# Patient Record
Sex: FEMALE | Race: WHITE | NOT HISPANIC OR LATINO | Employment: OTHER | URBAN - NONMETROPOLITAN AREA
[De-identification: names, ages, dates, MRNs, and addresses within clinical notes are randomized per-mention and may not be internally consistent; named-entity substitution may affect disease eponyms.]

---

## 2017-01-05 ENCOUNTER — OFFICE VISIT (OUTPATIENT)
Dept: FAMILY MEDICINE CLINIC | Facility: CLINIC | Age: 62
End: 2017-01-05

## 2017-01-05 VITALS
BODY MASS INDEX: 30.82 KG/M2 | DIASTOLIC BLOOD PRESSURE: 82 MMHG | WEIGHT: 185 LBS | SYSTOLIC BLOOD PRESSURE: 122 MMHG | HEIGHT: 65 IN

## 2017-01-05 DIAGNOSIS — M54.41 MIDLINE LOW BACK PAIN WITH BILATERAL SCIATICA, UNSPECIFIED CHRONICITY: Primary | ICD-10-CM

## 2017-01-05 DIAGNOSIS — M54.42 MIDLINE LOW BACK PAIN WITH BILATERAL SCIATICA, UNSPECIFIED CHRONICITY: Primary | ICD-10-CM

## 2017-01-05 DIAGNOSIS — Z23 FLU VACCINE NEED: ICD-10-CM

## 2017-01-05 PROCEDURE — 90471 IMMUNIZATION ADMIN: CPT | Performed by: FAMILY MEDICINE

## 2017-01-05 PROCEDURE — 99213 OFFICE O/P EST LOW 20 MIN: CPT | Performed by: FAMILY MEDICINE

## 2017-01-05 PROCEDURE — 90686 IIV4 VACC NO PRSV 0.5 ML IM: CPT | Performed by: FAMILY MEDICINE

## 2017-01-05 RX ORDER — CLONAZEPAM 1 MG/1
1 TABLET ORAL 3 TIMES DAILY PRN
Qty: 90 TABLET | Refills: 2 | Status: SHIPPED | OUTPATIENT
Start: 2017-01-05 | End: 2017-03-15

## 2017-01-05 RX ORDER — HYDROCODONE BITARTRATE AND ACETAMINOPHEN 10; 325 MG/1; MG/1
1 TABLET ORAL EVERY 6 HOURS PRN
Qty: 120 TABLET | Refills: 0 | Status: SHIPPED | OUTPATIENT
Start: 2017-01-05 | End: 2017-03-15 | Stop reason: SDDI

## 2017-01-05 RX ORDER — OXYCODONE HYDROCHLORIDE 60 MG/1
60 TABLET, FILM COATED, EXTENDED RELEASE ORAL EVERY 12 HOURS
Qty: 60 TABLET | Refills: 0 | Status: SHIPPED | OUTPATIENT
Start: 2017-01-05 | End: 2017-03-15 | Stop reason: SDDI

## 2017-01-05 NOTE — PROGRESS NOTES
Subjective   Lana Mata is a 61 y.o. female.     Back Pain   This is a chronic problem. The current episode started more than 1 year ago. The problem has been gradually worsening since onset. The pain is present in the lumbar spine. The quality of the pain is described as aching. The pain radiates to the right thigh and left thigh. The pain is at a severity of 7/10. The pain is moderate. The pain is worse during the night. The symptoms are aggravated by standing and lying down. Stiffness is present at night. Pertinent negatives include no bladder incontinence.        The following portions of the patient's history were reviewed and updated as appropriate: allergies, current medications, past family history, past medical history, past social history, past surgical history and problem list.    Review of Systems   Genitourinary: Negative for bladder incontinence.   Musculoskeletal: Positive for back pain.       Objective   Physical Exam   Constitutional: She is oriented to person, place, and time. She appears well-developed and well-nourished. No distress.   HENT:   Head: Normocephalic and atraumatic.   Musculoskeletal:        Lumbar back: She exhibits decreased range of motion, tenderness and pain. She exhibits no bony tenderness, no swelling, no edema, no deformity, no laceration and no spasm.   Neurological: She is alert and oriented to person, place, and time.   Reflex Scores:       Patellar reflexes are 2+ on the right side and 2+ on the left side.  Skin: Skin is warm and dry. She is not diaphoretic.   Psychiatric: She has a normal mood and affect. Her behavior is normal. Judgment and thought content normal.   Nursing note and vitals reviewed.      Assessment/Plan   Problems Addressed this Visit     None

## 2017-01-05 NOTE — PROGRESS NOTES
Subjective   Lana Mata is a 61 y.o. female.     Back Pain   This is a chronic problem. The current episode started more than 1 year ago. The problem has been gradually worsening since onset. The pain is present in the lumbar spine. The quality of the pain is described as aching. The pain radiates to the right thigh and left thigh. The pain is at a severity of 7/10. The pain is moderate. The pain is worse during the night. The symptoms are aggravated by standing and lying down. Stiffness is present at night. Pertinent negatives include no bladder incontinence, bowel incontinence or fever.        The following portions of the patient's history were reviewed and updated as appropriate: allergies, current medications, past family history, past medical history, past social history, past surgical history and problem list.    Review of Systems   Constitutional: Negative for fever.   Gastrointestinal: Negative for bowel incontinence.   Genitourinary: Negative for bladder incontinence.   Musculoskeletal: Positive for back pain.       Objective   Physical Exam   Constitutional: She is oriented to person, place, and time. She appears well-developed and well-nourished. No distress.   HENT:   Head: Normocephalic and atraumatic.   Musculoskeletal:        Lumbar back: She exhibits decreased range of motion, tenderness and pain. She exhibits no bony tenderness, no swelling, no edema, no deformity, no laceration and no spasm.   Neurological: She is alert and oriented to person, place, and time.   Reflex Scores:       Patellar reflexes are 2+ on the right side and 2+ on the left side.  Skin: Skin is warm and dry. She is not diaphoretic.   Psychiatric: She has a normal mood and affect. Her behavior is normal. Judgment and thought content normal.   Nursing note and vitals reviewed.      Assessment/Plan   Problems Addressed this Visit     None      Visit Diagnoses     Midline low back pain with bilateral sciatica, unspecified  chronicity    -  Primary

## 2017-01-05 NOTE — MR AVS SNAPSHOT
Lana Mata   1/5/2017 1:45 PM   Office Visit    Dept Phone:  [phone]   Encounter #:  [id number]    Provider:  Camron Hancock MD   Department:  [facility]                Your Full Care Plan              Where to Get Your Medications      You can get these medications from any pharmacy     Bring a paper prescription for each of these medications     clonazePAM 1 MG tablet    HYDROcodone-acetaminophen  MG per tablet    OxyCODONE HCl ER 60 MG 12 hr tablet            Your Updated Medication List          This list is accurate as of: 1/5/17  2:04 PM.  Always use your most recent med list.                aspirin 81 MG EC tablet       atorvastatin 20 MG tablet   Commonly known as:  LIPITOR   Take 1 tablet by mouth daily.       calcium carbonate-vitamin d 600-400 MG-UNIT per tablet   Take 1 tablet by mouth 2 (two) times a day.       calcium citrate-vitamin d 200-250 MG-UNIT tablet tablet   Commonly known as:  CITRACAL       carisoprodol 350 MG tablet   Commonly known as:  SOMA   Take 1 tablet by mouth Every Night.       clonazePAM 1 MG tablet   Commonly known as:  KlonoPIN   Take 1 tablet by mouth 3 (Three) Times a Day As Needed for seizures (anxiety).       CYANOCOBALAMIN ER PO       gabapentin 300 MG capsule   Commonly known as:  NEURONTIN   Take 1 capsule by mouth 3 (Three) Times a Day.       HYDROcodone-acetaminophen  MG per tablet   Commonly known as:  NORCO   Take 1 tablet by mouth Every 6 (Six) Hours As Needed for moderate pain (4-6).       lisinopril 40 MG tablet   Commonly known as:  PRINIVIL,ZESTRIL   Take 1 tablet by mouth Daily.       methIMAzole 5 MG tablet   Commonly known as:  TAPAZOLE       MILK THISTLE PO       nitroglycerin 0.4 MG SL tablet   Commonly known as:  NITROSTAT   Place 1 tablet under the tongue See Admin Instructions. PRN cp, if not relieved in 5 min repeat and go to ER       omeprazole 20 MG capsule   Commonly  known as:  priLOSEC   Take 1 capsule by mouth daily.       OxyCODONE HCl ER 60 MG 12 hr tablet   Commonly known as:  OXYCONTIN   Take 1 tablet by mouth Every 12 (Twelve) Hours.       potassium chloride 10 MEQ CR tablet   Commonly known as:  K-DUR   Take 1 tablet by mouth 2 (two) times a day.       sotalol 120 MG tablet   Commonly known as:  BETAPACE       triamterene-hydrochlorothiazide 37.5-25 MG per tablet   Commonly known as:  MAXZIDE-25   Take 1 tablet by mouth every other day.       TRILIPIX 135 MG capsule   Generic drug:  choline fenofibrate       * venlafaxine  MG 24 hr capsule   Commonly known as:  EFFEXOR-XR       * venlafaxine 75 MG tablet   Commonly known as:  EFFEXOR       VITAMIN D BOOSTER PO       * Notice:  This list has 2 medication(s) that are the same as other medications prescribed for you. Read the directions carefully, and ask your doctor or other care provider to review them with you.            We Performed the Following     Flu Vaccine Quad (FLUZONE)       You Were Diagnosed With        Codes Comments    Midline low back pain with bilateral sciatica, unspecified chronicity    -  Primary ICD-10-CM: M54.41, M54.42  ICD-9-CM: 724.3     Flu vaccine need     ICD-10-CM: Z23  ICD-9-CM: V04.81       Instructions     None    Patient Instructions History      Upcoming Appointments     Visit Type Date Time Department    OFFICE VISIT 1/5/2017  1:45 PM [facility]    OFFICE VISIT 2/6/2017  1:30 PM [facility]    OFFICE VISIT 3/15/2017 10:00 AM [facility]    OFFICE VISIT 3/27/2017  1:15 PM [facility] Sign[facility]     [facility] [employer] allows you to send messages to your doctor, view your test results, renew your prescriptions, schedule appointments, and more. To sign up, go to [employer] and click on the Sign Up Now link in the New User? box. Enter your [employer] Activation Code exactly as it appears below along with the last four digits of  "your Social Security Number and your Date of Birth () to complete the sign-up process. If you do not sign up before the expiration date, you must request a new code.    [employer] Activation Code: [email or web address]  Expires: 2017  2:02 PM    If you have questions, you can email [email or web address] or call [phone] to talk to our MyChart staff. Remember, MyChart is NOT to be used for urgent needs. For medical emergencies, dial 911.               Other Info from Your Visit           Your Appointments     2017  1:30 PM CST   Office Visit with Camron Hancock MD   [facility] (--)    [address] [place] [address] [address]  [address] [place] [id number] [phone]           Arrive 15 minutes prior to appointment.            Mar 15, 2017 10:00 AM CDT   Office Visit with Camron Hancock MD   [facility] (--)    [address] [place] [address] [address]  [facility] [address] [place] [id number] [phone]           Arrive 15 minutes prior to appointment.            Mar 27, 2017  1:15 PM CDT   Office Visit with Ar Gooden MD PhD   [facility]IOLOG[facility] (--)    [address] [place] [address] [address]  [facility] [address] [place] [id number] [phone]           Arrive 15 minutes prior to appointment.              Allergies     Codeine      Latex      Pravachol [Pravastatin Sodium]  Myalgia      Reason for Visit     Back Pain midline low back pain with bilateral sciatica,unspecified chronicity      Vital Signs     Blood Pressure Height Weight Last Menstrual Period Body Mass Index Smoking Status    122/82 65\" (165.1 cm) 185 lb (83.9 kg) (LMP Unknown) 30.79 kg/m2 Never Smoker      Problems and Diagnoses Noted     Low back pain    Needs flu shot          Immunizations Administered     Name Date    Flu Vaccine Split Quad         "

## 2017-01-07 ENCOUNTER — TRANSCRIBE ORDERS (OUTPATIENT)
Dept: ADMINISTRATIVE | Facility: HOSPITAL | Age: 62
End: 2017-01-07

## 2017-01-07 DIAGNOSIS — I34.0 NON-RHEUMATIC MITRAL REGURGITATION: Primary | ICD-10-CM

## 2017-01-26 NOTE — TELEPHONE ENCOUNTER
Ms. torres will contact her pharmacy as she should have refills remaining. She will call if they tell her they need a new rx.

## 2017-01-31 ENCOUNTER — OFFICE VISIT (OUTPATIENT)
Dept: PAIN MEDICINE | Facility: CLINIC | Age: 62
End: 2017-01-31

## 2017-01-31 ENCOUNTER — LAB (OUTPATIENT)
Dept: LAB | Facility: HOSPITAL | Age: 62
End: 2017-01-31

## 2017-01-31 VITALS
WEIGHT: 190.2 LBS | HEIGHT: 65 IN | SYSTOLIC BLOOD PRESSURE: 158 MMHG | DIASTOLIC BLOOD PRESSURE: 90 MMHG | BODY MASS INDEX: 31.69 KG/M2

## 2017-01-31 DIAGNOSIS — M54.16 LUMBAR RADICULOPATHY: ICD-10-CM

## 2017-01-31 DIAGNOSIS — Z98.890 HISTORY OF LUMBAR SURGERY: ICD-10-CM

## 2017-01-31 DIAGNOSIS — M43.06 LUMBAR SPONDYLOLYSIS: ICD-10-CM

## 2017-01-31 DIAGNOSIS — M51.36 DDD (DEGENERATIVE DISC DISEASE), LUMBAR: Primary | ICD-10-CM

## 2017-01-31 DIAGNOSIS — Z79.891 LONG TERM (CURRENT) USE OF OPIATE ANALGESIC: ICD-10-CM

## 2017-01-31 DIAGNOSIS — Z79.891 LONG TERM (CURRENT) USE OF OPIATE ANALGESIC: Primary | ICD-10-CM

## 2017-01-31 PROCEDURE — G0481 DRUG TEST DEF 8-14 CLASSES: HCPCS | Performed by: NURSE PRACTITIONER

## 2017-01-31 PROCEDURE — 80307 DRUG TEST PRSMV CHEM ANLYZR: CPT | Performed by: NURSE PRACTITIONER

## 2017-01-31 PROCEDURE — 99204 OFFICE O/P NEW MOD 45 MIN: CPT | Performed by: NURSE PRACTITIONER

## 2017-01-31 PROCEDURE — 82570 ASSAY OF URINE CREATININE: CPT | Performed by: NURSE PRACTITIONER

## 2017-01-31 NOTE — PROGRESS NOTES
Lana Mata is a 61 y.o. female.   1955    HPI:   Location: lower back  Quality: pressure, throbbing and sharp  Severity: 5/10  Timing: constant  Alleviating: nothing  Aggravating: increased activity      Pt referred pain management via Dr. Magallanes related to Chronic Lower Back pain. Pt states lower back pain started 1995 at work. Pt was seen per Dr. Velasco PCP, Xray and MRI related to on and off left sided leg pain. Physical Therapy and Pain med (high pain tolerances). Dr. [facility] and eventually Dr. Landers ([facility]) MRI and scheduled lower disc left sided L5-S1 diskectomy surgery. PT states was not healing well but left leg pain did improve. Second back surgery 1996, Dr Landers and Dr. Jovel diskectomy right sided L5-S1 and hardware Fusion. Pt states pain was tolerable but leg left pain continued intermittently. Pt remained using pain medicine for years, and right leg became irritated and seen Dr. Landers 3rd surgery 2016, MRI done and DX Spinal stenosis--took small rods out and put bigger rods in and CAGED. Pt has been on Soma, Norco, Oxycontin, and neurontin for pain control by Dr. Magallanes. Depression controlled by Effexor PCP---hx Depression and suicide attempts in 1990s, seen Jhonathan. PCP wants pt to see Psychiatrist, I recommended that she sees a counselor and/or psychiatrist ASAP if patient wants to return in one week when PCP pain medicines run out--however it is noted and explained to the patient that pain medicines may or may not be resumed by this pain management establishment.  I would like to re-Dr. Magallanes's notes regarding apparent no-show for UDS that is in question.  Examination a patient, history and physical, obtaining images for review, discussion pain management options, recommending psychiatry or counseling before next visit, ordering UDS, and discussion with Dr. Eugene Brandon this is a 60 minute visit.          PROCEDURE- MRI lumbar spine without  contrast.      HISTORY- Back pain radiates down both hips. Prior lumbar surgery.      TECHNIQUE- Multiplanar multisequence noncontrast images lumbar spine.      Findings/conclusion-      There is a 0.7 cm anterior spondylolisthesis of L4 with respect L5  .This is a new finding in comparison with a prior lumbar spine x-ray  dated June 20, 2010.      There is a posterior lumbar fusion L5, S1 stabilized by posterior  vertical rods and 4 pedicle screws.   Pedicle screws unfortunately cause severe degradation of normal  anatomy at L5 and S1. Because of this MRI evaluation of L5 and S1  region is nondiagnostic.      MRI Lumbar spine is otherwise unremarkable.          Electronically Signed By- Jose Abbott MD On: 2015-03-17 17:34    The following portions of the patient's history were reviewed by me and updated as appropriate: allergies, current medications, past family history, past medical history, past social history, past surgical history and problem list.    Past Medical History   Diagnosis Date   • Abnormal gait 11/28/2012   • Abrasion 02/26/2015   • Acute ankle pain 03/31/2016   • Acute bronchitis 09/04/2015   • Allergic rhinitis 05/22/2012   • Asthenia 05/22/2014   • Atrial fibrillation 03/04/2016     - converted    • Attempted suicide    • Benign essential hypertension 03/04/2016   • Cardiovascular stress test abnormal 03/04/2016   • Cellulitis of knee 03/03/2015   • Chest pain 04/02/2015   • Chronic pain disorder    • Common cold 06/24/2014   • Congestive heart failure 02/04/2016   • Depressive disorder 05/22/2015     suspect more complicated psych issues      • Gastroesophageal reflux disease 01/17/2013   • Generalized anxiety disorder 02/04/2016   • H/O echocardiogram      Normal LV function with Ef of 60-65%.Mildly dilated right ventricle with normal function. Grade 1B diastolic dysfunction with mild CLVH.NO sig. valvular regurg. or stenosis.   • Headache 10/27/2014   • Hyperlipidemia 03/04/2016   • Low back  pain 06/30/2016     Need for prophylactic vaccination against Streptococcus pneumoniae [pneumococcus]    01/23/2015    • Neck pain 06/30/2016   • Obstructive sleep apnea of adult 07/30/2015   • Obstructive sleep apnea syndrome    • Osteoarthritis    • Osteoporosis 06/30/2016   • Pain in right shoulder 06/30/2016   • Pain in right shoulder 04/24/2014   • Shortness of breath 04/02/2015   • Shortness of breath 04/02/2015   • Skin ulcer 04/13/2015     left   • Thyrotoxicosis with or without goiter 10/24/2014   • Urinary incontinence 05/22/2014       Social History     Social History   • Marital status:      Spouse name: N/A   • Number of children: N/A   • Years of education: N/A     Occupational History   • Not on file.     Social History Main Topics   • Smoking status: Never Smoker   • Smokeless tobacco: Not on file      Comment: smoking cessation    • Alcohol use No   • Drug use: No      Comment: lortab / oxycontin   • Sexual activity: Defer     Other Topics Concern   • Not on file     Social History Narrative       Family History   Problem Relation Age of Onset   • Hypertension Mother    • Hyperlipidemia Mother    • Mental illness Mother    • Thyroid disease Mother    • Diabetes Maternal Grandmother    • Cancer Maternal Grandmother    • Depression Other    • Diabetes Other    • Heart disease Other    • Hypertension Other    • Osteoporosis Other    • Thyroid disease Other    • Cancer Maternal Aunt    • Cancer Paternal Aunt          Current Outpatient Prescriptions:   •  aspirin 81 MG EC tablet, Take 81 mg by mouth 2 (two) times a day., Disp: , Rfl:   •  atorvastatin (LIPITOR) 20 MG tablet, Take 1 tablet by mouth daily., Disp: 90 tablet, Rfl: 1  •  calcium carbonate-vitamin d 600-400 MG-UNIT per tablet, Take 1 tablet by mouth 2 (two) times a day., Disp: 60 tablet, Rfl: 0  •  calcium citrate-vitamin d (CITRACAL) 200-250 MG-UNIT tablet tablet, Take  by mouth daily., Disp: , Rfl:   •  carisoprodol (SOMA) 350 MG  tablet, Take 1 tablet by mouth Every Night., Disp: 30 tablet, Rfl: 5  •  choline fenofibrate (TRILIPIX) 135 MG capsule, Take 135 mg by mouth daily., Disp: , Rfl:   •  clonazePAM (KlonoPIN) 1 MG tablet, Take 1 tablet by mouth 3 (Three) Times a Day As Needed for seizures (anxiety)., Disp: 90 tablet, Rfl: 2  •  CYANOCOBALAMIN ER PO, Take  by mouth., Disp: , Rfl:   •  gabapentin (NEURONTIN) 300 MG capsule, Take 1 capsule by mouth 3 (Three) Times a Day., Disp: 90 capsule, Rfl: 11  •  HYDROcodone-acetaminophen (NORCO)  MG per tablet, Take 1 tablet by mouth Every 6 (Six) Hours As Needed for moderate pain (4-6)., Disp: 120 tablet, Rfl: 0  •  lisinopril (PRINIVIL,ZESTRIL) 40 MG tablet, Take 1 tablet by mouth Daily., Disp: 30 tablet, Rfl: 11  •  methimazole (TAPAZOLE) 5 MG tablet, Take 5 mg by mouth daily., Disp: , Rfl:   •  MILK THISTLE PO, Take  by mouth 2 (two) times a day., Disp: , Rfl:   •  nitroglycerin (NITROSTAT) 0.4 MG SL tablet, Place 1 tablet under the tongue See Admin Instructions. PRN cp, if not relieved in 5 min repeat and go to ER, Disp: 25 tablet, Rfl: 12  •  Nutritional Supplements (VITAMIN D BOOSTER PO), Take  by mouth., Disp: , Rfl:   •  omeprazole (PriLOSEC) 20 MG capsule, Take 1 capsule by mouth daily., Disp: 90 capsule, Rfl: 3  •  OxyCODONE HCl ER (OXYCONTIN) 60 MG 12 hr tablet, Take 1 tablet by mouth Every 12 (Twelve) Hours., Disp: 60 tablet, Rfl: 0  •  potassium chloride (K-DUR) 10 MEQ CR tablet, Take 1 tablet by mouth 2 (two) times a day., Disp: 60 tablet, Rfl: 11  •  sotalol (BETAPACE) 120 MG tablet, Take 120 mg by mouth 2 (two) times a day., Disp: , Rfl:   •  triamterene-hydrochlorothiazide (MAXZIDE-25) 37.5-25 MG per tablet, Take 1 tablet by mouth every other day., Disp: 90 tablet, Rfl: 3  •  venlafaxine (EFFEXOR) 75 MG tablet, Take 75 mg by mouth daily., Disp: , Rfl:   •  venlafaxine XR (EFFEXOR-XR) 150 MG 24 hr capsule, Take 150 mg by mouth daily., Disp: , Rfl:     Allergies   Allergen  Reactions   • Codeine    • Latex    • Pravachol [Pravastatin Sodium] Myalgia         She has already failed the following measures, including:   Conservative measures: ice and heat     Review of Systems   Musculoskeletal: Positive for back pain.   Neurological: Positive for headaches.   Psychiatric/Behavioral: Positive for dysphoric mood. The patient is nervous/anxious.      10 system review of systems was reviewed and negative except for above.    Physical Exam   Constitutional: She is oriented to person, place, and time. She appears well-developed and well-nourished.   HENT:   Head: Normocephalic.   Eyes: Conjunctivae and EOM are normal. Pupils are equal, round, and reactive to light.   Neck: Normal range of motion. Neck supple.   Cardiovascular: Normal rate and regular rhythm.    Pulmonary/Chest: Effort normal and breath sounds normal.   Abdominal: Soft. Bowel sounds are normal.   Musculoskeletal:        Thoracic back: She exhibits tenderness ( muscular in nature).        Lumbar back: She exhibits decreased range of motion ( flex 40 deg and 10 ext deg with mild FL ), tenderness, pain and spasm.        Back:    Tenderness bilateral SI joints   Neurological: She is alert and oriented to person, place, and time. A sensory deficit ( bilateral lateral thighs with light touch decrease in sensation) is present. Gait ( WC) abnormal.   Reflex Scores:       Tricep reflexes are 2+ on the right side and 2+ on the left side.       Bicep reflexes are 2+ on the right side and 2+ on the left side.       Brachioradialis reflexes are 2+ on the right side and 2+ on the left side.       Patellar reflexes are 2+ on the right side and 2+ on the left side.       Achilles reflexes are 2+ on the right side and 2+ on the left side.  Positive mild  SRL bilateral L>R   Skin: Skin is warm and dry.   Psychiatric: She has a normal mood and affect. Her behavior is normal. Judgment normal.   Nursing note and vitals reviewed.      Lana was seen  today for back pain.    Diagnoses and all orders for this visit:    DDD (degenerative disc disease), lumbar    Lumbar radiculopathy    Lumbar spondylolysis    History of lumbar surgery    Long term (current) use of opiate analgesic  -     ToxASSURE Select 13 (MW)        Medication: None at this time. Pt has been on Soma, Norco, Oxycontin, and neurontin for pain control by Dr. Magallanes. I explained to the patient at this current time no opiate medications will be taken overall prescribed.     Interventional:  Discussed TONI and need for emergency treatment. Pt currently on Xarleto chronically related to chronic A-Fib.    Rehab: none at this time.  Patient may be a candidate for future physical therapy.     Behavioral: No aberrant behavior noted. KAYLEIGH Report # [id number]  was reviewed and is consistent with stated history    Urine drug screen Ordered today to test for drugs of abuse and prescribed medications.  I understand that patient has currently taking Soma, OxyContin, Norco for pain management by primary care.  There are seems to be issues of depression that needs to be taken care of by psychiatry of counseling patient states that primary care recommended that she sees a psychiatrist which she has not done, I instructed her that she needs an appointment with a psychiatrist or counselor within 1 week before her pain medicine runs out and returned back to pain management for the Re-evaluation this will be attempt to continue or take over her opiate medication which is not a definite at this present time and this is discussed with the patient.      ORT: 6  Depression hx and suicide attempts in 1990s.  There are several areas of concern with opiate medication at this present time.  I hope to review Dr. Magallanes's last dose about aberrant UDS or no-show as reported.  Patient is to have a psychiatry of counseling appointment within 1 week.  A UDS will be done today with a possible result by within 1 week.     PHQ-9:  24          This document has been electronically signed by JOHN Woo on January 31, 2017 6:12 PM          This document has been electronically signed by JOHN Woo on January 31, 2017 6:12 PM

## 2017-02-05 LAB
CONV REPORT SUMMARY: NORMAL
Lab: NORMAL

## 2017-02-06 ENCOUNTER — OFFICE VISIT (OUTPATIENT)
Dept: PAIN MEDICINE | Facility: CLINIC | Age: 62
End: 2017-02-06

## 2017-02-06 VITALS
SYSTOLIC BLOOD PRESSURE: 140 MMHG | DIASTOLIC BLOOD PRESSURE: 88 MMHG | BODY MASS INDEX: 30.54 KG/M2 | WEIGHT: 183.3 LBS | HEIGHT: 65 IN

## 2017-02-06 DIAGNOSIS — Z98.890 HISTORY OF LUMBAR SURGERY: ICD-10-CM

## 2017-02-06 DIAGNOSIS — M51.36 DDD (DEGENERATIVE DISC DISEASE), LUMBAR: Primary | ICD-10-CM

## 2017-02-06 DIAGNOSIS — M54.16 LUMBAR RADICULOPATHY: ICD-10-CM

## 2017-02-06 DIAGNOSIS — M43.06 LUMBAR SPONDYLOLYSIS: ICD-10-CM

## 2017-02-06 PROCEDURE — 99213 OFFICE O/P EST LOW 20 MIN: CPT | Performed by: NURSE PRACTITIONER

## 2017-02-06 NOTE — PROGRESS NOTES
"Lana Mata is a 61 y.o. female.   1955    HPI:   Location: lower back  Quality: pressure,throbbing, and sharp  Severity: 5/10  Timing: constant  Alleviating: nothing  Aggravating: increased activity     Pt reports has upcoming appointment [facility] as discussed. Pt states \"will ask to up her Effexor\". UDS results reviewed. Reviewed Elpidio Quinn- UDS missed 1- and no account of pill count noted.  Discussed TONI and the need for emergency care.. Discussed today's visit is a follow-up related to counselor appointment, reviewing Dr. Magallanes's notes, and review of UDS.  Patient states \"you are going to write my pain medicines correct\"      The following portions of the patient's history were reviewed by me and updated as appropriate: allergies, current medications, past family history, past medical history, past social history, past surgical history and problem list.    Past Medical History   Diagnosis Date   • Abnormal gait 11/28/2012   • Abrasion 02/26/2015   • Acute ankle pain 03/31/2016   • Acute bronchitis 09/04/2015   • Allergic rhinitis 05/22/2012   • Asthenia 05/22/2014   • Atrial fibrillation 03/04/2016     - converted    • Attempted suicide    • Benign essential hypertension 03/04/2016   • Cardiovascular stress test abnormal 03/04/2016   • Cellulitis of knee 03/03/2015   • Chest pain 04/02/2015   • Chronic pain disorder    • Common cold 06/24/2014   • Congestive heart failure 02/04/2016   • Depressive disorder 05/22/2015     suspect more complicated psych issues      • Gastroesophageal reflux disease 01/17/2013   • Generalized anxiety disorder 02/04/2016   • H/O echocardiogram      Normal LV function with Ef of 60-65%.Mildly dilated right ventricle with normal function. Grade 1B diastolic dysfunction with mild CLVH.NO sig. valvular regurg. or stenosis.   • Headache 10/27/2014   • Hyperlipidemia 03/04/2016   • Low back pain 06/30/2016     Need for prophylactic vaccination against " Streptococcus pneumoniae [pneumococcus]    01/23/2015    • Neck pain 06/30/2016   • Obstructive sleep apnea of adult 07/30/2015   • Obstructive sleep apnea syndrome    • Osteoarthritis    • Osteoporosis 06/30/2016   • Pain in right shoulder 06/30/2016   • Pain in right shoulder 04/24/2014   • Shortness of breath 04/02/2015   • Shortness of breath 04/02/2015   • Skin ulcer 04/13/2015     left   • Thyrotoxicosis with or without goiter 10/24/2014   • Urinary incontinence 05/22/2014       Social History     Social History   • Marital status:      Spouse name: N/A   • Number of children: N/A   • Years of education: N/A     Occupational History   • Not on file.     Social History Main Topics   • Smoking status: Never Smoker   • Smokeless tobacco: Not on file      Comment: smoking cessation    • Alcohol use No   • Drug use: No      Comment: lortab / oxycontin   • Sexual activity: Defer     Other Topics Concern   • Not on file     Social History Narrative       Family History   Problem Relation Age of Onset   • Hypertension Mother    • Hyperlipidemia Mother    • Mental illness Mother    • Thyroid disease Mother    • Diabetes Maternal Grandmother    • Cancer Maternal Grandmother    • Depression Other    • Diabetes Other    • Heart disease Other    • Hypertension Other    • Osteoporosis Other    • Thyroid disease Other    • Cancer Maternal Aunt    • Cancer Paternal Aunt          Current Outpatient Prescriptions:   •  aspirin 81 MG EC tablet, Take 81 mg by mouth 2 (two) times a day., Disp: , Rfl:   •  atorvastatin (LIPITOR) 20 MG tablet, Take 1 tablet by mouth daily., Disp: 90 tablet, Rfl: 1  •  calcium carbonate-vitamin d 600-400 MG-UNIT per tablet, Take 1 tablet by mouth 2 (two) times a day., Disp: 60 tablet, Rfl: 0  •  calcium citrate-vitamin d (CITRACAL) 200-250 MG-UNIT tablet tablet, Take  by mouth daily., Disp: , Rfl:   •  carisoprodol (SOMA) 350 MG tablet, Take 1 tablet by mouth Every Night., Disp: 30 tablet,  Rfl: 5  •  choline fenofibrate (TRILIPIX) 135 MG capsule, Take 135 mg by mouth daily., Disp: , Rfl:   •  clonazePAM (KlonoPIN) 1 MG tablet, Take 1 tablet by mouth 3 (Three) Times a Day As Needed for seizures (anxiety)., Disp: 90 tablet, Rfl: 2  •  CYANOCOBALAMIN ER PO, Take  by mouth., Disp: , Rfl:   •  gabapentin (NEURONTIN) 300 MG capsule, Take 1 capsule by mouth 3 (Three) Times a Day., Disp: 90 capsule, Rfl: 11  •  HYDROcodone-acetaminophen (NORCO)  MG per tablet, Take 1 tablet by mouth Every 6 (Six) Hours As Needed for moderate pain (4-6)., Disp: 120 tablet, Rfl: 0  •  lisinopril (PRINIVIL,ZESTRIL) 40 MG tablet, Take 1 tablet by mouth Daily., Disp: 30 tablet, Rfl: 11  •  methimazole (TAPAZOLE) 5 MG tablet, Take 5 mg by mouth daily., Disp: , Rfl:   •  MILK THISTLE PO, Take  by mouth 2 (two) times a day., Disp: , Rfl:   •  nitroglycerin (NITROSTAT) 0.4 MG SL tablet, Place 1 tablet under the tongue See Admin Instructions. PRN cp, if not relieved in 5 min repeat and go to ER, Disp: 25 tablet, Rfl: 12  •  Nutritional Supplements (VITAMIN D BOOSTER PO), Take  by mouth., Disp: , Rfl:   •  omeprazole (PriLOSEC) 20 MG capsule, Take 1 capsule by mouth daily., Disp: 90 capsule, Rfl: 3  •  OxyCODONE HCl ER (OXYCONTIN) 60 MG 12 hr tablet, Take 1 tablet by mouth Every 12 (Twelve) Hours., Disp: 60 tablet, Rfl: 0  •  potassium chloride (K-DUR) 10 MEQ CR tablet, Take 1 tablet by mouth 2 (two) times a day., Disp: 60 tablet, Rfl: 11  •  sotalol (BETAPACE) 120 MG tablet, Take 120 mg by mouth 2 (two) times a day., Disp: , Rfl:   •  triamterene-hydrochlorothiazide (MAXZIDE-25) 37.5-25 MG per tablet, Take 1 tablet by mouth every other day., Disp: 90 tablet, Rfl: 3  •  venlafaxine (EFFEXOR) 75 MG tablet, Take 75 mg by mouth daily., Disp: , Rfl:   •  venlafaxine XR (EFFEXOR-XR) 150 MG 24 hr capsule, Take 150 mg by mouth daily., Disp: , Rfl:     Allergies   Allergen Reactions   • Codeine    • Latex    • Pravachol [Pravastatin  Sodium] Myalgia         Review of Systems   Musculoskeletal: Positive for back pain (lower).     10 system review of systems was reviewed and negative except for above.    Physical Exam   Constitutional: She is oriented to person, place, and time. She appears well-developed and well-nourished.   HENT:   Head: Normocephalic.   Eyes: Conjunctivae and EOM are normal. Pupils are equal, round, and reactive to light.   Neck: Normal range of motion. Neck supple.   Cardiovascular: Normal rate.    Pulmonary/Chest: Effort normal.   Abdominal: Soft.   Musculoskeletal:        Thoracic back: She exhibits tenderness ( muscular in nature).        Lumbar back: She exhibits decreased range of motion ( flex 40 deg and 10 ext deg with mild FL  ), tenderness and pain.        Back:    Tenderness bilateral SI joints   Neurological: She is alert and oriented to person, place, and time. A sensory deficit ( bilateral lateral thighs with light touch decrease in sensation) is present. Gait ( WC) abnormal.   Reflex Scores:       Tricep reflexes are 2+ on the right side and 2+ on the left side.       Bicep reflexes are 2+ on the right side and 2+ on the left side.       Brachioradialis reflexes are 2+ on the right side and 2+ on the left side.       Patellar reflexes are 2+ on the right side and 2+ on the left side.       Achilles reflexes are 2+ on the right side and 2+ on the left side.  Positive mild  SRL bilateral L>R   Skin: Skin is warm and dry.   Psychiatric: She has a normal mood and affect. Her behavior is normal. Judgment normal. She expresses no homicidal and no suicidal ideation. She expresses no suicidal plans.   Nursing note and vitals reviewed.      Lana was seen today for back pain.    Diagnoses and all orders for this visit:    DDD (degenerative disc disease), lumbar    Lumbar radiculopathy    Lumbar spondylolysis    History of lumbar surgery        Medication: None at this time.  I encouraged patient to keep counseling  "appointment next week.  After reviewing Dr. Magallanes's notes, KAYLEIGH report from January 2016, it is noted that patient did not show up for pill count and missed a urinary drug screen.  As I stated in my notes prior visit, I reviewed Dr. Magallanes's notes and I confronted patient.  Patient was agitated saying that no one in the office told her about the pill count or UDS-I commonly reviewed pain management options and medical decision related to no opiate medication prescriptions today.  Patient demanded to talk to someone \"higher up\" and possibly Dr. Magallanes's office.  Dr. Eugene Brandon, practice manager [place], and I commonly talk with patient respectively over issue.  Patient \"Dr. Pompa sent me to get pain medicines, I'm not leaving until I get pain medicines,\".  Explained that today's evaluation after one week to insure that she has a follow up with Counselor, review Dr. Magallanes's notes, and review UDS.  Patient again became agitated and demanding.  Patient states \"everybody else is getting pain medicine by her…\" Patient irritated as she was assisted by security towards elevators.  Patient may follow up with pain management when necessary, but it was discussed that alternative treatments and medications may be prescribed but opiate medications will not be part of the treatment.    Interventional: Discussed in detail lumbar injection interventions and alternative muscle relaxers; patient declined. Pt following up with tessa Olivera. Follow up PCP Elpidio.     Rehab: Discussed physical therapy for mobility and pain; patient declined. Possible future candidate for Physical Therapy.     Behavioral: No aberrant behavior noted. KAYLEIGH Report # [id number]  was reviewed and is consistent with stated history    Urine drug screen Reviewed from last visit and is appropriate.  Patient become very irate and agitated after reviewing Dr. Magallanes's notes and speaking with his staff, patient did not show up for pill count and urinary drug " screen.          This document has been electronically signed by JOHN Woo on February 6, 2017 12:51 PM

## 2017-02-14 NOTE — PROGRESS NOTES
Addendum:  I have reviewed this patient with JOHN Silver.  I agree with the above findings and plan.  I have personally spoken with the patient today, and confirmed key findings.

## 2017-02-16 ENCOUNTER — DOCUMENTATION (OUTPATIENT)
Dept: FAMILY MEDICINE CLINIC | Facility: CLINIC | Age: 62
End: 2017-02-16

## 2017-02-16 NOTE — PROGRESS NOTES
On 01/16/2017, I called the patient to tell her to come in for a Random Pill Count and Urine Drug Screen for her controlled pain medication.  Told patient that she had 24 hours per Dr. Hancock to come in and have these two things done.  Pt stated that she understood.  Pt did not show up within the 24 hour time.  Per Dr. Hancock, I called patient and told her that due to her not coming in, Dr. Hancock would no longer be able to prescribe any controlled medications for her.  Relayed to patient that Dr. Hancock would still be able to see her for any medical issues, but that we would have to refer her to a Pain Management provider for her controlled medications.  Pt was upset, but stated that she did indeed want to see a pain management provider.  Per Dr. Hancock, referral was sent to Dr. Jacob Brandon for pain management.

## 2017-03-09 ENCOUNTER — HOSPITAL ENCOUNTER (EMERGENCY)
Facility: HOSPITAL | Age: 62
Discharge: HOME | End: 2017-03-09
Attending: EMERGENCY MEDICINE | Admitting: EMERGENCY MEDICINE

## 2017-03-09 ENCOUNTER — APPOINTMENT (OUTPATIENT)
Dept: GENERAL RADIOLOGY | Facility: HOSPITAL | Age: 62
End: 2017-03-09

## 2017-03-09 VITALS
HEART RATE: 103 BPM | TEMPERATURE: 97.9 F | WEIGHT: 189 LBS | RESPIRATION RATE: 18 BRPM | OXYGEN SATURATION: 96 % | BODY MASS INDEX: 31.49 KG/M2 | HEIGHT: 65 IN | DIASTOLIC BLOOD PRESSURE: 74 MMHG | SYSTOLIC BLOOD PRESSURE: 169 MMHG

## 2017-03-09 DIAGNOSIS — F41.9 ANXIETY: Primary | ICD-10-CM

## 2017-03-09 DIAGNOSIS — R06.00 DYSPNEA, UNSPECIFIED TYPE: ICD-10-CM

## 2017-03-09 LAB
ALBUMIN SERPL-MCNC: 4.7 G/DL (ref 3.4–4.8)
ALBUMIN/GLOB SERPL: 1.4 G/DL (ref 1.1–1.8)
ALP SERPL-CCNC: 57 U/L (ref 38–126)
ALT SERPL W P-5'-P-CCNC: 26 U/L (ref 9–52)
ANION GAP SERPL CALCULATED.3IONS-SCNC: 12 MMOL/L (ref 5–15)
APTT PPP: 32.9 SECONDS (ref 20–40.3)
AST SERPL-CCNC: 29 U/L (ref 14–36)
BASOPHILS # BLD AUTO: 0.06 10*3/MM3 (ref 0–0.2)
BASOPHILS NFR BLD AUTO: 0.7 % (ref 0–2)
BILIRUB SERPL-MCNC: 0.7 MG/DL (ref 0.2–1.3)
BUN BLD-MCNC: 14 MG/DL (ref 7–21)
BUN/CREAT SERPL: 14.3 (ref 7–25)
CALCIUM SPEC-SCNC: 10.1 MG/DL (ref 8.4–10.2)
CHLORIDE SERPL-SCNC: 102 MMOL/L (ref 95–110)
CK MB SERPL-CCNC: 1.7 NG/ML (ref 0–5)
CO2 SERPL-SCNC: 27 MMOL/L (ref 22–31)
CREAT BLD-MCNC: 0.98 MG/DL (ref 0.5–1)
DEPRECATED RDW RBC AUTO: 41.6 FL (ref 36.4–46.3)
EOSINOPHIL # BLD AUTO: 0.25 10*3/MM3 (ref 0–0.7)
EOSINOPHIL NFR BLD AUTO: 3 % (ref 0–7)
ERYTHROCYTE [DISTWIDTH] IN BLOOD BY AUTOMATED COUNT: 13 % (ref 11.5–14.5)
GFR SERPL CREATININE-BSD FRML MDRD: 58 ML/MIN/1.73 (ref 45–104)
GLOBULIN UR ELPH-MCNC: 3.3 GM/DL (ref 2.3–3.5)
GLUCOSE BLD-MCNC: 130 MG/DL (ref 60–100)
HCT VFR BLD AUTO: 46 % (ref 35–45)
HGB BLD-MCNC: 16.1 G/DL (ref 12–15.5)
HOLD SPECIMEN: NORMAL
IMM GRANULOCYTES # BLD: 0.01 10*3/MM3 (ref 0–0.02)
IMM GRANULOCYTES NFR BLD: 0.1 % (ref 0–0.5)
INR PPP: 1.58 (ref 0.8–1.2)
LYMPHOCYTES # BLD AUTO: 2.58 10*3/MM3 (ref 0.6–4.2)
LYMPHOCYTES NFR BLD AUTO: 30.8 % (ref 10–50)
MCH RBC QN AUTO: 30.8 PG (ref 26.5–34)
MCHC RBC AUTO-ENTMCNC: 35 G/DL (ref 31.4–36)
MCV RBC AUTO: 88.1 FL (ref 80–98)
MONOCYTES # BLD AUTO: 0.65 10*3/MM3 (ref 0–0.9)
MONOCYTES NFR BLD AUTO: 7.7 % (ref 0–12)
NEUTROPHILS # BLD AUTO: 4.84 10*3/MM3 (ref 2–8.6)
NEUTROPHILS NFR BLD AUTO: 57.7 % (ref 37–80)
NT-PROBNP SERPL-MCNC: 2390 PG/ML (ref 0–900)
PLATELET # BLD AUTO: 324 10*3/MM3 (ref 150–450)
PMV BLD AUTO: 10.2 FL (ref 8–12)
POTASSIUM BLD-SCNC: 4.3 MMOL/L (ref 3.5–5.1)
PROT SERPL-MCNC: 8 G/DL (ref 6.3–8.6)
PROTHROMBIN TIME: 18.6 SECONDS (ref 11.1–15.3)
RBC # BLD AUTO: 5.22 10*6/MM3 (ref 3.77–5.16)
SODIUM BLD-SCNC: 141 MMOL/L (ref 137–145)
TROPONIN I SERPL-MCNC: 0.02 NG/ML
WBC NRBC COR # BLD: 8.39 10*3/MM3 (ref 3.2–9.8)

## 2017-03-09 PROCEDURE — 93005 ELECTROCARDIOGRAM TRACING: CPT

## 2017-03-09 PROCEDURE — 82553 CREATINE MB FRACTION: CPT | Performed by: FAMILY MEDICINE

## 2017-03-09 PROCEDURE — 80053 COMPREHEN METABOLIC PANEL: CPT | Performed by: FAMILY MEDICINE

## 2017-03-09 PROCEDURE — 99284 EMERGENCY DEPT VISIT MOD MDM: CPT

## 2017-03-09 PROCEDURE — 85610 PROTHROMBIN TIME: CPT | Performed by: FAMILY MEDICINE

## 2017-03-09 PROCEDURE — 84484 ASSAY OF TROPONIN QUANT: CPT | Performed by: FAMILY MEDICINE

## 2017-03-09 PROCEDURE — 71020 HC CHEST PA AND LATERAL: CPT

## 2017-03-09 PROCEDURE — 83880 ASSAY OF NATRIURETIC PEPTIDE: CPT | Performed by: FAMILY MEDICINE

## 2017-03-09 PROCEDURE — 85730 THROMBOPLASTIN TIME PARTIAL: CPT | Performed by: FAMILY MEDICINE

## 2017-03-09 PROCEDURE — 93010 ELECTROCARDIOGRAM REPORT: CPT | Performed by: INTERNAL MEDICINE

## 2017-03-09 PROCEDURE — 85025 COMPLETE CBC W/AUTO DIFF WBC: CPT | Performed by: FAMILY MEDICINE

## 2017-03-09 RX ORDER — LABETALOL HYDROCHLORIDE 5 MG/ML
10 INJECTION, SOLUTION INTRAVENOUS ONCE
Status: DISCONTINUED | OUTPATIENT
Start: 2017-03-09 | End: 2017-03-09 | Stop reason: HOSPADM

## 2017-03-09 RX ORDER — HYDROCHLOROTHIAZIDE 25 MG/1
25 TABLET ORAL DAILY
COMMUNITY
End: 2017-03-27

## 2017-03-09 RX ORDER — SODIUM CHLORIDE 0.9 % (FLUSH) 0.9 %
10 SYRINGE (ML) INJECTION AS NEEDED
Status: DISCONTINUED | OUTPATIENT
Start: 2017-03-09 | End: 2017-03-09 | Stop reason: HOSPADM

## 2017-03-09 RX ORDER — HYDROXYZINE PAMOATE 50 MG/1
50 CAPSULE ORAL 3 TIMES DAILY PRN
Qty: 30 CAPSULE | Refills: 0 | Status: SHIPPED | OUTPATIENT
Start: 2017-03-09 | End: 2017-03-27

## 2017-03-09 NOTE — ED PROVIDER NOTES
Subjective     History provided by:  Patient    Lana Mata is a 62 year old female with a history complicated by previous Afib, CHF Grade 1B diastolic dysfunction with EF 60-65% and sleep apnea who presented with a 1 day history of dyspnea, increased blood pressure, headache, dizzyness, weakness. This morning she said she felt like her heart was racing and checked her bp and it was 158/110. These findings along with her feeling of increased dyspnea is what brought her to the ED today. Denies chest pain, wheeze, vomiting, syncope. Has an echo scheduled next Friday with a follow up visit on the 27th with Dr. Gooden.     Review of Systems   Constitutional: Negative for activity change, appetite change, fatigue and fever.   HENT: Negative for congestion, nosebleeds, postnasal drip, rhinorrhea, sneezing and sore throat.    Eyes: Negative for pain, discharge, itching and visual disturbance.   Respiratory: Positive for shortness of breath. Negative for cough, choking, wheezing and stridor.    Cardiovascular: Positive for palpitations. Negative for chest pain.   Gastrointestinal: Negative for abdominal distention, abdominal pain, constipation, diarrhea, nausea and vomiting.   Genitourinary: Negative for dysuria, flank pain and hematuria.   Musculoskeletal: Negative for arthralgias, neck pain and neck stiffness.   Skin: Negative for color change and rash.   Neurological: Positive for dizziness, light-headedness and headaches. Negative for seizures, syncope and speech difficulty.   Psychiatric/Behavioral: Positive for sleep disturbance (sleep apnea). Negative for agitation and confusion. The patient is not nervous/anxious.        Past Medical History   Diagnosis Date   • Abnormal gait 11/28/2012   • Abrasion 02/26/2015   • Acute ankle pain 03/31/2016   • Acute bronchitis 09/04/2015   • Allergic rhinitis 05/22/2012   • Asthenia 05/22/2014   • Atrial fibrillation 03/04/2016     - converted    • Attempted suicide    •  Benign essential hypertension 03/04/2016   • Cardiovascular stress test abnormal 03/04/2016   • Cellulitis of knee 03/03/2015   • Chest pain 04/02/2015   • Chronic pain disorder    • Common cold 06/24/2014   • Congestive heart failure 02/04/2016   • Depressive disorder 05/22/2015     suspect more complicated psych issues      • Gastroesophageal reflux disease 01/17/2013   • Generalized anxiety disorder 02/04/2016   • H/O echocardiogram      Normal LV function with Ef of 60-65%.Mildly dilated right ventricle with normal function. Grade 1B diastolic dysfunction with mild CLVH.NO sig. valvular regurg. or stenosis.   • Headache 10/27/2014   • Hyperlipidemia 03/04/2016   • Low back pain 06/30/2016     Need for prophylactic vaccination against Streptococcus pneumoniae [pneumococcus]    01/23/2015    • Neck pain 06/30/2016   • Obstructive sleep apnea of adult 07/30/2015   • Obstructive sleep apnea syndrome    • Osteoarthritis    • Osteoporosis 06/30/2016   • Pain in right shoulder 06/30/2016   • Pain in right shoulder 04/24/2014   • Shortness of breath 04/02/2015   • Shortness of breath 04/02/2015   • Skin ulcer 04/13/2015     left   • Thyrotoxicosis with or without goiter 10/24/2014   • Urinary incontinence 05/22/2014       Allergies   Allergen Reactions   • Codeine    • Latex    • Morphine And Related    • Pravachol [Pravastatin Sodium] Myalgia       Past Surgical History   Procedure Laterality Date   • Back surgery  08/07/2015     Removal of intstrumentation,L5-S1.Exploration of fusion,L5-S1.Posterolateral fusion,L4-L5.Posterior segmental spinal instrumentation L4-5.Transforaminal interbody fusion Through right sided approach.Performed at    • Injection of medication  03/23/2015     celestone   • Colonoscopy  05/04/2016     will order cologuard   • Mandible fracture surgery       JAW WIRED SHUT   • Injection of medication  05/04/2016     KENALOG(6)   • Lumbar laminectomy  1995   • Lumbar fusion       discectomy fusion  with rods L4-S1   • Mouth surgery  10/05/2015     Fractured mandible. Removal of arch bars.   • Mouth surgery  1985     Fractured mandible. Removal of arch bars.   • Rhinoplasty     • Tubal abdominal ligation  1986       Family History   Problem Relation Age of Onset   • Hypertension Mother    • Hyperlipidemia Mother    • Mental illness Mother    • Thyroid disease Mother    • Diabetes Maternal Grandmother    • Cancer Maternal Grandmother    • Depression Other    • Diabetes Other    • Heart disease Other    • Hypertension Other    • Osteoporosis Other    • Thyroid disease Other    • Cancer Maternal Aunt    • Cancer Paternal Aunt        Social History     Social History   • Marital status:      Spouse name: N/A   • Number of children: N/A   • Years of education: N/A     Social History Main Topics   • Smoking status: Never Smoker   • Smokeless tobacco: Not on file      Comment: smoking cessation    • Alcohol use No   • Drug use: No      Comment: lortab / oxycontin   • Sexual activity: Defer     Other Topics Concern   • Not on file     Social History Narrative           Objective   Physical Exam   Constitutional: She is oriented to person, place, and time. She appears well-developed and well-nourished. No distress.   HENT:   Head: Normocephalic and atraumatic.   Right Ear: External ear normal.   Left Ear: External ear normal.   Nose: Nose normal.   Mouth/Throat: Oropharynx is clear and moist. No oropharyngeal exudate.   Eyes: Conjunctivae and EOM are normal. Pupils are equal, round, and reactive to light. Right eye exhibits no discharge. Left eye exhibits no discharge. No scleral icterus.   Neck: Normal range of motion. Neck supple. No JVD present. No tracheal deviation present.   Cardiovascular: Regular rhythm, S1 normal, S2 normal and normal heart sounds.  Tachycardia present.  Exam reveals no gallop, no distant heart sounds and no friction rub.    No murmur heard.  Pulmonary/Chest: Effort normal and breath  sounds normal. No stridor. No respiratory distress. She has no wheezes. She has no rales. She exhibits no tenderness.   Abdominal: Soft. Bowel sounds are normal. She exhibits no distension and no mass. There is no tenderness. There is no rebound and no guarding. No hernia.   Musculoskeletal: Normal range of motion. She exhibits no edema, tenderness or deformity.   Lymphadenopathy:     She has no cervical adenopathy.   Neurological: She is alert and oriented to person, place, and time. No cranial nerve deficit. She exhibits normal muscle tone.   Skin: Skin is warm and dry. No rash noted. She is not diaphoretic. No erythema. No pallor.   Psychiatric: She has a normal mood and affect. Her behavior is normal. Judgment and thought content normal.       Procedures  Lab Results (last 24 hours)     Procedure Component Value Units Date/Time    CBC & Differential [[id number]] Collected:  03/09/17 0934    Specimen:  Blood Updated:  03/09/17 0948    Narrative:       The following orders were created for panel order CBC & Differential.  Procedure                               Abnormality         Status                     ---------                               -----------         ------                     CBC Auto Differential[[id number]]         Abnormal            Final result                 Please view results for these tests on the individual orders.    Comprehensive Metabolic Panel [[id number]]  (Abnormal) Collected:  03/09/17 0934    Specimen:  Blood Updated:  03/09/17 1007     Glucose 130 (H) mg/dL      BUN 14 mg/dL      Creatinine 0.98 mg/dL      Sodium 141 mmol/L      Potassium 4.3 mmol/L      Chloride 102 mmol/L      CO2 27.0 mmol/L      Calcium 10.1 mg/dL      Total Protein 8.0 g/dL      Albumin 4.70 g/dL      ALT (SGPT) 26 U/L      AST (SGOT) 29 U/L      Alkaline Phosphatase 57 U/L      Total Bilirubin 0.7 mg/dL      eGFR Non African Amer 58 mL/min/1.73      Globulin 3.3 gm/dL      A/G Ratio 1.4 g/dL      BUN/Creatinine  Ratio 14.3      Anion Gap 12.0 mmol/L     BNP [[id number]]  (Abnormal) Collected:  03/09/17 0934    Specimen:  Blood Updated:  03/09/17 1019     proBNP 2390.0 (H) pg/mL     Troponin [[id number]]  (Normal) Collected:  03/09/17 0934    Specimen:  Blood Updated:  03/09/17 1019     Troponin I 0.022 ng/mL     CK-MB [[id number]]  (Normal) Collected:  03/09/17 0934    Specimen:  Blood Updated:  03/09/17 1019     CKMB 1.70 ng/mL     CBC Auto Differential [[id number]]  (Abnormal) Collected:  03/09/17 0934    Specimen:  Blood Updated:  03/09/17 0948     WBC 8.39 10*3/mm3      RBC 5.22 (H) 10*6/mm3      Hemoglobin 16.1 (H) g/dL      Hematocrit 46.0 (H) %      MCV 88.1 fL      MCH 30.8 pg      MCHC 35.0 g/dL      RDW 13.0 %      RDW-SD 41.6 fl      MPV 10.2 fL      Platelets 324 10*3/mm3      Neutrophil % 57.7 %      Lymphocyte % 30.8 %      Monocyte % 7.7 %      Eosinophil % 3.0 %      Basophil % 0.7 %      Immature Grans % 0.1 %      Neutrophils, Absolute 4.84 10*3/mm3      Lymphocytes, Absolute 2.58 10*3/mm3      Monocytes, Absolute 0.65 10*3/mm3      Eosinophils, Absolute 0.25 10*3/mm3      Basophils, Absolute 0.06 10*3/mm3      Immature Grans, Absolute 0.01 10*3/mm3         Xr Chest 2 View    Result Date: 3/9/2017  Narrative: Patient Name:  MS. AZIZA EDWARDS [facility] Patient ID:  [id number] Ordering:  THOMAS KERN Attending:  HUMBERTO DAO Referring:  THOMAS KERN ------------------------------------------------ Chest PA and lateral CLINICAL INDICATION: Shortness of breath COMPARISON: December 15, 2016. FINDINGS: The heart is borderline enlarged. Pulmonary vascularity is unremarkable. No focal infiltrate or consolidation.  No pleural effusion.  No pneumothorax. No change since prior exam.    Impression: CONCLUSION: Borderline cardiomegaly. Otherwise unremarkable chest without change since prior exam. No evidence of active disease. Electronically signed by:  Jose Abbott MD  3/9/2017 10:01 AM CST Workstation:  [id number]         ED Course  ED Course   Comment By Time   Seen and examined Earl Orourke MD 03/09 0950   Ordered cbc, cmp, PT/INR, aptt, Troponin and ck-mb x3, EKG, BNP, CXR. Gave 10 mg labetalol for elevated bp. Earl Orourke MD 03/09 0951   Labetalol held due to bp decrease to 157/86 Earl Orourke MD 03/09 0959   Reevaluated, resting comfortably. Mentioned she had run out of her anxiety medications. Earl Orourke MD 03/09 1054                  [facility]    Final diagnoses:   Anxiety   Dyspnea, unspecified type            Earl Orourke MD  Resident  03/09/17 1101

## 2017-03-10 ENCOUNTER — TELEPHONE (OUTPATIENT)
Dept: CARDIOLOGY | Facility: CLINIC | Age: 62
End: 2017-03-10

## 2017-03-10 NOTE — TELEPHONE ENCOUNTER
Patient contacted the office requesting that dr. garza increase her bp meds as she was recently seen in the er with elevated bp and hr. Dr. garza has reviewed the er note and has asked that the patient see her pcp to restart her anxiety meds. She was instructed to keep her f/u appt with dr. garza on march 27.

## 2017-03-15 ENCOUNTER — OFFICE VISIT (OUTPATIENT)
Dept: FAMILY MEDICINE CLINIC | Facility: CLINIC | Age: 62
End: 2017-03-15

## 2017-03-15 VITALS
BODY MASS INDEX: 29.72 KG/M2 | HEART RATE: 69 BPM | OXYGEN SATURATION: 99 % | SYSTOLIC BLOOD PRESSURE: 110 MMHG | WEIGHT: 178.6 LBS | DIASTOLIC BLOOD PRESSURE: 70 MMHG

## 2017-03-15 DIAGNOSIS — I10 BENIGN ESSENTIAL HYPERTENSION: ICD-10-CM

## 2017-03-15 DIAGNOSIS — I48.0 PAROXYSMAL ATRIAL FIBRILLATION (HCC): Primary | ICD-10-CM

## 2017-03-15 DIAGNOSIS — F41.1 GENERALIZED ANXIETY DISORDER: ICD-10-CM

## 2017-03-15 DIAGNOSIS — R26.9 ABNORMAL GAIT: ICD-10-CM

## 2017-03-15 DIAGNOSIS — I50.9 CONGESTIVE HEART FAILURE, UNSPECIFIED CONGESTIVE HEART FAILURE CHRONICITY, UNSPECIFIED CONGESTIVE HEART FAILURE TYPE: ICD-10-CM

## 2017-03-15 PROCEDURE — 99214 OFFICE O/P EST MOD 30 MIN: CPT | Performed by: FAMILY MEDICINE

## 2017-03-15 RX ORDER — BUSPIRONE HYDROCHLORIDE 5 MG/1
5 TABLET ORAL 3 TIMES DAILY
Qty: 90 TABLET | Refills: 11 | Status: ON HOLD | OUTPATIENT
Start: 2017-03-15 | End: 2018-02-27

## 2017-03-15 RX ORDER — BUSPIRONE HYDROCHLORIDE 5 MG/1
5 TABLET ORAL 3 TIMES DAILY
Qty: 90 TABLET | Refills: 11 | Status: SHIPPED | OUTPATIENT
Start: 2017-03-15 | End: 2017-03-15 | Stop reason: SDUPTHER

## 2017-03-15 NOTE — PROGRESS NOTES
Subjective   Lana Mata is a 62 y.o. female.     Hypertension   This is a chronic problem. The problem has been waxing and waning since onset. The problem is uncontrolled. Associated symptoms include chest pain, headaches, palpitations and shortness of breath. Pertinent negatives include no peripheral edema.   Atrial Fibrillation   Presents for follow-up visit. Symptoms include chest pain, hypertension, palpitations, shortness of breath and tachycardia. Symptoms are negative for hemodynamic instability. Past medical history includes atrial fibrillation and hyperlipidemia.   Congestive Heart Failure   Presents for follow-up visit. Associated symptoms include chest pain, chest pressure, edema, near-syncope, palpitations and shortness of breath.   Hyperlipidemia   This is a chronic problem. Associated symptoms include chest pain and shortness of breath.   Depression   Visit Type: follow-up  Patient presents with the following symptoms: depressed mood, hyperventilation, nervousness/anxiety, palpitations and shortness of breath.    Headache    This is a new problem. The current episode started 1 to 4 weeks ago. The problem occurs intermittently. The problem has been waxing and waning. The pain is located in the occipital region. The pain is moderate (went to ER). Her past medical history is significant for hypertension.   Chest Pain    This is a recurrent (to get echo friday) problem. The current episode started 1 to 4 weeks ago. The onset quality is sudden. The problem has been waxing and waning. The pain is present in the substernal region. The pain is mild. The quality of the pain is described as tightness. Associated symptoms include headaches, near-syncope, palpitations and shortness of breath.   Her past medical history is significant for hyperlipidemia and hypertension.        The following portions of the patient's history were reviewed and updated as appropriate: allergies, current medications, past  family history, past medical history, past social history, past surgical history and problem list.    Review of Systems   Respiratory: Positive for shortness of breath.    Cardiovascular: Positive for chest pain, palpitations and near-syncope.   Genitourinary: Positive for hematuria. Negative for difficulty urinating and dysuria.   Neurological: Positive for headaches.   Psychiatric/Behavioral: The patient is nervous/anxious.        Objective   Physical Exam   Constitutional: She is oriented to person, place, and time. She appears well-developed and well-nourished. No distress.   HENT:   Head: Normocephalic and atraumatic.   Cardiovascular: Normal rate and intact distal pulses.  An irregular rhythm present. Exam reveals distant heart sounds. Exam reveals no gallop and no friction rub.    No murmur heard.  Pulmonary/Chest: Effort normal. She has decreased breath sounds. She has no wheezes. She has no rhonchi. She has no rales.   Musculoskeletal: She exhibits edema. She exhibits no tenderness.   Neurological: She is alert and oriented to person, place, and time.   Skin: Skin is warm and dry. She is not diaphoretic.   Psychiatric: She has a normal mood and affect. Her behavior is normal. Judgment and thought content normal.   Nursing note and vitals reviewed.      Assessment/Plan   Problems Addressed this Visit        Cardiovascular and Mediastinum    Congestive heart failure    Atrial fibrillation - Primary    Benign essential hypertension       Other    Generalized anxiety disorder    Relevant Medications    busPIRone (BUSPAR) 5 MG tablet    Abnormal gait

## 2017-03-20 RX ORDER — VENLAFAXINE HYDROCHLORIDE 150 MG/1
150 CAPSULE, EXTENDED RELEASE ORAL DAILY
Qty: 30 CAPSULE | Refills: 6 | Status: SHIPPED | OUTPATIENT
Start: 2017-03-20 | End: 2017-03-20 | Stop reason: SDUPTHER

## 2017-03-20 RX ORDER — VENLAFAXINE 75 MG/1
75 TABLET ORAL DAILY
Qty: 30 TABLET | Refills: 6 | Status: SHIPPED | OUTPATIENT
Start: 2017-03-20 | End: 2018-01-31

## 2017-03-20 RX ORDER — VENLAFAXINE 75 MG/1
75 TABLET ORAL DAILY
Qty: 30 TABLET | Refills: 6 | Status: SHIPPED | OUTPATIENT
Start: 2017-03-20 | End: 2017-03-20 | Stop reason: SDUPTHER

## 2017-03-20 RX ORDER — VENLAFAXINE HYDROCHLORIDE 150 MG/1
150 CAPSULE, EXTENDED RELEASE ORAL DAILY
Qty: 30 CAPSULE | Refills: 6 | Status: SHIPPED | OUTPATIENT
Start: 2017-03-20 | End: 2018-04-13

## 2017-03-27 ENCOUNTER — OFFICE VISIT (OUTPATIENT)
Dept: CARDIOLOGY | Facility: CLINIC | Age: 62
End: 2017-03-27

## 2017-03-27 VITALS
OXYGEN SATURATION: 93 % | SYSTOLIC BLOOD PRESSURE: 126 MMHG | HEART RATE: 69 BPM | DIASTOLIC BLOOD PRESSURE: 80 MMHG | HEIGHT: 65 IN | WEIGHT: 177 LBS | BODY MASS INDEX: 29.49 KG/M2

## 2017-03-27 DIAGNOSIS — E78.2 MIXED HYPERLIPIDEMIA: ICD-10-CM

## 2017-03-27 DIAGNOSIS — R94.39 ABNORMAL FINDING ON CARDIOVASCULAR STRESS TEST: ICD-10-CM

## 2017-03-27 DIAGNOSIS — I10 ESSENTIAL HYPERTENSION: ICD-10-CM

## 2017-03-27 DIAGNOSIS — I48.0 PAROXYSMAL ATRIAL FIBRILLATION (HCC): Primary | ICD-10-CM

## 2017-03-27 PROCEDURE — 99214 OFFICE O/P EST MOD 30 MIN: CPT | Performed by: INTERNAL MEDICINE

## 2017-03-27 RX ORDER — SOTALOL HYDROCHLORIDE 120 MG/1
120 TABLET ORAL 2 TIMES DAILY
Qty: 60 TABLET | Refills: 12 | Status: SHIPPED | OUTPATIENT
Start: 2017-03-27 | End: 2017-05-31 | Stop reason: SDUPTHER

## 2017-03-27 NOTE — PROGRESS NOTES
Cardiovascular Medicine   Ar Gooden M.D., Ph.D., [facility]      The patient returns to cardiology clinic for follow-up of the following cardiac problems:    PROBLEM LIST:    1. PAF, non-valvular  a.  Ejection fraction: 60-65 percent, May 2015  b. Mild concentric left ventricular hypertrophy: 12mm at IVS  c. Left Atrium 37 mm  d.  Metoprolol succinate 100 mg twice a shabbir  e.  Multaq  f.  Aspirin 162 mg--has refused anticoagulation  2.  Essential hypertension  a.  Mild LVH  b.  Grade 1B diastolic dysfunction  3.  Hyperlipidemia  a.  Myalgias with pravastatin, but did not improve after medication was discontinued  4.  Mild SOPHIA    a.   Not on CPAP.   5.  Questionable history of heart failure  6.  Hyperthyroidism  7.  Chronic back pain  8.  Abnormal stress test  a.  Myocardial perfusion stress April 2015  -Mild photopenic defect of moderate size in the base and mid inferior lateral wall  -Ejection fraction 67%  9. Mild to Moderate MR    PAF  The patient returns in follow-up for her nonvalvular PAF.  She's now on sotalol.  She states she is tolerating this well.  Ejection fraction is preserved.  Interventricular septum has mild LVH 12 mm.  She does have a history of abnormal stress test, so Propafenone was discontinued.  She is on Xarelto.    HTN  Concerning her hypertension, she remains on medication.  She is medication and diet compliant.  Blood pressures at goal.   Denies side effects.      HLD  Concerning her hyperlipidemia, she remains on atorvastatin.  She is tolerating this well.  Denies side effects.    Abnormal MPS  Concerning her abnormal stress test, she's been on medication management.  No further chest pain.  Patient denies CP, SOB, CHAN, PND, orthopnea, dizziness, BLE edema, early abdominal satiety, dizziness, or syncope.     MR  Concerning her MR, she remains asymptomatic. She had a TTE that was mild.     Current Outpatient Prescriptions on File Prior to Visit   Medication Sig Dispense Refill   •  aspirin 81 MG EC tablet Take 81 mg by mouth 2 (two) times a day.     • atorvastatin (LIPITOR) 20 MG tablet Take 1 tablet by mouth daily. 90 tablet 1   • busPIRone (BUSPAR) 5 MG tablet Take 1 tablet by mouth 3 (Three) Times a Day. 90 tablet 11   • calcium carbonate-vitamin d 600-400 MG-UNIT per tablet Take 1 tablet by mouth 2 (two) times a day. 60 tablet 0   • calcium citrate-vitamin d (CITRACAL) 200-250 MG-UNIT tablet tablet Take  by mouth daily.     • carisoprodol (SOMA) 350 MG tablet Take 1 tablet by mouth Every Night. 30 tablet 5   • choline fenofibrate (TRILIPIX) 135 MG capsule Take 135 mg by mouth daily.     • CYANOCOBALAMIN ER PO Take  by mouth.     • gabapentin (NEURONTIN) 300 MG capsule Take 1 capsule by mouth 3 (Three) Times a Day. 90 capsule 11   • hydrochlorothiazide (HYDRODIURIL) 25 MG tablet Take 25 mg by mouth Daily.     • hydrOXYzine (VISTARIL) 50 MG capsule Take 1 capsule by mouth 3 (Three) Times a Day As Needed for Anxiety. 30 capsule 0   • lisinopril (PRINIVIL,ZESTRIL) 40 MG tablet Take 1 tablet by mouth Daily. 30 tablet 11   • methimazole (TAPAZOLE) 5 MG tablet Take 5 mg by mouth daily.     • MILK THISTLE PO Take  by mouth 2 (two) times a day.     • nitroglycerin (NITROSTAT) 0.4 MG SL tablet Place 1 tablet under the tongue See Admin Instructions. PRN cp, if not relieved in 5 min repeat and go to ER 25 tablet 12   • Nutritional Supplements (VITAMIN D BOOSTER PO) Take  by mouth.     • omeprazole (PriLOSEC) 20 MG capsule Take 1 capsule by mouth daily. 90 capsule 3   • potassium chloride (K-DUR) 10 MEQ CR tablet Take 1 tablet by mouth 2 (two) times a day. 60 tablet 11   • rivaroxaban (XARELTO) 20 MG tablet Take 20 mg by mouth Daily.     • sotalol (BETAPACE) 120 MG tablet Take 120 mg by mouth 2 (two) times a day.     • triamterene-hydrochlorothiazide (MAXZIDE-25) 37.5-25 MG per tablet Take 1 tablet by mouth every other day. 90 tablet 3   • venlafaxine (EFFEXOR) 75 MG tablet Take 1 tablet by mouth  Daily. 30 tablet 6   • venlafaxine XR (EFFEXOR-XR) 150 MG 24 hr capsule Take 1 capsule by mouth Daily. 30 capsule 6     No current facility-administered medications on file prior to visit.      Allergies   Allergen Reactions   • Codeine    • Latex    • Morphine And Related    • Pravachol [Pravastatin Sodium] Myalgia     Social History     Social History   • Marital status:      Spouse name: N/A   • Number of children: N/A   • Years of education: N/A     Occupational History   • Not on file.     Social History Main Topics   • Smoking status: Never Smoker   • Smokeless tobacco: Not on file      Comment: smoking cessation    • Alcohol use No   • Drug use: No      Comment: lortab / oxycontin   • Sexual activity: Defer     Other Topics Concern   • Not on file     Social History Narrative       Physical Exam:  Vitals:    03/27/17 1321   BP: 126/80   Pulse: 69   SpO2: 93%     Body mass index is 29.45 kg/(m^2).    GEN: alert, appears stated age and cooperative  Body Habitus: well-nourished  HEENT: Head: Normocephalic, no lesions, without obvious abnormality.  Neck / Thyroid: Supple, no masses, nodes, nodules or enlargement. No arcus senilis, xanthelasma or xanthomas.   JVP: None HJR: Nonsustained.     Carotid:  Upstroke: Brisk.  Volume: Normal.    Carotid Bruit:  None  Subclavian Bruit: Not present.    Chest:  Normal Excursion: Normal.  I:E: Normal  Pulmonary:clear to auscultation, no wheezes, rales or rhonchi, symmetric air entry      Precordium:  No palpable heaves or thrusts. P2 is not palpable.   [place]:  normal size and placement Palpable S4: Not present.   Heart rate: normal     Heart Sounds: S1: normal intensity  S2: normal intensity  S3: Absent      S4: Absent  Opening Snap: Absent.      A2-OS:  N/A  Pericardial rub: None  Ejection click: None      Murmurs: Systolic: Not present  Diastolic: Not present  Extremity: no edema, cyanosis  Trophic changes: None   Pallor on elevation: Absent.    Rubor on dependency:  None  Pulses: Right radial artery has 2+ (normal) pulse    DATA REVIEWED:   Ejection fraction 65%  Mild LVH  Diastolic dysfunction  No valvular abnormalities      This was performed March 15, 2012  Ejection fraction 55-60%  Mild left ventricular hypertrophy  Left Atrium 37 mm  Mild  tricuspid regurgitation  Mild to moderate mitral regurgitation  Mild aortic insufficiency  No mention of diastolic parameters  Myocardial perfusion stress 2015 with inducible ischemia    Procedures  · Left Ventricle: Left ventricular function is normal. Estimated EF appears to be in the range of 61 - 65%.  · Left ventricular diastolic dysfunction is noted (grade II w/high LAP) consistent with pseudonormalization. Elevated left atrial pressure  · Right Ventricle: Normal wall thickness, systolic function and septal motion noted. Right ventricular cavity is mild-to-moderately dilated  · Estimated right ventricular systolic pressure from tricuspid regurgitation is normal (<35 mmHg).  · Mild aortic valve regurgitation is present  · There is no evidence of pericardial effusion.    Assessment/Plan     1.   Abnormal stress test with dyspnea on exertion.  Ejection fraction is preserved.  I recommended aggressive risk factor stratification. CCS 0.  ASA, Sotalol and Pravastatin    2.  Paroxysmal atrial fibrillation.  Currently on  Sotalol. Medications were reviewed.   Additionally, she has mild LVH.  No episodes of increased cardiac awareness.    Continue Sotalol and Xarelto    3. Essential hypertension.  Blood pressures at goal today.  Complicated by mild LVH and diastolic dysfunction.  DASH; Dyazide and Lisinopril    4.  Statin therapy.    Atorvastatin    5. Mild MR, asymptomatic.  Echo in 2020    6.  Tobacco status: Patient is a nonsmoker    Thank you for allowing me  to participate in the care of your patient. Please do not hesitate to contact me with any questions.     Ar Gooden M.D., Ph.D., [facility]

## 2017-04-10 ENCOUNTER — TELEPHONE (OUTPATIENT)
Dept: FAMILY MEDICINE CLINIC | Facility: CLINIC | Age: 62
End: 2017-04-10

## 2017-04-10 RX ORDER — ATORVASTATIN CALCIUM 20 MG/1
20 TABLET, FILM COATED ORAL DAILY
Qty: 90 TABLET | Refills: 1 | Status: SHIPPED | OUTPATIENT
Start: 2017-04-10 | End: 2017-05-31 | Stop reason: SDUPTHER

## 2017-04-10 RX ORDER — METHIMAZOLE 5 MG/1
5 TABLET ORAL DAILY
Qty: 90 TABLET | Refills: 1 | Status: ON HOLD | OUTPATIENT
Start: 2017-04-10 | End: 2018-02-27

## 2017-04-10 NOTE — TELEPHONE ENCOUNTER
Requested Refills Sent    ---- Message from Carrie Mcghee sent at 4/10/2017  4:07 PM CDT -----  Contact: [phone]  Pt called and left message needing a refill of Methimazole 5 mg tablets to be sent to [facility]. Her phone is [phone]. 90 day supply.

## 2017-05-31 RX ORDER — SOTALOL HYDROCHLORIDE 120 MG/1
120 TABLET ORAL 2 TIMES DAILY
Qty: 180 TABLET | Refills: 1 | Status: SHIPPED | OUTPATIENT
Start: 2017-05-31 | End: 2017-09-21 | Stop reason: SDUPTHER

## 2017-05-31 RX ORDER — SOTALOL HYDROCHLORIDE 120 MG/1
120 TABLET ORAL 2 TIMES DAILY
Qty: 60 TABLET | Refills: 0 | Status: SHIPPED | OUTPATIENT
Start: 2017-05-31 | End: 2017-05-31 | Stop reason: SDUPTHER

## 2017-05-31 RX ORDER — ATORVASTATIN CALCIUM 20 MG/1
20 TABLET, FILM COATED ORAL DAILY
Qty: 30 TABLET | Refills: 0 | Status: SHIPPED | OUTPATIENT
Start: 2017-05-31 | End: 2018-01-03 | Stop reason: SDUPTHER

## 2017-06-02 ENCOUNTER — TELEPHONE (OUTPATIENT)
Dept: FAMILY MEDICINE CLINIC | Facility: CLINIC | Age: 62
End: 2017-06-02

## 2017-06-02 RX ORDER — CYCLOBENZAPRINE HCL 5 MG
5 TABLET ORAL 3 TIMES DAILY PRN
Qty: 30 TABLET | Refills: 0 | Status: SHIPPED | OUTPATIENT
Start: 2017-06-02 | End: 2018-01-31

## 2017-06-02 NOTE — TELEPHONE ENCOUNTER
Dr. Hancock will not refill any controlled meds for Ms. Mata, letter was sent to her on 2/16/17.    [facility] has been called.     Ms. Mata has been called and reminded that Dr. Hancock will not be refilling any controlled meds.    Dr. Hancock    She wants to know if there is a muscle relaxer that she can have that is not controlled.  She said maybe something once a day. Because of her stiffness and muscle aches in the AM.    Please advise

## 2017-06-02 NOTE — TELEPHONE ENCOUNTER
Pr . Dr. Hancock's Instruction, prescription for Cyclobenzaprine 5 mg has been sent to [facility].   Patient has been called as well

## 2017-07-11 ENCOUNTER — OFFICE VISIT (OUTPATIENT)
Dept: SLEEP MEDICINE | Facility: HOSPITAL | Age: 62
End: 2017-07-11

## 2017-07-11 VITALS
HEIGHT: 65 IN | DIASTOLIC BLOOD PRESSURE: 76 MMHG | BODY MASS INDEX: 30.99 KG/M2 | WEIGHT: 186 LBS | SYSTOLIC BLOOD PRESSURE: 120 MMHG

## 2017-07-11 DIAGNOSIS — G47.33 OSA ON CPAP: Primary | ICD-10-CM

## 2017-07-11 DIAGNOSIS — G47.30 HYPERSOMNIA WITH SLEEP APNEA: ICD-10-CM

## 2017-07-11 DIAGNOSIS — Z99.89 OSA ON CPAP: Primary | ICD-10-CM

## 2017-07-11 DIAGNOSIS — G47.10 HYPERSOMNIA WITH SLEEP APNEA: ICD-10-CM

## 2017-07-11 PROCEDURE — 99213 OFFICE O/P EST LOW 20 MIN: CPT | Performed by: INTERNAL MEDICINE

## 2017-07-11 NOTE — PROGRESS NOTES
Sleep Clinic Follow Up    Date: 7/11/2017  Primary Care Physician: Camron Hancock MD    CHIEF COMPLAINT: Follow up SOPHIA    Interim History:  Since the last visit on 09/01/2015, patient reports compliance with CPAP at times. She suffers from claustrophobia, and hence wears it when she is just relaxing. She does not have download information available today. She uses a nasal mask but would like to try nasal pillows.     Bed time: fragmented through the night  Sleep latency: 1 hour or longer  Number of times awakens during the night: varies  Wake time: 0700 to let the dogs out, then up for good about 9am  Estimated total sleep time at night: uncertain  Caffeine intake: 3 glasses of tea, and 1 coffee  Alcohol intake: none  Nap time: none  Driving: she does not drive  Mask and machine issues: has claustrophobia that inteferes with wearing while asleep.    She denies abnormal dreams, sleep paralysis, hypnagogic hallucinations, cataplexy symptoms, (+) RLS sx - very rare    PMHx, FH, SH reviewed and pertinent changes are starting buspar    REVIEW OF SYSTEMS:   Negative for chest pain, fever, chills, SOA, abdominal pain.      Exam:  Vitals:    07/11/17 1351   BP: 120/76     Body mass index is 30.95 kg/(m^2).    Gen:  No distress, appears stated age, alert, oriented to person, place, and time  Heent:   NC/AT, PERRLA, EOMI, anicteric sclera, external ears normal, OP clear, Mallamati 4, nares patent  NECK:  Supple, midline trachea, no palpable goiter  LUNGS: Clear breath sounds bilaterally, nonlabored breathing  CV:  Normal S1/S2  ABD:  Non tender, non distended, bowel sounds not appreciated  EXT:  No cyanosis or clubbing      ASSESSMENT:  1. Obstructive sleep apnea - continue CPAP - need compliance, new supplies  2. Restless leg syndrome/ /Alanis-Ekbom disease (RLS/WED) - mild no therapy at this point  3. Fragmented sleep secondary to some unfortunate life events and chronic pain  4. Depression  5. HTN  6. Chronic  pain  7. Atrial fibrillation      PLAN:  1. Continue CPAP as prescribed. RTC in 1 year with compliance check unless sx        Patel Clemente MD        CC: Camron Hancock MD          No ref. provider found

## 2017-07-18 RX ORDER — LISINOPRIL 40 MG/1
40 TABLET ORAL DAILY
Qty: 30 TABLET | Refills: 3 | Status: SHIPPED | OUTPATIENT
Start: 2017-07-18 | End: 2017-11-16 | Stop reason: SDUPTHER

## 2017-09-21 RX ORDER — SOTALOL HYDROCHLORIDE 120 MG/1
120 TABLET ORAL 2 TIMES DAILY
Qty: 180 TABLET | Refills: 1 | Status: SHIPPED | OUTPATIENT
Start: 2017-09-21 | End: 2017-10-06 | Stop reason: SDUPTHER

## 2017-10-06 RX ORDER — POTASSIUM CHLORIDE 750 MG/1
10 TABLET, FILM COATED, EXTENDED RELEASE ORAL 2 TIMES DAILY
Qty: 180 TABLET | Refills: 1 | Status: SHIPPED | OUTPATIENT
Start: 2017-10-06 | End: 2018-02-15 | Stop reason: SDUPTHER

## 2017-10-06 RX ORDER — SOTALOL HYDROCHLORIDE 120 MG/1
120 TABLET ORAL 2 TIMES DAILY
Qty: 180 TABLET | Refills: 1 | Status: SHIPPED | OUTPATIENT
Start: 2017-10-06 | End: 2017-10-06 | Stop reason: SDUPTHER

## 2017-10-06 RX ORDER — SOTALOL HYDROCHLORIDE 120 MG/1
120 TABLET ORAL 2 TIMES DAILY
Qty: 180 TABLET | Refills: 1 | Status: SHIPPED | OUTPATIENT
Start: 2017-10-06 | End: 2018-02-15 | Stop reason: SDUPTHER

## 2017-11-16 RX ORDER — LISINOPRIL 40 MG/1
40 TABLET ORAL DAILY
Qty: 7 TABLET | Refills: 0 | Status: SHIPPED | OUTPATIENT
Start: 2017-11-16 | End: 2017-11-16 | Stop reason: SDUPTHER

## 2017-11-16 RX ORDER — LISINOPRIL 40 MG/1
40 TABLET ORAL DAILY
Qty: 90 TABLET | Refills: 0 | Status: SHIPPED | OUTPATIENT
Start: 2017-11-16 | End: 2017-11-30 | Stop reason: SDUPTHER

## 2017-11-30 RX ORDER — LISINOPRIL 40 MG/1
40 TABLET ORAL DAILY
Qty: 90 TABLET | Refills: 0 | Status: SHIPPED | OUTPATIENT
Start: 2017-11-30 | End: 2018-05-30 | Stop reason: SDUPTHER

## 2017-12-07 ENCOUNTER — APPOINTMENT (OUTPATIENT)
Dept: LAB | Facility: HOSPITAL | Age: 62
End: 2017-12-07

## 2017-12-07 ENCOUNTER — TRANSCRIBE ORDERS (OUTPATIENT)
Dept: LAB | Facility: HOSPITAL | Age: 62
End: 2017-12-07

## 2017-12-07 DIAGNOSIS — E05.90 THYROTOXICOSIS WITHOUT THYROID STORM, UNSPECIFIED THYROTOXICOSIS TYPE: Primary | ICD-10-CM

## 2017-12-07 LAB
ALBUMIN SERPL-MCNC: 4.2 G/DL (ref 3.4–4.8)
ALBUMIN/GLOB SERPL: 1.1 G/DL (ref 1.1–1.8)
ALP SERPL-CCNC: 53 U/L (ref 38–126)
ALT SERPL W P-5'-P-CCNC: 19 U/L (ref 9–52)
ANION GAP SERPL CALCULATED.3IONS-SCNC: 12 MMOL/L (ref 5–15)
AST SERPL-CCNC: 40 U/L (ref 14–36)
BASOPHILS # BLD AUTO: 0.07 10*3/MM3 (ref 0–0.2)
BASOPHILS NFR BLD AUTO: 0.8 % (ref 0–2)
BILIRUB SERPL-MCNC: 0.8 MG/DL (ref 0.2–1.3)
BUN BLD-MCNC: 15 MG/DL (ref 7–21)
BUN/CREAT SERPL: 13.5 (ref 7–25)
CALCIUM SPEC-SCNC: 9.7 MG/DL (ref 8.4–10.2)
CHLORIDE SERPL-SCNC: 101 MMOL/L (ref 95–110)
CO2 SERPL-SCNC: 26 MMOL/L (ref 22–31)
CREAT BLD-MCNC: 1.11 MG/DL (ref 0.5–1)
DEPRECATED RDW RBC AUTO: 40.9 FL (ref 36.4–46.3)
EOSINOPHIL # BLD AUTO: 0.54 10*3/MM3 (ref 0–0.7)
EOSINOPHIL NFR BLD AUTO: 5.9 % (ref 0–7)
ERYTHROCYTE [DISTWIDTH] IN BLOOD BY AUTOMATED COUNT: 12.8 % (ref 11.5–14.5)
GFR SERPL CREATININE-BSD FRML MDRD: 50 ML/MIN/1.73 (ref 60–104)
GLOBULIN UR ELPH-MCNC: 3.7 GM/DL (ref 2.3–3.5)
GLUCOSE BLD-MCNC: 117 MG/DL (ref 60–100)
HCT VFR BLD AUTO: 46.2 % (ref 35–45)
HGB BLD-MCNC: 15.3 G/DL (ref 12–15.5)
IMM GRANULOCYTES # BLD: 0.02 10*3/MM3 (ref 0–0.02)
IMM GRANULOCYTES NFR BLD: 0.2 % (ref 0–0.5)
LYMPHOCYTES # BLD AUTO: 2.26 10*3/MM3 (ref 0.6–4.2)
LYMPHOCYTES NFR BLD AUTO: 24.8 % (ref 10–50)
MCH RBC QN AUTO: 29.3 PG (ref 26.5–34)
MCHC RBC AUTO-ENTMCNC: 33.1 G/DL (ref 31.4–36)
MCV RBC AUTO: 88.5 FL (ref 80–98)
MONOCYTES # BLD AUTO: 0.66 10*3/MM3 (ref 0–0.9)
MONOCYTES NFR BLD AUTO: 7.3 % (ref 0–12)
NEUTROPHILS # BLD AUTO: 5.55 10*3/MM3 (ref 2–8.6)
NEUTROPHILS NFR BLD AUTO: 61 % (ref 37–80)
PLATELET # BLD AUTO: 335 10*3/MM3 (ref 150–450)
PMV BLD AUTO: 10.5 FL (ref 8–12)
POTASSIUM BLD-SCNC: 4.2 MMOL/L (ref 3.5–5.1)
PROT SERPL-MCNC: 7.9 G/DL (ref 6.3–8.6)
RBC # BLD AUTO: 5.22 10*6/MM3 (ref 3.77–5.16)
SODIUM BLD-SCNC: 139 MMOL/L (ref 137–145)
T4 FREE SERPL-MCNC: 1.09 NG/DL (ref 0.78–2.19)
TSH SERPL DL<=0.05 MIU/L-ACNC: 2.91 MIU/ML (ref 0.46–4.68)
WBC NRBC COR # BLD: 9.1 10*3/MM3 (ref 3.2–9.8)

## 2017-12-07 PROCEDURE — 80053 COMPREHEN METABOLIC PANEL: CPT | Performed by: INTERNAL MEDICINE

## 2017-12-07 PROCEDURE — 36415 COLL VENOUS BLD VENIPUNCTURE: CPT | Performed by: INTERNAL MEDICINE

## 2017-12-07 PROCEDURE — 84439 ASSAY OF FREE THYROXINE: CPT | Performed by: INTERNAL MEDICINE

## 2017-12-07 PROCEDURE — 84443 ASSAY THYROID STIM HORMONE: CPT | Performed by: INTERNAL MEDICINE

## 2017-12-07 PROCEDURE — 85025 COMPLETE CBC W/AUTO DIFF WBC: CPT | Performed by: INTERNAL MEDICINE

## 2018-01-03 RX ORDER — TRIAMTERENE AND HYDROCHLOROTHIAZIDE 37.5; 25 MG/1; MG/1
1 TABLET ORAL EVERY OTHER DAY
Qty: 90 TABLET | Refills: 3 | Status: SHIPPED | OUTPATIENT
Start: 2018-01-03 | End: 2018-02-15 | Stop reason: SDUPTHER

## 2018-01-03 RX ORDER — ATORVASTATIN CALCIUM 20 MG/1
20 TABLET, FILM COATED ORAL DAILY
Qty: 90 TABLET | Refills: 3 | Status: SHIPPED | OUTPATIENT
Start: 2018-01-03 | End: 2018-02-15 | Stop reason: SDUPTHER

## 2018-01-30 DIAGNOSIS — I48.0 PAF (PAROXYSMAL ATRIAL FIBRILLATION) (HCC): Primary | ICD-10-CM

## 2018-01-31 ENCOUNTER — APPOINTMENT (OUTPATIENT)
Dept: LAB | Facility: HOSPITAL | Age: 63
End: 2018-01-31

## 2018-01-31 ENCOUNTER — HOSPITAL ENCOUNTER (OUTPATIENT)
Dept: GENERAL RADIOLOGY | Facility: HOSPITAL | Age: 63
Discharge: HOME | End: 2018-01-31
Attending: INTERNAL MEDICINE | Admitting: INTERNAL MEDICINE

## 2018-01-31 ENCOUNTER — OFFICE VISIT (OUTPATIENT)
Dept: CARDIOLOGY | Facility: CLINIC | Age: 63
End: 2018-01-31

## 2018-01-31 VITALS
BODY MASS INDEX: 33.15 KG/M2 | SYSTOLIC BLOOD PRESSURE: 118 MMHG | DIASTOLIC BLOOD PRESSURE: 86 MMHG | OXYGEN SATURATION: 98 % | WEIGHT: 199 LBS | HEIGHT: 65 IN | HEART RATE: 90 BPM

## 2018-01-31 DIAGNOSIS — R06.09 DYSPNEA ON EXERTION: ICD-10-CM

## 2018-01-31 DIAGNOSIS — E78.2 MIXED HYPERLIPIDEMIA: ICD-10-CM

## 2018-01-31 DIAGNOSIS — R94.39 ABNORMAL MYOCARDIAL PERFUSION STUDY: ICD-10-CM

## 2018-01-31 DIAGNOSIS — I34.0 NON-RHEUMATIC MITRAL REGURGITATION: ICD-10-CM

## 2018-01-31 DIAGNOSIS — R79.9 ABNORMAL FINDING OF BLOOD CHEMISTRY: ICD-10-CM

## 2018-01-31 DIAGNOSIS — I10 ESSENTIAL HYPERTENSION: ICD-10-CM

## 2018-01-31 DIAGNOSIS — I48.0 PAROXYSMAL ATRIAL FIBRILLATION (HCC): Primary | ICD-10-CM

## 2018-01-31 DIAGNOSIS — Z13.1 DIABETES MELLITUS SCREENING: ICD-10-CM

## 2018-01-31 LAB
ALBUMIN SERPL-MCNC: 4.5 G/DL (ref 3.4–4.8)
ALBUMIN UR-MCNC: <0.6 MG/L
ALBUMIN/GLOB SERPL: 1.3 G/DL (ref 1.1–1.8)
ALP SERPL-CCNC: 62 U/L (ref 38–126)
ALT SERPL W P-5'-P-CCNC: 27 U/L (ref 9–52)
ANION GAP SERPL CALCULATED.3IONS-SCNC: 9 MMOL/L (ref 5–15)
ARTICHOKE IGE QN: 77 MG/DL (ref 1–129)
AST SERPL-CCNC: 25 U/L (ref 14–36)
BILIRUB SERPL-MCNC: 0.6 MG/DL (ref 0.2–1.3)
BUN BLD-MCNC: 24 MG/DL (ref 7–21)
BUN/CREAT SERPL: 21.4 (ref 7–25)
CALCIUM SPEC-SCNC: 10.2 MG/DL (ref 8.4–10.2)
CHLORIDE SERPL-SCNC: 101 MMOL/L (ref 95–110)
CHOLEST SERPL-MCNC: 165 MG/DL (ref 0–199)
CO2 SERPL-SCNC: 28 MMOL/L (ref 22–31)
CREAT BLD-MCNC: 1.12 MG/DL (ref 0.5–1)
DEPRECATED RDW RBC AUTO: 41.1 FL (ref 36.4–46.3)
ERYTHROCYTE [DISTWIDTH] IN BLOOD BY AUTOMATED COUNT: 12.9 % (ref 11.5–14.5)
GFR SERPL CREATININE-BSD FRML MDRD: 49 ML/MIN/1.73 (ref 45–104)
GLOBULIN UR ELPH-MCNC: 3.5 GM/DL (ref 2.3–3.5)
GLUCOSE BLD-MCNC: 90 MG/DL (ref 60–100)
HBA1C MFR BLD: 5.7 % (ref 4–5.6)
HCT VFR BLD AUTO: 46.7 % (ref 35–45)
HDLC SERPL-MCNC: 67 MG/DL (ref 60–200)
HGB BLD-MCNC: 15.8 G/DL (ref 12–15.5)
LDLC/HDLC SERPL: 0.96 {RATIO} (ref 0–3.22)
MCH RBC QN AUTO: 29.4 PG (ref 26.5–34)
MCHC RBC AUTO-ENTMCNC: 33.8 G/DL (ref 31.4–36)
MCV RBC AUTO: 86.8 FL (ref 80–98)
NT-PROBNP SERPL-MCNC: 1330 PG/ML (ref 0–900)
PLATELET # BLD AUTO: 349 10*3/MM3 (ref 150–450)
PMV BLD AUTO: 9.8 FL (ref 8–12)
POTASSIUM BLD-SCNC: 4.3 MMOL/L (ref 3.5–5.1)
PROT SERPL-MCNC: 8 G/DL (ref 6.3–8.6)
RBC # BLD AUTO: 5.38 10*6/MM3 (ref 3.77–5.16)
SODIUM BLD-SCNC: 138 MMOL/L (ref 137–145)
TRIGL SERPL-MCNC: 169 MG/DL (ref 20–199)
TSH SERPL DL<=0.05 MIU/L-ACNC: 3.96 MIU/ML (ref 0.46–4.68)
WBC NRBC COR # BLD: 12.26 10*3/MM3 (ref 3.2–9.8)

## 2018-01-31 PROCEDURE — 83880 ASSAY OF NATRIURETIC PEPTIDE: CPT | Performed by: INTERNAL MEDICINE

## 2018-01-31 PROCEDURE — 71046 X-RAY EXAM CHEST 2 VIEWS: CPT

## 2018-01-31 PROCEDURE — 82043 UR ALBUMIN QUANTITATIVE: CPT | Performed by: INTERNAL MEDICINE

## 2018-01-31 PROCEDURE — 85027 COMPLETE CBC AUTOMATED: CPT | Performed by: INTERNAL MEDICINE

## 2018-01-31 PROCEDURE — 83036 HEMOGLOBIN GLYCOSYLATED A1C: CPT | Performed by: INTERNAL MEDICINE

## 2018-01-31 PROCEDURE — 80061 LIPID PANEL: CPT | Performed by: INTERNAL MEDICINE

## 2018-01-31 PROCEDURE — 84443 ASSAY THYROID STIM HORMONE: CPT | Performed by: INTERNAL MEDICINE

## 2018-01-31 PROCEDURE — 99214 OFFICE O/P EST MOD 30 MIN: CPT | Performed by: INTERNAL MEDICINE

## 2018-01-31 PROCEDURE — 36415 COLL VENOUS BLD VENIPUNCTURE: CPT | Performed by: INTERNAL MEDICINE

## 2018-01-31 PROCEDURE — 80053 COMPREHEN METABOLIC PANEL: CPT | Performed by: INTERNAL MEDICINE

## 2018-01-31 RX ORDER — ARIPIPRAZOLE 5 MG/1
5 TABLET ORAL DAILY
COMMUNITY

## 2018-01-31 NOTE — PATIENT INSTRUCTIONS
"Pulmonary Function Tests  Pulmonary function tests (PFTs) measure how well your lungs are working. The tests can help to identify the causes of lung problems. They can also help your health care provider select the best treatment for you. Your health care provider may order pulmonary function for any of the following reasons:  · When an illness involving the lungs is suspected.  · To follow changes in your lung function over time if you are known to have a chronic lung disease.  · For industrial plant workers to examine the effects of being exposed to chemicals over a long period of time.  · To assess lung function prior to surgery or other procedures.  · For people who are smokers.  Your measured lung function will be compared to the expected lung function of someone with healthy lungs who is similar to you in age, gender, size, and other factors.  This is used to determine your \"percent predicted\" lung function, which is how your health care provider knows if your lung function is normal or abnormal. If you have had prior pulmonary function testing performed, your health care provider will also compare your current results with past tests to see if your lung function is better, worse, or staying the same. This can sometimes be useful to see if treatments are working.   LET YOUR HEALTH CARE PROVIDER KNOW ABOUT:  · Any allergies you have.  · All medicines you are taking, including inhaler or nebulizer medicines, vitamins, herbs, eye drops, creams, and over-the-counter medicines.  · Any blood disorders you have.  · Previous surgeries you have had, especially recent eye surgery, abdominal surgery, or chest surgery. These can make performing pulmonary function tests difficult or unsafe.  · Medical conditions you have.  · Chest pain or heart problems.  · Tuberculosis or respiratory infections, such as pneumonia, a cold, or the flu.  If you think you will have difficulty performing any of the breathing maneuvers, ask " your health care provider if you should reschedule the test.  RISKS AND COMPLICATIONS:  Generally, pulmonary function testing is a safe procedure. However, as with any procedure, complications can occur. Possible complications include:  · Lightheadedness due to overbreathing (hyperventilation).  · An asthmatic attack from deep breathing.  BEFORE THE PROCEDURE  · Take medicine as directed by your health care provider. If you take inhaler or nebulizer medicines, ask your health care provider which medicines you should take on the day of your test. Some inhaler medicines may interfere with pulmonary function tests, such as bronchodilator testing, if taken shortly before the test.  · Avoid eating a large meal before your test.  · Do not smoke before your test.  · Wear comfortable clothing which will not interfere with breathing.  PROCEDURE  · You will be given a soft nose clip to wear during the procedure. This is done so that all of your breaths will go through your mouth instead of your nose.  · You will be given a germ-free (sterile) mouthpiece. It will be attached to a spirometer. The spirometer is the machine that measures your breathing.  · You will be instructed to perform various breathing maneuvers. The maneuvers will be done by breathing in (inhaling) and breathing out (exhaling). Depending on what measurements are ordered, you may be asked to repeat the maneuvers several times before the test is completed.  · It is important to follow the instructions exactly to obtain accurate results. Make sure to blow as hard and as fast as you can when you are instructed to do so.  · You may be given a bronchodilator after testing has been performed. A bronchodilator is a medicine which makes the small air passages in your lungs larger. These medicines usually make it easier to breathe. The tests are then repeated several minutes later after the bronchodilator has taken effect.  · You will be monitored carefully during  the procedure for faintness, dizziness, difficulty breathing, or any other problems.  AFTER THE PROCEDURE   · You may resume your usual diet, medicines, and activities as directed by your health care provider.  · Your health care provider will go over your test results with you and determine what treatments may be helpful.  This information is not intended to replace advice given to you by your health care provider. Make sure you discuss any questions you have with your health care provider.  Document Released: 08/10/2005 Document Revised: 10/08/2014 Document Reviewed: 07/17/2014  [employer] Interactive Patient Education © 2017 [employer]  Electrical Cardioversion  Electrical cardioversion is the delivery of a jolt of electricity to restore a normal rhythm to the heart. A rhythm that is too fast or is not regular keeps the heart from pumping well. In this procedure, sticky patches or metal paddles are placed on the chest to deliver electricity to the heart from a device.  This procedure may be done in an emergency if:  · There is low or no blood pressure as a result of the heart rhythm.  · Normal rhythm must be restored as fast as possible to protect the brain and heart from further damage.  · It may save a life.  This procedure may also be done for irregular or fast heart rhythms that are not immediately life-threatening.  Tell a health care provider about:  · Any allergies you have.  · All medicines you are taking, including vitamins, herbs, eye drops, creams, and over-the-counter medicines.  · Any problems you or family members have had with anesthetic medicines.  · Any blood disorders you have.  · Any surgeries you have had.  · Any medical conditions you have.  · Whether you are pregnant or may be pregnant.  What are the risks?  Generally, this is a safe procedure. However, problems may occur, including:  · Allergic reactions to medicines.  · A blood clot that breaks free and travels to other parts of your  body.  · The possible return of an abnormal heart rhythm within hours or days after the procedure.  · Your heart stopping (cardiac arrest ). This is rare.  What happens before the procedure?  Medicines  · Your health care provider may have you start taking:  ¨ Blood-thinning medicines (anticoagulants) so your blood does not clot as easily.  ¨ Medicines may be given to help stabilize your heart rate and rhythm.  · Ask your health care provider about changing or stopping your regular medicines. This is especially important if you are taking diabetes medicines or blood thinners.  General instructions  · Plan to have someone take you home from the hospital or clinic.  · If you will be going home right after the procedure, plan to have someone with you for 24 hours.  · Follow instructions from your health care provider about eating or drinking restrictions.  What happens during the procedure?  · To lower your risk of infection:  ¨ Your health care team will wash or sanitize their hands.  ¨ Your skin will be washed with soap.  · An IV tube will be inserted into one of your veins.  · You will be given a medicine to help you relax (sedative).  · Sticky patches (electrodes) or metal paddles may be placed on your chest.  · An electrical shock will be delivered.  The procedure may vary among health care providers and hospitals.  What happens after the procedure?  · Your blood pressure, heart rate, breathing rate, and blood oxygen level will be monitored until the medicines you were given have worn off.  · Do not drive for 24 hours if you were given a sedative.  · Your heart rhythm will be watched to make sure it does not change.  This information is not intended to replace advice given to you by your health care provider. Make sure you discuss any questions you have with your health care provider.  Document Released: 12/08/2003 Document Revised: 08/16/2017 Document Reviewed: 06/23/2017  [employer] Interactive Patient Education ©  2017 [employer]  Transesophageal Echocardiogram  Transesophageal echocardiography (JOCY) is a picture test of your heart using sound waves. The pictures taken can give very detailed pictures of your heart. This can help your doctor see if there are problems with your heart. JOCY can check:  · If your heart has blood clots in it.  · How well your heart valves are working.  · If you have an infection on the inside of your heart.  · Some of the major arteries of your heart.  · If your heart valve is working after a repair.  · Your heart before a procedure that uses a shock to your heart to get the rhythm back to normal.  What happens before the procedure?  · Do not eat or drink for 6 hours before the procedure or as told by your doctor.  · Make plans to have someone drive you home after the procedure. Do not drive yourself home.  · An IV tube will be put in your arm.  What happens during the procedure?  · You will be given a medicine to help you relax (sedative). It will be given through the IV tube.  · A numbing medicine will be sprayed or gargled in the back of your throat to help numb it.  · The tip of the probe is placed into the back of your mouth. You will be asked to swallow. This helps to pass the probe into your esophagus.  · Once the tip of the probe is in the right place, your doctor can take pictures of your heart.  · You may feel pressure at the back of your throat.  What happens after the procedure?  · You will be taken to a recovery area so the sedative can wear off.  · Your throat may be sore and scratchy. This will go away slowly over time.  · You will go home when you are fully awake and able to swallow liquids.  · You should have someone stay with you for the next 24 hours.  · Do not drive or operate machinery for the next 24 hours.  This information is not intended to replace advice given to you by your health care provider. Make sure you discuss any questions you have with your health care  provider.  Document Released: 10/15/2010 Document Revised: 05/25/2017 Document Reviewed: 06/19/2014  [employer] Interactive Patient Education © 2017 [employer]

## 2018-01-31 NOTE — PROGRESS NOTES
Cardiovascular Medicine   Ar Gooden M.D., Ph.D., [facility]      The patient returns to cardiology clinic for follow-up of the following cardiac problems:    PROBLEM LIST:    1. PAF, non-valvular  a.  Ejection fraction: 60-65 percent, May 2015  b. Mild concentric left ventricular hypertrophy: 12mm at IVS  c. Left Atrium 37 mm  d.  Metoprolol succinate 100 mg twice a shabbir  e.  Multaq  f.  Aspirin 162 mg--has refused anticoagulation  2.  Essential hypertension  a.  Mild LVH  b.  Grade 1B diastolic dysfunction  3.  Hyperlipidemia  a.  Myalgias with pravastatin, but did not improve after medication was discontinued  4.  Mild SOPHIA    a.   Not on CPAP.   5.  Questionable history of heart failure  6.  Hyperthyroidism  7.  Chronic back pain  8.  Abnormal stress test  a.  Myocardial perfusion stress April 2015  -Mild photopenic defect of moderate size in the base and mid inferior lateral wall  -Ejection fraction 67%  9. Mild to Moderate MR    PAF  The patient returns in follow-up for her nonvalvular PAF.  She's now on sotalol.  She states she is tolerating this well.  Ejection fraction is preserved.  Interventricular septum has mild LVH 12 mm.  She does have a history of abnormal stress test, so Propafenone was discontinued.  She is on Xarelto. Since her last visit she has been feeling poorly. She has felt unwell for several months. She believes her AF has come back. She has had mild LE edema. She does have fatigue during the day. She tells me her Oxygen has been low. She has had sats down to 88%.     HTN  Concerning her hypertension, she remains on medication.  She is medication and diet compliant.  Blood pressures at goal.   Denies side effects.      HLD  Concerning her hyperlipidemia, she remains on atorvastatin.  She is tolerating this well.  Denies side effects.    Abnormal MPS  Concerning her abnormal stress test, she's been on medication management.  No further chest pain.      MR  She had a TTE that was mild.      Current Outpatient Prescriptions on File Prior to Visit   Medication Sig Dispense Refill   • aspirin 81 MG EC tablet Take 81 mg by mouth 2 (two) times a day.     • atorvastatin (LIPITOR) 20 MG tablet Take 1 tablet by mouth Daily. 90 tablet 3   • busPIRone (BUSPAR) 5 MG tablet Take 1 tablet by mouth 3 (Three) Times a Day. (Patient taking differently: Take 5 mg by mouth. bid) 90 tablet 11   • calcium carbonate-vitamin d 600-400 MG-UNIT per tablet Take 1 tablet by mouth 2 (two) times a day. 60 tablet 0   • choline fenofibrate (TRILIPIX) 135 MG capsule Take 1 capsule by mouth Daily. 90 capsule 0   • CYANOCOBALAMIN ER PO Take  by mouth.     • lisinopril (PRINIVIL,ZESTRIL) 40 MG tablet Take 1 tablet by mouth Daily. 90 tablet 0   • methIMAzole (TAPAZOLE) 5 MG tablet Take 1 tablet by mouth Daily. (Patient taking differently: Take 5 mg by mouth Daily. Patient stated she takes 1/2 a pill (2.5mg)) 90 tablet 1   • MILK THISTLE PO Take  by mouth 2 (two) times a day.     • nitroglycerin (NITROSTAT) 0.4 MG SL tablet Place 1 tablet under the tongue See Admin Instructions. PRN cp, if not relieved in 5 min repeat and go to ER 25 tablet 12   • omeprazole (PriLOSEC) 20 MG capsule Take 1 capsule by mouth daily. 90 capsule 3   • potassium chloride (K-DUR) 10 MEQ CR tablet Take 1 tablet by mouth 2 (Two) Times a Day. 180 tablet 1   • rivaroxaban (XARELTO) 20 MG tablet Take 1 tablet by mouth Daily. 90 tablet 1   • sotalol (BETAPACE) 120 MG tablet Take 1 tablet by mouth 2 (Two) Times a Day. 180 tablet 1   • triamterene-hydrochlorothiazide (MAXZIDE-25) 37.5-25 MG per tablet Take 1 tablet by mouth Every Other Day. 90 tablet 3   • venlafaxine XR (EFFEXOR-XR) 150 MG 24 hr capsule Take 1 capsule by mouth Daily. 30 capsule 6   • [DISCONTINUED] calcium citrate-vitamin d (CITRACAL) 200-250 MG-UNIT tablet tablet Take  by mouth daily.     • [DISCONTINUED] carisoprodol (SOMA) 350 MG tablet Take 1 tablet by mouth Every Night. 30 tablet 5   •  [DISCONTINUED] cyclobenzaprine (FLEXERIL) 5 MG tablet Take 1 tablet by mouth 3 (Three) Times a Day As Needed for Muscle Spasms. 30 tablet 0   • [DISCONTINUED] gabapentin (NEURONTIN) 300 MG capsule Take 1 capsule by mouth 3 (Three) Times a Day. 90 capsule 11   • [DISCONTINUED] venlafaxine (EFFEXOR) 75 MG tablet Take 1 tablet by mouth Daily. 30 tablet 6     No current facility-administered medications on file prior to visit.      Allergies   Allergen Reactions   • Codeine    • Latex    • Morphine And Related    • Pravachol [Pravastatin Sodium] Myalgia     Social History     Social History   • Marital status:      Spouse name: N/A   • Number of children: N/A   • Years of education: N/A     Occupational History   • Not on file.     Social History Main Topics   • Smoking status: Never Smoker   • Smokeless tobacco: Not on file      Comment: smoking cessation    • Alcohol use No   • Drug use: No      Comment: lortab / oxycontin   • Sexual activity: Defer     Other Topics Concern   • Not on file     Social History Narrative       Physical Exam:  Vitals:    01/31/18 1137   BP: 118/86   Pulse: 90   SpO2: 98%     Body mass index is 33.12 kg/(m^2).    GEN: alert, appears stated age and cooperative  Body Habitus: well-nourished  HEENT: Head: Normocephalic, no lesions, without obvious abnormality.  JVP: 6  HJR: Nonsustained.     Carotid:  Upstroke: Brisk.  Volume: Normal.    Carotid Bruit:  None  Subclavian Bruit: Not present.    Chest:  Normal Excursion: Normal.  I:E: Normal  Pulmonary:clear to auscultation, no wheezes, rales or rhonchi, symmetric air entry      Precordium:  No palpable heaves or thrusts. P2 is not palpable.   Imperial:  normal size and placement Palpable S4: Not present.   Heart rate: normal     Heart Sounds: S1: normal intensity  S2: normal intensity  S3: Absent      S4: Absent  Opening Snap: Absent.      A2-OS:  N/A  Pericardial rub: None  Ejection click: None      Murmurs: Systolic: Not  present  Diastolic: Not present  Extremity: no edema, cyanosis  Trophic changes: None   Pallor on elevation: Absent.    Rubor on dependency: None  Pulses: Right radial artery has 2+ (normal) pulse    DATA REVIEWED:     EKG: Atrial fibrillation. VR-90.     Ejection fraction 65%  Mild LVH  Diastolic dysfunction  No valvular abnormalities      This was performed March 15, 2012  Ejection fraction 55-60%  Mild left ventricular hypertrophy  Left Atrium 37 mm  Mild  tricuspid regurgitation  Mild to moderate mitral regurgitation  Mild aortic insufficiency  No mention of diastolic parameters  Myocardial perfusion stress 2015 with inducible ischemia    Procedures  · Left Ventricle: Left ventricular function is normal. Estimated EF appears to be in the range of 61 - 65%.  · Left ventricular diastolic dysfunction is noted (grade II w/high LAP) consistent with pseudonormalization. Elevated left atrial pressure  · Right Ventricle: Normal wall thickness, systolic function and septal motion noted. Right ventricular cavity is mild-to-moderately dilated  · Estimated right ventricular systolic pressure from tricuspid regurgitation is normal (<35 mmHg).  · Mild aortic valve regurgitation is present  · There is no evidence of pericardial effusion.    Assessment/Plan     1.   Abnormal stress test with dyspnea on exertion.  Ejection fraction is preserved.  I recommended aggressive risk factor stratification. CCS 0.  ASA, Sotalol and Pravastatin    2. paroxysmal - 7 days or less Atrial fibrillation. The patient is currently in Atrial fibrillation with a well controlled ventricular rate. She is symptomatic. Recommended JOCY/DCCV.    A JOCY was recommended to the patient with MAC local IV sedation. The indications and risks/benefits were discussed. This included risks of inadvertent tracheal intubation, hematoma, oropharyngeal bleeding, esophageal perforation.  The patient agreed to proceeding.  The risks of conscious sedation were also  reviewed including allergy, anaphylaxis and hypoventilation requiring mechanical ventilation.  A hand out was also provided to the patient explaining the procedure.    DCCV was recommended. The procedure was explained to the patient with risks and benefits including risk of stroke, MI, bradycardia, pulmonary edema and skin burns. Risks of prolonged SNRT were reviewed, including symptomatic bradycardia. Rare, but possible need for emergent TCP, temporary transvenous pacing and even PPM were discussed. The patient voiced understanding.     Anticoagulation:ordered  -Xarelto  Rate control agents:ordered.  - Agent: beta blocker-Sotalol  -Rhythm control agents:ordered  -Additional orders: echocardiogram or Sotalol, JOCY with DCCV    3. Cardiac Risk Assessment:   -Recommended moderate-intensity statin therapy  -Lipid-lowering medications: Atorvastatin  -Recommended ASA  -Smoking cessation  -Daily activity of at least 30 minutes  -BP goal <130/80, Maintain BMI 18.5-24.9, Maintain hemoglobin A1c less than 7    4. Mild MR, asymptomatic.  Echo   5.  Tobacco status: Patient is a nonsmoker    6. Dyspnea.   -6MWT, labs, PFTs, Pulm referral  -CXR  -Will plan on JOCY/DCCV  -Follow-up Chyna    Follow-up: 3 months          This document has been electronically signed by Ar Gooden MD PhD on January 31, 2018 11:55 AM

## 2018-02-07 ENCOUNTER — OFFICE VISIT (OUTPATIENT)
Dept: FAMILY MEDICINE CLINIC | Facility: CLINIC | Age: 63
End: 2018-02-07

## 2018-02-07 VITALS
HEIGHT: 65 IN | OXYGEN SATURATION: 96 % | SYSTOLIC BLOOD PRESSURE: 130 MMHG | WEIGHT: 202 LBS | HEART RATE: 94 BPM | DIASTOLIC BLOOD PRESSURE: 80 MMHG | BODY MASS INDEX: 33.66 KG/M2

## 2018-02-07 DIAGNOSIS — Z98.890 HISTORY OF BACK SURGERY: Primary | ICD-10-CM

## 2018-02-07 DIAGNOSIS — K21.9 GASTROESOPHAGEAL REFLUX DISEASE WITHOUT ESOPHAGITIS: ICD-10-CM

## 2018-02-07 DIAGNOSIS — Z23 NEED FOR TDAP VACCINATION: ICD-10-CM

## 2018-02-07 PROCEDURE — 90471 IMMUNIZATION ADMIN: CPT | Performed by: FAMILY MEDICINE

## 2018-02-07 PROCEDURE — 99203 OFFICE O/P NEW LOW 30 MIN: CPT | Performed by: FAMILY MEDICINE

## 2018-02-07 PROCEDURE — 90715 TDAP VACCINE 7 YRS/> IM: CPT | Performed by: FAMILY MEDICINE

## 2018-02-07 RX ORDER — OMEPRAZOLE 20 MG/1
20 CAPSULE, DELAYED RELEASE ORAL DAILY
Qty: 90 CAPSULE | Refills: 3 | Status: SHIPPED | OUTPATIENT
Start: 2018-02-07 | End: 2019-04-09 | Stop reason: SDUPTHER

## 2018-02-07 NOTE — PROGRESS NOTES
ID: Lana Mata    CC:   Chief Complaint   Patient presents with   • Establish Care       Subjective:     HPI     Lana Mata is a 62 y.o. female who presents to establish care.  She states she no longer wishes to see Dr. Hancock as her PCP as she has had a few problems.  She was upset when he weaned her off of her pain medication but is ultimately happy she is no longer taking these.  Patient has multiple specialists for her care.  She goes to Dr. Gooden for her atrial fibrillation.  Per the patient Dr. Gooden wishes to do a JOCY and then cardiovert her if she does not have evidence of thrombus.  She goes to Dr. Landers in [place] for her extensive back surgery history but wishes to go to Dr. Pizarro in [place] as this would be easier for her to get to.  Patient also goes to Dr. MENDEZ her endocrinologist who follows her thyroid function.  Patient also request refill of her prilosec which she takes for GERD.  She states with her Prilosec she has no problems from her GERD but that when she runs out she starts to have pain, bloating and acid reflux.          Preventative:  Over the past 2 weeks, have you felt down, depressed, or hopeless?No   Over the past 2 weeks, have you felt little interest or pleasure in doing things?No  Clinical depression screening refused by patient.No PHQ9: 4    On osteoporosis therapy?No     Past Medical Hx:  Past Medical History:   Diagnosis Date   • Abnormal gait 11/28/2012   • Abrasion 02/26/2015   • Acute ankle pain 03/31/2016   • Acute bronchitis 09/04/2015   • Allergic rhinitis 05/22/2012   • Asthenia 05/22/2014   • Atrial fibrillation 03/04/2016    - converted    • Attempted suicide    • Benign essential hypertension 03/04/2016   • Cardiovascular stress test abnormal 03/04/2016   • Cellulitis of knee 03/03/2015   • Chest pain 04/02/2015   • Chronic pain disorder    • Common cold 06/24/2014   • Congestive heart failure 02/04/2016   • Depressive disorder 05/22/2015     suspect more complicated psych issues      • Gastroesophageal reflux disease 01/17/2013   • Generalized anxiety disorder 02/04/2016   • H/O echocardiogram     Normal LV function with Ef of 60-65%.Mildly dilated right ventricle with normal function. Grade 1B diastolic dysfunction with mild CLVH.NO sig. valvular regurg. or stenosis.   • Headache 10/27/2014   • Hyperlipidemia 03/04/2016   • Low back pain 06/30/2016    Need for prophylactic vaccination against Streptococcus pneumoniae [pneumococcus]    01/23/2015    • Neck pain 06/30/2016   • Obstructive sleep apnea of adult 07/30/2015   • Obstructive sleep apnea syndrome    • Osteoarthritis    • Osteoporosis 06/30/2016   • Pain in right shoulder 06/30/2016   • Pain in right shoulder 04/24/2014   • Shortness of breath 04/02/2015   • Shortness of breath 04/02/2015   • Skin ulcer 04/13/2015    left   • Thyrotoxicosis with or without goiter 10/24/2014   • Urinary incontinence 05/22/2014       Past Surgical Hx:  Past Surgical History:   Procedure Laterality Date   • BACK SURGERY  08/07/2015    Removal of intstrumentation,L5-S1.Exploration of fusion,L5-S1.Posterolateral fusion,L4-L5.Posterior segmental spinal instrumentation L4-5.Transforaminal interbody fusion Through right sided approach.Performed at    • COLONOSCOPY  05/04/2016    will order cologuard   • INJECTION OF MEDICATION  03/23/2015    celestone   • INJECTION OF MEDICATION  05/04/2016    KENALOG(6)   • LUMBAR FUSION      discectomy fusion with rods L4-S1   • LUMBAR LAMINECTOMY  1995   • MANDIBLE FRACTURE SURGERY      JAW WIRED SHUT   • MOUTH SURGERY  10/05/2015    Fractured mandible. Removal of arch bars.   • MOUTH SURGERY  1985    Fractured mandible. Removal of arch bars.   • RHINOPLASTY     • TUBAL ABDOMINAL LIGATION  1986       Health Maintenance:  Health Maintenance   Topic Date Due   • DXA SCAN  08/15/2018   • MAMMOGRAM  08/16/2018   • LIPID PANEL  01/31/2019   • MEDICARE ANNUAL WELLNESS  02/07/2019   • PAP  SMEAR  12/15/2019   • COLONOSCOPY  11/07/2026   • TDAP/TD VACCINES (3 - Td) 02/07/2028   • HEPATITIS C SCREENING  Completed   • INFLUENZA VACCINE  Addressed   • ZOSTER VACCINE  Completed       Current Meds:    Current Outpatient Prescriptions:   •  ARIPiprazole (ABILIFY) 5 MG tablet, Take 5 mg by mouth Daily., Disp: , Rfl:   •  aspirin 81 MG EC tablet, Take 81 mg by mouth 2 (two) times a day., Disp: , Rfl:   •  atorvastatin (LIPITOR) 20 MG tablet, Take 1 tablet by mouth Daily., Disp: 90 tablet, Rfl: 3  •  busPIRone (BUSPAR) 5 MG tablet, Take 1 tablet by mouth 3 (Three) Times a Day. (Patient taking differently: Take 5 mg by mouth. bid), Disp: 90 tablet, Rfl: 11  •  choline fenofibrate (TRILIPIX) 135 MG capsule, Take 1 capsule by mouth Daily., Disp: 90 capsule, Rfl: 0  •  CYANOCOBALAMIN ER PO, Take  by mouth., Disp: , Rfl:   •  lisinopril (PRINIVIL,ZESTRIL) 40 MG tablet, Take 1 tablet by mouth Daily., Disp: 90 tablet, Rfl: 0  •  methIMAzole (TAPAZOLE) 5 MG tablet, Take 1 tablet by mouth Daily. (Patient taking differently: Take 5 mg by mouth Daily. Patient stated she takes 1/2 a pill (2.5mg)), Disp: 90 tablet, Rfl: 1  •  MILK THISTLE PO, Take  by mouth 2 (two) times a day., Disp: , Rfl:   •  nitroglycerin (NITROSTAT) 0.4 MG SL tablet, Place 1 tablet under the tongue See Admin Instructions. PRN cp, if not relieved in 5 min repeat and go to ER, Disp: 25 tablet, Rfl: 12  •  omeprazole (priLOSEC) 20 MG capsule, Take 1 capsule by mouth Daily., Disp: 90 capsule, Rfl: 3  •  potassium chloride (K-DUR) 10 MEQ CR tablet, Take 1 tablet by mouth 2 (Two) Times a Day., Disp: 180 tablet, Rfl: 1  •  rivaroxaban (XARELTO) 20 MG tablet, Take 1 tablet by mouth Daily., Disp: 90 tablet, Rfl: 1  •  sotalol (BETAPACE) 120 MG tablet, Take 1 tablet by mouth 2 (Two) Times a Day., Disp: 180 tablet, Rfl: 1  •  triamterene-hydrochlorothiazide (MAXZIDE-25) 37.5-25 MG per tablet, Take 1 tablet by mouth Every Other Day., Disp: 90 tablet, Rfl: 3  •   venlafaxine XR (EFFEXOR-XR) 150 MG 24 hr capsule, Take 1 capsule by mouth Daily., Disp: 30 capsule, Rfl: 6    Allergies:  Codeine; Latex; Morphine and related; and Pravachol [pravastatin sodium]    Family Hx:  Family History   Problem Relation Age of Onset   • Hypertension Mother    • Hyperlipidemia Mother    • Mental illness Mother    • Thyroid disease Mother    • Diabetes Maternal Grandmother    • Cancer Maternal Grandmother    • Depression Other    • Diabetes Other    • Heart disease Other    • Hypertension Other    • Osteoporosis Other    • Thyroid disease Other    • Cancer Maternal Aunt    • Cancer Paternal Aunt         Social History:  Social History     Social History   • Marital status:      Spouse name: N/A   • Number of children: N/A   • Years of education: N/A     Occupational History   • Not on file.     Social History Main Topics   • Smoking status: Never Smoker   • Smokeless tobacco: Never Used      Comment: smoking cessation    • Alcohol use No   • Drug use: No      Comment: lortab / oxycontin   • Sexual activity: Defer     Other Topics Concern   • Not on file     Social History Narrative   • No narrative on file       Review of Systems   Constitutional: Negative for activity change, appetite change, fatigue and fever.   HENT: Negative for ear pain and sore throat.    Eyes: Negative for pain and visual disturbance.   Respiratory: Negative for cough and shortness of breath.    Cardiovascular: Negative for chest pain and palpitations.   Gastrointestinal: Negative for abdominal pain and nausea.   Endocrine: Negative for cold intolerance and heat intolerance.   Genitourinary: Negative for difficulty urinating and dysuria.   Musculoskeletal: Negative for arthralgias and gait problem.   Skin: Negative for color change and rash.   Neurological: Negative for dizziness, weakness and headaches.   Hematological: Negative for adenopathy. Does not bruise/bleed easily.   Psychiatric/Behavioral: Negative for  "agitation, confusion and sleep disturbance.       Objective:     /80  Pulse 94  Ht 165.1 cm (65\")  Wt 91.6 kg (202 lb)  SpO2 96%  BMI 33.61 kg/m2    Physical Exam   Constitutional: She is oriented to person, place, and time. She appears well-developed and well-nourished. No distress.   HENT:   Head: Normocephalic and atraumatic.   Nose: Nose normal.   Eyes: Conjunctivae are normal. Right eye exhibits no discharge. Left eye exhibits no discharge.   Neck: Neck supple.   Cardiovascular: Normal rate and normal heart sounds.  An irregularly irregular rhythm present. Exam reveals no gallop and no friction rub.    No murmur heard.  Pulmonary/Chest: Effort normal and breath sounds normal. No accessory muscle usage. No respiratory distress. She has no wheezes. She has no rales. She exhibits no tenderness.   Abdominal: Soft. Bowel sounds are normal. She exhibits no distension. There is no tenderness.   Musculoskeletal: She exhibits no edema or deformity.   Neurological: She is alert and oriented to person, place, and time.   Skin: Skin is warm and dry. No rash noted. She is not diaphoretic. No erythema. No pallor.   Psychiatric: She has a normal mood and affect. Her behavior is normal.          Assessment/Plan:     Lana was seen today for establish care.    Diagnoses and all orders for this visit:    History of back surgery  -     Ambulatory Referral to Neurosurgery    Gastroesophageal reflux disease without esophagitis  -     omeprazole (priLOSEC) 20 MG capsule; Take 1 capsule by mouth Daily.    Need for Tdap vaccination  -     Tdap Vaccine Greater Than or Equal To 6yo IM    Other orders  -     Cancel: DT Vaccine Less Than 6yo IM      Patient has not had pap smear since 32 years old, she was encouraged to return for pap smear and explained the reason for the screening.  Patient declines colonoscopy even after lengthy discussion, she instead opts to do fecal cards provided by her insurance company via mail.  "   Kai reviewed and appropriate # [id number]    Follow-up:     Return in about 4 weeks (around 3/7/2018).      Goals        Patient Stated    • management of chronic medical conditions (pt-stated)            Barriers to goals: none              Health Maintenance   Topic Date Due   • DXA SCAN  08/15/2018   • MAMMOGRAM  08/16/2018   • LIPID PANEL  01/31/2019   • MEDICARE ANNUAL WELLNESS  02/07/2019   • PAP SMEAR  12/15/2019   • COLONOSCOPY  11/07/2026   • TDAP/TD VACCINES (3 - Td) 02/07/2028   • HEPATITIS C SCREENING  Completed   • INFLUENZA VACCINE  Addressed   • ZOSTER VACCINE  Completed       does not smoke  does not drink  eat more fruits and vegetables, reduce screen time, cut out extra servings, reduce fast food intake, family to eat at dinner table more often, plan meals and have 3 meals a day    RISK SCORE: 3          This document has been electronically signed by Bernie Ortiz MD on February 12, 2018 10:05 AM

## 2018-02-07 NOTE — PROGRESS NOTES
I discussed the case with the resident and I agree with their assessment  and plan as outlined by Dr. Ortiz .  Enio Prescott MD.

## 2018-02-13 ENCOUNTER — OFFICE VISIT (OUTPATIENT)
Dept: PULMONOLOGY | Facility: CLINIC | Age: 63
End: 2018-02-13

## 2018-02-13 DIAGNOSIS — R06.02 SOB (SHORTNESS OF BREATH): ICD-10-CM

## 2018-02-13 PROCEDURE — 94727 GAS DIL/WSHOT DETER LNG VOL: CPT | Performed by: INTERNAL MEDICINE

## 2018-02-13 PROCEDURE — 94060 EVALUATION OF WHEEZING: CPT | Performed by: INTERNAL MEDICINE

## 2018-02-13 PROCEDURE — 94729 DIFFUSING CAPACITY: CPT | Performed by: INTERNAL MEDICINE

## 2018-02-15 RX ORDER — ATORVASTATIN CALCIUM 20 MG/1
20 TABLET, FILM COATED ORAL DAILY
Qty: 90 TABLET | Refills: 3 | Status: SHIPPED | OUTPATIENT
Start: 2018-02-15 | End: 2018-07-23 | Stop reason: SDUPTHER

## 2018-02-15 RX ORDER — SOTALOL HYDROCHLORIDE 120 MG/1
120 TABLET ORAL 2 TIMES DAILY
Qty: 180 TABLET | Refills: 3 | Status: SHIPPED | OUTPATIENT
Start: 2018-02-15 | End: 2018-03-09

## 2018-02-15 RX ORDER — POTASSIUM CHLORIDE 750 MG/1
10 TABLET, FILM COATED, EXTENDED RELEASE ORAL 2 TIMES DAILY WITH MEALS
Qty: 180 TABLET | Refills: 3 | Status: SHIPPED | OUTPATIENT
Start: 2018-02-15 | End: 2019-03-13 | Stop reason: SDUPTHER

## 2018-02-15 RX ORDER — TRIAMTERENE AND HYDROCHLOROTHIAZIDE 37.5; 25 MG/1; MG/1
1 TABLET ORAL EVERY OTHER DAY
Qty: 90 TABLET | Refills: 3 | Status: SHIPPED | OUTPATIENT
Start: 2018-02-15 | End: 2018-05-26 | Stop reason: HOSPADM

## 2018-02-26 ENCOUNTER — ANESTHESIA EVENT (OUTPATIENT)
Dept: CARDIOLOGY | Facility: HOSPITAL | Age: 63
End: 2018-02-26

## 2018-02-26 ENCOUNTER — OFFICE VISIT (OUTPATIENT)
Dept: PULMONOLOGY | Facility: CLINIC | Age: 63
End: 2018-02-26

## 2018-02-26 VITALS
BODY MASS INDEX: 33.67 KG/M2 | WEIGHT: 202.1 LBS | HEIGHT: 65 IN | SYSTOLIC BLOOD PRESSURE: 130 MMHG | HEART RATE: 99 BPM | DIASTOLIC BLOOD PRESSURE: 80 MMHG | OXYGEN SATURATION: 97 %

## 2018-02-26 DIAGNOSIS — E66.01 MORBID OBESITY (HCC): ICD-10-CM

## 2018-02-26 DIAGNOSIS — R53.81 PHYSICAL DECONDITIONING: ICD-10-CM

## 2018-02-26 DIAGNOSIS — I50.32 CHRONIC DIASTOLIC CONGESTIVE HEART FAILURE (HCC): ICD-10-CM

## 2018-02-26 DIAGNOSIS — I48.0 PAROXYSMAL ATRIAL FIBRILLATION (HCC): ICD-10-CM

## 2018-02-26 DIAGNOSIS — R06.09 DYSPNEA ON EXERTION: Primary | ICD-10-CM

## 2018-02-26 PROCEDURE — G8417 CALC BMI ABV UP PARAM F/U: HCPCS | Performed by: INTERNAL MEDICINE

## 2018-02-26 PROCEDURE — G9903 PT SCRN TBCO ID AS NON USER: HCPCS | Performed by: INTERNAL MEDICINE

## 2018-02-26 PROCEDURE — 99204 OFFICE O/P NEW MOD 45 MIN: CPT | Performed by: INTERNAL MEDICINE

## 2018-02-26 RX ORDER — SODIUM CHLORIDE 9 MG/ML
1000 INJECTION, SOLUTION INTRAVENOUS CONTINUOUS PRN
Status: CANCELLED | OUTPATIENT
Start: 2018-02-27

## 2018-02-26 NOTE — PROGRESS NOTES
Pulmonary Consultation    No ref. provider found,    Thank you for asking me to see Lana Mata for   Chief Complaint   Patient presents with   • Shortness of Breath     ref by Dr. Gooden   .    Subjective     History of Present Illness  Lana Mata is a 62 y.o. female with a PMH significant for morbid obesity, SOPHIA, PAF, diastolic dysfunction, HTN, HLD, and chronic back pain who presents for evaluation of dyspnea. Pt states she has had progressive CHAN for the past year which has worsened in the last 6 months. She describes the sensation that her lungs feel like they collapse. Pt has been following with Dr. Gooden for multiple cardiac issues. She reports that she is scheduled for a cardioversion tomorrow as she is symptomatic with her AF, but she does not believe her dyspnea is not due to the AF. Pt has been to the ED several times and usually gets IV diltiazem which converts her. She is now on sotalol but believes she just needs to be on diltiazem. Pt states that she initially thought her dyspnea was anxiety related to her AF, but she now believes it is her lungs. She denies prior lung disease. She admits to fatigue related to her dyspnea, but she denies chest pain. Pt does have occasional cough due to a tickle in her throat but she does take Allegra prn for her allergies. She denies significant wt changes but she has had intermittent edema. Pt report she has SOPHIA and has a CPAP but she takes it off when she is ready to fall asleep. She has been referred to Dr. Clemente. She does report that she has used her  's albuterol and has felt some relief in the past.  Pt is a never smoker but her  was a smoker. She is a retired RN. Her mother has chronic bronchitis but there is no lung cancer in the family.    Review of Systems: History obtained from chart review and the patient.  Review of Systems   Constitutional: Positive for fatigue. Negative for unexpected weight change.   HENT:  Positive for postnasal drip.    Respiratory: Positive for shortness of breath. Negative for cough and wheezing.    Cardiovascular: Positive for palpitations. Negative for chest pain and leg swelling.   Psychiatric/Behavioral: The patient is nervous/anxious.      As described in the HPI. Otherwise, remainder of ROS (14 systems) were negative.    Patient Active Problem List   Diagnosis   • Thyrotoxicosis with or without goiter   • Osteoporosis   • Pain in right shoulder   • Low back pain   • Hyperlipidemia   • Generalized anxiety disorder   • Depressive disorder   • Congestive heart failure   • Atrial fibrillation   • Benign essential hypertension   • Asthenia   • Abnormal gait   • Obstructive sleep apnea of adult   • DDD (degenerative disc disease), lumbar   • Lumbar radiculopathy   • History of lumbar surgery   • Lumbar spondylolysis   • Long term (current) use of opiate analgesic   • Paroxysmal atrial fibrillation         Current Outpatient Prescriptions:   •  ARIPiprazole (ABILIFY) 5 MG tablet, Take 5 mg by mouth Daily., Disp: , Rfl:   •  aspirin 81 MG EC tablet, Take 81 mg by mouth 2 (two) times a day., Disp: , Rfl:   •  atorvastatin (LIPITOR) 20 MG tablet, Take 1 tablet by mouth Daily., Disp: 90 tablet, Rfl: 3  •  busPIRone (BUSPAR) 5 MG tablet, Take 1 tablet by mouth 3 (Three) Times a Day. (Patient taking differently: Take 5 mg by mouth. bid), Disp: 90 tablet, Rfl: 11  •  choline fenofibrate (TRILIPIX) 135 MG capsule, Take 1 capsule by mouth Daily., Disp: 90 capsule, Rfl: 3  •  CYANOCOBALAMIN ER PO, Take  by mouth., Disp: , Rfl:   •  lisinopril (PRINIVIL,ZESTRIL) 40 MG tablet, Take 1 tablet by mouth Daily., Disp: 90 tablet, Rfl: 0  •  methIMAzole (TAPAZOLE) 5 MG tablet, Take 1 tablet by mouth Daily. (Patient taking differently: Take 5 mg by mouth Daily. Patient stated she takes 1/2 a pill (2.5mg)), Disp: 90 tablet, Rfl: 1  •  MILK THISTLE PO, Take  by mouth 2 (two) times a day., Disp: , Rfl:   •  nitroglycerin  (NITROSTAT) 0.4 MG SL tablet, Place 1 tablet under the tongue See Admin Instructions. PRN cp, if not relieved in 5 min repeat and go to ER, Disp: 25 tablet, Rfl: 12  •  omeprazole (priLOSEC) 20 MG capsule, Take 1 capsule by mouth Daily., Disp: 90 capsule, Rfl: 3  •  potassium chloride (K-DUR) 10 MEQ CR tablet, Take 1 tablet by mouth 2 (Two) Times a Day With Meals., Disp: 180 tablet, Rfl: 3  •  rivaroxaban (XARELTO) 20 MG tablet, Take 1 tablet by mouth Daily., Disp: 90 tablet, Rfl: 3  •  sotalol (BETAPACE) 120 MG tablet, Take 1 tablet by mouth 2 (Two) Times a Day., Disp: 180 tablet, Rfl: 3  •  triamterene-hydrochlorothiazide (MAXZIDE-25) 37.5-25 MG per tablet, Take 1 tablet by mouth Every Other Day., Disp: 90 tablet, Rfl: 3  •  venlafaxine XR (EFFEXOR-XR) 150 MG 24 hr capsule, Take 1 capsule by mouth Daily., Disp: 30 capsule, Rfl: 6    Past Medical History:   Diagnosis Date   • Abnormal gait 11/28/2012   • Abrasion 02/26/2015   • Acute ankle pain 03/31/2016   • Acute bronchitis 09/04/2015   • Allergic rhinitis 05/22/2012   • Asthenia 05/22/2014   • Atrial fibrillation 03/04/2016    - converted    • Attempted suicide    • Benign essential hypertension 03/04/2016   • Cardiovascular stress test abnormal 03/04/2016   • Cellulitis of knee 03/03/2015   • Chest pain 04/02/2015   • Chronic pain disorder    • Common cold 06/24/2014   • Congestive heart failure 02/04/2016   • Depressive disorder 05/22/2015    suspect more complicated psych issues      • Gastroesophageal reflux disease 01/17/2013   • Generalized anxiety disorder 02/04/2016   • H/O echocardiogram     Normal LV function with Ef of 60-65%.Mildly dilated right ventricle with normal function. Grade 1B diastolic dysfunction with mild CLVH.NO sig. valvular regurg. or stenosis.   • Headache 10/27/2014   • Hyperlipidemia 03/04/2016   • Low back pain 06/30/2016    Need for prophylactic vaccination against Streptococcus pneumoniae [pneumococcus]    01/23/2015    • Neck  pain 06/30/2016   • Obstructive sleep apnea of adult 07/30/2015   • Obstructive sleep apnea syndrome    • Osteoarthritis    • Osteoporosis 06/30/2016   • Pain in right shoulder 06/30/2016   • Pain in right shoulder 04/24/2014   • Shortness of breath 04/02/2015   • Shortness of breath 04/02/2015   • Skin ulcer 04/13/2015    left   • Thyrotoxicosis with or without goiter 10/24/2014   • Urinary incontinence 05/22/2014     Past Surgical History:   Procedure Laterality Date   • BACK SURGERY  08/07/2015    Removal of intstrumentation,L5-S1.Exploration of fusion,L5-S1.Posterolateral fusion,L4-L5.Posterior segmental spinal instrumentation L4-5.Transforaminal interbody fusion Through right sided approach.Performed at    • COLONOSCOPY  05/04/2016    will order cologuard   • INJECTION OF MEDICATION  03/23/2015    celestone   • INJECTION OF MEDICATION  05/04/2016    KENALOG(6)   • LUMBAR FUSION      discectomy fusion with rods L4-S1   • LUMBAR LAMINECTOMY  1995   • MANDIBLE FRACTURE SURGERY      JAW WIRED SHUT   • MOUTH SURGERY  10/05/2015    Fractured mandible. Removal of arch bars.   • MOUTH SURGERY  1985    Fractured mandible. Removal of arch bars.   • RHINOPLASTY     • TUBAL ABDOMINAL LIGATION  1986     Social History     Social History   • Marital status:      Spouse name: N/A   • Number of children: N/A   • Years of education: N/A     Social History Main Topics   • Smoking status: Never Smoker   • Smokeless tobacco: Never Used      Comment: smoking cessation    • Alcohol use No   • Drug use: No      Comment: lortab / oxycontin   • Sexual activity: Defer     Other Topics Concern   • None     Social History Narrative     Family History   Problem Relation Age of Onset   • Hypertension Mother    • Hyperlipidemia Mother    • Mental illness Mother    • Thyroid disease Mother    • Diabetes Maternal Grandmother    • Cancer Maternal Grandmother    • Depression Other    • Diabetes Other    • Heart disease Other    •  "Hypertension Other    • Osteoporosis Other    • Thyroid disease Other    • Cancer Maternal Aunt    • Cancer Paternal Aunt           Objective     Blood pressure 130/80, pulse 99, height 165.1 cm (65\"), weight 91.7 kg (202 lb 1.6 oz), SpO2 97 %.  Physical Exam   Constitutional: She is oriented to person, place, and time. Vital signs are normal. She appears well-developed and well-nourished.   obese   HENT:   Head: Normocephalic and atraumatic.   Nose: Nose normal.   Mouth/Throat: Uvula is midline, oropharynx is clear and moist and mucous membranes are normal.   Mallampati 4   Eyes: Conjunctivae, EOM and lids are normal. Pupils are equal, round, and reactive to light.   Neck: Trachea normal and normal range of motion. No tracheal tenderness present. No thyroid mass present.   Cardiovascular: Normal rate and normal heart sounds.  An irregularly irregular rhythm present. PMI is not displaced.  Exam reveals no gallop.    No murmur heard.  Pulmonary/Chest: Effort normal and breath sounds normal. No respiratory distress. She has no decreased breath sounds. She has no wheezes. She has no rhonchi. Chest wall is not dull to percussion. She exhibits no tenderness.   Abdominal: Soft. Normal appearance and bowel sounds are normal. There is no hepatosplenomegaly. There is no tenderness.   obese   Musculoskeletal:   Normal gait, no extremity edema       Vascular Status -  Her exam exhibits no right foot edema. Her exam exhibits no left foot edema.  Lymphadenopathy:        Head (right side): No submandibular adenopathy present.        Head (left side): No submandibular adenopathy present.     She has no cervical adenopathy.        Right: No supraclavicular adenopathy present.        Left: No supraclavicular adenopathy present.   Neurological: She is alert and oriented to person, place, and time. Gait normal.   Skin: Skin is warm and dry. No rash noted. No cyanosis. Nails show no clubbing.   Psychiatric: She has a normal mood and " affect. Her speech is normal and behavior is normal. Judgment normal.   Nursing note and vitals reviewed.      PFTs: 2/13/18 (independently reviewed and interpreted by me)  Ratio 81  FVC 1.79/ 53%  FEV1 1.45/ 56%  TLC 4.53/ 87%  DLCO 25.38/ 99%  Poor effort.  Nonspecific spirometry pattern.  Normal lung volumes and diffusing capacity.  No bronchodilator response.  No comparative data available.    Radiology (independently reviewed and interpreted by me): CXR 1/31/18 showed mild thoracic scoliosis, NACPD       Assessment/Plan     Lana was seen today for shortness of breath.    Diagnoses and all orders for this visit:    Dyspnea on exertion    Morbid obesity    Paroxysmal atrial fibrillation    Chronic diastolic congestive heart failure    Physical deconditioning         Discussion/ Recommendations:   PFTs did not show overt obstruction and no bronchodilator response.  Chest imaging was unremarkable.  While it is possible she has some mild underlying asthma, I think her current dyspnea is more related to her issues with atrial fibrillation, diastolic dysfunction, and untreated SOPHIA.  She also likely has component from her obesity and deconditioning.  I recommended a stepwise approach and as there are already plans to address her A. fib and SOPHIA, we wait to assess her response before considering bronchodilators given the potential risk with minimal benefit.    -Agree with plan for JOCY and cardioversion per Dr. Gooden given persistent A. fib on antiarrhythmics.  -Counseled on the importance of wearing CPAP with sleep to actually treat SOPHIA.  Recommend she discuss masks with her [employer] company and Dr. Clemente to may be able to assist with CPAP desensitization.  -Cautioned patient on using albuterol given risk for tachycardia with beta agonists and no clear indication.  -Encouraged her to start regular exercise to assist with weight loss and deconditioning.  -Patient's BMI is above normal parameters. Follow-up plan includes:   exercise counseling, nutrition counseling and referral to primary care.         Return in about 3 months (around 5/26/2018) for Recheck.      Thank you for allowing me to participate in the care of Lana Mata. Please do not hesitate to contact me with any questions.         This document has been electronically signed by Bernie Helms MD on February 26, 2018 11:10 AM      Dictated using Dragon

## 2018-02-27 ENCOUNTER — ANESTHESIA (OUTPATIENT)
Dept: CARDIOLOGY | Facility: HOSPITAL | Age: 63
End: 2018-02-27

## 2018-02-27 ENCOUNTER — HOSPITAL ENCOUNTER (OUTPATIENT)
Dept: CARDIOLOGY | Facility: HOSPITAL | Age: 63
Discharge: HOME | End: 2018-02-27
Attending: INTERNAL MEDICINE | Admitting: INTERNAL MEDICINE

## 2018-02-27 ENCOUNTER — HOSPITAL ENCOUNTER (OUTPATIENT)
Facility: HOSPITAL | Age: 63
Discharge: HOME | End: 2018-02-27
Attending: INTERNAL MEDICINE | Admitting: INTERNAL MEDICINE

## 2018-02-27 ENCOUNTER — ANESTHESIA EVENT (OUTPATIENT)
Dept: CARDIOLOGY | Facility: HOSPITAL | Age: 63
End: 2018-02-27

## 2018-02-27 VITALS
SYSTOLIC BLOOD PRESSURE: 143 MMHG | OXYGEN SATURATION: 98 % | WEIGHT: 202 LBS | HEART RATE: 99 BPM | DIASTOLIC BLOOD PRESSURE: 99 MMHG | HEIGHT: 65 IN | TEMPERATURE: 97.2 F | RESPIRATION RATE: 20 BRPM | BODY MASS INDEX: 33.66 KG/M2

## 2018-02-27 VITALS
DIASTOLIC BLOOD PRESSURE: 71 MMHG | BODY MASS INDEX: 33.46 KG/M2 | SYSTOLIC BLOOD PRESSURE: 135 MMHG | TEMPERATURE: 98.3 F | WEIGHT: 200.84 LBS | RESPIRATION RATE: 18 BRPM | HEIGHT: 65 IN | HEART RATE: 82 BPM | OXYGEN SATURATION: 98 %

## 2018-02-27 DIAGNOSIS — I48.19 PERSISTENT ATRIAL FIBRILLATION (HCC): Primary | ICD-10-CM

## 2018-02-27 DIAGNOSIS — I48.0 PAROXYSMAL ATRIAL FIBRILLATION (HCC): ICD-10-CM

## 2018-02-27 PROCEDURE — 92960 CARDIOVERSION ELECTRIC EXT: CPT | Performed by: INTERNAL MEDICINE

## 2018-02-27 PROCEDURE — 25010000002 MIDAZOLAM PER 1 MG: Performed by: NURSE ANESTHETIST, CERTIFIED REGISTERED

## 2018-02-27 PROCEDURE — 93321 DOPPLER ECHO F-UP/LMTD STD: CPT | Performed by: INTERNAL MEDICINE

## 2018-02-27 PROCEDURE — 25010000002 PROPOFOL 10 MG/ML EMULSION: Performed by: NURSE ANESTHETIST, CERTIFIED REGISTERED

## 2018-02-27 PROCEDURE — 93312 ECHO TRANSESOPHAGEAL: CPT | Performed by: INTERNAL MEDICINE

## 2018-02-27 PROCEDURE — 93325 DOPPLER ECHO COLOR FLOW MAPG: CPT

## 2018-02-27 PROCEDURE — 93325 DOPPLER ECHO COLOR FLOW MAPG: CPT | Performed by: INTERNAL MEDICINE

## 2018-02-27 PROCEDURE — 93005 ELECTROCARDIOGRAM TRACING: CPT | Performed by: INTERNAL MEDICINE

## 2018-02-27 PROCEDURE — 93312 ECHO TRANSESOPHAGEAL: CPT

## 2018-02-27 PROCEDURE — 93010 ELECTROCARDIOGRAM REPORT: CPT | Performed by: INTERNAL MEDICINE

## 2018-02-27 PROCEDURE — 93321 DOPPLER ECHO F-UP/LMTD STD: CPT

## 2018-02-27 RX ORDER — PROMETHAZINE HYDROCHLORIDE 25 MG/1
25 TABLET ORAL ONCE AS NEEDED
Status: DISCONTINUED | OUTPATIENT
Start: 2018-02-27 | End: 2018-02-28 | Stop reason: HOSPADM

## 2018-02-27 RX ORDER — PROMETHAZINE HYDROCHLORIDE 25 MG/1
25 SUPPOSITORY RECTAL ONCE AS NEEDED
Status: DISCONTINUED | OUTPATIENT
Start: 2018-02-27 | End: 2018-02-28 | Stop reason: HOSPADM

## 2018-02-27 RX ORDER — BUSPIRONE HYDROCHLORIDE 5 MG/1
5 TABLET ORAL 3 TIMES DAILY
COMMUNITY
End: 2018-04-13

## 2018-02-27 RX ORDER — MIDAZOLAM HYDROCHLORIDE 1 MG/ML
INJECTION INTRAMUSCULAR; INTRAVENOUS AS NEEDED
Status: DISCONTINUED | OUTPATIENT
Start: 2018-02-27 | End: 2018-02-27 | Stop reason: SURG

## 2018-02-27 RX ORDER — DILTIAZEM HYDROCHLORIDE 120 MG/1
120 CAPSULE, EXTENDED RELEASE ORAL DAILY
Qty: 30 CAPSULE | Refills: 11 | Status: SHIPPED | OUTPATIENT
Start: 2018-02-27 | End: 2018-03-26 | Stop reason: DRUGHIGH

## 2018-02-27 RX ORDER — METHIMAZOLE 5 MG/1
2.5 TABLET ORAL DAILY
Status: ON HOLD | COMMUNITY
End: 2020-03-15

## 2018-02-27 RX ORDER — ONDANSETRON 2 MG/ML
4 INJECTION INTRAMUSCULAR; INTRAVENOUS ONCE AS NEEDED
Status: DISCONTINUED | OUTPATIENT
Start: 2018-02-27 | End: 2018-02-28 | Stop reason: HOSPADM

## 2018-02-27 RX ORDER — LIDOCAINE HYDROCHLORIDE 10 MG/ML
INJECTION, SOLUTION INFILTRATION; PERINEURAL AS NEEDED
Status: DISCONTINUED | OUTPATIENT
Start: 2018-02-27 | End: 2018-02-27 | Stop reason: SURG

## 2018-02-27 RX ORDER — PROMETHAZINE HYDROCHLORIDE 25 MG/ML
12.5 INJECTION, SOLUTION INTRAMUSCULAR; INTRAVENOUS ONCE AS NEEDED
Status: DISCONTINUED | OUTPATIENT
Start: 2018-02-27 | End: 2018-02-28 | Stop reason: HOSPADM

## 2018-02-27 RX ORDER — SODIUM CHLORIDE 9 MG/ML
1000 INJECTION, SOLUTION INTRAVENOUS CONTINUOUS PRN
Status: DISCONTINUED | OUTPATIENT
Start: 2018-02-27 | End: 2018-02-27 | Stop reason: HOSPADM

## 2018-02-27 RX ORDER — PROPOFOL 10 MG/ML
VIAL (ML) INTRAVENOUS AS NEEDED
Status: DISCONTINUED | OUTPATIENT
Start: 2018-02-27 | End: 2018-02-27 | Stop reason: SURG

## 2018-02-27 RX ADMIN — PROPOFOL 60 MG: 10 INJECTION, EMULSION INTRAVENOUS at 10:55

## 2018-02-27 RX ADMIN — PROPOFOL 100 MG: 10 INJECTION, EMULSION INTRAVENOUS at 10:45

## 2018-02-27 RX ADMIN — MIDAZOLAM 2 MG: 1 INJECTION INTRAMUSCULAR; INTRAVENOUS at 10:44

## 2018-02-27 RX ADMIN — PROPOFOL 50 MG: 10 INJECTION, EMULSION INTRAVENOUS at 10:50

## 2018-02-27 RX ADMIN — LIDOCAINE HYDROCHLORIDE 50 MG: 10 INJECTION, SOLUTION INFILTRATION; PERINEURAL at 10:40

## 2018-02-27 RX ADMIN — SODIUM CHLORIDE 1000 ML: 9 INJECTION, SOLUTION INTRAVENOUS at 08:39

## 2018-02-27 RX ADMIN — PROPOFOL 50 MG: 10 INJECTION, EMULSION INTRAVENOUS at 10:47

## 2018-02-27 NOTE — ANESTHESIA PREPROCEDURE EVALUATION
Anesthesia Evaluation     NPO Solid Status: > 8 hours  NPO Liquid Status: > 8 hours           Airway   Mallampati: I  TM distance: >3 FB  Neck ROM: full  no difficulty expected  Dental    (+) edentulous    Pulmonary     breath sounds clear to auscultation  (+) a smoker Former, shortness of breath, recent URI resolved, sleep apnea on CPAP,   Cardiovascular     ECG reviewed  PT is on anticoagulation therapy  Rhythm: irregular  Rate: abnormal    (+) hypertension, dysrhythmias Atrial Fib, CHF, hyperlipidemia,       Neuro/Psych  (+) headaches, numbness, psychiatric history Anxiety,     GI/Hepatic/Renal/Endo    (+) obesity,  GERD,  hyperthyroidism    Musculoskeletal     (+) arthralgias, neck pain,   Abdominal   (+) obese,     Abdomen: soft.   Substance History      OB/GYN          Other   (+) arthritis                     Anesthesia Plan    ASA 3     MAC     intravenous induction   Anesthetic plan and risks discussed with patient.

## 2018-02-27 NOTE — INTERVAL H&P NOTE
H&P reviewed. The patient was examined and there are no changes to the H&P.            This document has been electronically signed by Ar Gooden MD PhD on February 27, 2018 8:53 AM       To bedside for pain exacerbation.     Pt writhing in pain, crying and stating pain is 9/10.  Pain is in her ribs and back. Minimal relief for 30 minutes after last dose of Dilaudid. No SOB, contractions, LOF, VB. Normal fetal movement. Pulse low 100's, O2 sats 96%, /57.    -Will give 1mg of dilaudid now and plan to increase the frequency of dilaudid from here to be 1mg q2hr PRN.   -Give oral tylenol.  -Currently receiving 1st unit of blood. Plan for 2 units with follow up CBC and hgb electrophoresis.   -If pain continues to be uncontrolled consider anesthesia consult with PCA.      Discussed assessment and plan with Dr. Cortez.    Tatyana Dalal M.D.  PGY-3 ObGyn  [phone]

## 2018-02-27 NOTE — TREATMENT PLAN
Status post uncomplicated JOCY  Normal LVEF  Mild mitral regurgitation  No evidence of MICKEY thrombus    Failed DCCV X 2  Refer to Akram  Start Diltiazem

## 2018-02-27 NOTE — ANESTHESIA POSTPROCEDURE EVALUATION
Patient: Lana Ayala Attebury    Procedure Summary     Date Anesthesia Start Anesthesia Stop Room / Location    02/27/18 1048 1112 [facility] CATH LAB       Procedure Diagnosis Scheduled Providers Provider    ADULT TRANSESOPHAGEAL ECHO (JOCY) W/ CONT IF NECESSARY PER PROTOCOL Paroxysmal atrial fibrillation  (Guidance for Cardiac Intervention) Ar Gooden MD PhD No responsible provider of record.          Anesthesia Type: MAC  Last vitals  BP       Temp       Pulse       Resp         SpO2         Post Anesthesia Care and Evaluation    Patient location during evaluation: bedside  Patient participation: complete - patient participated  Level of consciousness: awake and alert  Pain score: 0  Pain management: adequate  Airway patency: patent  Anesthetic complications: No anesthetic complications  PONV Status: none  Cardiovascular status: acceptable  Respiratory status: acceptable  Hydration status: acceptable

## 2018-02-27 NOTE — H&P (VIEW-ONLY)
Cardiovascular Medicine   Ar Gooden M.D., Ph.D., [facility]      The patient returns to cardiology clinic for follow-up of the following cardiac problems:    PROBLEM LIST:    1. PAF, non-valvular  a.  Ejection fraction: 60-65 percent, May 2015  b. Mild concentric left ventricular hypertrophy: 12mm at IVS  c. Left Atrium 37 mm  d.  Metoprolol succinate 100 mg twice a shabbir  e.  Multaq  f.  Aspirin 162 mg--has refused anticoagulation  2.  Essential hypertension  a.  Mild LVH  b.  Grade 1B diastolic dysfunction  3.  Hyperlipidemia  a.  Myalgias with pravastatin, but did not improve after medication was discontinued  4.  Mild SOPHIA    a.   Not on CPAP.   5.  Questionable history of heart failure  6.  Hyperthyroidism  7.  Chronic back pain  8.  Abnormal stress test  a.  Myocardial perfusion stress April 2015  -Mild photopenic defect of moderate size in the base and mid inferior lateral wall  -Ejection fraction 67%  9. Mild to Moderate MR    PAF  The patient returns in follow-up for her nonvalvular PAF.  She's now on sotalol.  She states she is tolerating this well.  Ejection fraction is preserved.  Interventricular septum has mild LVH 12 mm.  She does have a history of abnormal stress test, so Propafenone was discontinued.  She is on Xarelto. Since her last visit she has been feeling poorly. She has felt unwell for several months. She believes her AF has come back. She has had mild LE edema. She does have fatigue during the day. She tells me her Oxygen has been low. She has had sats down to 88%.     HTN  Concerning her hypertension, she remains on medication.  She is medication and diet compliant.  Blood pressures at goal.   Denies side effects.      HLD  Concerning her hyperlipidemia, she remains on atorvastatin.  She is tolerating this well.  Denies side effects.    Abnormal MPS  Concerning her abnormal stress test, she's been on medication management.  No further chest pain.      MR  She had a TTE that was mild.      Current Outpatient Prescriptions on File Prior to Visit   Medication Sig Dispense Refill   • aspirin 81 MG EC tablet Take 81 mg by mouth 2 (two) times a day.     • atorvastatin (LIPITOR) 20 MG tablet Take 1 tablet by mouth Daily. 90 tablet 3   • busPIRone (BUSPAR) 5 MG tablet Take 1 tablet by mouth 3 (Three) Times a Day. (Patient taking differently: Take 5 mg by mouth. bid) 90 tablet 11   • calcium carbonate-vitamin d 600-400 MG-UNIT per tablet Take 1 tablet by mouth 2 (two) times a day. 60 tablet 0   • choline fenofibrate (TRILIPIX) 135 MG capsule Take 1 capsule by mouth Daily. 90 capsule 0   • CYANOCOBALAMIN ER PO Take  by mouth.     • lisinopril (PRINIVIL,ZESTRIL) 40 MG tablet Take 1 tablet by mouth Daily. 90 tablet 0   • methIMAzole (TAPAZOLE) 5 MG tablet Take 1 tablet by mouth Daily. (Patient taking differently: Take 5 mg by mouth Daily. Patient stated she takes 1/2 a pill (2.5mg)) 90 tablet 1   • MILK THISTLE PO Take  by mouth 2 (two) times a day.     • nitroglycerin (NITROSTAT) 0.4 MG SL tablet Place 1 tablet under the tongue See Admin Instructions. PRN cp, if not relieved in 5 min repeat and go to ER 25 tablet 12   • omeprazole (PriLOSEC) 20 MG capsule Take 1 capsule by mouth daily. 90 capsule 3   • potassium chloride (K-DUR) 10 MEQ CR tablet Take 1 tablet by mouth 2 (Two) Times a Day. 180 tablet 1   • rivaroxaban (XARELTO) 20 MG tablet Take 1 tablet by mouth Daily. 90 tablet 1   • sotalol (BETAPACE) 120 MG tablet Take 1 tablet by mouth 2 (Two) Times a Day. 180 tablet 1   • triamterene-hydrochlorothiazide (MAXZIDE-25) 37.5-25 MG per tablet Take 1 tablet by mouth Every Other Day. 90 tablet 3   • venlafaxine XR (EFFEXOR-XR) 150 MG 24 hr capsule Take 1 capsule by mouth Daily. 30 capsule 6   • [DISCONTINUED] calcium citrate-vitamin d (CITRACAL) 200-250 MG-UNIT tablet tablet Take  by mouth daily.     • [DISCONTINUED] carisoprodol (SOMA) 350 MG tablet Take 1 tablet by mouth Every Night. 30 tablet 5   •  [DISCONTINUED] cyclobenzaprine (FLEXERIL) 5 MG tablet Take 1 tablet by mouth 3 (Three) Times a Day As Needed for Muscle Spasms. 30 tablet 0   • [DISCONTINUED] gabapentin (NEURONTIN) 300 MG capsule Take 1 capsule by mouth 3 (Three) Times a Day. 90 capsule 11   • [DISCONTINUED] venlafaxine (EFFEXOR) 75 MG tablet Take 1 tablet by mouth Daily. 30 tablet 6     No current facility-administered medications on file prior to visit.      Allergies   Allergen Reactions   • Codeine    • Latex    • Morphine And Related    • Pravachol [Pravastatin Sodium] Myalgia     Social History     Social History   • Marital status:      Spouse name: N/A   • Number of children: N/A   • Years of education: N/A     Occupational History   • Not on file.     Social History Main Topics   • Smoking status: Never Smoker   • Smokeless tobacco: Not on file      Comment: smoking cessation    • Alcohol use No   • Drug use: No      Comment: lortab / oxycontin   • Sexual activity: Defer     Other Topics Concern   • Not on file     Social History Narrative       Physical Exam:  Vitals:    01/31/18 1137   BP: 118/86   Pulse: 90   SpO2: 98%     Body mass index is 33.12 kg/(m^2).    GEN: alert, appears stated age and cooperative  Body Habitus: well-nourished  HEENT: Head: Normocephalic, no lesions, without obvious abnormality.  JVP: 6  HJR: Nonsustained.     Carotid:  Upstroke: Brisk.  Volume: Normal.    Carotid Bruit:  None  Subclavian Bruit: Not present.    Chest:  Normal Excursion: Normal.  I:E: Normal  Pulmonary:clear to auscultation, no wheezes, rales or rhonchi, symmetric air entry      Precordium:  No palpable heaves or thrusts. P2 is not palpable.   [place]:  normal size and placement Palpable S4: Not present.   Heart rate: normal     Heart Sounds: S1: normal intensity  S2: normal intensity  S3: Absent      S4: Absent  Opening Snap: Absent.      A2-OS:  N/A  Pericardial rub: None  Ejection click: None      Murmurs: Systolic: Not  present  Diastolic: Not present  Extremity: no edema, cyanosis  Trophic changes: None   Pallor on elevation: Absent.    Rubor on dependency: None  Pulses: Right radial artery has 2+ (normal) pulse    DATA REVIEWED:     EKG: Atrial fibrillation. VR-90.     Ejection fraction 65%  Mild LVH  Diastolic dysfunction  No valvular abnormalities      This was performed March 15, 2012  Ejection fraction 55-60%  Mild left ventricular hypertrophy  Left Atrium 37 mm  Mild  tricuspid regurgitation  Mild to moderate mitral regurgitation  Mild aortic insufficiency  No mention of diastolic parameters  Myocardial perfusion stress 2015 with inducible ischemia    Procedures  · Left Ventricle: Left ventricular function is normal. Estimated EF appears to be in the range of 61 - 65%.  · Left ventricular diastolic dysfunction is noted (grade II w/high LAP) consistent with pseudonormalization. Elevated left atrial pressure  · Right Ventricle: Normal wall thickness, systolic function and septal motion noted. Right ventricular cavity is mild-to-moderately dilated  · Estimated right ventricular systolic pressure from tricuspid regurgitation is normal (<35 mmHg).  · Mild aortic valve regurgitation is present  · There is no evidence of pericardial effusion.    Assessment/Plan     1.   Abnormal stress test with dyspnea on exertion.  Ejection fraction is preserved.  I recommended aggressive risk factor stratification. CCS 0.  ASA, Sotalol and Pravastatin    2. paroxysmal - 7 days or less Atrial fibrillation. The patient is currently in Atrial fibrillation with a well controlled ventricular rate. She is symptomatic. Recommended JOCY/DCCV.    A JOCY was recommended to the patient with MAC local IV sedation. The indications and risks/benefits were discussed. This included risks of inadvertent tracheal intubation, hematoma, oropharyngeal bleeding, esophageal perforation.  The patient agreed to proceeding.  The risks of conscious sedation were also  reviewed including allergy, anaphylaxis and hypoventilation requiring mechanical ventilation.  A hand out was also provided to the patient explaining the procedure.    DCCV was recommended. The procedure was explained to the patient with risks and benefits including risk of stroke, MI, bradycardia, pulmonary edema and skin burns. Risks of prolonged SNRT were reviewed, including symptomatic bradycardia. Rare, but possible need for emergent TCP, temporary transvenous pacing and even PPM were discussed. The patient voiced understanding.     Anticoagulation:ordered  -Xarelto  Rate control agents:ordered.  - Agent: beta blocker-Sotalol  -Rhythm control agents:ordered  -Additional orders: echocardiogram or Sotalol, JOCY with DCCV    3. Cardiac Risk Assessment:   -Recommended moderate-intensity statin therapy  -Lipid-lowering medications: Atorvastatin  -Recommended ASA  -Smoking cessation  -Daily activity of at least 30 minutes  -BP goal <130/80, Maintain BMI 18.5-24.9, Maintain hemoglobin A1c less than 7    4. Mild MR, asymptomatic.  Echo   5.  Tobacco status: Patient is a nonsmoker    6. Dyspnea.   -6MWT, labs, PFTs, Pulm referral  -CXR  -Will plan on JOCY/DCCV  -Follow-up Chyna    Follow-up: 3 months          This document has been electronically signed by Ar Gooden MD PhD on January 31, 2018 11:55 AM

## 2018-03-01 ENCOUNTER — OFFICE VISIT (OUTPATIENT)
Dept: CARDIOLOGY | Facility: CLINIC | Age: 63
End: 2018-03-01

## 2018-03-01 VITALS
SYSTOLIC BLOOD PRESSURE: 130 MMHG | WEIGHT: 201.7 LBS | DIASTOLIC BLOOD PRESSURE: 75 MMHG | HEIGHT: 65 IN | HEART RATE: 61 BPM | BODY MASS INDEX: 33.61 KG/M2 | OXYGEN SATURATION: 99 %

## 2018-03-01 DIAGNOSIS — I48.0 PAROXYSMAL ATRIAL FIBRILLATION (HCC): Primary | ICD-10-CM

## 2018-03-01 PROCEDURE — 99213 OFFICE O/P EST LOW 20 MIN: CPT | Performed by: NURSE PRACTITIONER

## 2018-03-01 NOTE — PROGRESS NOTES
Subjective:     Follow-up  HR check following cardioversion    Atrial Fibrillation   Presents for follow-up visit. Symptoms include hypertension. Symptoms are negative for bradycardia, chest pain, dizziness, palpitations, shortness of breath, syncope, tachycardia and weakness. The symptoms have been stable. Past medical history includes atrial fibrillation. There are no medication compliance problems.     Ms. Mata is here for pulse check following unsuccessful DCCV on Tuesday with uncontrolled rate. Today, rate is controlled and irregular. She has had marked symptom improvement since Tuesday. Before she was extremely fatigued, sob, and limp. Now she is able to ambulate and do ADLs without SOA. She is awaiting shipment of Xarelto [facility] and has been out for 1 week. Sample provided today.     Review of Systems   Constitution: Negative for weakness.   Cardiovascular: Positive for irregular heartbeat. Negative for chest pain, dyspnea on exertion, leg swelling, orthopnea, palpitations and syncope.   Respiratory: Negative for shortness of breath.    Musculoskeletal: Negative for falls.   Neurological: Negative for dizziness.       Current Outpatient Prescriptions   Medication Sig Dispense Refill   • ARIPiprazole (ABILIFY) 5 MG tablet Take 5 mg by mouth Daily.     • aspirin 81 MG EC tablet Take 81 mg by mouth 2 (two) times a day.     • atorvastatin (LIPITOR) 20 MG tablet Take 1 tablet by mouth Daily. 90 tablet 3   • busPIRone (BUSPAR) 5 MG tablet Take 5 mg by mouth 3 (Three) Times a Day.     • choline fenofibrate (TRILIPIX) 135 MG capsule Take 1 capsule by mouth Daily. 90 capsule 3   • diltiazem XR (DILACOR XR) 120 MG 24 hr capsule Take 1 capsule by mouth Daily. 30 capsule 11   • lisinopril (PRINIVIL,ZESTRIL) 40 MG tablet Take 1 tablet by mouth Daily. 90 tablet 0   • methIMAzole (TAPAZOLE) 5 MG tablet Take 2.5 mg by mouth Daily.     • nitroglycerin (NITROSTAT) 0.4 MG SL tablet Place 1 tablet under the tongue See Admin  "Instructions. PRN cp, if not relieved in 5 min repeat and go to ER 25 tablet 12   • omeprazole (priLOSEC) 20 MG capsule Take 1 capsule by mouth Daily. 90 capsule 3   • potassium chloride (K-DUR) 10 MEQ CR tablet Take 1 tablet by mouth 2 (Two) Times a Day With Meals. 180 tablet 3   • rivaroxaban (XARELTO) 20 MG tablet Take 1 tablet by mouth Daily. 90 tablet 3   • sotalol (BETAPACE) 120 MG tablet Take 1 tablet by mouth 2 (Two) Times a Day. 180 tablet 3   • triamterene-hydrochlorothiazide (MAXZIDE-25) 37.5-25 MG per tablet Take 1 tablet by mouth Every Other Day. 90 tablet 3   • venlafaxine XR (EFFEXOR-XR) 150 MG 24 hr capsule Take 1 capsule by mouth Daily. 30 capsule 6     No current facility-administered medications for this visit.         Objective:     Vitals:    03/01/18 0844   BP: 130/75   Pulse: 61   SpO2: 99%   Weight: 91.5 kg (201 lb 11.2 oz)   Height: 165.1 cm (65\")          Physical Exam   Constitutional: She is oriented to person, place, and time. She appears well-developed and well-nourished. No distress.   HENT:   Head: Normocephalic.   Neck: No JVD present.   Cardiovascular: Normal rate, S1 normal, S2 normal, normal heart sounds and intact distal pulses.  An irregular rhythm present.   No murmur heard.  Pulmonary/Chest: Effort normal and breath sounds normal. No respiratory distress. She has no wheezes. She has no rales.   Abdominal: Soft. Bowel sounds are normal.   Musculoskeletal: Normal range of motion. She exhibits no edema.   Neurological: She is alert and oriented to person, place, and time.   Skin: Skin is warm and dry. No erythema.   Psychiatric: She has a normal mood and affect. Her behavior is normal. Judgment and thought content normal.       Cardiographics  Echocardiogram:   ECG:    JOCY 2/27/2018  Interpretation Summary   · Transesophageal Echocardiogram with Color and Doppler  · Left Ventricle: Estimated EF appears to be in the range of 61 - 65%. Normal left ventricular cavity size " noted  · Left ventricular wall thickness is consistent with mild concentric hypertrophy  · Right Ventricle: Normal right ventricular cavity size, wall thickness, systolic function and septal motion noted  · No evidence of a left atrial appendage thrombus was present. The interatrial septum appears redundant  · No evidence of a patent foramen ovale. No evidence of an atrial septal defect present. Saline test results are negative.  · Mild aortic valve regurgitation is present  · Mild mitral valve regurgitation is present  · There is no evidence of pericardial effusion         Imaging  Chest x-ray:    Lab Review          The following portions of the patient's history were reviewed and updated as appropriate: allergies, current medications, past family history, past medical history, past social history, past surgical history and problem list.  Old records reviewed and pertinent information is included in the above objective data.      Assessment/Plan:      Diagnosis Plan   1. Paroxysmal atrial fibrillation  Status post unsuccessful cardioversion attempt on Tuesday. She is now rate controlled.   BP improved  Sotalol 120mg BID, Cardizem 120mg  Wearing CPAP now.   Xarelto- been out for a week. Awaiting shipment from [facility]. Will provide samples today.    Appointment with Dr. Jurado next week.

## 2018-03-09 ENCOUNTER — OFFICE VISIT (OUTPATIENT)
Dept: CARDIOLOGY | Facility: CLINIC | Age: 63
End: 2018-03-09

## 2018-03-09 VITALS
DIASTOLIC BLOOD PRESSURE: 72 MMHG | BODY MASS INDEX: 33.82 KG/M2 | HEIGHT: 65 IN | SYSTOLIC BLOOD PRESSURE: 130 MMHG | WEIGHT: 203 LBS | HEART RATE: 67 BPM

## 2018-03-09 DIAGNOSIS — I48.0 PAROXYSMAL ATRIAL FIBRILLATION (HCC): ICD-10-CM

## 2018-03-09 DIAGNOSIS — Z79.891 LONG TERM (CURRENT) USE OF OPIATE ANALGESIC: ICD-10-CM

## 2018-03-09 DIAGNOSIS — E05.90 THYROTOXICOSIS WITH OR WITHOUT GOITER: Primary | ICD-10-CM

## 2018-03-09 DIAGNOSIS — E78.2 MIXED HYPERLIPIDEMIA: ICD-10-CM

## 2018-03-09 PROCEDURE — 99204 OFFICE O/P NEW MOD 45 MIN: CPT | Performed by: INTERNAL MEDICINE

## 2018-03-09 PROCEDURE — 93000 ELECTROCARDIOGRAM COMPLETE: CPT | Performed by: INTERNAL MEDICINE

## 2018-03-09 RX ORDER — AMIODARONE HYDROCHLORIDE 200 MG/1
TABLET ORAL
Qty: 30 TABLET | Refills: 1 | Status: SHIPPED | OUTPATIENT
Start: 2018-03-09 | End: 2018-05-26 | Stop reason: HOSPADM

## 2018-03-09 RX ORDER — AMIODARONE HYDROCHLORIDE 200 MG/1
200 TABLET ORAL DAILY
Qty: 30 TABLET | Refills: 5 | Status: SHIPPED | OUTPATIENT
Start: 2018-03-09 | End: 2018-03-09 | Stop reason: SDUPTHER

## 2018-03-09 NOTE — PROGRESS NOTES
Lana Ayala [facility]  63 y.o. female    03/09/2018  1. Thyrotoxicosis with or without goiter    2. Mixed hyperlipidemia    3. Paroxysmal atrial fibrillation    4. Long term (current) use of opiate analgesic        History of Present Illness:    63 years old patient with history of atrial fibrillations more than 5 minute duration, hypertension with hypertensive heart disease, chronic back pain, exogenous obesity with a BMI 33.8, history of sleep apnea status post electrical cardioversion and unable to maintain sinus rhythm with the help of Betapace.  Patient referred for subsequent evaluation is management.  Family present the room at the time of evaluation.  The patient ambulate with the help of cane.  She had a good time after electrical cardioversion for about a week or so but unfortunately unable to maintain sinus rhythm and had change in quality of life and increasing shortness of breath with exertional activity.  No syncope or near syncopal episode reported.  No fever cough or chills reported.  She did dyspnea on exertion.    ECHO 2017    · Left Ventricle: Left ventricular function is normal. Estimated EF appears to be in the range of 61 - 65%.  · Left ventricular diastolic dysfunction is noted (grade II w/high LAP) consistent with pseudonormalization. Elevated left atrial pressure  · Right Ventricle: Normal wall thickness, systolic function and septal motion noted. Right ventricular cavity is mild-to-moderately dilated  · Estimated right ventricular systolic pressure from tricuspid regurgitation is normal (<35 mmHg).  · Mild aortic valve regurgitation is present  · There is no evidence of pericardial effusion.    SUBJECTIVE    Allergies   Allergen Reactions   • Codeine Nausea And Vomiting   • Morphine And Related Hallucinations   • Pravachol [Pravastatin Sodium] Myalgia   • Latex Rash         Past Medical History:   Diagnosis Date   • Abnormal gait 11/28/2012   • Abrasion 02/26/2015   • Acute ankle pain  03/31/2016   • Acute bronchitis 09/04/2015   • Allergic rhinitis 05/22/2012   • Asthenia 05/22/2014   • Atrial fibrillation 03/04/2016    - converted    • Attempted suicide    • Benign essential hypertension 03/04/2016   • Cardiovascular stress test abnormal 03/04/2016   • Cellulitis of knee 03/03/2015   • Chest pain 04/02/2015   • Chronic pain disorder    • Common cold 06/24/2014   • Congestive heart failure 02/04/2016   • Depressive disorder 05/22/2015    suspect more complicated psych issues      • Gastroesophageal reflux disease 01/17/2013   • Generalized anxiety disorder 02/04/2016   • H/O echocardiogram     Normal LV function with Ef of 60-65%.Mildly dilated right ventricle with normal function. Grade 1B diastolic dysfunction with mild CLVH.NO sig. valvular regurg. or stenosis.   • Headache 10/27/2014   • Hyperlipidemia 03/04/2016   • Low back pain 06/30/2016    Need for prophylactic vaccination against Streptococcus pneumoniae [pneumococcus]    01/23/2015    • Neck pain 06/30/2016   • Obstructive sleep apnea of adult 07/30/2015   • Obstructive sleep apnea syndrome    • Osteoarthritis    • Osteoporosis 06/30/2016   • Pain in right shoulder 06/30/2016   • Pain in right shoulder 04/24/2014   • Shortness of breath 04/02/2015   • Shortness of breath 04/02/2015   • Skin ulcer 04/13/2015    left   • Thyrotoxicosis with or without goiter 10/24/2014   • Urinary incontinence 05/22/2014         Past Surgical History:   Procedure Laterality Date   • BACK SURGERY  08/07/2015    Removal of intstrumentation,L5-S1.Exploration of fusion,L5-S1.Posterolateral fusion,L4-L5.Posterior segmental spinal instrumentation L4-5.Transforaminal interbody fusion Through right sided approach.Performed at    • COLONOSCOPY  05/04/2016    will order cologuard   • INJECTION OF MEDICATION  03/23/2015    celestone   • INJECTION OF MEDICATION  05/04/2016    KENALOG(6)   • LUMBAR FUSION      discectomy fusion with rods L4-S1   • LUMBAR LAMINECTOMY   1995   • MANDIBLE FRACTURE SURGERY      JAW WIRED SHUT   • MOUTH SURGERY  10/05/2015    Fractured mandible. Removal of arch bars.   • MOUTH SURGERY  1985    Fractured mandible. Removal of arch bars.   • RHINOPLASTY     • TUBAL ABDOMINAL LIGATION  1986         Family History   Problem Relation Age of Onset   • Hypertension Mother    • Hyperlipidemia Mother    • Mental illness Mother    • Thyroid disease Mother    • Diabetes Maternal Grandmother    • Cancer Maternal Grandmother    • Depression Other    • Diabetes Other    • Heart disease Other    • Hypertension Other    • Osteoporosis Other    • Thyroid disease Other    • Cancer Maternal Aunt    • Cancer Paternal Aunt          Social History     Social History   • Marital status:      Spouse name: N/A   • Number of children: N/A   • Years of education: N/A     Occupational History   • Not on file.     Social History Main Topics   • Smoking status: Never Smoker   • Smokeless tobacco: Never Used      Comment: smoking cessation    • Alcohol use No   • Drug use: No      Comment: lortab / oxycontin   • Sexual activity: Defer     Other Topics Concern   • Not on file     Social History Narrative         Current Outpatient Prescriptions   Medication Sig Dispense Refill   • ARIPiprazole (ABILIFY) 5 MG tablet Take 5 mg by mouth Daily.     • aspirin 81 MG EC tablet Take 81 mg by mouth 2 (two) times a day.     • atorvastatin (LIPITOR) 20 MG tablet Take 1 tablet by mouth Daily. 90 tablet 3   • busPIRone (BUSPAR) 5 MG tablet Take 5 mg by mouth 3 (Three) Times a Day.     • choline fenofibrate (TRILIPIX) 135 MG capsule Take 1 capsule by mouth Daily. 90 capsule 3   • diltiazem XR (DILACOR XR) 120 MG 24 hr capsule Take 1 capsule by mouth Daily. 30 capsule 11   • lisinopril (PRINIVIL,ZESTRIL) 40 MG tablet Take 1 tablet by mouth Daily. 90 tablet 0   • methIMAzole (TAPAZOLE) 5 MG tablet Take 2.5 mg by mouth Daily.     • nitroglycerin (NITROSTAT) 0.4 MG SL tablet Place 1 tablet  "under the tongue See Admin Instructions. PRN cp, if not relieved in 5 min repeat and go to ER 25 tablet 12   • omeprazole (priLOSEC) 20 MG capsule Take 1 capsule by mouth Daily. 90 capsule 3   • potassium chloride (K-DUR) 10 MEQ CR tablet Take 1 tablet by mouth 2 (Two) Times a Day With Meals. 180 tablet 3   • rivaroxaban (XARELTO) 20 MG tablet Take 1 tablet by mouth Daily. 90 tablet 3   • triamterene-hydrochlorothiazide (MAXZIDE-25) 37.5-25 MG per tablet Take 1 tablet by mouth Every Other Day. 90 tablet 3   • venlafaxine XR (EFFEXOR-XR) 150 MG 24 hr capsule Take 1 capsule by mouth Daily. 30 capsule 6   • amiodarone (PACERONE) 200 MG tablet Take 1 tablet by mouth Daily. 30 tablet 5     No current facility-administered medications for this visit.          OBJECTIVE    /72  Pulse 67  Ht 165.1 cm (65\")  Wt 92.1 kg (203 lb)  BMI 33.78 kg/m2        Review of Systems     Constitutional:   change in exercise tolerance     HENT:  Denies any hearing loss, epistaxis, hoarseness, or difficulty speaking.     Eyes: No blurring     Respiratory:  Exertional dyspnea    Cardiovascular: See H&P    Gastrointestinal:  Denies change in bowel habits, dyspepsia, ulcer disease, hematochezia, or melena.     Endocrine: Negative for cold intolerance, heat intolerance, polydipsia, polyphagia and polyuria. Denies any history of weight change, heat/cold intolerance, polydipsia, polyuria     Genitourinary: Negative.      Musculoskeletal: Chronic pain syndrome    Skin:  Denies any change in hair or nails, rashes, or skin lesions.     Allergic/Immunologic: Negative.  Negative for environmental allergies, food allergies and immunocompromised state.     Neurological:  Denies any history of recurrent headaches, strokes, TIA, or seizure disorder.     Hematological: Denies any food allergies, seasonal allergies, bleeding disorders, or lymphadenopathy.     Psychiatric/Behavioral: Denies any history of depression, substance abuse, or change in " cognitive function.         Physical Exam     Constitutional: Cooperative, alert and oriented, well-developed, well-nourished, in no acute distress.     HENT:   Head: Normocephalic, normal hair patterns, no masses or tenderness.  Ears, Nose, and Throat: No gross abnormalities. No pallor or cyanosis. Dentition good.   Eyes: EOMS intact, PERRL, conjunctivae and lids unremarkable. Fundoscopic exam and visual fields not performed.   Neck: No palpable masses or adenopathy, no thyromegaly, no JVD, carotid pulses are full and equal bilaterally and without  Bruits.     Cardiovascular: Regular rhythm, S1 and S2 normal, no S3 or S4. Apical impulse not displaced. No murmurs, gallops, or rubs detected.     Pulmonary/Chest: Chest: normal symmetry, no tenderness to palpation, normal respiratory excursion, no intercostal retraction, no use of accessory muscles.            Pulmonary: Normal breath sounds. No rales or ronchi.    Abdominal: Abdomen soft, bowel sounds normoactive, no masses, no hepatosplenomegaly, non-tender, no bruits.     Musculoskeletal: No deformities, clubbing, cyanosis, erythema, or edema observed. There are no spinal abnormalities noted. Normal muscle strength and tone. Pulses full and equal in all extremities, no bruits auscultated.     Neurological: No gross motor or sensory deficits noted, affect appropriate, oriented to time, person, place.     Skin: Warm and dry to the touch, no apparent skin lesions or masses noted.     Psychiatric: She has a normal mood and affect. Her behavior is normal. Judgment and thought content normal.           ECG 12 Lead  Date/Time: 3/9/2018 8:44 AM  Performed by: CHIKIS GARCIA  Authorized by: CHIKIS GARCIA   Rhythm: atrial fibrillation              Lab Results   Component Value Date    WBC 12.26 (H) 01/31/2018    HGB 15.8 (H) 01/31/2018    HCT 46.7 (H) 01/31/2018    MCV 86.8 01/31/2018     01/31/2018     Lab Results   Component Value Date    GLUCOSE 90 01/31/2018     BUN 24 (H) 01/31/2018    CREATININE 1.12 (H) 01/31/2018    EGFRIFNONA 49 01/31/2018    BCR 21.4 01/31/2018    CO2 28.0 01/31/2018    CALCIUM 10.2 01/31/2018    ALBUMIN 4.50 01/31/2018    LABIL2 1.3 01/31/2018    AST 25 01/31/2018    ALT 27 01/31/2018     Lab Results   Component Value Date    CHOL 165 01/31/2018     Lab Results   Component Value Date    TRIG 169 01/31/2018    TRIG 115 12/15/2016    TRIG 111 09/08/2016     Lab Results   Component Value Date    HDL 67 01/31/2018    HDL 52 (L) 12/15/2016    HDL 66 09/08/2016     No components found for: LDLCALC  Lab Results   Component Value Date    LDL 77 01/31/2018    LDL 73 12/15/2016    LDL 61 09/08/2016     No results found for: HDLLDLRATIO  No components found for: CHOLHDL  Lab Results   Component Value Date    HGBA1C 5.7 (H) 01/31/2018     Lab Results   Component Value Date    TSH 3.960 01/31/2018           ASSESSMENT AND PLAN  #1 paroxysmal atrial fibrillation on file in duration normal to palpitation #3 hypertension with hypertensive heart disease #4 chronic back pain #5 hypothyroidism on Tapazole    Clinically, no sign of cardiac decompensation based on the clinical history physical finding.  No evidence of ongoing ischemia.  At present patient being treated with the Betapace 120 mg twice a day post electrical cardioversion but unable to maintain sinus rhythm.  She is on oral anticoagulations.  Hypertensions a decent blood pressures and treated with the Maxide, lisinopril.  Hyperlipidemia being managed with atorvastatin.  Discussed with the patient and family regarding the management of symptomatic atrial fibrillations off and on of 5-year- durations with  failed Betapace.  discuseds the EP study and ablations with the patient and family.  Pros/ cons/procedure and risk discussed with the patient.  Risk included but nonlimiting to infection, bleeding, stroke, pneumothorax, hematoma, cardiac perforations, phrenic nerve damage, pulmonary vein stenosis,  gastroesophageal fistula range from less than 1 to 7%.  Patient doesn't want to consider or pursue this option at this time.  Second option, starting amiodarone for but a month and re attempt at electrical cardioversions.  She agreed to have another attempt electrical cardioversions.  We'll discontinue Betapace and start the patient amiodarone 200 mg twice a day for about a week then 200 mg daily.   She is in coarse atrial fibrillation    Lana was seen today for follow-up and atrial fibrillation.    Diagnoses and all orders for this visit:    Thyrotoxicosis with or without goiter    Mixed hyperlipidemia    Paroxysmal atrial fibrillation    Long term (current) use of opiate analgesic    Other orders  -     amiodarone (PACERONE) 200 MG tablet; Take 1 tablet by mouth Daily.        Kristyn Jurado MD  3/9/2018  8:32 AM

## 2018-03-15 ENCOUNTER — HOSPITAL ENCOUNTER (EMERGENCY)
Facility: HOSPITAL | Age: 63
Discharge: HOME | End: 2018-03-16
Attending: FAMILY MEDICINE | Admitting: FAMILY MEDICINE

## 2018-03-15 ENCOUNTER — APPOINTMENT (OUTPATIENT)
Dept: GENERAL RADIOLOGY | Facility: HOSPITAL | Age: 63
End: 2018-03-15

## 2018-03-15 DIAGNOSIS — I48.0 PAROXYSMAL ATRIAL FIBRILLATION (HCC): Primary | ICD-10-CM

## 2018-03-15 LAB
ALBUMIN SERPL-MCNC: 4.2 G/DL (ref 3.4–4.8)
ALBUMIN/GLOB SERPL: 1.3 G/DL (ref 1.1–1.8)
ALP SERPL-CCNC: 64 U/L (ref 38–126)
ALT SERPL W P-5'-P-CCNC: 27 U/L (ref 9–52)
ANION GAP SERPL CALCULATED.3IONS-SCNC: 17 MMOL/L (ref 5–15)
AST SERPL-CCNC: 34 U/L (ref 14–36)
BASOPHILS # BLD AUTO: 0.08 10*3/MM3 (ref 0–0.2)
BASOPHILS NFR BLD AUTO: 0.6 % (ref 0–2)
BILIRUB SERPL-MCNC: 0.3 MG/DL (ref 0.2–1.3)
BUN BLD-MCNC: 15 MG/DL (ref 7–21)
BUN/CREAT SERPL: 12.3 (ref 7–25)
CALCIUM SPEC-SCNC: 9.3 MG/DL (ref 8.4–10.2)
CHLORIDE SERPL-SCNC: 98 MMOL/L (ref 95–110)
CO2 SERPL-SCNC: 24 MMOL/L (ref 22–31)
CREAT BLD-MCNC: 1.22 MG/DL (ref 0.5–1)
DEPRECATED RDW RBC AUTO: 43.1 FL (ref 36.4–46.3)
EOSINOPHIL # BLD AUTO: 0.56 10*3/MM3 (ref 0–0.7)
EOSINOPHIL NFR BLD AUTO: 4.5 % (ref 0–7)
ERYTHROCYTE [DISTWIDTH] IN BLOOD BY AUTOMATED COUNT: 13.4 % (ref 11.5–14.5)
GFR SERPL CREATININE-BSD FRML MDRD: 45 ML/MIN/1.73 (ref 45–104)
GLOBULIN UR ELPH-MCNC: 3.2 GM/DL (ref 2.3–3.5)
GLUCOSE BLD-MCNC: 152 MG/DL (ref 60–100)
HCT VFR BLD AUTO: 44.7 % (ref 35–45)
HGB BLD-MCNC: 15.3 G/DL (ref 12–15.5)
IMM GRANULOCYTES # BLD: 0.05 10*3/MM3 (ref 0–0.02)
IMM GRANULOCYTES NFR BLD: 0.4 % (ref 0–0.5)
INR PPP: 1.46 (ref 0.8–1.2)
LYMPHOCYTES # BLD AUTO: 4.59 10*3/MM3 (ref 0.6–4.2)
LYMPHOCYTES NFR BLD AUTO: 36.6 % (ref 10–50)
MCH RBC QN AUTO: 30.3 PG (ref 26.5–34)
MCHC RBC AUTO-ENTMCNC: 34.2 G/DL (ref 31.4–36)
MCV RBC AUTO: 88.5 FL (ref 80–98)
MONOCYTES # BLD AUTO: 1.12 10*3/MM3 (ref 0–0.9)
MONOCYTES NFR BLD AUTO: 8.9 % (ref 0–12)
NEUTROPHILS # BLD AUTO: 6.15 10*3/MM3 (ref 2–8.6)
NEUTROPHILS NFR BLD AUTO: 49 % (ref 37–80)
NT-PROBNP SERPL-MCNC: 428 PG/ML (ref 0–900)
PLATELET # BLD AUTO: 329 10*3/MM3 (ref 150–450)
PMV BLD AUTO: 10.3 FL (ref 8–12)
POTASSIUM BLD-SCNC: 3.2 MMOL/L (ref 3.5–5.1)
PROT SERPL-MCNC: 7.4 G/DL (ref 6.3–8.6)
PROTHROMBIN TIME: 17.3 SECONDS (ref 11.1–15.3)
RBC # BLD AUTO: 5.05 10*6/MM3 (ref 3.77–5.16)
SODIUM BLD-SCNC: 139 MMOL/L (ref 137–145)
WBC NRBC COR # BLD: 12.55 10*3/MM3 (ref 3.2–9.8)

## 2018-03-15 PROCEDURE — 85025 COMPLETE CBC W/AUTO DIFF WBC: CPT | Performed by: FAMILY MEDICINE

## 2018-03-15 PROCEDURE — 85610 PROTHROMBIN TIME: CPT | Performed by: FAMILY MEDICINE

## 2018-03-15 PROCEDURE — 93005 ELECTROCARDIOGRAM TRACING: CPT | Performed by: FAMILY MEDICINE

## 2018-03-15 PROCEDURE — 93010 ELECTROCARDIOGRAM REPORT: CPT | Performed by: INTERNAL MEDICINE

## 2018-03-15 PROCEDURE — 99284 EMERGENCY DEPT VISIT MOD MDM: CPT

## 2018-03-15 PROCEDURE — 83880 ASSAY OF NATRIURETIC PEPTIDE: CPT | Performed by: FAMILY MEDICINE

## 2018-03-15 PROCEDURE — 71045 X-RAY EXAM CHEST 1 VIEW: CPT

## 2018-03-15 PROCEDURE — 25010000002 LORAZEPAM PER 2 MG: Performed by: FAMILY MEDICINE

## 2018-03-15 PROCEDURE — 84484 ASSAY OF TROPONIN QUANT: CPT | Performed by: FAMILY MEDICINE

## 2018-03-15 PROCEDURE — 96374 THER/PROPH/DIAG INJ IV PUSH: CPT

## 2018-03-15 PROCEDURE — 80053 COMPREHEN METABOLIC PANEL: CPT | Performed by: FAMILY MEDICINE

## 2018-03-15 PROCEDURE — 96375 TX/PRO/DX INJ NEW DRUG ADDON: CPT

## 2018-03-15 RX ORDER — ASPIRIN 325 MG
325 TABLET ORAL ONCE
Status: COMPLETED | OUTPATIENT
Start: 2018-03-15 | End: 2018-03-15

## 2018-03-15 RX ORDER — METOPROLOL TARTRATE 5 MG/5ML
5 INJECTION INTRAVENOUS ONCE
Status: COMPLETED | OUTPATIENT
Start: 2018-03-15 | End: 2018-03-15

## 2018-03-15 RX ORDER — LORAZEPAM 2 MG/ML
1 INJECTION INTRAMUSCULAR ONCE
Status: COMPLETED | OUTPATIENT
Start: 2018-03-15 | End: 2018-03-15

## 2018-03-15 RX ORDER — SODIUM CHLORIDE 0.9 % (FLUSH) 0.9 %
10 SYRINGE (ML) INJECTION AS NEEDED
Status: DISCONTINUED | OUTPATIENT
Start: 2018-03-15 | End: 2018-03-16 | Stop reason: HOSPADM

## 2018-03-15 RX ADMIN — ASPIRIN 325 MG: 325 TABLET, COATED ORAL at 23:08

## 2018-03-15 RX ADMIN — LORAZEPAM 1 MG: 2 INJECTION INTRAMUSCULAR; INTRAVENOUS at 23:00

## 2018-03-15 RX ADMIN — METOPROLOL TARTRATE 5 MG: 5 INJECTION INTRAVENOUS at 23:07

## 2018-03-16 ENCOUNTER — TELEPHONE (OUTPATIENT)
Dept: FAMILY MEDICINE CLINIC | Facility: CLINIC | Age: 63
End: 2018-03-16

## 2018-03-16 VITALS
DIASTOLIC BLOOD PRESSURE: 62 MMHG | TEMPERATURE: 98.1 F | HEART RATE: 88 BPM | BODY MASS INDEX: 32.99 KG/M2 | HEIGHT: 65 IN | RESPIRATION RATE: 20 BRPM | SYSTOLIC BLOOD PRESSURE: 114 MMHG | OXYGEN SATURATION: 99 % | WEIGHT: 198 LBS

## 2018-03-16 LAB
HOLD SPECIMEN: NORMAL
HOLD SPECIMEN: NORMAL
TROPONIN I SERPL-MCNC: 0.03 NG/ML
WHOLE BLOOD HOLD SPECIMEN: NORMAL
WHOLE BLOOD HOLD SPECIMEN: NORMAL

## 2018-03-16 NOTE — ED PROVIDER NOTES
Subjective     History provided by:  Patient   used: No    Palpitations   Palpitations quality:  Irregular  Onset quality:  Gradual  Duration:  12 hours  Timing:  Intermittent  Progression:  Waxing and waning  Context: anxiety    Relieved by:  Breathing exercises and deep relaxation  Worsened by:  Nothing  Ineffective treatments:  None tried  Associated symptoms: no cough, no diaphoresis, no nausea, no shortness of breath and no syncope    Risk factors: heart disease and hx of atrial fibrillation        Review of Systems   Constitutional: Negative for diaphoresis.   Respiratory: Negative for cough and shortness of breath.    Cardiovascular: Positive for palpitations. Negative for syncope.   Gastrointestinal: Negative for nausea.   All other systems reviewed and are negative.      Past Medical History:   Diagnosis Date   • Abnormal gait 11/28/2012   • Abrasion 02/26/2015   • Acute ankle pain 03/31/2016   • Acute bronchitis 09/04/2015   • Allergic rhinitis 05/22/2012   • Asthenia 05/22/2014   • Atrial fibrillation 03/04/2016    - converted    • Attempted suicide    • Benign essential hypertension 03/04/2016   • Cardiovascular stress test abnormal 03/04/2016   • Cellulitis of knee 03/03/2015   • Chest pain 04/02/2015   • Chronic pain disorder    • Common cold 06/24/2014   • Congestive heart failure 02/04/2016   • Depressive disorder 05/22/2015    suspect more complicated psych issues      • Gastroesophageal reflux disease 01/17/2013   • Generalized anxiety disorder 02/04/2016   • H/O echocardiogram     Normal LV function with Ef of 60-65%.Mildly dilated right ventricle with normal function. Grade 1B diastolic dysfunction with mild CLVH.NO sig. valvular regurg. or stenosis.   • Headache 10/27/2014   • Hyperlipidemia 03/04/2016   • Low back pain 06/30/2016    Need for prophylactic vaccination against Streptococcus pneumoniae [pneumococcus]    01/23/2015    • Neck pain 06/30/2016   • Obstructive sleep  apnea of adult 07/30/2015   • Obstructive sleep apnea syndrome    • Osteoarthritis    • Osteoporosis 06/30/2016   • Pain in right shoulder 06/30/2016   • Pain in right shoulder 04/24/2014   • Shortness of breath 04/02/2015   • Shortness of breath 04/02/2015   • Skin ulcer 04/13/2015    left   • Thyrotoxicosis with or without goiter 10/24/2014   • Urinary incontinence 05/22/2014       Allergies   Allergen Reactions   • Codeine Nausea And Vomiting   • Morphine And Related Hallucinations   • Pravachol [Pravastatin Sodium] Myalgia   • Latex Rash       Past Surgical History:   Procedure Laterality Date   • BACK SURGERY  08/07/2015    Removal of intstrumentation,L5-S1.Exploration of fusion,L5-S1.Posterolateral fusion,L4-L5.Posterior segmental spinal instrumentation L4-5.Transforaminal interbody fusion Through right sided approach.Performed at    • COLONOSCOPY  05/04/2016    will order cologuard   • INJECTION OF MEDICATION  03/23/2015    celestone   • INJECTION OF MEDICATION  05/04/2016    KENALOG(6)   • LUMBAR FUSION      discectomy fusion with rods L4-S1   • LUMBAR LAMINECTOMY  1995   • MANDIBLE FRACTURE SURGERY      JAW WIRED SHUT   • MOUTH SURGERY  10/05/2015    Fractured mandible. Removal of arch bars.   • MOUTH SURGERY  1985    Fractured mandible. Removal of arch bars.   • RHINOPLASTY     • TUBAL ABDOMINAL LIGATION  1986       Family History   Problem Relation Age of Onset   • Hypertension Mother    • Hyperlipidemia Mother    • Mental illness Mother    • Thyroid disease Mother    • Diabetes Maternal Grandmother    • Cancer Maternal Grandmother    • Depression Other    • Diabetes Other    • Heart disease Other    • Hypertension Other    • Osteoporosis Other    • Thyroid disease Other    • Cancer Maternal Aunt    • Cancer Paternal Aunt        Social History     Social History   • Marital status:      Social History Main Topics   • Smoking status: Never Smoker   • Smokeless tobacco: Never Used      Comment:  "smoking cessation    • Alcohol use No   • Drug use: No      Comment: lortab / oxycontin   • Sexual activity: Defer     Other Topics Concern   • Not on file     /62   Pulse 96   Temp 98.1 °F (36.7 °C) (Oral)   Resp 20   Ht 165.1 cm (65\")   Wt 89.8 kg (198 lb)   SpO2 98%   BMI 32.95 kg/m²       Objective   Physical Exam   Constitutional: She is oriented to person, place, and time. She appears well-developed and well-nourished.   HENT:   Head: Normocephalic.   Right Ear: External ear normal.   Left Ear: External ear normal.   Mouth/Throat: Oropharynx is clear and moist.   Neck: Normal range of motion. Neck supple.   Cardiovascular: An irregular rhythm present.   Pulmonary/Chest: Effort normal and breath sounds normal.   Abdominal: Soft. Bowel sounds are normal.   Musculoskeletal: Normal range of motion.   Neurological: She is alert and oriented to person, place, and time.   Skin: Skin is warm. Capillary refill takes less than 2 seconds.   Psychiatric: She has a normal mood and affect. Her behavior is normal. Judgment and thought content normal.   Nursing note and vitals reviewed.      Procedures         ED Course  ED Course      Labs Reviewed   COMPREHENSIVE METABOLIC PANEL - Abnormal; Notable for the following:        Result Value    Glucose 152 (*)     Creatinine 1.22 (*)     Potassium 3.2 (*)     Anion Gap 17.0 (*)     All other components within normal limits   PROTIME-INR - Abnormal; Notable for the following:     Protime 17.3 (*)     INR 1.46 (*)     All other components within normal limits    Narrative:     Therapeutic range for most indications is 2.0-3.0 INR,  or 2.5-3.5 for mechanical heart valves.   CBC WITH AUTO DIFFERENTIAL - Abnormal; Notable for the following:     WBC 12.55 (*)     Lymphocytes, Absolute 4.59 (*)     Monocytes, Absolute 1.12 (*)     Immature Grans, Absolute 0.05 (*)     All other components within normal limits   TROPONIN (IN-HOUSE) - Normal   BNP (IN-HOUSE) - Normal "   RAINBOW DRAW    Narrative:     The following orders were created for panel order [place] Draw.  Procedure                               Abnormality         Status                     ---------                               -----------         ------                     Light Blue Top[[id number]]                                   Final result               Green Top (Gel)[[id number]]                                  Final result               Lavender Top[[id number]]                                     Final result               Gold Top - SST[[id number]]                                   Final result                 Please view results for these tests on the individual orders.   TROPONIN (IN-HOUSE)   CBC AND DIFFERENTIAL    Narrative:     The following orders were created for panel order CBC & Differential.  Procedure                               Abnormality         Status                     ---------                               -----------         ------                     CBC Auto Differential[[id number]]        Abnormal            Final result                 Please view results for these tests on the individual orders.   LIGHT BLUE TOP   GREEN TOP   LAVENDER TOP   GOLD TOP - SST       XR Chest 1 View   Final Result   No acute disease.      Electronically signed by:  Nguyễn Anderson  3/15/2018 11:01 PM   CDT Workstation: [id number]                [facility]    Final diagnoses:   Paroxysmal atrial fibrillation            Jerson Arteaga MD  03/16/18 0037

## 2018-03-19 ENCOUNTER — OFFICE VISIT (OUTPATIENT)
Dept: FAMILY MEDICINE CLINIC | Facility: CLINIC | Age: 63
End: 2018-03-19

## 2018-03-19 VITALS
HEART RATE: 96 BPM | HEIGHT: 65 IN | DIASTOLIC BLOOD PRESSURE: 80 MMHG | SYSTOLIC BLOOD PRESSURE: 130 MMHG | WEIGHT: 203 LBS | OXYGEN SATURATION: 98 % | BODY MASS INDEX: 33.82 KG/M2

## 2018-03-19 DIAGNOSIS — Z09 HOSPITAL DISCHARGE FOLLOW-UP: Primary | ICD-10-CM

## 2018-03-19 PROCEDURE — 99212 OFFICE O/P EST SF 10 MIN: CPT | Performed by: FAMILY MEDICINE

## 2018-03-19 NOTE — PROGRESS NOTES
Subjective:     Lana Mata is a 63 y.o. female who presents for E.D. follow up for heart palpitations:    Parox. A.Fib:   On 3/15, patient began having heart rate in 130's and would not decrease. She went to the E.D. She was given Ativan and another medication, but she is unsure what this was. Her heart rate decreased and she was allowed to go home. Since that time, if she does any activity her heart rate increases. Her heart rate has been staying in 80-90 while resting, however minimal exertion such as getting dressed her heart rate goes to 130's. She feels weak at those times. She has had a cardioversion on 2/27/18. She convert to NSR, but she was back in Afib by the time she got home. Dr. Riley is planning a second cardioversion in the first part of April. She takes Xarelto and ASA. .     She reports that she has been under a lot stress. She has had a lot of stress with her neighbors. She has been fighting a lot with her family. Her son had a nervous break down 2 months ago, and was arrested. She has been unable to bail him out of [facility]. 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   She is on Effexor 150 mg daily and Buspar 10 mg TID. She sees [facility]. She has any appointment with them 3/29/18.     Preventative:  Over the past 2 weeks, have you felt down, depressed, or hopeless?Yes   Over the past 2 weeks, have you felt little interest or pleasure in doing things?Yes  Clinical depression screening refused by patient.No     On osteoporosis therapy?No     Past Medical Hx:  Past Medical History:   Diagnosis Date   • Abnormal gait 11/28/2012   • Abrasion 02/26/2015   • Acute ankle pain 03/31/2016   • Acute bronchitis 09/04/2015   • Allergic rhinitis 05/22/2012   • Asthenia 05/22/2014   • Atrial fibrillation 03/04/2016    - converted    • Attempted suicide    • Benign essential hypertension 03/04/2016   • Cardiovascular stress test abnormal 03/04/2016   • Cellulitis of knee 03/03/2015    • Chest pain 04/02/2015   • Chronic pain disorder    • Common cold 06/24/2014   • Congestive heart failure 02/04/2016   • Depressive disorder 05/22/2015    suspect more complicated psych issues      • Gastroesophageal reflux disease 01/17/2013   • Generalized anxiety disorder 02/04/2016   • H/O echocardiogram     Normal LV function with Ef of 60-65%.Mildly dilated right ventricle with normal function. Grade 1B diastolic dysfunction with mild CLVH.NO sig. valvular regurg. or stenosis.   • Headache 10/27/2014   • Hyperlipidemia 03/04/2016   • Low back pain 06/30/2016    Need for prophylactic vaccination against Streptococcus pneumoniae [pneumococcus]    01/23/2015    • Neck pain 06/30/2016   • Obstructive sleep apnea of adult 07/30/2015   • Obstructive sleep apnea syndrome    • Osteoarthritis    • Osteoporosis 06/30/2016   • Pain in right shoulder 06/30/2016   • Pain in right shoulder 04/24/2014   • Shortness of breath 04/02/2015   • Shortness of breath 04/02/2015   • Skin ulcer 04/13/2015    left   • Thyrotoxicosis with or without goiter 10/24/2014   • Urinary incontinence 05/22/2014       Past Surgical Hx:  Past Surgical History:   Procedure Laterality Date   • BACK SURGERY  08/07/2015    Removal of intstrumentation,L5-S1.Exploration of fusion,L5-S1.Posterolateral fusion,L4-L5.Posterior segmental spinal instrumentation L4-5.Transforaminal interbody fusion Through right sided approach.Performed at    • COLONOSCOPY  05/04/2016    will order cologuard   • INJECTION OF MEDICATION  03/23/2015    celestone   • INJECTION OF MEDICATION  05/04/2016    KENALOG(6)   • LUMBAR FUSION      discectomy fusion with rods L4-S1   • LUMBAR LAMINECTOMY  1995   • MANDIBLE FRACTURE SURGERY      JAW WIRED SHUT   • MOUTH SURGERY  10/05/2015    Fractured mandible. Removal of arch bars.   • MOUTH SURGERY  1985    Fractured mandible. Removal of arch bars.   • RHINOPLASTY     • TUBAL ABDOMINAL LIGATION  1986       Health Maintenance:  Health  Maintenance   Topic Date Due   • DXA SCAN  08/15/2018   • MAMMOGRAM  08/16/2018   • LIPID PANEL  01/31/2019   • MEDICARE ANNUAL WELLNESS  02/07/2019   • PAP SMEAR  12/15/2019   • COLONOSCOPY  11/07/2026   • TDAP/TD VACCINES (3 - Td) 02/07/2028   • HEPATITIS C SCREENING  Completed   • INFLUENZA VACCINE  Addressed   • ZOSTER VACCINE  Completed       Current Meds:    Current Outpatient Prescriptions:   •  amiodarone (PACERONE) 200 MG tablet, Take 1 tablet two times a day for a week then 1 tablet daily starting on 3/10/2018, Disp: 30 tablet, Rfl: 1  •  ARIPiprazole (ABILIFY) 5 MG tablet, Take 5 mg by mouth Daily., Disp: , Rfl:   •  aspirin 81 MG EC tablet, Take 81 mg by mouth 2 (two) times a day., Disp: , Rfl:   •  atorvastatin (LIPITOR) 20 MG tablet, Take 1 tablet by mouth Daily., Disp: 90 tablet, Rfl: 3  •  busPIRone (BUSPAR) 5 MG tablet, Take 5 mg by mouth 3 (Three) Times a Day., Disp: , Rfl:   •  choline fenofibrate (TRILIPIX) 135 MG capsule, Take 1 capsule by mouth Daily., Disp: 90 capsule, Rfl: 3  •  diltiazem XR (DILACOR XR) 120 MG 24 hr capsule, Take 1 capsule by mouth Daily., Disp: 30 capsule, Rfl: 11  •  lisinopril (PRINIVIL,ZESTRIL) 40 MG tablet, Take 1 tablet by mouth Daily., Disp: 90 tablet, Rfl: 0  •  methIMAzole (TAPAZOLE) 5 MG tablet, Take 2.5 mg by mouth Daily., Disp: , Rfl:   •  nitroglycerin (NITROSTAT) 0.4 MG SL tablet, Place 1 tablet under the tongue See Admin Instructions. PRN cp, if not relieved in 5 min repeat and go to ER, Disp: 25 tablet, Rfl: 12  •  omeprazole (priLOSEC) 20 MG capsule, Take 1 capsule by mouth Daily., Disp: 90 capsule, Rfl: 3  •  potassium chloride (K-DUR) 10 MEQ CR tablet, Take 1 tablet by mouth 2 (Two) Times a Day With Meals., Disp: 180 tablet, Rfl: 3  •  rivaroxaban (XARELTO) 20 MG tablet, Take 1 tablet by mouth Daily., Disp: 90 tablet, Rfl: 3  •  triamterene-hydrochlorothiazide (MAXZIDE-25) 37.5-25 MG per tablet, Take 1 tablet by mouth Every Other Day., Disp: 90 tablet,  Rfl: 3  •  venlafaxine XR (EFFEXOR-XR) 150 MG 24 hr capsule, Take 1 capsule by mouth Daily., Disp: 30 capsule, Rfl: 6    Current outpatient and discharge medications have been reconciled for the patient.  Cody Abarca MD  Allergies:  Codeine; Morphine and related; Pravachol [pravastatin sodium]; and Latex    Family Hx:  Family History   Problem Relation Age of Onset   • Hypertension Mother    • Hyperlipidemia Mother    • Mental illness Mother    • Thyroid disease Mother    • Diabetes Maternal Grandmother    • Cancer Maternal Grandmother    • Depression Other    • Diabetes Other    • Heart disease Other    • Hypertension Other    • Osteoporosis Other    • Thyroid disease Other    • Cancer Maternal Aunt    • Cancer Paternal Aunt         Social History:  Social History     Social History   • Marital status:      Spouse name: N/A   • Number of children: N/A   • Years of education: N/A     Occupational History   • Not on file.     Social History Main Topics   • Smoking status: Never Smoker   • Smokeless tobacco: Never Used      Comment: smoking cessation    • Alcohol use No   • Drug use: No      Comment: lortab / oxycontin   • Sexual activity: Defer     Other Topics Concern   • Not on file     Social History Narrative   • No narrative on file       Review of Systems  Review of Systems   Constitutional: Negative for appetite change, chills and fever.   HENT: Negative for congestion, ear pain, hearing loss, rhinorrhea, sore throat and trouble swallowing.    Eyes: Negative for pain and visual disturbance.   Respiratory: Positive for cough. Negative for shortness of breath.    Cardiovascular: Positive for palpitations. Negative for chest pain and leg swelling.   Gastrointestinal: Negative for abdominal pain, constipation, diarrhea, nausea and vomiting.   Genitourinary: Negative for dysuria and hematuria.   Musculoskeletal: Negative for back pain and neck pain.   Skin: Negative for rash and wound.  "  Neurological: Negative for dizziness and seizures.   Psychiatric/Behavioral: Positive for dysphoric mood and sleep disturbance. The patient is nervous/anxious.        Objective:     /80   Pulse 96   Ht 165.1 cm (65\")   Wt 92.1 kg (203 lb)   SpO2 98%   BMI 33.78 kg/m²    Physical Exam   Constitutional: She is oriented to person, place, and time. She appears well-developed and well-nourished.   HENT:   Head: Normocephalic and atraumatic.   Mouth/Throat: Oropharynx is clear and moist. No oropharyngeal exudate.   Eyes: Conjunctivae and EOM are normal. Pupils are equal, round, and reactive to light.   Neck: Normal range of motion. Neck supple. No tracheal deviation present. No thyromegaly present.   Cardiovascular: Normal heart sounds.  Exam reveals no gallop and no friction rub.    No murmur heard.  Irregularly irregular.    Pulmonary/Chest: Effort normal and breath sounds normal. No respiratory distress. She has no wheezes. She has no rales.   Abdominal: Soft. Bowel sounds are normal. She exhibits no distension. There is no tenderness.   Musculoskeletal: Normal range of motion. She exhibits no edema or deformity.   Lymphadenopathy:     She has no cervical adenopathy.   Neurological: She is alert and oriented to person, place, and time.   Skin: Skin is warm and dry. No rash noted. No erythema.   Psychiatric: She has a normal mood and affect. Her behavior is normal. Thought content normal.   Vitals reviewed.    Assessment/Plan:     1. Hospital discharge follow-up         Lana was seen today for follow-up.    Diagnoses and all orders for this visit:    Hospital discharge follow-up: This patient has had some improvement of her tachycardia since the E.D visit. I discussed the need to avoid strenuous activity until she has the second cardioversion performed by Dr. Riley. I discussed the importance of taking all of her medications as prescribed. Patient gave verbal understanding and agreement to this plan. "       Follow-up:     Return in about 4 weeks (around 4/16/2018), or with Dr. Ortiz.      Goals        Patient Stated    • management of chronic medical conditions (pt-stated)            Barriers to goals: none            Preventative:    Vaccines Recommended at this visit:   Influenza    Vaccines Received at this visit:  Patient refused all vaccinations at this visit.    Screenings Recommended at this visit:  Colonoscopy    Screenings Ordered at this visit:  Patient refused all screenings offer at this visit.    Smoking Status:  Patient has never smoked.    Alcohol Intake:  Patient does not drink    Patient's BMI is Body mass index is 33.78 kg/m². at today's visit. This is classified as Greater than 30.0: Obese.   Goals discussed with the patient to decrease BMI were eat more fruits and vegetables, decrease soda or juice intake and increase physical activity        RISK SCORE: 4              This document has been electronically signed by Cody Abarca MD on March 21, 2018 5:57 PM

## 2018-03-21 DIAGNOSIS — M51.36 DDD (DEGENERATIVE DISC DISEASE), LUMBAR: Primary | ICD-10-CM

## 2018-03-21 PROBLEM — Z09 HOSPITAL DISCHARGE FOLLOW-UP: Status: ACTIVE | Noted: 2018-03-21

## 2018-03-26 ENCOUNTER — TELEPHONE (OUTPATIENT)
Dept: CARDIOLOGY | Facility: CLINIC | Age: 63
End: 2018-03-26

## 2018-03-26 RX ORDER — METOPROLOL SUCCINATE 25 MG/1
25 TABLET, EXTENDED RELEASE ORAL DAILY
Qty: 30 TABLET | Refills: 2 | Status: SHIPPED | OUTPATIENT
Start: 2018-03-26 | End: 2018-05-26 | Stop reason: HOSPADM

## 2018-03-26 RX ORDER — DILTIAZEM HYDROCHLORIDE 180 MG/1
180 CAPSULE, EXTENDED RELEASE ORAL DAILY
Qty: 30 CAPSULE | Refills: 2 | Status: SHIPPED | OUTPATIENT
Start: 2018-03-26 | End: 2018-09-21 | Stop reason: SDUPTHER

## 2018-03-26 NOTE — TELEPHONE ENCOUNTER
----- Message from Carrie Musekit sent at 3/26/2018 10:09 AM CDT -----  Contact: [phone]  Pt called requesting to speak with you stating she is having issues with her blood pressure.      Patient contacted the office stating her blood pressure was 158/110 and her heart rate is 135. Ms. torres has been laying down and her heart rate has dropped to 124. Per dr. cuello her diltiazem will be increased from 120 mg to 180 mg daily and toprol xl 25 mg will be added. She has been instructed to report to the er should her symptoms not improve or become worrisome. Patient verbalized understanding.

## 2018-04-04 ENCOUNTER — APPOINTMENT (OUTPATIENT)
Dept: LAB | Facility: HOSPITAL | Age: 63
End: 2018-04-04

## 2018-04-04 ENCOUNTER — TRANSCRIBE ORDERS (OUTPATIENT)
Dept: LAB | Facility: HOSPITAL | Age: 63
End: 2018-04-04

## 2018-04-04 DIAGNOSIS — E05.90 THYROTOXICOSIS WITHOUT THYROID STORM, UNSPECIFIED THYROTOXICOSIS TYPE: Primary | ICD-10-CM

## 2018-04-04 LAB
ALBUMIN SERPL-MCNC: 4.3 G/DL (ref 3.4–4.8)
ALBUMIN/GLOB SERPL: 1.3 G/DL (ref 1.1–1.8)
ALP SERPL-CCNC: 48 U/L (ref 38–126)
ALT SERPL W P-5'-P-CCNC: 30 U/L (ref 9–52)
ANION GAP SERPL CALCULATED.3IONS-SCNC: 13 MMOL/L (ref 5–15)
AST SERPL-CCNC: 29 U/L (ref 14–36)
BASOPHILS # BLD AUTO: 0.06 10*3/MM3 (ref 0–0.2)
BASOPHILS NFR BLD AUTO: 0.6 % (ref 0–2)
BILIRUB SERPL-MCNC: 0.8 MG/DL (ref 0.2–1.3)
BUN BLD-MCNC: 14 MG/DL (ref 7–21)
BUN/CREAT SERPL: 12.4 (ref 7–25)
CALCIUM SPEC-SCNC: 9.8 MG/DL (ref 8.4–10.2)
CHLORIDE SERPL-SCNC: 101 MMOL/L (ref 95–110)
CO2 SERPL-SCNC: 27 MMOL/L (ref 22–31)
CREAT BLD-MCNC: 1.13 MG/DL (ref 0.5–1)
DEPRECATED RDW RBC AUTO: 41.9 FL (ref 36.4–46.3)
EOSINOPHIL # BLD AUTO: 0.45 10*3/MM3 (ref 0–0.7)
EOSINOPHIL NFR BLD AUTO: 4.9 % (ref 0–7)
ERYTHROCYTE [DISTWIDTH] IN BLOOD BY AUTOMATED COUNT: 13.1 % (ref 11.5–14.5)
GFR SERPL CREATININE-BSD FRML MDRD: 49 ML/MIN/1.73 (ref 60–104)
GLOBULIN UR ELPH-MCNC: 3.2 GM/DL (ref 2.3–3.5)
GLUCOSE BLD-MCNC: 107 MG/DL (ref 60–100)
HCT VFR BLD AUTO: 45.4 % (ref 35–45)
HGB BLD-MCNC: 15.5 G/DL (ref 12–15.5)
IMM GRANULOCYTES # BLD: 0.04 10*3/MM3 (ref 0–0.02)
IMM GRANULOCYTES NFR BLD: 0.4 % (ref 0–0.5)
LYMPHOCYTES # BLD AUTO: 3.24 10*3/MM3 (ref 0.6–4.2)
LYMPHOCYTES NFR BLD AUTO: 35 % (ref 10–50)
MCH RBC QN AUTO: 30 PG (ref 26.5–34)
MCHC RBC AUTO-ENTMCNC: 34.1 G/DL (ref 31.4–36)
MCV RBC AUTO: 87.8 FL (ref 80–98)
MONOCYTES # BLD AUTO: 0.61 10*3/MM3 (ref 0–0.9)
MONOCYTES NFR BLD AUTO: 6.6 % (ref 0–12)
NEUTROPHILS # BLD AUTO: 4.86 10*3/MM3 (ref 2–8.6)
NEUTROPHILS NFR BLD AUTO: 52.5 % (ref 37–80)
PLATELET # BLD AUTO: 327 10*3/MM3 (ref 150–450)
PMV BLD AUTO: 10.1 FL (ref 8–12)
POTASSIUM BLD-SCNC: 4 MMOL/L (ref 3.5–5.1)
PROT SERPL-MCNC: 7.5 G/DL (ref 6.3–8.6)
RBC # BLD AUTO: 5.17 10*6/MM3 (ref 3.77–5.16)
SODIUM BLD-SCNC: 141 MMOL/L (ref 137–145)
T4 FREE SERPL-MCNC: 1.33 NG/DL (ref 0.78–2.19)
TSH SERPL DL<=0.05 MIU/L-ACNC: 4.36 MIU/ML (ref 0.46–4.68)
WBC NRBC COR # BLD: 9.26 10*3/MM3 (ref 3.2–9.8)

## 2018-04-04 PROCEDURE — 84439 ASSAY OF FREE THYROXINE: CPT | Performed by: INTERNAL MEDICINE

## 2018-04-04 PROCEDURE — 85025 COMPLETE CBC W/AUTO DIFF WBC: CPT | Performed by: INTERNAL MEDICINE

## 2018-04-04 PROCEDURE — 36415 COLL VENOUS BLD VENIPUNCTURE: CPT | Performed by: INTERNAL MEDICINE

## 2018-04-04 PROCEDURE — 80053 COMPREHEN METABOLIC PANEL: CPT | Performed by: INTERNAL MEDICINE

## 2018-04-04 PROCEDURE — 84443 ASSAY THYROID STIM HORMONE: CPT | Performed by: INTERNAL MEDICINE

## 2018-04-12 ENCOUNTER — TELEPHONE (OUTPATIENT)
Dept: FAMILY MEDICINE CLINIC | Facility: CLINIC | Age: 63
End: 2018-04-12

## 2018-04-13 ENCOUNTER — OFFICE VISIT (OUTPATIENT)
Dept: CARDIOLOGY | Facility: CLINIC | Age: 63
End: 2018-04-13

## 2018-04-13 ENCOUNTER — DOCUMENTATION (OUTPATIENT)
Dept: CARDIOLOGY | Facility: CLINIC | Age: 63
End: 2018-04-13

## 2018-04-13 ENCOUNTER — TELEPHONE (OUTPATIENT)
Dept: FAMILY MEDICINE CLINIC | Facility: CLINIC | Age: 63
End: 2018-04-13

## 2018-04-13 VITALS
HEART RATE: 72 BPM | DIASTOLIC BLOOD PRESSURE: 78 MMHG | SYSTOLIC BLOOD PRESSURE: 118 MMHG | WEIGHT: 212 LBS | HEIGHT: 65 IN | BODY MASS INDEX: 35.32 KG/M2

## 2018-04-13 DIAGNOSIS — E78.2 MIXED HYPERLIPIDEMIA: Primary | ICD-10-CM

## 2018-04-13 DIAGNOSIS — E05.90 THYROTOXICOSIS WITH OR WITHOUT GOITER: ICD-10-CM

## 2018-04-13 DIAGNOSIS — I48.19 PERSISTENT ATRIAL FIBRILLATION (HCC): Primary | ICD-10-CM

## 2018-04-13 DIAGNOSIS — I48.0 PAROXYSMAL ATRIAL FIBRILLATION (HCC): ICD-10-CM

## 2018-04-13 PROCEDURE — 99213 OFFICE O/P EST LOW 20 MIN: CPT | Performed by: INTERNAL MEDICINE

## 2018-04-13 PROCEDURE — 93000 ELECTROCARDIOGRAM COMPLETE: CPT | Performed by: INTERNAL MEDICINE

## 2018-04-13 RX ORDER — SERTRALINE HYDROCHLORIDE 100 MG/1
100 TABLET, FILM COATED ORAL 2 TIMES DAILY
Refills: 1 | COMMUNITY
Start: 2018-03-22 | End: 2022-06-17 | Stop reason: ALTCHOICE

## 2018-04-13 RX ORDER — LORAZEPAM 1 MG/1
1 TABLET ORAL 2 TIMES DAILY
Refills: 0 | COMMUNITY
Start: 2018-03-22 | End: 2022-07-20 | Stop reason: DRUGHIGH

## 2018-04-13 RX ORDER — ASPIRIN 325 MG
500 TABLET ORAL DAILY
COMMUNITY

## 2018-04-13 RX ORDER — SODIUM CHLORIDE 9 MG/ML
80 INJECTION, SOLUTION INTRAVENOUS CONTINUOUS
Status: CANCELLED | OUTPATIENT
Start: 2018-04-13

## 2018-04-13 NOTE — PROGRESS NOTES
Lana Ayala [facility]  63 y.o. female    04/13/2018  1. Mixed hyperlipidemia    2. Thyrotoxicosis with or without goiter    3. Paroxysmal atrial fibrillation        History of Present Illness  63 years old patient with history of atrial fibrillations more than 5 minute duration, hypertension with hypertensive heart disease, chronic back pain, exogenous obesity with a BMI 33.8, history of sleep apnea status post electrical cardioversion and unable to maintain sinus rhythm with the help of Betapace.  Patient referred for subsequent evaluation is management.  Family present the room at the time of evaluation.  The patient ambulate with the help of cane.  She had a good time after electrical cardioversion for about a week or so but unfortunately unable to maintain sinus rhythm and had change in quality of life and increasing shortness of breath with exertional activity.  No syncope or near syncopal episode reported.  No fever cough or chills reported. EKG done and finding discussed the patient.  Patient still in atrial fibrillation rate is well controlled.  On amiodarone for almost a month duration and she continue Xarelto to decrease risk of cardiac embolic phenomena.      JOCY 1/2018  ·   · Transesophageal Echocardiogram with Color and Doppler  · Left Ventricle: Estimated EF appears to be in the range of 61 - 65%. Normal left ventricular cavity size noted  · Left ventricular wall thickness is consistent with mild concentric hypertrophy  · Right Ventricle: Normal right ventricular cavity size, wall thickness, systolic function and septal motion noted  · No evidence of a left atrial appendage thrombus was present. The interatrial septum appears redundant  · No evidence of a patent foramen ovale. No evidence of an atrial septal defect present. Saline test results are negative.  · Mild aortic valve regurgitation is present  · Mild mitral valve regurgitation is present  4/2018  Total Protein 6.3 - 8.6 g/dL 7.5    Albumin 3.40  - 4.80 g/dL 4.30    ALT (SGPT) 9 - 52 U/L 30    AST (SGOT) 14 - 36 U/L 29    Alkaline Phosphatase 38 - 126 U/L 48    Total Bilirubin 0.2 - 1.3 mg/dL 0.8        1/2018  Total Cholesterol 0 - 199 mg/dL 165    Triglycerides 20 - 199 mg/dL 169    HDL Cholesterol 60 - 200 mg/dL 67    LDL Cholesterol  1 - 129 mg/dL 77    LDL/HDL Ratio 0.00 - 3.22 0.96        TSH 0.460 - 4.680 mIU/mL 3.960          SUBJECTIVE    Allergies   Allergen Reactions   • Codeine Nausea And Vomiting   • Morphine And Related Hallucinations   • Pravachol [Pravastatin Sodium] Myalgia   • Latex Rash         Past Medical History:   Diagnosis Date   • Abnormal gait 11/28/2012   • Abrasion 02/26/2015   • Acute ankle pain 03/31/2016   • Acute bronchitis 09/04/2015   • Allergic rhinitis 05/22/2012   • Asthenia 05/22/2014   • Atrial fibrillation 03/04/2016    - converted    • Attempted suicide    • Benign essential hypertension 03/04/2016   • Cardiovascular stress test abnormal 03/04/2016   • Cellulitis of knee 03/03/2015   • Chest pain 04/02/2015   • Chronic pain disorder    • Common cold 06/24/2014   • Congestive heart failure 02/04/2016   • Depressive disorder 05/22/2015    suspect more complicated psych issues      • Gastroesophageal reflux disease 01/17/2013   • Generalized anxiety disorder 02/04/2016   • H/O echocardiogram     Normal LV function with Ef of 60-65%.Mildly dilated right ventricle with normal function. Grade 1B diastolic dysfunction with mild CLVH.NO sig. valvular regurg. or stenosis.   • Headache 10/27/2014   • Hyperlipidemia 03/04/2016   • Low back pain 06/30/2016    Need for prophylactic vaccination against Streptococcus pneumoniae [pneumococcus]    01/23/2015    • Neck pain 06/30/2016   • Obstructive sleep apnea of adult 07/30/2015   • Obstructive sleep apnea syndrome    • Osteoarthritis    • Osteoporosis 06/30/2016   • Pain in right shoulder 06/30/2016   • Pain in right shoulder 04/24/2014   • Shortness of breath 04/02/2015   • Skin  ulcer 04/13/2015    left   • Thyrotoxicosis with or without goiter 10/24/2014   • Urinary incontinence 05/22/2014         Past Surgical History:   Procedure Laterality Date   • BACK SURGERY  08/07/2015    Removal of intstrumentation,L5-S1.Exploration of fusion,L5-S1.Posterolateral fusion,L4-L5.Posterior segmental spinal instrumentation L4-5.Transforaminal interbody fusion Through right sided approach.Performed at    • COLONOSCOPY  05/04/2016    will order cologuard   • INJECTION OF MEDICATION  03/23/2015    celestone   • INJECTION OF MEDICATION  05/04/2016    KENALOG(6)   • LUMBAR FUSION      discectomy fusion with rods L4-S1   • LUMBAR LAMINECTOMY  1995   • MANDIBLE FRACTURE SURGERY      JAW WIRED SHUT   • MOUTH SURGERY  10/05/2015    Fractured mandible. Removal of arch bars.   • MOUTH SURGERY  1985    Fractured mandible. Removal of arch bars.   • RHINOPLASTY     • TUBAL ABDOMINAL LIGATION  1986         Family History   Problem Relation Age of Onset   • Hypertension Mother    • Hyperlipidemia Mother    • Mental illness Mother    • Thyroid disease Mother    • Diabetes Maternal Grandmother    • Cancer Maternal Grandmother    • Depression Other    • Diabetes Other    • Heart disease Other    • Hypertension Other    • Osteoporosis Other    • Thyroid disease Other    • Cancer Maternal Aunt    • Cancer Paternal Aunt          Social History     Social History   • Marital status:      Spouse name: N/A   • Number of children: N/A   • Years of education: N/A     Occupational History   • Not on file.     Social History Main Topics   • Smoking status: Never Smoker   • Smokeless tobacco: Never Used      Comment: smoking cessation    • Alcohol use No   • Drug use: No      Comment: lortab / oxycontin   • Sexual activity: Defer     Other Topics Concern   • Not on file     Social History Narrative   • No narrative on file         Current Outpatient Prescriptions   Medication Sig Dispense Refill   • amiodarone (PACERONE) 200  "MG tablet Take 1 tablet two times a day for a week then 1 tablet daily starting on 3/10/2018 (Patient taking differently: Take 200 mg by mouth Daily.) 30 tablet 1   • ARIPiprazole (ABILIFY) 5 MG tablet Take 5 mg by mouth Daily.     • aspirin 325 MG tablet Take 325 mg by mouth Daily.     • atorvastatin (LIPITOR) 20 MG tablet Take 1 tablet by mouth Daily. 90 tablet 3   • choline fenofibrate (TRILIPIX) 135 MG capsule Take 1 capsule by mouth Daily. 90 capsule 3   • diltiazem XR (DILACOR XR) 180 MG 24 hr capsule Take 1 capsule by mouth Daily. 30 capsule 2   • lisinopril (PRINIVIL,ZESTRIL) 40 MG tablet Take 1 tablet by mouth Daily. 90 tablet 0   • LORazepam (ATIVAN) 1 MG tablet Take 1 mg by mouth 2 (Two) Times a Day.  0   • methIMAzole (TAPAZOLE) 5 MG tablet Take 2.5 mg by mouth Daily.     • metoprolol succinate XL (TOPROL-XL) 25 MG 24 hr tablet Take 1 tablet by mouth Daily. 30 tablet 2   • nitroglycerin (NITROSTAT) 0.4 MG SL tablet Place 1 tablet under the tongue See Admin Instructions. PRN cp, if not relieved in 5 min repeat and go to ER 25 tablet 12   • omeprazole (priLOSEC) 20 MG capsule Take 1 capsule by mouth Daily. 90 capsule 3   • potassium chloride (K-DUR) 10 MEQ CR tablet Take 1 tablet by mouth 2 (Two) Times a Day With Meals. 180 tablet 3   • rivaroxaban (XARELTO) 20 MG tablet Take 1 tablet by mouth Daily. 90 tablet 3   • sertraline (ZOLOFT) 100 MG tablet Take 150 mg by mouth Daily.  1   • triamterene-hydrochlorothiazide (MAXZIDE-25) 37.5-25 MG per tablet Take 1 tablet by mouth Every Other Day. 90 tablet 3     No current facility-administered medications for this visit.          OBJECTIVE    /78   Pulse 72   Ht 165.1 cm (65\")   Wt 96.2 kg (212 lb)   BMI 35.28 kg/m²         Review of Systems     Constitutional:  Denies recent weight loss, weight gain, fever or chills, no change in exercise tolerance     HENT:  Denies any hearing loss, epistaxis, hoarseness, or difficulty speaking.     Eyes: Wears " eyeglasses or contact lenses     Respiratory:  Denies dyspnea with exertion,no cough, wheezing, or hemoptysis.     Cardiovascular: Negative for palpations, chest pain, orthopnea, PND, peripheral edema, syncope, or claudication.     Gastrointestinal:  Denies change in bowel habits, dyspepsia, ulcer disease, hematochezia, or melena.     Endocrine: Negative for cold intolerance, heat intolerance, polydipsia, polyphagia and polyuria. Denies any history of weight change, heat/cold intolerance, polydipsia, polyuria     Genitourinary: Negative.      Musculoskeletal: Denies any history of arthritic symptoms or back problems     Skin:  Denies any change in hair or nails, rashes, or skin lesions.     Allergic/Immunologic: Negative.  Negative for environmental allergies, food allergies and immunocompromised state.     Neurological:  Denies any history of recurrent headaches, strokes, TIA, or seizure disorder.     Hematological: Denies any food allergies, seasonal allergies, bleeding disorders, or lymphadenopathy.     Psychiatric/Behavioral: Denies any history of depression, substance abuse, or change in cognitive function.         Physical Exam     Constitutional: Cooperative, alert and oriented, well-developed, well-nourished, in no acute distress.     HENT:   Head: Normocephalic, normal hair patterns, no masses or tenderness.  Ears, Nose, and Throat: No gross abnormalities. No pallor or cyanosis. Dentition good.   Eyes: EOMS intact, PERRL, conjunctivae and lids unremarkable. Fundoscopic exam and visual fields not performed.   Neck: No palpable masses or adenopathy, no thyromegaly, no JVD, carotid pulses are full and equal bilaterally and without  Bruits.     Cardiovascular: Regular rhythm, S1 and S2 normal, no S3 or S4. Apical impulse not displaced. No murmurs, gallops, or rubs detected.     Pulmonary/Chest: Chest: normal symmetry, no tenderness to palpation, normal respiratory excursion, no intercostal retraction, no use of  accessory muscles.            Pulmonary: Normal breath sounds. No rales or ronchi.    Abdominal: Abdomen soft, bowel sounds normoactive, no masses, no hepatosplenomegaly, non-tender, no bruits.     Musculoskeletal: No deformities, clubbing, cyanosis, erythema, or edema observed. There are no spinal abnormalities noted. Normal muscle strength and tone. Pulses full and equal in all extremities, no bruits auscultated.     Neurological: No gross motor or sensory deficits noted, affect appropriate, oriented to time, person, place.     Skin: Warm and dry to the touch, no apparent skin lesions or masses noted.     Psychiatric: She has a normal mood and affect. Her behavior is normal. Judgment and thought content normal.           ECG 12 Lead  Date/Time: 4/13/2018 12:01 PM  Performed by: CHIKIS GARCIA  Authorized by: CHIKIS GARCIA   Comparison: not compared with previous ECG   Rhythm: atrial fibrillation              Lab Results   Component Value Date    WBC 9.26 04/04/2018    HGB 15.5 04/04/2018    HCT 45.4 (H) 04/04/2018    MCV 87.8 04/04/2018     04/04/2018     Lab Results   Component Value Date    GLUCOSE 107 (H) 04/04/2018    BUN 14 04/04/2018    CREATININE 1.13 (H) 04/04/2018    EGFRIFNONA 49 (L) 04/04/2018    BCR 12.4 04/04/2018    CO2 27.0 04/04/2018    CALCIUM 9.8 04/04/2018    ALBUMIN 4.30 04/04/2018    LABIL2 1.3 04/04/2018    AST 29 04/04/2018    ALT 30 04/04/2018     Lab Results   Component Value Date    CHOL 165 01/31/2018     Lab Results   Component Value Date    TRIG 169 01/31/2018    TRIG 115 12/15/2016    TRIG 111 09/08/2016     Lab Results   Component Value Date    HDL 67 01/31/2018    HDL 52 (L) 12/15/2016    HDL 66 09/08/2016     No components found for: LDLCALC  Lab Results   Component Value Date    LDL 77 01/31/2018    LDL 73 12/15/2016    LDL 61 09/08/2016     No results found for: HDLLDLRATIO  No components found for: CHOLHDL  Lab Results   Component Value Date    HGBA1C 5.7 (H)  01/31/2018     Lab Results   Component Value Date    TSH 4.360 04/04/2018           ASSESSMENT AND PLAN  #1 paroxysmal atrial fibrillation on file in duration normal to palpitation #3 hypertension with hypertensive heart disease #4 chronic back pain #5 hypothyroidism on Tapazole     Clinically, no sign of cardiac decompensation based on the clinical history physical finding.  No evidence of ongoing ischemia.  At present patient being treated with the Betapace 120 mg twice a day post electrical cardioversion but unable to maintain sinus rhythm.  She is on oral anticoagulations.  Hypertensions a decent blood pressures and treated with the Maxide, lisinopril.  Hyperlipidemia being managed with atorvastatin.  Discussed with the patient and family regarding the management of symptomatic atrial fibrillations off and on of 5-year- durations with  failed Betapace.  discuseds the EP study and ablations with the patient and family.  Pros/ cons/procedure and risk discussed with the patient.  Risk included but nonlimiting to infection, bleeding, stroke, pneumothorax, hematoma, cardiac perforations, phrenic nerve damage, pulmonary vein stenosis, gastroesophageal fistula range from less than 1 to 7%.  EKG finding discussed with the patient.  The patient doesn't want to consider EP study and ablation but okay to have another attempt electrical cardioversions.  Fetal risk regarding the current cardioversion discussed with the patient.  Risk included but nonlimiting 2 arrhythmia and stroke.  Currently patient is being treated with Xarelto to decrease risk of cardiac embolic phenomena, amiodarone's an AV kaya blocking drug with the Cardizem, lisinopril for hypertension.    Lana was seen today for follow-up.    Diagnoses and all orders for this visit:    Mixed hyperlipidemia    Thyrotoxicosis with or without goiter    Paroxysmal atrial fibrillation        Kristyn Jurado MD  4/13/2018  11:53 AM

## 2018-04-16 ENCOUNTER — DOCUMENTATION (OUTPATIENT)
Dept: FAMILY MEDICINE CLINIC | Facility: CLINIC | Age: 63
End: 2018-04-16

## 2018-04-16 NOTE — PROGRESS NOTES
Patient was called.  She will be recontacted by nursing staff to schedule MRI of lumbar spine necessary for her to establish care with a new neurologist.          This document has been electronically signed by Bernie Ortiz MD on April 16, 2018 3:03 PM

## 2018-04-18 ENCOUNTER — DOCUMENTATION (OUTPATIENT)
Dept: FAMILY MEDICINE CLINIC | Facility: CLINIC | Age: 63
End: 2018-04-18

## 2018-04-18 ENCOUNTER — ANESTHESIA EVENT (OUTPATIENT)
Dept: CARDIOLOGY | Facility: HOSPITAL | Age: 63
End: 2018-04-18

## 2018-04-18 NOTE — PROGRESS NOTES
Patient called and did not answer.  Voicemail was not set up and thus voicemail could not be left.  Called #[phone]          This document has been electronically signed by Bernie Ortiz MD on April 18, 2018 1:35 PM

## 2018-04-19 ENCOUNTER — ANESTHESIA (OUTPATIENT)
Dept: CARDIOLOGY | Facility: HOSPITAL | Age: 63
End: 2018-04-19

## 2018-04-19 ENCOUNTER — HOSPITAL ENCOUNTER (OUTPATIENT)
Dept: CARDIOLOGY | Facility: HOSPITAL | Age: 63
Setting detail: HOSPITAL OUTPATIENT SURGERY
Discharge: HOME | End: 2018-04-19
Attending: INTERNAL MEDICINE | Admitting: INTERNAL MEDICINE

## 2018-04-19 VITALS
OXYGEN SATURATION: 100 % | SYSTOLIC BLOOD PRESSURE: 135 MMHG | WEIGHT: 213.85 LBS | RESPIRATION RATE: 20 BRPM | DIASTOLIC BLOOD PRESSURE: 81 MMHG | BODY MASS INDEX: 35.63 KG/M2 | HEART RATE: 72 BPM | TEMPERATURE: 98.4 F | HEIGHT: 65 IN

## 2018-04-19 DIAGNOSIS — I48.19 PERSISTENT ATRIAL FIBRILLATION (HCC): ICD-10-CM

## 2018-04-19 LAB
ANION GAP SERPL CALCULATED.3IONS-SCNC: 12 MMOL/L (ref 5–15)
APTT PPP: 29.5 SECONDS (ref 20–40.3)
BUN BLD-MCNC: 21 MG/DL (ref 7–21)
BUN/CREAT SERPL: 18.8 (ref 7–25)
CALCIUM SPEC-SCNC: 9.6 MG/DL (ref 8.4–10.2)
CHLORIDE SERPL-SCNC: 102 MMOL/L (ref 95–110)
CO2 SERPL-SCNC: 26 MMOL/L (ref 22–31)
CREAT BLD-MCNC: 1.12 MG/DL (ref 0.5–1)
GFR SERPL CREATININE-BSD FRML MDRD: 49 ML/MIN/1.73 (ref 60–104)
GLUCOSE BLD-MCNC: 113 MG/DL (ref 60–100)
INR PPP: 1.22 (ref 0.8–1.2)
POTASSIUM BLD-SCNC: 4 MMOL/L (ref 3.5–5.1)
PROTHROMBIN TIME: 15.1 SECONDS (ref 11.1–15.3)
SODIUM BLD-SCNC: 140 MMOL/L (ref 137–145)

## 2018-04-19 PROCEDURE — 92960 CARDIOVERSION ELECTRIC EXT: CPT | Performed by: INTERNAL MEDICINE

## 2018-04-19 PROCEDURE — 25010000002 PROPOFOL 10 MG/ML EMULSION: Performed by: NURSE ANESTHETIST, CERTIFIED REGISTERED

## 2018-04-19 PROCEDURE — 93010 ELECTROCARDIOGRAM REPORT: CPT | Performed by: INTERNAL MEDICINE

## 2018-04-19 PROCEDURE — 80048 BASIC METABOLIC PNL TOTAL CA: CPT | Performed by: INTERNAL MEDICINE

## 2018-04-19 PROCEDURE — 92960 CARDIOVERSION ELECTRIC EXT: CPT

## 2018-04-19 PROCEDURE — 85730 THROMBOPLASTIN TIME PARTIAL: CPT | Performed by: INTERNAL MEDICINE

## 2018-04-19 PROCEDURE — 85610 PROTHROMBIN TIME: CPT | Performed by: INTERNAL MEDICINE

## 2018-04-19 PROCEDURE — 93005 ELECTROCARDIOGRAM TRACING: CPT | Performed by: INTERNAL MEDICINE

## 2018-04-19 RX ORDER — PROMETHAZINE HYDROCHLORIDE 25 MG/ML
12.5 INJECTION, SOLUTION INTRAMUSCULAR; INTRAVENOUS ONCE AS NEEDED
Status: CANCELLED | OUTPATIENT
Start: 2018-04-19

## 2018-04-19 RX ORDER — PROMETHAZINE HYDROCHLORIDE 25 MG/1
25 SUPPOSITORY RECTAL ONCE AS NEEDED
Status: CANCELLED | OUTPATIENT
Start: 2018-04-19

## 2018-04-19 RX ORDER — LIDOCAINE HYDROCHLORIDE 10 MG/ML
INJECTION, SOLUTION INFILTRATION; PERINEURAL AS NEEDED
Status: DISCONTINUED | OUTPATIENT
Start: 2018-04-19 | End: 2018-04-19 | Stop reason: SURG

## 2018-04-19 RX ORDER — SODIUM CHLORIDE 9 MG/ML
80 INJECTION, SOLUTION INTRAVENOUS CONTINUOUS
Status: DISCONTINUED | OUTPATIENT
Start: 2018-04-19 | End: 2018-04-20 | Stop reason: HOSPADM

## 2018-04-19 RX ORDER — PROPOFOL 10 MG/ML
VIAL (ML) INTRAVENOUS AS NEEDED
Status: DISCONTINUED | OUTPATIENT
Start: 2018-04-19 | End: 2018-04-19 | Stop reason: SURG

## 2018-04-19 RX ORDER — PROMETHAZINE HYDROCHLORIDE 12.5 MG/1
25 TABLET ORAL ONCE AS NEEDED
Status: CANCELLED | OUTPATIENT
Start: 2018-04-19

## 2018-04-19 RX ORDER — SCOLOPAMINE TRANSDERMAL SYSTEM 1 MG/1
1 PATCH, EXTENDED RELEASE TRANSDERMAL
Status: DISCONTINUED | OUTPATIENT
Start: 2018-04-19 | End: 2018-04-20 | Stop reason: HOSPADM

## 2018-04-19 RX ORDER — ONDANSETRON 2 MG/ML
4 INJECTION INTRAMUSCULAR; INTRAVENOUS ONCE AS NEEDED
Status: CANCELLED | OUTPATIENT
Start: 2018-04-19

## 2018-04-19 RX ADMIN — SCOPALAMINE 1 PATCH: 1 PATCH, EXTENDED RELEASE TRANSDERMAL at 06:44

## 2018-04-19 RX ADMIN — LIDOCAINE HYDROCHLORIDE 50 MG: 10 INJECTION, SOLUTION INFILTRATION; PERINEURAL at 08:19

## 2018-04-19 RX ADMIN — PROPOFOL 50 MG: 10 INJECTION, EMULSION INTRAVENOUS at 08:22

## 2018-04-19 RX ADMIN — SODIUM CHLORIDE 80 ML/HR: 9 INJECTION, SOLUTION INTRAVENOUS at 06:43

## 2018-04-19 RX ADMIN — PROPOFOL 50 MG: 10 INJECTION, EMULSION INTRAVENOUS at 08:19

## 2018-04-19 NOTE — ANESTHESIA PREPROCEDURE EVALUATION
Anesthesia Evaluation     history of anesthetic complications: PONV  NPO Solid Status: > 8 hours  NPO Liquid Status: > 8 hours           Airway   Mallampati: I  TM distance: >3 FB  Neck ROM: full  no difficulty expected  Dental    (+) upper dentures and poor dentition        Pulmonary     breath sounds clear to auscultation  (+) a smoker Former, shortness of breath, recent URI resolved, sleep apnea on CPAP,   Cardiovascular   Exercise tolerance: poor (<4 METS)    ECG reviewed  PT is on anticoagulation therapy  Patient on routine beta blocker and Beta blocker given within 24 hours of surgery  Rhythm: irregular  Rate: abnormal    (+) hypertension, dysrhythmias Atrial Fib, CHF, hyperlipidemia,   (-) angina    ROS comment: · Left Ventricle: Left ventricular function is normal. Estimated EF appears to be in the range of 61 - 65%.  · Left ventricular diastolic dysfunction is noted (grade II w/high LAP) consistent with pseudonormalization. Elevated left atrial pressure  · Right Ventricle: Normal wall thickness, systolic function and septal motion noted. Right ventricular cavity is mild-to-moderately dilated  · Estimated right ventricular systolic pressure from tricuspid regurgitation is normal (<35 mmHg).  · Mild aortic valve regurgitation is present  · There is no evidence of pericardial effusion.    Neuro/Psych  (+) headaches, numbness, psychiatric history Anxiety,     GI/Hepatic/Renal/Endo    (+) obesity,  GERD well controlled,  hyperthyroidism    Musculoskeletal     (+) arthralgias, neck pain,   Abdominal   (+) obese,     Abdomen: soft.   Substance History - negative use     OB/GYN          Other   (+) arthritis                       Anesthesia Plan    ASA 4     MAC and general   total IV anesthesia  intravenous induction   Anesthetic plan and risks discussed with patient.

## 2018-04-19 NOTE — ANESTHESIA POSTPROCEDURE EVALUATION
Patient: Lana Ayala At[place]bury    Procedure Summary     Date:  04/19/18 Room / Location:  [facility] CATH LAB    Anesthesia Start:  0807 Anesthesia Stop:  0829    Procedure:  CARDIOVERSION EXTERNAL IN CARDIOLOGY DEPARTMENT Diagnosis:       Persistent atrial fibrillation      (atrial fib)    Scheduled Providers:  Kristyn Jurado MD Provider:  Paul Mckenzie MD    Anesthesia Type:  MAC, general ASA Status:  4          Anesthesia Type: MAC, general  Last vitals  BP   136/81 (04/19/18 0629)   Temp   96.9 °F (36.1 °C) (04/19/18 0629)   Pulse   97 (04/19/18 0629)   Resp   18 (04/19/18 0629)     SpO2   95 % (04/19/18 0629)     Post Anesthesia Care and Evaluation    Patient location during evaluation: bedside  Patient participation: complete - patient cannot participate  Level of consciousness: awake  Pain score: 0  Pain management: adequate  Airway patency: patent  Anesthetic complications: No anesthetic complications  PONV Status: none  Cardiovascular status: acceptable  Respiratory status: acceptable  Hydration status: acceptable

## 2018-04-19 NOTE — NURSING NOTE
Attempted to schedule follow up appointment with Dr. Jurado but received no answer when calling office. Left message with [facility] call back number as well as patient's call back number with instructions for 1 month follow up. Instructed patient office should call with appointment but if she had not heard from them by tomorrow to notify office and verify appointment date and time for 1 month follow up with Dr. Jurado. Patient verbalized understanding and written instructions provided.

## 2018-04-19 NOTE — H&P (VIEW-ONLY)
[facility]  63 y.o. female    04/13/2018  1. Mixed hyperlipidemia    2. Thyrotoxicosis with or without goiter    3. Paroxysmal atrial fibrillation        History of Present Illness  63 years old patient with history of atrial fibrillations more than 5 minute duration, hypertension with hypertensive heart disease, chronic back pain, exogenous obesity with a BMI 33.8, history of sleep apnea status post electrical cardioversion and unable to maintain sinus rhythm with the help of Betapace.  Patient referred for subsequent evaluation is management.  Family present the room at the time of evaluation.  The patient ambulate with the help of cane.  She had a good time after electrical cardioversion for about a week or so but unfortunately unable to maintain sinus rhythm and had change in quality of life and increasing shortness of breath with exertional activity.  No syncope or near syncopal episode reported.  No fever cough or chills reported. EKG done and finding discussed the patient.  Patient still in atrial fibrillation rate is well controlled.  On amiodarone for almost a month duration and she continue Xarelto to decrease risk of cardiac embolic phenomena.      JOCY 1/2018  ·   · Transesophageal Echocardiogram with Color and Doppler  · Left Ventricle: Estimated EF appears to be in the range of 61 - 65%. Normal left ventricular cavity size noted  · Left ventricular wall thickness is consistent with mild concentric hypertrophy  · Right Ventricle: Normal right ventricular cavity size, wall thickness, systolic function and septal motion noted  · No evidence of a left atrial appendage thrombus was present. The interatrial septum appears redundant  · No evidence of a patent foramen ovale. No evidence of an atrial septal defect present. Saline test results are negative.  · Mild aortic valve regurgitation is present  · Mild mitral valve regurgitation is present  4/2018  Total Protein 6.3 - 8.6 g/dL 7.5    Albumin 3.40  - 4.80 g/dL 4.30    ALT (SGPT) 9 - 52 U/L 30    AST (SGOT) 14 - 36 U/L 29    Alkaline Phosphatase 38 - 126 U/L 48    Total Bilirubin 0.2 - 1.3 mg/dL 0.8        1/2018  Total Cholesterol 0 - 199 mg/dL 165    Triglycerides 20 - 199 mg/dL 169    HDL Cholesterol 60 - 200 mg/dL 67    LDL Cholesterol  1 - 129 mg/dL 77    LDL/HDL Ratio 0.00 - 3.22 0.96        TSH 0.460 - 4.680 mIU/mL 3.960          SUBJECTIVE    Allergies   Allergen Reactions   • Codeine Nausea And Vomiting   • Morphine And Related Hallucinations   • Pravachol [Pravastatin Sodium] Myalgia   • Latex Rash         Past Medical History:   Diagnosis Date   • Abnormal gait 11/28/2012   • Abrasion 02/26/2015   • Acute ankle pain 03/31/2016   • Acute bronchitis 09/04/2015   • Allergic rhinitis 05/22/2012   • Asthenia 05/22/2014   • Atrial fibrillation 03/04/2016    - converted    • Attempted suicide    • Benign essential hypertension 03/04/2016   • Cardiovascular stress test abnormal 03/04/2016   • Cellulitis of knee 03/03/2015   • Chest pain 04/02/2015   • Chronic pain disorder    • Common cold 06/24/2014   • Congestive heart failure 02/04/2016   • Depressive disorder 05/22/2015    suspect more complicated psych issues      • Gastroesophageal reflux disease 01/17/2013   • Generalized anxiety disorder 02/04/2016   • H/O echocardiogram     Normal LV function with Ef of 60-65%.Mildly dilated right ventricle with normal function. Grade 1B diastolic dysfunction with mild CLVH.NO sig. valvular regurg. or stenosis.   • Headache 10/27/2014   • Hyperlipidemia 03/04/2016   • Low back pain 06/30/2016    Need for prophylactic vaccination against Streptococcus pneumoniae [pneumococcus]    01/23/2015    • Neck pain 06/30/2016   • Obstructive sleep apnea of adult 07/30/2015   • Obstructive sleep apnea syndrome    • Osteoarthritis    • Osteoporosis 06/30/2016   • Pain in right shoulder 06/30/2016   • Pain in right shoulder 04/24/2014   • Shortness of breath 04/02/2015   • Skin  ulcer 04/13/2015    left   • Thyrotoxicosis with or without goiter 10/24/2014   • Urinary incontinence 05/22/2014         Past Surgical History:   Procedure Laterality Date   • BACK SURGERY  08/07/2015    Removal of intstrumentation,L5-S1.Exploration of fusion,L5-S1.Posterolateral fusion,L4-L5.Posterior segmental spinal instrumentation L4-5.Transforaminal interbody fusion Through right sided approach.Performed at    • COLONOSCOPY  05/04/2016    will order cologuard   • INJECTION OF MEDICATION  03/23/2015    celestone   • INJECTION OF MEDICATION  05/04/2016    KENALOG(6)   • LUMBAR FUSION      discectomy fusion with rods L4-S1   • LUMBAR LAMINECTOMY  1995   • MANDIBLE FRACTURE SURGERY      JAW WIRED SHUT   • MOUTH SURGERY  10/05/2015    Fractured mandible. Removal of arch bars.   • MOUTH SURGERY  1985    Fractured mandible. Removal of arch bars.   • RHINOPLASTY     • TUBAL ABDOMINAL LIGATION  1986         Family History   Problem Relation Age of Onset   • Hypertension Mother    • Hyperlipidemia Mother    • Mental illness Mother    • Thyroid disease Mother    • Diabetes Maternal Grandmother    • Cancer Maternal Grandmother    • Depression Other    • Diabetes Other    • Heart disease Other    • Hypertension Other    • Osteoporosis Other    • Thyroid disease Other    • Cancer Maternal Aunt    • Cancer Paternal Aunt          Social History     Social History   • Marital status:      Spouse name: N/A   • Number of children: N/A   • Years of education: N/A     Occupational History   • Not on file.     Social History Main Topics   • Smoking status: Never Smoker   • Smokeless tobacco: Never Used      Comment: smoking cessation    • Alcohol use No   • Drug use: No      Comment: lortab / oxycontin   • Sexual activity: Defer     Other Topics Concern   • Not on file     Social History Narrative   • No narrative on file         Current Outpatient Prescriptions   Medication Sig Dispense Refill   • amiodarone (PACERONE) 200  "MG tablet Take 1 tablet two times a day for a week then 1 tablet daily starting on 3/10/2018 (Patient taking differently: Take 200 mg by mouth Daily.) 30 tablet 1   • ARIPiprazole (ABILIFY) 5 MG tablet Take 5 mg by mouth Daily.     • aspirin 325 MG tablet Take 325 mg by mouth Daily.     • atorvastatin (LIPITOR) 20 MG tablet Take 1 tablet by mouth Daily. 90 tablet 3   • choline fenofibrate (TRILIPIX) 135 MG capsule Take 1 capsule by mouth Daily. 90 capsule 3   • diltiazem XR (DILACOR XR) 180 MG 24 hr capsule Take 1 capsule by mouth Daily. 30 capsule 2   • lisinopril (PRINIVIL,ZESTRIL) 40 MG tablet Take 1 tablet by mouth Daily. 90 tablet 0   • LORazepam (ATIVAN) 1 MG tablet Take 1 mg by mouth 2 (Two) Times a Day.  0   • methIMAzole (TAPAZOLE) 5 MG tablet Take 2.5 mg by mouth Daily.     • metoprolol succinate XL (TOPROL-XL) 25 MG 24 hr tablet Take 1 tablet by mouth Daily. 30 tablet 2   • nitroglycerin (NITROSTAT) 0.4 MG SL tablet Place 1 tablet under the tongue See Admin Instructions. PRN cp, if not relieved in 5 min repeat and go to ER 25 tablet 12   • omeprazole (priLOSEC) 20 MG capsule Take 1 capsule by mouth Daily. 90 capsule 3   • potassium chloride (K-DUR) 10 MEQ CR tablet Take 1 tablet by mouth 2 (Two) Times a Day With Meals. 180 tablet 3   • rivaroxaban (XARELTO) 20 MG tablet Take 1 tablet by mouth Daily. 90 tablet 3   • sertraline (ZOLOFT) 100 MG tablet Take 150 mg by mouth Daily.  1   • triamterene-hydrochlorothiazide (MAXZIDE-25) 37.5-25 MG per tablet Take 1 tablet by mouth Every Other Day. 90 tablet 3     No current facility-administered medications for this visit.          OBJECTIVE    /78   Pulse 72   Ht 165.1 cm (65\")   Wt 96.2 kg (212 lb)   BMI 35.28 kg/m²         Review of Systems     Constitutional:  Denies recent weight loss, weight gain, fever or chills, no change in exercise tolerance     HENT:  Denies any hearing loss, epistaxis, hoarseness, or difficulty speaking.     Eyes: Wears " eyeglasses or contact lenses     Respiratory:  Denies dyspnea with exertion,no cough, wheezing, or hemoptysis.     Cardiovascular: Negative for palpations, chest pain, orthopnea, PND, peripheral edema, syncope, or claudication.     Gastrointestinal:  Denies change in bowel habits, dyspepsia, ulcer disease, hematochezia, or melena.     Endocrine: Negative for cold intolerance, heat intolerance, polydipsia, polyphagia and polyuria. Denies any history of weight change, heat/cold intolerance, polydipsia, polyuria     Genitourinary: Negative.      Musculoskeletal: Denies any history of arthritic symptoms or back problems     Skin:  Denies any change in hair or nails, rashes, or skin lesions.     Allergic/Immunologic: Negative.  Negative for environmental allergies, food allergies and immunocompromised state.     Neurological:  Denies any history of recurrent headaches, strokes, TIA, or seizure disorder.     Hematological: Denies any food allergies, seasonal allergies, bleeding disorders, or lymphadenopathy.     Psychiatric/Behavioral: Denies any history of depression, substance abuse, or change in cognitive function.         Physical Exam     Constitutional: Cooperative, alert and oriented, well-developed, well-nourished, in no acute distress.     HENT:   Head: Normocephalic, normal hair patterns, no masses or tenderness.  Ears, Nose, and Throat: No gross abnormalities. No pallor or cyanosis. Dentition good.   Eyes: EOMS intact, PERRL, conjunctivae and lids unremarkable. Fundoscopic exam and visual fields not performed.   Neck: No palpable masses or adenopathy, no thyromegaly, no JVD, carotid pulses are full and equal bilaterally and without  Bruits.     Cardiovascular: Regular rhythm, S1 and S2 normal, no S3 or S4. Apical impulse not displaced. No murmurs, gallops, or rubs detected.     Pulmonary/Chest: Chest: normal symmetry, no tenderness to palpation, normal respiratory excursion, no intercostal retraction, no use of  accessory muscles.            Pulmonary: Normal breath sounds. No rales or ronchi.    Abdominal: Abdomen soft, bowel sounds normoactive, no masses, no hepatosplenomegaly, non-tender, no bruits.     Musculoskeletal: No deformities, clubbing, cyanosis, erythema, or edema observed. There are no spinal abnormalities noted. Normal muscle strength and tone. Pulses full and equal in all extremities, no bruits auscultated.     Neurological: No gross motor or sensory deficits noted, affect appropriate, oriented to time, person, place.     Skin: Warm and dry to the touch, no apparent skin lesions or masses noted.     Psychiatric: She has a normal mood and affect. Her behavior is normal. Judgment and thought content normal.           ECG 12 Lead  Date/Time: 4/13/2018 12:01 PM  Performed by: CHIKIS GARCIA  Authorized by: CHIKIS GARCIA   Comparison: not compared with previous ECG   Rhythm: atrial fibrillation              Lab Results   Component Value Date    WBC 9.26 04/04/2018    HGB 15.5 04/04/2018    HCT 45.4 (H) 04/04/2018    MCV 87.8 04/04/2018     04/04/2018     Lab Results   Component Value Date    GLUCOSE 107 (H) 04/04/2018    BUN 14 04/04/2018    CREATININE 1.13 (H) 04/04/2018    EGFRIFNONA 49 (L) 04/04/2018    BCR 12.4 04/04/2018    CO2 27.0 04/04/2018    CALCIUM 9.8 04/04/2018    ALBUMIN 4.30 04/04/2018    LABIL2 1.3 04/04/2018    AST 29 04/04/2018    ALT 30 04/04/2018     Lab Results   Component Value Date    CHOL 165 01/31/2018     Lab Results   Component Value Date    TRIG 169 01/31/2018    TRIG 115 12/15/2016    TRIG 111 09/08/2016     Lab Results   Component Value Date    HDL 67 01/31/2018    HDL 52 (L) 12/15/2016    HDL 66 09/08/2016     No components found for: LDLCALC  Lab Results   Component Value Date    LDL 77 01/31/2018    LDL 73 12/15/2016    LDL 61 09/08/2016     No results found for: HDLLDLRATIO  No components found for: CHOLHDL  Lab Results   Component Value Date    HGBA1C 5.7 (H)  01/31/2018     Lab Results   Component Value Date    TSH 4.360 04/04/2018           ASSESSMENT AND PLAN  #1 paroxysmal atrial fibrillation on file in duration normal to palpitation #3 hypertension with hypertensive heart disease #4 chronic back pain #5 hypothyroidism on Tapazole     Clinically, no sign of cardiac decompensation based on the clinical history physical finding.  No evidence of ongoing ischemia.  At present patient being treated with the Betapace 120 mg twice a day post electrical cardioversion but unable to maintain sinus rhythm.  She is on oral anticoagulations.  Hypertensions a decent blood pressures and treated with the Maxide, lisinopril.  Hyperlipidemia being managed with atorvastatin.  Discussed with the patient and family regarding the management of symptomatic atrial fibrillations off and on of 5-year- durations with  failed Betapace.  discuseds the EP study and ablations with the patient and family.  Pros/ cons/procedure and risk discussed with the patient.  Risk included but nonlimiting to infection, bleeding, stroke, pneumothorax, hematoma, cardiac perforations, phrenic nerve damage, pulmonary vein stenosis, gastroesophageal fistula range from less than 1 to 7%.  EKG finding discussed with the patient.  The patient doesn't want to consider EP study and ablation but okay to have another attempt electrical cardioversions.  Fetal risk regarding the current cardioversion discussed with the patient.  Risk included but nonlimiting 2 arrhythmia and stroke.  Currently patient is being treated with Xarelto to decrease risk of cardiac embolic phenomena, amiodarone's an AV kaya blocking drug with the Cardizem, lisinopril for hypertension.    Lana was seen today for follow-up.    Diagnoses and all orders for this visit:    Mixed hyperlipidemia    Thyrotoxicosis with or without goiter    Paroxysmal atrial fibrillation        Kristyn Jurado MD  4/13/2018  11:53 AM

## 2018-04-25 ENCOUNTER — TELEPHONE (OUTPATIENT)
Dept: FAMILY MEDICINE CLINIC | Facility: CLINIC | Age: 63
End: 2018-04-25

## 2018-04-25 NOTE — TELEPHONE ENCOUNTER
[facility] called said that they are wanting to make sure that the form was sent for the pt mobility exam, and that the correct documentation was done.  Said that what was needed to be listed is on page 2 of the information that was sent.       Should you have questions please call [phone] Ref# [id number]    Her appt is tomorrow at 2 with you

## 2018-04-26 DIAGNOSIS — Z00.00 EVALUATION BY MEDICAL SERVICE REQUIRED: Primary | ICD-10-CM

## 2018-04-30 ENCOUNTER — OFFICE VISIT (OUTPATIENT)
Dept: CARDIOLOGY | Facility: CLINIC | Age: 63
End: 2018-04-30

## 2018-04-30 ENCOUNTER — PREP FOR SURGERY (OUTPATIENT)
Dept: OTHER | Facility: HOSPITAL | Age: 63
End: 2018-04-30

## 2018-04-30 VITALS
HEIGHT: 65 IN | SYSTOLIC BLOOD PRESSURE: 130 MMHG | BODY MASS INDEX: 34.91 KG/M2 | WEIGHT: 209.5 LBS | DIASTOLIC BLOOD PRESSURE: 84 MMHG

## 2018-04-30 DIAGNOSIS — E78.2 MIXED HYPERLIPIDEMIA: ICD-10-CM

## 2018-04-30 DIAGNOSIS — I10 ESSENTIAL HYPERTENSION: ICD-10-CM

## 2018-04-30 DIAGNOSIS — I48.19 PERSISTENT ATRIAL FIBRILLATION (HCC): Primary | ICD-10-CM

## 2018-04-30 DIAGNOSIS — R94.39 ABNORMAL CARDIOVASCULAR STRESS TEST: ICD-10-CM

## 2018-04-30 DIAGNOSIS — I48.91 ATRIAL FIBRILLATION, UNSPECIFIED TYPE (HCC): Primary | ICD-10-CM

## 2018-04-30 DIAGNOSIS — I48.0 PAROXYSMAL ATRIAL FIBRILLATION (HCC): Primary | ICD-10-CM

## 2018-04-30 PROCEDURE — 99214 OFFICE O/P EST MOD 30 MIN: CPT | Performed by: INTERNAL MEDICINE

## 2018-04-30 PROCEDURE — 93000 ELECTROCARDIOGRAM COMPLETE: CPT | Performed by: INTERNAL MEDICINE

## 2018-04-30 RX ORDER — SODIUM CHLORIDE 9 MG/ML
50 INJECTION, SOLUTION INTRAVENOUS CONTINUOUS
Status: CANCELLED | OUTPATIENT
Start: 2018-05-23 | End: 2018-05-23

## 2018-04-30 NOTE — PROGRESS NOTES
Cardiovascular Medicine   Ar Gooden M.D., Ph.D., [facility]      The patient returns to cardiology clinic for follow-up of the following cardiac problems:    PROBLEM LIST:    1. PAF, non-valvular  a.  Ejection fraction: 60-65 percent, May 2015  b. Mild concentric left ventricular hypertrophy: 12mm at IVS  c. Left Atrium 37 mm  d.  Metoprolol succinate 100 mg twice a shabbir  e.  Multaq  f.  Aspirin 162 mg--has refused anticoagulation  2.  Essential hypertension  a.  Mild LVH  b.  Grade 1B diastolic dysfunction  3.  Hyperlipidemia  a.  Myalgias with pravastatin, but did not improve after medication was discontinued  4.  Mild SOPHIA    a.   Not on CPAP.   5.  Questionable history of heart failure  6.  Hyperthyroidism  7.  Chronic back pain  8.  Abnormal stress test  a.  Myocardial perfusion stress April 2015  -Mild photopenic defect of moderate size in the base and mid inferior lateral wall  -Ejection fraction 67%  9. Mild to Moderate MR    PAF  The patient returns in follow-up for her nonvalvular PAF.  She's now on sotalol.  She states she is tolerating this well.  Ejection fraction is preserved.  Interventricular septum has mild LVH 12 mm.  She does have a history of abnormal stress test, so Propafenone was discontinued.  She is on Xarelto.  She went back into atrial fibrillation.  We attempted a JOCY/DCCV that was not successful.  Patient has subsequently seen in EP and was started on amiodarone.  She then underwent a successful DCCV.     Unfortunately, she has went back into AF. Her VR-106 bpm. She is endorsing fatigue. She has had cardiac awareness. She is having dyspnea.     HTN  Concerning the patient's hypertension, the patient is managed by a PCP.  The patient is prescribed antihypertensives.  The patient is medication compliant.  Denies side effects.  Patient's laboratory evaluations are followed  by the PCP.     Elevated Cardiac Risk/Dyslipidemia  Concerning the patient's elevated cardiac risk and  dyslipidemia, the patient remains on a statin.  She's medication compliant.  She's tolerating this well.  She denies side effects.    Abnormal MPS  Concerning her abnormal stress test, she's been on medication management.  She is currently on atorvastatin and Toprol-XL.  She's had no resting, exertional or nocturnal angina.      Current Outpatient Prescriptions on File Prior to Visit   Medication Sig Dispense Refill   • amiodarone (PACERONE) 200 MG tablet Take 1 tablet two times a day for a week then 1 tablet daily starting on 3/10/2018 (Patient taking differently: Take 200 mg by mouth Daily.) 30 tablet 1   • ARIPiprazole (ABILIFY) 5 MG tablet Take 5 mg by mouth Daily.     • aspirin 325 MG tablet Take 325 mg by mouth Daily.     • atorvastatin (LIPITOR) 20 MG tablet Take 1 tablet by mouth Daily. 90 tablet 3   • choline fenofibrate (TRILIPIX) 135 MG capsule Take 1 capsule by mouth Daily. 90 capsule 3   • diltiazem XR (DILACOR XR) 180 MG 24 hr capsule Take 1 capsule by mouth Daily. 30 capsule 2   • lisinopril (PRINIVIL,ZESTRIL) 40 MG tablet Take 1 tablet by mouth Daily. 90 tablet 0   • LORazepam (ATIVAN) 1 MG tablet Take 1 mg by mouth 2 (Two) Times a Day.  0   • methIMAzole (TAPAZOLE) 5 MG tablet Take 2.5 mg by mouth Daily.     • metoprolol succinate XL (TOPROL-XL) 25 MG 24 hr tablet Take 1 tablet by mouth Daily. 30 tablet 2   • nitroglycerin (NITROSTAT) 0.4 MG SL tablet Place 1 tablet under the tongue See Admin Instructions. PRN cp, if not relieved in 5 min repeat and go to ER 25 tablet 12   • omeprazole (priLOSEC) 20 MG capsule Take 1 capsule by mouth Daily. 90 capsule 3   • potassium chloride (K-DUR) 10 MEQ CR tablet Take 1 tablet by mouth 2 (Two) Times a Day With Meals. 180 tablet 3   • rivaroxaban (XARELTO) 20 MG tablet Take 1 tablet by mouth Daily. 90 tablet 3   • sertraline (ZOLOFT) 100 MG tablet Take 150 mg by mouth Daily.  1   • triamterene-hydrochlorothiazide (MAXZIDE-25) 37.5-25 MG per tablet Take 1 tablet  by mouth Every Other Day. 90 tablet 3     No current facility-administered medications on file prior to visit.      Allergies   Allergen Reactions   • Codeine Nausea And Vomiting   • Latex Rash   • Morphine And Related Hallucinations   • Pravachol [Pravastatin Sodium] Myalgia     Social History     Social History   • Marital status:      Spouse name: N/A   • Number of children: N/A   • Years of education: N/A     Occupational History   • Not on file.     Social History Main Topics   • Smoking status: Never Smoker   • Smokeless tobacco: Never Used      Comment: smoking cessation    • Alcohol use No   • Drug use: No      Comment: lortab / oxycontin   • Sexual activity: Defer     Other Topics Concern   • Not on file     Social History Narrative   • No narrative on file       Physical Exam:  Vitals:    04/30/18 1330   BP: 130/84     Body mass index is 34.86 kg/m².    GEN: alert, appears stated age and cooperative  Body Habitus: well-nourished  JVP: 6  HJR: Nonsustained.     Heart rate: tachycardic Irregularly irregular.      Heart Sounds: S1: normal intensity  S2: normal intensity  S3: Absent      S4: Absent  Opening Snap: Absent.      A2-OS:  N/A  Pericardial rub: None  Ejection click: None      Murmurs: Systolic: Not present  Diastolic: Not present  Extremity: no edema, cyanosis  Trophic changes: None   Pallor on elevation: Absent.    Rubor on dependency: None  Pulses: Right radial artery has 2+ (normal) pulse    DATA REVIEWED:     EKG: AF with [id number].     Procedures  · Left Ventricle: Left ventricular function is normal. Estimated EF appears to be in the range of 61 - 65%.  · Left ventricular diastolic dysfunction is noted (grade II w/high LAP) consistent with pseudonormalization. Elevated left atrial pressure  · Right Ventricle: Normal wall thickness, systolic function and septal motion noted. Right ventricular cavity is mild-to-moderately dilated  · Estimated right ventricular systolic pressure from  tricuspid regurgitation is normal (<35 mmHg).  · Mild aortic valve regurgitation is present  · There is no evidence of pericardial effusion.    Assessment/Plan     1.   Abnormal stress test with dyspnea on exertion.  Ejection fraction is preserved.  I recommended aggressive risk factor stratification. CCS 0.  ASA, statin, BB    2. paroxysmal - 7 days or less Atrial fibrillation. The patient is currently in AF with RVR.   Anticoagulation:ordered  -Xarelto  Rate control agents:ordered.  - Agent: beta blocker-Metoprolol  -Rhythm control agents:ordered  -Additional orders: Amiodarone  -Will transition her care to Dr. Jurado for possible RFA/EPS.     3. Cardiac Risk Assessment:   -Recommended moderate-intensity statin therapy  -Lipid-lowering medications: Atorvastatin  -Recommended ASA  -Smoking cessation  -Daily activity of at least 30 minutes  -BP goal <130/80, Maintain BMI 18.5-24.9, Maintain hemoglobin A1c less than 7    4.  Tobacco status: Patient is a nonsmoker    5. Weight: Body mass index is 34.86 kg/m²..  BMI above upper level, FU Plan documented .  Class: I  -General patient education:  -Weight loss hand-out  -Exercise intervention:   Hand out, increase aerobic activity  -PCP Follow-up    Follow-up: I will release her from my care in transition her care to Dr. Jurado; she will be given a 6 month follow-up appointment in his office.          This document has been electronically signed by Ar Gooden MD PhD on April 30, 2018 1:36 PM

## 2018-04-30 NOTE — PATIENT INSTRUCTIONS

## 2018-05-03 ENCOUNTER — TELEPHONE (OUTPATIENT)
Dept: FAMILY MEDICINE CLINIC | Facility: CLINIC | Age: 63
End: 2018-05-03

## 2018-05-03 NOTE — TELEPHONE ENCOUNTER
ISABELLE LIU CALLED TO VERIFY PATIENT'S REFERRAL TO PHYSICAL THERAPY.    AFTER PATIENT IS EVALUATED BY PT THEY WILL SEND YOU THE PAPERWORK TO BE SIGNED OFF ON. HER ID # IS [id number]    THANK YOU

## 2018-05-07 ENCOUNTER — HOSPITAL ENCOUNTER (OUTPATIENT)
Dept: PHYSICAL THERAPY | Facility: HOSPITAL | Age: 63
Setting detail: THERAPIES SERIES
Discharge: HOME | End: 2018-05-07
Attending: FAMILY MEDICINE

## 2018-05-07 DIAGNOSIS — Z00.00 EVALUATION BY MEDICAL SERVICE REQUIRED: Primary | ICD-10-CM

## 2018-05-07 PROCEDURE — G8979 MOBILITY GOAL STATUS: HCPCS

## 2018-05-07 PROCEDURE — G8978 MOBILITY CURRENT STATUS: HCPCS

## 2018-05-07 PROCEDURE — 97162 PT EVAL MOD COMPLEX 30 MIN: CPT

## 2018-05-07 PROCEDURE — G8980 MOBILITY D/C STATUS: HCPCS

## 2018-05-07 NOTE — THERAPY EVALUATION
Outpatient Physical Therapy Ortho Initial Evaluation  [facility]     Patient Name: Lana Mata  : 1955  MRN: [id number]  Today's Date: 2018      Visit Date: 2018    Patient Active Problem List   Diagnosis   • Thyrotoxicosis with or without goiter   • Osteoporosis   • Pain in right shoulder   • Low back pain   • Hyperlipidemia   • Generalized anxiety disorder   • Depressive disorder   • Congestive heart failure   • Atrial fibrillation   • Benign essential hypertension   • Asthenia   • Abnormal gait   • Obstructive sleep apnea of adult   • DDD (degenerative disc disease), lumbar   • Lumbar radiculopathy   • History of lumbar surgery   • Lumbar spondylolysis   • Long term (current) use of opiate analgesic   • Paroxysmal atrial fibrillation   • Hospital discharge follow-up   • Urinary incontinence   • Skin ulcer   • Shortness of breath   • Shortness of breath   • Osteoarthritis   • Obstructive sleep apnea syndrome   • Neck pain   • Headache   • H/O echocardiogram   • Gastroesophageal reflux disease   • Common cold   • Chronic pain disorder   • Chest pain   • Cellulitis of knee   • Cardiovascular stress test abnormal   • Attempted suicide   • Allergic rhinitis   • Acute bronchitis   • Acute ankle pain   • Abrasion        Past Medical History:   Diagnosis Date   • Abnormal gait 2012   • Abrasion 2015   • Acute ankle pain 2016   • Acute bronchitis 2015   • Allergic rhinitis 2012   • Asthenia 2014   • Atrial fibrillation 2016    - converted    • Attempted suicide    • Benign essential hypertension 2016   • Cardiovascular stress test abnormal 2016   • Cellulitis of knee 2015   • Chest pain 2015   • Chronic pain disorder    • Common cold 2014   • Congestive heart failure 2016   • Depressive disorder 2015    suspect more complicated psych issues      • Gastroesophageal reflux disease 2013   • Generalized  anxiety disorder 02/04/2016   • H/O echocardiogram     Normal LV function with Ef of 60-65%.Mildly dilated right ventricle with normal function. Grade 1B diastolic dysfunction with mild CLVH.NO sig. valvular regurg. or stenosis.   • Headache 10/27/2014   • Hyperlipidemia 03/04/2016   • Low back pain 06/30/2016    Need for prophylactic vaccination against Streptococcus pneumoniae [pneumococcus]    01/23/2015    • Neck pain 06/30/2016   • Obstructive sleep apnea of adult 07/30/2015   • Obstructive sleep apnea syndrome    • Osteoarthritis    • Osteoporosis 06/30/2016   • Pain in right shoulder 06/30/2016   • Pain in right shoulder 04/24/2014   • Shortness of breath 04/02/2015   • Skin ulcer 04/13/2015    left   • Thyrotoxicosis with or without goiter 10/24/2014   • Urinary incontinence 05/22/2014        Past Surgical History:   Procedure Laterality Date   • BACK SURGERY  08/07/2015    Removal of intstrumentation,L5-S1.Exploration of fusion,L5-S1.Posterolateral fusion,L4-L5.Posterior segmental spinal instrumentation L4-5.Transforaminal interbody fusion Through right sided approach.Performed at    • COLONOSCOPY  05/04/2016    will order cologuard   • INJECTION OF MEDICATION  03/23/2015    celestone   • INJECTION OF MEDICATION  05/04/2016    KENALOG(6)   • LUMBAR FUSION      discectomy fusion with rods L4-S1   • LUMBAR LAMINECTOMY  1995   • MANDIBLE FRACTURE SURGERY      JAW WIRED SHUT   • MOUTH SURGERY  10/05/2015    Fractured mandible. Removal of arch bars.   • MOUTH SURGERY  1985    Fractured mandible. Removal of arch bars.   • NASAL SEPTUM SURGERY     • RHINOPLASTY     • TUBAL ABDOMINAL LIGATION  1986       Visit Dx:     ICD-10-CM ICD-9-CM   1. Evaluation by medical service required Z00.00 V82.89             Patient History     Row Name 05/07/18 1000             History    Chief Complaint Difficulty Walking;Difficulty with daily activities  -MW      Date Current Problem(s) Began --   chronic issue  -MW              "  User Key  (r) = Recorded By, (t) = Taken By, (c) = Cosigned By    Initials Name Provider Type    NUBIA Lutz PT Physical Therapist                                                                              [facility]   Wheelchair/Mobility Evaluation    Patient Information:   Patient name: Lana Mata  Patient : 1955  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 The pt states she has had a Hoveround chair in the past; however, it is no longer usable. Pt states she has low back and heart issues that attribute to her weakness and need for a power chair. Pt states she is unable to walk and perform ADLs (like going to the grocery) secondary to profound weakness and low activity tolerance. Pt states she is unable to be independent and leave her home secondary to not being ambulatory w/in the community.Pt has frequent SOA that attributes to her mobility deficits.  Pain: Pt experiences back and \"heart\" pain pending what she is doing.  Date of onset: Chronic- over several years  Past Medical History:   Past Medical History:   Diagnosis Date   • Abnormal gait 2012   • Abrasion 2015   • Acute ankle pain 2016   • Acute bronchitis 2015   • Allergic rhinitis 2012   • Asthenia 2014   • Atrial fibrillation 2016    - converted    • Attempted suicide    • Benign essential hypertension 2016   • Cardiovascular stress test abnormal 2016   • Cellulitis of knee 2015   • Chest pain 2015   • Chronic pain disorder    • Common cold 2014   • Congestive heart failure 2016   • Depressive disorder 2015    suspect more complicated psych issues      • Gastroesophageal reflux disease 2013   • Generalized anxiety " disorder 02/04/2016   • H/O echocardiogram     Normal LV function with Ef of 60-65%.Mildly dilated right ventricle with normal function. Grade 1B diastolic dysfunction with mild CLVH.NO sig. valvular regurg. or stenosis.   • Headache 10/27/2014   • Hyperlipidemia 03/04/2016   • Low back pain 06/30/2016    Need for prophylactic vaccination against Streptococcus pneumoniae [pneumococcus]    01/23/2015    • Neck pain 06/30/2016   • Obstructive sleep apnea of adult 07/30/2015   • Obstructive sleep apnea syndrome    • Osteoarthritis    • Osteoporosis 06/30/2016   • Pain in right shoulder 06/30/2016   • Pain in right shoulder 04/24/2014   • Shortness of breath 04/02/2015   • Skin ulcer 04/13/2015    left   • Thyrotoxicosis with or without goiter 10/24/2014   • Urinary incontinence 05/22/2014     Past Surgical History:   Past Surgical History:   Procedure Laterality Date   • BACK SURGERY  08/07/2015    Removal of intstrumentation,L5-S1.Exploration of fusion,L5-S1.Posterolateral fusion,L4-L5.Posterior segmental spinal instrumentation L4-5.Transforaminal interbody fusion Through right sided approach.Performed at    • COLONOSCOPY  05/04/2016    will order cologuard   • INJECTION OF MEDICATION  03/23/2015    celestone   • INJECTION OF MEDICATION  05/04/2016    KENALOG(6)   • LUMBAR FUSION      discectomy fusion with rods L4-S1   • LUMBAR LAMINECTOMY  1995   • MANDIBLE FRACTURE SURGERY      JAW WIRED SHUT   • MOUTH SURGERY  10/05/2015    Fractured mandible. Removal of arch bars.   • MOUTH SURGERY  1985    Fractured mandible. Removal of arch bars.   • NASAL SEPTUM SURGERY     • RHINOPLASTY     • TUBAL ABDOMINAL LIGATION  1986     Allergies:   Allergies   Allergen Reactions   • Codeine Nausea And Vomiting   • Latex Rash   • Morphine And Related Hallucinations   • Pravachol [Pravastatin Sodium] Myalgia     Special tests (Xray, MRI etc): JOCY performed for possible clots- no significant findings  Alcohol use: no  Tobacco Use:  no    Social History/Home environment:   ///single:    Children: 4 (all local)  Lives with: lives alone  Type of home: house- 2 story (all needs met on first floor)  Stairs: >10 inside the home, 5 steps to enter the home Handrail: yes for inside and outside home  Ramp: No- pt reports she just moved houses and is planning to have a ramp built     Jocelyn in home: [place]  Bathroom accesible(per patient report): yes  Bedroom accessible(per patient report): yes  Home equipment: (lift chair, wheelchair, BSC, etc): BSC, shower chair, manual WC  Driveway (gravel, blacktop): gravel  Current mode of transportation: daughter transports pt, daughter drives [place] [employer]   Does patient drive: No  What type of vehicle will be used: Pt states her Hoveround chair will mainly be for home use. She states she would like to have van access to take a chair places; however, she currently does not have access to one.  Will patient load/unload independently: N/A    Communication:  Verbal communication: WNL   Hearing: WNL     Current ambulation/mobility:  Does the patient Ambulate?: Yes  How far: Home distances (approx 50 feet)  AD used: 4-prong straight cane  Distance a day: Pt states she typically only ambulates home distance throughout her house. She states she cannot go out into the community independently.   Gait deviations: Yes, pt has decreased hip flex/ext, lateral trunk shift bilat, decreased DF ability of the L foot causing decreased swing phase of the L LE, and circumduction of the L LE.   Is the patient able to push a manual wheelchair: No- pt lacks UE strength to push her own body weight  Current gait:  Antalgic, slowed ghdaa  Mobility in home: independent w/ use of AD  Fall status/recent falls/frequency: Frequent falls, couple times/month. The pt has foot drop on the L side that is residual from her low back issues. Pt states she had a recent fall (2 weeks ago). Falls are often attributed to  "pt's foot drop.     Objective:   General apperance: Pt seems distressed w/ movement w/in the clinic.    Posture in sitting: Pt has forward flexion of her trunk and she has significant WB through her UE while sitting on the table.   In standing: Pt stands w/ excessive ER of the L hip. Bilat knee valgus noted.  She remains in forward trunk flexion throughout static stance.   Transfers: Independent w/ use of UE or AD assist   Height: 5 foot, 4 inches  Weight: 211 lbs    Physical exam:  ROM(UE/LE): bilat UE and LE AROM WFL  Strength(UE/LE): Bilat UE strength WFL for light ADLs. MMT for bilat LE as follows: hip flex 4-/5, hip abd/add 4-/5, knee ext 4/5, knee flex 4-/5, ankle DF 4/5 on R and 4-/5 on L.   Tone(UE/LE): No significant findings  Balance(sitting, standing): Pt has balance deficits- she requires a widened ARMINDA in standing or use of an AD to maintain standing > 1 min.   Coordination(UE/LE): Coordination WFL  Neurovascular status: (sensation in UE, LE, bottom area) Dermatome testing revealed deficits in L4 and L5 dermatome patterns on the L LE.  Pain rating/location/frequency: back pain 3/10, bilat legs 2/10  Transfers(bed to chair, etc if applicable) Pt requires use of UE for sit-to-stand and stand-to-sit transfers.   Special tests:TUG- pt took 27.18 seconds to complete 10 foot TUG test    Current wheel chair and seating system:   Wheel chair : AMSC  Model type: Pt does not know  Seat width: Unknown  Seat depth:  Unknown  Back height:  Unknown  Overall width:  Unknown  Overall length:  Unknown  Overall height in w/c:  Unknown  Seat height from floor:  Unknown     Rear:  Unknown  Front:  Unknown  Age of current w/c and or seating system: 20 years ago  Are there problems with current system: Yes, pt states \"nothing works\" on the chair. Pt states the electrical component on the chair quit working.  Hours current system is used: Pt is unable to use.     Has the patient tried any other types of " wheelchairs or scooters, if so what types: Alyce chair    Current cushion: N/A  Type: N/A  How old: N/A  Does patient have sores if so where: No  History of sores: No      Clients Current Measurements:  Across Hips: 23 inches  Hip to knee Left 16 inches Right 16 inches  Knee to foot left 15 inches Right 15 inches  Bottom of buttocks to inferior angle of scapula 12.5 inches  Bottom of buttocks to top of shoulder 26 inches  Bottom of buttocks to top of head 34.25 inches  Bottom of buttocks to forearm with elbow bent 8 inches  Shoe size Women's 8  Chest width 15 inches  Chest depth 14 inches  Shoulder width 16 inches    Need for lateral supports: No  Positioning seat belt needed? No  Orthopedic defortmities: increased thoracic Kyphosis, decreased Lordosis     Treatment: Initial evaluation with seating and mobility assessment including measurements taken and discussion of POC for patient to follow up with medical supply to obtain new system.     Assessment:   Patient is able to communicate and interact appropriately during evaluation. Pt has functional mobility deficits due to weakness and other health comorbidities that are impacting her QOL at this time.   It is recommended the patient receive a standard type of wheelchair/powerchair that is powered, with standard wheel type, with standard electric propulsion driving, with standard foot plates, without lateral supports for positioning, standard  armrests, without thigh and knee lateral supports, without trunk supports, without chest supports, and without headrest. Patient verbalizes and demonstrates full understanding of proposed treatment plan. Patient will follow up with patients choice of wheelchair supplier.        Functional Limitation/Rehabilitation Problem List:    1. Ability to obtaining a new wheelchair   2. Decreased mobility    3. Difficulty with ADLs and IADLs   Rehab Potential: Good  Time Calculation:   Start Time: 1011  Stop Time: 1059  Time Calculation  (min): 48 min  Total Timed Code Minutes- PT: 0 minute(s)     Therapy Charges for Today     Code Description Service Date Service Provider Modifiers Qty    87628523132 HC PT MOBILITY CURRENT 2018 Ksenia Lutz, PT GP, CL 1    [id number] HC PT MOBILITY PROJECTED 2018 Ksenia Lutz, PT GP, CL 1    [id number] HC PT MOBILITY DISCHARGE 2018 Ksenia Lutz, PT GP, CL 1    [id number] HC PT EVAL MOD COMPLEXITY 3 2018 Ksenia Lutz, PT GP 1          PT G-Codes  PT Professional Judgement Used?: Yes  Functional Limitation: Mobility: Walking and moving around  Mobility: Walking and Moving Around Current Status (): At least 60 percent but less than 80 percent impaired, limited or restricted  Mobility: Walking and Moving Around Goal Status (): At least 60 percent but less than 80 percent impaired, limited or restricted  Mobility: Walking and Moving Around Discharge Status (): At least 60 percent but less than 80 percent impaired, limited or restricted     Safety Issues/Barriers to rehab: N/A    Goals:  Patient Goals:   1. Obtain new seating system   2. Have independent mobility in community and home   ST. Patient to follow up with medical supply for obtaining new seating system    Plan:   Wheelchair assessment, measurements, and mobility assessment. Patient to follow up PRN for additional documentation and measurements as needed but will otherwise be discharged at this time and to follow up with MD and or medical supplier of patients choice for chair.   Frequency: PRN  Reassessment: PRN     Thank you for this referral,   Ksenia Lutz, PT-DPT        Ksenia Lutz PT  2018

## 2018-05-23 ENCOUNTER — HOSPITAL ENCOUNTER (INPATIENT)
Facility: HOSPITAL | Age: 63
LOS: 2 days | Discharge: HOME | End: 2018-05-26
Attending: INTERNAL MEDICINE | Admitting: INTERNAL MEDICINE

## 2018-05-23 DIAGNOSIS — N18.30 CKD (CHRONIC KIDNEY DISEASE) STAGE 3, GFR 30-59 ML/MIN (HCC): Primary | ICD-10-CM

## 2018-05-23 DIAGNOSIS — I48.20 CHRONIC ATRIAL FIBRILLATION (HCC): ICD-10-CM

## 2018-05-23 LAB
ANION GAP SERPL CALCULATED.3IONS-SCNC: 12 MMOL/L (ref 5–15)
BUN BLD-MCNC: 22 MG/DL (ref 7–21)
BUN/CREAT SERPL: 17.1 (ref 7–25)
CALCIUM SPEC-SCNC: 9.6 MG/DL (ref 8.4–10.2)
CHLORIDE SERPL-SCNC: 102 MMOL/L (ref 95–110)
CO2 SERPL-SCNC: 27 MMOL/L (ref 22–31)
CREAT BLD-MCNC: 1.29 MG/DL (ref 0.5–1)
DEPRECATED RDW RBC AUTO: 42 FL (ref 36.4–46.3)
ERYTHROCYTE [DISTWIDTH] IN BLOOD BY AUTOMATED COUNT: 13.1 % (ref 11.5–14.5)
GFR SERPL CREATININE-BSD FRML MDRD: 42 ML/MIN/1.73 (ref 60–104)
GLUCOSE BLD-MCNC: 105 MG/DL (ref 60–100)
HCT VFR BLD AUTO: 40.2 % (ref 35–45)
HGB BLD-MCNC: 13.4 G/DL (ref 12–15.5)
INR PPP: 1.3 (ref 0.8–1.2)
MAGNESIUM SERPL-MCNC: 1.8 MG/DL (ref 1.6–2.3)
MCH RBC QN AUTO: 29 PG (ref 26.5–34)
MCHC RBC AUTO-ENTMCNC: 33.3 G/DL (ref 31.4–36)
MCV RBC AUTO: 87 FL (ref 80–98)
PLATELET # BLD AUTO: 278 10*3/MM3 (ref 150–450)
PMV BLD AUTO: 10.2 FL (ref 8–12)
POTASSIUM BLD-SCNC: 3.9 MMOL/L (ref 3.5–5.1)
PROTHROMBIN TIME: 15.8 SECONDS (ref 11.1–15.3)
RBC # BLD AUTO: 4.62 10*6/MM3 (ref 3.77–5.16)
SODIUM BLD-SCNC: 141 MMOL/L (ref 137–145)
TSH SERPL DL<=0.05 MIU/L-ACNC: 3.68 MIU/ML (ref 0.46–4.68)
WBC NRBC COR # BLD: 9.19 10*3/MM3 (ref 3.2–9.8)

## 2018-05-23 PROCEDURE — 25010000002 MAGNESIUM SULFATE IN D5W 1G/100ML (PREMIX) 1-5 GM/100ML-% SOLUTION: Performed by: INTERNAL MEDICINE

## 2018-05-23 PROCEDURE — 93010 ELECTROCARDIOGRAM REPORT: CPT | Performed by: INTERNAL MEDICINE

## 2018-05-23 PROCEDURE — 85610 PROTHROMBIN TIME: CPT | Performed by: INTERNAL MEDICINE

## 2018-05-23 PROCEDURE — G0378 HOSPITAL OBSERVATION PER HR: HCPCS

## 2018-05-23 PROCEDURE — 85027 COMPLETE CBC AUTOMATED: CPT | Performed by: INTERNAL MEDICINE

## 2018-05-23 PROCEDURE — 80048 BASIC METABOLIC PNL TOTAL CA: CPT | Performed by: INTERNAL MEDICINE

## 2018-05-23 PROCEDURE — 93005 ELECTROCARDIOGRAM TRACING: CPT | Performed by: INTERNAL MEDICINE

## 2018-05-23 PROCEDURE — 84443 ASSAY THYROID STIM HORMONE: CPT | Performed by: INTERNAL MEDICINE

## 2018-05-23 PROCEDURE — 99219 PR INITIAL OBSERVATION CARE/DAY 50 MINUTES: CPT | Performed by: INTERNAL MEDICINE

## 2018-05-23 PROCEDURE — 83735 ASSAY OF MAGNESIUM: CPT | Performed by: INTERNAL MEDICINE

## 2018-05-23 RX ORDER — METHIMAZOLE 5 MG/1
5 TABLET ORAL DAILY
Status: DISCONTINUED | OUTPATIENT
Start: 2018-05-23 | End: 2018-05-26 | Stop reason: HOSPADM

## 2018-05-23 RX ORDER — MAGNESIUM SULFATE 1 G/100ML
1 INJECTION INTRAVENOUS ONCE
Status: COMPLETED | OUTPATIENT
Start: 2018-05-23 | End: 2018-05-23

## 2018-05-23 RX ORDER — DOFETILIDE 0.25 MG/1
250 CAPSULE ORAL EVERY 12 HOURS SCHEDULED
Status: DISCONTINUED | OUTPATIENT
Start: 2018-05-23 | End: 2018-05-24

## 2018-05-23 RX ORDER — LISINOPRIL 40 MG/1
40 TABLET ORAL
Status: DISCONTINUED | OUTPATIENT
Start: 2018-05-23 | End: 2018-05-26 | Stop reason: HOSPADM

## 2018-05-23 RX ORDER — ATORVASTATIN CALCIUM 20 MG/1
20 TABLET, FILM COATED ORAL NIGHTLY
Status: DISCONTINUED | OUTPATIENT
Start: 2018-05-23 | End: 2018-05-26 | Stop reason: HOSPADM

## 2018-05-23 RX ADMIN — LISINOPRIL 40 MG: 40 TABLET ORAL at 11:45

## 2018-05-23 RX ADMIN — DOFETILIDE 250 MCG: 0.25 CAPSULE ORAL at 11:44

## 2018-05-23 RX ADMIN — RIVAROXABAN 20 MG: 10 TABLET, FILM COATED ORAL at 18:18

## 2018-05-23 RX ADMIN — METHIMAZOLE 5 MG: 5 TABLET ORAL at 11:45

## 2018-05-23 RX ADMIN — MAGNESIUM SULFATE HEPTAHYDRATE 1 G: 1 INJECTION, SOLUTION INTRAVENOUS at 12:09

## 2018-05-23 RX ADMIN — ATORVASTATIN CALCIUM 20 MG: 20 TABLET, FILM COATED ORAL at 22:20

## 2018-05-23 RX ADMIN — DOFETILIDE 250 MCG: 0.25 CAPSULE ORAL at 22:21

## 2018-05-23 NOTE — CONSULTS
"Adult Nutrition  Assessment    Patient Name:  Lana Ayala Attebury  YOB: 1955  MRN: [id number]  Admit Date:  5/23/2018    Assessment Date:  5/23/2018    Comments:  Pt with dx Heart Failure.  Mild Sodium Restricted Diet info used to provide ed regarding Low Sodium Diet and diet copy given.          Adult Nutrition Assessment     Row Name 05/23/18 1451       Reason for Assessment    Reason For Assessment per organizational policy   Low Sodium diet    Diagnosis cardiac disease   dx Heart Failure    Identified At Risk by Screening Criteria need for education    Row Name 05/23/18 0849       Anthropometrics    Height 162.6 cm (64\")    Weight 96.2 kg (212 lb)       Ideal Body Weight (IBW)    Ideal Body Weight (IBW) (kg) 55    % Ideal Body Weight 174.83       Body Mass Index (BMI)    BMI (kg/m2) 36.47       IBW Adjustment, Para/Tetraplegia    5% Adjustment, Para (IBW) 52.25    10% Adjustment, Para (IBW) 49.5    10% Adjustment, Tetra (IBW) 49.5    15% Adjustment, Tetra (IBW) 46.75          Electronically signed by:  Lana Jenkins RD  05/23/18 2:52 PM  "

## 2018-05-23 NOTE — PLAN OF CARE
Problem: Patient Care Overview  Goal: Plan of Care Review  Outcome: Ongoing (interventions implemented as appropriate)   05/23/18 1301   Coping/Psychosocial   Plan of Care Reviewed With patient   Plan of Care Review   Progress no change   OTHER   Outcome Summary client to have cardioversion in morning, Tikosyn started today, client is concerned about medication for procedure, will ask Rhiannon COBB     Goal: Individualization and Mutuality  Outcome: Ongoing (interventions implemented as appropriate)    Goal: Discharge Needs Assessment  Outcome: Ongoing (interventions implemented as appropriate)    Goal: Interprofessional Rounds/Family Conf  Outcome: Ongoing (interventions implemented as appropriate)      Problem: Arrhythmia/Dysrhythmia (Symptomatic) (Adult)  Goal: Signs and Symptoms of Listed Potential Problems Will be Absent, Minimized or Managed (Arrhythmia/Dysrhythmia)  Outcome: Ongoing (interventions implemented as appropriate)

## 2018-05-23 NOTE — H&P
Cardiology at [facility] History and Physical Note      [facility]  [facility]  [id number]  1955    DATE OF ADMISSION: 5/23/2018    Reason for Hospitalization: Initiations of anti-arrhythmia medication and Pro arrhythmia monitoring    History of Present Illness:  63 years old patient with history of persistent  atrial fibrillation, hypertension and post electrical cardioversion on amiodarone unable to decrease the recurrence.  Patient evaluated by me in the office.  She doesn't want to pursue EP study and ablation at this stage.  She wanted try a different anti-arrhythmia medication such as Tikosyn with hypertensive heart disease, chronic back pain, exogenous obesity with a BMI 33.8  No syncope or near syncopal episode reported.  No fever cough or chills reported. she continue Xarelto to decrease risk of cardiac embolic phenomena.  Recently evaluated in the office and admitted for thickness and initiations and arrhythmia monitoring.  He is in atrial fibrillation with good resting heart rate.  Amiodarone and AV kaya blocking drug discontinued.        JOCY 1/2018  ·    · Transesophageal Echocardiogram with Color and Doppler  · Left Ventricle: Estimated EF appears to be in the range of 61 - 65%. Normal left ventricular cavity size noted  · Left ventricular wall thickness is consistent with mild concentric hypertrophy  · Right Ventricle: Normal right ventricular cavity size, wall thickness, systolic function and septal motion noted  · No evidence of a left atrial appendage thrombus was present. The interatrial septum appears redundant  · No evidence of a patent foramen ovale. No evidence of an atrial septal defect present. Saline test results are negative.  · Mild aortic valve regurgitation is present  · Mild mitral valve regurgitation is present  4/2018  Total Protein 6.3 - 8.6 g/dL 7.5    Albumin 3.40 - 4.80 g/dL 4.30    ALT (SGPT) 9 - 52 U/L 30    AST (SGOT) 14 - 36 U/L 29    Alkaline  Phosphatase 38 - 126 U/L 48    Total Bilirubin 0.2 - 1.3 mg/dL 0.8          1/2018  Total Cholesterol 0 - 199 mg/dL 165    Triglycerides 20 - 199 mg/dL 169    HDL Cholesterol 60 - 200 mg/dL 67    LDL Cholesterol  1 - 129 mg/dL 77    LDL/HDL Ratio 0.00 - 3.22 0.96          TSH 0.460 - 4.680 mIU/mL       Allergies   Allergen Reactions   • Codeine Nausea And Vomiting   • Latex Rash   • Morphine And Related Hallucinations   • Pravachol [Pravastatin Sodium] Myalgia       Prior to Admission medications    Medication Sig Start Date End Date Taking? Authorizing Provider   amiodarone (PACERONE) 200 MG tablet Take 1 tablet two times a day for a week then 1 tablet daily starting on 3/10/2018  Patient taking differently: Take 200 mg by mouth Daily. 3/9/18  Yes Kristyn Jurado MD   ARIPiprazole (ABILIFY) 5 MG tablet Take 5 mg by mouth Daily. At bedtime   Yes Historical Provider, MD   aspirin 325 MG tablet Take 325 mg by mouth Daily. At bedtime   Yes Historical Provider, MD   atorvastatin (LIPITOR) 20 MG tablet Take 1 tablet by mouth Daily.  Patient taking differently: Take 20 mg by mouth Every Night. 2/15/18  Yes Ar Gooden MD PhD   choline fenofibrate (TRILIPIX) 135 MG capsule Take 1 capsule by mouth Daily.  Patient taking differently: Take 135 mg by mouth Daily. At bedtime 2/15/18  Yes Ar Gooden MD PhD   diltiazem XR (DILACOR XR) 180 MG 24 hr capsule Take 1 capsule by mouth Daily.  Patient taking differently: Take 180 mg by mouth Daily. At bedtime 3/26/18  Yes Kristyn Jurado MD   lisinopril (PRINIVIL,ZESTRIL) 40 MG tablet Take 1 tablet by mouth Daily. 11/30/17  Yes Ar Gooden MD PhD   LORazepam (ATIVAN) 1 MG tablet Take 1 mg by mouth 2 (Two) Times a Day. Scheduled to take in the morning and one with dinner 3/22/18  Yes Historical Provider, MD   methIMAzole (TAPAZOLE) 5 MG tablet Take 2.5 mg by mouth Daily.   Yes Historical Provider, MD   metoprolol succinate XL (TOPROL-XL) 25 MG 24 hr  tablet Take 1 tablet by mouth Daily. 3/26/18  Yes Kristyn Jurado MD   nitroglycerin (NITROSTAT) 0.4 MG SL tablet Place 1 tablet under the tongue See Admin Instructions. PRN cp, if not relieved in 5 min repeat and go to ER 12/15/16  Yes Camron Hancock MD   omeprazole (priLOSEC) 20 MG capsule Take 1 capsule by mouth Daily.  Patient taking differently: Take 20 mg by mouth Daily. At bedtime 2/7/18  Yes Bernie Ortiz MD   potassium chloride (K-DUR) 10 MEQ CR tablet Take 1 tablet by mouth 2 (Two) Times a Day With Meals. 2/15/18  Yes Ar Gooden MD PhD   rivaroxaban (XARELTO) 20 MG tablet Take 1 tablet by mouth Daily. 2/15/18  Yes Ar Gooden MD PhD   sertraline (ZOLOFT) 100 MG tablet Take 150 mg by mouth Daily. 3/22/18  Yes Historical Provider, MD   triamterene-hydrochlorothiazide (MAXZIDE-25) 37.5-25 MG per tablet Take 1 tablet by mouth Every Other Day. 2/15/18  Yes Ar Gooden MD PhD       Past Medical History:   Diagnosis Date   • Abnormal gait 11/28/2012   • Abrasion 02/26/2015   • Acute ankle pain 03/31/2016   • Acute bronchitis 09/04/2015   • Allergic rhinitis 05/22/2012   • Asthenia 05/22/2014   • Atrial fibrillation 03/04/2016    - converted    • Attempted suicide    • Benign essential hypertension 03/04/2016   • Cardiovascular stress test abnormal 03/04/2016   • Cellulitis of knee 03/03/2015   • Chest pain 04/02/2015   • Chronic pain disorder    • Common cold 06/24/2014   • Congestive heart failure 02/04/2016   • Depressive disorder 05/22/2015    suspect more complicated psych issues      • Gastroesophageal reflux disease 01/17/2013   • Generalized anxiety disorder 02/04/2016   • H/O echocardiogram     Normal LV function with Ef of 60-65%.Mildly dilated right ventricle with normal function. Grade 1B diastolic dysfunction with mild CLVH.NO sig. valvular regurg. or stenosis.   • Headache 10/27/2014   • Hyperlipidemia 03/04/2016   • Low back pain 06/30/2016    Need for  prophylactic vaccination against Streptococcus pneumoniae [pneumococcus]    01/23/2015    • Neck pain 06/30/2016   • Obstructive sleep apnea of adult 07/30/2015   • Obstructive sleep apnea syndrome    • Osteoarthritis    • Osteoporosis 06/30/2016   • Pain in right shoulder 06/30/2016   • Pain in right shoulder 04/24/2014   • Shortness of breath 04/02/2015   • Skin ulcer 04/13/2015    left   • Thyrotoxicosis with or without goiter 10/24/2014   • Urinary incontinence 05/22/2014       Past Surgical History:   Procedure Laterality Date   • BACK SURGERY  08/07/2015    Removal of intstrumentation,L5-S1.Exploration of fusion,L5-S1.Posterolateral fusion,L4-L5.Posterior segmental spinal instrumentation L4-5.Transforaminal interbody fusion Through right sided approach.Performed at    • COLONOSCOPY  05/04/2016    will order cologuard   • INJECTION OF MEDICATION  03/23/2015    celestone   • INJECTION OF MEDICATION  05/04/2016    KENALOG(6)   • LUMBAR FUSION      discectomy fusion with rods L4-S1   • LUMBAR LAMINECTOMY  1995   • MANDIBLE FRACTURE SURGERY      JAW WIRED SHUT   • MOUTH SURGERY  10/05/2015    Fractured mandible. Removal of arch bars.   • MOUTH SURGERY  1985    Fractured mandible. Removal of arch bars.   • NASAL SEPTUM SURGERY     • RHINOPLASTY     • TUBAL ABDOMINAL LIGATION  1986       Social History     Social History   • Marital status:      Spouse name: N/A   • Number of children: N/A   • Years of education: N/A     Occupational History   • Not on file.     Social History Main Topics   • Smoking status: Never Smoker   • Smokeless tobacco: Never Used      Comment: smoking cessation    • Alcohol use No   • Drug use: No      Comment: lortab / oxycontin   • Sexual activity: Defer     Other Topics Concern   • Not on file     Social History Narrative   • No narrative on file       Family History   Problem Relation Age of Onset   • Hypertension Mother    • Hyperlipidemia Mother    • Mental illness Mother    •  Thyroid disease Mother    • Diabetes Maternal Grandmother    • Cancer Maternal Grandmother    • Depression Other    • Diabetes Other    • Heart disease Other    • Hypertension Other    • Osteoporosis Other    • Thyroid disease Other    • Cancer Maternal Aunt    • Cancer Paternal Aunt        Patient Active Problem List   Diagnosis   • Thyrotoxicosis with or without goiter   • Osteoporosis   • Pain in right shoulder   • Low back pain   • Hyperlipidemia   • Generalized anxiety disorder   • Depressive disorder   • Congestive heart failure   • Atrial fibrillation   • Benign essential hypertension   • Asthenia   • Abnormal gait   • Obstructive sleep apnea of adult   • DDD (degenerative disc disease), lumbar   • Lumbar radiculopathy   • History of lumbar surgery   • Lumbar spondylolysis   • Long term (current) use of opiate analgesic   • Paroxysmal atrial fibrillation   • Hospital discharge follow-up   • Urinary incontinence   • Skin ulcer   • Shortness of breath   • Shortness of breath   • Osteoarthritis   • Obstructive sleep apnea syndrome   • Neck pain   • Headache   • H/O echocardiogram   • Gastroesophageal reflux disease   • Common cold   • Chronic pain disorder   • Chest pain   • Cellulitis of knee   • Cardiovascular stress test abnormal   • Attempted suicide   • Allergic rhinitis   • Acute bronchitis   • Acute ankle pain   • Abrasion        REVIEW OF SYSTEMS:   Review of Systems   Constitutional: Negative for appetite change, diaphoresis and fatigue.   HENT: Negative for congestion, ear pain and hearing loss.    Eyes: Negative for photophobia.   Respiratory: Negative for cough, chest tightness and wheezing.    Cardiovascular: Positive for palpitations. Negative for chest pain and leg swelling.   Gastrointestinal: Negative for abdominal distention.   Endocrine: Negative for cold intolerance and heat intolerance.   Genitourinary: Negative for dysuria.   Musculoskeletal: Positive for back pain.   Neurological: Negative  for dizziness and light-headedness.   Psychiatric/Behavioral: Negative for agitation and behavioral problems.              Objective:     Vitals:    05/23/18 0849   BP: 117/65   BP Location: Right arm   Patient Position: Sitting   Pulse: 59   Resp: 18   Temp: 98.6 °F (37 °C)   TempSrc: Oral   SpO2: 94%     There is no height or weight on file to calculate BMI.    No intake or output data in the 24 hours ending 05/23/18 0950      Physical Exam   Constitutional: oriented to person, place, and time.  Well-developed and well-nourished. No distress.   HENT: Normocephalic.   Eyes: Conjunctivae are normal. No scleral icterus.   Neck: Normal carotid pulses, no hepatojugular reflux and no JVD present. Carotid bruit is not present. No tracheal deviation, no edema and no erythema present. No thyromegaly present.   Cardiovascular: Irregular rate and rhythm sounds and intact distal pulses.   No extrasystoles are present.   Pulmonary/Chest: Effort normal and breath sounds normal. No respiratory distress.   Abdominal: Soft. Bowel sounds are normal. Exhibits no distension and no mass.   Musculoskeletal:  Exhibits no edema, tenderness.   Neurological: is alert and awake  Skin: Skin is warm and dry  Psychiatric: Normal mood and affect       Lab Review:                  Results from last 7 days  Lab Units 05/23/18  0845   SODIUM mmol/L 141   POTASSIUM mmol/L 3.9   CHLORIDE mmol/L 102   CO2 mmol/L 27.0   BUN mg/dL 22*   CREATININE mg/dL 1.29*   GLUCOSE mg/dL 105*       Results from last 7 days  Lab Units 05/23/18  0845   WBC 10*3/mm3 9.19   HEMOGLOBIN g/dL 13.4   HEMATOCRIT % 40.2   PLATELETS 10*3/mm3 278       Results from last 7 days  Lab Units 05/23/18  0845   INR  1.30*           Results from last 7 days  Lab Units 05/23/18  0845   MAGNESIUM mg/dL 1.8             I personally viewed and interpreted the patient's EKG/Telemetry data      Assessment/Plan:     #1 persistent atrial fibrillation #2 hypertension with hypertensive heart  disease #3 hyperlipidemia #4 palpitation    63 years old patient with a BMI 36.5 with a history of persistent atrial fibrillation status post electrical cardioversion with a failed Betapace starting an amiodarone cardioverted did well for some time  Unable to maintain sinus with amiodarone. Discussed with the patient regarding EP study and ablation or starting a different anti-arrhythmia medication.  She decided to try a different anti-arrhythmia medication.  She admitted for  initiation of Tikosyn and Pro arrhythmia monitoring.  She is an atrial fibrillation with good heart rate.  Amiodarone and  AV kaay blocking drug discontinued.  Will keep nothing by mouth for electrical cardioversions tomorrow.  Procedure risk regarding the electrical cardioversion discussed with the patient.  Understand willing to proceed forward.  Pros and cons explained.  Pros and cons regarding initiation of Tikosyn also discussed with the patient.  We'll resume the lisinopril for management of hypertension, Lipitor for management of hyperlipidemia.  Risk factor lifestyle modification discussed.-Diet explained          Thank you for allowing me to participate in the care of Lana Mata. Feel free to contact me directly with any further questions or concerns.    Kristyn Jurado MD  05/23/18  9:50 AM.      EMR Dragon/Transcription disclaimer:   Much of this encounter note is an electronic transcription/translation of spoken language to printed text. The electronic translation of spoken language may permit erroneous, or at times, nonsensical words or phrases to be inadvertently transcribed; Although I have reviewed the note for such errors, some may still exist.

## 2018-05-23 NOTE — PLAN OF CARE
Problem: Patient Care Overview  Goal: Plan of Care Review  Outcome: Ongoing (interventions implemented as appropriate)  Mild Sodium Restricted Diet info used to provide ed regarding Low Sodium Diet and diet copy given.   05/23/18 2379   Coping/Psychosocial   Plan of Care Reviewed With patient   Plan of Care Review   Progress no change   OTHER   Outcome Summary initial assessment

## 2018-05-24 LAB
BILIRUB UR QL STRIP: NEGATIVE
CLARITY UR: CLEAR
COLOR UR: YELLOW
GLUCOSE UR STRIP-MCNC: NEGATIVE MG/DL
HGB UR QL STRIP.AUTO: NEGATIVE
KETONES UR QL STRIP: NEGATIVE
LEUKOCYTE ESTERASE UR QL STRIP.AUTO: NEGATIVE
NITRITE UR QL STRIP: NEGATIVE
PH UR STRIP.AUTO: 6.5 [PH] (ref 5–9)
PROT UR QL STRIP: NEGATIVE
SP GR UR STRIP: 1.01 (ref 1–1.03)
UROBILINOGEN UR QL STRIP: NORMAL

## 2018-05-24 PROCEDURE — 93005 ELECTROCARDIOGRAM TRACING: CPT | Performed by: INTERNAL MEDICINE

## 2018-05-24 PROCEDURE — 99232 SBSQ HOSP IP/OBS MODERATE 35: CPT | Performed by: INTERNAL MEDICINE

## 2018-05-24 PROCEDURE — 93010 ELECTROCARDIOGRAM REPORT: CPT | Performed by: INTERNAL MEDICINE

## 2018-05-24 PROCEDURE — 81003 URINALYSIS AUTO W/O SCOPE: CPT | Performed by: NURSE PRACTITIONER

## 2018-05-24 RX ORDER — DOFETILIDE 0.12 MG/1
125 CAPSULE ORAL EVERY 12 HOURS SCHEDULED
Status: DISCONTINUED | OUTPATIENT
Start: 2018-05-24 | End: 2018-05-24 | Stop reason: DRUGHIGH

## 2018-05-24 RX ORDER — LORAZEPAM 1 MG/1
1 TABLET ORAL EVERY 12 HOURS SCHEDULED
Status: DISCONTINUED | OUTPATIENT
Start: 2018-05-24 | End: 2018-05-26 | Stop reason: HOSPADM

## 2018-05-24 RX ORDER — DOFETILIDE 0.12 MG/1
125 CAPSULE ORAL EVERY 12 HOURS SCHEDULED
Status: DISCONTINUED | OUTPATIENT
Start: 2018-05-25 | End: 2018-05-26

## 2018-05-24 RX ORDER — DOFETILIDE 0.25 MG/1
250 CAPSULE ORAL ONCE
Status: COMPLETED | OUTPATIENT
Start: 2018-05-24 | End: 2018-05-24

## 2018-05-24 RX ADMIN — DOFETILIDE 250 MCG: 0.25 CAPSULE ORAL at 08:30

## 2018-05-24 RX ADMIN — DOFETILIDE 250 MCG: 0.25 CAPSULE ORAL at 20:52

## 2018-05-24 RX ADMIN — LORAZEPAM 1 MG: 1 TABLET ORAL at 09:40

## 2018-05-24 RX ADMIN — RIVAROXABAN 20 MG: 10 TABLET, FILM COATED ORAL at 17:56

## 2018-05-24 RX ADMIN — LORAZEPAM 1 MG: 1 TABLET ORAL at 20:52

## 2018-05-24 RX ADMIN — ATORVASTATIN CALCIUM 20 MG: 20 TABLET, FILM COATED ORAL at 20:51

## 2018-05-24 RX ADMIN — SERTRALINE HYDROCHLORIDE 100 MG: 50 TABLET ORAL at 09:40

## 2018-05-24 RX ADMIN — METHIMAZOLE 5 MG: 5 TABLET ORAL at 08:30

## 2018-05-24 RX ADMIN — LISINOPRIL 40 MG: 40 TABLET ORAL at 08:30

## 2018-05-24 NOTE — PLAN OF CARE
Problem: Patient Care Overview  Goal: Plan of Care Review  Outcome: Ongoing (interventions implemented as appropriate)   05/24/18 0411   Coping/Psychosocial   Plan of Care Reviewed With patient   Plan of Care Review   Progress improving       Problem: Arrhythmia/Dysrhythmia (Symptomatic) (Adult)  Goal: Signs and Symptoms of Listed Potential Problems Will be Absent, Minimized or Managed (Arrhythmia/Dysrhythmia)  Outcome: Ongoing (interventions implemented as appropriate)   05/24/18 0411   Goal/Outcome Evaluation   Problems Assessed (Arrhythmia/Dysrhythmia) all   Problems Present (Dysrhythmia) none;electrophysiologic conduction defect

## 2018-05-24 NOTE — PROGRESS NOTES
LOS: 0 days   Patient Care Team:  Bernie Ortiz MD as PCP - General (Family Medicine)  Cody Abarca MD as Resident (Family Medicine)  Jv Khalil Jr., MD as Resident (Family Medicine)  Allie Martin MD as Resident (Family Medicine)  Erendira Aguayo MD as Resident (Family Medicine)  Bola Caldera MD as Resident (Family Medicine)    Chief Complaint:  Paroxysmal atrial fibrillation admitted for Tikosyn initiation and pro arrhythmia monitoring.    Patient was in atrial fibrillation and converted after initiations of decreasing to sinus rhythm.  Initial EKG there   prolonged QTc interval the repeat EKG reported normal QTc interval.     Review of Systems:   ROS  Constitutional: Negative for appetite change, diaphoresis and fatigue.   HENT: Negative for congestion, ear pain and hearing loss.    Eyes: Negative for photophobia.   Respiratory: Negative for cough, chest tightness and wheezing.    Cardiovascular: Positive for palpitation resolved. Negative for chest pain and leg swelling.   Gastrointestinal: Negative for abdominal distention.   Endocrine: Negative for cold intolerance and heat intolerance.   Genitourinary: Negative for dysuria.   Musculoskeletal: Positive for back pain.   Neurological: Negative for dizziness and light-headedness.   Psychiatric/Behavioral: Negative for agitation and behavioral problems  Objective     Current Facility-Administered Medications   Medication Dose Route Frequency Provider Last Rate Last Dose   • atorvastatin (LIPITOR) tablet 20 mg  20 mg Oral Nightly Kristyn Jurado MD   20 mg at 05/23/18 2220   • dofetilide (TIKOSYN) capsule 250 mcg  250 mcg Oral Q12H Kristyn Jurado MD   250 mcg at 05/24/18 0830   • lisinopril (PRINIVIL,ZESTRIL) tablet 40 mg  40 mg Oral Q24H Kristyn Jurado MD   40 mg at 05/24/18 0830   • LORazepam (ATIVAN) tablet 1 mg  1 mg Oral Q12H Kristyn Jurado MD   1 mg at 05/24/18 0940   • methIMAzole (TAPAZOLE) tablet 5 mg  5 mg Oral  "Daily Kristyn Jurado MD   5 mg at 05/24/18 0830   • rivaroxaban (XARELTO) tablet 20 mg  20 mg Oral Daily With Dinner Kristyn Jurado MD   20 mg at 05/23/18 1818   • sertraline (ZOLOFT) tablet 100 mg  100 mg Oral Daily Kristyn Jurado MD   100 mg at 05/24/18 0940       Vital Sign Min/Max for last 24 hours  Temp  Min: 97.6 °F (36.4 °C)  Max: 98.8 °F (37.1 °C)   BP  Min: 106/54  Max: 145/76   Pulse  Min: 63  Max: 73   Resp  Min: 18  Max: 18   SpO2  Min: 92 %  Max: 94 %   No Data Recorded   Weight  Min: 95.7 kg (211 lb)  Max: 95.7 kg (211 lb)     Flowsheet Rows      First Filed Value   Admission Height  162.6 cm (64\") Documented at 05/23/2018 0849   Admission Weight  96.2 kg (212 lb) Documented at 05/23/2018 0849            Intake/Output Summary (Last 24 hours) at 05/24/18 1158  Last data filed at 05/24/18 0900   Gross per 24 hour   Intake              360 ml   Output                0 ml   Net              360 ml       Physical Exam:     General Appearance:    Alert, cooperative, in no acute distress   Lungs:     Clear to auscultation,respirations regular, even and                  unlabored    Heart:    Regular rhythm and normal rate, normal S1 and S2, no            murmur, no gallop, no rub, no click   Chest Wall:    No abnormalities observed   Abdomen:     Normal bowel sounds, no masses, no organomegaly, soft        non-tender, non-distended, no guarding, no rebound                tenderness   Extremities:   Moves all extremities well, no edema, no cyanosis, no             redness   Pulses:   Pulses palpable and equal bilaterally   Skin:   No bleeding, bruising or rash        Results Review:   I reviewed the patient's new clinical results.  Lab Results   Component Value Date    WBC 9.19 05/23/2018    HGB 13.4 05/23/2018    HCT 40.2 05/23/2018    MCV 87.0 05/23/2018     05/23/2018     Lab Results   Component Value Date    GLUCOSE 105 (H) 05/23/2018    BUN 22 (H) 05/23/2018    CREATININE 1.29 (H) 05/23/2018    " EGFRIFNONA 42 (L) 05/23/2018    BCR 17.1 05/23/2018    CO2 27.0 05/23/2018    CALCIUM 9.6 05/23/2018    ALBUMIN 4.30 04/04/2018    LABIL2 1.3 04/04/2018    AST 29 04/04/2018    ALT 30 04/04/2018     Lab Results   Component Value Date    TSH 3.680 05/23/2018     Lab Results   Component Value Date    INR 1.30 (H) 05/23/2018    INR 1.22 (H) 04/19/2018    INR 1.46 (H) 03/15/2018    PROTIME 15.8 (H) 05/23/2018    PROTIME 15.1 04/19/2018    PROTIME 17.3 (H) 03/15/2018     Lab Results   Component Value Date    PTT 29.5 04/19/2018       EKG:     Telemetry:    Ejection Fraction  No results found for: EF    Echo EF Estimated  No results found for: ECHOEFEST      Present on Admission:  • Atrial fibrillation    Assessment/Plan        #1 Paroxysmal atrial fibrillation #2 hypertension with hypertensive heart disease #3 hyperlipidemia #4 palpitation    Patient converted to sinus rhythm with creatinine 1.29.  She is on 250 µg creatinine will decrease to 125 given the creatinine and GFR 42.  Hypertension is being  managed with lisinopril and hyperlipidemia with atorvastatin.  We will get a renal consultation  Kristyn Jurado MD  05/24/18  11:58 AM      EMR Dragon/Transcription disclaimer:   Much of this encounter note is an electronic transcription/translation of spoken language to printed text. The electronic translation of spoken language may permit erroneous, or at times, nonsensical words or phrases to be inadvertently transcribed; Although I have reviewed the note for such errors, some may still exist.

## 2018-05-24 NOTE — CONSULTS
[facility]  [address] [place] [address]. [address]   - [phone]    [phone]     Consultation         PATIENT  DEMOGRAPHICS   PATIENT NAME: Lana Ayala Attebury                      PHYSICIAN: JOHN Hayes  : 1955  MRN: [id number]    Subjective   SUBJECTIVE   Referring Provider: Alfredo Jurado MD  Reason for Consultation: CKD3  History of present illness:  This is a 63-year-old female patient admitted for initiation of antiarrhythmic medication.  She has a past medical history of hypertension, persistent atrial fibrillation, cardioversion, hyperlipidemia, sleep apnea, and obesity.  Nephrology was consulted for further input given her history of kidney disease and new initiation of antiarrhythmic medication.    Past Medical History:   Diagnosis Date   • Abnormal gait 2012   • Abrasion 2015   • Acute ankle pain 2016   • Acute bronchitis 2015   • Allergic rhinitis 2012   • Asthenia 2014   • Atrial fibrillation 2016    - converted    • Attempted suicide    • Benign essential hypertension 2016   • Cardiovascular stress test abnormal 2016   • Cellulitis of knee 2015   • Chest pain 2015   • Chronic pain disorder    • Common cold 2014   • Congestive heart failure 2016   • Depressive disorder 2015    suspect more complicated psych issues      • Gastroesophageal reflux disease 2013   • Generalized anxiety disorder 2016   • H/O echocardiogram     Normal LV function with Ef of 60-65%.Mildly dilated right ventricle with normal function. Grade 1B diastolic dysfunction with mild CLVH.NO sig. valvular regurg. or stenosis.   • Headache 10/27/2014   • Hyperlipidemia 2016   • Low back pain 2016    Need for prophylactic vaccination against Streptococcus pneumoniae [pneumococcus]    2015    • Neck pain 2016   • Obstructive sleep apnea of adult 2015   •  Obstructive sleep apnea syndrome    • Osteoarthritis    • Osteoporosis 06/30/2016   • Pain in right shoulder 06/30/2016   • Pain in right shoulder 04/24/2014   • Shortness of breath 04/02/2015   • Skin ulcer 04/13/2015    left   • Thyrotoxicosis with or without goiter 10/24/2014   • Urinary incontinence 05/22/2014     Past Surgical History:   Procedure Laterality Date   • BACK SURGERY  08/07/2015    Removal of intstrumentation,L5-S1.Exploration of fusion,L5-S1.Posterolateral fusion,L4-L5.Posterior segmental spinal instrumentation L4-5.Transforaminal interbody fusion Through right sided approach.Performed at    • COLONOSCOPY  05/04/2016    will order cologuard   • INJECTION OF MEDICATION  03/23/2015    celestone   • INJECTION OF MEDICATION  05/04/2016    KENALOG(6)   • LUMBAR FUSION      discectomy fusion with rods L4-S1   • LUMBAR LAMINECTOMY  1995   • MANDIBLE FRACTURE SURGERY      JAW WIRED SHUT   • MOUTH SURGERY  10/05/2015    Fractured mandible. Removal of arch bars.   • MOUTH SURGERY  1985    Fractured mandible. Removal of arch bars.   • NASAL SEPTUM SURGERY     • RHINOPLASTY     • TUBAL ABDOMINAL LIGATION  1986     Family History   Problem Relation Age of Onset   • Hypertension Mother    • Hyperlipidemia Mother    • Mental illness Mother    • Thyroid disease Mother    • Diabetes Maternal Grandmother    • Cancer Maternal Grandmother    • Depression Other    • Diabetes Other    • Heart disease Other    • Hypertension Other    • Osteoporosis Other    • Thyroid disease Other    • Cancer Maternal Aunt    • Cancer Paternal Aunt      Social History   Substance Use Topics   • Smoking status: Never Smoker   • Smokeless tobacco: Never Used      Comment: smoking cessation    • Alcohol use No     Allergies:  Codeine; Latex; Morphine and related; and Pravachol [pravastatin sodium]     REVIEW OF SYSTEMS    Review of Systems   Constitutional: Negative.    HENT: Positive for hearing loss (Left ear).    Eyes: Negative.   "  Respiratory: Negative.    Cardiovascular: Negative.    Gastrointestinal: Negative.    Endocrine: Negative.    Genitourinary: Negative.    Musculoskeletal: Negative.    Skin: Negative.    Allergic/Immunologic: Negative.    Neurological: Negative.    Hematological: Negative.    Psychiatric/Behavioral: Negative.        Objective   OBJECTIVE   Vital Signs  Temp:  [97.6 °F (36.4 °C)-98.8 °F (37.1 °C)] 97.6 °F (36.4 °C)  Heart Rate:  [63-73] 71  Resp:  [18] 18  BP: (106-145)/(54-76) 145/76    Flowsheet Rows      First Filed Value   Admission Height  162.6 cm (64\") Documented at 05/23/2018 0849   Admission Weight  96.2 kg (212 lb) Documented at 05/23/2018 0849           I/O last 3 completed shifts:  In: 480 [P.O.:480]  Out: -     PHYSICAL EXAM    Physical Exam   Constitutional: She is oriented to person, place, and time. She appears well-developed and well-nourished.   HENT:   Head: Normocephalic and atraumatic.   Hard of hearing in left ear   Eyes: Conjunctivae and EOM are normal. Pupils are equal, round, and reactive to light.   Cardiovascular: Normal rate and regular rhythm.    Pulmonary/Chest: Effort normal and breath sounds normal.   Abdominal: Soft.   Musculoskeletal: She exhibits no edema.   Neurological: She is alert and oriented to person, place, and time.   Skin: Skin is warm and dry. Capillary refill takes less than 2 seconds.   Psychiatric: She has a normal mood and affect. Her behavior is normal. Judgment and thought content normal.   Nursing note and vitals reviewed.      RESULTS   Results Review:      Results from last 7 days  Lab Units 05/23/18  0845   SODIUM mmol/L 141   POTASSIUM mmol/L 3.9   CHLORIDE mmol/L 102   CO2 mmol/L 27.0   BUN mg/dL 22*   CREATININE mg/dL 1.29*   CALCIUM mg/dL 9.6   GLUCOSE mg/dL 105*       Estimated Creatinine Clearance: 50.1 mL/min (A) (by C-G formula based on SCr of 1.29 mg/dL (H)).      Results from last 7 days  Lab Units 05/23/18  0845   MAGNESIUM mg/dL 1.8         "       Results from last 7 days  Lab Units 05/23/18  0845   WBC 10*3/mm3 9.19   HEMOGLOBIN g/dL 13.4   PLATELETS 10*3/mm3 278         Results from last 7 days  Lab Units 05/23/18  0845   INR  1.30*        MEDICATIONS      atorvastatin 20 mg Oral Nightly   [START ON 5/25/2018] dofetilide 125 mcg Oral Q12H   dofetilide 250 mcg Oral Once   lisinopril 40 mg Oral Q24H   LORazepam 1 mg Oral Q12H   methIMAzole 5 mg Oral Daily   rivaroxaban 20 mg Oral Daily With Dinner   sertraline 100 mg Oral Daily        Prescriptions Prior to Admission   Medication Sig Dispense Refill Last Dose   • amiodarone (PACERONE) 200 MG tablet Take 1 tablet two times a day for a week then 1 tablet daily starting on 3/10/2018 (Patient taking differently: Take 200 mg by mouth Daily.) 30 tablet 1 5/9/2018 at Unknown time   • ARIPiprazole (ABILIFY) 5 MG tablet Take 5 mg by mouth Daily. At bedtime   5/8/2018 at Unknown time   • aspirin 325 MG tablet Take 325 mg by mouth Daily. At bedtime   5/8/2018 at Unknown time   • atorvastatin (LIPITOR) 20 MG tablet Take 1 tablet by mouth Daily. (Patient taking differently: Take 20 mg by mouth Every Night.) 90 tablet 3 5/8/2018 at Unknown time   • choline fenofibrate (TRILIPIX) 135 MG capsule Take 1 capsule by mouth Daily. (Patient taking differently: Take 135 mg by mouth Daily. At bedtime) 90 capsule 3 5/8/2018 at Unknown time   • diltiazem XR (DILACOR XR) 180 MG 24 hr capsule Take 1 capsule by mouth Daily. (Patient taking differently: Take 180 mg by mouth Daily. At bedtime) 30 capsule 2 5/8/2018 at Unknown time   • lisinopril (PRINIVIL,ZESTRIL) 40 MG tablet Take 1 tablet by mouth Daily. 90 tablet 0 5/9/2018 at Unknown time   • LORazepam (ATIVAN) 1 MG tablet Take 1 mg by mouth 2 (Two) Times a Day. Scheduled to take in the morning and one with dinner  0 5/9/2018 at Unknown time   • methIMAzole (TAPAZOLE) 5 MG tablet Take 2.5 mg by mouth Daily.   5/9/2018 at Unknown time   • metoprolol succinate XL (TOPROL-XL) 25 MG  24 hr tablet Take 1 tablet by mouth Daily. 30 tablet 2 5/8/2018 at Unknown time   • nitroglycerin (NITROSTAT) 0.4 MG SL tablet Place 1 tablet under the tongue See Admin Instructions. PRN cp, if not relieved in 5 min repeat and go to ER 25 tablet 12 More than a month at Unknown time   • omeprazole (priLOSEC) 20 MG capsule Take 1 capsule by mouth Daily. (Patient taking differently: Take 20 mg by mouth Daily. At bedtime) 90 capsule 3 5/8/2018 at Unknown time   • potassium chloride (K-DUR) 10 MEQ CR tablet Take 1 tablet by mouth 2 (Two) Times a Day With Meals. 180 tablet 3 5/9/2018 at Unknown time   • rivaroxaban (XARELTO) 20 MG tablet Take 1 tablet by mouth Daily. 90 tablet 3 5/9/2018 at Unknown time   • sertraline (ZOLOFT) 100 MG tablet Take 150 mg by mouth Daily.  1 5/9/2018 at Unknown time   • triamterene-hydrochlorothiazide (MAXZIDE-25) 37.5-25 MG per tablet Take 1 tablet by mouth Every Other Day. 90 tablet 3 Past Week at Unknown time     Assessment/Plan   ASSESSMENT / PLAN    Active Problems:    Atrial fibrillation    1.  CKD3- Although this is a new diagnosis review of the patient's medical records show that her baseline creatinine was approximately 0.9 as recently as March 2017, elevated to 1.1-1.2 since December 2017.  This places her in CKD3.  Etiology of CKD3 is likely from hypertensive disease.  Patient's recently increased baseline may be due to persistent atrial fibrillation and decreased perfusion of the kidneys, it is possible this will improve now that the patient is back in normal sinus rhythm and started on an antiarrhythmic medication.      -We'll check vitamin D and PTH     -We'll check urinalysis    -No intervention or change to regimen is recommended at this time    2.  Atrial fibrillation-this is persistent with recurrence after electrical cardioversion, cardiology following, patient initiated on Tikosyn.    3.  Hypertension-blood pressures currently acceptable     Thank you for the consult, we  will continue to follow this patient.         I discussed the patients findings and my recommendations with the patient    This document has been electronically signed by JOHN Hayes on May 24, 2018 1:34 PM

## 2018-05-25 LAB
25(OH)D3 SERPL-MCNC: 32.9 NG/ML (ref 30–100)
PTH-INTACT SERPL-MCNC: 42.2 PG/ML (ref 10–65)
WHOLE BLOOD HOLD SPECIMEN: NORMAL

## 2018-05-25 PROCEDURE — 93005 ELECTROCARDIOGRAM TRACING: CPT | Performed by: INTERNAL MEDICINE

## 2018-05-25 PROCEDURE — 99232 SBSQ HOSP IP/OBS MODERATE 35: CPT | Performed by: INTERNAL MEDICINE

## 2018-05-25 PROCEDURE — 93010 ELECTROCARDIOGRAM REPORT: CPT | Performed by: INTERNAL MEDICINE

## 2018-05-25 PROCEDURE — 82306 VITAMIN D 25 HYDROXY: CPT | Performed by: INTERNAL MEDICINE

## 2018-05-25 PROCEDURE — 83970 ASSAY OF PARATHORMONE: CPT | Performed by: NURSE PRACTITIONER

## 2018-05-25 RX ORDER — PANTOPRAZOLE SODIUM 40 MG/1
40 TABLET, DELAYED RELEASE ORAL NIGHTLY
Status: DISCONTINUED | OUTPATIENT
Start: 2018-05-25 | End: 2018-05-26 | Stop reason: HOSPADM

## 2018-05-25 RX ORDER — DILTIAZEM HYDROCHLORIDE 180 MG/1
180 CAPSULE, COATED, EXTENDED RELEASE ORAL
Status: DISCONTINUED | OUTPATIENT
Start: 2018-05-25 | End: 2018-05-26 | Stop reason: HOSPADM

## 2018-05-25 RX ORDER — AMILORIDE HYDROCHLORIDE 5 MG/1
5 TABLET ORAL DAILY
Status: DISCONTINUED | OUTPATIENT
Start: 2018-05-25 | End: 2018-05-26 | Stop reason: HOSPADM

## 2018-05-25 RX ADMIN — AMILORIDE HYDROCHLORIDE 5 MG: 5 TABLET ORAL at 11:43

## 2018-05-25 RX ADMIN — METHIMAZOLE 5 MG: 5 TABLET ORAL at 08:09

## 2018-05-25 RX ADMIN — DOFETILIDE 125 MCG: 0.12 CAPSULE ORAL at 21:04

## 2018-05-25 RX ADMIN — ATORVASTATIN CALCIUM 20 MG: 20 TABLET, FILM COATED ORAL at 21:06

## 2018-05-25 RX ADMIN — PANTOPRAZOLE SODIUM 40 MG: 40 TABLET, DELAYED RELEASE ORAL at 21:06

## 2018-05-25 RX ADMIN — DOFETILIDE 125 MCG: 0.12 CAPSULE ORAL at 11:43

## 2018-05-25 RX ADMIN — LORAZEPAM 1 MG: 1 TABLET ORAL at 08:09

## 2018-05-25 RX ADMIN — RIVAROXABAN 20 MG: 10 TABLET, FILM COATED ORAL at 17:44

## 2018-05-25 RX ADMIN — LISINOPRIL 40 MG: 40 TABLET ORAL at 08:09

## 2018-05-25 RX ADMIN — SERTRALINE HYDROCHLORIDE 100 MG: 50 TABLET ORAL at 08:09

## 2018-05-25 RX ADMIN — DILTIAZEM HYDROCHLORIDE 180 MG: 180 CAPSULE, COATED, EXTENDED RELEASE ORAL at 15:10

## 2018-05-25 RX ADMIN — LORAZEPAM 1 MG: 1 TABLET ORAL at 21:06

## 2018-05-25 NOTE — PROGRESS NOTES
Discharge Planning Assessment  [facility]     Patient Name: Lana Mata  MRN: [id number]  Today's Date: 5/25/2018    Admit Date: 5/23/2018          Discharge Needs Assessment     Row Name 05/25/18 1003       Living Environment    Lives With alone    Current Living Arrangements home/apartment/condo   Information on face sheet confirmed with patient.     Primary Care Provided by self    Provides Primary Care For no one    Family Caregiver if Needed child(cande), adult;other (see comments)   Patient voiced that her daughter and/or daughter-in-law assists as needed.     Quality of Family Relationships helpful;involved    Able to Return to Prior Arrangements yes       Resource/Environmental Concerns    Transportation Concerns car, none       Transition Planning    Patient/Family Anticipates Transition to home    Patient/Family Anticipated Services at Transition none    Transportation Anticipated family or friend will provide   Patient does not drive. Patient voiced that her daughter and/or daughter-in-law assists with transportation.        Discharge Needs Assessment    Readmission Within the Last 30 Days no previous admission in last 30 days    Concerns to be Addressed adjustment to diagnosis/illness;compliance issue    Concerns Comments Patient voiced that she is compliant with low sodium diet. S[facility] reinforced importance of performing daily weights. Patient verbalized understanding. Patient does not have a home scale but agreed to purchase one at d/c.     Equipment Currently Used at Home wheelchair;walker, rolling;cane, quad    Anticipated Changes Related to Illness none    Equipment Needed After Discharge none    Current Discharge Risk chronically ill   CHF & AFib            Discharge Plan     Row Name 05/25/18 1007       Plan    Plan home with no services    Plan Comments LACE complete. Patient denies need for home health services at d/c. Continue with medical management....[place] RO[facility]    Row Name  05/25/18 0944       Plan    Plan Comments Noted EF 61-65%. HF navigator not considered at this time secondary to EF %.         Destination     No service coordination in this encounter.      Durable Medical Equipment     No service coordination in this encounter.      Dialysis/Infusion     No service coordination in this encounter.      Home Medical Care     No service coordination in this encounter.      Social Care     No service coordination in this encounter.        Expected Discharge Date and Time     Expected Discharge Date Expected Discharge Time    May 26, 2018               Demographic Summary     Row Name 05/25/18 1001       General Information    Admission Type inpatient    Arrived From home    Referral Source high risk screening   LACE score 8    Preferred Language English     Used During This Interaction no            Functional Status     Row Name 05/25/18 1001       Functional Status    Usual Activity Tolerance moderate       Functional Status, IADL    Medications independent   Pharmacy, [facility], and coverage verified with patient.     Meal Preparation independent    Housekeeping assistive person    Laundry assistive person    Shopping assistive person    IADL Comments Patient voiced that she is independent with ADL's. She voiced that she uses a quad cane for ambulation.  Patient voiced that her daughter assists with housekeeping, laundry, and shopping as she reports this is difficult for her to perform secondary to becoming SOA.        Mental Status Summary    Recent Changes in Mental Status/Cognitive Functioning no changes            Psychosocial    No documentation.           Abuse/Neglect    No documentation.           Legal    No documentation.           Substance Abuse    No documentation.           Patient Forms    No documentation.         JANINA Moses

## 2018-05-25 NOTE — PLAN OF CARE
Problem: Patient Care Overview  Goal: Plan of Care Review  Outcome: Ongoing (interventions implemented as appropriate)  Pt rhythm irregular, suspect Sinus Arrhythmia.    05/25/18 0450   Coping/Psychosocial   Plan of Care Reviewed With patient   Plan of Care Review   Progress no change        Problem: Arrhythmia/Dysrhythmia (Symptomatic) (Adult)  Goal: Signs and Symptoms of Listed Potential Problems Will be Absent, Minimized or Managed (Arrhythmia/Dysrhythmia)  Outcome: Ongoing (interventions implemented as appropriate)

## 2018-05-25 NOTE — NURSING NOTE
While sleeping pt appears to be in Sinus Arrhythmia. ECG scheduled for this AM, pt not symptomatic and denies palpitations, soa, or cp.

## 2018-05-25 NOTE — PLAN OF CARE
Problem: Patient Care Overview  Goal: Plan of Care Review  Outcome: Ongoing (interventions implemented as appropriate)   05/25/18 6936   Coping/Psychosocial   Plan of Care Reviewed With patient   Plan of Care Review   Progress improving   OTHER   Outcome Summary Afib per Dr. Jurado. Tikosyn adjusted, cardizem started. QTc 459. diuretic started as rx'd. Pt is agreeable with plan.       Problem: Arrhythmia/Dysrhythmia (Symptomatic) (Adult)  Goal: Signs and Symptoms of Listed Potential Problems Will be Absent, Minimized or Managed (Arrhythmia/Dysrhythmia)  Outcome: Ongoing (interventions implemented as appropriate)

## 2018-05-25 NOTE — PROGRESS NOTES
LOS: 1 day   Patient Care Team:  Bernie Ortiz MD as PCP - General (Family Medicine)  Cody Abarca MD as Resident (Family Medicine)  Jv Khalil Jr., MD as Resident (Family Medicine)  Allie Martin MD as Resident (Family Medicine)  Erendira Aguayo MD as Resident (Family Medicine)  Bola Caldera MD as Resident (Family Medicine)    Chief Complaint:  History of paroxysmal atrial fibrillation admitted for Tikosyn and initiations and throat arrhythmia monitoring.  She was in sinus rhythm yesterday as she  converted on Tikosyn but today she is  back in atrial fibrillation with a resting heart rate 130 bpm.       Review of Systems:   ROS  Constitutional: Negative for appetite change, diaphoresis and fatigue.   HENT: Negative for congestion, ear pain and hearing loss.    Eyes: Negative for photophobia.   Respiratory: Negative for cough, chest tightness and wheezing.    Cardiovascular: Positive for palpitation  Negative for chest pain and leg swelling.   Gastrointestinal: Negative for abdominal distention.   Endocrine: Negative for cold intolerance and heat intolerance.   Genitourinary: Negative for dysuria.   Musculoskeletal: Positive for back pain.   Neurological: Negative for dizziness and light-headedness.   Psychiatric/Behavioral: Negative for agitation and behavioral problems  Objective     Current Facility-Administered Medications   Medication Dose Route Frequency Provider Last Rate Last Dose   • aMILoride (MIDAMOR) tablet 5 mg  5 mg Oral Daily Mara Buchanan MD   5 mg at 05/25/18 1143   • atorvastatin (LIPITOR) tablet 20 mg  20 mg Oral Nightly Kristyn Jurado MD   20 mg at 05/24/18 2051   • diltiaZEM CD (CARDIZEM CD) 24 hr capsule 180 mg  180 mg Oral Q24H Nette Mejia MD       • dofetilide (TIKOSYN) capsule 125 mcg  125 mcg Oral Q12H Kristyn Jurado MD   125 mcg at 05/25/18 1143   • lisinopril (PRINIVIL,ZESTRIL) tablet 40 mg  40 mg Oral Q24H Kristyn Jurado MD   40 mg  "at 05/25/18 0809   • LORazepam (ATIVAN) tablet 1 mg  1 mg Oral Q12H Kristyn Jurado MD   1 mg at 05/25/18 0809   • methIMAzole (TAPAZOLE) tablet 5 mg  5 mg Oral Daily Kristyn Jurado MD   5 mg at 05/25/18 0809   • rivaroxaban (XARELTO) tablet 20 mg  20 mg Oral Daily With Dinner Kristyn Jurado MD   20 mg at 05/24/18 1756   • sertraline (ZOLOFT) tablet 100 mg  100 mg Oral Daily Kristyn Jurado MD   100 mg at 05/25/18 0809       Vital Sign Min/Max for last 24 hours  Temp  Min: 97 °F (36.1 °C)  Max: 98.3 °F (36.8 °C)   BP  Min: 115/55  Max: 161/85   Pulse  Min: 69  Max: 84   Resp  Min: 18  Max: 18   SpO2  Min: 91 %  Max: 97 %   No Data Recorded   Weight  Min: 95.5 kg (210 lb 9.6 oz)  Max: 95.5 kg (210 lb 9.6 oz)     Flowsheet Rows      First Filed Value   Admission Height  162.6 cm (64\") Documented at 05/23/2018 0849   Admission Weight  96.2 kg (212 lb) Documented at 05/23/2018 0849            Intake/Output Summary (Last 24 hours) at 05/25/18 1354  Last data filed at 05/25/18 0810   Gross per 24 hour   Intake              840 ml   Output                0 ml   Net              840 ml       Physical Exam:     General Appearance:    Alert, cooperative, in no acute distress   Lungs:     Clear to auscultation,respirations regular, even and                  unlabored    Heart:    Irregular rhythm and normal rate, normal S1 and S2, no            murmur, no gallop, no rub, no click   Chest Wall:    No abnormalities observed   Abdomen:     Normal bowel sounds, no masses, no organomegaly, soft        non-tender, non-distended, no guarding, no rebound                tenderness   Extremities:   Moves all extremities well, no edema, no cyanosis, no             redness   Pulses:   Pulses palpable and equal bilaterally   Skin:   No bleeding, bruising or rash        Results Review:   I reviewed the patient's new clinical results.  Lab Results   Component Value Date    WBC 9.19 05/23/2018    HGB 13.4 05/23/2018    HCT 40.2 05/23/2018 "    MCV 87.0 05/23/2018     05/23/2018     Lab Results   Component Value Date    GLUCOSE 105 (H) 05/23/2018    BUN 22 (H) 05/23/2018    CREATININE 1.29 (H) 05/23/2018    EGFRIFNONA 42 (L) 05/23/2018    BCR 17.1 05/23/2018    CO2 27.0 05/23/2018    CALCIUM 9.6 05/23/2018    ALBUMIN 4.30 04/04/2018    LABIL2 1.3 04/04/2018    AST 29 04/04/2018    ALT 30 04/04/2018     Lab Results   Component Value Date    TSH 3.680 05/23/2018     Lab Results   Component Value Date    INR 1.30 (H) 05/23/2018    INR 1.22 (H) 04/19/2018    INR 1.46 (H) 03/15/2018    PROTIME 15.8 (H) 05/23/2018    PROTIME 15.1 04/19/2018    PROTIME 17.3 (H) 03/15/2018     Lab Results   Component Value Date    PTT 29.5 04/19/2018       EKG:     Telemetry:    Ejection Fraction  No results found for: EF    Echo EF Estimated  No results found for: ECHOEFEST      Present on Admission:  • Atrial fibrillation    Assessment/Plan     #1 Paroxysmal atrial fibrillation #2 hypertension with hypertensive heart disease #3 hyperlipidemia #4 palpitation     Patient converted to sinus rhythm with creatinine 1.29.   she is back in atrial fibrillation and Tikosyn dose decreased to decrease to 125 given the creatinine and GFR 42.  Continue Tikosyn today and if she remained in atrial fibrillation will switch to rate controlling medications.  She is reluctan/t hesitant to consider the EP study and ablation.   Hypertension is being  managed with lisinopril and hyperlipidemia with atorvastatin.      Kristyn Jurado MD  05/25/18  1:54 PM      EMR Dragon/Transcription disclaimer:   Much of this encounter note is an electronic transcription/translation of spoken language to printed text. The electronic translation of spoken language may permit erroneous, or at times, nonsensical words or phrases to be inadvertently transcribed; Although I have reviewed the note for such errors, some may still exist.

## 2018-05-26 VITALS
OXYGEN SATURATION: 97 % | HEIGHT: 64 IN | SYSTOLIC BLOOD PRESSURE: 102 MMHG | BODY MASS INDEX: 35.96 KG/M2 | RESPIRATION RATE: 18 BRPM | TEMPERATURE: 98.4 F | WEIGHT: 210.6 LBS | DIASTOLIC BLOOD PRESSURE: 64 MMHG | HEART RATE: 67 BPM

## 2018-05-26 PROCEDURE — 99238 HOSP IP/OBS DSCHRG MGMT 30/<: CPT | Performed by: INTERNAL MEDICINE

## 2018-05-26 RX ORDER — DILTIAZEM HYDROCHLORIDE 180 MG/1
180 CAPSULE, COATED, EXTENDED RELEASE ORAL
Qty: 30 CAPSULE | Refills: 6 | Status: SHIPPED | OUTPATIENT
Start: 2018-05-27 | End: 2018-07-24 | Stop reason: SDUPTHER

## 2018-05-26 RX ORDER — AMILORIDE HYDROCHLORIDE 5 MG/1
5 TABLET ORAL DAILY
Qty: 30 TABLET | Refills: 6 | Status: SHIPPED | OUTPATIENT
Start: 2018-05-27 | End: 2021-09-01 | Stop reason: SDUPTHER

## 2018-05-26 RX ADMIN — LORAZEPAM 1 MG: 1 TABLET ORAL at 08:42

## 2018-05-26 RX ADMIN — METHIMAZOLE 5 MG: 5 TABLET ORAL at 08:42

## 2018-05-26 RX ADMIN — DILTIAZEM HYDROCHLORIDE 180 MG: 180 CAPSULE, COATED, EXTENDED RELEASE ORAL at 08:42

## 2018-05-26 RX ADMIN — SERTRALINE HYDROCHLORIDE 100 MG: 50 TABLET ORAL at 08:42

## 2018-05-26 RX ADMIN — METOPROLOL TARTRATE 25 MG: 25 TABLET ORAL at 10:11

## 2018-05-26 RX ADMIN — AMILORIDE HYDROCHLORIDE 5 MG: 5 TABLET ORAL at 08:42

## 2018-05-26 RX ADMIN — LISINOPRIL 40 MG: 40 TABLET ORAL at 08:42

## 2018-05-26 NOTE — DISCHARGE SUMMARY
Date of Discharge:  5/26/2018    Discharge Diagnosis:   1. Paroxysmal Atrial fibrillation   2.  Hypertension   3.CKD Stage 3-    Presenting Problem/History of Present Illness  Atrial fibrillation [I48.91]  Atrial fibrillation [I48.91]     Hospital Course  Patient is a 63 y.o. female presented with paroxysmal atrial fibrillation who was admitted by Dr. Jurado for initiation of Tikosyn therapy.  The patient was started on 125 µg twice a day.  Though the patient was in sinus rhythm initially she returned to atrial fibrillation.  The patient symptomatically feels better when she is in sinus rhythm and has palpitation and shortness of breath when in atrial fibrillation.  She was seen in consultation by Dr. Buchanan who started her on an amiloride and his HCTZ was discontinued.  The etiology for worsening renal function is likely hypertension and reoccurrence of atrial arrhythmia.      Dr. Jurado and I had long discussion with the patient about different treatment options.  She has obviously not responded to antiarrhythmic therapy with amiodarone,   Tikosyn, and  is symptomatic when in atrial fibrillation.  I believe that she will benefit from pulmonary vein isolation therapy for atrial fibrillation and this has been recommended.  She had several questions with regards to the procedure and pros and cons were discussed.  I believe that she is willing to consider this after discussing with Dr. Jurado.  Tikosyn was hence was discontinued and rate control with diltiazem  mg and metoprolol tartrate 25 mg twice a day will be continued.  The coagulation with Xarelto has been continued.  Diuretic therapy is as per     Consults: Dr. Buchanan    Condition on Discharge:  Stable    Vital Signs  Temp:  [97.1 °F (36.2 °C)-98.4 °F (36.9 °C)] 98.4 °F (36.9 °C)  Heart Rate:  [] 67  Resp:  [16-18] 18  BP: (102-140)/(64-86) 102/64    Physical Exam:  Constitutional: Alert and oriented, in no acute distress.     Cardiovascular:  Irregular rhythm, S1 variable, S2 normal, no S3 or S4.  No murmurs, gallops, or rubs detected.     Pulmonary/Chest: Chest: normal symmetry,  normal respiratory excursion, no intercostal retraction, no use of accessory muscles.            Pulmonary: Normal breath sounds. No rales or ronchi.    Abdominal: Abdomen soft, bowel sounds normoactive, no masses, no hepatosplenomegaly, non-tender, no bruits.     Discharge Disposition  Home or Self Care    Discharge Medications   Lana Mata   Home Medication Instructions ROMULO:[id number]    Printed on:05/26/18 7606   Medication Information                      aMILoride (MIDAMOR) 5 MG tablet  Take 1 tablet by mouth Daily.             ARIPiprazole (ABILIFY) 5 MG tablet  Take 5 mg by mouth Daily. At bedtime             aspirin 325 MG tablet  Take 325 mg by mouth Daily. At bedtime             atorvastatin (LIPITOR) 20 MG tablet  Take 1 tablet by mouth Daily.             choline fenofibrate (TRILIPIX) 135 MG capsule  Take 1 capsule by mouth Daily.             diltiaZEM CD (CARDIZEM CD) 180 MG 24 hr capsule  Take 1 capsule by mouth Daily.             diltiazem XR (DILACOR XR) 180 MG 24 hr capsule  Take 1 capsule by mouth Daily.             lisinopril (PRINIVIL,ZESTRIL) 40 MG tablet  Take 1 tablet by mouth Daily.             LORazepam (ATIVAN) 1 MG tablet  Take 1 mg by mouth 2 (Two) Times a Day. Scheduled to take in the morning and one with dinner             methIMAzole (TAPAZOLE) 5 MG tablet  Take 2.5 mg by mouth Daily.             metoprolol tartrate (LOPRESSOR) 25 MG tablet  Take 1 tablet by mouth Every 12 (Twelve) Hours.             nitroglycerin (NITROSTAT) 0.4 MG SL tablet  Place 1 tablet under the tongue See Admin Instructions. PRN cp, if not relieved in 5 min repeat and go to ER             omeprazole (priLOSEC) 20 MG capsule  Take 1 capsule by mouth Daily.             potassium chloride (K-DUR) 10 MEQ CR tablet  Take 1 tablet by mouth 2 (Two) Times a Day With  Meals.             rivaroxaban (XARELTO) 20 MG tablet  Take 1 tablet by mouth Daily.             sertraline (ZOLOFT) 100 MG tablet  Take 150 mg by mouth Daily.                 Discharge Diet:   Diet Instructions     Heart healthy diet                 Activity at Discharge:   Activity Instructions     As tolerated                 Follow-up Appointments  Future Appointments  Date Time Provider Department Center   6/7/2018 10:30 AM Jolie Gonzalez, MS CCC-A [facility] None   6/7/2018 11:15 AM Enio Alanis MD [facility] None   7/12/2018 2:30 PM Patel Clemente MD [facility] SM MAD None     Additional Instructions for the Follow-ups that You Need to Schedule     Basic Metabolic Panel     Jun 08, 2018 (Approximate)      Magnesium     Jun 08, 2018 (Approximate)      Renal Function Panel     Jun 09, 2018 (Approximate)          Patient will follow up with Dr. Jurado 5 days as outpatient.       Nette Mejia MD  05/26/18  3:54 PM

## 2018-05-26 NOTE — PROGRESS NOTES
"[facility]  [address] [place] [address]. [address]  T - [phone]   - [phone]     Progress Note          PATIENT  DEMOGRAPHICS   PATIENT NAME: Lana Ayala Attebleonila                      PHYSICIAN: Altaf THOMPSON JOHN Strange  : 1955  MRN: [id number]   LOS: 2 days    Patient Care Team:  Bernie Ortiz MD as PCP - General (Family Medicine)  Cody Abarca MD as Resident (Family Medicine)  Jv Khalil Jr., MD as Resident (Family Medicine)  Allie Martin MD as Resident (Family Medicine)  Erendira Aguayo MD as Resident (Family Medicine)  Bola Caldera MD as Resident (Family Medicine)  Subjective   SUBJECTIVE   Feeling a little worse today         Objective   OBJECTIVE   Vital Signs  Temp:  [97.1 °F (36.2 °C)] 97.1 °F (36.2 °C)  Heart Rate:  [] 73  Resp:  [16-18] 18  BP: (131-140)/(64-86) 133/64    Flowsheet Rows      First Filed Value   Admission Height  162.6 cm (64\") Documented at 2018 0849   Admission Weight  96.2 kg (212 lb) Documented at 2018 0849           I/O last 3 completed shifts:  In: 2400 [P.O.:2400]  Out: -     PHYSICAL EXAM    Physical Exam   Constitutional: She is oriented to person, place, and time. She appears well-developed and well-nourished.   HENT:   Head: Normocephalic and atraumatic.   [place] L ear   Eyes: Conjunctivae and EOM are normal. Pupils are equal, round, and reactive to light.   Cardiovascular: Normal rate.    Irregularly irregular rhythm    Pulmonary/Chest: Effort normal and breath sounds normal.   Abdominal: Soft.   Neurological: She is alert and oriented to person, place, and time.   Skin: Skin is warm and dry.   Vitals reviewed.      RESULTS   Results Review:      Results from last 7 days  Lab Units 18  0845   SODIUM mmol/L 141   POTASSIUM mmol/L 3.9   CHLORIDE mmol/L 102   CO2 mmol/L 27.0   BUN mg/dL 22*   CREATININE mg/dL 1.29*   CALCIUM mg/dL 9.6   GLUCOSE mg/dL 105*       Estimated Creatinine " Clearance: 50 mL/min (A) (by C-G formula based on SCr of 1.29 mg/dL (H)).      Results from last 7 days  Lab Units 05/23/18  0845   MAGNESIUM mg/dL 1.8               Results from last 7 days  Lab Units 05/23/18  0845   WBC 10*3/mm3 9.19   HEMOGLOBIN g/dL 13.4   PLATELETS 10*3/mm3 278         Results from last 7 days  Lab Units 05/23/18  0845   INR  1.30*         Imaging Results (last 24 hours)     ** No results found for the last 24 hours. **           MEDICATIONS      aMILoride 5 mg Oral Daily   atorvastatin 20 mg Oral Nightly   diltiaZEM  mg Oral Q24H   lisinopril 40 mg Oral Q24H   LORazepam 1 mg Oral Q12H   methIMAzole 5 mg Oral Daily   metoprolol tartrate 25 mg Oral Q12H   pantoprazole 40 mg Oral Nightly   rivaroxaban 20 mg Oral Daily With Dinner   sertraline 100 mg Oral Daily          Assessment/Plan   ASSESSMENT / PLAN    Active Problems:    Atrial fibrillation    1.  CKD3- Although this is a new diagnosis review of the patient's medical records show that her baseline creatinine was approximately 0.9 as recently as March 2017, elevated to 1.1-1.2 since December 2017.  This places her in CKD3.  Etiology of CKD3 is likely from hypertensive disease.  Patient's recently increased baseline may be due to persistent atrial fibrillation and decreased perfusion of the kidneys, it is possible this will improve now that the patient is back in normal sinus rhythm and started on an antiarrhythmic medication.       -vitamin D and PTH normal     -No proteinuria      -Ok to take tikosyn at 125mcg bid (can increase it to 250mcg if needed for her current GFR). On maxizde qod and h/o low k and mg. I would keep only k sparing diuretic amiloride and stop the hctz component bc of electrolyte wasting. Also can stop k supplement (she is not on it at present)    -As pt is being discharged we will f/u in office after the lab is back today.      2.  Atrial fibrillation-this is persistent with recurrence after electrical  cardioversion, cardiology following, patient initiated on Tikosyn. Per pt she is now being discharged and will follow-up with Akram to plan an ablationn     3.  Hypertension-blood pressures currently acceptable           This document has been electronically signed by JOHN Hayes on May 26, 2018 1:53 PM

## 2018-05-29 ENCOUNTER — TELEPHONE (OUTPATIENT)
Dept: FAMILY MEDICINE CLINIC | Facility: CLINIC | Age: 63
End: 2018-05-29

## 2018-05-29 NOTE — PAYOR COMM NOTE
"Lana Mata (63 y.o. Female)     Date of Birth Social Security Number Address Home Phone MRN    1955  [id number]  [facility] [id number] [phone] [id number]    [employer]          [employer]        Admission Date Admission Type Admitting Provider Attending Provider Department, Room/Bed    5/23/18 Urgent Kristyn Jurado MD  [address], 303/1    Discharge Date Discharge Disposition Discharge Destination        5/26/2018 Home or Self Care              Attending Provider:  (none)   Allergies:  Codeine, Latex, Morphine And Related, Pravachol [Pravastatin Sodium]    Isolation:  None   Infection:  None   Code Status:  Not on file    Ht:  162.6 cm (64\")   Wt:  95.5 kg (210 lb 9.6 oz)    Admission Cmt:  None   Principal Problem:  None                Active Insurance as of 5/23/2018     Primary Coverage     Payor Plan Insurance Group Employer/Plan Group    HUMANA MEDICARE REPLACEMENT HUMANA MEDICARE REPL [id number]     Payor Plan Address Payor Plan Phone Number Effective From Effective To    [address] [address] [phone] 1/1/2014     [facility] [address]       Subscriber Name Subscriber Birth Date Member ID       LANA MATA 1955 [id number]           Secondary Coverage     Payor Plan Insurance Group Employer/Plan Group      [id number]     Payor Plan Address Payor Plan Phone Number Effective From Effective To    [address] [phone] 2/26/1999     [place] [address]       Subscriber Name Subscriber Birth Date Member ID       LANA MATA 1955 [id number]                 Emergency Contacts      (Rel.) Home Phone Work Phone Mobile Phone    EsperanzaGeovanna (Daughter) [phone] -- --    EsperanzaFabian (Son) -- [phone]    Merrill Mata (Other) -- -- [phone]               Discharge Summary      Nette Mejia MD at 5/26/2018  3:54 PM          Date of Discharge:  " 5/26/2018    Discharge Diagnosis:   1. Paroxysmal Atrial fibrillation   2.  Hypertension   3.CKD Stage 3-    Presenting Problem/History of Present Illness  Atrial fibrillation [I48.91]  Atrial fibrillation [I48.91]     Hospital Course  Patient is a 63 y.o. female presented with paroxysmal atrial fibrillation who was admitted by Dr. Jurado for initiation of Tikosyn therapy.  The patient was started on 125 µg twice a day.  Though the patient was in sinus rhythm initially she returned to atrial fibrillation.  The patient symptomatically feels better when she is in sinus rhythm and has palpitation and shortness of breath when in atrial fibrillation.  She was seen in consultation by Dr. Buchanan who started her on an amiloride and his HCTZ was discontinued.  The etiology for worsening renal function is likely hypertension and reoccurrence of atrial arrhythmia.      Dr. Jurado and I had long discussion with the patient about different treatment options.  She has obviously not responded to antiarrhythmic therapy with amiodarone,   Tikosyn, and  is symptomatic when in atrial fibrillation.  I believe that she will benefit from pulmonary vein isolation therapy for atrial fibrillation and this has been recommended.  She had several questions with regards to the procedure and pros and cons were discussed.  I believe that she is willing to consider this after discussing with Dr. Jurado.  Tikosyn was hence was discontinued and rate control with diltiazem  mg and metoprolol tartrate 25 mg twice a day will be continued.  The coagulation with Xarelto has been continued.  Diuretic therapy is as per     Consults: Dr. Buchanan    Condition on Discharge:  Stable    Vital Signs  Temp:  [97.1 °F (36.2 °C)-98.4 °F (36.9 °C)] 98.4 °F (36.9 °C)  Heart Rate:  [] 67  Resp:  [16-18] 18  BP: (102-140)/(64-86) 102/64    Physical Exam:  Constitutional: Alert and oriented, in no acute distress.     Cardiovascular: Irregular rhythm, S1  variable, S2 normal, no S3 or S4.  No murmurs, gallops, or rubs detected.     Pulmonary/Chest: Chest: normal symmetry,  normal respiratory excursion, no intercostal retraction, no use of accessory muscles.            Pulmonary: Normal breath sounds. No rales or ronchi.    Abdominal: Abdomen soft, bowel sounds normoactive, no masses, no hepatosplenomegaly, non-tender, no bruits.     Discharge Disposition  Home or Self Care    Discharge Medications   Lana Mata   Home Medication Instructions ROMULO:[id number]    Printed on:05/26/18 2807   Medication Information                      aMILoride (MIDAMOR) 5 MG tablet  Take 1 tablet by mouth Daily.             ARIPiprazole (ABILIFY) 5 MG tablet  Take 5 mg by mouth Daily. At bedtime             aspirin 325 MG tablet  Take 325 mg by mouth Daily. At bedtime             atorvastatin (LIPITOR) 20 MG tablet  Take 1 tablet by mouth Daily.             choline fenofibrate (TRILIPIX) 135 MG capsule  Take 1 capsule by mouth Daily.             diltiaZEM CD (CARDIZEM CD) 180 MG 24 hr capsule  Take 1 capsule by mouth Daily.             diltiazem XR (DILACOR XR) 180 MG 24 hr capsule  Take 1 capsule by mouth Daily.             lisinopril (PRINIVIL,ZESTRIL) 40 MG tablet  Take 1 tablet by mouth Daily.             LORazepam (ATIVAN) 1 MG tablet  Take 1 mg by mouth 2 (Two) Times a Day. Scheduled to take in the morning and one with dinner             methIMAzole (TAPAZOLE) 5 MG tablet  Take 2.5 mg by mouth Daily.             metoprolol tartrate (LOPRESSOR) 25 MG tablet  Take 1 tablet by mouth Every 12 (Twelve) Hours.             nitroglycerin (NITROSTAT) 0.4 MG SL tablet  Place 1 tablet under the tongue See Admin Instructions. PRN cp, if not relieved in 5 min repeat and go to ER             omeprazole (priLOSEC) 20 MG capsule  Take 1 capsule by mouth Daily.             potassium chloride (K-DUR) 10 MEQ CR tablet  Take 1 tablet by mouth 2 (Two) Times a Day With Meals.              rivaroxaban (XARELTO) 20 MG tablet  Take 1 tablet by mouth Daily.             sertraline (ZOLOFT) 100 MG tablet  Take 150 mg by mouth Daily.                 Discharge Diet:   Diet Instructions     Heart healthy diet                 Activity at Discharge:   Activity Instructions     As tolerated                 Follow-up Appointments  Future Appointments  Date Time Provider Department Center   6/7/2018 10:30 AM Jolie Gonzalez, MS CCC-A [facility] AUD MAD None   6/7/2018 11:15 AM Enio Alanis MD [facility]IN [facility] None   7/12/2018 2:30 PM Patel Clemente MD [facility] SM MAD None     Additional Instructions for the Follow-ups that You Need to Schedule     Basic Metabolic Panel     Jun 08, 2018 (Approximate)      Magnesium     Jun 08, 2018 (Approximate)      Renal Function Panel     Jun 09, 2018 (Approximate)          Patient will follow up with Dr. Jurado 5 days as outpatient.       Nette Mejia MD  05/26/18  3:54 PM              Electronically signed by Nette Mejia MD at 5/26/2018  4:11 PM

## 2018-05-30 RX ORDER — LISINOPRIL 40 MG/1
TABLET ORAL
Qty: 30 TABLET | Refills: 1 | Status: SHIPPED | OUTPATIENT
Start: 2018-05-30 | End: 2018-06-25 | Stop reason: DRUGHIGH

## 2018-06-01 ENCOUNTER — OFFICE VISIT (OUTPATIENT)
Dept: FAMILY MEDICINE CLINIC | Facility: CLINIC | Age: 63
End: 2018-06-01

## 2018-06-01 VITALS
HEIGHT: 64 IN | HEART RATE: 85 BPM | DIASTOLIC BLOOD PRESSURE: 74 MMHG | OXYGEN SATURATION: 98 % | BODY MASS INDEX: 35.34 KG/M2 | SYSTOLIC BLOOD PRESSURE: 130 MMHG | WEIGHT: 207 LBS

## 2018-06-01 DIAGNOSIS — Z09 HOSPITAL DISCHARGE FOLLOW-UP: Primary | ICD-10-CM

## 2018-06-01 DIAGNOSIS — M25.552 LEFT HIP PAIN: ICD-10-CM

## 2018-06-01 PROCEDURE — 99213 OFFICE O/P EST LOW 20 MIN: CPT | Performed by: FAMILY MEDICINE

## 2018-06-01 NOTE — PROGRESS NOTES
I discussed the case with the resident and I agree with their assessment and plan as outlined by Dr. Ortiz.  Enio Prescott MD.

## 2018-06-07 ENCOUNTER — OFFICE VISIT (OUTPATIENT)
Dept: OTOLARYNGOLOGY | Facility: CLINIC | Age: 63
End: 2018-06-07

## 2018-06-07 ENCOUNTER — CLINICAL SUPPORT (OUTPATIENT)
Dept: AUDIOLOGY | Facility: CLINIC | Age: 63
End: 2018-06-07

## 2018-06-07 VITALS — TEMPERATURE: 96 F | WEIGHT: 207 LBS | BODY MASS INDEX: 35.34 KG/M2 | HEIGHT: 64 IN

## 2018-06-07 DIAGNOSIS — H93.12 TINNITUS OF LEFT EAR: Primary | ICD-10-CM

## 2018-06-07 DIAGNOSIS — R42 DIZZINESS: ICD-10-CM

## 2018-06-07 DIAGNOSIS — H91.20 SUDDEN-ONSET SENSORINEURAL HEARING LOSS: ICD-10-CM

## 2018-06-07 DIAGNOSIS — H90.3 SENSORINEURAL HEARING LOSS, ASYMMETRICAL: Primary | ICD-10-CM

## 2018-06-07 PROCEDURE — 99204 OFFICE O/P NEW MOD 45 MIN: CPT | Performed by: OTOLARYNGOLOGY

## 2018-06-07 RX ORDER — PREDNISONE 10 MG/1
TABLET ORAL
Qty: 30 TABLET | Refills: 0 | Status: SHIPPED | OUTPATIENT
Start: 2018-06-07 | End: 2018-09-13

## 2018-06-07 NOTE — PROGRESS NOTES
STANDARD AUDIOMETRIC EVALUATION      Name:  Lana Mata  :  1955  Age:  63 y.o.  Date of Evaluation:  2018      HISTORY    Reason for visit:  Lana Mata is seen today for a hearing evaluation at the request of Dr. Enio Alanis.  Patient reports an incident happened 3 weeks ago when she bent over, and when she stood up, sounds felt muffled in both ears and she lost her hearing.  She states she also experienced dizziness and ringing in the ears.  She states that since that time, the hearing has returned in her right ear, but her left ear still has muffled hearing ear and ringing.  She also states she still gets dizzy when laying down, and she has problems localizing where a sound is coming from.  She reports she had a left ear drum perforation several years ago due to a traumatic injury.        EVALUATION    See Audiogram    RESULTS        Otoscopy and Tympanometry 226 Hz :  Right Ear:  Otoscopy:  Clear ear canal          Tympanometry:  Middle ear function within normal limits    Left Ear:   Otoscopy:  Clear ear canal        Tympanometry:  Middle ear function within normal limits    Test technique:  Standard Audiometry     Pure Tone Audiometry:   Patient responded to pure tones at 5-30 dB for 250-8000 Hz in right ear, and at 95-95 dB for 250 and 500 Hz in left ear.  There was no response to pure tones above 500 Hz in left ear.     Speech Audiometry:        Right Ear:  Speech Reception Threshold (SRT) was obtained at 10 dBHL                 Speech Discrimination scores were 100% in quiet when words were presented at 50 dBHL       Left Ear:  Speech Awareness Threshold (SAT) was observed at 85 dBHL                 Speech Discrimination scores did not test due to limits of audiometer    Reliability:   good    IMPRESSIONS:  1.  Tympanometry results are consistent with Middle ear function within normal limits in both ears.  2.  Pure tone results are consistent with normal to mild high frequency  sensorineural hearing loss for right ear, and severe to profound flat sensorineural hearing loss in left ear.       RECOMMENDATIONS:  Patient is seeing the Ear Nose and Throat physician immediately following this examination.  It was a pleasure seeing Lana Mata in Audiology today.  We would be happy to do further testing or discuss these test as necessary.          This document has been electronically signed by Jolie Gonzalez MS CCC-MYLES on June 7, 2018 2:34 PM       Jolie Gonzalez MS CCC-MYLES  Licensed Audiologist

## 2018-06-07 NOTE — PROGRESS NOTES
Subjective   Lana Mata is a 63 y.o. female.   Chief complaint sudden sensorineural hearing loss  History of Present Illness   Patient had sudden onset of hearing loss dizziness tinnitus or left ear is not come back she is referred for evaluation she's not had any recent trauma noise exposure or toxic drugs has significant cardiac problems  She has remote history of ear surgery on that side but no recent pain drainage.  Said she was bending over what happened.  Hearing is not   improved since that time      The following portions of the patient's history were reviewed and updated as appropriate: allergies, current medications, past family history, past medical history, past social history, past surgical history and problem list.      Lana Mata reports that she has never smoked. She has never used smokeless tobacco. She reports that she does not drink alcohol or use drugs.  Patient is not a tobacco user and has not been counseled for use of tobacco products    Family History   Problem Relation Age of Onset   • Hypertension Mother    • Hyperlipidemia Mother    • Mental illness Mother    • Thyroid disease Mother    • Diabetes Maternal Grandmother    • Cancer Maternal Grandmother    • Depression Other    • Diabetes Other    • Heart disease Other    • Hypertension Other    • Osteoporosis Other    • Thyroid disease Other    • Cancer Maternal Aunt    • Cancer Paternal Aunt          Current Outpatient Prescriptions:   •  aMILoride (MIDAMOR) 5 MG tablet, Take 1 tablet by mouth Daily., Disp: 30 tablet, Rfl: 6  •  ARIPiprazole (ABILIFY) 5 MG tablet, Take 5 mg by mouth Daily. At bedtime, Disp: , Rfl:   •  aspirin 325 MG tablet, Take 325 mg by mouth Daily. At bedtime, Disp: , Rfl:   •  atorvastatin (LIPITOR) 20 MG tablet, Take 1 tablet by mouth Daily. (Patient taking differently: Take 20 mg by mouth Every Night.), Disp: 90 tablet, Rfl: 3  •  choline fenofibrate (TRILIPIX) 135 MG capsule, Take 1 capsule by  mouth Daily. (Patient taking differently: Take 135 mg by mouth Daily. At bedtime), Disp: 90 capsule, Rfl: 3  •  diltiaZEM CD (CARDIZEM CD) 180 MG 24 hr capsule, Take 1 capsule by mouth Daily., Disp: 30 capsule, Rfl: 6  •  diltiazem XR (DILACOR XR) 180 MG 24 hr capsule, Take 1 capsule by mouth Daily. (Patient taking differently: Take 180 mg by mouth Daily. At bedtime), Disp: 30 capsule, Rfl: 2  •  lisinopril (PRINIVIL,ZESTRIL) 40 MG tablet, TAKE 1 TABLET EVERY DAY, Disp: 30 tablet, Rfl: 1  •  LORazepam (ATIVAN) 1 MG tablet, Take 1 mg by mouth 2 (Two) Times a Day. Scheduled to take in the morning and one with dinner, Disp: , Rfl: 0  •  methIMAzole (TAPAZOLE) 5 MG tablet, Take 2.5 mg by mouth Daily., Disp: , Rfl:   •  metoprolol tartrate (LOPRESSOR) 25 MG tablet, Take 1 tablet by mouth Every 12 (Twelve) Hours., Disp: 60 tablet, Rfl: 6  •  nitroglycerin (NITROSTAT) 0.4 MG SL tablet, Place 1 tablet under the tongue See Admin Instructions. PRN cp, if not relieved in 5 min repeat and go to ER, Disp: 25 tablet, Rfl: 12  •  omeprazole (priLOSEC) 20 MG capsule, Take 1 capsule by mouth Daily. (Patient taking differently: Take 20 mg by mouth Daily. At bedtime), Disp: 90 capsule, Rfl: 3  •  potassium chloride (K-DUR) 10 MEQ CR tablet, Take 1 tablet by mouth 2 (Two) Times a Day With Meals., Disp: 180 tablet, Rfl: 3  •  rivaroxaban (XARELTO) 20 MG tablet, Take 1 tablet by mouth Daily., Disp: 90 tablet, Rfl: 3  •  sertraline (ZOLOFT) 100 MG tablet, Take 150 mg by mouth Daily., Disp: , Rfl: 1    Allergies   Allergen Reactions   • Codeine Nausea And Vomiting   • Latex Rash   • Morphine And Related Hallucinations   • Pravachol [Pravastatin Sodium] Myalgia       Past Medical History:   Diagnosis Date   • Abnormal gait 11/28/2012   • Abrasion 02/26/2015   • Acute ankle pain 03/31/2016   • Acute bronchitis 09/04/2015   • Allergic rhinitis 05/22/2012   • Asthenia 05/22/2014   • Atrial fibrillation 03/04/2016    - converted    • Attempted  suicide    • Benign essential hypertension 03/04/2016   • Cardiovascular stress test abnormal 03/04/2016   • Cellulitis of knee 03/03/2015   • Chest pain 04/02/2015   • Chronic pain disorder    • Common cold 06/24/2014   • Congestive heart failure 02/04/2016   • Depressive disorder 05/22/2015    suspect more complicated psych issues      • Gastroesophageal reflux disease 01/17/2013   • Generalized anxiety disorder 02/04/2016   • H/O echocardiogram     Normal LV function with Ef of 60-65%.Mildly dilated right ventricle with normal function. Grade 1B diastolic dysfunction with mild CLVH.NO sig. valvular regurg. or stenosis.   • Headache 10/27/2014   • Hyperlipidemia 03/04/2016   • Low back pain 06/30/2016    Need for prophylactic vaccination against Streptococcus pneumoniae [pneumococcus]    01/23/2015    • Neck pain 06/30/2016   • Obstructive sleep apnea of adult 07/30/2015   • Obstructive sleep apnea syndrome    • Osteoarthritis    • Osteoporosis 06/30/2016   • Pain in right shoulder 06/30/2016   • Pain in right shoulder 04/24/2014   • Shortness of breath 04/02/2015   • Skin ulcer 04/13/2015    left   • Thyrotoxicosis with or without goiter 10/24/2014   • Urinary incontinence 05/22/2014         Review of Systems   Constitutional: Negative.    HENT: Positive for hearing loss and trouble swallowing.    Eyes: Negative.         Visual change     Respiratory: Positive for cough, choking and shortness of breath.    Cardiovascular: Positive for chest pain and palpitations.   Gastrointestinal: Positive for diarrhea.   Endocrine: Positive for heat intolerance.        Hot flashes   Genitourinary: Negative.         Nighttime urination  Urinary incontinence   Musculoskeletal: Negative.         Arthritis  Muscle weakness  Numbness/tingling  Leg pain   Skin: Negative.    Allergic/Immunologic: Negative.    Neurological: Positive for numbness.   Hematological: Negative.    Psychiatric/Behavioral: Negative.         Mood  changes  Depression     All other systems reviewed and are negative.  Tinnitus dizziness hearing loss        Objective   Physical Exam   Constitutional: She is oriented to person, place, and time. She appears well-developed and well-nourished.   HENT:   Head: Normocephalic and atraumatic.   Right Ear: Hearing, tympanic membrane, external ear and ear canal normal.   Left Ear: Hearing, tympanic membrane, external ear and ear canal normal.   Nose: Nose normal. No mucosal edema, rhinorrhea, nasal deformity or septal deviation. No epistaxis. Right sinus exhibits no maxillary sinus tenderness and no frontal sinus tenderness. Left sinus exhibits no maxillary sinus tenderness and no frontal sinus tenderness.   Mouth/Throat: Uvula is midline, oropharynx is clear and moist and mucous membranes are normal. No trismus in the jaw. Normal dentition. No oropharyngeal exudate or posterior oropharyngeal edema. No tonsillar exudate.   Eyes: Conjunctivae and EOM are normal. Pupils are equal, round, and reactive to light.   Neck: Normal range of motion. Neck supple. No JVD present. No tracheal deviation present. No thyromegaly present.   Pulmonary/Chest: Effort normal.   Musculoskeletal: Normal range of motion.   Lymphadenopathy:        Head (right side): No submental, no submandibular, no tonsillar, no preauricular, no posterior auricular and no occipital adenopathy present.        Head (left side): No submental, no submandibular, no tonsillar, no preauricular, no posterior auricular and no occipital adenopathy present.     She has no cervical adenopathy.        Right cervical: No superficial cervical, no deep cervical and no posterior cervical adenopathy present.       Left cervical: No superficial cervical, no deep cervical and no posterior cervical adenopathy present.   Neurological: She is alert and oriented to person, place, and time. No cranial nerve deficit.   Finger to nose, alternating movements, gait and stance normal she  she does use a cane she has normal heal to shin no nystagmus is noted   Skin: Skin is warm.   Psychiatric: She has a normal mood and affect. Her speech is normal and behavior is normal. Thought content normal.   Nursing note and vitals reviewed.      Audiogram and tympanogram were reviewed with the patient actual tracings shown to her showing essentially a dead ear and the left ear      Assessment/Plan   Lana was seen today for ear problem.    Diagnoses and all orders for this visit:    Tinnitus of left ear  -     MRI Internal Auditory Canal With Contrast; Future  -     Creatinine, Serum; Future    Dizziness  -     MRI Internal Auditory Canal With Contrast; Future  -     Creatinine, Serum; Future    Sudden-onset sensorineural hearing loss  -     MRI Internal Auditory Canal With Contrast; Future  -     Creatinine, Serum; Future      Scearce intratympanic injection but the patient does not want to do that psych confirms her cardiologist she can take the medication  We discussed some concern because she has such a profound hearing loss left ear may not come about her hearing loss started 3 weeks ago which is also concerned explained her ideally we start this as soon as possible.    I started on prednisone taper discussed risks benefits.  Before starting the prednisone we confirmed with her cardiologist she could take it the patient denies she is a diabetic.   I am arranging for an MRI she has no contraindications has no pacemaker to have an MRI done to look for cause of this profound hearing loss.  She had recent creatinine level.  We'll see her back in 2 weeks to reassess that she has a problems or questions with the medications she'll let me know explained to use in the proper fashion to minimize side effects

## 2018-06-07 NOTE — PATIENT INSTRUCTIONS

## 2018-06-10 NOTE — PROGRESS NOTES
ID: Lana Mata    CC:   Chief Complaint   Patient presents with   • Atrial Fibrillation     wasin the hospital    • Hip Pain       Subjective:     HPI     Lana Mata is a 63 y.o. female who presents for hospital follow up.  Patient was in the hospital from 5/23 to 5/26 for medication adjustment by cardiologist.  She complains of shortness of breath with activity and weakness.  She denies any chest pain or palpitations currently.      Left hip pain:   Patient also complains of left hip pain which is rated as 7/10 pain when walking and standing.  Currently sitting the pain is a 0/10.  She denies any injuries to the hip and the pain has been present for 3 days, it is described as stinging and occurring 4 times daily and lasting 30 seconds each.  She has a cane which she uses in her right hand.  She denies any radiation, numbness, or tingling.  She has recently under gone evaluation by physical therapy for a duarte round.  Patient was offered lidocaine patch, she states she will try a topical over the counter cream and see if this improves pain as it has only been present for 3 days.        Preventative:  Over the past 2 weeks, have you felt down, depressed, or hopeless?No   Over the past 2 weeks, have you felt little interest or pleasure in doing things?No  Clinical depression screening refused by patient.No     On osteoporosis therapy?No     Past Medical Hx:  Past Medical History:   Diagnosis Date   • Abnormal gait 11/28/2012   • Abrasion 02/26/2015   • Acute ankle pain 03/31/2016   • Acute bronchitis 09/04/2015   • Allergic rhinitis 05/22/2012   • Asthenia 05/22/2014   • Atrial fibrillation 03/04/2016    - converted    • Attempted suicide    • Benign essential hypertension 03/04/2016   • Cardiovascular stress test abnormal 03/04/2016   • Cellulitis of knee 03/03/2015   • Chest pain 04/02/2015   • Chronic pain disorder    • Common cold 06/24/2014   • Congestive heart failure 02/04/2016   • Depressive  disorder 05/22/2015    suspect more complicated psych issues      • Gastroesophageal reflux disease 01/17/2013   • Generalized anxiety disorder 02/04/2016   • H/O echocardiogram     Normal LV function with Ef of 60-65%.Mildly dilated right ventricle with normal function. Grade 1B diastolic dysfunction with mild CLVH.NO sig. valvular regurg. or stenosis.   • Headache 10/27/2014   • Hyperlipidemia 03/04/2016   • Low back pain 06/30/2016    Need for prophylactic vaccination against Streptococcus pneumoniae [pneumococcus]    01/23/2015    • Neck pain 06/30/2016   • Obstructive sleep apnea of adult 07/30/2015   • Obstructive sleep apnea syndrome    • Osteoarthritis    • Osteoporosis 06/30/2016   • Pain in right shoulder 06/30/2016   • Pain in right shoulder 04/24/2014   • Shortness of breath 04/02/2015   • Skin ulcer 04/13/2015    left   • Thyrotoxicosis with or without goiter 10/24/2014   • Urinary incontinence 05/22/2014       Past Surgical Hx:  Past Surgical History:   Procedure Laterality Date   • BACK SURGERY  08/07/2015    Removal of intstrumentation,L5-S1.Exploration of fusion,L5-S1.Posterolateral fusion,L4-L5.Posterior segmental spinal instrumentation L4-5.Transforaminal interbody fusion Through right sided approach.Performed at    • COLONOSCOPY  05/04/2016    will order cologuard   • INJECTION OF MEDICATION  03/23/2015    celestone   • INJECTION OF MEDICATION  05/04/2016    KENALOG(6)   • LUMBAR FUSION      discectomy fusion with rods L4-S1   • LUMBAR LAMINECTOMY  1995   • MANDIBLE FRACTURE SURGERY      JAW WIRED SHUT   • MOUTH SURGERY  10/05/2015    Fractured mandible. Removal of arch bars.   • MOUTH SURGERY  1985    Fractured mandible. Removal of arch bars.   • NASAL SEPTUM SURGERY     • RHINOPLASTY     • TUBAL ABDOMINAL LIGATION  1986       Health Maintenance:  Health Maintenance   Topic Date Due   • ZOSTER VACCINE (2 of 2) 02/09/2017   • INFLUENZA VACCINE  08/01/2018   • DXA SCAN  08/15/2018   • MAMMOGRAM   08/16/2018   • LIPID PANEL  01/31/2019   • MEDICARE ANNUAL WELLNESS  02/07/2019   • PAP SMEAR  12/15/2019   • COLONOSCOPY  11/07/2026   • TDAP/TD VACCINES (3 - Td) 02/07/2028   • HEPATITIS C SCREENING  Completed       Current Meds:    Current Outpatient Prescriptions:   •  aMILoride (MIDAMOR) 5 MG tablet, Take 1 tablet by mouth Daily., Disp: 30 tablet, Rfl: 6  •  ARIPiprazole (ABILIFY) 5 MG tablet, Take 5 mg by mouth Daily. At bedtime, Disp: , Rfl:   •  aspirin 325 MG tablet, Take 325 mg by mouth Daily. At bedtime, Disp: , Rfl:   •  atorvastatin (LIPITOR) 20 MG tablet, Take 1 tablet by mouth Daily. (Patient taking differently: Take 20 mg by mouth Every Night.), Disp: 90 tablet, Rfl: 3  •  choline fenofibrate (TRILIPIX) 135 MG capsule, Take 1 capsule by mouth Daily. (Patient taking differently: Take 135 mg by mouth Daily. At bedtime), Disp: 90 capsule, Rfl: 3  •  diltiaZEM CD (CARDIZEM CD) 180 MG 24 hr capsule, Take 1 capsule by mouth Daily., Disp: 30 capsule, Rfl: 6  •  diltiazem XR (DILACOR XR) 180 MG 24 hr capsule, Take 1 capsule by mouth Daily. (Patient taking differently: Take 180 mg by mouth Daily. At bedtime), Disp: 30 capsule, Rfl: 2  •  lisinopril (PRINIVIL,ZESTRIL) 40 MG tablet, TAKE 1 TABLET EVERY DAY, Disp: 30 tablet, Rfl: 1  •  LORazepam (ATIVAN) 1 MG tablet, Take 1 mg by mouth 2 (Two) Times a Day. Scheduled to take in the morning and one with dinner, Disp: , Rfl: 0  •  methIMAzole (TAPAZOLE) 5 MG tablet, Take 2.5 mg by mouth Daily., Disp: , Rfl:   •  metoprolol tartrate (LOPRESSOR) 25 MG tablet, Take 1 tablet by mouth Every 12 (Twelve) Hours., Disp: 60 tablet, Rfl: 6  •  nitroglycerin (NITROSTAT) 0.4 MG SL tablet, Place 1 tablet under the tongue See Admin Instructions. PRN cp, if not relieved in 5 min repeat and go to ER, Disp: 25 tablet, Rfl: 12  •  omeprazole (priLOSEC) 20 MG capsule, Take 1 capsule by mouth Daily. (Patient taking differently: Take 20 mg by mouth Daily. At bedtime), Disp: 90 capsule,  Rfl: 3  •  potassium chloride (K-DUR) 10 MEQ CR tablet, Take 1 tablet by mouth 2 (Two) Times a Day With Meals., Disp: 180 tablet, Rfl: 3  •  rivaroxaban (XARELTO) 20 MG tablet, Take 1 tablet by mouth Daily., Disp: 90 tablet, Rfl: 3  •  sertraline (ZOLOFT) 100 MG tablet, Take 150 mg by mouth Daily., Disp: , Rfl: 1  •  predniSONE (DELTASONE) 10 MG tablet, Daily dose: 5 tabs for 2 days, then 4 tabs for 2 days, then 3 tabs for 2 days, then 2 tabs for 2 days, then 1 tab for 2 days, Disp: 30 tablet, Rfl: 0    Allergies:  Codeine; Latex; Morphine and related; and Pravachol [pravastatin sodium]    Family Hx:  Family History   Problem Relation Age of Onset   • Hypertension Mother    • Hyperlipidemia Mother    • Mental illness Mother    • Thyroid disease Mother    • Diabetes Maternal Grandmother    • Cancer Maternal Grandmother    • Depression Other    • Diabetes Other    • Heart disease Other    • Hypertension Other    • Osteoporosis Other    • Thyroid disease Other    • Cancer Maternal Aunt    • Cancer Paternal Aunt         Social History:  Social History     Social History   • Marital status:      Spouse name: N/A   • Number of children: N/A   • Years of education: N/A     Occupational History   • Not on file.     Social History Main Topics   • Smoking status: Never Smoker   • Smokeless tobacco: Never Used      Comment: smoking cessation    • Alcohol use No   • Drug use: No      Comment: lortab / oxycontin   • Sexual activity: Defer     Other Topics Concern   • Not on file     Social History Narrative   • No narrative on file       Review of Systems   Constitutional: Negative for activity change, appetite change, fatigue and fever.   HENT: Negative for ear pain and sore throat.    Eyes: Negative for pain and visual disturbance.   Respiratory: Positive for shortness of breath. Negative for cough.    Cardiovascular: Negative for chest pain and palpitations.   Gastrointestinal: Negative for abdominal pain and nausea.  "  Endocrine: Negative for cold intolerance and heat intolerance.   Genitourinary: Negative for difficulty urinating and dysuria.   Musculoskeletal: Positive for gait problem. Negative for arthralgias.        Left hip pain   Skin: Negative for color change and rash.   Neurological: Positive for weakness. Negative for dizziness and headaches.   Hematological: Negative for adenopathy. Does not bruise/bleed easily.   Psychiatric/Behavioral: Negative for agitation, confusion and sleep disturbance.           Objective:     /74   Pulse 85   Ht 162.6 cm (64\")   Wt 93.9 kg (207 lb)   LMP  (LMP Unknown)   SpO2 98%   BMI 35.53 kg/m²     Physical Exam   Constitutional: She is oriented to person, place, and time. She appears well-developed and well-nourished. No distress.   HENT:   Head: Normocephalic and atraumatic.   Nose: Nose normal.   Eyes: Conjunctivae are normal. Right eye exhibits no discharge. Left eye exhibits no discharge.   Neck: Neck supple.   Cardiovascular: Normal rate, regular rhythm and normal heart sounds.  Exam reveals no gallop and no friction rub.    No murmur heard.  Pulmonary/Chest: Effort normal and breath sounds normal. No accessory muscle usage. No respiratory distress. She has no wheezes. She has no rales. She exhibits no tenderness.   Abdominal: Soft. Bowel sounds are normal. She exhibits no distension. There is no tenderness.   Musculoskeletal: She exhibits no edema or deformity.   Neurological: She is alert and oriented to person, place, and time.   Skin: Skin is warm and dry. No rash noted. She is not diaphoretic. No erythema. No pallor.        Psychiatric: She has a normal mood and affect. Her behavior is normal.          Assessment/Plan:     Lana was seen today for atrial fibrillation and hip pain.    Diagnoses and all orders for this visit:    Hospital discharge follow-up    Left hip pain    Will try topical over the counter cream such as icy hot.  Patient also encouraged to ice " the hip and rest.    Patient's Body mass index is 35.53 kg/m². BMI is above normal parameters. Recommendations include: exercise counseling.    Follow-up:     Return in about 4 weeks (around 6/29/2018) for Recheck hip pain.      Goals        Patient Stated    • management of chronic medical conditions (pt-stated)            Barriers to goals: none            Health Maintenance   Topic Date Due   • ZOSTER VACCINE (2 of 2) 02/09/2017   • INFLUENZA VACCINE  08/01/2018   • DXA SCAN  08/15/2018   • MAMMOGRAM  08/16/2018   • LIPID PANEL  01/31/2019   • MEDICARE ANNUAL WELLNESS  02/07/2019   • PAP SMEAR  12/15/2019   • COLONOSCOPY  11/07/2026   • TDAP/TD VACCINES (3 - Td) 02/07/2028   • HEPATITIS C SCREENING  Completed       does not smoke  occasional/rare  eat more fruits and vegetables, decrease soda or juice intake, increase physical activity, reduce screen time, cut out extra servings and keep TV off during meals    RISK SCORE: 4          This document has been electronically signed by Bernie Ortiz MD on Vandana 10, 2018 1:32 PM

## 2018-06-13 ENCOUNTER — HOSPITAL ENCOUNTER (OUTPATIENT)
Dept: MRI IMAGING | Facility: HOSPITAL | Age: 63
Discharge: HOME | End: 2018-06-13
Admitting: OTOLARYNGOLOGY

## 2018-06-13 DIAGNOSIS — R42 DIZZINESS: ICD-10-CM

## 2018-06-13 DIAGNOSIS — H91.20 SUDDEN-ONSET SENSORINEURAL HEARING LOSS: ICD-10-CM

## 2018-06-13 DIAGNOSIS — H93.12 TINNITUS OF LEFT EAR: ICD-10-CM

## 2018-06-13 PROCEDURE — 25010000002 GADOTERIDOL PER 1 ML: Performed by: OTOLARYNGOLOGY

## 2018-06-13 PROCEDURE — 70553 MRI BRAIN STEM W/O & W/DYE: CPT

## 2018-06-13 PROCEDURE — A9576 INJ PROHANCE MULTIPACK: HCPCS | Performed by: OTOLARYNGOLOGY

## 2018-06-13 RX ADMIN — GADOTERIDOL 20 ML: 279.3 INJECTION, SOLUTION INTRAVENOUS at 09:16

## 2018-06-18 ENCOUNTER — TRANSCRIBE ORDERS (OUTPATIENT)
Dept: LAB | Facility: HOSPITAL | Age: 63
End: 2018-06-18

## 2018-06-18 ENCOUNTER — LAB (OUTPATIENT)
Dept: LAB | Facility: HOSPITAL | Age: 63
End: 2018-06-18

## 2018-06-18 DIAGNOSIS — I48.20 CHRONIC ATRIAL FIBRILLATION (HCC): ICD-10-CM

## 2018-06-18 DIAGNOSIS — N18.30 CKD (CHRONIC KIDNEY DISEASE) STAGE 3, GFR 30-59 ML/MIN (HCC): ICD-10-CM

## 2018-06-18 DIAGNOSIS — N18.30 STAGE 3 CHRONIC KIDNEY DISEASE (HCC): Primary | ICD-10-CM

## 2018-06-18 LAB
ALBUMIN SERPL-MCNC: 4.1 G/DL (ref 3.4–4.8)
ANION GAP SERPL CALCULATED.3IONS-SCNC: 9 MMOL/L (ref 5–15)
BUN BLD-MCNC: 25 MG/DL (ref 7–21)
BUN/CREAT SERPL: 19.5 (ref 7–25)
CALCIUM SPEC-SCNC: 9.1 MG/DL (ref 8.4–10.2)
CHLORIDE SERPL-SCNC: 102 MMOL/L (ref 95–110)
CO2 SERPL-SCNC: 27 MMOL/L (ref 22–31)
CREAT BLD-MCNC: 1.28 MG/DL (ref 0.5–1)
GFR SERPL CREATININE-BSD FRML MDRD: 42 ML/MIN/1.73 (ref 45–104)
GLUCOSE BLD-MCNC: 103 MG/DL (ref 60–100)
MAGNESIUM SERPL-MCNC: 2 MG/DL (ref 1.6–2.3)
PHOSPHATE SERPL-MCNC: 4.4 MG/DL (ref 2.4–4.4)
POTASSIUM BLD-SCNC: 5.2 MMOL/L (ref 3.5–5.1)
SODIUM BLD-SCNC: 138 MMOL/L (ref 137–145)

## 2018-06-18 PROCEDURE — 80069 RENAL FUNCTION PANEL: CPT

## 2018-06-18 PROCEDURE — 36415 COLL VENOUS BLD VENIPUNCTURE: CPT

## 2018-06-18 PROCEDURE — 83735 ASSAY OF MAGNESIUM: CPT

## 2018-06-25 ENCOUNTER — OFFICE VISIT (OUTPATIENT)
Dept: OTOLARYNGOLOGY | Facility: CLINIC | Age: 63
End: 2018-06-25

## 2018-06-25 ENCOUNTER — CLINICAL SUPPORT (OUTPATIENT)
Dept: AUDIOLOGY | Facility: CLINIC | Age: 63
End: 2018-06-25

## 2018-06-25 VITALS — TEMPERATURE: 97.7 F | WEIGHT: 213 LBS | HEIGHT: 64 IN | BODY MASS INDEX: 36.37 KG/M2

## 2018-06-25 DIAGNOSIS — H90.3 SENSORINEURAL HEARING LOSS, ASYMMETRICAL: Primary | ICD-10-CM

## 2018-06-25 DIAGNOSIS — H93.12 TINNITUS OF LEFT EAR: Primary | ICD-10-CM

## 2018-06-25 DIAGNOSIS — R22.1 NECK MASS: ICD-10-CM

## 2018-06-25 DIAGNOSIS — H91.20 SUDDEN-ONSET SENSORINEURAL HEARING LOSS: ICD-10-CM

## 2018-06-25 PROCEDURE — 99214 OFFICE O/P EST MOD 30 MIN: CPT | Performed by: OTOLARYNGOLOGY

## 2018-06-25 RX ORDER — CEFDINIR 300 MG/1
300 CAPSULE ORAL 2 TIMES DAILY
Qty: 20 CAPSULE | Refills: 0 | Status: SHIPPED | OUTPATIENT
Start: 2018-06-25 | End: 2018-09-13

## 2018-06-25 RX ORDER — LISINOPRIL 20 MG/1
20 TABLET ORAL DAILY
COMMUNITY
End: 2021-08-26 | Stop reason: SDUPTHER

## 2018-06-25 NOTE — PATIENT INSTRUCTIONS

## 2018-06-25 NOTE — PROGRESS NOTES
STANDARD AUDIOMETRIC EVALUATION      Name:  Lana Mata  :  1955  Age:  63 y.o.  Date of Evaluation:  2018      HISTORY    Reason for visit:  Lana Mata is seen today for a hearing evaluation at the request of Dr. Enio Alanis.  Patient reports she has been on prednisone for a sudden hearing loss in her left ear.  She states she has had slight improvement in her hearing, but still has ringing in her left ear.      EVALUATION    See Audiogram    RESULTS        Otoscopy and Tympanometry 226 Hz :  Right Ear:  Otoscopy:  Clear ear canal          Tympanometry:  Middle ear function within normal limits    Left Ear:   Otoscopy:  Clear ear canal        Tympanometry:  Middle ear function within normal limits    Test technique:  Standard Audiometry     Pure Tone Audiometry:   Patient responded to pure tones at 10-35 dB for 250-8000 Hz in right ear, and at  dB for 250-6000 Hz in left ear.       Speech Audiometry:        Right Ear:  Speech Reception Threshold (SRT) was obtained at 15 dBHL                 Speech Discrimination scores were 100% in quiet when words were presented at 55 dBHL       Left Ear:  Speech Awareness Threshold (SAT) was observed at 75 dBHL                 Speech Discrimination scores were 0% in masking noise when words were presented at  95 dBHL    Reliability:   good    IMPRESSIONS:  1.  Tympanometry results are consistent with Middle ear function within normal limits in both ears.  2.  Pure tone results are consistent with normal to mild high frequency   hearing loss for right ear, and severe to profound sloping sensorineural hearing loss in left ear.       RECOMMENDATIONS:  Patient is seeing the Ear Nose and Throat physician immediately following this examination.  It was a pleasure seeing Lana Mata in Audiology today.  We would be happy to do further testing or discuss these test as necessary.          This document has been electronically signed by Jolie  Serafin Gonzalez, MS CCC-A on June 25, 2018 3:07 PM       Jolie Gonzalez, MS DIAL-A  Licensed Audiologist

## 2018-06-25 NOTE — PROGRESS NOTES
Subjective   Lana Mata is a 63 y.o. female.   Follow-up sudden hearing loss    History of Present Illness   Comes in for review of her MRI results she has no new symptoms she has some tinnitus no change in her hearing.  She is also concerned about an area of her neck were to become inflamed and had some drainage she's not any fever chills Xarelto small area was cut bigger recently.      The following portions of the patient's history were reviewed and updated as appropriate: allergies, current medications, past family history, past medical history, past social history, past surgical history and problem list.      Current Outpatient Prescriptions:   •  aMILoride (MIDAMOR) 5 MG tablet, Take 1 tablet by mouth Daily., Disp: 30 tablet, Rfl: 6  •  ARIPiprazole (ABILIFY) 5 MG tablet, Take 5 mg by mouth Daily. At bedtime, Disp: , Rfl:   •  aspirin 325 MG tablet, Take 325 mg by mouth Daily. At bedtime, Disp: , Rfl:   •  atorvastatin (LIPITOR) 20 MG tablet, Take 1 tablet by mouth Daily. (Patient taking differently: Take 20 mg by mouth Every Night.), Disp: 90 tablet, Rfl: 3  •  choline fenofibrate (TRILIPIX) 135 MG capsule, Take 1 capsule by mouth Daily. (Patient taking differently: Take 135 mg by mouth Daily. At bedtime), Disp: 90 capsule, Rfl: 3  •  diltiaZEM CD (CARDIZEM CD) 180 MG 24 hr capsule, Take 1 capsule by mouth Daily., Disp: 30 capsule, Rfl: 6  •  diltiazem XR (DILACOR XR) 180 MG 24 hr capsule, Take 1 capsule by mouth Daily. (Patient taking differently: Take 180 mg by mouth Daily. At bedtime), Disp: 30 capsule, Rfl: 2  •  lisinopril (PRINIVIL,ZESTRIL) 20 MG tablet, Take 20 mg by mouth Daily., Disp: , Rfl:   •  LORazepam (ATIVAN) 1 MG tablet, Take 1 mg by mouth 2 (Two) Times a Day. Scheduled to take in the morning and one with dinner, Disp: , Rfl: 0  •  methIMAzole (TAPAZOLE) 5 MG tablet, Take 2.5 mg by mouth Daily., Disp: , Rfl:   •  metoprolol tartrate (LOPRESSOR) 25 MG tablet, Take 1 tablet by mouth  Every 12 (Twelve) Hours., Disp: 60 tablet, Rfl: 6  •  nitroglycerin (NITROSTAT) 0.4 MG SL tablet, Place 1 tablet under the tongue See Admin Instructions. PRN cp, if not relieved in 5 min repeat and go to ER, Disp: 25 tablet, Rfl: 12  •  omeprazole (priLOSEC) 20 MG capsule, Take 1 capsule by mouth Daily. (Patient taking differently: Take 20 mg by mouth Daily. At bedtime), Disp: 90 capsule, Rfl: 3  •  potassium chloride (K-DUR) 10 MEQ CR tablet, Take 1 tablet by mouth 2 (Two) Times a Day With Meals., Disp: 180 tablet, Rfl: 3  •  predniSONE (DELTASONE) 10 MG tablet, Daily dose: 5 tabs for 2 days, then 4 tabs for 2 days, then 3 tabs for 2 days, then 2 tabs for 2 days, then 1 tab for 2 days, Disp: 30 tablet, Rfl: 0  •  rivaroxaban (XARELTO) 20 MG tablet, Take 1 tablet by mouth Daily., Disp: 90 tablet, Rfl: 3  •  sertraline (ZOLOFT) 100 MG tablet, Take 150 mg by mouth Daily., Disp: , Rfl: 1    Allergies   Allergen Reactions   • Codeine Nausea And Vomiting   • Latex Rash   • Morphine And Related Hallucinations   • Pravachol [Pravastatin Sodium] Myalgia             Review of Systems   Constitutional: Negative for fever.   HENT: Positive for hearing loss and tinnitus.    Skin:        Small amount of soreness skin drainage left mid neck   Neurological: Negative for dizziness.   Hematological: Negative for adenopathy.           Objective   Physical Exam   Constitutional: She is oriented to person, place, and time. She appears well-developed and well-nourished.   HENT:   Head: Normocephalic and atraumatic.   Right Ear: Hearing, tympanic membrane, external ear and ear canal normal.   Left Ear: Hearing, tympanic membrane, external ear and ear canal normal.   Nose: Nose normal. No mucosal edema, rhinorrhea, nasal deformity or septal deviation. No epistaxis. Right sinus exhibits no maxillary sinus tenderness and no frontal sinus tenderness. Left sinus exhibits no maxillary sinus tenderness and no frontal sinus tenderness.    Mouth/Throat: Uvula is midline, oropharynx is clear and moist and mucous membranes are normal. No trismus in the jaw. Normal dentition. No oropharyngeal exudate or posterior oropharyngeal edema. No tonsillar exudate.   Eyes: Conjunctivae and EOM are normal. Pupils are equal, round, and reactive to light.   Neck: Normal range of motion. Neck supple. No JVD present. No tracheal deviation present. No thyromegaly present.       Pulmonary/Chest: Effort normal.   Musculoskeletal: Normal range of motion.   Lymphadenopathy:        Head (right side): No submental, no submandibular, no tonsillar, no preauricular, no posterior auricular and no occipital adenopathy present.        Head (left side): No submental, no submandibular, no tonsillar, no preauricular, no posterior auricular and no occipital adenopathy present.     She has no cervical adenopathy.        Right cervical: No superficial cervical, no deep cervical and no posterior cervical adenopathy present.       Left cervical: No superficial cervical, no deep cervical and no posterior cervical adenopathy present.   Neurological: She is alert and oriented to person, place, and time. No cranial nerve deficit.   Finger to nose, alternating movements, gait and stance normal she she does use a cane she has normal heal to shin no nystagmus is noted   Skin: Skin is warm.   Psychiatric: She has a normal mood and affect. Her speech is normal and behavior is normal. Thought content normal.   Nursing note and vitals reviewed.    Images     Radiology Images   Study Result     Dizziness and hearing loss.     MR, internal auditory canal with and without intravenous  contrast.     Comparison is made with CT head dated October 27, 2014.     MRI scan 1.5 María Elena. ProHance 20 mL injected IV.     High-resolution scan was performed with thin cut through the  posterior fossa. The supratentorial ventricular system is normal  in size and configuration. No midline shift is identified.  No  abnormal intensity is seen within the brain. There is no evidence  of acute ischemia, intracranial hemorrhage or mass. No acute  abnormality is seen in the posterior fossa. The pituitary is  normal in appearance. Following contrast administration no  abnormal enhancement is identified. Specifically no abnormal  enhancement is identified in the cerebellopontine angle  bilaterally.     No inner ear abnormality is identified.     The flow voids are seen in all major arteries.     The left mastoid cells are partially opacified.     IMPRESSION:  CONCLUSION: No intracranial abnormality is identified.     Electronically signed by:  Greyson Hernandez MD  6/13/2018 11:18  AM CDT Workstation: [id number]   Imaging     MRI Internal Auditory Canal With Wo (Order #[id number]) on 6/13/2018 - Imaging Information   Reprint Order Requisition     MRI Internal Auditory Canal With Wo (Order #[id number]) on 6/13/18       Gram was reviewed neck tracing showing the patient showing essentially a dead ear from the speech discrimination standpoint on the left    Assessment/Plan   Lana was seen today for follow-up.    Diagnoses and all orders for this visit:    Tinnitus of left ear    Sudden-onset sensorineural hearing loss    Neck mass    probiotics   ABX for neck cyst     Concerning her hearing loss reviewed these findings is suggested across hearing aid she's going to talked the audiologist about that.  We'll reassess her neck and she may eventually need excision of the neck mass she is to call for any worsening will follow up the next 2 weeks

## 2018-07-06 ENCOUNTER — OFFICE VISIT (OUTPATIENT)
Dept: CARDIOLOGY | Facility: CLINIC | Age: 63
End: 2018-07-06

## 2018-07-06 VITALS
HEART RATE: 83 BPM | OXYGEN SATURATION: 98 % | WEIGHT: 213 LBS | SYSTOLIC BLOOD PRESSURE: 112 MMHG | HEIGHT: 64 IN | BODY MASS INDEX: 36.37 KG/M2 | DIASTOLIC BLOOD PRESSURE: 60 MMHG

## 2018-07-06 DIAGNOSIS — G47.33 OBSTRUCTIVE SLEEP APNEA OF ADULT: ICD-10-CM

## 2018-07-06 DIAGNOSIS — I10 BENIGN ESSENTIAL HYPERTENSION: ICD-10-CM

## 2018-07-06 DIAGNOSIS — F41.1 GENERALIZED ANXIETY DISORDER: ICD-10-CM

## 2018-07-06 DIAGNOSIS — E78.2 MIXED HYPERLIPIDEMIA: Primary | ICD-10-CM

## 2018-07-06 DIAGNOSIS — I48.0 PAROXYSMAL ATRIAL FIBRILLATION (HCC): ICD-10-CM

## 2018-07-06 PROCEDURE — 99214 OFFICE O/P EST MOD 30 MIN: CPT | Performed by: INTERNAL MEDICINE

## 2018-07-06 PROCEDURE — 93000 ELECTROCARDIOGRAM COMPLETE: CPT | Performed by: INTERNAL MEDICINE

## 2018-07-06 NOTE — PROGRESS NOTES
Lana [facility]ala [facility]  63 y.o. female    07/06/2018  1. Mixed hyperlipidemia    2. Generalized anxiety disorder    3. Paroxysmal atrial fibrillation (CMS/HCC)    4. Benign essential hypertension    5. Obstructive sleep apnea of adult        History of Present Illness    63 years old patient with  BMI 36.5 history of persistent  atrial fibrillation, hypertension and post electrical cardioversion on amiodarone unable to sinus.  Patient evaluated by me in the office.  She doesn't want to pursue EP study and ablation at this stage.  She wanted try a different anti-arrhythmia medication such as Tikosyn with hypertensive heart disease, chronic back pain, exogenous obesity with a BMI 33.8  No syncope or near syncopal episode reported.  No fever cough or chills reported.Shouldn't hospitalized for Tikosyn initiation and prrhythmia monitoring.  Unable to keep him in sinus rhythm.  Tikosyn discontinued and started on AV kaya blocking drug.  she continue Xarelto to decrease risk of cardiac embolic phenomena.  Recently evaluated in the office and admitted for thickness and initiations and arrhythmia monitoring.  He is in atrial fibrillation with good resting heart rate.   who presented posthospital follow-up.  EKG done in atrial fibrillation with good resting heart rate.  Discussed with the patient regarding EP study and ablation she not interested and want to continue AV kaya blocking drug.   JOCY 1/2018  ·    · Transesophageal Echocardiogram with Color and Doppler  · Left Ventricle: Estimated EF appears to be in the range of 61 - 65%. Normal left ventricular cavity size noted  · Left ventricular wall thickness is consistent with mild concentric hypertrophy  · Right Ventricle: Normal right ventricular cavity size, wall thickness, systolic function and septal motion noted  · No evidence of a left atrial appendage thrombus was present. The interatrial septum appears redundant  · No evidence of a patent foramen ovale. No evidence  of an atrial septal defect present. Saline test results are negative.  · Mild aortic valve regurgitation is present  · Mild mitral valve regurgitation is present  4/2018  Total Protein 6.3 - 8.6 g/dL 7.5    Albumin 3.40 - 4.80 g/dL 4.30    ALT (SGPT) 9 - 52 U/L 30    AST (SGOT) 14 - 36 U/L 29    Alkaline Phosphatase 38 - 126 U/L 48    Total Bilirubin 0.2 - 1.3 mg/dL 0.8          1/2018  Total Cholesterol 0 - 199 mg/dL 165    Triglycerides 20 - 199 mg/dL 169    HDL Cholesterol 60 - 200 mg/dL 67    LDL Cholesterol  1 - 129 mg/dL 77    LDL/HDL Ratio 0.00 - 3.22 0.96          TSH 0.460 - 4.680 mIU/mL              SUBJECTIVE    Allergies   Allergen Reactions   • Codeine Nausea And Vomiting   • Latex Rash   • Morphine And Related Hallucinations   • Pravachol [Pravastatin Sodium] Myalgia         Past Medical History:   Diagnosis Date   • Abnormal gait 11/28/2012   • Abrasion 02/26/2015   • Acute ankle pain 03/31/2016   • Acute bronchitis 09/04/2015   • Allergic rhinitis 05/22/2012   • Asthenia 05/22/2014   • Atrial fibrillation (CMS/[facility]) 03/04/2016    - converted    • Attempted suicide    • Benign essential hypertension 03/04/2016   • Cardiovascular stress test abnormal 03/04/2016   • Cellulitis of knee 03/03/2015   • Chest pain 04/02/2015   • Chronic pain disorder    • Common cold 06/24/2014   • Congestive heart failure (CMS/[facility]) 02/04/2016   • Depressive disorder 05/22/2015    suspect more complicated psych issues      • Gastroesophageal reflux disease 01/17/2013   • Generalized anxiety disorder 02/04/2016   • H/O echocardiogram     Normal LV function with Ef of 60-65%.Mildly dilated right ventricle with normal function. Grade 1B diastolic dysfunction with mild CLVH.NO sig. valvular regurg. or stenosis.   • Headache 10/27/2014   • Hyperlipidemia 03/04/2016   • Low back pain 06/30/2016    Need for prophylactic vaccination against Streptococcus pneumoniae [pneumococcus]    01/23/2015    • Neck pain 06/30/2016   •  Obstructive sleep apnea of adult 07/30/2015   • Obstructive sleep apnea syndrome    • Osteoarthritis    • Osteoporosis 06/30/2016   • Pain in right shoulder 06/30/2016   • Pain in right shoulder 04/24/2014   • Shortness of breath 04/02/2015   • Skin ulcer (CMS/HCC) 04/13/2015    left   • Thyrotoxicosis with or without goiter 10/24/2014   • Urinary incontinence 05/22/2014         Past Surgical History:   Procedure Laterality Date   • BACK SURGERY  08/07/2015    Removal of intstrumentation,L5-S1.Exploration of fusion,L5-S1.Posterolateral fusion,L4-L5.Posterior segmental spinal instrumentation L4-5.Transforaminal interbody fusion Through right sided approach.Performed at    • COLONOSCOPY  05/04/2016    will order cologuard   • INJECTION OF MEDICATION  03/23/2015    celestone   • INJECTION OF MEDICATION  05/04/2016    KENALOG(6)   • LUMBAR FUSION      discectomy fusion with rods L4-S1   • LUMBAR LAMINECTOMY  1995   • MANDIBLE FRACTURE SURGERY      JAW WIRED SHUT   • MOUTH SURGERY  10/05/2015    Fractured mandible. Removal of arch bars.   • MOUTH SURGERY  1985    Fractured mandible. Removal of arch bars.   • NASAL SEPTUM SURGERY     • RHINOPLASTY     • TUBAL ABDOMINAL LIGATION  1986         Family History   Problem Relation Age of Onset   • Hypertension Mother    • Hyperlipidemia Mother    • Mental illness Mother    • Thyroid disease Mother    • Diabetes Maternal Grandmother    • Cancer Maternal Grandmother    • Depression Other    • Diabetes Other    • Heart disease Other    • Hypertension Other    • Osteoporosis Other    • Thyroid disease Other    • Cancer Maternal Aunt    • Cancer Paternal Aunt          Social History     Social History   • Marital status:      Spouse name: N/A   • Number of children: N/A   • Years of education: N/A     Occupational History   • Not on file.     Social History Main Topics   • Smoking status: Never Smoker   • Smokeless tobacco: Never Used      Comment: smoking cessation    •  Alcohol use No   • Drug use: No      Comment: lortab / oxycontin   • Sexual activity: Defer     Other Topics Concern   • Not on file     Social History Narrative   • No narrative on file         Current Outpatient Prescriptions   Medication Sig Dispense Refill   • aMILoride (MIDAMOR) 5 MG tablet Take 1 tablet by mouth Daily. 30 tablet 6   • ARIPiprazole (ABILIFY) 5 MG tablet Take 5 mg by mouth Daily. At bedtime     • aspirin 325 MG tablet Take 325 mg by mouth Daily. At bedtime     • atorvastatin (LIPITOR) 20 MG tablet Take 1 tablet by mouth Daily. (Patient taking differently: Take 20 mg by mouth Every Night.) 90 tablet 3   • cefdinir (OMNICEF) 300 MG capsule Take 1 capsule by mouth 2 (Two) Times a Day. 20 capsule 0   • choline fenofibrate (TRILIPIX) 135 MG capsule Take 1 capsule by mouth Daily. (Patient taking differently: Take 135 mg by mouth Daily. At bedtime) 90 capsule 3   • diltiaZEM CD (CARDIZEM CD) 180 MG 24 hr capsule Take 1 capsule by mouth Daily. 30 capsule 6   • diltiazem XR (DILACOR XR) 180 MG 24 hr capsule Take 1 capsule by mouth Daily. (Patient taking differently: Take 180 mg by mouth Daily. At bedtime) 30 capsule 2   • lisinopril (PRINIVIL,ZESTRIL) 20 MG tablet Take 20 mg by mouth Daily.     • LORazepam (ATIVAN) 1 MG tablet Take 1 mg by mouth 2 (Two) Times a Day. Scheduled to take in the morning and one with dinner  0   • methIMAzole (TAPAZOLE) 5 MG tablet Take 2.5 mg by mouth Daily.     • metoprolol tartrate (LOPRESSOR) 25 MG tablet Take 1 tablet by mouth Every 12 (Twelve) Hours. 60 tablet 6   • nitroglycerin (NITROSTAT) 0.4 MG SL tablet Place 1 tablet under the tongue See Admin Instructions. PRN cp, if not relieved in 5 min repeat and go to ER 25 tablet 12   • omeprazole (priLOSEC) 20 MG capsule Take 1 capsule by mouth Daily. (Patient taking differently: Take 20 mg by mouth Daily. At bedtime) 90 capsule 3   • potassium chloride (K-DUR) 10 MEQ CR tablet Take 1 tablet by mouth 2 (Two) Times a Day With  "Meals. 180 tablet 3   • predniSONE (DELTASONE) 10 MG tablet Daily dose: 5 tabs for 2 days, then 4 tabs for 2 days, then 3 tabs for 2 days, then 2 tabs for 2 days, then 1 tab for 2 days 30 tablet 0   • rivaroxaban (XARELTO) 20 MG tablet Take 1 tablet by mouth Daily. 90 tablet 3   • sertraline (ZOLOFT) 100 MG tablet Take 150 mg by mouth Daily.  1     No current facility-administered medications for this visit.          OBJECTIVE    /60   Pulse 83   Ht 162.6 cm (64.02\")   Wt 96.6 kg (213 lb)   SpO2 98%   BMI 36.54 kg/m²         Review of Systems     Constitutional:  Denies recent weight loss, weight gain, fever or chills, no change in exercise tolerance     HENT:  Denies any hearing loss, epistaxis, hoarseness, or difficulty speaking.     Eyes: Wears eyeglasses or contact lenses     Respiratory:  Denies dyspnea with exertion,no cough, wheezing, or hemoptysis.     Cardiovascular: Negative for palpations, chest pain, orthopnea, PND, peripheral edema, syncope, or claudication.     Gastrointestinal:  Denies change in bowel habits, dyspepsia, ulcer disease, hematochezia, or melena.     Endocrine: Negative for cold intolerance, heat intolerance, polydipsia, polyphagia and polyuria. Denies any history of weight change, heat/cold intolerance, polydipsia, polyuria     Genitourinary: Negative.      Musculoskeletal: Denies any history of arthritic symptoms or back problems     Skin:  Denies any change in hair or nails, rashes, or skin lesions.     Allergic/Immunologic: Negative.  Negative for environmental allergies, food allergies and immunocompromised state.     Neurological:  Denies any history of recurrent headaches, strokes, TIA, or seizure disorder.     Hematological: Denies any food allergies, seasonal allergies, bleeding disorders, or lymphadenopathy.     Psychiatric/Behavioral: Denies any history of depression, substance abuse, or change in cognitive function.         Physical Exam     Constitutional: " Cooperative, alert and oriented, well-developed, well-nourished, in no acute distress.     HENT:   Head: Normocephalic, normal hair patterns, no masses or tenderness.  Ears, Nose, and Throat: No gross abnormalities. No pallor or cyanosis. Dentition good.   Eyes: EOMS intact, PERRL, conjunctivae and lids unremarkable. Fundoscopic exam and visual fields not performed.   Neck: No palpable masses or adenopathy, no thyromegaly, no JVD, carotid pulses are full and equal bilaterally and without  Bruits.     Cardiovascular: Regular rhythm, S1 and S2 normal, no S3 or S4. Apical impulse not displaced. No murmurs, gallops, or rubs detected.     Pulmonary/Chest: Chest: normal symmetry, no tenderness to palpation, normal respiratory excursion, no intercostal retraction, no use of accessory muscles.            Pulmonary: Normal breath sounds. No rales or ronchi.    Abdominal: Abdomen soft, bowel sounds normoactive, no masses, no hepatosplenomegaly, non-tender, no bruits.     Musculoskeletal: No deformities, clubbing, cyanosis, erythema, or edema observed. There are no spinal abnormalities noted. Normal muscle strength and tone. Pulses full and equal in all extremities, no bruits auscultated.     Neurological: No gross motor or sensory deficits noted, affect appropriate, oriented to time, person, place.     Skin: Warm and dry to the touch, no apparent skin lesions or masses noted.     Psychiatric: She has a normal mood and affect. Her behavior is normal. Judgment and thought content normal.           ECG 12 Lead  Date/Time: 7/6/2018 9:52 AM  Performed by: CHIKIS GARCIA  Authorized by: CHIKIS GARCIA   Comparison: not compared with previous ECG   Rhythm: atrial fibrillation              Lab Results   Component Value Date    WBC 9.19 05/23/2018    HGB 13.4 05/23/2018    HCT 40.2 05/23/2018    MCV 87.0 05/23/2018     05/23/2018     Lab Results   Component Value Date    GLUCOSE 103 (H) 06/18/2018    BUN 25 (H) 06/18/2018     CREATININE 1.28 (H) 06/18/2018    EGFRIFNONA 42 (L) 06/18/2018    BCR 19.5 06/18/2018    CO2 27.0 06/18/2018    CALCIUM 9.1 06/18/2018    ALBUMIN 4.10 06/18/2018    LABIL2 1.3 04/04/2018    AST 29 04/04/2018    ALT 30 04/04/2018     Lab Results   Component Value Date    CHOL 165 01/31/2018     Lab Results   Component Value Date    TRIG 169 01/31/2018    TRIG 115 12/15/2016    TRIG 111 09/08/2016     Lab Results   Component Value Date    HDL 67 01/31/2018    HDL 52 (L) 12/15/2016    HDL 66 09/08/2016     No components found for: LDLCALC  Lab Results   Component Value Date    LDL 77 01/31/2018    LDL 73 12/15/2016    LDL 61 09/08/2016     No results found for: HDLLDLRATIO  No components found for: CHOLHDL  Lab Results   Component Value Date    HGBA1C 5.7 (H) 01/31/2018     Lab Results   Component Value Date    TSH 3.680 05/23/2018           ASSESSMENT AND PLAN    #1 persistent atrial fibrillation #2 hypertension with hypertensive heart disease #3 hyperlipidemia #4 palpitation     63 years old patient with a BMI 36.5 with a history of persistent atrial fibrillation status post electrical cardioversion with a failed Betapace starting an amiodarone cardioverted did well for some time  Unable to maintain sinus with amiodarone. Discussed with the patient regarding EP study and ablation . She decided to try  different anti-arrhythmia medication.  She admitted for  initiation of Tikosyn and Pro arrhythmia monitoring.  Patient converted to sinus rhythm on unable to maintain anticus and discontinued started on AV kaya blocking drug and subsequent discharge.  She presented posthospital follow-up.  She is in persistent atrial fibrillation rate is better controlled.  Patient does not want to pursue EP study and ablations..  We'll resume the lisinopril for management of hypertension, Lipitor for management of hyperlipidemia.  Risk factor lifestyle modification discussed.  Low-sodium and dash Diet explained    Lana was seen  today for follow-up.    Diagnoses and all orders for this visit:    Mixed hyperlipidemia    Generalized anxiety disorder    Paroxysmal atrial fibrillation (CMS/HCC)    Benign essential hypertension    Obstructive sleep apnea of adult        Kristyn Jurado MD  7/6/2018  9:47 AM

## 2018-07-12 ENCOUNTER — OFFICE VISIT (OUTPATIENT)
Dept: SLEEP MEDICINE | Facility: HOSPITAL | Age: 63
End: 2018-07-12

## 2018-07-12 VITALS
HEART RATE: 71 BPM | BODY MASS INDEX: 36.37 KG/M2 | DIASTOLIC BLOOD PRESSURE: 62 MMHG | SYSTOLIC BLOOD PRESSURE: 121 MMHG | WEIGHT: 213 LBS | HEIGHT: 64 IN | OXYGEN SATURATION: 98 %

## 2018-07-12 DIAGNOSIS — E05.90 THYROTOXICOSIS WITH OR WITHOUT GOITER: ICD-10-CM

## 2018-07-12 DIAGNOSIS — G47.33 OSA (OBSTRUCTIVE SLEEP APNEA): Primary | ICD-10-CM

## 2018-07-12 DIAGNOSIS — G47.33 OBSTRUCTIVE SLEEP APNEA, ADULT: ICD-10-CM

## 2018-07-12 DIAGNOSIS — I10 BENIGN ESSENTIAL HYPERTENSION: ICD-10-CM

## 2018-07-12 DIAGNOSIS — I48.0 PAROXYSMAL ATRIAL FIBRILLATION (HCC): ICD-10-CM

## 2018-07-12 PROCEDURE — 99214 OFFICE O/P EST MOD 30 MIN: CPT | Performed by: INTERNAL MEDICINE

## 2018-07-12 NOTE — PROGRESS NOTES
Sleep Clinic Follow Up    Date: 7/12/2018  Primary Care Physician: Bernie Ortiz MD      Interim History (1/3):  Since the last visit on 07/11/2017, patient has:      1)  SOPHIA - Has not remained compliant with CPAP. She feels like she does not need PAP. She thinks here sx were opiate induced. No data card was provided.    Bed time: 2232-4900  Sleep latency: 20-60 minutes  Number of times awakens during the night: 2  Wake time: 0530 to take the dogs out, back to sleep until 0730 to take dogs out again, then sleeps until 0930  Estimated total sleep time at night: 6-7 hours  Caffeine intake: 1 coffee, 3 teas, 1 soda  Alcohol intake: 0.5  Nap time: 1 hour in the daytime   Sleepiness with Driving: N/A    Pryor - 4    2) Patient denies RLS symptoms.     PMHx, FH, SH reviewed and pertinent changes are: zoloft, ativan, and abilify were added    REVIEW OF SYSTEMS:   Negative for chest pain, fever, chills, SOA, abdominal pain. Smoking: none, complains of hearing loss in her left ear      Exam (6-11/12):    Vitals:    07/12/18 1524   BP: 121/62   Pulse: 71   SpO2: 98%           Body mass index is 36.56 kg/m². Patient's Body mass index is 36.56 kg/m². BMI is above normal parameters. Recommendations include: referral to primary care.      Gen:  No distress, conversant, pleasant, appears stated age, alert, oriented  Eyes:   Anicteric sclera, moist conjunctiva, no lid lag     PERRLA, EOMI   Heent:   NC/AT    Oropharynx clear, Mallampati 4, upper dentures    Reduced hearing in left ear  Lungs:  Normal effort, non-labored breathing    Clear to auscultation    CV:  Normal S1/S2, no murmur    no lower extremity edema  ABD:  Soft, normal bowel sounds    Psych:  Appropriate affect  Neuro:  CN 2-12 intact    Past Medical History:   Diagnosis Date   • Abnormal gait 11/28/2012   • Abrasion 02/26/2015   • Acute ankle pain 03/31/2016   • Acute bronchitis 09/04/2015   • Allergic rhinitis 05/22/2012   • Asthenia 05/22/2014   •  Atrial fibrillation (CMS/[facility]) 03/04/2016    - converted    • Attempted suicide    • Benign essential hypertension 03/04/2016   • Cardiovascular stress test abnormal 03/04/2016   • Cellulitis of knee 03/03/2015   • Chest pain 04/02/2015   • Chronic pain disorder    • Common cold 06/24/2014   • Congestive heart failure (CMS/[facility]) 02/04/2016   • Depressive disorder 05/22/2015    suspect more complicated psych issues      • Gastroesophageal reflux disease 01/17/2013   • Generalized anxiety disorder 02/04/2016   • H/O echocardiogram     Normal LV function with Ef of 60-65%.Mildly dilated right ventricle with normal function. Grade 1B diastolic dysfunction with mild CLVH.NO sig. valvular regurg. or stenosis.   • Headache 10/27/2014   • Hyperlipidemia 03/04/2016   • Low back pain 06/30/2016    Need for prophylactic vaccination against Streptococcus pneumoniae [pneumococcus]    01/23/2015    • Neck pain 06/30/2016   • Obstructive sleep apnea of adult 07/30/2015   • Obstructive sleep apnea syndrome    • Osteoarthritis    • Osteoporosis 06/30/2016   • Pain in right shoulder 06/30/2016   • Pain in right shoulder 04/24/2014   • Shortness of breath 04/02/2015   • Skin ulcer (CMS/[facility]) 04/13/2015    left   • Thyrotoxicosis with or without goiter 10/24/2014   • Urinary incontinence 05/22/2014       Current Outpatient Prescriptions:   •  aMILoride (MIDAMOR) 5 MG tablet, Take 1 tablet by mouth Daily., Disp: 30 tablet, Rfl: 6  •  ARIPiprazole (ABILIFY) 5 MG tablet, Take 5 mg by mouth Daily. At bedtime, Disp: , Rfl:   •  aspirin 325 MG tablet, Take 325 mg by mouth Daily. At bedtime, Disp: , Rfl:   •  atorvastatin (LIPITOR) 20 MG tablet, Take 1 tablet by mouth Daily. (Patient taking differently: Take 20 mg by mouth Every Night.), Disp: 90 tablet, Rfl: 3  •  cefdinir (OMNICEF) 300 MG capsule, Take 1 capsule by mouth 2 (Two) Times a Day., Disp: 20 capsule, Rfl: 0  •  choline fenofibrate (TRILIPIX) 135 MG capsule, Take 1 capsule by mouth  Daily. (Patient taking differently: Take 135 mg by mouth Daily. At bedtime), Disp: 90 capsule, Rfl: 3  •  diltiaZEM CD (CARDIZEM CD) 180 MG 24 hr capsule, Take 1 capsule by mouth Daily., Disp: 30 capsule, Rfl: 6  •  diltiazem XR (DILACOR XR) 180 MG 24 hr capsule, Take 1 capsule by mouth Daily. (Patient taking differently: Take 180 mg by mouth Daily. At bedtime), Disp: 30 capsule, Rfl: 2  •  lisinopril (PRINIVIL,ZESTRIL) 20 MG tablet, Take 20 mg by mouth Daily., Disp: , Rfl:   •  LORazepam (ATIVAN) 1 MG tablet, Take 1 mg by mouth 2 (Two) Times a Day. Scheduled to take in the morning and one with dinner, Disp: , Rfl: 0  •  methIMAzole (TAPAZOLE) 5 MG tablet, Take 2.5 mg by mouth Daily., Disp: , Rfl:   •  metoprolol tartrate (LOPRESSOR) 25 MG tablet, Take 1 tablet by mouth Every 12 (Twelve) Hours., Disp: 60 tablet, Rfl: 6  •  nitroglycerin (NITROSTAT) 0.4 MG SL tablet, Place 1 tablet under the tongue See Admin Instructions. PRN cp, if not relieved in 5 min repeat and go to ER, Disp: 25 tablet, Rfl: 12  •  omeprazole (priLOSEC) 20 MG capsule, Take 1 capsule by mouth Daily. (Patient taking differently: Take 20 mg by mouth Daily. At bedtime), Disp: 90 capsule, Rfl: 3  •  potassium chloride (K-DUR) 10 MEQ CR tablet, Take 1 tablet by mouth 2 (Two) Times a Day With Meals., Disp: 180 tablet, Rfl: 3  •  predniSONE (DELTASONE) 10 MG tablet, Daily dose: 5 tabs for 2 days, then 4 tabs for 2 days, then 3 tabs for 2 days, then 2 tabs for 2 days, then 1 tab for 2 days, Disp: 30 tablet, Rfl: 0  •  rivaroxaban (XARELTO) 20 MG tablet, Take 1 tablet by mouth Daily., Disp: 90 tablet, Rfl: 3  •  sertraline (ZOLOFT) 100 MG tablet, Take 150 mg by mouth Daily., Disp: , Rfl: 1      ASSESSMENT / PLAN:     1. Obstructive sleep apnea  1. Split night PSG - 11/18/2015, AHI of 12.6 recommended 7 cm H2O  2. Repeat in lab PSG to confirm SOPHIA since her meds have decreased and her sx are better  2. RLS - twice weekly  1. Not on therapy  3. CKD -  3  1. Recent admission  4. Depression  5. HTN  6. Chronic pain  7. Atrial fibrillation  1. Recently chemical cardioversion failed, shock x 3 worked only temporarily  8. Poor sleep hygiene   1. Dogs in bed -discussed      Total time 25 min, more than half spent in face to face counseling and coordination of care.    RTC 2 weeks after testing     This document has been electronically signed by Patel Clemente MD on July 12, 2018         CC: Bernie Ortiz MD          No ref. provider found

## 2018-07-16 DIAGNOSIS — G47.33 OSA (OBSTRUCTIVE SLEEP APNEA): Primary | ICD-10-CM

## 2018-07-24 ENCOUNTER — TRANSCRIBE ORDERS (OUTPATIENT)
Dept: LAB | Facility: HOSPITAL | Age: 63
End: 2018-07-24

## 2018-07-24 ENCOUNTER — LAB (OUTPATIENT)
Dept: LAB | Facility: HOSPITAL | Age: 63
End: 2018-07-24

## 2018-07-24 DIAGNOSIS — I48.91 ATRIAL FIBRILLATION, UNSPECIFIED TYPE (HCC): ICD-10-CM

## 2018-07-24 DIAGNOSIS — I10 ESSENTIAL HYPERTENSION: ICD-10-CM

## 2018-07-24 DIAGNOSIS — E87.6 HYPOKALEMIA: ICD-10-CM

## 2018-07-24 DIAGNOSIS — E05.90 HYPERTHYROIDISM: ICD-10-CM

## 2018-07-24 DIAGNOSIS — N18.30 CHRONIC RENAL DISEASE, STAGE III (HCC): ICD-10-CM

## 2018-07-24 DIAGNOSIS — N18.30 CHRONIC RENAL DISEASE, STAGE III (HCC): Primary | ICD-10-CM

## 2018-07-24 DIAGNOSIS — E83.42 HYPOMAGNESEMIA: ICD-10-CM

## 2018-07-24 LAB
25(OH)D3 SERPL-MCNC: 27.5 NG/ML (ref 30–100)
ANION GAP SERPL CALCULATED.3IONS-SCNC: 6 MMOL/L (ref 5–15)
BUN BLD-MCNC: 22 MG/DL (ref 7–21)
BUN/CREAT SERPL: 20.4 (ref 7–25)
CALCIUM SPEC-SCNC: 9.2 MG/DL (ref 8.4–10.2)
CHLORIDE SERPL-SCNC: 106 MMOL/L (ref 95–110)
CO2 SERPL-SCNC: 25 MMOL/L (ref 22–31)
CREAT BLD-MCNC: 1.08 MG/DL (ref 0.5–1)
GFR SERPL CREATININE-BSD FRML MDRD: 51 ML/MIN/1.73 (ref 45–104)
GLUCOSE BLD-MCNC: 93 MG/DL (ref 60–100)
HCT VFR BLD AUTO: 41.1 % (ref 35–45)
HGB BLD-MCNC: 13.5 G/DL (ref 12–15.5)
POTASSIUM BLD-SCNC: 4.4 MMOL/L (ref 3.5–5.1)
SODIUM BLD-SCNC: 137 MMOL/L (ref 137–145)

## 2018-07-24 PROCEDURE — 85018 HEMOGLOBIN: CPT

## 2018-07-24 PROCEDURE — 80048 BASIC METABOLIC PNL TOTAL CA: CPT

## 2018-07-24 PROCEDURE — 36415 COLL VENOUS BLD VENIPUNCTURE: CPT

## 2018-07-24 PROCEDURE — 85014 HEMATOCRIT: CPT

## 2018-07-24 PROCEDURE — 82306 VITAMIN D 25 HYDROXY: CPT

## 2018-07-24 RX ORDER — ATORVASTATIN CALCIUM 20 MG/1
20 TABLET, FILM COATED ORAL DAILY
Qty: 14 TABLET | Refills: 0 | Status: SHIPPED | OUTPATIENT
Start: 2018-07-24 | End: 2018-07-24 | Stop reason: SDUPTHER

## 2018-07-24 RX ORDER — DILTIAZEM HYDROCHLORIDE 180 MG/1
180 CAPSULE, COATED, EXTENDED RELEASE ORAL
Qty: 30 CAPSULE | Refills: 6 | Status: SHIPPED | OUTPATIENT
Start: 2018-07-24 | End: 2018-11-12 | Stop reason: SDUPTHER

## 2018-07-24 RX ORDER — ATORVASTATIN CALCIUM 20 MG/1
20 TABLET, FILM COATED ORAL DAILY
Qty: 14 TABLET | Refills: 0 | Status: SHIPPED | OUTPATIENT
Start: 2018-07-24 | End: 2019-05-22 | Stop reason: SDUPTHER

## 2018-07-24 RX ORDER — METOPROLOL SUCCINATE 25 MG/1
25 TABLET, EXTENDED RELEASE ORAL DAILY
Qty: 30 TABLET | Refills: 2 | Status: SHIPPED | OUTPATIENT
Start: 2018-07-24 | End: 2018-11-12 | Stop reason: SDUPTHER

## 2018-08-02 ENCOUNTER — OFFICE VISIT (OUTPATIENT)
Dept: FAMILY MEDICINE CLINIC | Facility: CLINIC | Age: 63
End: 2018-08-02

## 2018-08-02 VITALS
BODY MASS INDEX: 36.25 KG/M2 | HEART RATE: 74 BPM | WEIGHT: 212.3 LBS | SYSTOLIC BLOOD PRESSURE: 122 MMHG | DIASTOLIC BLOOD PRESSURE: 62 MMHG | HEIGHT: 64 IN | OXYGEN SATURATION: 97 %

## 2018-08-02 DIAGNOSIS — M54.42 CHRONIC MIDLINE LOW BACK PAIN WITH LEFT-SIDED SCIATICA: Primary | ICD-10-CM

## 2018-08-02 DIAGNOSIS — G89.29 CHRONIC MIDLINE LOW BACK PAIN WITH LEFT-SIDED SCIATICA: Primary | ICD-10-CM

## 2018-08-02 NOTE — PROGRESS NOTES
ID: Lana Mata    CC:   Chief Complaint   Patient presents with   • Hip Pain   • Personal Problem     want ref to sports med       Subjective:     HPI       Lana Mata is a 63 y.o. female who presents for back pain to bilateral hips.  Patient injured herself in 1995 and has had several back surgeries.  Patient states her back always hurts and is a 3/10 currently.  It is described as stinging but it also has a constant pressure pain.  Starts in back and radiates to both hips.  Patient states she has numbness and tingling of the left foot.  This comes and goes randomly.  Back pain is worse with twisting, bending and walking.  The worst it has been is an 8/10.  For the back pain she has had 3 back surgeries, pain medications, shots, and physical therapy.  Patient wants a duarte round and needs a new referral to sports medicine for evaluation.      Preventative:  Over the past 2 weeks, have you felt down, depressed, or hopeless?Yes   Over the past 2 weeks, have you felt little interest or pleasure in doing things?Yes currently she is on zoloft and is happy with the current dosage.  She thinks that is due to stress of her son being in [facility].    Clinical depression screening refused by patient.No     On osteoporosis therapy?No     Past Medical Hx:  Past Medical History:   Diagnosis Date   • Abnormal gait 11/28/2012   • Abrasion 02/26/2015   • Acute ankle pain 03/31/2016   • Acute bronchitis 09/04/2015   • Allergic rhinitis 05/22/2012   • Asthenia 05/22/2014   • Atrial fibrillation (CMS/[facility]) 03/04/2016    - converted    • Attempted suicide    • Benign essential hypertension 03/04/2016   • Cardiovascular stress test abnormal 03/04/2016   • Cellulitis of knee 03/03/2015   • Chest pain 04/02/2015   • Chronic pain disorder    • Common cold 06/24/2014   • Congestive heart failure (CMS/[facility]) 02/04/2016   • Depressive disorder 05/22/2015    suspect more complicated psych issues      • Gastroesophageal reflux  disease 01/17/2013   • Generalized anxiety disorder 02/04/2016   • H/O echocardiogram     Normal LV function with Ef of 60-65%.Mildly dilated right ventricle with normal function. Grade 1B diastolic dysfunction with mild CLVH.NO sig. valvular regurg. or stenosis.   • Headache 10/27/2014   • Hyperlipidemia 03/04/2016   • Low back pain 06/30/2016    Need for prophylactic vaccination against Streptococcus pneumoniae [pneumococcus]    01/23/2015    • Neck pain 06/30/2016   • Obstructive sleep apnea of adult 07/30/2015   • Obstructive sleep apnea syndrome    • Osteoarthritis    • Osteoporosis 06/30/2016   • Pain in right shoulder 06/30/2016   • Pain in right shoulder 04/24/2014   • Shortness of breath 04/02/2015   • Skin ulcer (CMS/[facility]) 04/13/2015    left   • Thyrotoxicosis with or without goiter 10/24/2014   • Urinary incontinence 05/22/2014       Past Surgical Hx:  Past Surgical History:   Procedure Laterality Date   • BACK SURGERY  08/07/2015    Removal of intstrumentation,L5-S1.Exploration of fusion,L5-S1.Posterolateral fusion,L4-L5.Posterior segmental spinal instrumentation L4-5.Transforaminal interbody fusion Through right sided approach.Performed at    • COLONOSCOPY  05/04/2016    will order cologuard   • INJECTION OF MEDICATION  03/23/2015    celestone   • INJECTION OF MEDICATION  05/04/2016    KENALOG(6)   • LUMBAR FUSION      discectomy fusion with rods L4-S1   • LUMBAR LAMINECTOMY  1995   • MANDIBLE FRACTURE SURGERY      JAW WIRED SHUT   • MOUTH SURGERY  10/05/2015    Fractured mandible. Removal of arch bars.   • MOUTH SURGERY  1985    Fractured mandible. Removal of arch bars.   • NASAL SEPTUM SURGERY     • RHINOPLASTY     • TUBAL ABDOMINAL LIGATION  1986       Health Maintenance:  Health Maintenance   Topic Date Due   • INFLUENZA VACCINE  08/01/2018   • ZOSTER VACCINE (2 of 2) 08/02/2019 (Originally 2/9/2017)   • DXA SCAN  08/15/2018   • MAMMOGRAM  08/16/2018   • LIPID PANEL  01/31/2019   • MEDICARE ANNUAL  WELLNESS  02/07/2019   • PAP SMEAR  12/15/2019   • COLONOSCOPY  11/07/2026   • TDAP/TD VACCINES (3 - Td) 02/07/2028   • HEPATITIS C SCREENING  Completed       Current Meds:    Current Outpatient Prescriptions:   •  aMILoride (MIDAMOR) 5 MG tablet, Take 1 tablet by mouth Daily., Disp: 30 tablet, Rfl: 6  •  ARIPiprazole (ABILIFY) 5 MG tablet, Take 5 mg by mouth Daily. At bedtime, Disp: , Rfl:   •  aspirin 325 MG tablet, Take 325 mg by mouth Daily. At bedtime, Disp: , Rfl:   •  atorvastatin (LIPITOR) 20 MG tablet, Take 1 tablet by mouth Daily., Disp: 14 tablet, Rfl: 0  •  choline fenofibrate (TRILIPIX) 135 MG capsule, Take 1 capsule by mouth Daily. (Patient taking differently: Take 135 mg by mouth Daily. At bedtime), Disp: 90 capsule, Rfl: 3  •  diltiaZEM CD (CARDIZEM CD) 180 MG 24 hr capsule, Take 1 capsule by mouth Daily., Disp: 30 capsule, Rfl: 6  •  diltiazem XR (DILACOR XR) 180 MG 24 hr capsule, Take 1 capsule by mouth Daily. (Patient taking differently: Take 180 mg by mouth Daily. At bedtime), Disp: 30 capsule, Rfl: 2  •  lisinopril (PRINIVIL,ZESTRIL) 20 MG tablet, Take 20 mg by mouth Daily., Disp: , Rfl:   •  LORazepam (ATIVAN) 1 MG tablet, Take 1 mg by mouth 2 (Two) Times a Day. Scheduled to take in the morning and one with dinner, Disp: , Rfl: 0  •  methIMAzole (TAPAZOLE) 5 MG tablet, Take 2.5 mg by mouth Daily., Disp: , Rfl:   •  metoprolol tartrate (LOPRESSOR) 25 MG tablet, Take 1 tablet by mouth Every 12 (Twelve) Hours., Disp: 60 tablet, Rfl: 6  •  nitroglycerin (NITROSTAT) 0.4 MG SL tablet, Place 1 tablet under the tongue See Admin Instructions. PRN cp, if not relieved in 5 min repeat and go to ER, Disp: 25 tablet, Rfl: 12  •  omeprazole (priLOSEC) 20 MG capsule, Take 1 capsule by mouth Daily. (Patient taking differently: Take 20 mg by mouth Daily. At bedtime), Disp: 90 capsule, Rfl: 3  •  potassium chloride (K-DUR) 10 MEQ CR tablet, Take 1 tablet by mouth 2 (Two) Times a Day With Meals., Disp: 180 tablet,  Rfl: 3  •  rivaroxaban (XARELTO) 20 MG tablet, Take 1 tablet by mouth Daily., Disp: 90 tablet, Rfl: 3  •  sertraline (ZOLOFT) 100 MG tablet, Take 150 mg by mouth Daily., Disp: , Rfl: 1  •  cefdinir (OMNICEF) 300 MG capsule, Take 1 capsule by mouth 2 (Two) Times a Day., Disp: 20 capsule, Rfl: 0  •  metoprolol succinate XL (TOPROL-XL) 25 MG 24 hr tablet, TAKE 1 TABLET BY MOUTH DAILY., Disp: 30 tablet, Rfl: 2  •  predniSONE (DELTASONE) 10 MG tablet, Daily dose: 5 tabs for 2 days, then 4 tabs for 2 days, then 3 tabs for 2 days, then 2 tabs for 2 days, then 1 tab for 2 days, Disp: 30 tablet, Rfl: 0    Allergies:  Codeine; Latex; Morphine and related; and Pravachol [pravastatin sodium]    Family Hx:  Family History   Problem Relation Age of Onset   • Hypertension Mother    • Hyperlipidemia Mother    • Mental illness Mother    • Thyroid disease Mother    • Diabetes Maternal Grandmother    • Cancer Maternal Grandmother    • Depression Other    • Diabetes Other    • Heart disease Other    • Hypertension Other    • Osteoporosis Other    • Thyroid disease Other    • Cancer Maternal Aunt    • Cancer Paternal Aunt         Social History:  Social History     Social History   • Marital status:      Spouse name: N/A   • Number of children: N/A   • Years of education: N/A     Occupational History   • Not on file.     Social History Main Topics   • Smoking status: Never Smoker   • Smokeless tobacco: Never Used      Comment: smoking cessation    • Alcohol use No   • Drug use: No      Comment: lortab / oxycontin   • Sexual activity: Defer     Other Topics Concern   • Not on file     Social History Narrative   • No narrative on file       Review of Systems   Constitutional: Negative for activity change, appetite change, fatigue and fever.   HENT: Negative for ear pain and sore throat.    Eyes: Negative for pain and visual disturbance.   Respiratory: Negative for cough and shortness of breath.    Cardiovascular: Negative for chest  "pain and palpitations.   Gastrointestinal: Negative for abdominal pain and nausea.   Endocrine: Negative for cold intolerance and heat intolerance.   Genitourinary: Negative for difficulty urinating and dysuria.   Musculoskeletal: Positive for back pain. Negative for arthralgias and gait problem.   Skin: Negative for color change and rash.   Neurological: Negative for dizziness, weakness and headaches.   Hematological: Negative for adenopathy. Does not bruise/bleed easily.   Psychiatric/Behavioral: Negative for agitation, confusion and sleep disturbance.           Objective:     /62 (BP Location: Left arm, Patient Position: Sitting, Cuff Size: Large Adult)   Pulse 74   Ht 162.6 cm (64\")   Wt 96.3 kg (212 lb 4.8 oz)   SpO2 97%   BMI 36.44 kg/m²     Physical Exam   Constitutional: She is oriented to person, place, and time. She appears well-developed and well-nourished. No distress.   HENT:   Head: Normocephalic and atraumatic.   Nose: Nose normal.   Eyes: Conjunctivae are normal. Right eye exhibits no discharge. Left eye exhibits no discharge.   Neck: Neck supple.   Cardiovascular: Normal rate, regular rhythm and normal heart sounds.  Exam reveals no gallop and no friction rub.    No murmur heard.  Pulmonary/Chest: Effort normal and breath sounds normal. No accessory muscle usage. No respiratory distress. She has no wheezes. She has no rales. She exhibits no tenderness.   Abdominal: Soft. Bowel sounds are normal. She exhibits no distension. There is no tenderness.   Musculoskeletal: She exhibits no edema or deformity.   Neurological: She is alert and oriented to person, place, and time.   Skin: Skin is warm and dry. No rash noted. She is not diaphoretic. No erythema. No pallor.        Left hip with decreased passive range of motion, + left leg straight leg raise test   Psychiatric: She has a normal mood and affect. Her behavior is normal.              Assessment/Plan:     Lana was seen today for hip pain " and personal problem.    Diagnoses and all orders for this visit:    Chronic midline low back pain with left-sided sciatica  -     Ambulatory Referral to Orthopedic Surgery  -     Ambulatory Referral to Physical Therapy      Patient was seen today for face to face for power wheel chair assessment and was sent to Physical therapy for official assessment.      Follow-up:     Return in about 6 months (around 2/2/2019) for Annual.      Goals        Patient Stated    • management of chronic medical conditions (pt-stated)            Barriers to goals: none          Patient's Body mass index is 36.44 kg/m². BMI is above normal parameters. Recommendations include: educational material, exercise counseling and nutrition counseling.      Health Maintenance   Topic Date Due   • INFLUENZA VACCINE  08/01/2018   • ZOSTER VACCINE (2 of 2) 08/02/2019 (Originally 2/9/2017)   • DXA SCAN  08/15/2018   • MAMMOGRAM  08/16/2018   • LIPID PANEL  01/31/2019   • MEDICARE ANNUAL WELLNESS  02/07/2019   • PAP SMEAR  12/15/2019   • COLONOSCOPY  11/07/2026   • TDAP/TD VACCINES (3 - Td) 02/07/2028   • HEPATITIS C SCREENING  Completed   does not smoke  occasional/rare  eat more fruits and vegetables, decrease soda or juice intake, increase physical activity, reduce screen time, cut out extra servings and keep TV off during meals        RISK SCORE: 3          This document has been electronically signed by Bernie Ortiz MD on August 5, 2018 2:29 PM

## 2018-08-06 ENCOUNTER — APPOINTMENT (OUTPATIENT)
Dept: SLEEP MEDICINE | Facility: HOSPITAL | Age: 63
End: 2018-08-06

## 2018-08-08 ENCOUNTER — HOSPITAL ENCOUNTER (OUTPATIENT)
Dept: PHYSICAL THERAPY | Facility: HOSPITAL | Age: 63
Setting detail: THERAPIES SERIES
Discharge: HOME | End: 2018-08-08
Attending: FAMILY MEDICINE

## 2018-08-08 DIAGNOSIS — M51.36 DDD (DEGENERATIVE DISC DISEASE), LUMBAR: Primary | ICD-10-CM

## 2018-08-08 DIAGNOSIS — I48.0 PAROXYSMAL ATRIAL FIBRILLATION (HCC): ICD-10-CM

## 2018-08-08 DIAGNOSIS — M54.16 LUMBAR RADICULOPATHY: ICD-10-CM

## 2018-08-08 DIAGNOSIS — I50.9 CONGESTIVE HEART FAILURE, UNSPECIFIED CONGESTIVE HEART FAILURE CHRONICITY, UNSPECIFIED CONGESTIVE HEART FAILURE TYPE: ICD-10-CM

## 2018-08-08 PROCEDURE — 97162 PT EVAL MOD COMPLEX 30 MIN: CPT | Performed by: PHYSICAL THERAPIST

## 2018-08-08 PROCEDURE — G8980 MOBILITY D/C STATUS: HCPCS | Performed by: PHYSICAL THERAPIST

## 2018-08-08 PROCEDURE — G8978 MOBILITY CURRENT STATUS: HCPCS | Performed by: PHYSICAL THERAPIST

## 2018-08-08 PROCEDURE — G8979 MOBILITY GOAL STATUS: HCPCS | Performed by: PHYSICAL THERAPIST

## 2018-08-08 NOTE — THERAPY EVALUATION
Outpatient Physical Therapy Ortho Initial Evaluation/ Mobility exam  [facility]     Patient Name: Lana Mata  : 1955  MRN: [id number]   Address: [address] [place] [address]                 Phone:      [phone]  Today's Date: 2018      Visit Date: 2018    Patient Active Problem List   Diagnosis   • Thyrotoxicosis with or without goiter   • Osteoporosis   • Pain in right shoulder   • Low back pain   • Hyperlipidemia   • Generalized anxiety disorder   • Depressive disorder   • Congestive heart failure (CMS/HCC)   • Atrial fibrillation (CMS/HCC)   • Benign essential hypertension   • Asthenia   • Abnormal gait   • Obstructive sleep apnea of adult   • DDD (degenerative disc disease), lumbar   • Lumbar radiculopathy   • History of lumbar surgery   • Lumbar spondylolysis   • Long term (current) use of opiate analgesic   • Paroxysmal atrial fibrillation (CMS/HCC)   • Hospital discharge follow-up   • Urinary incontinence   • Skin ulcer (CMS/HCC)   • Shortness of breath   • Shortness of breath   • Osteoarthritis   • Obstructive sleep apnea syndrome   • Neck pain   • Headache   • H/O echocardiogram   • Gastroesophageal reflux disease   • Common cold   • Chronic pain disorder   • Chest pain   • Cellulitis of knee   • Cardiovascular stress test abnormal   • Attempted suicide   • Allergic rhinitis   • Acute bronchitis   • Acute ankle pain   • Abrasion        Past Medical History:   Diagnosis Date   • Abnormal gait 2012   • Abrasion 2015   • Acute ankle pain 2016   • Acute bronchitis 2015   • Allergic rhinitis 2012   • Asthenia 2014   • Atrial fibrillation (CMS/HCC) 2016    - converted    • Attempted suicide    • Benign essential hypertension 2016   • Cardiovascular stress test abnormal 2016   • Cellulitis of knee 2015   • Chest pain 2015   • Chronic pain disorder    • Common cold 2014   • Congestive heart failure  (CMS/[facility]) 02/04/2016   • Depressive disorder 05/22/2015    suspect more complicated psych issues      • Gastroesophageal reflux disease 01/17/2013   • Generalized anxiety disorder 02/04/2016   • H/O echocardiogram     Normal LV function with Ef of 60-65%.Mildly dilated right ventricle with normal function. Grade 1B diastolic dysfunction with mild CLVH.NO sig. valvular regurg. or stenosis.   • Headache 10/27/2014   • Hyperlipidemia 03/04/2016   • Low back pain 06/30/2016    Need for prophylactic vaccination against Streptococcus pneumoniae [pneumococcus]    01/23/2015    • Neck pain 06/30/2016   • Obstructive sleep apnea of adult 07/30/2015   • Obstructive sleep apnea syndrome    • Osteoarthritis    • Osteoporosis 06/30/2016   • Pain in right shoulder 06/30/2016   • Pain in right shoulder 04/24/2014   • Shortness of breath 04/02/2015   • Skin ulcer (CMS/[facility]) 04/13/2015    left   • Thyrotoxicosis with or without goiter 10/24/2014   • Urinary incontinence 05/22/2014        Past Surgical History:   Procedure Laterality Date   • BACK SURGERY  08/07/2015    Removal of intstrumentation,L5-S1.Exploration of fusion,L5-S1.Posterolateral fusion,L4-L5.Posterior segmental spinal instrumentation L4-5.Transforaminal interbody fusion Through right sided approach.Performed at    • COLONOSCOPY  05/04/2016    will order cologuard   • INJECTION OF MEDICATION  03/23/2015    celestone   • INJECTION OF MEDICATION  05/04/2016    KENALOG(6)   • LUMBAR FUSION      discectomy fusion with rods L4-S1   • LUMBAR LAMINECTOMY  1995   • MANDIBLE FRACTURE SURGERY      JAW WIRED SHUT   • MOUTH SURGERY  10/05/2015    Fractured mandible. Removal of arch bars.   • MOUTH SURGERY  1985    Fractured mandible. Removal of arch bars.   • NASAL SEPTUM SURGERY     • RHINOPLASTY     • TUBAL ABDOMINAL LIGATION  1986       Visit Dx:     ICD-10-CM ICD-9-CM   1. DDD (degenerative disc disease), lumbar M51.36 722.52   2. Congestive heart failure, unspecified  "congestive heart failure chronicity, unspecified congestive heart failure type (CMS/[facility]) I50.9 428.0   3. Lumbar radiculopathy M54.16 724.4   4. Paroxysmal atrial fibrillation (CMS/[facility]) I48.0 427.31             Patient History     Row Name 08/08/18 1200             History    Chief Complaint Difficulty Walking   difficulty with ADLs  -DD      Date Current Problem(s) Began --   first back surgery was about 20 years ago  -DD      Brief Description of Current Complaint Patient presents today for wheelchair evaluation for new [employer] chair.  She states she has used one previously is currently not able to be repaired and has not used it for approximately 2 years.  She has decreased mobility within the household and lives alone she does not go into the community without assistance.  She has no way to transport the chair it is for home use  -DD      Smoking Status none  -DD      Hand Dominance right-handed  -DD         Pain     Pain at Present 3  -DD      Pain Frequency Constant/continuous  -DD        User Key  (r) = Recorded By, (t) = Taken By, (c) = Cosigned By    Initials Name Provider Type    DD Patricia Valentine, PT Physical Therapist           Patient is 64 yo  female , disabled. 5'4\", 212 lbs.  Patient has 4 grown children. And lives alone.  Household Barriers: 2 story home with all needs met by 1st floor, 5 steps to enter.  She does not plan to use chair outside of house.  Ramp: No, she plans to reside in an apartment her son is building on his property that is handicap accessible at ground level.  Jocelyn in current home: [place]  Bathroom accessible per patient report: Yes  Bedroom accessible per patient report: Yes  Home equipment:  manual wheelchair, bedside commode, shower chair  Driveway: [place]  Mode of transportation: daughter transports in car  Does the patient drive: No  What type of vehicle will be used: Patient states around chair will be for home use as she does not currently " have ability to transport it      Medications    aMILoride (MIDAMOR) 5 MG tablet    ARIPiprazole (ABILIFY) 5 MG tablet    aspirin 325 MG tablet    atorvastatin (LIPITOR) 20 MG tablet    cefdinir (OMNICEF) 300 MG capsule    choline fenofibrate (TRILIPIX) 135 MG capsule    diltiaZEM CD (CARDIZEM CD) 180 MG 24 hr capsule    diltiazem XR (DILACOR XR) 180 MG 24 hr capsule    lisinopril (PRINIVIL,ZESTRIL) 20 MG tablet    LORazepam (ATIVAN) 1 MG tablet    methIMAzole (TAPAZOLE) 5 MG tablet    metoprolol succinate XL (TOPROL-XL) 25 MG 24 hr tablet    metoprolol tartrate (LOPRESSOR) 25 MG tablet    nitroglycerin (NITROSTAT) 0.4 MG SL tablet    omeprazole (priLOSEC) 20 MG capsule    potassium chloride (K-DUR) 10 MEQ CR tablet    predniSONE (DELTASONE) 10 MG tablet    rivaroxaban (XARELTO) 20 MG tablet    sertraline (ZOLOFT) 100 MG tablet     Allergies:    Severity Noted Reaction Type Reactions   Codeine Low 08/09/2016    Nausea And Vomiting   Latex Low 08/09/2016    Rash   Morphine And Related Low 03/09/2017    Hallucinations   Pravachol [pravastatin Sodium]         Communication: Patient is verbal has hearing loss in left year at 100%    Current ambulation/mobility:  Does the patient ambulate: Yes  Distance: 75 feet  Assistive device used: Quad cane, patient is without it this date  Distance a day: Patient states she typically only ambulates within the household throughout the day she does not go into the community independently and uses mobility devices available at her destination.  Gait deviations: Patient presents with slight hip flexion with a lateral shift to the right.  She has decreased toe clearance on the left due to foot drop from back surgery certain depth left lower extremity due to hip weakness.  Is the patient able to push a manual wheelchair: No-Patient lacks upper extremity strength and cardiovascular endurance to propel the wheelchair.  Current gait: Unsteady with short stride and slowed ghada  Mobility  in home: Needs assistive device and is unsafe  Fall status/recent falls/frequency: Patient is had frequent falls with approximately 2 per month.  The foot drop is primary cause of the falls as well as lower extremity weakness on the left and giving way.  She has a fall alarm she can use of for assistance  Physical exam:  Vitals O2 sat  96, HR 58.  After ambulation O2 sat 94 and HR 69.  Patient presents under no apparent distress at this time no assistive device present.  She uses upper extremities for sit to stand transfer weight shifts to the right in standing position excessive external rotation on the left and knee valgus noted bilaterally.  She is forward flexed through static stance.  Static balance fair.  Transfers are independent with use of upper extremities.    Range of motion: Bilateral upper extremities and lower extremities are within functional limits.  Strength: Bilateral upper extremities within functional limits for light ADLs.  Manual muscle test 4+/5 flexion 4/5 abduction, 4+ at biceps elbow flexion.  Manual muscle testing bilateral lower extremities hip flexor 4 minus left, 4 right abduction 4 minus bilaterally knee extension right 4+ left 3 knee flexion bilaterally 4 minus, ankle dorsiflexion 4/5 on the right and 4 minus on the left  Tone: No significant findings  Balance sitting within functional limits, standing balance with widened base of support or use of assistive device with standing greater than 1 minute.  Coordination: Pronation within functional limits.  Neurovascular status: Dermatomal testing revealed deficits and L4 5 dermatomes of left lower extremity.  Transfers bed to chair and chair to bed independent with use of upper extremities.  Special test: Timed up and go 10 feet-23.39 seconds          Current Wheelchair System:   and Model: BitGo  Age: Approximately 20 years  Seat Width: Unknown  Seat Depth: Unknown  Back Height: Unknown  Overall width: Unknown  Overall  length: Unknown  Seat Height from floor, rear: Unknown  Seat Height from floor, front: Unknown  Reason for ordering new system: Prior Hoveround model currently not in use and unable to be repaired.  Accessibility issues with current system:     Has the patient tried any other types of wheelchairs or scooters?: Yes    If so, what types? Alyce   Problems with other types:     Current Cushion: Unknown  Will the patient be able to properly maintain the cushion Independantly?: Yes  Has the patient tried any other types of cushions?:       Cardio-Respiratory Status: Impaired O2 sat 95, heart rate 63 at rest, decrease in O2 sat 93 with 50 foot ambulation    COGNITIVE VISUAL STATUS:   Memory: Intact   Problem Solving: Intact    Judgement: Intact   Attention/Concentration: Intact    Vision: Intact   Hearing:  Impaired Left        SENSATION:   Intact,   Does the patient have any current pressure sores?  No    Does the patient have a history of pressure sores?:  No  At Risk?:     ADL/IADL STATUS:  Dressing: Independent   Bathing: Independent   Feeding: Independent   Grooming/Hygeine: Independent   Toileting: Independent   Meal Prep: Independent   Home management:  With assistance   Bowel management: Continent   Bladder Management: Independent     Manual W/C propulsion:  Unable   Operate power W/C with standard joystick: Independent   Operate power W/C with alternate controls: :  N/A  Able to perform weight shifts: : Independent   Hours spent sitting in Wheelchair each day: N/A    MISC. HOME  1) Will the patient be driving?  No  2) What type of vehicle will be used?N/A  3) If a car or truck, will the patient load & unload the w/c independently? N/A  4) Does the patient work or go to school?  No  5) Home Accessibility?  a. Ramp into house? No  i. Can the patient independently propel w/c on ramps?  No  ii. Comments:   b. Doorways accessible? Yes   c. Bathroom accessible? Yes   d. One/Two story?   Two  e. Basement?  No  f. If two  "is the patient required to go upstairs/downstairs? No  g. Jocelyn in the home?   i. Does/Would the patient have difficulty propelling on carpet? Yes  ii. Comments:  h. Driveway?   i. Does/Would the patient have difficulty propelling in grass/gravel? Yes No  ii. Comments:     Client’s Measurements  Across hips: 23.5\"  Hip to knee: 16\"  Knee to Foot 15\"  Hip to inferior angle of scapula: 12.5\"  Hip to top of shoulder:25\"  Hip to top of head:34\"  Hip to forearm with elbow bent:8\"  Chest width:  15\"  Chest Depth: 14\"  Shoe size: 8  Height: 5'4\"  Weight: 212  Orthopedic deformities: Increased thoracic kyphosis, decreased lumbar lordosis  Does the patient suffer from spasms?  No  Will lateral supports be needed:  No  Will a positioning belt needed? No      Coverage  Mobility Assessment Basic Coverage: The patient shows to have limitations of mobility that impair his/per ability to participate in ,mobility related activities of daily living and instrumental activities of daily living to include but not limited to feeding, grooming, toileting, dressing, meal preparation, light housekeeping, and bathing either:  o Prevents the patient from accomplishing an ADL/IADL entirely, or-No  o Patient can accomplish but with risk to safety, or  o Patient can accomplish but not within a reasonable amount of time.    Considering Cane or Walker: Will a cane/walker allow the patient to participate in ADLs/IADLs safely and in a timely manner?  No    Considering a manual wheelchair: Does the patient have sufficient upper extremity strength or endurance necessary to self propel an optimally configured manual wheelchair?  No    Considering a scooter/POV: Scooter requires the patient to have sufficient trunk strength, hand , and upper extremity function, balance to sit upright, requires ability to stand and pivot and may require more space in the home to maneuver. Given these requirements, is the assessment of the patient and their living " environment, is a scooter appropriate?  No    SPECIFIC PMD COVERAGE CRITERIA: Considering a power wheelchair? yes  Is the patient willing or do they have the cognition, judgment, and/or vision to participate in ALD/IALDs? Yes     Does the patient have the functional ability to consistently access a drive control and judgment and visual ability to safely operate a power wheelchair to participate in ADL/IADLs with the home environment? Yes     Is the patient able to safely operate a power wheelchair?  Yes   Is the patient’s weight is less than or equal to the weight capacity on the power wheelchair? Yes    Does the patient’s home provides adequate access between rooms, maneuvering space, and surfaces for the operation of the power wheelchair? Yes No  Will use of the power wheelchair significantly improve the patient’s ability to participate in ADLs/IADLs and will the patient use it thin the home? Yes    GOALS: Patient Goals (4 weeks):  1) To increase mobility and independence with ADLs/IADLs.    Long Term Goal (4 weeks):  1) Pt. to make return phone call if there is a concern or problem obtaining new seating system.  2) Pt. to return for further evaluation &/or care if warranted.              PT OP Goals     Row Name 08/08/18 1200          PT Short Term Goals    STG Date to Achieve 09/05/18  -DD     STG 1 Patient to obtain mobility exam and provide DME with physician and therapy examination papers to obtain a new power mobility device  -DD        Time Calculation    PT Goal Re-Cert Due Date 09/05/18  -DD       User Key  (r) = Recorded By, (t) = Taken By, (c) = Cosigned By    Initials Name Provider Type    Patricia Funk, PT Physical Therapist                PT Assessment/Plan     Row Name 08/08/18 1219          PT Assessment    Assessment Comments Patient has ability to communicate and interact appropriately during evaluation and exam.  She has functional mobility deficits in the lower extremities which  impact gait endurance as well as other medical comorbidities that limit breathing and endurance.  It is recommended to receive a standard type power mobility device with standard we'll type and standard electric propulsion.  Normal foot plates positioning or armrests necessary.  Patient will not need head support or tilt devices.  Patient understands proposed treatment plan and follow-up with wheelchair supplier of choice.  -DD     Rehab Potential Good  -DD        PT Plan    PT Plan Comments Wheelchair assessment only no further physical therapy intervention necessary at this time.  Wheelchair assessment [facility] to [facility] at patient's request.  -DD       User Key  (r) = Recorded By, (t) = Taken By, (c) = Cosigned By    Initials Name Provider Type    DD Patricia Valentine, PT Physical Therapist          Time Calculation:     Therapy Suggested Charges     Code   Minutes Charges    None             Start Time: 1100  Stop Time: 1149  Time Calculation (min): 49 min  Total Timed Code Minutes- PT: 0 minute(s)     Therapy Charges for Today     Code Description Service Date Service Provider Modifiers Qty    58556043225 HC PT MOBILITY CURRENT 8/8/2018 Patricia Valentine, PT GP, CL 1    04889919026 HC PT MOBILITY PROJECTED 8/8/2018 Patricia Valentine, PT GP, CL 1    10878840896 HC PT MOBILITY DISCHARGE 8/8/2018 Patricia Valentine, PT GP, CL 1    13854442069 HC PT EVAL MOD COMPLEXITY 3 8/8/2018 Patricia Valentine, PT GP 1          PT G-Codes  PT Professional Judgement Used?: Yes  Functional Limitation: Mobility: Walking and moving around  Mobility: Walking and Moving Around Current Status (): At least 60 percent but less than 80 percent impaired, limited or restricted  Mobility: Walking and Moving Around Goal Status (): At least 60 percent but less than 80 percent impaired, limited or restricted  Mobility: Walking and Moving Around Discharge Status (): At least 60 percent but less than  80 percent impaired, limited or restricted         Patricia Valentine, PT, ATC, DPT 8/8/2018

## 2018-08-20 ENCOUNTER — TELEPHONE (OUTPATIENT)
Dept: FAMILY MEDICINE CLINIC | Facility: CLINIC | Age: 63
End: 2018-08-20

## 2018-08-22 ENCOUNTER — DOCUMENTATION (OUTPATIENT)
Dept: OTOLARYNGOLOGY | Facility: CLINIC | Age: 63
End: 2018-08-22

## 2018-08-22 NOTE — PROGRESS NOTES
Patient was notified by phone to see if she would like to reschedule her follow up appointment.  This has been rescheduled for 09/13/2018

## 2018-08-30 ENCOUNTER — HOSPITAL ENCOUNTER (OUTPATIENT)
Dept: SLEEP MEDICINE | Facility: HOSPITAL | Age: 63
Discharge: HOME | End: 2018-08-30
Attending: INTERNAL MEDICINE | Admitting: INTERNAL MEDICINE

## 2018-08-30 DIAGNOSIS — G47.33 OSA (OBSTRUCTIVE SLEEP APNEA): ICD-10-CM

## 2018-08-30 PROCEDURE — 95806 SLEEP STUDY UNATT&RESP EFFT: CPT

## 2018-08-30 PROCEDURE — 95806 SLEEP STUDY UNATT&RESP EFFT: CPT | Performed by: INTERNAL MEDICINE

## 2018-09-05 ENCOUNTER — TRANSCRIBE ORDERS (OUTPATIENT)
Dept: LAB | Facility: HOSPITAL | Age: 63
End: 2018-09-05

## 2018-09-05 ENCOUNTER — APPOINTMENT (OUTPATIENT)
Dept: LAB | Facility: HOSPITAL | Age: 63
End: 2018-09-05

## 2018-09-05 DIAGNOSIS — E05.90 THYROTOXICOSIS WITHOUT THYROID STORM, UNSPECIFIED THYROTOXICOSIS TYPE: Primary | ICD-10-CM

## 2018-09-05 LAB
ALBUMIN SERPL-MCNC: 4.2 G/DL (ref 3.4–4.8)
ALBUMIN/GLOB SERPL: 1.3 G/DL (ref 1.1–1.8)
ALP SERPL-CCNC: 56 U/L (ref 38–126)
ALT SERPL W P-5'-P-CCNC: 24 U/L (ref 9–52)
ANION GAP SERPL CALCULATED.3IONS-SCNC: 8 MMOL/L (ref 5–15)
AST SERPL-CCNC: 37 U/L (ref 14–36)
BASOPHILS # BLD AUTO: 0.04 10*3/MM3 (ref 0–0.2)
BASOPHILS NFR BLD AUTO: 0.5 % (ref 0–2)
BILIRUB SERPL-MCNC: 0.5 MG/DL (ref 0.2–1.3)
BUN BLD-MCNC: 18 MG/DL (ref 7–21)
BUN/CREAT SERPL: 14.1 (ref 7–25)
CALCIUM SPEC-SCNC: 9.5 MG/DL (ref 8.4–10.2)
CHLORIDE SERPL-SCNC: 106 MMOL/L (ref 95–110)
CO2 SERPL-SCNC: 26 MMOL/L (ref 22–31)
CREAT BLD-MCNC: 1.28 MG/DL (ref 0.5–1)
DEPRECATED RDW RBC AUTO: 46.2 FL (ref 36.4–46.3)
EOSINOPHIL # BLD AUTO: 0.49 10*3/MM3 (ref 0–0.7)
EOSINOPHIL NFR BLD AUTO: 6.3 % (ref 0–7)
ERYTHROCYTE [DISTWIDTH] IN BLOOD BY AUTOMATED COUNT: 15 % (ref 11.5–14.5)
GFR SERPL CREATININE-BSD FRML MDRD: 42 ML/MIN/1.73 (ref 45–104)
GLOBULIN UR ELPH-MCNC: 3.3 GM/DL (ref 2.3–3.5)
GLUCOSE BLD-MCNC: 99 MG/DL (ref 60–100)
HCT VFR BLD AUTO: 41.3 % (ref 35–45)
HGB BLD-MCNC: 13.5 G/DL (ref 12–15.5)
IMM GRANULOCYTES # BLD: 0.02 10*3/MM3 (ref 0–0.02)
IMM GRANULOCYTES NFR BLD: 0.3 % (ref 0–0.5)
LYMPHOCYTES # BLD AUTO: 2.45 10*3/MM3 (ref 0.6–4.2)
LYMPHOCYTES NFR BLD AUTO: 31.5 % (ref 10–50)
MCH RBC QN AUTO: 27.7 PG (ref 26.5–34)
MCHC RBC AUTO-ENTMCNC: 32.7 G/DL (ref 31.4–36)
MCV RBC AUTO: 84.6 FL (ref 80–98)
MONOCYTES # BLD AUTO: 0.64 10*3/MM3 (ref 0–0.9)
MONOCYTES NFR BLD AUTO: 8.2 % (ref 0–12)
NEUTROPHILS # BLD AUTO: 4.13 10*3/MM3 (ref 2–8.6)
NEUTROPHILS NFR BLD AUTO: 53.2 % (ref 37–80)
PLATELET # BLD AUTO: 290 10*3/MM3 (ref 150–450)
PMV BLD AUTO: 10.6 FL (ref 8–12)
POTASSIUM BLD-SCNC: 4.8 MMOL/L (ref 3.5–5.1)
PROT SERPL-MCNC: 7.5 G/DL (ref 6.3–8.6)
RBC # BLD AUTO: 4.88 10*6/MM3 (ref 3.77–5.16)
SODIUM BLD-SCNC: 140 MMOL/L (ref 137–145)
T4 FREE SERPL-MCNC: 1.35 NG/DL (ref 0.78–2.19)
TSH SERPL DL<=0.05 MIU/L-ACNC: 2.32 MIU/ML (ref 0.46–4.68)
WBC NRBC COR # BLD: 7.77 10*3/MM3 (ref 3.2–9.8)

## 2018-09-05 PROCEDURE — 36415 COLL VENOUS BLD VENIPUNCTURE: CPT | Performed by: INTERNAL MEDICINE

## 2018-09-05 PROCEDURE — 84443 ASSAY THYROID STIM HORMONE: CPT | Performed by: INTERNAL MEDICINE

## 2018-09-05 PROCEDURE — 84439 ASSAY OF FREE THYROXINE: CPT | Performed by: INTERNAL MEDICINE

## 2018-09-05 PROCEDURE — 85025 COMPLETE CBC W/AUTO DIFF WBC: CPT | Performed by: INTERNAL MEDICINE

## 2018-09-05 PROCEDURE — 80053 COMPREHEN METABOLIC PANEL: CPT | Performed by: INTERNAL MEDICINE

## 2018-09-07 DIAGNOSIS — G47.33 OSA (OBSTRUCTIVE SLEEP APNEA): Primary | ICD-10-CM

## 2018-09-11 ENCOUNTER — TELEPHONE (OUTPATIENT)
Dept: FAMILY MEDICINE CLINIC | Facility: CLINIC | Age: 63
End: 2018-09-11

## 2018-09-11 ENCOUNTER — TELEPHONE (OUTPATIENT)
Dept: SLEEP MEDICINE | Facility: HOSPITAL | Age: 63
End: 2018-09-11

## 2018-09-11 NOTE — TELEPHONE ENCOUNTER
[facility] called, they are wanting to check on the status of the order that was sent over for the pt on 8/7/2018.   Said that it is not showing in their system where it has been completed.        [phone]  Ref ID [id number]

## 2018-09-11 NOTE — TELEPHONE ENCOUNTER
[facility] called to tell the sleep center that Ms. Mata insurance will not cover a new CPAP machine since her machine is less than 3 yrs old and patient has not been compliant.  So I called Ms Mata to let her know that despite Dr. Clemente ordering her a new machine, insurance was not going to pay for it.  She was instructed to get new supplies and start wearing the CPAP machine she currently has that is set on 7 cmH2O.   Patient understood and had started using her home unit last night.

## 2018-09-13 ENCOUNTER — OFFICE VISIT (OUTPATIENT)
Dept: OTOLARYNGOLOGY | Facility: CLINIC | Age: 63
End: 2018-09-13

## 2018-09-13 ENCOUNTER — TELEPHONE (OUTPATIENT)
Dept: FAMILY MEDICINE CLINIC | Facility: CLINIC | Age: 63
End: 2018-09-13

## 2018-09-13 VITALS — HEIGHT: 64 IN | TEMPERATURE: 96.9 F | WEIGHT: 215.2 LBS | BODY MASS INDEX: 36.74 KG/M2

## 2018-09-13 DIAGNOSIS — H91.20 SUDDEN-ONSET SENSORINEURAL HEARING LOSS: Primary | ICD-10-CM

## 2018-09-13 DIAGNOSIS — H93.12 TINNITUS OF LEFT EAR: ICD-10-CM

## 2018-09-13 DIAGNOSIS — R22.1 NECK MASS: ICD-10-CM

## 2018-09-13 PROCEDURE — 99213 OFFICE O/P EST LOW 20 MIN: CPT | Performed by: OTOLARYNGOLOGY

## 2018-09-13 NOTE — PATIENT INSTRUCTIONS

## 2018-09-13 NOTE — TELEPHONE ENCOUNTER
[facility] called, left voicemail, to check on the status of the orders for the pt, said that they have called several times.   They would like to speak to you about this, please call them at [phone], use ref id# [id number]

## 2018-09-13 NOTE — PROGRESS NOTES
Subjective   Lana Mata is a 63 y.o. female.   Follow-up hearing loss and neck mass    History of Present Illness     She notes no change in her hearing no new dizziness or vertigo.  She's not any ear drainage or pain in her neck is resolved is no mass palpable no lesion no drainage or swallowing or breathing problems    The following portions of the patient's history were reviewed and updated as appropriate: allergies, current medications, past family history, past medical history, past social history, past surgical history and problem list.      Current Outpatient Prescriptions:   •  aMILoride (MIDAMOR) 5 MG tablet, Take 1 tablet by mouth Daily., Disp: 30 tablet, Rfl: 6  •  ARIPiprazole (ABILIFY) 5 MG tablet, Take 5 mg by mouth Daily. At bedtime, Disp: , Rfl:   •  aspirin 325 MG tablet, Take 325 mg by mouth Daily. At bedtime, Disp: , Rfl:   •  atorvastatin (LIPITOR) 20 MG tablet, Take 1 tablet by mouth Daily., Disp: 14 tablet, Rfl: 0  •  choline fenofibrate (TRILIPIX) 135 MG capsule, Take 1 capsule by mouth Daily. (Patient taking differently: Take 135 mg by mouth Daily. At bedtime), Disp: 90 capsule, Rfl: 3  •  diltiaZEM CD (CARDIZEM CD) 180 MG 24 hr capsule, Take 1 capsule by mouth Daily., Disp: 30 capsule, Rfl: 6  •  diltiazem XR (DILACOR XR) 180 MG 24 hr capsule, Take 1 capsule by mouth Daily. (Patient taking differently: Take 180 mg by mouth Daily. At bedtime), Disp: 30 capsule, Rfl: 2  •  lisinopril (PRINIVIL,ZESTRIL) 20 MG tablet, Take 20 mg by mouth Daily., Disp: , Rfl:   •  LORazepam (ATIVAN) 1 MG tablet, Take 1 mg by mouth 2 (Two) Times a Day. Scheduled to take in the morning and one with dinner, Disp: , Rfl: 0  •  methIMAzole (TAPAZOLE) 5 MG tablet, Take 2.5 mg by mouth Daily., Disp: , Rfl:   •  metoprolol succinate XL (TOPROL-XL) 25 MG 24 hr tablet, TAKE 1 TABLET BY MOUTH DAILY., Disp: 30 tablet, Rfl: 2  •  metoprolol tartrate (LOPRESSOR) 25 MG tablet, Take 1 tablet by mouth Every 12 (Twelve)  Hours., Disp: 60 tablet, Rfl: 6  •  nitroglycerin (NITROSTAT) 0.4 MG SL tablet, Place 1 tablet under the tongue See Admin Instructions. PRN cp, if not relieved in 5 min repeat and go to ER, Disp: 25 tablet, Rfl: 12  •  omeprazole (priLOSEC) 20 MG capsule, Take 1 capsule by mouth Daily. (Patient taking differently: Take 20 mg by mouth Daily. At bedtime), Disp: 90 capsule, Rfl: 3  •  potassium chloride (K-DUR) 10 MEQ CR tablet, Take 1 tablet by mouth 2 (Two) Times a Day With Meals., Disp: 180 tablet, Rfl: 3  •  rivaroxaban (XARELTO) 20 MG tablet, Take 1 tablet by mouth Daily., Disp: 30 tablet, Rfl: 0  •  sertraline (ZOLOFT) 100 MG tablet, Take 150 mg by mouth Daily., Disp: , Rfl: 1    Allergies   Allergen Reactions   • Codeine Nausea And Vomiting   • Latex Rash   • Morphine And Related Hallucinations   • Pravachol [Pravastatin Sodium] Myalgia             Review of Systems   Constitutional: Negative for fever.   HENT: Positive for hearing loss and tinnitus. Negative for congestion, facial swelling and trouble swallowing.    Hematological: Negative for adenopathy.           Objective   Physical Exam   Constitutional: She is oriented to person, place, and time. She appears well-developed and well-nourished.   HENT:   Head: Normocephalic and atraumatic.   Right Ear: Hearing, tympanic membrane, external ear and ear canal normal.   Left Ear: Hearing, tympanic membrane, external ear and ear canal normal.   Nose: Nose normal. No mucosal edema, rhinorrhea, nasal deformity or septal deviation. No epistaxis. Right sinus exhibits no maxillary sinus tenderness and no frontal sinus tenderness. Left sinus exhibits no maxillary sinus tenderness and no frontal sinus tenderness.   Mouth/Throat: Uvula is midline, oropharynx is clear and moist and mucous membranes are normal. No trismus in the jaw. Normal dentition. No oropharyngeal exudate or posterior oropharyngeal edema. No tonsillar exudate.   Eyes: Conjunctivae are normal.   Neck:  Normal range of motion. Neck supple. No JVD present. No tracheal deviation present. No thyromegaly present.       Pulmonary/Chest: Effort normal.   Musculoskeletal: Normal range of motion.   Lymphadenopathy:        Head (right side): No submental, no submandibular, no tonsillar, no preauricular, no posterior auricular and no occipital adenopathy present.        Head (left side): No submental, no submandibular, no tonsillar, no preauricular, no posterior auricular and no occipital adenopathy present.     She has no cervical adenopathy.        Right cervical: No superficial cervical, no deep cervical and no posterior cervical adenopathy present.       Left cervical: No superficial cervical, no deep cervical and no posterior cervical adenopathy present.   Neurological: She is alert and oriented to person, place, and time. No cranial nerve deficit.   Finger to nose, alternating movements, gait and stance normal she she does use a cane she has normal heal to shin no nystagmus is noted   Skin: Skin is warm.   Psychiatric: She has a normal mood and affect. Her speech is normal and behavior is normal. Thought content normal.   Nursing note and vitals reviewed.          Assessment/Plan   Lana was seen today for follow-up.    Diagnoses and all orders for this visit:    Sudden-onset sensorineural hearing loss    Tinnitus of left ear    Neck mass    Neck mass resolved -call sooner if recurs   F/u 6 mon  Hearing aid eval for CROS hearing aids if any worsening or problems with her left ear

## 2018-09-14 ENCOUNTER — DOCUMENTATION (OUTPATIENT)
Dept: PHYSICAL THERAPY | Facility: HOSPITAL | Age: 63
End: 2018-09-14

## 2018-09-14 ENCOUNTER — TELEPHONE (OUTPATIENT)
Dept: FAMILY MEDICINE CLINIC | Facility: CLINIC | Age: 63
End: 2018-09-14

## 2018-09-14 NOTE — TELEPHONE ENCOUNTER
[facility] called, said that they just sent a new order.  They are needing to sign date and concur on that, and a new script for the wheelchair    Date on line line 3 has to be the day that you sign for the PT eval.       Should you have questions please call them at [phone] Account# [id number]

## 2018-09-19 ENCOUNTER — TELEPHONE (OUTPATIENT)
Dept: FAMILY MEDICINE CLINIC | Facility: CLINIC | Age: 63
End: 2018-09-19

## 2018-09-19 NOTE — TELEPHONE ENCOUNTER
Elizabeth called said that a final delivery document was sent to you.    They are needing it to be signed, and sent back as soon as can.   They are re-sending this to the main fax.   Please check the the media tab.       They are asking that this be signed and sent back as soon as can      Thanks

## 2018-09-21 ENCOUNTER — TELEPHONE (OUTPATIENT)
Dept: FAMILY MEDICINE CLINIC | Facility: CLINIC | Age: 63
End: 2018-09-21

## 2018-09-21 NOTE — TELEPHONE ENCOUNTER
Rosalina with [employer] called and said they faxed over a product sheet for the power chair for this patient and they need it faxed back to them with the NPI number verified and legibly signed and dated. Ref ID is [id number]. Fax number is [phone].     Thanks

## 2018-09-24 ENCOUNTER — CLINICAL SUPPORT (OUTPATIENT)
Dept: AUDIOLOGY | Facility: CLINIC | Age: 63
End: 2018-09-24

## 2018-09-24 DIAGNOSIS — Z71.89 ENCOUNTER FOR HEARING AID CONSULTATION: Primary | ICD-10-CM

## 2018-09-24 PROCEDURE — HEARINGNOCHG: Performed by: AUDIOLOGIST

## 2018-09-24 RX ORDER — DILTIAZEM HYDROCHLORIDE 180 MG/1
180 CAPSULE, EXTENDED RELEASE ORAL DAILY
Qty: 30 CAPSULE | Refills: 3 | Status: SHIPPED | OUTPATIENT
Start: 2018-09-24 | End: 2019-02-18 | Stop reason: SDUPTHER

## 2018-09-24 NOTE — PROGRESS NOTES
HEARING AID CONSULT    Name:  Lana Mata  :  1955  Age:  63 y.o.  Date of Evaluation:  2018      HISTORY    Reason for visit:  Lana Mata is seen today for a hearing aid consultation at the request of Dr. Enio Alanis.  A previous audiogram completed on 2018 shows a mild high frequency indeterminate hearing loss in the right ear and a severe sloping to profound sensorineural hearing loss in the left ear.  Patient reports difficulty localizing sounds.  She reports difficulty hearing in background noise.    Hearing aid history:  Patient currently does not have hearing aids.      RECOMMENDATIONS:  Amplification options were discussed.  During the Hearing Aid discussion, Ms. Mata has chosen 1  in the Canal (PRABHAKAR) hearing aid(s) for right ear.  She has chose one CROS hearing aid for the left ear.  The hearing aid recommended is from [employer] with the Audeo B-50 digital circuit for the right ear and the CROS digital circuit for the left ear. The cost of the hearing aid is $1400.00 for the right ear and $1500.00 for the left ear for a total of 2900.00, which will be due at the time of the fitting.  Once the hearing aids arrive, the patient will be contacted to schedule her hearing aid fitting.      It was a pleasure seeing Lana Mata in Audiology today.  I look forward to helping Ms. Mata with her amplification needs.      For Billing and Coding:  Z71.89  Encounter for Hearing Aid Consultation- no charge        This document has been electronically signed by KILEY Hsu on 2018 1:30 PM

## 2018-10-16 ENCOUNTER — LAB (OUTPATIENT)
Dept: LAB | Facility: HOSPITAL | Age: 63
End: 2018-10-16

## 2018-10-16 ENCOUNTER — TRANSCRIBE ORDERS (OUTPATIENT)
Dept: LAB | Facility: HOSPITAL | Age: 63
End: 2018-10-16

## 2018-10-16 DIAGNOSIS — N18.30 CHRONIC RENAL DISEASE, STAGE III (HCC): ICD-10-CM

## 2018-10-16 DIAGNOSIS — E05.90 HYPERTHYROIDISM: ICD-10-CM

## 2018-10-16 DIAGNOSIS — I48.91 ATRIAL FIBRILLATION, UNSPECIFIED TYPE (HCC): ICD-10-CM

## 2018-10-16 DIAGNOSIS — N18.30 CHRONIC RENAL DISEASE, STAGE III (HCC): Primary | ICD-10-CM

## 2018-10-16 DIAGNOSIS — E87.6 HYPOKALEMIA: ICD-10-CM

## 2018-10-16 DIAGNOSIS — I10 ESSENTIAL (PRIMARY) HYPERTENSION: ICD-10-CM

## 2018-10-16 LAB — POTASSIUM BLD-SCNC: 4 MMOL/L (ref 3.5–5.1)

## 2018-10-16 PROCEDURE — 84132 ASSAY OF SERUM POTASSIUM: CPT

## 2018-10-16 PROCEDURE — 36415 COLL VENOUS BLD VENIPUNCTURE: CPT

## 2018-11-12 ENCOUNTER — APPOINTMENT (OUTPATIENT)
Dept: LAB | Facility: HOSPITAL | Age: 63
End: 2018-11-12

## 2018-11-12 ENCOUNTER — OFFICE VISIT (OUTPATIENT)
Dept: SLEEP MEDICINE | Facility: HOSPITAL | Age: 63
End: 2018-11-12

## 2018-11-12 VITALS
WEIGHT: 219.9 LBS | HEIGHT: 64 IN | DIASTOLIC BLOOD PRESSURE: 73 MMHG | SYSTOLIC BLOOD PRESSURE: 123 MMHG | OXYGEN SATURATION: 96 % | BODY MASS INDEX: 37.54 KG/M2 | HEART RATE: 68 BPM

## 2018-11-12 DIAGNOSIS — G25.81 RESTLESS LEGS SYNDROME (RLS): ICD-10-CM

## 2018-11-12 DIAGNOSIS — Z72.821 INADEQUATE SLEEP HYGIENE: ICD-10-CM

## 2018-11-12 DIAGNOSIS — G47.33 OBSTRUCTIVE SLEEP APNEA, ADULT: Primary | ICD-10-CM

## 2018-11-12 DIAGNOSIS — F45.8 OTHER SOMATOFORM DISORDERS: ICD-10-CM

## 2018-11-12 LAB — FERRITIN SERPL-MCNC: 12.1 NG/ML (ref 11.1–264)

## 2018-11-12 PROCEDURE — 82728 ASSAY OF FERRITIN: CPT | Performed by: INTERNAL MEDICINE

## 2018-11-12 PROCEDURE — 36415 COLL VENOUS BLD VENIPUNCTURE: CPT | Performed by: INTERNAL MEDICINE

## 2018-11-12 PROCEDURE — 99214 OFFICE O/P EST MOD 30 MIN: CPT | Performed by: INTERNAL MEDICINE

## 2018-11-12 RX ORDER — FERROUS SULFATE 325(65) MG
325 TABLET ORAL
Qty: 30 TABLET | Refills: 6 | Status: SHIPPED | OUTPATIENT
Start: 2018-11-12 | End: 2020-04-10

## 2018-11-12 NOTE — PROGRESS NOTES
CHIEF COMPLAINT: follow up home sleep study    HPI:    The patient is a 63 y.o. female.  She returns to sleep clinic to discuss the results of the recent home sleep study performed on 08/30/2018.  The AHI/ASHLEE was 7.9.    INTERVAL MEDICAL HISTORY: started on autocpap 5-20 cm H2O. her sx of SOPHIA are improved with less excessive daytime sleepiness, reduced naps, and better sleep at night. No compliance report was available.    PAP Data:  Mask type: FFM  DME: [facility]    Bed time: 2200  Sleep latency: 30 minutes  Number of times awakens during the night: 3-4  Wake time: 0530  Estimated total sleep time at night: 7-8 hours  Caffeine intake: 12oz of coffee, 24oz of tea, and 12oz of soda  Alcohol intake: 0 drinks per week  Nap time: once weekly  Sleepiness with Driving: resolved      MEDICATIONS:   Current Outpatient Medications:   •  aMILoride (MIDAMOR) 5 MG tablet, Take 1 tablet by mouth Daily., Disp: 30 tablet, Rfl: 6  •  ARIPiprazole (ABILIFY) 5 MG tablet, Take 5 mg by mouth Daily. At bedtime, Disp: , Rfl:   •  aspirin 325 MG tablet, Take 325 mg by mouth Daily. At bedtime, Disp: , Rfl:   •  atorvastatin (LIPITOR) 20 MG tablet, Take 1 tablet by mouth Daily., Disp: 14 tablet, Rfl: 0  •  choline fenofibrate (TRILIPIX) 135 MG capsule, Take 1 capsule by mouth Daily. (Patient taking differently: Take 135 mg by mouth Daily. At bedtime), Disp: 90 capsule, Rfl: 3  •  DILT- MG 24 hr capsule, TAKE 1 CAPSULE BY MOUTH DAILY., Disp: 30 capsule, Rfl: 3  •  lisinopril (PRINIVIL,ZESTRIL) 20 MG tablet, Take 20 mg by mouth Daily., Disp: , Rfl:   •  LORazepam (ATIVAN) 1 MG tablet, Take 1 mg by mouth 2 (Two) Times a Day. Scheduled to take in the morning and one with dinner, Disp: , Rfl: 0  •  methIMAzole (TAPAZOLE) 5 MG tablet, Take 2.5 mg by mouth Daily., Disp: , Rfl:   •  metoprolol tartrate (LOPRESSOR) 25 MG tablet, Take 1 tablet by mouth Every 12 (Twelve) Hours., Disp: 60 tablet, Rfl: 6  •  nitroglycerin (NITROSTAT) 0.4 MG SL  tablet, Place 1 tablet under the tongue See Admin Instructions. PRN cp, if not relieved in 5 min repeat and go to ER, Disp: 25 tablet, Rfl: 12  •  omeprazole (priLOSEC) 20 MG capsule, Take 1 capsule by mouth Daily. (Patient taking differently: Take 20 mg by mouth Daily. At bedtime), Disp: 90 capsule, Rfl: 3  •  rivaroxaban (XARELTO) 20 MG tablet, Take 1 tablet by mouth Daily., Disp: 30 tablet, Rfl: 0  •  sertraline (ZOLOFT) 100 MG tablet, Take 150 mg by mouth Daily., Disp: , Rfl: 1  •  potassium chloride (K-DUR) 10 MEQ CR tablet, Take 1 tablet by mouth 2 (Two) Times a Day With Meals., Disp: 180 tablet, Rfl: 3    PHYSICAL EXAM  Vital Signs (last 24 hours)        0700  -   0659  0700  -   0934   Most Recent    Heart Rate     68     68    BP     123/73     123/73    SpO2 (%)     96     96          Gen:  No acute distress, alert, oriented  Lungs:  CTA with normal effort   CV:  RRR, no M/R/G  GI:  soft, non-tender  Ext:  no c/c/e, uses a cane    Sleep Testin. home sleep study on 2018, AHI of 7.9   2. Split night on 2015, AHI of 12.6, recommended 7 cm H2O   3. Currently on 5-20 cm H2O    ASSESSMENT / PLAN:     1. Obstructive sleep apnea Established, stable (1)  1. Continue PAP as prescribed.   2. Script for PAP supplies  3. Return to clinic in 1 year with compliance check unless sx change in the interim period.  2. Restless leg syndrome/ /Alanis-Ekbom disease (RLS/WED) Established, not controlled (2)   1. About 4/7 per week  2. Check ferritin results by mail  3. Consider Requip  3. Afib -stable  4. Poor sleep hygiene  1. Dogs in bed - discussed     All of the patient's questions were answered. She states understanding and agreement with my assessment and plan as above.  Total time 25 min, more than half spent in face to face counseling and coordination of care.    RTC in 6 months    She agrees to return sooner on a prn basis.             This document has been electronically signed  by Patel Clemente MD on November 12, 2018           CC: Bernie Ortiz MD          No ref. provider found

## 2018-12-28 ENCOUNTER — APPOINTMENT (OUTPATIENT)
Dept: LAB | Facility: HOSPITAL | Age: 63
End: 2018-12-28

## 2018-12-28 ENCOUNTER — TRANSCRIBE ORDERS (OUTPATIENT)
Dept: LAB | Facility: HOSPITAL | Age: 63
End: 2018-12-28

## 2018-12-28 DIAGNOSIS — E05.90 THYROTOXICOSIS WITHOUT THYROID STORM, UNSPECIFIED THYROTOXICOSIS TYPE: Primary | ICD-10-CM

## 2018-12-28 LAB
T4 FREE SERPL-MCNC: 1.37 NG/DL (ref 0.78–2.19)
TSH SERPL DL<=0.05 MIU/L-ACNC: 1.54 MIU/ML (ref 0.46–4.68)

## 2018-12-28 PROCEDURE — 36415 COLL VENOUS BLD VENIPUNCTURE: CPT | Performed by: INTERNAL MEDICINE

## 2018-12-28 PROCEDURE — 84443 ASSAY THYROID STIM HORMONE: CPT | Performed by: INTERNAL MEDICINE

## 2018-12-28 PROCEDURE — 84439 ASSAY OF FREE THYROXINE: CPT | Performed by: INTERNAL MEDICINE

## 2019-01-04 ENCOUNTER — TELEPHONE (OUTPATIENT)
Dept: FAMILY MEDICINE CLINIC | Facility: CLINIC | Age: 64
End: 2019-01-04

## 2019-01-06 DIAGNOSIS — R52 PAIN: Primary | ICD-10-CM

## 2019-01-15 ENCOUNTER — OFFICE VISIT (OUTPATIENT)
Dept: CARDIOLOGY | Facility: CLINIC | Age: 64
End: 2019-01-15

## 2019-01-15 VITALS
HEART RATE: 72 BPM | BODY MASS INDEX: 37.9 KG/M2 | HEIGHT: 64 IN | OXYGEN SATURATION: 97 % | WEIGHT: 222 LBS | DIASTOLIC BLOOD PRESSURE: 76 MMHG | SYSTOLIC BLOOD PRESSURE: 130 MMHG

## 2019-01-15 DIAGNOSIS — E78.2 MIXED HYPERLIPIDEMIA: Primary | ICD-10-CM

## 2019-01-15 DIAGNOSIS — I10 BENIGN ESSENTIAL HYPERTENSION: ICD-10-CM

## 2019-01-15 DIAGNOSIS — I48.0 PAROXYSMAL ATRIAL FIBRILLATION (HCC): ICD-10-CM

## 2019-01-15 DIAGNOSIS — G47.33 OBSTRUCTIVE SLEEP APNEA SYNDROME: ICD-10-CM

## 2019-01-15 PROBLEM — I48.19 PERSISTENT ATRIAL FIBRILLATION (HCC): Status: ACTIVE | Noted: 2019-01-15

## 2019-01-15 PROBLEM — I48.19 PERSISTENT ATRIAL FIBRILLATION (HCC): Status: RESOLVED | Noted: 2019-01-15 | Resolved: 2019-01-15

## 2019-01-15 PROCEDURE — 93000 ELECTROCARDIOGRAM COMPLETE: CPT | Performed by: INTERNAL MEDICINE

## 2019-01-15 PROCEDURE — 99213 OFFICE O/P EST LOW 20 MIN: CPT | Performed by: INTERNAL MEDICINE

## 2019-01-15 NOTE — PROGRESS NOTES
Lana Ayala [facility]  63 y.o. female    01/15/2019  1. Mixed hyperlipidemia    2. Paroxysmal atrial fibrillation (CMS/HCC)    3. Benign essential hypertension    #4    obstructive sleep      History of Present Illness:    Patient's Body mass index is 38.11 kg/m². BMI is above normal parameters. Recommendations include: exercise counseling, nutrition counseling and referral to primary care.  63 years old patient with  BMI 36.5 history of persistent  atrial fibrillation, hypertension and post electrical cardioversion on amiodarone unable to  keep sinus.  Patient evaluated by me in the office.  She doesn't want to pursue EP study and ablation at this stage.  She wanted try a different anti-arrhythmia medication such as Tikosyn with hypertensive heart disease, chronic back pain, exogenous obesity with a BMI 37  No syncope or near syncopal episode reported.  No fever cough or chills reported.Shouldn't hospitalized for Tikosyn initiation and prrhythmia monitoring.  Unable to keep  in sinus rhythm.  Tikosyn discontinued and started on AV kaya blocking drug.  Today,  noted regular pulse and EKG confirmed the sinus rhythm  she continue Xarelto to decrease risk of cardiac embolic phenomena.  Previously ,Discussed with the patient regarding EP study and ablation she not interested and want to continue AV kaya blocking drug.   JOCY 1/2018  ·    · Transesophageal Echocardiogram with Color and Doppler  · Left Ventricle: Estimated EF appears to be in the range of 61 - 65%. Normal left ventricular cavity size noted  · Left ventricular wall thickness is consistent with mild concentric hypertrophy  · Right Ventricle: Normal right ventricular cavity size, wall thickness, systolic function and septal motion noted  · No evidence of a left atrial appendage thrombus was present. The interatrial septum appears redundant  · No evidence of a patent foramen ovale. No evidence of an atrial septal defect present. Saline test results are  negative.  · Mild aortic valve regurgitation is present  · Mild mitral valve regurgitation is present  4/2018  Total Protein 6.3 - 8.6 g/dL 7.5    Albumin 3.40 - 4.80 g/dL 4.30    ALT (SGPT) 9 - 52 U/L 30    AST (SGOT) 14 - 36 U/L 29    Alkaline Phosphatase 38 - 126 U/L 48    Total Bilirubin 0.2 - 1.3 mg/dL 0.8          1/2018  Total Cholesterol 0 - 199 mg/dL 165    Triglycerides 20 - 199 mg/dL 169    HDL Cholesterol 60 - 200 mg/dL 67    LDL Cholesterol  1 - 129 mg/dL 77    LDL/HDL Ratio 0.00 - 3.22 0.96          TSH 0.460 - 4.680 mIU/mL               SUBJECTIVE:    Allergies   Allergen Reactions   • Codeine Nausea And Vomiting   • Latex Rash   • Morphine And Related Hallucinations   • Pravachol [Pravastatin Sodium] Myalgia         Past Medical History:   Diagnosis Date   • Abnormal gait 11/28/2012   • Abrasion 02/26/2015   • Acute ankle pain 03/31/2016   • Acute bronchitis 09/04/2015   • Allergic rhinitis 05/22/2012   • Asthenia 05/22/2014   • Atrial fibrillation (CMS/[facility]) 03/04/2016    - converted    • Attempted suicide (CMS/[facility])    • Benign essential hypertension 03/04/2016   • Cardiovascular stress test abnormal 03/04/2016   • Cellulitis of knee 03/03/2015   • Chest pain 04/02/2015   • Chronic pain disorder    • Common cold 06/24/2014   • Congestive heart failure (CMS/[facility]) 02/04/2016   • Depressive disorder 05/22/2015    suspect more complicated psych issues      • Gastroesophageal reflux disease 01/17/2013   • Generalized anxiety disorder 02/04/2016   • H/O echocardiogram     Normal LV function with Ef of 60-65%.Mildly dilated right ventricle with normal function. Grade 1B diastolic dysfunction with mild CLVH.NO sig. valvular regurg. or stenosis.   • Headache 10/27/2014   • Hyperlipidemia 03/04/2016   • Low back pain 06/30/2016    Need for prophylactic vaccination against Streptococcus pneumoniae [pneumococcus]    01/23/2015    • Neck pain 06/30/2016   • Obstructive sleep apnea of adult 07/30/2015   •  Obstructive sleep apnea syndrome    • Osteoarthritis    • Osteoporosis 06/30/2016   • Pain in right shoulder 06/30/2016   • Pain in right shoulder 04/24/2014   • Shortness of breath 04/02/2015   • Skin ulcer (CMS/HCC) 04/13/2015    left   • Thyrotoxicosis with or without goiter 10/24/2014   • Urinary incontinence 05/22/2014         Past Surgical History:   Procedure Laterality Date   • BACK SURGERY  08/07/2015    Removal of intstrumentation,L5-S1.Exploration of fusion,L5-S1.Posterolateral fusion,L4-L5.Posterior segmental spinal instrumentation L4-5.Transforaminal interbody fusion Through right sided approach.Performed at    • COLONOSCOPY  05/04/2016    will order cologuard   • INJECTION OF MEDICATION  03/23/2015    celestone   • INJECTION OF MEDICATION  05/04/2016    KENALOG(6)   • LUMBAR FUSION      discectomy fusion with rods L4-S1   • LUMBAR LAMINECTOMY  1995   • MANDIBLE FRACTURE SURGERY      JAW WIRED SHUT   • MOUTH SURGERY  10/05/2015    Fractured mandible. Removal of arch bars.   • MOUTH SURGERY  1985    Fractured mandible. Removal of arch bars.   • NASAL SEPTUM SURGERY     • RHINOPLASTY     • TUBAL ABDOMINAL LIGATION  1986         Family History   Problem Relation Age of Onset   • Hypertension Mother    • Hyperlipidemia Mother    • Mental illness Mother    • Thyroid disease Mother    • Diabetes Maternal Grandmother    • Cancer Maternal Grandmother    • Depression Other    • Diabetes Other    • Heart disease Other    • Hypertension Other    • Osteoporosis Other    • Thyroid disease Other    • Cancer Maternal Aunt    • Cancer Paternal Aunt          Social History     Socioeconomic History   • Marital status:      Spouse name: Not on file   • Number of children: Not on file   • Years of education: Not on file   • Highest education level: Not on file   Social Needs   • Financial resource strain: Not on file   • Food insecurity - worry: Not on file   • Food insecurity - inability: Not on file   •  Transportation needs - medical: Not on file   • Transportation needs - non-medical: Not on file   Occupational History   • Not on file   Tobacco Use   • Smoking status: Never Smoker   • Smokeless tobacco: Never Used   • Tobacco comment: smoking cessation    Substance and Sexual Activity   • Alcohol use: No   • Drug use: No     Comment: lortab / oxycontin   • Sexual activity: Defer   Other Topics Concern   • Not on file   Social History Narrative   • Not on file         Current Outpatient Medications   Medication Sig Dispense Refill   • aMILoride (MIDAMOR) 5 MG tablet Take 1 tablet by mouth Daily. 30 tablet 6   • ARIPiprazole (ABILIFY) 5 MG tablet Take 5 mg by mouth Daily. At bedtime     • aspirin 325 MG tablet Take 325 mg by mouth Daily. At bedtime     • atorvastatin (LIPITOR) 20 MG tablet Take 1 tablet by mouth Daily. 14 tablet 0   • choline fenofibrate (TRILIPIX) 135 MG capsule Take 1 capsule by mouth Daily. (Patient taking differently: Take 135 mg by mouth Daily. At bedtime) 90 capsule 3   • DILT- MG 24 hr capsule TAKE 1 CAPSULE BY MOUTH DAILY. 30 capsule 3   • ferrous sulfate 325 (65 FE) MG tablet Take 1 tablet by mouth Daily With Breakfast. Take with vitamin C to help with absorption 30 tablet 6   • lisinopril (PRINIVIL,ZESTRIL) 20 MG tablet Take 20 mg by mouth Daily.     • LORazepam (ATIVAN) 1 MG tablet Take 1 mg by mouth 2 (Two) Times a Day. Scheduled to take in the morning and one with dinner  0   • methIMAzole (TAPAZOLE) 5 MG tablet Take 2.5 mg by mouth Daily.     • metoprolol tartrate (LOPRESSOR) 25 MG tablet Take 1 tablet by mouth Every 12 (Twelve) Hours. 60 tablet 6   • nitroglycerin (NITROSTAT) 0.4 MG SL tablet Place 1 tablet under the tongue See Admin Instructions. PRN cp, if not relieved in 5 min repeat and go to ER 25 tablet 12   • omeprazole (priLOSEC) 20 MG capsule Take 1 capsule by mouth Daily. (Patient taking differently: Take 20 mg by mouth Daily. At bedtime) 90 capsule 3   • potassium  chloride (K-DUR) 10 MEQ CR tablet Take 1 tablet by mouth 2 (Two) Times a Day With Meals. 180 tablet 3   • rivaroxaban (XARELTO) 20 MG tablet Take 1 tablet by mouth Daily. 30 tablet 0   • sertraline (ZOLOFT) 100 MG tablet Take 150 mg by mouth Daily.  1     No current facility-administered medications for this visit.            Review of Systems:     Constitutional:  Denies recent weight loss, weight gain, fever or chills, no change in exercise tolerance.     HENT:  Denies any hearing loss, epistaxis, hoarseness, or difficulty speaking.     Eyes: Wears eyeglasses or contact lenses.    Respiratory:  Denies dyspnea with exertion,no cough, wheezing, or hemoptysis.     Cardiovascular: Negative for palpations, chest pain, orthopnea, PND, peripheral edema, syncope, or claudication.     Gastrointestinal:  Denies change in bowel habits, dyspepsia, ulcer disease, hematochezia, or melena.     Endocrine: Negative for cold intolerance, heat intolerance, polydipsia, polyphagia and polyuria. Denies any history of weight change, polydipsia, polyuria.     Genitourinary: Negative.      Musculoskeletal: Denies any history of arthritic symptoms or back problems.     Skin:  Denies any change in hair or nails, rashes, or skin lesions.     Allergic/Immunologic: Negative.  Negative for environmental allergies, food allergies and immunocompromised state.     Neurological:  Denies any history of recurrent headaches, strokes, TIA, or seizure disorder.     Hematological: Denies any food allergies, seasonal allergies, bleeding disorders, or lymphadenopathy.     Psychiatric/Behavioral: Denies any history of depression, substance abuse, or change in cognitive function.       OBJECTIVE:    There were no vitals taken for this visit.      Physical Exam:     Constitutional: Cooperative, alert and oriented, well-developed, well-nourished, in no acute distress.     HENT:   Head: Normocephalic, normal hair patterns, no masses or tenderness.  Ears, Nose,  and Throat: No gross abnormalities. No pallor or cyanosis. Dentition good.   Eyes: EOMS intact, PERRL, conjunctivae and lids unremarkable. Fundoscopic exam and visual fields not performed.   Neck: No palpable masses or adenopathy, no thyromegaly, no JVD, carotid pulses are full and equal bilaterally and without  Bruits.     Cardiovascular: Regular rhythm, S1 and S2 normal, no S3 or S4. Apical impulse not displaced. No murmurs, gallops, or rubs detected.     Pulmonary/Chest: Chest: normal symmetry, no tenderness to palpation, normal respiratory excursion, no intercostal retraction, no use of accessory muscles. Pulmonary: Normal breath sounds. No rales or rhonchi.    Abdominal: Abdomen soft, bowel sounds normoactive, no masses, no hepatosplenomegaly, non-tender, no bruits.     Musculoskeletal: No deformities, clubbing, cyanosis, erythema, or edema observed. There are no spinal abnormalities noted. Normal muscle strength and tone. Pulses full and equal in all extremities, no bruits auscultated.     Neurological: No gross motor or sensory deficits noted, affect appropriate, oriented to time, person, place.     Skin: Warm and dry to the touch, no apparent skin lesions or masses noted.     Psychiatric: She has a normal mood and affect. Her behavior is normal. Judgment and thought content normal.           ECG 12 Lead  Date/Time: 1/15/2019 3:24 PM  Performed by: Kristyn Jurado MD  Authorized by: Kristyn Jurado MD   Comparison: not compared with previous ECG   Rhythm: sinus rhythm  Other findings: LVH              Lab Results   Component Value Date    WBC 7.77 09/05/2018    HGB 13.5 09/05/2018    HCT 41.3 09/05/2018    MCV 84.6 09/05/2018     09/05/2018     Lab Results   Component Value Date    GLUCOSE 99 09/05/2018    BUN 18 09/05/2018    CREATININE 1.28 (H) 09/05/2018    EGFRIFNONA 42 (L) 09/05/2018    BCR 14.1 09/05/2018    CO2 26.0 09/05/2018    CALCIUM 9.5 09/05/2018    ALBUMIN 4.20 09/05/2018    AST 37 (H)  09/05/2018    ALT 24 09/05/2018     Lab Results   Component Value Date    CHOL 165 01/31/2018     Lab Results   Component Value Date    TRIG 169 01/31/2018    TRIG 115 12/15/2016    TRIG 111 09/08/2016     Lab Results   Component Value Date    HDL 67 01/31/2018    HDL 52 (L) 12/15/2016    HDL 66 09/08/2016     No components found for: LDLCALC  Lab Results   Component Value Date    LDL 77 01/31/2018    LDL 73 12/15/2016    LDL 61 09/08/2016     No results found for: HDLLDLRATIO  No components found for: CHOLHDL  Lab Results   Component Value Date    HGBA1C 5.7 (H) 01/31/2018     Lab Results   Component Value Date    TSH 1.540 12/28/2018           ASSESSMENT AND PLAN:  #1 persistent atrial fibrillation #2 hypertension with hypertensive heart disease #3 hyperlipidemia #4 palpitation     63 years old patient with history of paroxysmal atrial fibrillation failed amiodarone and failed Tikosyn now she is compliant with the CPAP and wasn't a AV kaya blocking drug given the history of persistent atrial fibrillation and present to the office for routine follow-up.  She is back in sinus rhythm confirming by clinical conditions an EKG and she is a pleased with the clinical outcome.  I will continue AV kaya blocking drug and Xarelto to decrease risk of cardiac embolic phenomena.  Hypertension being managed with lisinopril and atorvastatin for management of hyperlipidemia.  We'll see her back in 6 month R depends on patient clinical conditions.   Patient does not want to pursue EP study and ablations..  We'll resume the lisinopril for management of hypertension, Lipitor for management of hyperlipidemia.  Risk factor lifestyle modification discussed.  Low-sodium, low carbohydrate and dash Diet explained        Diagnoses and all orders for this visit:    Mixed hyperlipidemia    Paroxysmal atrial fibrillation (CMS/HCC)    Benign essential hypertension        Kristyn Jurado MD  1/15/2019  3:05 PM

## 2019-02-18 RX ORDER — DILTIAZEM HYDROCHLORIDE 180 MG/1
CAPSULE, EXTENDED RELEASE ORAL
Qty: 30 CAPSULE | Refills: 4 | Status: SHIPPED | OUTPATIENT
Start: 2019-02-18 | End: 2019-07-08 | Stop reason: SDUPTHER

## 2019-03-13 RX ORDER — POTASSIUM CHLORIDE 750 MG/1
10 TABLET, FILM COATED, EXTENDED RELEASE ORAL 2 TIMES DAILY WITH MEALS
Qty: 180 TABLET | Refills: 3 | Status: SHIPPED | OUTPATIENT
Start: 2019-03-13 | End: 2020-03-26 | Stop reason: SDUPTHER

## 2019-04-09 DIAGNOSIS — K21.9 GASTROESOPHAGEAL REFLUX DISEASE WITHOUT ESOPHAGITIS: ICD-10-CM

## 2019-04-09 RX ORDER — OMEPRAZOLE 20 MG/1
20 CAPSULE, DELAYED RELEASE ORAL DAILY
Qty: 30 CAPSULE | Refills: 0 | Status: SHIPPED | OUTPATIENT
Start: 2019-04-09 | End: 2019-04-12 | Stop reason: SDUPTHER

## 2019-04-09 NOTE — TELEPHONE ENCOUNTER
PATIENT IS REQUESTING A MEDICATION REFILL ON HER omeprazole (priLOSEC) 20 MG capsule.    THANKS,   ELIS

## 2019-04-12 ENCOUNTER — TELEPHONE (OUTPATIENT)
Dept: FAMILY MEDICINE CLINIC | Facility: CLINIC | Age: 64
End: 2019-04-12

## 2019-04-12 DIAGNOSIS — K21.9 GASTROESOPHAGEAL REFLUX DISEASE WITHOUT ESOPHAGITIS: ICD-10-CM

## 2019-04-12 RX ORDER — OMEPRAZOLE 20 MG/1
20 CAPSULE, DELAYED RELEASE ORAL DAILY
Qty: 30 CAPSULE | Refills: 0 | Status: SHIPPED | OUTPATIENT
Start: 2019-04-12 | End: 2019-05-22 | Stop reason: SDUPTHER

## 2019-04-12 NOTE — TELEPHONE ENCOUNTER
Pt called about her omeprezole refill. See in the notes it was filled but says transmission to pharmacy failed. Please resend.    Thanks  Taylor

## 2019-04-16 ENCOUNTER — DOCUMENTATION (OUTPATIENT)
Dept: CARDIOLOGY | Facility: CLINIC | Age: 64
End: 2019-04-16

## 2019-04-16 NOTE — PROGRESS NOTES
Gave patient 3 bottles Xarelto 20 mg while she waits on her mail order refill.    Lot [id number]  Exp

## 2019-05-06 ENCOUNTER — OFFICE VISIT (OUTPATIENT)
Dept: SLEEP MEDICINE | Facility: HOSPITAL | Age: 64
End: 2019-05-06

## 2019-05-06 VITALS
SYSTOLIC BLOOD PRESSURE: 108 MMHG | BODY MASS INDEX: 37.39 KG/M2 | DIASTOLIC BLOOD PRESSURE: 67 MMHG | HEART RATE: 85 BPM | OXYGEN SATURATION: 96 % | HEIGHT: 64 IN | WEIGHT: 219 LBS

## 2019-05-06 DIAGNOSIS — G47.33 OBSTRUCTIVE SLEEP APNEA, ADULT: Primary | ICD-10-CM

## 2019-05-06 PROCEDURE — 99214 OFFICE O/P EST MOD 30 MIN: CPT | Performed by: NURSE PRACTITIONER

## 2019-05-06 NOTE — PROGRESS NOTES
Sleep Clinic Follow Up    Date: 2019  Primary Care Physician: Bernie Ortiz MD    Last office visit: 2018 (I reviewed this note)    CC: Follow up: SOPHIA on CPAP      Interim History:  Since the last visit:    1)  SOPHIA -  Lana Mata has reported to have remained compliant with CPAP. She denies mask and machine issues, dry mouth, headaches, or pressures intolerance. She denies abnormal dreams, sleep paralysis, nasal congestion, URI sx. Unfortunately, there is no download of compliance data available. Contacted patient's DME regarding compliance data availability as well. (Compliance report data retrieved 2019).    Sleep Testin. HST on 2018, AHI of 7.9, Split night study on 2015   2. CPAP titration on 2015, recommended 7 cm H2O   3. Currently on 7 cm H2O    PAP Data:    PAP data is unfortunately unavailable. (Compliance report data retrieved 2019).  Time frame: 2018-2019   Compliance 88.4 %  Average use on days used: 6 hrs 25 min  Percent of days with usage greater than or equal to 4 hours: 75.3%  PAP range : 7 cm H2O  Average 90% pressure: 7 cmH2O  Leak: 3 minutes  Average AHI 6.0 events/hr  Mask type: Full face mask  DME: [facility]    Bed time: 2200  Sleep latency: 30 minutes  Number of times awakens during the night: 3  Wake time: 0530  Estimated total sleep time at night: 7-8 hours  Caffeine intake: 12 oz of coffee, 24 oz of tea, and 12 oz of soda  Alcohol intake: 0 drinks per week  Nap time: 3 times weekly   Sleepiness with Driving: none    2) RLS - improved with iron supplementation    PMHx, FH, SH reviewed and pertinent changes are: Reportedly unchanged from last office visit on 2018      REVIEW OF SYSTEMS:   Negative for chest pain, fever, cough, SOA, abdominal pain. Smoking:none        Exam:  Vitals:    19   BP: 108/67   Pulse: 85   SpO2: 96%           19   Weight: 99.3 kg (219 lb)     Body mass index is  37.59 kg/m². Patient's Body mass index is 37.59 kg/m². BMI is above normal parameters. Recommendations include: referral to primary care.      Gen:                No distress, conversant, pleasant, appears stated age, alert, oriented  Eyes:               Anicteric sclera, moist conjunctiva, no lid lag                           PERRL, EOMI   Heent:             NC/AT                          Oropharynx clear                          Normal hearing  Lungs:             Normal effort, non-labored breathing                          Clear to auscultation bilaterally          CV:                  Normal S1/S2, no murmur                          No lower extremity edema  ABD:               Soft, rounded, non-distended                          Normal bowel sounds                    Psych:             Appropriate affect  Neuro:             CN 2-12 appear intact    Past Medical History:   Diagnosis Date   • Abnormal gait 11/28/2012   • Abrasion 02/26/2015   • Acute ankle pain 03/31/2016   • Acute bronchitis 09/04/2015   • Allergic rhinitis 05/22/2012   • Asthenia 05/22/2014   • Atrial fibrillation (CMS/[facility]) 03/04/2016    - converted    • Attempted suicide (CMS/[facility])    • Benign essential hypertension 03/04/2016   • Cardiovascular stress test abnormal 03/04/2016   • Cellulitis of knee 03/03/2015   • Chest pain 04/02/2015   • Chronic pain disorder    • Common cold 06/24/2014   • Congestive heart failure (CMS/HCC) 02/04/2016   • Depressive disorder 05/22/2015    suspect more complicated psych issues      • Gastroesophageal reflux disease 01/17/2013   • Generalized anxiety disorder 02/04/2016   • H/O echocardiogram     Normal LV function with Ef of 60-65%.Mildly dilated right ventricle with normal function. Grade 1B diastolic dysfunction with mild CLVH.NO sig. valvular regurg. or stenosis.   • Headache 10/27/2014   • Hyperlipidemia 03/04/2016   • Low back pain 06/30/2016    Need for prophylactic vaccination against Streptococcus  pneumoniae [pneumococcus]    01/23/2015    • Neck pain 06/30/2016   • Obstructive sleep apnea of adult 07/30/2015   • Obstructive sleep apnea syndrome    • Osteoarthritis    • Osteoporosis 06/30/2016   • Pain in right shoulder 06/30/2016   • Pain in right shoulder 04/24/2014   • Shortness of breath 04/02/2015   • Skin ulcer (CMS/HCC) 04/13/2015    left   • Thyrotoxicosis with or without goiter 10/24/2014   • Urinary incontinence 05/22/2014       Current Outpatient Medications:   •  aMILoride (MIDAMOR) 5 MG tablet, Take 1 tablet by mouth Daily., Disp: 30 tablet, Rfl: 6  •  ARIPiprazole (ABILIFY) 5 MG tablet, Take 5 mg by mouth Daily. At bedtime, Disp: , Rfl:   •  aspirin 325 MG tablet, Take 325 mg by mouth Daily. At bedtime, Disp: , Rfl:   •  atorvastatin (LIPITOR) 20 MG tablet, Take 1 tablet by mouth Daily., Disp: 14 tablet, Rfl: 0  •  choline fenofibrate (TRILIPIX) 135 MG capsule, Take 1 capsule by mouth Daily., Disp: 90 capsule, Rfl: 3  •  DILT- MG 24 hr capsule, TAKE 1 CAPSULE EVERY DAY, Disp: 30 capsule, Rfl: 4  •  ferrous sulfate 325 (65 FE) MG tablet, Take 1 tablet by mouth Daily With Breakfast. Take with vitamin C to help with absorption, Disp: 30 tablet, Rfl: 6  •  lisinopril (PRINIVIL,ZESTRIL) 20 MG tablet, Take 20 mg by mouth Daily., Disp: , Rfl:   •  LORazepam (ATIVAN) 1 MG tablet, Take 1 mg by mouth 2 (Two) Times a Day. Scheduled to take in the morning and one with dinner, Disp: , Rfl: 0  •  metoprolol tartrate (LOPRESSOR) 25 MG tablet, Take 1 tablet by mouth Every 12 (Twelve) Hours., Disp: 60 tablet, Rfl: 6  •  nitroglycerin (NITROSTAT) 0.4 MG SL tablet, Place 1 tablet under the tongue See Admin Instructions. PRN cp, if not relieved in 5 min repeat and go to ER, Disp: 25 tablet, Rfl: 12  •  omeprazole (priLOSEC) 20 MG capsule, Take 1 capsule by mouth Daily., Disp: 30 capsule, Rfl: 0  •  potassium chloride (K-DUR) 10 MEQ CR tablet, Take 1 tablet by mouth 2 (Two) Times a Day With Meals., Disp: 180  tablet, Rfl: 3  •  rivaroxaban (XARELTO) 20 MG tablet, Take 1 tablet by mouth Daily., Disp: 30 tablet, Rfl: 0  •  sertraline (ZOLOFT) 100 MG tablet, Take 150 mg by mouth Daily., Disp: , Rfl: 1  •  methIMAzole (TAPAZOLE) 5 MG tablet, Take 2.5 mg by mouth Daily., Disp: , Rfl:       Assessment and Plan:    1. Obstructive sleep apnea Established, stable (1)  1. Unable to assess CPAP compliance due to lack of compliance report/data  2. Patient instructed to take machine or card to DME supplier or to our office for data retrieval. (data retrieved)  3. Continue PAP as prescribed - recommend increasing pressure to 9.0 cm H2O  4. Script for PAP supplies  5. Return to clinic in 1 year or sooner if changes in sx  2. Restless leg syndrome Established, stable (1)    1.   Continue ferrous sulfate      Total time 15 min, more than half spent in face to face counseling and coordination of care.    RTC in 12 months        This document has been electronically signed by JOHN Rizo on May 6, 2019 9:45 AM                      CC: Bernie Ortiz MD          No ref. provider found

## 2019-05-09 DIAGNOSIS — G47.33 OBSTRUCTIVE SLEEP APNEA, ADULT: Primary | ICD-10-CM

## 2019-05-22 DIAGNOSIS — K21.9 GASTROESOPHAGEAL REFLUX DISEASE WITHOUT ESOPHAGITIS: ICD-10-CM

## 2019-05-22 RX ORDER — OMEPRAZOLE 20 MG/1
20 CAPSULE, DELAYED RELEASE ORAL DAILY
Qty: 90 CAPSULE | Refills: 0 | Status: SHIPPED | OUTPATIENT
Start: 2019-05-22 | End: 2019-08-13 | Stop reason: SDUPTHER

## 2019-05-22 RX ORDER — ATORVASTATIN CALCIUM 20 MG/1
20 TABLET, FILM COATED ORAL DAILY
Qty: 90 TABLET | Refills: 1 | Status: SHIPPED | OUTPATIENT
Start: 2019-05-22 | End: 2019-12-03 | Stop reason: SDUPTHER

## 2019-05-22 NOTE — TELEPHONE ENCOUNTER
Patient called about getting a refill on her omeprazole (priLOSEC) 20 MG capsule. She said that she would like a 90 day supply.    Patient uses [facility].    Thanks,   Roselyn

## 2019-07-08 RX ORDER — DILTIAZEM HYDROCHLORIDE 180 MG/1
CAPSULE, EXTENDED RELEASE ORAL
Qty: 30 CAPSULE | Refills: 4 | Status: SHIPPED | OUTPATIENT
Start: 2019-07-08 | End: 2019-12-02 | Stop reason: SDUPTHER

## 2019-07-24 ENCOUNTER — OFFICE VISIT (OUTPATIENT)
Dept: CARDIOLOGY | Facility: CLINIC | Age: 64
End: 2019-07-24

## 2019-07-24 ENCOUNTER — TRANSCRIBE ORDERS (OUTPATIENT)
Dept: LAB | Facility: HOSPITAL | Age: 64
End: 2019-07-24

## 2019-07-24 ENCOUNTER — LAB (OUTPATIENT)
Dept: LAB | Facility: HOSPITAL | Age: 64
End: 2019-07-24

## 2019-07-24 VITALS
DIASTOLIC BLOOD PRESSURE: 110 MMHG | HEART RATE: 71 BPM | OXYGEN SATURATION: 95 % | HEIGHT: 64 IN | BODY MASS INDEX: 38.24 KG/M2 | WEIGHT: 224 LBS | SYSTOLIC BLOOD PRESSURE: 150 MMHG

## 2019-07-24 DIAGNOSIS — E05.90 THYROTOXICOSIS WITHOUT THYROID STORM, UNSPECIFIED THYROTOXICOSIS TYPE: ICD-10-CM

## 2019-07-24 DIAGNOSIS — E05.90 THYROTOXICOSIS WITHOUT THYROID STORM, UNSPECIFIED THYROTOXICOSIS TYPE: Primary | ICD-10-CM

## 2019-07-24 DIAGNOSIS — I48.0 PAROXYSMAL ATRIAL FIBRILLATION (HCC): Primary | ICD-10-CM

## 2019-07-24 DIAGNOSIS — E78.2 MIXED HYPERLIPIDEMIA: ICD-10-CM

## 2019-07-24 DIAGNOSIS — I48.0 PAROXYSMAL ATRIAL FIBRILLATION (HCC): ICD-10-CM

## 2019-07-24 DIAGNOSIS — I10 BENIGN ESSENTIAL HYPERTENSION: Primary | ICD-10-CM

## 2019-07-24 PROCEDURE — 93000 ELECTROCARDIOGRAM COMPLETE: CPT | Performed by: INTERNAL MEDICINE

## 2019-07-24 PROCEDURE — 99213 OFFICE O/P EST LOW 20 MIN: CPT | Performed by: INTERNAL MEDICINE

## 2019-07-24 PROCEDURE — 84443 ASSAY THYROID STIM HORMONE: CPT

## 2019-07-24 PROCEDURE — 84439 ASSAY OF FREE THYROXINE: CPT

## 2019-07-24 PROCEDURE — 36415 COLL VENOUS BLD VENIPUNCTURE: CPT

## 2019-07-24 NOTE — PROGRESS NOTES
Lana Ayala [facility]  64 y.o. female    07/24/2019  1. Benign essential hypertension    2. Mixed hyperlipidemia    3. Paroxysmal atrial fibrillation (CMS/HCC)        History of Present Illness:    Body mass index is 38.45 kg/m². BMI is above normal parameters. Recommendations include: exercise counseling, nutrition counseling and referral to primary care.        64 years old patient with  BMI 38 history of persistent  atrial fibrillation, hypertension and post electrical cardioversion on amiodarone unable to  keep sinus.  Patient evaluated by me in the office.  She doesn't want to pursue EP study and ablation at this stage.  She wanted try a different anti-arrhythmia medication such as Tikosyn with hypertensive heart disease, chronic back pain, exogenous obesity with a BMI 38 No syncope or near syncopal episode reported.  No fever cough or chills reported. Tikosyn discontinued and started on AV kaya blocking drug.    During previous office visit noted regular pulse and EKG confirmed the sinus rhythm  she continue Xarelto to decrease risk of cardiac embolic phenomena.  Previously ,Discussed with the patient regarding EP study and ablation she not interested and want to continue AV kaya blocking drug.   JOCY 1/2018  ·    · Transesophageal Echocardiogram with Color and Doppler  · Left Ventricle: Estimated EF appears to be in the range of 61 - 65%. Normal left ventricular cavity size noted  · Left ventricular wall thickness is consistent with mild concentric hypertrophy  · Right Ventricle: Normal right ventricular cavity size, wall thickness, systolic function and septal motion noted  · No evidence of a left atrial appendage thrombus was present. The interatrial septum appears redundant  · No evidence of a patent foramen ovale. No evidence of an atrial septal defect present. Saline test results are negative.  · Mild aortic valve regurgitation is present  · Mild mitral valve regurgitation is present  4/2018  Total  Protein 6.3 - 8.6 g/dL 7.5    Albumin 3.40 - 4.80 g/dL 4.30    ALT (SGPT) 9 - 52 U/L 30    AST (SGOT) 14 - 36 U/L 29    Alkaline Phosphatase 38 - 126 U/L 48    Total Bilirubin 0.2 - 1.3 mg/dL 0.8          1/2018  Total Cholesterol 0 - 199 mg/dL 165    Triglycerides 20 - 199 mg/dL 169    HDL Cholesterol 60 - 200 mg/dL 67    LDL Cholesterol  1 - 129 mg/dL 77    LDL/HDL Ratio 0.00 - 3.22 0.96              SUBJECTIVE:    Allergies   Allergen Reactions   • Codeine Nausea And Vomiting   • Latex Rash   • Morphine And Related Hallucinations   • Pravachol [Pravastatin Sodium] Myalgia         Past Medical History:   Diagnosis Date   • Abnormal gait 11/28/2012   • Abrasion 02/26/2015   • Acute ankle pain 03/31/2016   • Acute bronchitis 09/04/2015   • Allergic rhinitis 05/22/2012   • Asthenia 05/22/2014   • Atrial fibrillation (CMS/[facility]) 03/04/2016    - converted    • Attempted suicide (CMS/[facility])    • Benign essential hypertension 03/04/2016   • Cardiovascular stress test abnormal 03/04/2016   • Cellulitis of knee 03/03/2015   • Chest pain 04/02/2015   • Chronic pain disorder    • Common cold 06/24/2014   • Congestive heart failure (CMS/[facility]) 02/04/2016   • Depressive disorder 05/22/2015    suspect more complicated psych issues      • Gastroesophageal reflux disease 01/17/2013   • Generalized anxiety disorder 02/04/2016   • H/O echocardiogram     Normal LV function with Ef of 60-65%.Mildly dilated right ventricle with normal function. Grade 1B diastolic dysfunction with mild CLVH.NO sig. valvular regurg. or stenosis.   • Headache 10/27/2014   • Hyperlipidemia 03/04/2016   • Low back pain 06/30/2016    Need for prophylactic vaccination against Streptococcus pneumoniae [pneumococcus]    01/23/2015    • Neck pain 06/30/2016   • Obstructive sleep apnea of adult 07/30/2015   • Obstructive sleep apnea syndrome    • Osteoarthritis    • Osteoporosis 06/30/2016   • Pain in right shoulder 06/30/2016   • Pain in right shoulder 04/24/2014   •  Shortness of breath 04/02/2015   • Skin ulcer (CMS/HCC) 04/13/2015    left   • Thyrotoxicosis with or without goiter 10/24/2014   • Urinary incontinence 05/22/2014         Past Surgical History:   Procedure Laterality Date   • BACK SURGERY  08/07/2015    Removal of intstrumentation,L5-S1.Exploration of fusion,L5-S1.Posterolateral fusion,L4-L5.Posterior segmental spinal instrumentation L4-5.Transforaminal interbody fusion Through right sided approach.Performed at    • COLONOSCOPY  05/04/2016    will order cologuard   • INJECTION OF MEDICATION  03/23/2015    celestone   • INJECTION OF MEDICATION  05/04/2016    KENALOG(6)   • LUMBAR FUSION      discectomy fusion with rods L4-S1   • LUMBAR LAMINECTOMY  1995   • MANDIBLE FRACTURE SURGERY      JAW WIRED SHUT   • MOUTH SURGERY  10/05/2015    Fractured mandible. Removal of arch bars.   • MOUTH SURGERY  1985    Fractured mandible. Removal of arch bars.   • NASAL SEPTUM SURGERY     • RHINOPLASTY     • TUBAL ABDOMINAL LIGATION  1986         Family History   Problem Relation Age of Onset   • Hypertension Mother    • Hyperlipidemia Mother    • Mental illness Mother    • Thyroid disease Mother    • Diabetes Maternal Grandmother    • Cancer Maternal Grandmother    • Depression Other    • Diabetes Other    • Heart disease Other    • Hypertension Other    • Osteoporosis Other    • Thyroid disease Other    • Cancer Maternal Aunt    • Cancer Paternal Aunt          Social History     Socioeconomic History   • Marital status:      Spouse name: Not on file   • Number of children: Not on file   • Years of education: Not on file   • Highest education level: Not on file   Tobacco Use   • Smoking status: Never Smoker   • Smokeless tobacco: Never Used   • Tobacco comment: smoking cessation    Substance and Sexual Activity   • Alcohol use: No   • Drug use: No     Comment: lortab / oxycontin   • Sexual activity: Defer         Current Outpatient Medications   Medication Sig Dispense  Refill   • aMILoride (MIDAMOR) 5 MG tablet Take 1 tablet by mouth Daily. 30 tablet 6   • ARIPiprazole (ABILIFY) 5 MG tablet Take 5 mg by mouth Daily. At bedtime     • aspirin 325 MG tablet Take 325 mg by mouth Daily. At bedtime     • atorvastatin (LIPITOR) 20 MG tablet Take 1 tablet by mouth Daily. 90 tablet 1   • choline fenofibrate (TRILIPIX) 135 MG capsule Take 1 capsule by mouth Daily. 90 capsule 3   • DILT- MG 24 hr capsule TAKE 1 CAPSULE EVERY DAY 30 capsule 4   • ferrous sulfate 325 (65 FE) MG tablet Take 1 tablet by mouth Daily With Breakfast. Take with vitamin C to help with absorption 30 tablet 6   • lisinopril (PRINIVIL,ZESTRIL) 20 MG tablet Take 20 mg by mouth Daily.     • LORazepam (ATIVAN) 1 MG tablet Take 1 mg by mouth 2 (Two) Times a Day. Scheduled to take in the morning and one with dinner  0   • methIMAzole (TAPAZOLE) 5 MG tablet Take 2.5 mg by mouth Daily.     • metoprolol tartrate (LOPRESSOR) 25 MG tablet TAKE 1 TABLET EVERY 12 HOURS 60 tablet 6   • nitroglycerin (NITROSTAT) 0.4 MG SL tablet Place 1 tablet under the tongue See Admin Instructions. PRN cp, if not relieved in 5 min repeat and go to ER 25 tablet 12   • omeprazole (priLOSEC) 20 MG capsule Take 1 capsule by mouth Daily. 90 capsule 0   • potassium chloride (K-DUR) 10 MEQ CR tablet Take 1 tablet by mouth 2 (Two) Times a Day With Meals. 180 tablet 3   • rivaroxaban (XARELTO) 20 MG tablet Take 1 tablet by mouth Daily. 30 tablet 0   • sertraline (ZOLOFT) 100 MG tablet Take 150 mg by mouth Daily.  1     No current facility-administered medications for this visit.            Review of Systems:     Constitutional:  Denies recent weight loss, weight gain, fever or chills, no change in exercise tolerance.     HENT:  Denies any hearing loss, epistaxis, hoarseness, or difficulty speaking.     Eyes: Wears eyeglasses or contact lenses.    Respiratory:  Denies dyspnea with exertion,no cough, wheezing, or hemoptysis.     Cardiovascular: Negative  "for palpations, chest pain, orthopnea, PND, peripheral edema, syncope, or claudication.     Gastrointestinal:  Denies change in bowel habits, dyspepsia, ulcer disease, hematochezia, or melena.     Endocrine: Negative for cold intolerance, heat intolerance, polydipsia, polyphagia and polyuria. Denies any history of weight change, polydipsia, polyuria.     Genitourinary: Negative.      Musculoskeletal: Denies any history of arthritic symptoms or back problems.     Skin:  Denies any change in hair or nails, rashes, or skin lesions.     Allergic/Immunologic: Negative.  Negative for environmental allergies, food allergies and immunocompromised state.     Neurological:  Denies any history of recurrent headaches, strokes, TIA, or seizure disorder.     Hematological: Denies any food allergies, seasonal allergies, bleeding disorders, or lymphadenopathy.     Psychiatric/Behavioral: Denies any history of depression, substance abuse, or change in cognitive function.       OBJECTIVE:    BP (!) 150/110   Pulse 71   Ht 162.6 cm (64\")   Wt 102 kg (224 lb)   SpO2 95%   BMI 38.45 kg/m²       Physical Exam:     Constitutional: Cooperative, alert and oriented, well-developed, well-nourished, in no acute distress.     HENT:   Head: Normocephalic, normal hair patterns, no masses or tenderness.  Ears, Nose, and Throat: No gross abnormalities. No pallor or cyanosis. Dentition good.   Eyes: EOMS intact, PERRL, conjunctivae and lids unremarkable. Fundoscopic exam and visual fields not performed.   Neck: No palpable masses or adenopathy, no thyromegaly, no JVD, carotid pulses are full and equal bilaterally and without  Bruits.     Cardiovascular: Regular rhythm, S1 and S2 normal, no S3 or S4. Apical impulse not displaced. No murmurs, gallops, or rubs detected.     Pulmonary/Chest: Chest: normal symmetry, no tenderness to palpation, normal respiratory excursion, no intercostal retraction, no use of accessory muscles. Pulmonary: Normal " breath sounds. No rales or rhonchi.    Abdominal: Abdomen soft, bowel sounds normoactive, no masses, no hepatosplenomegaly, non-tender, no bruits.     Musculoskeletal: No deformities, clubbing, cyanosis, erythema, or edema observed. There are no spinal abnormalities noted. Normal muscle strength and tone. Pulses full and equal in all extremities, no bruits auscultated.     Neurological: No gross motor or sensory deficits noted, affect appropriate, oriented to time, person, place.     Skin: Warm and dry to the touch, no apparent skin lesions or masses noted.     Psychiatric: She has a normal mood and affect. Her behavior is normal. Judgment and thought content normal.         Procedures      Lab Results   Component Value Date    WBC 7.77 09/05/2018    HGB 13.5 09/05/2018    HCT 41.3 09/05/2018    MCV 84.6 09/05/2018     09/05/2018     Lab Results   Component Value Date    GLUCOSE 99 09/05/2018    BUN 18 09/05/2018    CREATININE 1.28 (H) 09/05/2018    EGFRIFNONA 42 (L) 09/05/2018    BCR 14.1 09/05/2018    CO2 26.0 09/05/2018    CALCIUM 9.5 09/05/2018    ALBUMIN 4.20 09/05/2018    AST 37 (H) 09/05/2018    ALT 24 09/05/2018     Lab Results   Component Value Date    CHOL 165 01/31/2018     Lab Results   Component Value Date    TRIG 169 01/31/2018    TRIG 115 12/15/2016    TRIG 111 09/08/2016     Lab Results   Component Value Date    HDL 67 01/31/2018    HDL 52 (L) 12/15/2016    HDL 66 09/08/2016     No components found for: LDLCALC  Lab Results   Component Value Date    LDL 77 01/31/2018    LDL 73 12/15/2016    LDL 61 09/08/2016     No results found for: HDLLDLRATIO  No components found for: CHOLHDL  Lab Results   Component Value Date    HGBA1C 5.7 (H) 01/31/2018     Lab Results   Component Value Date    TSH 1.540 12/28/2018           ASSESSMENT AND PLAN:  #1 persistent atrial fibrillation #2 hypertension with hypertensive heart disease #3 hyperlipidemia #4 palpitation     63 years old patient with history of  paroxysmal atrial fibrillation failed amiodarone and failed Tikosyn now she is compliant with the CPAP and wasn't a AV kaya blocking drug given the history of persistent atrial fibrillation and present to the office for routine follow-up.  She is back in sinus rhythm confirming by clinical conditions an EKG and she is a pleased with the clinical outcome.  I will continue AV kaya blocking drug and Xarelto to decrease risk of cardiac embolic phenomena.  Hypertension being managed with lisinopril and atorvastatin for management of hyperlipidemia.  We'll see her back in 6 month R depends on patient clinical conditions.   Patient does not want to pursue EP study and ablations..  We'll resume the lisinopril for management of hypertension, Lipitor for management of hyperlipidemia.  Patient maintaining sinus rhythm EKG EKG confirms a sinus rhythm finding discussed with the patient rsk factor lifestyle modification discussed.  Low-sodium, low carbohydrate and dash Diet explained        Lana was seen today for follow-up.    Diagnoses and all orders for this visit:    Benign essential hypertension    Mixed hyperlipidemia    Paroxysmal atrial fibrillation (CMS/HCC)        Kristyn Jurado MD  7/24/2019  3:48 PM

## 2019-07-25 LAB
T4 FREE SERPL-MCNC: 1.38 NG/DL (ref 0.93–1.7)
TSH SERPL DL<=0.05 MIU/L-ACNC: 1.37 MIU/ML (ref 0.27–4.2)

## 2019-08-13 ENCOUNTER — TELEPHONE (OUTPATIENT)
Dept: FAMILY MEDICINE CLINIC | Facility: CLINIC | Age: 64
End: 2019-08-13

## 2019-08-13 DIAGNOSIS — K21.9 GASTROESOPHAGEAL REFLUX DISEASE WITHOUT ESOPHAGITIS: ICD-10-CM

## 2019-08-13 NOTE — TELEPHONE ENCOUNTER
Patient called asking for a refill on her omeprazole (priLOSEC) 20 MG capsule and would like it sent to [facility]    ThanksRoselyn  Contact number for patient is [phone]    Roselyn Nicholson

## 2019-08-13 NOTE — TELEPHONE ENCOUNTER
Wrong team.  Please forward this request DAMIEN Khalil Junior.  Thank you for your help and participate in the care of this patient.        This document has been electronically signed by Seferino Oates III, MD on August 13, 2019 2:46 PM

## 2019-08-14 RX ORDER — OMEPRAZOLE 20 MG/1
20 CAPSULE, DELAYED RELEASE ORAL DAILY
Qty: 90 CAPSULE | Refills: 0 | Status: SHIPPED | OUTPATIENT
Start: 2019-08-14 | End: 2019-11-19 | Stop reason: SDUPTHER

## 2019-10-01 ENCOUNTER — TELEPHONE (OUTPATIENT)
Dept: FAMILY MEDICINE CLINIC | Facility: CLINIC | Age: 64
End: 2019-10-01

## 2019-10-01 NOTE — TELEPHONE ENCOUNTER
Patient called asking for her paperwork that was dropped off 2 weeks ago for her disability sticker for her vehical.  Patient would like a call back when that is ready for .    Call back number for her is [phone]    Thanks  Roselyn

## 2019-10-18 ENCOUNTER — APPOINTMENT (OUTPATIENT)
Dept: LAB | Facility: HOSPITAL | Age: 64
End: 2019-10-18

## 2019-10-18 ENCOUNTER — TRANSCRIBE ORDERS (OUTPATIENT)
Dept: LAB | Facility: HOSPITAL | Age: 64
End: 2019-10-18

## 2019-10-18 ENCOUNTER — OFFICE VISIT (OUTPATIENT)
Dept: FAMILY MEDICINE CLINIC | Facility: CLINIC | Age: 64
End: 2019-10-18

## 2019-10-18 VITALS
SYSTOLIC BLOOD PRESSURE: 132 MMHG | BODY MASS INDEX: 37.88 KG/M2 | HEART RATE: 68 BPM | OXYGEN SATURATION: 98 % | TEMPERATURE: 97.5 F | HEIGHT: 64 IN | DIASTOLIC BLOOD PRESSURE: 84 MMHG | WEIGHT: 221.9 LBS

## 2019-10-18 DIAGNOSIS — I10 ESSENTIAL (PRIMARY) HYPERTENSION: ICD-10-CM

## 2019-10-18 DIAGNOSIS — E78.5 HYPERLIPIDEMIA, UNSPECIFIED HYPERLIPIDEMIA TYPE: Primary | ICD-10-CM

## 2019-10-18 DIAGNOSIS — E05.90 HYPERTHYROIDISM: ICD-10-CM

## 2019-10-18 DIAGNOSIS — F41.1 GENERALIZED ANXIETY DISORDER: ICD-10-CM

## 2019-10-18 DIAGNOSIS — F32.A DEPRESSIVE DISORDER: ICD-10-CM

## 2019-10-18 DIAGNOSIS — Z13.820 SCREENING FOR OSTEOPOROSIS: ICD-10-CM

## 2019-10-18 DIAGNOSIS — E87.6 HYPOKALEMIA: ICD-10-CM

## 2019-10-18 DIAGNOSIS — I48.91 ATRIAL FIBRILLATION, UNSPECIFIED TYPE (HCC): ICD-10-CM

## 2019-10-18 DIAGNOSIS — Z12.31 SCREENING MAMMOGRAM, ENCOUNTER FOR: ICD-10-CM

## 2019-10-18 DIAGNOSIS — M51.36 DDD (DEGENERATIVE DISC DISEASE), LUMBAR: ICD-10-CM

## 2019-10-18 DIAGNOSIS — E83.42 HYPOMAGNESEMIA: ICD-10-CM

## 2019-10-18 DIAGNOSIS — N18.30 CHRONIC KIDNEY DISEASE, STAGE III (MODERATE) (HCC): Primary | ICD-10-CM

## 2019-10-18 DIAGNOSIS — N95.9 UNSPECIFIED MENOPAUSAL AND PERIMENOPAUSAL DISORDER: ICD-10-CM

## 2019-10-18 PROBLEM — Z09 HOSPITAL DISCHARGE FOLLOW-UP: Status: RESOLVED | Noted: 2018-03-21 | Resolved: 2019-10-18

## 2019-10-18 LAB
ALBUMIN SERPL-MCNC: 3.9 G/DL (ref 3.5–5.2)
ALBUMIN/GLOB SERPL: 1.1 G/DL
ALP SERPL-CCNC: 51 U/L (ref 39–117)
ALT SERPL W P-5'-P-CCNC: 16 U/L (ref 1–33)
ANION GAP SERPL CALCULATED.3IONS-SCNC: 10.1 MMOL/L (ref 5–15)
AST SERPL-CCNC: 22 U/L (ref 1–32)
BILIRUB SERPL-MCNC: 0.4 MG/DL (ref 0.2–1.2)
BUN BLD-MCNC: 16 MG/DL (ref 8–23)
BUN/CREAT SERPL: 13.1 (ref 7–25)
CALCIUM SPEC-SCNC: 9.3 MG/DL (ref 8.6–10.5)
CHLORIDE SERPL-SCNC: 97 MMOL/L (ref 98–107)
CO2 SERPL-SCNC: 26.9 MMOL/L (ref 22–29)
CREAT BLD-MCNC: 1.22 MG/DL (ref 0.57–1)
GFR SERPL CREATININE-BSD FRML MDRD: 44 ML/MIN/1.73
GLOBULIN UR ELPH-MCNC: 3.7 GM/DL
GLUCOSE BLD-MCNC: 131 MG/DL (ref 65–99)
HCT VFR BLD AUTO: 44.3 % (ref 34–46.6)
HGB BLD-MCNC: 14.9 G/DL (ref 12–15.9)
POTASSIUM BLD-SCNC: 4.3 MMOL/L (ref 3.5–5.2)
PROT SERPL-MCNC: 7.6 G/DL (ref 6–8.5)
SODIUM BLD-SCNC: 134 MMOL/L (ref 136–145)

## 2019-10-18 PROCEDURE — 85018 HEMOGLOBIN: CPT | Performed by: INTERNAL MEDICINE

## 2019-10-18 PROCEDURE — 80053 COMPREHEN METABOLIC PANEL: CPT | Performed by: INTERNAL MEDICINE

## 2019-10-18 PROCEDURE — 99213 OFFICE O/P EST LOW 20 MIN: CPT | Performed by: STUDENT IN AN ORGANIZED HEALTH CARE EDUCATION/TRAINING PROGRAM

## 2019-10-18 PROCEDURE — 85014 HEMATOCRIT: CPT | Performed by: INTERNAL MEDICINE

## 2019-10-18 PROCEDURE — 90674 CCIIV4 VAC NO PRSV 0.5 ML IM: CPT | Performed by: STUDENT IN AN ORGANIZED HEALTH CARE EDUCATION/TRAINING PROGRAM

## 2019-10-18 PROCEDURE — 36415 COLL VENOUS BLD VENIPUNCTURE: CPT | Performed by: INTERNAL MEDICINE

## 2019-10-18 PROCEDURE — G0008 ADMIN INFLUENZA VIRUS VAC: HCPCS | Performed by: STUDENT IN AN ORGANIZED HEALTH CARE EDUCATION/TRAINING PROGRAM

## 2019-10-18 RX ORDER — CYCLOBENZAPRINE HCL 5 MG
5 TABLET ORAL DAILY PRN
Qty: 30 TABLET | Refills: 2 | Status: SHIPPED | OUTPATIENT
Start: 2019-10-18 | End: 2019-10-21 | Stop reason: SDUPTHER

## 2019-10-18 NOTE — PROGRESS NOTES
Family Medicine Residency   Allie Martin MD    Lana Mata is a 64 y.o. female who presents to family medicine residency clinic for the following:    Subjective:     She has moderate to severe depression. No SI/HI. She has a lot of family issues. 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 Son has a lot of issues, and that is worrisome to her. He has paranoia, hallucinations. She is currently on Zoloft and Ativan. She does see a therapist. Next appointment is Monday. She goes to [facility]. She was well controlled prior to these life events. She is worried about her family livelihood.     She has back pain. She has had 3 back surgeries. She has tried lidocaine patch, tylenol, and heat that helps some.     She would like a DEXA scan, mammogram today. She had a pap smear 3 years ago with negative HPV.       Past Medical Hx:   Past Medical History:   Diagnosis Date   • Abnormal gait 11/28/2012   • Abrasion 02/26/2015   • Acute ankle pain 03/31/2016   • Acute bronchitis 09/04/2015   • Allergic rhinitis 05/22/2012   • Asthenia 05/22/2014   • Atrial fibrillation (CMS/HCC) 03/04/2016    - converted    • Attempted suicide (CMS/[facility])    • Benign essential hypertension 03/04/2016   • Cardiovascular stress test abnormal 03/04/2016   • Cellulitis of knee 03/03/2015   • Chest pain 04/02/2015   • Chronic pain disorder    • Common cold 06/24/2014   • Congestive heart failure (CMS/HCC) 02/04/2016   • Depressive disorder 05/22/2015    suspect more complicated psych issues      • Gastroesophageal reflux disease 01/17/2013   • Generalized anxiety disorder 02/04/2016   • H/O echocardiogram     Normal LV function with Ef of 60-65%.Mildly dilated right ventricle with normal function. Grade 1B diastolic dysfunction with mild CLVH.NO sig. valvular regurg. or stenosis.   • Headache 10/27/2014   • Hyperlipidemia 03/04/2016   • Low back pain 06/30/2016    Need for prophylactic vaccination against Streptococcus pneumoniae  [pneumococcus]    01/23/2015    • Neck pain 06/30/2016   • Obstructive sleep apnea of adult 07/30/2015   • Obstructive sleep apnea syndrome    • Osteoarthritis    • Osteoporosis 06/30/2016   • Pain in right shoulder 06/30/2016   • Pain in right shoulder 04/24/2014   • Shortness of breath 04/02/2015   • Skin ulcer (CMS/HCC) 04/13/2015    left   • Thyrotoxicosis with or without goiter 10/24/2014   • Urinary incontinence 05/22/2014       Past Surgical Hx:  Past Surgical History:   Procedure Laterality Date   • BACK SURGERY  08/07/2015    Removal of intstrumentation,L5-S1.Exploration of fusion,L5-S1.Posterolateral fusion,L4-L5.Posterior segmental spinal instrumentation L4-5.Transforaminal interbody fusion Through right sided approach.Performed at    • COLONOSCOPY  05/04/2016    will order cologuard   • INJECTION OF MEDICATION  03/23/2015    celestone   • INJECTION OF MEDICATION  05/04/2016    KENALOG(6)   • LUMBAR FUSION      discectomy fusion with rods L4-S1   • LUMBAR LAMINECTOMY  1995   • MANDIBLE FRACTURE SURGERY      JAW WIRED SHUT   • MOUTH SURGERY  10/05/2015    Fractured mandible. Removal of arch bars.   • MOUTH SURGERY  1985    Fractured mandible. Removal of arch bars.   • NASAL SEPTUM SURGERY     • RHINOPLASTY     • TUBAL ABDOMINAL LIGATION  1986       Current Meds:    Current Outpatient Medications:   •  aMILoride (MIDAMOR) 5 MG tablet, Take 1 tablet by mouth Daily., Disp: 30 tablet, Rfl: 6  •  ARIPiprazole (ABILIFY) 5 MG tablet, Take 5 mg by mouth Daily. At bedtime, Disp: , Rfl:   •  aspirin 325 MG tablet, Take 325 mg by mouth Daily. At bedtime, Disp: , Rfl:   •  atorvastatin (LIPITOR) 20 MG tablet, Take 1 tablet by mouth Daily., Disp: 90 tablet, Rfl: 1  •  choline fenofibrate (TRILIPIX) 135 MG capsule, Take 1 capsule by mouth Daily., Disp: 90 capsule, Rfl: 3  •  DILT- MG 24 hr capsule, TAKE 1 CAPSULE EVERY DAY, Disp: 30 capsule, Rfl: 4  •  ferrous sulfate 325 (65 FE) MG tablet, Take 1 tablet by mouth  Daily With Breakfast. Take with vitamin C to help with absorption, Disp: 30 tablet, Rfl: 6  •  lisinopril (PRINIVIL,ZESTRIL) 20 MG tablet, Take 20 mg by mouth Daily., Disp: , Rfl:   •  LORazepam (ATIVAN) 1 MG tablet, Take 1 mg by mouth 2 (Two) Times a Day. Scheduled to take in the morning and one with dinner, Disp: , Rfl: 0  •  methIMAzole (TAPAZOLE) 5 MG tablet, Take 2.5 mg by mouth Daily., Disp: , Rfl:   •  metoprolol tartrate (LOPRESSOR) 25 MG tablet, TAKE 1 TABLET EVERY 12 HOURS, Disp: 60 tablet, Rfl: 6  •  nitroglycerin (NITROSTAT) 0.4 MG SL tablet, Place 1 tablet under the tongue See Admin Instructions. PRN cp, if not relieved in 5 min repeat and go to ER, Disp: 25 tablet, Rfl: 12  •  omeprazole (priLOSEC) 20 MG capsule, Take 1 capsule by mouth Daily., Disp: 90 capsule, Rfl: 0  •  potassium chloride (K-DUR) 10 MEQ CR tablet, Take 1 tablet by mouth 2 (Two) Times a Day With Meals., Disp: 180 tablet, Rfl: 3  •  rivaroxaban (XARELTO) 20 MG tablet, Take 1 tablet by mouth Daily., Disp: 30 tablet, Rfl: 0  •  sertraline (ZOLOFT) 100 MG tablet, Take 150 mg by mouth Daily., Disp: , Rfl: 1  •  cyclobenzaprine (FLEXERIL) 5 MG tablet, Take 1 tablet by mouth Daily As Needed for Muscle Spasms., Disp: 30 tablet, Rfl: 2    Allergies:  Codeine; Latex; Morphine and related; and Pravachol [pravastatin sodium]    Family Hx:  Family History   Problem Relation Age of Onset   • Hypertension Mother    • Hyperlipidemia Mother    • Mental illness Mother    • Thyroid disease Mother    • Diabetes Maternal Grandmother    • Cancer Maternal Grandmother    • Depression Other    • Diabetes Other    • Heart disease Other    • Hypertension Other    • Osteoporosis Other    • Thyroid disease Other    • Cancer Maternal Aunt    • Cancer Paternal Aunt         Social History:  Social History     Socioeconomic History   • Marital status:      Spouse name: Not on file   • Number of children: Not on file   • Years of education: Not on file   •  Highest education level: Not on file   Tobacco Use   • Smoking status: Never Smoker   • Smokeless tobacco: Never Used   • Tobacco comment: smoking cessation    Substance and Sexual Activity   • Alcohol use: No   • Drug use: No     Comment: lortab / oxycontin   • Sexual activity: Defer       Review of Systems  Review of Systems   Constitutional: Negative for chills, diaphoresis and fever.   HENT: Negative for sneezing and sore throat.    Eyes: Negative for pain and discharge.   Respiratory: Negative for cough and shortness of breath.    Gastrointestinal: Negative for constipation, diarrhea, nausea and vomiting.   Endocrine: Negative for cold intolerance and heat intolerance.   Genitourinary: Negative for difficulty urinating, dysuria, frequency and urgency.   Musculoskeletal: Positive for arthralgias, back pain and myalgias.   Skin: Negative for color change and pallor.   Allergic/Immunologic: Negative for environmental allergies and food allergies.   Neurological: Negative for dizziness, syncope and weakness.   Psychiatric/Behavioral: Negative for confusion and sleep disturbance. The patient is nervous/anxious.        Physical Exam:  Vitals:    10/18/19 0903   BP: 132/84   Pulse: 68   Temp: 97.5 °F (36.4 °C)   SpO2: 98%       Body mass index is 38.09 kg/m².   Physical Exam   Constitutional: She is oriented to person, place, and time. She appears well-developed and well-nourished. No distress.   HENT:   Head: Normocephalic and atraumatic.   Nose: Nose normal.   Eyes: Conjunctivae and EOM are normal.   Neck: Normal range of motion. Neck supple.   Cardiovascular: Normal rate, regular rhythm and normal heart sounds.   No murmur heard.  Pulmonary/Chest: Effort normal and breath sounds normal. No respiratory distress. She has no wheezes. She has no rales.   Abdominal: Soft. Bowel sounds are normal. She exhibits no distension. There is no tenderness. There is no guarding.   Musculoskeletal: Normal range of motion. She  exhibits tenderness.   Neurological: She is alert and oriented to person, place, and time.   Skin: Skin is warm and dry.   Psychiatric: Her behavior is normal. Her mood appears anxious. She exhibits a depressed mood.   Vitals reviewed.      Objective:     Data Reviewed:     LABS:   Lab Results   Component Value Date    GLUCOSE 99 09/05/2018    BUN 18 09/05/2018    CREATININE 1.28 (H) 09/05/2018    EGFRIFNONA 42 (L) 09/05/2018    BCR 14.1 09/05/2018    K 4.0 10/16/2018    CO2 26.0 09/05/2018    CALCIUM 9.5 09/05/2018    ALBUMIN 4.20 09/05/2018    AST 37 (H) 09/05/2018    ALT 24 09/05/2018     Lab Results   Component Value Date    WBC 7.77 09/05/2018    HGB 13.5 09/05/2018    HCT 41.3 09/05/2018    MCV 84.6 09/05/2018     09/05/2018     Lab Results   Component Value Date    CHOL 165 01/31/2018    CHLPL 148 12/15/2016    TRIG 169 01/31/2018    HDL 67 01/31/2018    LDL 77 01/31/2018     Lab Results   Component Value Date    TSH 1.370 07/24/2019     Lab Results   Component Value Date    HGBA1C 5.7 (H) 01/31/2018    HGBA1C 5.3 04/02/2015     Lab Results   Component Value Date    MICROALBUR <0.6 01/31/2018    CREATININE 1.28 (H) 09/05/2018     Lab Results   Component Value Date    FERRITIN 12.10 11/12/2018         Health Maintenance:   Weight  -Class: Obese Class II: 35-39.9kg/m2  -Patient's Body mass index is 38.09 kg/m². BMI is above normal parameters. Recommendations include: exercise counseling and nutrition counseling.      Alcohol use:  reports that she does not drink alcohol.    Nicotine status  reports that she has never smoked. She has never used smokeless tobacco.        Health Maintenance  Health Maintenance   Topic Date Due   • ZOSTER VACCINE (2 of 2) 02/09/2017   • DXA SCAN  08/15/2018   • MAMMOGRAM  08/16/2018   • LIPID PANEL  01/31/2019   • MEDICARE ANNUAL WELLNESS  02/07/2019   • INFLUENZA VACCINE  08/01/2019   • PAP SMEAR  12/15/2019   • COLONOSCOPY  11/07/2026   • TDAP/TD VACCINES (4 - Td)  02/07/2028   • HEPATITIS C SCREENING  Completed        Goals:   Goals        Patient Stated    • management of chronic medical conditions (pt-stated)      Barriers to goals: none              Assessment/Plan:       Assessment/Plan   Lana was seen today for referral mammogram and back pain.    Diagnoses and all orders for this visit:    Hyperlipidemia, unspecified hyperlipidemia type  -     Lipid panel; Future    Screening for osteoporosis  -     DEXA Bone Density Axial; Future    Screening mammogram, encounter for  -     Mammo Screening Digital Tomosynthesis Bilateral With CAD; Future    Unspecified menopausal and perimenopausal disorder   -     DEXA Bone Density Axial; Future    DDD (degenerative disc disease), lumbar    Generalized anxiety disorder    Depressive disorder    Other orders  -     Flucelvax Quad=>4Years (PFS)  -     cyclobenzaprine (FLEXERIL) 5 MG tablet; Take 1 tablet by mouth Daily As Needed for Muscle Spasms.        Back pain   -will try Flexeril as needed for muscle spasms     Depression/Anxiety   -PHQ-9 today  -Management by [facility]  -Recommended that she get help with her son's mental illness    Will order DEXA, mammogram    Follow up in one month or sooner if needed        FOLLOW-UP  Return in about 4 weeks (around 11/15/2019) for Recheck.      RISK SCORE: 3        This document has been electronically signed by Allie Martin MD on October 18, 2019 10:36 AM

## 2019-10-18 NOTE — PROGRESS NOTES
I have reviewed the notes, assessments, and/or procedures performed by Allie Martin MD, I concur with her/his documentation and assessment and plan for Lana Mata.                This document has been electronically signed by Mina Porter MD on October 18, 2019 1:26 PM

## 2019-10-21 ENCOUNTER — TELEPHONE (OUTPATIENT)
Dept: FAMILY MEDICINE CLINIC | Facility: CLINIC | Age: 64
End: 2019-10-21

## 2019-10-21 RX ORDER — CYCLOBENZAPRINE HCL 5 MG
5 TABLET ORAL DAILY PRN
Qty: 30 TABLET | Refills: 2 | Status: SHIPPED | OUTPATIENT
Start: 2019-10-21 | End: 2019-12-07

## 2019-10-21 NOTE — TELEPHONE ENCOUNTER
Patient called asking for a refill on her cyclobenzaprine (FLEXERIL) 5 MG tablet. She said she was just seen recently and wanted to know if provider would be willing to refill this for her. Patient uses [facility] by mail.    Thanks,  Roselyn

## 2019-11-14 ENCOUNTER — TELEPHONE (OUTPATIENT)
Dept: FAMILY MEDICINE CLINIC | Facility: CLINIC | Age: 64
End: 2019-11-14

## 2019-11-14 RX ORDER — ALENDRONATE SODIUM 70 MG/1
70 TABLET ORAL
Qty: 4 TABLET | Refills: 5 | Status: SHIPPED | OUTPATIENT
Start: 2019-11-14 | End: 2021-05-12

## 2019-11-14 NOTE — TELEPHONE ENCOUNTER
Called patient to let her know her bone density showed increased risk for fracture, so Dr Martin sent  Fosamax, vitamin D and calcium to her pharmacy to help prevent osteoporosis and fractures. Patient understood

## 2019-11-18 ENCOUNTER — LAB (OUTPATIENT)
Dept: LAB | Facility: HOSPITAL | Age: 64
End: 2019-11-18

## 2019-11-18 DIAGNOSIS — E78.5 HYPERLIPIDEMIA, UNSPECIFIED HYPERLIPIDEMIA TYPE: ICD-10-CM

## 2019-11-18 LAB
CHOLEST SERPL-MCNC: 148 MG/DL (ref 0–200)
HDLC SERPL-MCNC: 55 MG/DL (ref 40–60)
LDLC SERPL CALC-MCNC: 43 MG/DL (ref 0–100)
LDLC/HDLC SERPL: 0.79 {RATIO}
TRIGL SERPL-MCNC: 249 MG/DL (ref 0–150)
VLDLC SERPL-MCNC: 49.8 MG/DL (ref 5–40)

## 2019-11-18 PROCEDURE — 36415 COLL VENOUS BLD VENIPUNCTURE: CPT

## 2019-11-18 PROCEDURE — 80061 LIPID PANEL: CPT

## 2019-11-19 ENCOUNTER — OFFICE VISIT (OUTPATIENT)
Dept: FAMILY MEDICINE CLINIC | Facility: CLINIC | Age: 64
End: 2019-11-19

## 2019-11-19 VITALS
DIASTOLIC BLOOD PRESSURE: 82 MMHG | OXYGEN SATURATION: 98 % | SYSTOLIC BLOOD PRESSURE: 122 MMHG | TEMPERATURE: 96.8 F | WEIGHT: 224.9 LBS | HEIGHT: 64 IN | BODY MASS INDEX: 38.39 KG/M2 | HEART RATE: 78 BPM

## 2019-11-19 DIAGNOSIS — G89.29 CHRONIC MIDLINE LOW BACK PAIN WITH LEFT-SIDED SCIATICA: Primary | ICD-10-CM

## 2019-11-19 DIAGNOSIS — K21.9 GASTROESOPHAGEAL REFLUX DISEASE WITHOUT ESOPHAGITIS: ICD-10-CM

## 2019-11-19 DIAGNOSIS — M54.42 CHRONIC MIDLINE LOW BACK PAIN WITH LEFT-SIDED SCIATICA: Primary | ICD-10-CM

## 2019-11-19 PROCEDURE — 99213 OFFICE O/P EST LOW 20 MIN: CPT | Performed by: STUDENT IN AN ORGANIZED HEALTH CARE EDUCATION/TRAINING PROGRAM

## 2019-11-19 RX ORDER — OMEPRAZOLE 20 MG/1
20 CAPSULE, DELAYED RELEASE ORAL DAILY
Qty: 90 CAPSULE | Refills: 5 | Status: SHIPPED | OUTPATIENT
Start: 2019-11-19 | End: 2020-12-15 | Stop reason: SDUPTHER

## 2019-12-02 RX ORDER — DILTIAZEM HYDROCHLORIDE 180 MG/1
CAPSULE, EXTENDED RELEASE ORAL
Qty: 30 CAPSULE | Refills: 4 | Status: SHIPPED | OUTPATIENT
Start: 2019-12-02 | End: 2020-01-15 | Stop reason: SDUPTHER

## 2019-12-03 RX ORDER — ATORVASTATIN CALCIUM 20 MG/1
20 TABLET, FILM COATED ORAL DAILY
Qty: 90 TABLET | Refills: 1 | Status: SHIPPED | OUTPATIENT
Start: 2019-12-03 | End: 2020-05-29

## 2019-12-07 ENCOUNTER — APPOINTMENT (OUTPATIENT)
Dept: GENERAL RADIOLOGY | Facility: HOSPITAL | Age: 64
End: 2019-12-07

## 2019-12-07 ENCOUNTER — APPOINTMENT (OUTPATIENT)
Dept: CT IMAGING | Facility: HOSPITAL | Age: 64
End: 2019-12-07

## 2019-12-07 ENCOUNTER — HOSPITAL ENCOUNTER (EMERGENCY)
Facility: HOSPITAL | Age: 64
Discharge: HOME | End: 2019-12-07
Attending: EMERGENCY MEDICINE | Admitting: EMERGENCY MEDICINE

## 2019-12-07 VITALS
SYSTOLIC BLOOD PRESSURE: 133 MMHG | HEIGHT: 65 IN | BODY MASS INDEX: 37.49 KG/M2 | TEMPERATURE: 98.1 F | DIASTOLIC BLOOD PRESSURE: 79 MMHG | OXYGEN SATURATION: 96 % | RESPIRATION RATE: 18 BRPM | HEART RATE: 76 BPM | WEIGHT: 225 LBS

## 2019-12-07 DIAGNOSIS — S61.512A LACERATION OF LEFT WRIST, INITIAL ENCOUNTER: ICD-10-CM

## 2019-12-07 DIAGNOSIS — S16.1XXA STRAIN OF NECK MUSCLE, INITIAL ENCOUNTER: Primary | ICD-10-CM

## 2019-12-07 PROCEDURE — 90471 IMMUNIZATION ADMIN: CPT | Performed by: EMERGENCY MEDICINE

## 2019-12-07 PROCEDURE — 25010000002 TDAP 5-2.5-18.5 LF-MCG/0.5 SUSPENSION: Performed by: EMERGENCY MEDICINE

## 2019-12-07 PROCEDURE — 99284 EMERGENCY DEPT VISIT MOD MDM: CPT

## 2019-12-07 PROCEDURE — 72125 CT NECK SPINE W/O DYE: CPT

## 2019-12-07 PROCEDURE — 90715 TDAP VACCINE 7 YRS/> IM: CPT | Performed by: EMERGENCY MEDICINE

## 2019-12-07 PROCEDURE — 73110 X-RAY EXAM OF WRIST: CPT

## 2019-12-07 RX ORDER — DIAPER,BRIEF,INFANT-TODD,DISP
EACH MISCELLANEOUS ONCE
Status: COMPLETED | OUTPATIENT
Start: 2019-12-07 | End: 2019-12-07

## 2019-12-07 RX ORDER — LIDOCAINE HYDROCHLORIDE AND EPINEPHRINE 10; 10 MG/ML; UG/ML
10 INJECTION, SOLUTION INFILTRATION; PERINEURAL ONCE
Status: COMPLETED | OUTPATIENT
Start: 2019-12-07 | End: 2019-12-07

## 2019-12-07 RX ORDER — METHOCARBAMOL 500 MG/1
500 TABLET, FILM COATED ORAL 4 TIMES DAILY PRN
Qty: 20 TABLET | Refills: 0 | Status: SHIPPED | OUTPATIENT
Start: 2019-12-07 | End: 2020-03-11 | Stop reason: SDUPTHER

## 2019-12-07 RX ADMIN — LIDOCAINE HYDROCHLORIDE,EPINEPHRINE BITARTRATE 10 ML: 10; .01 INJECTION, SOLUTION INFILTRATION; PERINEURAL at 23:02

## 2019-12-07 RX ADMIN — TETANUS TOXOID, REDUCED DIPHTHERIA TOXOID AND ACELLULAR PERTUSSIS VACCINE, ADSORBED 0.5 ML: 5; 2.5; 8; 8; 2.5 SUSPENSION INTRAMUSCULAR at 23:01

## 2019-12-07 RX ADMIN — BACITRACIN 1 APPLICATION: 500 OINTMENT TOPICAL at 23:03

## 2019-12-08 NOTE — ED NOTES
Wrist suture line covered with telfa, 4x4 gauze and wrapped with ace wrap, cock up wrist splint applied, pt verbalizes comfort with appliance, + csm after application     Valentina Kaur, RN  12/07/19 7084
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
## 2019-12-09 ENCOUNTER — EPISODE CHANGES (OUTPATIENT)
Dept: CASE MANAGEMENT | Facility: OTHER | Age: 64
End: 2019-12-09

## 2019-12-09 ENCOUNTER — PATIENT OUTREACH (OUTPATIENT)
Dept: CASE MANAGEMENT | Facility: OTHER | Age: 64
End: 2019-12-09
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
## 2019-12-09 NOTE — OUTREACH NOTE
Care Coordination Assessment    Documented/Reviewed By:  Cyn Conner RN Date/time:  12/9/2019 10:03 AM   Assessment completed with:  patient  Enrolled in care management program:  No  Living arrangement:  alone  Support system:  family  Type of residence:  private residence  Home care services:  No  Communication device:  Yes  Bed or wheelchair confined:  No  Inadequate nutrition:  No  Medication adherence problem:  No  Experiencing side effects from current medications:  No  Difficulty keeping appointments:  No

## 2019-12-09 NOTE — OUTREACH NOTE
Care Coordination Note    RN-CC spoke with Dr. Martin's office and requested they remind patient to schedule AWV when she checks out of her appointment tomorrow.     Cyn Conner RN  Ambulatory     12/9/2019, 10:46 AM

## 2019-12-12 NOTE — PROGRESS NOTES
Family Medicine Residency   Allie Martin MD    Lana Mata is a 64 y.o. female who presents to family medicine residency clinic for the following:    Subjective:     She has chronic pain.  She would like referral to pain management.  She has back pain.  Tylenol does not help.  She has also tried ibuprofen.  She has also tried physical therapy with limited benefit.  A TENS unit has helped some.  Flexeril does not help.  If she limits her activities, her pain is somewhat improved.    She has anxiety and depression.  She states that things are improved.  Her son is doing better and her mom is in town.  She reports that overall life is better.  She does see [facility] in 1 month.  She goes every 3 months.  No thoughts of hurting herself or anyone else.    Blood pressure is good today at 122/82.      Past Medical Hx:   Past Medical History:   Diagnosis Date   • Abnormal gait 11/28/2012   • Abrasion 02/26/2015   • Acute ankle pain 03/31/2016   • Acute bronchitis 09/04/2015   • Allergic rhinitis 05/22/2012   • Asthenia 05/22/2014   • Atrial fibrillation (CMS/HCC) 03/04/2016    - converted    • Attempted suicide (CMS/[facility])    • Benign essential hypertension 03/04/2016   • Cardiovascular stress test abnormal 03/04/2016   • Cellulitis of knee 03/03/2015   • Chest pain 04/02/2015   • Chronic pain disorder    • Common cold 06/24/2014   • Congestive heart failure (CMS/HCC) 02/04/2016   • Depressive disorder 05/22/2015    suspect more complicated psych issues      • Fibrocystic breast    • Gastroesophageal reflux disease 01/17/2013   • Generalized anxiety disorder 02/04/2016   • H/O echocardiogram     Normal LV function with Ef of 60-65%.Mildly dilated right ventricle with normal function. Grade 1B diastolic dysfunction with mild CLVH.NO sig. valvular regurg. or stenosis.   • Headache 10/27/2014   • Hyperlipidemia 03/04/2016   • Low back pain 06/30/2016    Need for prophylactic vaccination  against Streptococcus pneumoniae [pneumococcus]    01/23/2015    • Neck pain 06/30/2016   • Obstructive sleep apnea of adult 07/30/2015   • Obstructive sleep apnea syndrome    • Osteoarthritis    • Osteoporosis 06/30/2016   • Pain in right shoulder 06/30/2016   • Pain in right shoulder 04/24/2014   • Shortness of breath 04/02/2015   • Skin ulcer (CMS/HCC) 04/13/2015    left   • Thyrotoxicosis with or without goiter 10/24/2014   • Urinary incontinence 05/22/2014       Past Surgical Hx:  Past Surgical History:   Procedure Laterality Date   • BACK SURGERY  08/07/2015    Removal of intstrumentation,L5-S1.Exploration of fusion,L5-S1.Posterolateral fusion,L4-L5.Posterior segmental spinal instrumentation L4-5.Transforaminal interbody fusion Through right sided approach.Performed at    • COLONOSCOPY  05/04/2016    will order cologuard   • INJECTION OF MEDICATION  03/23/2015    celestone   • INJECTION OF MEDICATION  05/04/2016    KENALOG(6)   • LUMBAR FUSION      discectomy fusion with rods L4-S1   • LUMBAR LAMINECTOMY  1995   • MANDIBLE FRACTURE SURGERY      JAW WIRED SHUT   • MOUTH SURGERY  10/05/2015    Fractured mandible. Removal of arch bars.   • MOUTH SURGERY  1985    Fractured mandible. Removal of arch bars.   • NASAL SEPTUM SURGERY     • RHINOPLASTY     • TUBAL ABDOMINAL LIGATION  1986       Current Meds:    Current Outpatient Medications:   •  alendronate (FOSAMAX) 70 MG tablet, Take 1 tablet by mouth Every 7 (Seven) Days., Disp: 4 tablet, Rfl: 5  •  aMILoride (MIDAMOR) 5 MG tablet, Take 1 tablet by mouth Daily., Disp: 30 tablet, Rfl: 6  •  ARIPiprazole (ABILIFY) 5 MG tablet, Take 5 mg by mouth Daily. At bedtime, Disp: , Rfl:   •  aspirin 325 MG tablet, Take 325 mg by mouth Daily. At bedtime, Disp: , Rfl:   •  Calcium Carb-Cholecalciferol (CALCIUM-VITAMIN D) 500-200 MG-UNIT per tablet, Take 1 tablet by mouth 2 (Two) Times a Day With Meals., Disp: 60 tablet, Rfl: 2  •  choline fenofibrate (TRILIPIX) 135 MG capsule,  Take 1 capsule by mouth Daily., Disp: 90 capsule, Rfl: 3  •  ferrous sulfate 325 (65 FE) MG tablet, Take 1 tablet by mouth Daily With Breakfast. Take with vitamin C to help with absorption, Disp: 30 tablet, Rfl: 6  •  lisinopril (PRINIVIL,ZESTRIL) 20 MG tablet, Take 20 mg by mouth Daily., Disp: , Rfl:   •  LORazepam (ATIVAN) 1 MG tablet, Take 1 mg by mouth 2 (Two) Times a Day. Scheduled to take in the morning and one with dinner, Disp: , Rfl: 0  •  methIMAzole (TAPAZOLE) 5 MG tablet, Take 2.5 mg by mouth Daily., Disp: , Rfl:   •  nitroglycerin (NITROSTAT) 0.4 MG SL tablet, Place 1 tablet under the tongue See Admin Instructions. PRN cp, if not relieved in 5 min repeat and go to ER, Disp: 25 tablet, Rfl: 12  •  omeprazole (priLOSEC) 20 MG capsule, Take 1 capsule by mouth Daily., Disp: 90 capsule, Rfl: 5  •  potassium chloride (K-DUR) 10 MEQ CR tablet, Take 1 tablet by mouth 2 (Two) Times a Day With Meals., Disp: 180 tablet, Rfl: 3  •  rivaroxaban (XARELTO) 20 MG tablet, Take 1 tablet by mouth Daily., Disp: 90 tablet, Rfl: 3  •  sertraline (ZOLOFT) 100 MG tablet, Take 150 mg by mouth Daily., Disp: , Rfl: 1  •  atorvastatin (LIPITOR) 20 MG tablet, Take 1 tablet by mouth Daily., Disp: 90 tablet, Rfl: 1  •  DILT- MG 24 hr capsule, TAKE 1 CAPSULE EVERY DAY, Disp: 30 capsule, Rfl: 4  •  methocarbamol (ROBAXIN) 500 MG tablet, Take 1 tablet by mouth 4 (Four) Times a Day As Needed for Muscle Spasms., Disp: 20 tablet, Rfl: 0  •  metoprolol tartrate (LOPRESSOR) 25 MG tablet, TAKE 1 TABLET EVERY 12 HOURS, Disp: 60 tablet, Rfl: 6    Allergies:  Codeine; Latex; Morphine and related; and Pravachol [pravastatin sodium]    Family Hx:  Family History   Problem Relation Age of Onset   • Hypertension Mother    • Hyperlipidemia Mother    • Mental illness Mother    • Thyroid disease Mother    • Diabetes Maternal Grandmother    • Cancer Maternal Grandmother    • Depression Other    • Diabetes Other    • Heart disease Other    •  Hypertension Other    • Osteoporosis Other    • Thyroid disease Other    • Cancer Maternal Aunt    • Cancer Paternal Aunt    • Breast cancer Paternal Aunt         Social History:  Social History     Socioeconomic History   • Marital status:      Spouse name: Not on file   • Number of children: Not on file   • Years of education: Not on file   • Highest education level: Not on file   Tobacco Use   • Smoking status: Never Smoker   • Smokeless tobacco: Never Used   • Tobacco comment: smoking cessation    Substance and Sexual Activity   • Alcohol use: No   • Drug use: No     Comment: lortab / oxycontin   • Sexual activity: Defer       Review of Systems  Review of Systems   Constitutional: Negative for chills, diaphoresis and fever.   HENT: Negative for sneezing and sore throat.    Eyes: Negative for pain and discharge.   Respiratory: Negative for cough and shortness of breath.    Gastrointestinal: Negative for constipation, diarrhea, nausea and vomiting.   Endocrine: Negative for cold intolerance and heat intolerance.   Genitourinary: Negative for difficulty urinating, dysuria, frequency and urgency.   Musculoskeletal: Positive for arthralgias, back pain and myalgias.   Skin: Negative for color change and pallor.   Allergic/Immunologic: Negative for environmental allergies and food allergies.   Neurological: Negative for dizziness, syncope and weakness.   Psychiatric/Behavioral: Negative for confusion and sleep disturbance.       Physical Exam:  Vitals:    11/19/19 1509   BP: 122/82   Pulse: 78   Temp: 96.8 °F (36 °C)   SpO2: 98%       Body mass index is 38.6 kg/m².   Physical Exam   Constitutional: She is oriented to person, place, and time. She appears well-developed and well-nourished. No distress.   HENT:   Head: Normocephalic and atraumatic.   Nose: Nose normal.   Eyes: Conjunctivae and EOM are normal.   Neck: Normal range of motion. Neck supple.   Cardiovascular: Normal rate, regular rhythm and normal heart  sounds.   No murmur heard.  Pulmonary/Chest: Effort normal and breath sounds normal. No respiratory distress. She has no wheezes. She has no rales.   Abdominal: Soft. Bowel sounds are normal. She exhibits no distension. There is no tenderness. There is no guarding.   Musculoskeletal: Normal range of motion.   Neurological: She is alert and oriented to person, place, and time.   Skin: Skin is warm and dry.   Psychiatric: She has a normal mood and affect. Her behavior is normal.   Vitals reviewed.      Objective:     Data Reviewed:     LABS:   Lab Results   Component Value Date    GLUCOSE 131 (H) 10/18/2019    BUN 16 10/18/2019    CREATININE 1.22 (H) 10/18/2019    EGFRIFNONA 44 (L) 10/18/2019    BCR 13.1 10/18/2019    K 4.3 10/18/2019    CO2 26.9 10/18/2019    CALCIUM 9.3 10/18/2019    ALBUMIN 3.90 10/18/2019    AST 22 10/18/2019    ALT 16 10/18/2019     Lab Results   Component Value Date    WBC 7.77 09/05/2018    HGB 14.9 10/18/2019    HCT 44.3 10/18/2019    MCV 84.6 09/05/2018     09/05/2018     Lab Results   Component Value Date    CHOL 148 11/18/2019    CHLPL 148 12/15/2016    TRIG 249 (H) 11/18/2019    HDL 55 11/18/2019    LDL 43 11/18/2019     Lab Results   Component Value Date    TSH 1.370 07/24/2019     Lab Results   Component Value Date    HGBA1C 5.7 (H) 01/31/2018    HGBA1C 5.3 04/02/2015     Lab Results   Component Value Date    MICROALBUR <0.6 01/31/2018    CREATININE 1.22 (H) 10/18/2019     Lab Results   Component Value Date    FERRITIN 12.10 11/12/2018         Health Maintenance:   Weight  -Class: Obese Class II: 35-39.9kg/m2  -Patient's Body mass index is 38.6 kg/m². BMI is above normal parameters. Recommendations include: exercise counseling and nutrition counseling.      Alcohol use:  reports that she does not drink alcohol.    Nicotine status  reports that she has never smoked. She has never used smokeless tobacco.        Health Maintenance  Health Maintenance   Topic Date Due   • PNEUMOCOCCAL  VACCINE (19-64 HIGHEST RISK) (1 of 3 - PCV13) 03/09/1974   • ZOSTER VACCINE (2 of 2) 02/09/2017   • MEDICARE ANNUAL WELLNESS  02/07/2019   • PAP SMEAR  12/15/2019   • LIPID PANEL  11/18/2020   • MAMMOGRAM  11/07/2021   • DXA SCAN  11/07/2021   • COLONOSCOPY  11/07/2026   • TDAP/TD VACCINES (5 - Td) 12/07/2029   • HEPATITIS C SCREENING  Completed   • INFLUENZA VACCINE  Completed        Goals:   Goals        Patient Stated    • management of chronic medical conditions (pt-stated)      Barriers to goals: none              Assessment/Plan:       Assessment/Plan   Lana was seen today for depression, anxiety and ddd.    Diagnoses and all orders for this visit:    Chronic midline low back pain with left-sided sciatica  -     Ambulatory Referral to Pain Management    Gastroesophageal reflux disease without esophagitis  -     omeprazole (priLOSEC) 20 MG capsule; Take 1 capsule by mouth Daily.      I will refer her to pain management for chronic back pain since she has failed conservative treatment.    Continue to see [facility] for psychiatric issues. It appears that things are improved.        FOLLOW-UP  Return in about 3 months (around 2/19/2020) for Recheck.      RISK SCORE: 4        This document has been electronically signed by Allie Martin MD on December 12, 2019 10:18 AM

## 2019-12-17 ENCOUNTER — EPISODE CHANGES (OUTPATIENT)
Dept: CASE MANAGEMENT | Facility: OTHER | Age: 64
End: 2019-12-17

## 2019-12-18 NOTE — PROGRESS NOTES
I have reviewed the notes, assessments, and/or procedures performed by Allie Martin MD during office visit. I concur with her/his documentation and assessment and plan for Lana Mata.      This document has been electronically signed by Pasha Girard MD on December 18, 2019 10:46 AM

## 2020-01-15 RX ORDER — DILTIAZEM HYDROCHLORIDE 180 MG/1
180 CAPSULE, EXTENDED RELEASE ORAL DAILY
Qty: 90 CAPSULE | Refills: 2 | Status: SHIPPED | OUTPATIENT
Start: 2020-01-15 | End: 2020-10-27

## 2020-03-10 ENCOUNTER — HOSPITAL ENCOUNTER (EMERGENCY)
Facility: HOSPITAL | Age: 65
Discharge: HOME | End: 2020-03-11
Attending: EMERGENCY MEDICINE | Admitting: EMERGENCY MEDICINE

## 2020-03-10 DIAGNOSIS — K63.89 EPIPLOIC APPENDAGITIS: Primary | ICD-10-CM

## 2020-03-10 PROCEDURE — 99284 EMERGENCY DEPT VISIT MOD MDM: CPT

## 2020-03-10 PROCEDURE — 81001 URINALYSIS AUTO W/SCOPE: CPT | Performed by: EMERGENCY MEDICINE

## 2020-03-10 RX ORDER — SODIUM CHLORIDE 0.9 % (FLUSH) 0.9 %
10 SYRINGE (ML) INJECTION AS NEEDED
Status: DISCONTINUED | OUTPATIENT
Start: 2020-03-10 | End: 2020-03-11 | Stop reason: HOSPADM

## 2020-03-10 RX ORDER — ONDANSETRON 2 MG/ML
4 INJECTION INTRAMUSCULAR; INTRAVENOUS ONCE
Status: COMPLETED | OUTPATIENT
Start: 2020-03-10 | End: 2020-03-11

## 2020-03-11 ENCOUNTER — OFFICE VISIT (OUTPATIENT)
Dept: FAMILY MEDICINE CLINIC | Facility: CLINIC | Age: 65
End: 2020-03-11

## 2020-03-11 ENCOUNTER — APPOINTMENT (OUTPATIENT)
Dept: CT IMAGING | Facility: HOSPITAL | Age: 65
End: 2020-03-11

## 2020-03-11 ENCOUNTER — EPISODE CHANGES (OUTPATIENT)
Dept: CASE MANAGEMENT | Facility: OTHER | Age: 65
End: 2020-03-11

## 2020-03-11 VITALS
OXYGEN SATURATION: 97 % | TEMPERATURE: 98.3 F | RESPIRATION RATE: 18 BRPM | HEIGHT: 65 IN | SYSTOLIC BLOOD PRESSURE: 120 MMHG | HEART RATE: 68 BPM | WEIGHT: 225 LBS | BODY MASS INDEX: 37.49 KG/M2 | DIASTOLIC BLOOD PRESSURE: 58 MMHG

## 2020-03-11 VITALS
OXYGEN SATURATION: 99 % | TEMPERATURE: 96.8 F | SYSTOLIC BLOOD PRESSURE: 118 MMHG | DIASTOLIC BLOOD PRESSURE: 80 MMHG | WEIGHT: 223.9 LBS | HEART RATE: 63 BPM | BODY MASS INDEX: 37.3 KG/M2 | HEIGHT: 65 IN

## 2020-03-11 DIAGNOSIS — K63.89 EPIPLOIC APPENDAGITIS: Primary | ICD-10-CM

## 2020-03-11 DIAGNOSIS — K52.9 CHRONIC DIARRHEA: ICD-10-CM

## 2020-03-11 DIAGNOSIS — M62.830 BACK SPASM: ICD-10-CM

## 2020-03-11 LAB
ALBUMIN SERPL-MCNC: 4 G/DL (ref 3.5–5.2)
ALBUMIN/GLOB SERPL: 1.2 G/DL
ALP SERPL-CCNC: 82 U/L (ref 39–117)
ALT SERPL W P-5'-P-CCNC: 25 U/L (ref 1–33)
ANION GAP SERPL CALCULATED.3IONS-SCNC: 13 MMOL/L (ref 5–15)
AST SERPL-CCNC: 20 U/L (ref 1–32)
BACTERIA UR QL AUTO: ABNORMAL /HPF
BASOPHILS # BLD AUTO: 0.09 10*3/MM3 (ref 0–0.2)
BASOPHILS NFR BLD AUTO: 0.7 % (ref 0–1.5)
BILIRUB SERPL-MCNC: 0.5 MG/DL (ref 0.2–1.2)
BILIRUB UR QL STRIP: ABNORMAL
BUN BLD-MCNC: 19 MG/DL (ref 8–23)
BUN/CREAT SERPL: 19.8 (ref 7–25)
CALCIUM SPEC-SCNC: 9.6 MG/DL (ref 8.6–10.5)
CHLORIDE SERPL-SCNC: 105 MMOL/L (ref 98–107)
CLARITY UR: CLEAR
CO2 SERPL-SCNC: 23 MMOL/L (ref 22–29)
COLOR UR: YELLOW
CREAT BLD-MCNC: 0.96 MG/DL (ref 0.57–1)
DEPRECATED RDW RBC AUTO: 41.7 FL (ref 37–54)
EOSINOPHIL # BLD AUTO: 0.54 10*3/MM3 (ref 0–0.4)
EOSINOPHIL NFR BLD AUTO: 4.3 % (ref 0.3–6.2)
ERYTHROCYTE [DISTWIDTH] IN BLOOD BY AUTOMATED COUNT: 13 % (ref 12.3–15.4)
GFR SERPL CREATININE-BSD FRML MDRD: 58 ML/MIN/1.73
GLOBULIN UR ELPH-MCNC: 3.4 GM/DL
GLUCOSE BLD-MCNC: 130 MG/DL (ref 65–99)
GLUCOSE UR STRIP-MCNC: NEGATIVE MG/DL
HCT VFR BLD AUTO: 40.4 % (ref 34–46.6)
HGB BLD-MCNC: 13.6 G/DL (ref 12–15.9)
HGB UR QL STRIP.AUTO: NEGATIVE
HOLD SPECIMEN: NORMAL
HOLD SPECIMEN: NORMAL
HYALINE CASTS UR QL AUTO: ABNORMAL /LPF
IMM GRANULOCYTES # BLD AUTO: 0.06 10*3/MM3 (ref 0–0.05)
IMM GRANULOCYTES NFR BLD AUTO: 0.5 % (ref 0–0.5)
KETONES UR QL STRIP: NEGATIVE
LEUKOCYTE ESTERASE UR QL STRIP.AUTO: ABNORMAL
LIPASE SERPL-CCNC: 36 U/L (ref 13–60)
LYMPHOCYTES # BLD AUTO: 3.12 10*3/MM3 (ref 0.7–3.1)
LYMPHOCYTES NFR BLD AUTO: 25.1 % (ref 19.6–45.3)
MCH RBC QN AUTO: 29.4 PG (ref 26.6–33)
MCHC RBC AUTO-ENTMCNC: 33.7 G/DL (ref 31.5–35.7)
MCV RBC AUTO: 87.4 FL (ref 79–97)
MONOCYTES # BLD AUTO: 1.12 10*3/MM3 (ref 0.1–0.9)
MONOCYTES NFR BLD AUTO: 9 % (ref 5–12)
NEUTROPHILS # BLD AUTO: 7.52 10*3/MM3 (ref 1.7–7)
NEUTROPHILS NFR BLD AUTO: 60.4 % (ref 42.7–76)
NITRITE UR QL STRIP: NEGATIVE
NRBC BLD AUTO-RTO: 0 /100 WBC (ref 0–0.2)
PH UR STRIP.AUTO: 5.5 [PH] (ref 5–9)
PLATELET # BLD AUTO: 248 10*3/MM3 (ref 140–450)
PMV BLD AUTO: 9.7 FL (ref 6–12)
POTASSIUM BLD-SCNC: 4.1 MMOL/L (ref 3.5–5.2)
PROT SERPL-MCNC: 7.4 G/DL (ref 6–8.5)
PROT UR QL STRIP: NEGATIVE
RBC # BLD AUTO: 4.62 10*6/MM3 (ref 3.77–5.28)
RBC # UR: ABNORMAL /HPF
REF LAB TEST METHOD: ABNORMAL
SODIUM BLD-SCNC: 141 MMOL/L (ref 136–145)
SP GR UR STRIP: 1.03 (ref 1–1.03)
SQUAMOUS #/AREA URNS HPF: ABNORMAL /HPF
UROBILINOGEN UR QL STRIP: ABNORMAL
WBC NRBC COR # BLD: 12.45 10*3/MM3 (ref 3.4–10.8)
WBC UR QL AUTO: ABNORMAL /HPF
WHOLE BLOOD HOLD SPECIMEN: NORMAL
WHOLE BLOOD HOLD SPECIMEN: NORMAL

## 2020-03-11 PROCEDURE — 83690 ASSAY OF LIPASE: CPT | Performed by: EMERGENCY MEDICINE

## 2020-03-11 PROCEDURE — 96375 TX/PRO/DX INJ NEW DRUG ADDON: CPT

## 2020-03-11 PROCEDURE — 25010000002 HYDROMORPHONE 1 MG/ML SOLUTION: Performed by: EMERGENCY MEDICINE

## 2020-03-11 PROCEDURE — 96374 THER/PROPH/DIAG INJ IV PUSH: CPT

## 2020-03-11 PROCEDURE — 74177 CT ABD & PELVIS W/CONTRAST: CPT

## 2020-03-11 PROCEDURE — 25010000002 IOPAMIDOL 61 % SOLUTION: Performed by: EMERGENCY MEDICINE

## 2020-03-11 PROCEDURE — 99213 OFFICE O/P EST LOW 20 MIN: CPT | Performed by: STUDENT IN AN ORGANIZED HEALTH CARE EDUCATION/TRAINING PROGRAM

## 2020-03-11 PROCEDURE — 80053 COMPREHEN METABOLIC PANEL: CPT | Performed by: EMERGENCY MEDICINE

## 2020-03-11 PROCEDURE — 25010000002 ONDANSETRON PER 1 MG: Performed by: EMERGENCY MEDICINE

## 2020-03-11 PROCEDURE — 85025 COMPLETE CBC W/AUTO DIFF WBC: CPT | Performed by: EMERGENCY MEDICINE

## 2020-03-11 RX ORDER — AMOXICILLIN AND CLAVULANATE POTASSIUM 875; 125 MG/1; MG/1
1 TABLET, FILM COATED ORAL ONCE
Status: COMPLETED | OUTPATIENT
Start: 2020-03-11 | End: 2020-03-11

## 2020-03-11 RX ORDER — AMOXICILLIN AND CLAVULANATE POTASSIUM 875; 125 MG/1; MG/1
1 TABLET, FILM COATED ORAL 2 TIMES DAILY
Qty: 20 TABLET | Refills: 0 | Status: SHIPPED | OUTPATIENT
Start: 2020-03-11 | End: 2020-03-15

## 2020-03-11 RX ORDER — METHOCARBAMOL 500 MG/1
500 TABLET, FILM COATED ORAL 4 TIMES DAILY PRN
Qty: 90 TABLET | Refills: 5 | Status: SHIPPED | OUTPATIENT
Start: 2020-03-11 | End: 2021-08-23 | Stop reason: SDUPTHER

## 2020-03-11 RX ORDER — IBUPROFEN 800 MG/1
800 TABLET ORAL EVERY 6 HOURS PRN
Qty: 120 TABLET | Refills: 0 | Status: SHIPPED | OUTPATIENT
Start: 2020-03-11 | End: 2020-11-20

## 2020-03-11 RX ADMIN — SODIUM CHLORIDE 1000 ML: 9 INJECTION, SOLUTION INTRAVENOUS at 00:39

## 2020-03-11 RX ADMIN — HYDROMORPHONE HYDROCHLORIDE 0.5 MG: 1 INJECTION, SOLUTION INTRAMUSCULAR; INTRAVENOUS; SUBCUTANEOUS at 00:41

## 2020-03-11 RX ADMIN — ONDANSETRON 4 MG: 2 INJECTION INTRAMUSCULAR; INTRAVENOUS at 00:40

## 2020-03-11 RX ADMIN — AMOXICILLIN AND CLAVULANATE POTASSIUM 1 TABLET: 875; 125 TABLET, FILM COATED ORAL at 01:58

## 2020-03-11 RX ADMIN — IOPAMIDOL 90 ML: 612 INJECTION, SOLUTION INTRAVENOUS at 00:58

## 2020-03-11 NOTE — PROGRESS NOTES
Family Medicine Residency   Allie Martin MD    Lana Mata is a 65 y.o. female who presents to family medicine residency clinic for the following:    Subjective:     She has back spasms, controlled with Robaxin. She would also like a TENS unit.    She was seen in the ED yesterday for abdominal pain.   She has chronic diarrhea. She has 4-5 BM a day. They are more watery at the end of the day. No bloody BM. The abdominal pain is improved today. It is LLQ. It does radiate towards the middle. Does not appear to be in distress.     She had a CT abdomen done, showed epiploic appendagitis. WBC at 12.45. Her pain has improved since last night.       Past Medical Hx:   Past Medical History:   Diagnosis Date   • Abnormal gait 11/28/2012   • Abrasion 02/26/2015   • Acute ankle pain 03/31/2016   • Acute bronchitis 09/04/2015   • Allergic rhinitis 05/22/2012   • Asthenia 05/22/2014   • Atrial fibrillation (CMS/[facility]) 03/04/2016    - converted    • Attempted suicide (CMS/[facility])    • Benign essential hypertension 03/04/2016   • Cardiovascular stress test abnormal 03/04/2016   • Cellulitis of knee 03/03/2015   • Chest pain 04/02/2015   • Chronic pain disorder    • Common cold 06/24/2014   • Congestive heart failure (CMS/[facility]) 02/04/2016   • Depressive disorder 05/22/2015    suspect more complicated psych issues      • Fibrocystic breast    • Gastroesophageal reflux disease 01/17/2013   • Generalized anxiety disorder 02/04/2016   • H/O echocardiogram     Normal LV function with Ef of 60-65%.Mildly dilated right ventricle with normal function. Grade 1B diastolic dysfunction with mild CLVH.NO sig. valvular regurg. or stenosis.   • Headache 10/27/2014   • Hyperlipidemia 03/04/2016   • Low back pain 06/30/2016    Need for prophylactic vaccination against Streptococcus pneumoniae [pneumococcus]    01/23/2015    • Neck pain 06/30/2016   • Obstructive sleep apnea of adult 07/30/2015   • Obstructive sleep apnea syndrome     • Osteoarthritis    • Osteoporosis 06/30/2016   • Pain in right shoulder 06/30/2016   • Pain in right shoulder 04/24/2014   • Shortness of breath 04/02/2015   • Skin ulcer (CMS/HCC) 04/13/2015    left   • Thyrotoxicosis with or without goiter 10/24/2014   • Urinary incontinence 05/22/2014       Past Surgical Hx:  Past Surgical History:   Procedure Laterality Date   • BACK SURGERY  08/07/2015    Removal of intstrumentation,L5-S1.Exploration of fusion,L5-S1.Posterolateral fusion,L4-L5.Posterior segmental spinal instrumentation L4-5.Transforaminal interbody fusion Through right sided approach.Performed at    • COLONOSCOPY  05/04/2016    will order cologuard   • INJECTION OF MEDICATION  03/23/2015    celestone   • INJECTION OF MEDICATION  05/04/2016    KENALOG(6)   • LUMBAR FUSION      discectomy fusion with rods L4-S1   • LUMBAR LAMINECTOMY  1995   • MANDIBLE FRACTURE SURGERY      JAW WIRED SHUT   • MOUTH SURGERY  10/05/2015    Fractured mandible. Removal of arch bars.   • MOUTH SURGERY  1985    Fractured mandible. Removal of arch bars.   • NASAL SEPTUM SURGERY     • RHINOPLASTY     • TUBAL ABDOMINAL LIGATION  1986       Current Meds:    Current Outpatient Medications:   •  alendronate (FOSAMAX) 70 MG tablet, Take 1 tablet by mouth Every 7 (Seven) Days., Disp: 4 tablet, Rfl: 5  •  aMILoride (MIDAMOR) 5 MG tablet, Take 1 tablet by mouth Daily., Disp: 30 tablet, Rfl: 6  •  amoxicillin-clavulanate (AUGMENTIN) 875-125 MG per tablet, Take 1 tablet by mouth 2 (Two) Times a Day for 10 days., Disp: 20 tablet, Rfl: 0  •  ARIPiprazole (ABILIFY) 5 MG tablet, Take 5 mg by mouth Daily. At bedtime, Disp: , Rfl:   •  aspirin 325 MG tablet, Take 325 mg by mouth Daily. At bedtime, Disp: , Rfl:   •  atorvastatin (LIPITOR) 20 MG tablet, Take 1 tablet by mouth Daily., Disp: 90 tablet, Rfl: 1  •  Calcium Carb-Cholecalciferol (CALCIUM-VITAMIN D) 500-200 MG-UNIT per tablet, Take 1 tablet by mouth 2 (Two) Times a Day With Meals., Disp: 60  tablet, Rfl: 2  •  choline fenofibrate (TRILIPIX) 135 MG capsule, Take 1 capsule by mouth Daily., Disp: 90 capsule, Rfl: 3  •  dilTIAZem XR (DILT-XR) 180 MG 24 hr capsule, Take 1 capsule by mouth Daily., Disp: 90 capsule, Rfl: 2  •  ferrous sulfate 325 (65 FE) MG tablet, Take 1 tablet by mouth Daily With Breakfast. Take with vitamin C to help with absorption, Disp: 30 tablet, Rfl: 6  •  lisinopril (PRINIVIL,ZESTRIL) 20 MG tablet, Take 20 mg by mouth Daily., Disp: , Rfl:   •  LORazepam (ATIVAN) 1 MG tablet, Take 1 mg by mouth 2 (Two) Times a Day. Scheduled to take in the morning and one with dinner, Disp: , Rfl: 0  •  methIMAzole (TAPAZOLE) 5 MG tablet, Take 2.5 mg by mouth Daily., Disp: , Rfl:   •  methocarbamol (ROBAXIN) 500 MG tablet, Take 1 tablet by mouth 4 (Four) Times a Day As Needed for Muscle Spasms., Disp: 90 tablet, Rfl: 5  •  metoprolol tartrate (LOPRESSOR) 25 MG tablet, TAKE 1 TABLET EVERY 12 HOURS, Disp: 60 tablet, Rfl: 6  •  nitroglycerin (NITROSTAT) 0.4 MG SL tablet, Place 1 tablet under the tongue See Admin Instructions. PRN cp, if not relieved in 5 min repeat and go to ER, Disp: 25 tablet, Rfl: 12  •  omeprazole (priLOSEC) 20 MG capsule, Take 1 capsule by mouth Daily., Disp: 90 capsule, Rfl: 5  •  potassium chloride (K-DUR) 10 MEQ CR tablet, Take 1 tablet by mouth 2 (Two) Times a Day With Meals., Disp: 180 tablet, Rfl: 3  •  rivaroxaban (XARELTO) 20 MG tablet, Take 1 tablet by mouth Daily., Disp: 90 tablet, Rfl: 3  •  sertraline (ZOLOFT) 100 MG tablet, Take 150 mg by mouth Daily., Disp: , Rfl: 1  •  ibuprofen (ADVIL,MOTRIN) 800 MG tablet, Take 1 tablet by mouth Every 6 (Six) Hours As Needed for Mild Pain ., Disp: 120 tablet, Rfl: 0  No current facility-administered medications for this visit.     Allergies:  Codeine; Latex; Morphine and related; and Pravachol [pravastatin sodium]    Family Hx:  Family History   Problem Relation Age of Onset   • Hypertension Mother    • Hyperlipidemia Mother    •  Mental illness Mother    • Thyroid disease Mother    • Diabetes Maternal Grandmother    • Cancer Maternal Grandmother    • Depression Other    • Diabetes Other    • Heart disease Other    • Hypertension Other    • Osteoporosis Other    • Thyroid disease Other    • Cancer Maternal Aunt    • Cancer Paternal Aunt    • Breast cancer Paternal Aunt         Social History:  Social History     Socioeconomic History   • Marital status:      Spouse name: Not on file   • Number of children: Not on file   • Years of education: Not on file   • Highest education level: Not on file   Tobacco Use   • Smoking status: Never Smoker   • Smokeless tobacco: Never Used   • Tobacco comment: smoking cessation    Substance and Sexual Activity   • Alcohol use: No   • Drug use: No     Comment: lortab / oxycontin   • Sexual activity: Defer       Review of Systems  Review of Systems   Constitutional: Negative for chills, diaphoresis and fever.   HENT: Negative for sneezing and sore throat.    Eyes: Negative for pain and discharge.   Respiratory: Negative for cough and shortness of breath.    Gastrointestinal: Positive for abdominal pain and diarrhea. Negative for constipation, nausea and vomiting.   Endocrine: Negative for cold intolerance and heat intolerance.   Genitourinary: Negative for difficulty urinating, dysuria, frequency and urgency.   Musculoskeletal: Negative for arthralgias and myalgias.   Skin: Negative for color change and pallor.   Allergic/Immunologic: Negative for environmental allergies and food allergies.   Neurological: Negative for dizziness, syncope and weakness.   Psychiatric/Behavioral: Negative for confusion and sleep disturbance.       Physical Exam:  Vitals:    03/11/20 1418   BP: 118/80   Pulse: 63   Temp: 96.8 °F (36 °C)   SpO2: 99%       Body mass index is 37.26 kg/m².   Physical Exam   Constitutional: She is oriented to person, place, and time. She appears well-developed and well-nourished. No distress.      HENT:   Head: Normocephalic and atraumatic.   Nose: Nose normal.   Eyes: Conjunctivae and EOM are normal.   Neck: Normal range of motion. Neck supple.   Cardiovascular: Normal rate, regular rhythm and normal heart sounds.   No murmur heard.  Pulmonary/Chest: Effort normal and breath sounds normal. No respiratory distress. She has no wheezes. She has no rales.   Abdominal: Soft. She exhibits no distension. Bowel sounds are increased. There is tenderness. There is no guarding.   Mild tenderness to deep palpation in LLQ   Musculoskeletal: Normal range of motion.   Neurological: She is alert and oriented to person, place, and time.   Skin: Skin is warm and dry.   Psychiatric: She has a normal mood and affect. Her behavior is normal.   Vitals reviewed.      Objective:     Data Reviewed:     LABS:   Lab Results   Component Value Date    GLUCOSE 130 (H) 03/11/2020    BUN 19 03/11/2020    CREATININE 0.96 03/11/2020    EGFRIFNONA 58 (L) 03/11/2020    BCR 19.8 03/11/2020    K 4.1 03/11/2020    CO2 23.0 03/11/2020    CALCIUM 9.6 03/11/2020    ALBUMIN 4.00 03/11/2020    AST 20 03/11/2020    ALT 25 03/11/2020     Lab Results   Component Value Date    WBC 12.45 (H) 03/11/2020    HGB 13.6 03/11/2020    HCT 40.4 03/11/2020    MCV 87.4 03/11/2020     03/11/2020     Lab Results   Component Value Date    CHOL 148 11/18/2019    CHLPL 148 12/15/2016    TRIG 249 (H) 11/18/2019    HDL 55 11/18/2019    LDL 43 11/18/2019     Lab Results   Component Value Date    TSH 1.370 07/24/2019     Lab Results   Component Value Date    HGBA1C 5.7 (H) 01/31/2018    HGBA1C 5.3 04/02/2015     Lab Results   Component Value Date    MICROALBUR <0.6 01/31/2018    CREATININE 0.96 03/11/2020     Lab Results   Component Value Date    FERRITIN 12.10 11/12/2018         Health Maintenance:   Weight  -Class: Obese Class II: 35-39.9kg/m2  -Patient's Body mass index is 37.26 kg/m². BMI is above normal parameters. Recommendations include: exercise counseling  and nutrition counseling.      Alcohol use:  reports that she does not drink alcohol.    Nicotine status  reports that she has never smoked. She has never used smokeless tobacco.   [facility]  [facility] that [facility] has never smoked. She has never used smokeless tobacco.       Health Maintenance  Health Maintenance   Topic Date Due   • ZOSTER VACCINE (2 of 2) 02/09/2017   • MEDICARE ANNUAL WELLNESS  02/07/2019   • PAP SMEAR  12/15/2019   • Pneumococcal Vaccine Once at 65 Years Old  03/11/2021 (Originally 3/9/2020)   • LIPID PANEL  11/18/2020   • MAMMOGRAM  11/07/2021   • DXA SCAN  11/07/2021   • COLONOSCOPY  11/07/2026   • TDAP/TD VACCINES (5 - Td) 12/07/2029   • HEPATITIS C SCREENING  Completed   • INFLUENZA VACCINE  Completed        Goals:   Goals        Patient Stated    • management of chronic medical conditions (pt-stated)      Barriers to goals: none              Assessment/Plan:       Assessment/Plan   Lana was seen today for spasms.    Diagnoses and all orders for this visit:    Epiploic appendagitis    Back spasm  -     TENS (Transcutaneous Electrical Nerve Stimulator)    Chronic diarrhea  -     Ambulatory Referral to Gastroenterology    Other orders  -     methocarbamol (ROBAXIN) 500 MG tablet; Take 1 tablet by mouth 4 (Four) Times a Day As Needed for Muscle Spasms.  -     ibuprofen (ADVIL,MOTRIN) 800 MG tablet; Take 1 tablet by mouth Every 6 (Six) Hours As Needed for Mild Pain .      I spoke to general surgery. Will closely monitor her outpatient with NSAIDs. ER gave her a script for Augmentin, she can continue that. If her pain acutely worsens, will go to ER for emergent evaluation. Follow up in the office on Friday.    She may need GI to help with the chronic diarrhea.     Will give TENS unit and Robaxin for muscle spasms for her back.       FOLLOW-UP  Return in about 2 days (around 3/13/2020) for Recheck.      RISK SCORE: 4        This document has been electronically signed by Allie ORELLANA  MD Veronica on March 11, 2020 16:21

## 2020-03-11 NOTE — ED NOTES
Patient presents to ED with complaint of lower left abdominal pain. She states this started around 1700 on the right side that is now on the left. She states history of hernia that she is concerned for.      Елена Johnston RN  03/11/20 0052

## 2020-03-11 NOTE — ED PROVIDER NOTES
Subjective   65-year-old white female with history of multiple medical problems presents the emergency department chief complaint of abdominal pain.  Patient complains of left lower quadrant pain since 5 PM.  Associated symptoms include nausea.          Review of Systems   Constitutional: Negative for chills, diaphoresis and fever.   Respiratory: Negative for shortness of breath.    Cardiovascular: Negative for chest pain.   Gastrointestinal: Positive for abdominal pain and nausea. Negative for blood in stool and vomiting.   Genitourinary: Negative for dysuria and hematuria.   Musculoskeletal: Positive for back pain. Negative for neck stiffness.   Neurological: Negative for syncope, weakness and headaches.   All other systems reviewed and are negative.      Past Medical History:   Diagnosis Date   • Abnormal gait 11/28/2012   • Abrasion 02/26/2015   • Acute ankle pain 03/31/2016   • Acute bronchitis 09/04/2015   • Allergic rhinitis 05/22/2012   • Asthenia 05/22/2014   • Atrial fibrillation (CMS/[facility]) 03/04/2016    - converted    • Attempted suicide (CMS/[facility])    • Benign essential hypertension 03/04/2016   • Cardiovascular stress test abnormal 03/04/2016   • Cellulitis of knee 03/03/2015   • Chest pain 04/02/2015   • Chronic pain disorder    • Common cold 06/24/2014   • Congestive heart failure (CMS/[facility]) 02/04/2016   • Depressive disorder 05/22/2015    suspect more complicated psych issues      • Fibrocystic breast    • Gastroesophageal reflux disease 01/17/2013   • Generalized anxiety disorder 02/04/2016   • H/O echocardiogram     Normal LV function with Ef of 60-65%.Mildly dilated right ventricle with normal function. Grade 1B diastolic dysfunction with mild CLVH.NO sig. valvular regurg. or stenosis.   • Headache 10/27/2014   • Hyperlipidemia 03/04/2016   • Low back pain 06/30/2016    Need for prophylactic vaccination against Streptococcus pneumoniae [pneumococcus]    01/23/2015    • Neck pain 06/30/2016   •  Obstructive sleep apnea of adult 07/30/2015   • Obstructive sleep apnea syndrome    • Osteoarthritis    • Osteoporosis 06/30/2016   • Pain in right shoulder 06/30/2016   • Pain in right shoulder 04/24/2014   • Shortness of breath 04/02/2015   • Skin ulcer (CMS/HCC) 04/13/2015    left   • Thyrotoxicosis with or without goiter 10/24/2014   • Urinary incontinence 05/22/2014       Allergies   Allergen Reactions   • Codeine Nausea And Vomiting   • Latex Rash   • Morphine And Related Hallucinations   • Pravachol [Pravastatin Sodium] Myalgia       Past Surgical History:   Procedure Laterality Date   • BACK SURGERY  08/07/2015    Removal of intstrumentation,L5-S1.Exploration of fusion,L5-S1.Posterolateral fusion,L4-L5.Posterior segmental spinal instrumentation L4-5.Transforaminal interbody fusion Through right sided approach.Performed at    • COLONOSCOPY  05/04/2016    will order cologuard   • INJECTION OF MEDICATION  03/23/2015    celestone   • INJECTION OF MEDICATION  05/04/2016    KENALOG(6)   • LUMBAR FUSION      discectomy fusion with rods L4-S1   • LUMBAR LAMINECTOMY  1995   • MANDIBLE FRACTURE SURGERY      JAW WIRED SHUT   • MOUTH SURGERY  10/05/2015    Fractured mandible. Removal of arch bars.   • MOUTH SURGERY  1985    Fractured mandible. Removal of arch bars.   • NASAL SEPTUM SURGERY     • RHINOPLASTY     • TUBAL ABDOMINAL LIGATION  1986       Family History   Problem Relation Age of Onset   • Hypertension Mother    • Hyperlipidemia Mother    • Mental illness Mother    • Thyroid disease Mother    • Diabetes Maternal Grandmother    • Cancer Maternal Grandmother    • Depression Other    • Diabetes Other    • Heart disease Other    • Hypertension Other    • Osteoporosis Other    • Thyroid disease Other    • Cancer Maternal Aunt    • Cancer Paternal Aunt    • Breast cancer Paternal Aunt        Social History     Socioeconomic History   • Marital status:      Spouse name: Not on file   • Number of children: Not  on file   • Years of education: Not on file   • Highest education level: Not on file   Tobacco Use   • Smoking status: Never Smoker   • Smokeless tobacco: Never Used   • Tobacco comment: smoking cessation    Substance and Sexual Activity   • Alcohol use: No   • Drug use: No     Comment: lortab / oxycontin   • Sexual activity: Defer           Objective   Physical Exam   Constitutional: She is oriented to person, place, and time. She appears well-developed and well-nourished. No distress.   HENT:   Head: Normocephalic and atraumatic.   Right Ear: External ear normal.   Left Ear: External ear normal.   Nose: Nose normal.   Mouth/Throat: Oropharynx is clear and moist.   Eyes: Pupils are equal, round, and reactive to light. Conjunctivae and EOM are normal.   Neck: Normal range of motion. Neck supple.   Cardiovascular: Normal rate, regular rhythm, normal heart sounds and intact distal pulses.   Pulmonary/Chest: Effort normal and breath sounds normal.   Abdominal: Soft. Normal appearance and bowel sounds are normal. She exhibits no distension and no mass. There is tenderness in the left lower quadrant. There is no rigidity, no rebound and no guarding.   Musculoskeletal: Normal range of motion. She exhibits no edema or tenderness.   Neurological: She is alert and oriented to person, place, and time. She exhibits normal muscle tone.   Skin: Skin is warm and dry. She is not diaphoretic.   Psychiatric: She has a normal mood and affect. Her behavior is normal.   Nursing note and vitals reviewed.      Procedures           ED Course  ED Course as of Mar 11 0152   Wed Mar 11, 2020   0152 Patient is alert and resting comfortably. I reviewed the results of the emergency department evaluation with the patient.  I recommended primary care follow-up.  I advised the patient to return to the emergency department if their symptoms change or worsen.     [DR]      ED Course User Index  [DR] Saad Hooker MD            Labs Reviewed    COMPREHENSIVE METABOLIC PANEL - Abnormal; Notable for the following components:       Result Value    Glucose 130 (*)     eGFR Non  Amer 58 (*)     All other components within normal limits    Narrative:     GFR Normal >60  Chronic Kidney Disease <60  Kidney Failure <15     URINALYSIS W/ MICROSCOPIC IF INDICATED (NO CULTURE) - Abnormal; Notable for the following components:    Bilirubin, UA Small (1+) (*)     Leuk Esterase, UA Small (1+) (*)     All other components within normal limits   CBC WITH AUTO DIFFERENTIAL - Abnormal; Notable for the following components:    WBC 12.45 (*)     Neutrophils, Absolute 7.52 (*)     Lymphocytes, Absolute 3.12 (*)     Monocytes, Absolute 1.12 (*)     Eosinophils, Absolute 0.54 (*)     Immature Grans, Absolute 0.06 (*)     All other components within normal limits   URINALYSIS, MICROSCOPIC ONLY - Abnormal; Notable for the following components:    RBC, UA 3-5 (*)     WBC, UA 6-12 (*)     Squamous Epithelial Cells, UA 3-5 (*)     All other components within normal limits   LIPASE - Normal   RAINBOW DRAW    Narrative:     The following orders were created for panel order [place] Draw.  Procedure                               Abnormality         Status                     ---------                               -----------         ------                     Light Blue Top[[id number]]                                   Final result               Green Top (Gel)[[id number]]                                  Final result               Lavender Top[[id number]]                                     Final result               Gold Top - SST[[id number]]                                   Final result                 Please view results for these tests on the individual orders.   CBC AND DIFFERENTIAL    Narrative:     The following orders were created for panel order CBC & Differential.  Procedure                               Abnormality         Status                     ---------                                -----------         ------                     CBC Auto Differential[[id number]]        Abnormal            Final result                 Please view results for these tests on the individual orders.   LIGHT BLUE TOP   GREEN TOP   LAVENDER TOP   GOLD TOP - SST     Ct Abdomen Pelvis With Contrast    Result Date: 3/11/2020  Narrative: CT abdomen and pelvis with contrast on  3/11/2020 CLINICAL INDICATION: Left lower quadrant pain TECHNIQUE: Multiple axial images are obtained throughout the abdomen and pelvis following the administration of IV contrast. This exam was performed according to our departmental dose-optimization program, which includes automated exposure control, adjustment of the mA and/or kV according to patient size and/or use of iterative reconstruction technique. Total DLP is 808.4 mGy*cm. COMPARISON: None FINDINGS: Abdomen: There is evidence of calcified granulomatous disease in the lung bases. The lung bases are otherwise clear. The solid abdominal organs are unremarkable. Mild vascular calcifications are noted. There is no abdominal adenopathy. Small to moderate-sized duodenal diverticulum is noted along the third portion of the duodenum. There is no free fluid or free air within the abdomen. The abdominal portion of the GI tract is unremarkable. There is a small umbilical hernia containing only fat. Pelvis: There is mild fat stranding around a tubular fat density adjacent to the descending colon seen best on axial image 93 consistent with epiploic appendagitis. This would account for the patient's pain. Pelvic portion of the GI tract including the appendix is otherwise unremarkable. There is a small to moderate-sized left inguinal hernia containing only fat. Tiny focus of air is noted in the bladder that may be from recent catheterization but please correlate clinically to exclude infection as a cause. No free fluid is noted in the pelvis. Pelvic organs appear unremarkable by CT.  There is no pelvic adenopathy. Degenerative and postsurgical changes are noted in the spine.     Impression: 1. Findings consistent with epiploic appendagitis in the left lower quadrant accounting for the patient's pain. 2. Tiny focus of air in the bladder most likely from recent catheterization but please correlate clinically to exclude infection as a cause. Electronically signed by:  Nguyễn Anderson  3/11/2020 1:29 AM CDT Workstation: [id number]                                    [facility]    Final diagnoses:   Epiploic appendagitis            Saad Hooker MD  03/11/20 0152

## 2020-03-12 ENCOUNTER — PATIENT OUTREACH (OUTPATIENT)
Dept: CASE MANAGEMENT | Facility: OTHER | Age: 65
End: 2020-03-12
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
## 2020-03-12 NOTE — OUTREACH NOTE
Care Coordination Assessment    Documented/Reviewed By:  Roselyn Farrar RN Date/time:  3/12/2020 12:07 PM   Assessment completed with:  patient  Enrolled in care management program:  No  Living arrangement:  alone  Support system:  family  Type of residence:  private residence  Home care services:  No  Equipment used at home:  walker, cane, oxygen/respiratory treatment (Comment: cpap)  Bed or wheelchair confined:  No  Inadequate nutrition:  No  Medication adherence problem:  No  Experiencing side effects from current medications:  No  History of fall(s) in last 6 months:  Yes  Difficulty keeping appointments:  No  Family aware of the patient's advance care planning wishes:  Yes  Chronic pain:  Yes  Location of chronic pain:  back

## 2020-03-12 NOTE — PROGRESS NOTES
I have seen and examined the patient.  I have reviewed the notes, assessments, and/or procedures performed by Allie Martin MD, I concur with her/his documentation and assessment and plan for Lana Mata.               This document has been electronically signed by Mina Porter MD on March 12, 2020 10:12

## 2020-03-13 ENCOUNTER — OFFICE VISIT (OUTPATIENT)
Dept: FAMILY MEDICINE CLINIC | Facility: CLINIC | Age: 65
End: 2020-03-13

## 2020-03-13 VITALS
OXYGEN SATURATION: 99 % | HEART RATE: 75 BPM | BODY MASS INDEX: 36.82 KG/M2 | HEIGHT: 65 IN | DIASTOLIC BLOOD PRESSURE: 82 MMHG | WEIGHT: 221 LBS | SYSTOLIC BLOOD PRESSURE: 120 MMHG

## 2020-03-13 DIAGNOSIS — M54.50 CHRONIC MIDLINE LOW BACK PAIN, UNSPECIFIED WHETHER SCIATICA PRESENT: ICD-10-CM

## 2020-03-13 DIAGNOSIS — G89.29 CHRONIC MIDLINE LOW BACK PAIN, UNSPECIFIED WHETHER SCIATICA PRESENT: ICD-10-CM

## 2020-03-13 DIAGNOSIS — R19.7 DIARRHEA, UNSPECIFIED TYPE: ICD-10-CM

## 2020-03-13 DIAGNOSIS — K63.89 EPIPLOIC APPENDAGITIS: Primary | ICD-10-CM

## 2020-03-13 PROCEDURE — 99213 OFFICE O/P EST LOW 20 MIN: CPT | Performed by: STUDENT IN AN ORGANIZED HEALTH CARE EDUCATION/TRAINING PROGRAM

## 2020-03-13 NOTE — PROGRESS NOTES
ID: Lana Mata    CC:   Chief Complaint   Patient presents with   • Abdominal Pain   • Nausea       Subjective:     HPI     Lana Mata is a 65 y.o. female who presents for 2 day follow up. Patient was diagnosed with epiploic appendagitis and was started on ibuprofen. Patient states she is doing much better since being on the iburpofen. She takes 800 mg tid. Patient is still having pain, but is much better than two days ago. She has no nausea, vomiting. She does have chronic diarrhea and has an appt scheduled for next week with GI. Patient also was seen for back pain two days ago and was given robaxin and a nerve stimulator. Patient states the robaxin is helping, but she still has to  the nerve stimulator. Patient has no other concerns at this time.     Preventative:  Over the past 2 weeks, have you felt down, depressed, or hopeless? no  Over the past 2 weeks, have you felt little interest or pleasure in doing things? no  Clinical depression screening refused by patient no    On osteoporosis therapy? no    Past Medical Hx:  Past Medical History:   Diagnosis Date   • Abnormal gait 11/28/2012   • Abrasion 02/26/2015   • Acute ankle pain 03/31/2016   • Acute bronchitis 09/04/2015   • Allergic rhinitis 05/22/2012   • Asthenia 05/22/2014   • Atrial fibrillation (CMS/HCC) 03/04/2016    - converted    • Attempted suicide (CMS/[facility])    • Benign essential hypertension 03/04/2016   • Cardiovascular stress test abnormal 03/04/2016   • Cellulitis of knee 03/03/2015   • Chest pain 04/02/2015   • Chronic pain disorder    • Common cold 06/24/2014   • Congestive heart failure (CMS/HCC) 02/04/2016   • Depressive disorder 05/22/2015    suspect more complicated psych issues      • Fibrocystic breast    • Gastroesophageal reflux disease 01/17/2013   • Generalized anxiety disorder 02/04/2016   • H/O echocardiogram     Normal LV function with Ef of 60-65%.Mildly dilated right ventricle with normal function. Grade  1B diastolic dysfunction with mild CLVH.NO sig. valvular regurg. or stenosis.   • Headache 10/27/2014   • Hyperlipidemia 03/04/2016   • Low back pain 06/30/2016    Need for prophylactic vaccination against Streptococcus pneumoniae [pneumococcus]    01/23/2015    • Neck pain 06/30/2016   • Obstructive sleep apnea of adult 07/30/2015   • Obstructive sleep apnea syndrome    • Osteoarthritis    • Osteoporosis 06/30/2016   • Pain in right shoulder 06/30/2016   • Pain in right shoulder 04/24/2014   • Shortness of breath 04/02/2015   • Skin ulcer (CMS/HCC) 04/13/2015    left   • Thyrotoxicosis with or without goiter 10/24/2014   • Urinary incontinence 05/22/2014       Past Surgical Hx:  Past Surgical History:   Procedure Laterality Date   • BACK SURGERY  08/07/2015    Removal of intstrumentation,L5-S1.Exploration of fusion,L5-S1.Posterolateral fusion,L4-L5.Posterior segmental spinal instrumentation L4-5.Transforaminal interbody fusion Through right sided approach.Performed at    • COLONOSCOPY  05/04/2016    will order cologuard   • INJECTION OF MEDICATION  03/23/2015    celestone   • INJECTION OF MEDICATION  05/04/2016    KENALOG(6)   • LUMBAR FUSION      discectomy fusion with rods L4-S1   • LUMBAR LAMINECTOMY  1995   • MANDIBLE FRACTURE SURGERY      JAW WIRED SHUT   • MOUTH SURGERY  10/05/2015    Fractured mandible. Removal of arch bars.   • MOUTH SURGERY  1985    Fractured mandible. Removal of arch bars.   • NASAL SEPTUM SURGERY     • RHINOPLASTY     • TUBAL ABDOMINAL LIGATION  1986       Health Maintenance:  Health Maintenance   Topic Date Due   • ZOSTER VACCINE (2 of 2) 02/09/2017   • MEDICARE ANNUAL WELLNESS  02/07/2019   • PAP SMEAR  12/15/2019   • Pneumococcal Vaccine Once at 65 Years Old  03/11/2021 (Originally 3/9/2020)   • LIPID PANEL  11/18/2020   • MAMMOGRAM  11/07/2021   • DXA SCAN  11/07/2021   • COLONOSCOPY  11/07/2026   • TDAP/TD VACCINES (5 - Td) 12/07/2029   • HEPATITIS C SCREENING  Completed   •  INFLUENZA VACCINE  Completed       Current Meds:    Current Outpatient Medications:   •  alendronate (FOSAMAX) 70 MG tablet, Take 1 tablet by mouth Every 7 (Seven) Days., Disp: 4 tablet, Rfl: 5  •  aMILoride (MIDAMOR) 5 MG tablet, Take 1 tablet by mouth Daily., Disp: 30 tablet, Rfl: 6  •  amoxicillin-clavulanate (AUGMENTIN) 875-125 MG per tablet, Take 1 tablet by mouth 2 (Two) Times a Day for 10 days., Disp: 20 tablet, Rfl: 0  •  ARIPiprazole (ABILIFY) 5 MG tablet, Take 5 mg by mouth Daily. At bedtime, Disp: , Rfl:   •  aspirin 325 MG tablet, Take 325 mg by mouth Daily. At bedtime, Disp: , Rfl:   •  atorvastatin (LIPITOR) 20 MG tablet, Take 1 tablet by mouth Daily., Disp: 90 tablet, Rfl: 1  •  Calcium Carb-Cholecalciferol (CALCIUM-VITAMIN D) 500-200 MG-UNIT per tablet, Take 1 tablet by mouth 2 (Two) Times a Day With Meals., Disp: 60 tablet, Rfl: 2  •  choline fenofibrate (TRILIPIX) 135 MG capsule, Take 1 capsule by mouth Daily., Disp: 90 capsule, Rfl: 3  •  dilTIAZem XR (DILT-XR) 180 MG 24 hr capsule, Take 1 capsule by mouth Daily., Disp: 90 capsule, Rfl: 2  •  ferrous sulfate 325 (65 FE) MG tablet, Take 1 tablet by mouth Daily With Breakfast. Take with vitamin C to help with absorption, Disp: 30 tablet, Rfl: 6  •  ibuprofen (ADVIL,MOTRIN) 800 MG tablet, Take 1 tablet by mouth Every 6 (Six) Hours As Needed for Mild Pain ., Disp: 120 tablet, Rfl: 0  •  lisinopril (PRINIVIL,ZESTRIL) 20 MG tablet, Take 20 mg by mouth Daily., Disp: , Rfl:   •  LORazepam (ATIVAN) 1 MG tablet, Take 1 mg by mouth 2 (Two) Times a Day. Scheduled to take in the morning and one with dinner, Disp: , Rfl: 0  •  methIMAzole (TAPAZOLE) 5 MG tablet, Take 2.5 mg by mouth Daily., Disp: , Rfl:   •  methocarbamol (ROBAXIN) 500 MG tablet, Take 1 tablet by mouth 4 (Four) Times a Day As Needed for Muscle Spasms., Disp: 90 tablet, Rfl: 5  •  metoprolol tartrate (LOPRESSOR) 25 MG tablet, TAKE 1 TABLET EVERY 12 HOURS, Disp: 60 tablet, Rfl: 6  •   nitroglycerin (NITROSTAT) 0.4 MG SL tablet, Place 1 tablet under the tongue See Admin Instructions. PRN cp, if not relieved in 5 min repeat and go to ER, Disp: 25 tablet, Rfl: 12  •  omeprazole (priLOSEC) 20 MG capsule, Take 1 capsule by mouth Daily., Disp: 90 capsule, Rfl: 5  •  potassium chloride (K-DUR) 10 MEQ CR tablet, Take 1 tablet by mouth 2 (Two) Times a Day With Meals., Disp: 180 tablet, Rfl: 3  •  rivaroxaban (XARELTO) 20 MG tablet, Take 1 tablet by mouth Daily., Disp: 90 tablet, Rfl: 3  •  sertraline (ZOLOFT) 100 MG tablet, Take 150 mg by mouth Daily., Disp: , Rfl: 1    Allergies:  Codeine; Latex; Morphine and related; and Pravachol [pravastatin sodium]    Family Hx:  Family History   Problem Relation Age of Onset   • Hypertension Mother    • Hyperlipidemia Mother    • Mental illness Mother    • Thyroid disease Mother    • Diabetes Maternal Grandmother    • Cancer Maternal Grandmother    • Depression Other    • Diabetes Other    • Heart disease Other    • Hypertension Other    • Osteoporosis Other    • Thyroid disease Other    • Cancer Maternal Aunt    • Cancer Paternal Aunt    • Breast cancer Paternal Aunt         Social History:  Social History     Socioeconomic History   • Marital status:      Spouse name: Not on file   • Number of children: Not on file   • Years of education: Not on file   • Highest education level: Not on file   Social Needs   • Financial resource strain: Not hard at all   • Food insecurity:     Worry: Never true     Inability: Never true   • Transportation needs:     Medical: No     Non-medical: No   Tobacco Use   • Smoking status: Never Smoker   • Smokeless tobacco: Never Used   • Tobacco comment: smoking cessation    Substance and Sexual Activity   • Alcohol use: No   • Drug use: No     Comment: lortab / oxycontin   • Sexual activity: Defer       Review of Systems   Constitutional: Negative for activity change, appetite change, chills, fatigue and fever.   HENT: Negative  "for drooling, ear discharge, ear pain, facial swelling, hearing loss, mouth sores, rhinorrhea and sinus pain.    Eyes: Negative for pain, redness and itching.   Respiratory: Negative for cough, choking, chest tightness, shortness of breath and stridor.    Cardiovascular: Negative for chest pain, palpitations and leg swelling.   Gastrointestinal: Positive for abdominal pain. Negative for abdominal distention, anal bleeding, blood in stool, constipation, diarrhea and nausea.   Endocrine: Negative for heat intolerance, polydipsia and polyphagia.   Genitourinary: Negative for dysuria, flank pain, frequency and genital sores.   Musculoskeletal: Positive for back pain. Negative for gait problem, joint swelling and myalgias.   Skin: Negative for pallor and rash.   Neurological: Negative for seizures, facial asymmetry, speech difficulty, light-headedness, numbness and headaches.   Hematological: Negative for adenopathy. Does not bruise/bleed easily.   Psychiatric/Behavioral: Negative for confusion, decreased concentration, dysphoric mood and hallucinations. The patient is not nervous/anxious and is not hyperactive.            Objective:     /82   Pulse 75   Ht 165.1 cm (65\")   Wt 100 kg (221 lb)   SpO2 99%   BMI 36.78 kg/m²     Physical Exam   Constitutional: She is oriented to person, place, and time. She appears well-developed and well-nourished.   HENT:   Head: Normocephalic and atraumatic.   Right Ear: External ear normal.   Left Ear: External ear normal.   Nose: Nose normal.   Mouth/Throat: Oropharynx is clear and moist.   Eyes: Pupils are equal, round, and reactive to light. Conjunctivae and EOM are normal.   Neck: Normal range of motion. Neck supple.   Cardiovascular: Normal rate, regular rhythm, normal heart sounds and intact distal pulses.   Pulmonary/Chest: Effort normal and breath sounds normal.   Abdominal: Soft. Bowel sounds are normal. She exhibits no distension and no mass. There is tenderness. " There is no rebound and no guarding. No hernia.   Left lower quadrant tenderness    Musculoskeletal: Normal range of motion. She exhibits tenderness.   Neurological: She is alert and oriented to person, place, and time.   Skin: Skin is warm and dry.   Psychiatric: She has a normal mood and affect. Her behavior is normal. Judgment and thought content normal.              Assessment/Plan:     Lana was seen today for abdominal pain and nausea.    Diagnoses and all orders for this visit:    Epiploic appendagitis    Chronic midline low back pain, unspecified whether sciatica present    Diarrhea, unspecified type      Will continue with the ibuprofen for the epiploic appendagitis. Patient is to wean herself off the ibuprofen. Advised to go to ED if worsening symptoms. Will wait for recommendation with GI for chronic diarrhea. Advised patient to  the nerve stimulator as that should help with the back pain. Patient understands.     Follow-up:     1 month     Goals:   Goals     • management of chronic medical conditions (pt-stated)      Barriers to goals: none          Barriers to goals: back pain     Health Maintenance   Topic Date Due   • ZOSTER VACCINE (2 of 2) 02/09/2017   • MEDICARE ANNUAL WELLNESS  02/07/2019   • PAP SMEAR  12/15/2019   • Pneumococcal Vaccine Once at 65 Years Old  03/11/2021 (Originally 3/9/2020)   • LIPID PANEL  11/18/2020   • MAMMOGRAM  11/07/2021   • DXA SCAN  11/07/2021   • COLONOSCOPY  11/07/2026   • TDAP/TD VACCINES (5 - Td) 12/07/2029   • HEPATITIS C SCREENING  Completed   • INFLUENZA VACCINE  Completed       Tobacco: nonsmoker  Alcohol: does not drink  Lifestyle: Patient's Body mass index is 36.78 kg/m². BMI is above normal parameters. Recommendations include: exercise counseling and nutrition counseling.   reduce portion size, reduce fast food intake, family to eat at dinner table more often, keep TV off during meals, plan meals and eat breakfast    RISK SCORE: 3       Lucille Weiss  M.D. PGY2  [facility]  [address] [place] [address]  Office: [phone]    This document has been electronically signed by Lucille Weiss MD on March 13, 2020 10:30          This document has been electronically signed by Lucille Weiss MD on March 13, 2020 10:29

## 2020-03-13 NOTE — PROGRESS NOTES
I have reviewed the notes, assessments, and/or procedures performed by Dr. Lucille Pyle, I concur with her/his documentation and assessment and plan for Lana Mata.                This document has been electronically signed by Mina Porter MD on March 13, 2020 10:51

## 2020-03-14 ENCOUNTER — HOSPITAL ENCOUNTER (OUTPATIENT)
Facility: HOSPITAL | Age: 65
Setting detail: OBSERVATION
Discharge: HOME | End: 2020-03-16
Attending: EMERGENCY MEDICINE | Admitting: FAMILY MEDICINE

## 2020-03-14 ENCOUNTER — APPOINTMENT (OUTPATIENT)
Dept: CT IMAGING | Facility: HOSPITAL | Age: 65
End: 2020-03-14

## 2020-03-14 DIAGNOSIS — Z74.09 IMPAIRED PHYSICAL MOBILITY: ICD-10-CM

## 2020-03-14 DIAGNOSIS — K63.89 EPIPLOIC APPENDAGITIS: Primary | ICD-10-CM

## 2020-03-14 DIAGNOSIS — Z74.09 IMPAIRED MOBILITY AND ACTIVITIES OF DAILY LIVING: ICD-10-CM

## 2020-03-14 DIAGNOSIS — Z78.9 IMPAIRED MOBILITY AND ACTIVITIES OF DAILY LIVING: ICD-10-CM

## 2020-03-14 LAB
ALBUMIN SERPL-MCNC: 3.9 G/DL (ref 3.5–5.2)
ALBUMIN/GLOB SERPL: 1.2 G/DL
ALP SERPL-CCNC: 58 U/L (ref 39–117)
ALT SERPL W P-5'-P-CCNC: 15 U/L (ref 1–33)
ANION GAP SERPL CALCULATED.3IONS-SCNC: 14 MMOL/L (ref 5–15)
AST SERPL-CCNC: 16 U/L (ref 1–32)
BASOPHILS # BLD AUTO: 0.06 10*3/MM3 (ref 0–0.2)
BASOPHILS NFR BLD AUTO: 0.4 % (ref 0–1.5)
BILIRUB SERPL-MCNC: 0.4 MG/DL (ref 0.2–1.2)
BILIRUB UR QL STRIP: NEGATIVE
BUN BLD-MCNC: 14 MG/DL (ref 8–23)
BUN/CREAT SERPL: 12.5 (ref 7–25)
CALCIUM SPEC-SCNC: 9.2 MG/DL (ref 8.6–10.5)
CHLORIDE SERPL-SCNC: 102 MMOL/L (ref 98–107)
CLARITY UR: ABNORMAL
CO2 SERPL-SCNC: 23 MMOL/L (ref 22–29)
COLOR UR: ABNORMAL
CREAT BLD-MCNC: 1.12 MG/DL (ref 0.57–1)
D-LACTATE SERPL-SCNC: 1.1 MMOL/L (ref 0.5–2)
DEPRECATED RDW RBC AUTO: 41 FL (ref 37–54)
EOSINOPHIL # BLD AUTO: 0.51 10*3/MM3 (ref 0–0.4)
EOSINOPHIL NFR BLD AUTO: 3.5 % (ref 0.3–6.2)
ERYTHROCYTE [DISTWIDTH] IN BLOOD BY AUTOMATED COUNT: 13 % (ref 12.3–15.4)
GFR SERPL CREATININE-BSD FRML MDRD: 49 ML/MIN/1.73
GLOBULIN UR ELPH-MCNC: 3.3 GM/DL
GLUCOSE BLD-MCNC: 113 MG/DL (ref 65–99)
GLUCOSE UR STRIP-MCNC: NEGATIVE MG/DL
HCT VFR BLD AUTO: 40.6 % (ref 34–46.6)
HGB BLD-MCNC: 13.8 G/DL (ref 12–15.9)
HGB UR QL STRIP.AUTO: NEGATIVE
HOLD SPECIMEN: NORMAL
HOLD SPECIMEN: NORMAL
IMM GRANULOCYTES # BLD AUTO: 0.07 10*3/MM3 (ref 0–0.05)
IMM GRANULOCYTES NFR BLD AUTO: 0.5 % (ref 0–0.5)
KETONES UR QL STRIP: ABNORMAL
LEUKOCYTE ESTERASE UR QL STRIP.AUTO: NEGATIVE
LIPASE SERPL-CCNC: 56 U/L (ref 13–60)
LYMPHOCYTES # BLD AUTO: 2.41 10*3/MM3 (ref 0.7–3.1)
LYMPHOCYTES NFR BLD AUTO: 16.5 % (ref 19.6–45.3)
MCH RBC QN AUTO: 29.7 PG (ref 26.6–33)
MCHC RBC AUTO-ENTMCNC: 34 G/DL (ref 31.5–35.7)
MCV RBC AUTO: 87.3 FL (ref 79–97)
MONOCYTES # BLD AUTO: 1.15 10*3/MM3 (ref 0.1–0.9)
MONOCYTES NFR BLD AUTO: 7.9 % (ref 5–12)
NEUTROPHILS # BLD AUTO: 10.4 10*3/MM3 (ref 1.7–7)
NEUTROPHILS NFR BLD AUTO: 71.2 % (ref 42.7–76)
NITRITE UR QL STRIP: NEGATIVE
NRBC BLD AUTO-RTO: 0 /100 WBC (ref 0–0.2)
PH UR STRIP.AUTO: 5.5 [PH] (ref 5–9)
PLATELET # BLD AUTO: 249 10*3/MM3 (ref 140–450)
PMV BLD AUTO: 9.9 FL (ref 6–12)
POTASSIUM BLD-SCNC: 4.2 MMOL/L (ref 3.5–5.2)
PROT SERPL-MCNC: 7.2 G/DL (ref 6–8.5)
PROT UR QL STRIP: NEGATIVE
RBC # BLD AUTO: 4.65 10*6/MM3 (ref 3.77–5.28)
SODIUM BLD-SCNC: 139 MMOL/L (ref 136–145)
SP GR UR STRIP: 1.03 (ref 1–1.03)
UROBILINOGEN UR QL STRIP: ABNORMAL
WBC NRBC COR # BLD: 14.6 10*3/MM3 (ref 3.4–10.8)
WHOLE BLOOD HOLD SPECIMEN: NORMAL

## 2020-03-14 PROCEDURE — 80053 COMPREHEN METABOLIC PANEL: CPT | Performed by: EMERGENCY MEDICINE

## 2020-03-14 PROCEDURE — 74177 CT ABD & PELVIS W/CONTRAST: CPT

## 2020-03-14 PROCEDURE — 99284 EMERGENCY DEPT VISIT MOD MDM: CPT

## 2020-03-14 PROCEDURE — 85025 COMPLETE CBC W/AUTO DIFF WBC: CPT | Performed by: EMERGENCY MEDICINE

## 2020-03-14 PROCEDURE — 87040 BLOOD CULTURE FOR BACTERIA: CPT | Performed by: EMERGENCY MEDICINE

## 2020-03-14 PROCEDURE — 25010000002 ONDANSETRON PER 1 MG: Performed by: EMERGENCY MEDICINE

## 2020-03-14 PROCEDURE — 25010000002 HYDROMORPHONE 1 MG/ML SOLUTION: Performed by: EMERGENCY MEDICINE

## 2020-03-14 PROCEDURE — 83690 ASSAY OF LIPASE: CPT | Performed by: EMERGENCY MEDICINE

## 2020-03-14 PROCEDURE — 83605 ASSAY OF LACTIC ACID: CPT | Performed by: EMERGENCY MEDICINE

## 2020-03-14 PROCEDURE — 81003 URINALYSIS AUTO W/O SCOPE: CPT | Performed by: EMERGENCY MEDICINE

## 2020-03-14 PROCEDURE — 96375 TX/PRO/DX INJ NEW DRUG ADDON: CPT

## 2020-03-14 PROCEDURE — 25010000002 IOPAMIDOL 61 % SOLUTION: Performed by: EMERGENCY MEDICINE

## 2020-03-14 PROCEDURE — 85025 COMPLETE CBC W/AUTO DIFF WBC: CPT | Performed by: STUDENT IN AN ORGANIZED HEALTH CARE EDUCATION/TRAINING PROGRAM

## 2020-03-14 RX ORDER — ONDANSETRON 2 MG/ML
4 INJECTION INTRAMUSCULAR; INTRAVENOUS ONCE
Status: COMPLETED | OUTPATIENT
Start: 2020-03-14 | End: 2020-03-14

## 2020-03-14 RX ADMIN — ONDANSETRON 4 MG: 2 INJECTION INTRAMUSCULAR; INTRAVENOUS at 21:14

## 2020-03-14 RX ADMIN — IOPAMIDOL 90 ML: 612 INJECTION, SOLUTION INTRAVENOUS at 23:38

## 2020-03-14 RX ADMIN — SODIUM CHLORIDE 500 ML: 9 INJECTION, SOLUTION INTRAVENOUS at 21:14

## 2020-03-14 RX ADMIN — HYDROMORPHONE HYDROCHLORIDE 0.5 MG: 1 INJECTION, SOLUTION INTRAMUSCULAR; INTRAVENOUS; SUBCUTANEOUS at 21:14

## 2020-03-15 PROBLEM — K63.89 EPIPLOIC APPENDAGITIS: Status: ACTIVE | Noted: 2020-03-15

## 2020-03-15 LAB
ALBUMIN SERPL-MCNC: 3.5 G/DL (ref 3.5–5.2)
ALBUMIN/GLOB SERPL: 1.1 G/DL
ALP SERPL-CCNC: 49 U/L (ref 39–117)
ALT SERPL W P-5'-P-CCNC: 14 U/L (ref 1–33)
ANION GAP SERPL CALCULATED.3IONS-SCNC: 12 MMOL/L (ref 5–15)
AST SERPL-CCNC: 15 U/L (ref 1–32)
BASOPHILS # BLD AUTO: 0.06 10*3/MM3 (ref 0–0.2)
BASOPHILS # BLD AUTO: 0.07 10*3/MM3 (ref 0–0.2)
BASOPHILS NFR BLD AUTO: 0.5 % (ref 0–1.5)
BASOPHILS NFR BLD AUTO: 0.5 % (ref 0–1.5)
BILIRUB SERPL-MCNC: 0.7 MG/DL (ref 0.2–1.2)
BUN BLD-MCNC: 13 MG/DL (ref 8–23)
BUN/CREAT SERPL: 10.8 (ref 7–25)
CALCIUM SPEC-SCNC: 9.1 MG/DL (ref 8.6–10.5)
CHLORIDE SERPL-SCNC: 103 MMOL/L (ref 98–107)
CO2 SERPL-SCNC: 23 MMOL/L (ref 22–29)
CREAT BLD-MCNC: 1.2 MG/DL (ref 0.57–1)
DEPRECATED RDW RBC AUTO: 42 FL (ref 37–54)
DEPRECATED RDW RBC AUTO: 42.6 FL (ref 37–54)
EOSINOPHIL # BLD AUTO: 0.48 10*3/MM3 (ref 0–0.4)
EOSINOPHIL # BLD AUTO: 0.58 10*3/MM3 (ref 0–0.4)
EOSINOPHIL NFR BLD AUTO: 3.8 % (ref 0.3–6.2)
EOSINOPHIL NFR BLD AUTO: 3.8 % (ref 0.3–6.2)
ERYTHROCYTE [DISTWIDTH] IN BLOOD BY AUTOMATED COUNT: 13 % (ref 12.3–15.4)
ERYTHROCYTE [DISTWIDTH] IN BLOOD BY AUTOMATED COUNT: 13.1 % (ref 12.3–15.4)
GFR SERPL CREATININE-BSD FRML MDRD: 45 ML/MIN/1.73
GLOBULIN UR ELPH-MCNC: 3.2 GM/DL
GLUCOSE BLD-MCNC: 116 MG/DL (ref 65–99)
HCT VFR BLD AUTO: 36.8 % (ref 34–46.6)
HCT VFR BLD AUTO: 40.8 % (ref 34–46.6)
HGB BLD-MCNC: 12.4 G/DL (ref 12–15.9)
HGB BLD-MCNC: 13.6 G/DL (ref 12–15.9)
IMM GRANULOCYTES # BLD AUTO: 0.04 10*3/MM3 (ref 0–0.05)
IMM GRANULOCYTES # BLD AUTO: 0.07 10*3/MM3 (ref 0–0.05)
IMM GRANULOCYTES NFR BLD AUTO: 0.3 % (ref 0–0.5)
IMM GRANULOCYTES NFR BLD AUTO: 0.5 % (ref 0–0.5)
LYMPHOCYTES # BLD AUTO: 2.25 10*3/MM3 (ref 0.7–3.1)
LYMPHOCYTES # BLD AUTO: 2.35 10*3/MM3 (ref 0.7–3.1)
LYMPHOCYTES NFR BLD AUTO: 15.3 % (ref 19.6–45.3)
LYMPHOCYTES NFR BLD AUTO: 18 % (ref 19.6–45.3)
MCH RBC QN AUTO: 29.7 PG (ref 26.6–33)
MCH RBC QN AUTO: 29.8 PG (ref 26.6–33)
MCHC RBC AUTO-ENTMCNC: 33.3 G/DL (ref 31.5–35.7)
MCHC RBC AUTO-ENTMCNC: 33.7 G/DL (ref 31.5–35.7)
MCV RBC AUTO: 88 FL (ref 79–97)
MCV RBC AUTO: 89.3 FL (ref 79–97)
MONOCYTES # BLD AUTO: 1.05 10*3/MM3 (ref 0.1–0.9)
MONOCYTES # BLD AUTO: 1.23 10*3/MM3 (ref 0.1–0.9)
MONOCYTES NFR BLD AUTO: 8 % (ref 5–12)
MONOCYTES NFR BLD AUTO: 8.4 % (ref 5–12)
NEUTROPHILS # BLD AUTO: 11.06 10*3/MM3 (ref 1.7–7)
NEUTROPHILS # BLD AUTO: 8.65 10*3/MM3 (ref 1.7–7)
NEUTROPHILS NFR BLD AUTO: 69 % (ref 42.7–76)
NEUTROPHILS NFR BLD AUTO: 71.9 % (ref 42.7–76)
NRBC BLD AUTO-RTO: 0 /100 WBC (ref 0–0.2)
NRBC BLD AUTO-RTO: 0 /100 WBC (ref 0–0.2)
PLATELET # BLD AUTO: 212 10*3/MM3 (ref 140–450)
PLATELET # BLD AUTO: 250 10*3/MM3 (ref 140–450)
PMV BLD AUTO: 10.1 FL (ref 6–12)
PMV BLD AUTO: 10.1 FL (ref 6–12)
POTASSIUM BLD-SCNC: 4.1 MMOL/L (ref 3.5–5.2)
PROT SERPL-MCNC: 6.7 G/DL (ref 6–8.5)
RBC # BLD AUTO: 4.18 10*6/MM3 (ref 3.77–5.28)
RBC # BLD AUTO: 4.57 10*6/MM3 (ref 3.77–5.28)
SODIUM BLD-SCNC: 138 MMOL/L (ref 136–145)
WBC NRBC COR # BLD: 12.53 10*3/MM3 (ref 3.4–10.8)
WBC NRBC COR # BLD: 15.36 10*3/MM3 (ref 3.4–10.8)

## 2020-03-15 PROCEDURE — 25010000002 PIPERACILLIN SOD-TAZOBACTAM PER 1 G: Performed by: STUDENT IN AN ORGANIZED HEALTH CARE EDUCATION/TRAINING PROGRAM

## 2020-03-15 PROCEDURE — 99220 PR INITIAL OBSERVATION CARE/DAY 70 MINUTES: CPT | Performed by: STUDENT IN AN ORGANIZED HEALTH CARE EDUCATION/TRAINING PROGRAM

## 2020-03-15 PROCEDURE — 85025 COMPLETE CBC W/AUTO DIFF WBC: CPT | Performed by: STUDENT IN AN ORGANIZED HEALTH CARE EDUCATION/TRAINING PROGRAM

## 2020-03-15 PROCEDURE — G0378 HOSPITAL OBSERVATION PER HR: HCPCS

## 2020-03-15 PROCEDURE — 97162 PT EVAL MOD COMPLEX 30 MIN: CPT

## 2020-03-15 PROCEDURE — 94799 UNLISTED PULMONARY SVC/PX: CPT

## 2020-03-15 PROCEDURE — 97166 OT EVAL MOD COMPLEX 45 MIN: CPT

## 2020-03-15 PROCEDURE — 25010000002 PIPERACILLIN-TAZOBACTAM: Performed by: EMERGENCY MEDICINE

## 2020-03-15 PROCEDURE — 96376 TX/PRO/DX INJ SAME DRUG ADON: CPT

## 2020-03-15 PROCEDURE — 96365 THER/PROPH/DIAG IV INF INIT: CPT

## 2020-03-15 PROCEDURE — 96366 THER/PROPH/DIAG IV INF ADDON: CPT

## 2020-03-15 PROCEDURE — 80053 COMPREHEN METABOLIC PANEL: CPT | Performed by: STUDENT IN AN ORGANIZED HEALTH CARE EDUCATION/TRAINING PROGRAM

## 2020-03-15 PROCEDURE — 94760 N-INVAS EAR/PLS OXIMETRY 1: CPT

## 2020-03-15 PROCEDURE — 96361 HYDRATE IV INFUSION ADD-ON: CPT

## 2020-03-15 PROCEDURE — 25010000002 ONDANSETRON PER 1 MG: Performed by: STUDENT IN AN ORGANIZED HEALTH CARE EDUCATION/TRAINING PROGRAM

## 2020-03-15 RX ORDER — ONDANSETRON 2 MG/ML
4 INJECTION INTRAMUSCULAR; INTRAVENOUS EVERY 6 HOURS PRN
Status: DISCONTINUED | OUTPATIENT
Start: 2020-03-15 | End: 2020-03-16 | Stop reason: HOSPADM

## 2020-03-15 RX ORDER — METHOCARBAMOL 500 MG/1
500 TABLET, FILM COATED ORAL 4 TIMES DAILY PRN
Status: DISCONTINUED | OUTPATIENT
Start: 2020-03-15 | End: 2020-03-16 | Stop reason: HOSPADM

## 2020-03-15 RX ORDER — AMILORIDE HYDROCHLORIDE 5 MG/1
5 TABLET ORAL DAILY
Status: DISCONTINUED | OUTPATIENT
Start: 2020-03-15 | End: 2020-03-16 | Stop reason: HOSPADM

## 2020-03-15 RX ORDER — LORAZEPAM 1 MG/1
1 TABLET ORAL 2 TIMES DAILY
Status: DISCONTINUED | OUTPATIENT
Start: 2020-03-15 | End: 2020-03-16 | Stop reason: HOSPADM

## 2020-03-15 RX ORDER — FERROUS SULFATE TAB EC 324 MG (65 MG FE EQUIVALENT) 324 (65 FE) MG
324 TABLET DELAYED RESPONSE ORAL
Status: DISCONTINUED | OUTPATIENT
Start: 2020-03-15 | End: 2020-03-16 | Stop reason: HOSPADM

## 2020-03-15 RX ORDER — ARIPIPRAZOLE 5 MG/1
5 TABLET ORAL NIGHTLY
Status: DISCONTINUED | OUTPATIENT
Start: 2020-03-16 | End: 2020-03-16 | Stop reason: HOSPADM

## 2020-03-15 RX ORDER — SODIUM CHLORIDE 9 MG/ML
100 INJECTION, SOLUTION INTRAVENOUS CONTINUOUS
Status: DISCONTINUED | OUTPATIENT
Start: 2020-03-15 | End: 2020-03-16 | Stop reason: HOSPADM

## 2020-03-15 RX ORDER — LOPERAMIDE HYDROCHLORIDE 2 MG/1
2 CAPSULE ORAL 4 TIMES DAILY PRN
Status: DISCONTINUED | OUTPATIENT
Start: 2020-03-15 | End: 2020-03-15

## 2020-03-15 RX ORDER — LISINOPRIL 20 MG/1
20 TABLET ORAL DAILY
Status: DISCONTINUED | OUTPATIENT
Start: 2020-03-15 | End: 2020-03-16 | Stop reason: HOSPADM

## 2020-03-15 RX ORDER — SODIUM CHLORIDE 0.9 % (FLUSH) 0.9 %
10 SYRINGE (ML) INJECTION EVERY 12 HOURS SCHEDULED
Status: DISCONTINUED | OUTPATIENT
Start: 2020-03-15 | End: 2020-03-16 | Stop reason: HOSPADM

## 2020-03-15 RX ORDER — NITROGLYCERIN 0.4 MG/1
0.4 TABLET SUBLINGUAL
Status: DISCONTINUED | OUTPATIENT
Start: 2020-03-15 | End: 2020-03-16 | Stop reason: HOSPADM

## 2020-03-15 RX ORDER — PIPERACILLIN SODIUM, TAZOBACTAM SODIUM 3; .375 G/15ML; G/15ML
3.38 INJECTION, POWDER, LYOPHILIZED, FOR SOLUTION INTRAVENOUS EVERY 8 HOURS SCHEDULED
Status: DISCONTINUED | OUTPATIENT
Start: 2020-03-15 | End: 2020-03-15

## 2020-03-15 RX ORDER — ATORVASTATIN CALCIUM 20 MG/1
20 TABLET, FILM COATED ORAL DAILY
Status: DISCONTINUED | OUTPATIENT
Start: 2020-03-15 | End: 2020-03-16 | Stop reason: HOSPADM

## 2020-03-15 RX ORDER — DILTIAZEM HYDROCHLORIDE 180 MG/1
180 CAPSULE, COATED, EXTENDED RELEASE ORAL
Status: DISCONTINUED | OUTPATIENT
Start: 2020-03-15 | End: 2020-03-16 | Stop reason: HOSPADM

## 2020-03-15 RX ORDER — SODIUM CHLORIDE 0.9 % (FLUSH) 0.9 %
10 SYRINGE (ML) INJECTION AS NEEDED
Status: DISCONTINUED | OUTPATIENT
Start: 2020-03-15 | End: 2020-03-16 | Stop reason: HOSPADM

## 2020-03-15 RX ORDER — FENOFIBRATE 145 MG/1
145 TABLET, COATED ORAL DAILY
Status: DISCONTINUED | OUTPATIENT
Start: 2020-03-15 | End: 2020-03-16 | Stop reason: HOSPADM

## 2020-03-15 RX ORDER — ACETAMINOPHEN 325 MG/1
650 TABLET ORAL EVERY 6 HOURS PRN
Status: DISCONTINUED | OUTPATIENT
Start: 2020-03-15 | End: 2020-03-16 | Stop reason: HOSPADM

## 2020-03-15 RX ORDER — PANTOPRAZOLE SODIUM 40 MG/1
40 TABLET, DELAYED RELEASE ORAL EVERY MORNING
Status: DISCONTINUED | OUTPATIENT
Start: 2020-03-15 | End: 2020-03-16 | Stop reason: HOSPADM

## 2020-03-15 RX ORDER — LOPERAMIDE HYDROCHLORIDE 2 MG/1
2 CAPSULE ORAL EVERY 4 HOURS PRN
Status: DISCONTINUED | OUTPATIENT
Start: 2020-03-15 | End: 2020-03-16 | Stop reason: HOSPADM

## 2020-03-15 RX ADMIN — PIPERACILLIN AND TAZOBACTAM 3.38 G: 3; .375 INJECTION, POWDER, FOR SOLUTION INTRAVENOUS at 06:48

## 2020-03-15 RX ADMIN — ATORVASTATIN CALCIUM 20 MG: 20 TABLET, FILM COATED ORAL at 08:17

## 2020-03-15 RX ADMIN — LORAZEPAM 1 MG: 1 TABLET ORAL at 08:17

## 2020-03-15 RX ADMIN — ONDANSETRON 4 MG: 2 INJECTION INTRAMUSCULAR; INTRAVENOUS at 05:54

## 2020-03-15 RX ADMIN — ACETAMINOPHEN 650 MG: 325 TABLET, FILM COATED ORAL at 21:43

## 2020-03-15 RX ADMIN — SERTRALINE HYDROCHLORIDE 150 MG: 50 TABLET ORAL at 08:17

## 2020-03-15 RX ADMIN — PIPERACILLIN AND TAZOBACTAM 3.38 G: 3; .375 INJECTION, POWDER, FOR SOLUTION INTRAVENOUS at 21:03

## 2020-03-15 RX ADMIN — SODIUM CHLORIDE, PRESERVATIVE FREE 10 ML: 5 INJECTION INTRAVENOUS at 01:56

## 2020-03-15 RX ADMIN — RIVAROXABAN 20 MG: 10 TABLET, FILM COATED ORAL at 08:18

## 2020-03-15 RX ADMIN — PIPERACILLIN AND TAZOBACTAM 3.38 G: 3; .375 INJECTION, POWDER, FOR SOLUTION INTRAVENOUS at 14:04

## 2020-03-15 RX ADMIN — DILTIAZEM HYDROCHLORIDE 180 MG: 180 CAPSULE, COATED, EXTENDED RELEASE ORAL at 08:17

## 2020-03-15 RX ADMIN — SODIUM CHLORIDE, PRESERVATIVE FREE 10 ML: 5 INJECTION INTRAVENOUS at 21:04

## 2020-03-15 RX ADMIN — LOPERAMIDE HYDROCHLORIDE 2 MG: 2 CAPSULE ORAL at 06:08

## 2020-03-15 RX ADMIN — LOPERAMIDE HYDROCHLORIDE 2 MG: 2 CAPSULE ORAL at 11:53

## 2020-03-15 RX ADMIN — METOPROLOL TARTRATE 25 MG: 25 TABLET, FILM COATED ORAL at 21:03

## 2020-03-15 RX ADMIN — LISINOPRIL 20 MG: 20 TABLET ORAL at 08:17

## 2020-03-15 RX ADMIN — FENOFIBRATE 145 MG: 145 TABLET ORAL at 08:17

## 2020-03-15 RX ADMIN — LORAZEPAM 1 MG: 1 TABLET ORAL at 17:05

## 2020-03-15 RX ADMIN — FERROUS SULFATE TAB EC 324 MG (65 MG FE EQUIVALENT) 324 MG: 324 (65 FE) TABLET DELAYED RESPONSE at 08:17

## 2020-03-15 RX ADMIN — SODIUM CHLORIDE 100 ML/HR: 9 INJECTION, SOLUTION INTRAVENOUS at 01:56

## 2020-03-15 RX ADMIN — AMILORIDE HYDROCLORIDE 5 MG: 5 TABLET ORAL at 08:17

## 2020-03-15 RX ADMIN — METOPROLOL TARTRATE 25 MG: 25 TABLET, FILM COATED ORAL at 11:53

## 2020-03-15 RX ADMIN — PANTOPRAZOLE SODIUM 40 MG: 40 TABLET, DELAYED RELEASE ORAL at 08:17

## 2020-03-15 RX ADMIN — PIPERACILLIN SODIUM,TAZOBACTAM SODIUM 3.38 G: 3; .375 INJECTION, POWDER, FOR SOLUTION INTRAVENOUS at 00:42

## 2020-03-15 NOTE — ED PROVIDER NOTES
Subjective   65 years old female with multiple medical problems including atrial fibrillation on Xarelto, hypertension, hyperlipidemia, congestive heart failure, anxiety, depression, recently diagnosed with epiploicae appendagitis on Augmentin and ibuprofen was doing fine until this evening when she started to have increasing pain left lower quadrant, area where she was hurting before, moderate to severe intensity, sharp in nature without any significant aggravating or relieving factors, associated with nausea and vomited once.  She has chronic diarrhea which is not any worse than usual..  This evening she started to have fever of 100.8 and started to improve after taking Tylenol.      History provided by:  Patient      Review of Systems   Constitutional: Positive for chills and fever.   HENT: Negative for congestion, postnasal drip, rhinorrhea, sinus pressure and sore throat.    Respiratory: Negative for chest tightness and shortness of breath.    Cardiovascular: Negative for chest pain.   Gastrointestinal: Positive for abdominal pain, diarrhea and nausea.   Genitourinary: Negative for flank pain.   Skin: Negative for color change.   Neurological: Negative for syncope, speech difficulty and headaches.   Psychiatric/Behavioral: Negative for agitation.       Past Medical History:   Diagnosis Date   • Abnormal gait 11/28/2012   • Abrasion 02/26/2015   • Acute ankle pain 03/31/2016   • Acute bronchitis 09/04/2015   • Allergic rhinitis 05/22/2012   • Asthenia 05/22/2014   • Atrial fibrillation (CMS/[facility]) 03/04/2016    - converted    • Attempted suicide (CMS/[facility])    • Benign essential hypertension 03/04/2016   • Cardiovascular stress test abnormal 03/04/2016   • Cellulitis of knee 03/03/2015   • Chest pain 04/02/2015   • Chronic pain disorder    • Common cold 06/24/2014   • Congestive heart failure (CMS/[facility]) 02/04/2016   • Depressive disorder 05/22/2015    suspect more complicated psych issues      • Fibrocystic breast     • Gastroesophageal reflux disease 01/17/2013   • Generalized anxiety disorder 02/04/2016   • H/O echocardiogram     Normal LV function with Ef of 60-65%.Mildly dilated right ventricle with normal function. Grade 1B diastolic dysfunction with mild CLVH.NO sig. valvular regurg. or stenosis.   • Headache 10/27/2014   • Hyperlipidemia 03/04/2016   • Low back pain 06/30/2016    Need for prophylactic vaccination against Streptococcus pneumoniae [pneumococcus]    01/23/2015    • Neck pain 06/30/2016   • Obstructive sleep apnea of adult 07/30/2015   • Obstructive sleep apnea syndrome    • Osteoarthritis    • Osteoporosis 06/30/2016   • Pain in right shoulder 06/30/2016   • Pain in right shoulder 04/24/2014   • Shortness of breath 04/02/2015   • Skin ulcer (CMS/HCC) 04/13/2015    left   • Thyrotoxicosis with or without goiter 10/24/2014   • Urinary incontinence 05/22/2014       Allergies   Allergen Reactions   • Codeine Nausea And Vomiting   • Latex Rash   • Morphine And Related Hallucinations   • Pravachol [Pravastatin Sodium] Myalgia       Past Surgical History:   Procedure Laterality Date   • BACK SURGERY  08/07/2015    Removal of intstrumentation,L5-S1.Exploration of fusion,L5-S1.Posterolateral fusion,L4-L5.Posterior segmental spinal instrumentation L4-5.Transforaminal interbody fusion Through right sided approach.Performed at    • COLONOSCOPY  05/04/2016    will order cologuard   • INJECTION OF MEDICATION  03/23/2015    celestone   • INJECTION OF MEDICATION  05/04/2016    KENALOG(6)   • LUMBAR FUSION      discectomy fusion with rods L4-S1   • LUMBAR LAMINECTOMY  1995   • MANDIBLE FRACTURE SURGERY      JAW WIRED SHUT   • MOUTH SURGERY  10/05/2015    Fractured mandible. Removal of arch bars.   • MOUTH SURGERY  1985    Fractured mandible. Removal of arch bars.   • NASAL SEPTUM SURGERY     • RHINOPLASTY     • TUBAL ABDOMINAL LIGATION  1986       Family History   Problem Relation Age of Onset   • Hypertension Mother    •  Hyperlipidemia Mother    • Mental illness Mother    • Thyroid disease Mother    • Diabetes Maternal Grandmother    • Cancer Maternal Grandmother    • Depression Other    • Diabetes Other    • Heart disease Other    • Hypertension Other    • Osteoporosis Other    • Thyroid disease Other    • Cancer Maternal Aunt    • Cancer Paternal Aunt    • Breast cancer Paternal Aunt        Social History     Socioeconomic History   • Marital status:      Spouse name: Not on file   • Number of children: Not on file   • Years of education: Not on file   • Highest education level: Not on file   Social Needs   • Financial resource strain: Not hard at all   • Food insecurity:     Worry: Never true     Inability: Never true   • Transportation needs:     Medical: No     Non-medical: No   Tobacco Use   • Smoking status: Never Smoker   • Smokeless tobacco: Never Used   • Tobacco comment: smoking cessation    Substance and Sexual Activity   • Alcohol use: No   • Drug use: No     Comment: lortab / oxycontin   • Sexual activity: Defer           Objective   Physical Exam   Constitutional: She is oriented to person, place, and time. She appears well-developed and well-nourished.   HENT:   Head: Atraumatic.   Mouth/Throat: Oropharynx is clear and moist.   Eyes: EOM are normal.   Cardiovascular: Normal rate, regular rhythm and normal heart sounds.   Pulmonary/Chest: Effort normal and breath sounds normal.   Abdominal: Normal appearance and bowel sounds are normal. There is tenderness in the periumbilical area, left upper quadrant and left lower quadrant. There is guarding.   Neurological: She is alert and oriented to person, place, and time.   Skin: Skin is warm. Capillary refill takes less than 2 seconds.   Psychiatric: She has a normal mood and affect.   Nursing note and vitals reviewed.      Procedures           ED Course                                           MDM  Number of Diagnoses or Management Options  Epiploic appendagitis:    Diagnosis management comments: She is given IV fluid and pain medication, on reevaluation her pain is better but not completely improved.  She has a white count of 14 with normal lactate.  CT did not show any new findings but patient has been taking Augmentin for the last 4 days and is treatment failure, started on Zosyn, discussed with Dr. Dey and patient is being admitted as patient might be developing some colitis.       Amount and/or Complexity of Data Reviewed  Clinical lab tests: ordered and reviewed  Tests in the radiology section of CPT®: ordered and reviewed  Discuss the patient with other providers: yes      Labs Reviewed   COMPREHENSIVE METABOLIC PANEL - Abnormal; Notable for the following components:       Result Value    Glucose 113 (*)     Creatinine 1.12 (*)     eGFR Non  Amer 49 (*)     All other components within normal limits    Narrative:     GFR Normal >60  Chronic Kidney Disease <60  Kidney Failure <15     URINALYSIS W/ CULTURE IF INDICATED - Abnormal; Notable for the following components:    Appearance, UA Cloudy (*)     Specific Gravity, UA 1.034 (*)     Ketones, UA Trace (*)     All other components within normal limits    Narrative:     Urine microscopic not indicated.   CBC WITH AUTO DIFFERENTIAL - Abnormal; Notable for the following components:    WBC 14.60 (*)     Lymphocyte % 16.5 (*)     Neutrophils, Absolute 10.40 (*)     Monocytes, Absolute 1.15 (*)     Eosinophils, Absolute 0.51 (*)     Immature Grans, Absolute 0.07 (*)     All other components within normal limits   LIPASE - Normal   LACTIC ACID, PLASMA - Normal   BLOOD CULTURE   BLOOD CULTURE   RAINBOW DRAW    Narrative:     The following orders were created for panel order [facility] Draw.  Procedure                               Abnormality         Status                     ---------                               -----------         ------                     Light Blue Top[[id number]]                                    Final result               Green Top (Gel)[[id number]]                                  Final result               Lavender Top[[id number]]                                     Final result               Gold Top - SST[[id number]]                                   Final result                 Please view results for these tests on the individual orders.   RAINBOW DRAW    Narrative:     The following orders were created for panel order [place] Draw.  Procedure                               Abnormality         Status                     ---------                               -----------         ------                     Lavender Top[[id number]]                                     Final result               Gold Top - SST[[id number]]                                                                Please view results for these tests on the individual orders.   CBC AND DIFFERENTIAL    Narrative:     The following orders were created for panel order CBC & Differential.  Procedure                               Abnormality         Status                     ---------                               -----------         ------                     CBC Auto Differential[[id number]]        Abnormal            Final result                 Please view results for these tests on the individual orders.   LIGHT BLUE TOP   GREEN TOP   LAVENDER TOP   GOLD TOP - SST   LAVENDER TOP   GOLD TOP - SST       Ct Abdomen Pelvis With Contrast    Result Date: 3/15/2020  Narrative: CT abdomen and pelvis with contrast on  3/14/2020 CLINICAL INDICATION: Lower abdominal pain, fever TECHNIQUE: Multiple axial images are obtained throughout the abdomen and pelvis following the administration of IV contrast, 90 mL of Isovue-300 contrast was administered intravenously without complication. This exam was performed according to our departmental dose-optimization program, which includes automated exposure control, adjustment of the mA and/or kV according to patient  size and/or use of iterative reconstruction technique. Total DLP is 713.1 mGy*cm. COMPARISON: 3/11/2020 FINDINGS: Abdomen: There is evidence of calcified granulomatous disease in the lung bases. The lung bases are otherwise clear. The solid abdominal organs are unremarkable. There is no abdominal adenopathy. There is no free fluid or free air within the abdomen. The abdominal portion of the GI tract is unremarkable. Pelvis: There is some greater fat stranding around tubular fat density off the distal descending colon in the left lower quadrant again consistent with changes of epiploic appendagitis. This is seen well on coronal image 30. Again this would account for the patient's pain. No free fluid is noted in the pelvis. Pelvic organs appear unremarkable by CT. There is no pelvic adenopathy. Pelvic portion of the GI tract including the appendix is otherwise unremarkable. There is a small to moderate-sized left inguinal hernia containing only fat. Degenerative and postsurgical changes are noted in the spine.     Impression: 1. Continued evidence of epiploic appendagitis in the left lower quadrant which would account for the patient's pain. 2. Otherwise no acute abnormality. Electronically signed by:  Nguyễn Anderson  3/15/2020 12:06 AM CDT Workstation: [id number]    Ct Abdomen Pelvis With Contrast    Result Date: 3/11/2020  Narrative: CT abdomen and pelvis with contrast on  3/11/2020 CLINICAL INDICATION: Left lower quadrant pain TECHNIQUE: Multiple axial images are obtained throughout the abdomen and pelvis following the administration of IV contrast. This exam was performed according to our departmental dose-optimization program, which includes automated exposure control, adjustment of the mA and/or kV according to patient size and/or use of iterative reconstruction technique. Total DLP is 808.4 mGy*cm. COMPARISON: None FINDINGS: Abdomen: There is evidence of calcified granulomatous disease in the lung bases. The  lung bases are otherwise clear. The solid abdominal organs are unremarkable. Mild vascular calcifications are noted. There is no abdominal adenopathy. Small to moderate-sized duodenal diverticulum is noted along the third portion of the duodenum. There is no free fluid or free air within the abdomen. The abdominal portion of the GI tract is unremarkable. There is a small umbilical hernia containing only fat. Pelvis: There is mild fat stranding around a tubular fat density adjacent to the descending colon seen best on axial image 93 consistent with epiploic appendagitis. This would account for the patient's pain. Pelvic portion of the GI tract including the appendix is otherwise unremarkable. There is a small to moderate-sized left inguinal hernia containing only fat. Tiny focus of air is noted in the bladder that may be from recent catheterization but please correlate clinically to exclude infection as a cause. No free fluid is noted in the pelvis. Pelvic organs appear unremarkable by CT. There is no pelvic adenopathy. Degenerative and postsurgical changes are noted in the spine.     Impression: 1. Findings consistent with epiploic appendagitis in the left lower quadrant accounting for the patient's pain. 2. Tiny focus of air in the bladder most likely from recent catheterization but please correlate clinically to exclude infection as a cause. Electronically signed by:  Nguyễn Anderson  3/11/2020 1:29 AM CDT Workstation: [id number]          Final diagnoses:   Epiploic appendagitis            Jose R Richardson MD  03/15/20 0138

## 2020-03-15 NOTE — SIGNIFICANT NOTE
FAMILY MEDICINE RESIDENCY  TRANSFER OF CARE/ACCEPTANCE NOTE    PATIENT NAME: Lana Mata  : 1955  MRN: [id number]     Active Hospital Problems    Diagnosis  POA   • **Epiploic appendagitis [K52.9]  Yes   • Paroxysmal atrial fibrillation (CMS/HCC) [I48.0]  Yes   • Benign essential hypertension [I10]  Yes   • Generalized anxiety disorder [F41.1]  Yes   • Depressive disorder [F32.9]  Yes   • Gastroesophageal reflux disease [K21.9]  Yes      Resolved Hospital Problems   No resolved problems to display.       Patient seen and examined by me on 03/15/20 at 0750 AM.  Interim History:  Patient reports she was recently diagnosed with epiploic appendagitis by CT and prescribed Augmentin. She was advised to return to ED if she had a fever, and her Temp was 100.8 before presentation. She was started on Zosyn at that time with a [place]BC of 024[address].    I have noted the following changes since admission: PT complained of nausea which was treated with Zofran to resolution. She notes improvement in her abdominal pain. NPO except meds for now.    I have reviewed the H&P, diagnostic data and plan of care for Lana Madrigal case discussed with the admitting provider and/or FM service team.  I will be taking over care of this patient during the current hospitalization.      Cyndi Goldman MD  03/15/20  08:45

## 2020-03-15 NOTE — ACP (ADVANCE CARE PLANNING)
Patient states that she would like to be FULL CODE.  She also reports that her son Fabian Mata is to make decisions for her if she cannot make decisions for herself.        Angeline Bryson MD PGY3  [facility] Residency  This document has been electronically signed by Angeline Bryson MD on March 15, 2020 01:26

## 2020-03-15 NOTE — THERAPY EVALUATION
Patient Name: Lana Ayala Attebury  : 1955    MRN: [id number]                              Today's Date: 3/15/2020       Admit Date: 3/14/2020    Visit Dx:     ICD-10-CM ICD-9-CM   1. Epiploic appendagitis K52.9 558.9   2. Impaired mobility and activities of daily living Z74.09 799.89   3. Impaired physical mobility Z74.09 781.99     Patient Active Problem List   Diagnosis   • Thyrotoxicosis with or without goiter   • Osteoporosis   • Pain in right shoulder   • Low back pain   • Hyperlipidemia   • Generalized anxiety disorder   • Depressive disorder   • Benign essential hypertension   • Asthenia   • Abnormal gait   • Obstructive sleep apnea of adult   • DDD (degenerative disc disease), lumbar   • Lumbar radiculopathy   • History of lumbar surgery   • Lumbar spondylolysis   • Long term (current) use of opiate analgesic   • Paroxysmal atrial fibrillation (CMS/HCC)   • Urinary incontinence   • Skin ulcer (CMS/[facility])   • Shortness of breath   • Shortness of breath   • Osteoarthritis   • Obstructive sleep apnea syndrome   • Neck pain   • Headache   • H/O echocardiogram   • Gastroesophageal reflux disease   • Common cold   • Chronic pain disorder   • Chest pain   • Cellulitis of knee   • Cardiovascular stress test abnormal   • Attempted suicide (CMS/[facility])   • Allergic rhinitis   • Acute bronchitis   • Acute ankle pain   • Abrasion   • Epiploic appendagitis     Past Medical History:   Diagnosis Date   • Abnormal gait 2012   • Abrasion 2015   • Acute ankle pain 2016   • Acute bronchitis 2015   • Allergic rhinitis 2012   • Asthenia 2014   • Atrial fibrillation (CMS/HCC) 2016    - converted    • Attempted suicide (CMS/[facility])    • Benign essential hypertension 2016   • Cardiovascular stress test abnormal 2016   • Cellulitis of knee 2015   • Chest pain 2015   • Chronic pain disorder    • Common cold 2014   • Congestive heart failure (CMS/HCC) 2016    • Depressive disorder 05/22/2015    suspect more complicated psych issues      • Fibrocystic breast    • Gastroesophageal reflux disease 01/17/2013   • Generalized anxiety disorder 02/04/2016   • H/O echocardiogram     Normal LV function with Ef of 60-65%.Mildly dilated right ventricle with normal function. Grade 1B diastolic dysfunction with mild CLVH.NO sig. valvular regurg. or stenosis.   • Headache 10/27/2014   • Hyperlipidemia 03/04/2016   • Low back pain 06/30/2016    Need for prophylactic vaccination against Streptococcus pneumoniae [pneumococcus]    01/23/2015    • Neck pain 06/30/2016   • Obstructive sleep apnea of adult 07/30/2015   • Obstructive sleep apnea syndrome    • Osteoarthritis    • Osteoporosis 06/30/2016   • Pain in right shoulder 06/30/2016   • Pain in right shoulder 04/24/2014   • Shortness of breath 04/02/2015   • Skin ulcer (CMS/HCC) 04/13/2015    left   • Thyrotoxicosis with or without goiter 10/24/2014   • Urinary incontinence 05/22/2014     Past Surgical History:   Procedure Laterality Date   • BACK SURGERY  08/07/2015    Removal of intstrumentation,L5-S1.Exploration of fusion,L5-S1.Posterolateral fusion,L4-L5.Posterior segmental spinal instrumentation L4-5.Transforaminal interbody fusion Through right sided approach.Performed at    • COLONOSCOPY  05/04/2016    will order cologuard   • INJECTION OF MEDICATION  03/23/2015    celestone   • INJECTION OF MEDICATION  05/04/2016    KENALOG(6)   • LUMBAR FUSION      discectomy fusion with rods L4-S1   • LUMBAR LAMINECTOMY  1995   • MANDIBLE FRACTURE SURGERY      JAW WIRED SHUT   • MOUTH SURGERY  10/05/2015    Fractured mandible. Removal of arch bars.   • MOUTH SURGERY  1985    Fractured mandible. Removal of arch bars.   • NASAL SEPTUM SURGERY     • RHINOPLASTY     • TUBAL ABDOMINAL LIGATION  1986     General Information     Row Name 03/15/20 0905          PT Evaluation Time/Intention    Document Type  evaluation  -CZ     Mode of Treatment   "co-treatment;physical therapy;occupational therapy  -CZ     Row Name 03/15/20 0905          General Information    Patient Profile Reviewed?  yes  -CZ     Prior Level of Function  independent:;all household mobility;community mobility  -CZ     Existing Precautions/Restrictions  fall  -CZ     Row Name 03/15/20 0905          Relationship/Environment    Lives With  alone  -CZ     Row Name 03/15/20 0905          Resource/Environmental Concerns    Current Living Arrangements  home/apartment/condo  -CZ     Row Name 03/15/20 0905          Home Main Entrance    Number of Stairs, Main Entrance  eight  -CZ     Stair Railings, Main Entrance  railing on right side (ascending)  -CZ     Row Name 03/15/20 0905          Stairs Within Home, Primary    Stairs, Within Home, Primary  Has second floor, need not use.   -CZ     Number of Stairs, Within Home, Primary  ten  -CZ     Stair Railings, Within Home, Primary  railing on right side (ascending)  -CZ     Stairs Comment, Within Home, Primary  Has walk-in shower with shower seat and grab bar, hand held shower head, standard toilet height, no grab bars; grand daughter supervises bathing, cooking. Hx of falls (\"I get off balalnce.\"). Uses SPC, also has 4WW.   -CZ     Row Name 03/15/20 0905          Cognitive Assessment/Intervention- PT/OT    Orientation Status (Cognition)  oriented x 4  -CZ       User Key  (r) = Recorded By, (t) = Taken By, (c) = Cosigned By    Initials Name Provider Type    CZ Joshua Esquivel, PT Physical Therapist        Mobility     Row Name 03/15/20 0905          Bed Mobility Assessment/Treatment    Bed Mobility Assessment/Treatment  supine-sit-supine  -CZ     [place] Level (Bed Mobility)  conditional independence  -CZ     Supine-Sit-Supine [place] (Bed Mobility)  conditional independence  -CZ     Assistive Device (Bed Mobility)  bed rails;head of bed elevated  -CZ     Row Name 03/15/20 0905          Bed-Chair Transfer    Bed-Chair [place] (Transfers) "  conditional independence  -CZ     Assistive Device (Bed-Chair Transfers)  walker, front-wheeled  -CZ     Row Name 03/15/20 0905          Gait/Stairs Assessment/Training    [place] Level (Gait)  contact guard  -CZ     Assistive Device (Gait)  walker, front-wheeled  -CZ     Distance in Feet (Gait)  125'x1.   -CZ     Comment (Gait/Stairs)  Slow ghada, cues for proper use of walker.   -CZ       User Key  (r) = Recorded By, (t) = Taken By, (c) = Cosigned By    Initials Name Provider Type    CZ Joshua Esquivel, PT Physical Therapist        Obj/Interventions     Row Name 03/15/20 0905          General ROM    GENERAL ROM COMMENTS  BLEs WFL  -CZ     Row Name 03/15/20 0905          MMT (Manual Muscle Testing)    General MMT Comments  BLEs: 3+/5 grossly.   -CZ     Row Name 03/15/20 0905          Light Touch Sensation Assessment    Left Lower Extremity: Light Touch Sensation Assessment  moderate impairment, 50 to 74% correct responses  -CZ     Right Lower Extremity: Light Touch Sensation Assessment  mild impairment, 75% or more correct responses  -CZ       User Key  (r) = Recorded By, (t) = Taken By, (c) = Cosigned By    Initials Name Provider Type    CZ Joshua Esquivel, PT Physical Therapist        Goals/Plan     Row Name 03/15/20 0905          Gait Training Goal 1 (PT)    Activity/Assistive Device (Gait Training Goal 1, PT)  walker, rolling  -CZ     [place] Level (Gait Training Goal 1, PT)  conditional independence  -CZ     Distance (Gait Goal 1, PT)  150'x2.   -CZ     Time Frame (Gait Training Goal 1, PT)  by discharge;long term goal (LTG)  -CZ     Progress/Outcome (Gait Training Goal 1, PT)  goal not met  -CZ     Row Name 03/15/20 0905          ROM Goal 1 (PT)    ROM Goal 1 (PT)  Tinetti fall risk, score: 24/28.   -CZ     Time Frame (ROM Goal 1, PT)  long term goal (LTG);by discharge  -CZ     Barriers (ROM Goal 1, PT)  Poor stepping response to perturbation.   -CZ     Progress/Outcome (ROM Goal 1, PT)  goal  not met  -CZ     Row Name 03/15/20 0905          Stairs Goal 1 (PT)    Activity/Assistive Device (Stairs Goal 1, PT)  using handrail, right  -CZ     [place] Level/Cues Needed (Stairs Goal 1, PT)  conditional independence  -CZ     Number of Stairs (Stairs Goal 1, PT)  8 steps, R rail.   -CZ     Time Frame (Stairs Goal 1, PT)  long term goal (LTG);by discharge  -CZ     Progress/Outcome (Stairs Goal 1, PT)  goal not met  -CZ       User Key  (r) = Recorded By, (t) = Taken By, (c) = Cosigned By    Initials Name Provider Type    CZ Joshua Esquivel, PT Physical Therapist        Clinical Impression     Row Name 03/15/20 0905          Pain Assessment    Additional Documentation  Pain Scale: Numbers Pre/Post-Treatment (Group)  -CZ     Row Name 03/15/20 0905          Pain Scale: Numbers Pre/Post-Treatment    Pain Scale: Numbers, Pretreatment  1/10  -CZ     Pain Scale: Numbers, Post-Treatment  1/10  -CZ     Pain Location - Orientation  lower  -CZ     Pain Location  abdomen  -CZ     Row Name 03/15/20 0905          Plan of Care Review    Plan of Care Reviewed With  patient  -CZ     Row Name 03/15/20 0905          Physical Therapy Clinical Impression    Criteria for Skilled Interventions Met (PT Clinical Impression)  treatment indicated  -CZ     Rehab Potential (PT Clinical Summary)  good, to achieve stated therapy goals  -CZ     Predicted Duration of Therapy (PT)  1-3 days.   -CZ     Row Name 03/15/20 0905          Vital Signs    Pretreatment Heart Rate (beats/min)  123 115-133  -CZ     Intratreatment Heart Rate (beats/min)  107  -CZ     Posttreatment Heart Rate (beats/min)  132  -CZ     Pre SpO2 (%)  97  -CZ     O2 Delivery Pre Treatment  room air  -CZ     Intra SpO2 (%)  96  -CZ     O2 Delivery Intra Treatment  room air  -CZ     Post SpO2 (%)  96  -CZ     O2 Delivery Post Treatment  room air  -CZ     Pre Patient Position  Sitting  -CZ     Intra Patient Position  Sitting  -CZ     Post Patient Position  Sitting  -     Row  "Name 03/15/20 0905          Positioning and Restraints    Pre-Treatment Position  in bed  -CZ     Post Treatment Position  bed  -CZ     In Bed  supine;call light within reach;encouraged to call for assist;exit alarm on;side rails up x2  -CZ       User Key  (r) = Recorded By, (t) = Taken By, (c) = Cosigned By    Initials Name Provider Type    Joshua Nicole, PT Physical Therapist        Outcome Measures     Row Name 03/15/20 0905          Tinetti Assessment    Tinetti Assessment  yes  -CZ     Sitting Balance  1  -CZ     Arises  2  -CZ     Attempts to Rise  2  -CZ     Immediate Standing Balance (first 5 sec)  2  -CZ     Standing Balance  1  -CZ     Sternal Nudge (feet close together)  0  -CZ     Eyes Closed (feet close together)  0  -CZ     Turning 360 Degrees- Steps  1  -CZ     Turning 360 Degrees- Steadiness  1  -CZ     Sitting Down  2  -CZ     Tinetti Balance Score  12  -CZ     Gait Initiation (immediate after told \"go\")  1  -CZ     Step Length- Right Swing  1  -CZ     Step Length- Left Swing  1  -CZ     Foot Clearance- Right Foot  1  -CZ     Foot Clearance- Left Foot  1  -CZ     Step Symmetry  1  -CZ     Step Continuity  1  -CZ     Path (excursion)  1  -CZ     Trunk  0  -CZ     Base of Support  1  -CZ     Gait Score  9  -CZ     Tinetti Total Score  21  -CZ     Tinetti Assistive Device  rolling walker  -CZ     Tinetti Assessment Comments  Decreased stepping response to perturbation.   -CZ     Row Name 03/15/20 0905          Functional Assessment    Outcome Measure Options  Tinetti  -CZ       User Key  (r) = Recorded By, (t) = Taken By, (c) = Cosigned By    Initials Name Provider Type    Joshua Nicole, PT Physical Therapist          PT Recommendation and Plan     Outcome Summary/Treatment Plan (PT)  Anticipated Discharge Disposition (PT): home with OP services  Plan of Care Reviewed With: patient  Outcome Summary: Initial PT evaluation complete; co-evaluation with OT.  Patient is alert and cooperative.  " She demonstrates (I) with bed mobility and transfers, requires CGA with gait, ambulating 125'x1 with FWW, cues for proper use of walker. Resting HR elevated, nurse notified. Tinetti assessed: 21/28 or moderate fall risk. Recommend continued use of FWW during gait.  Also recommend O/P PT upon discharge home, to continue lowering fall risk.  Goals established, continue skilled PT.     Time Calculation:   PT Charges     Row Name 03/15/20 1359             Time Calculation    Start Time  0905  -CZ      Stop Time  0948  -CZ      Time Calculation (min)  43 min  -CZ      PT Received On  03/15/20  -CZ      PT Goal Re-Cert Due Date  03/28/20  -CZ        User Key  (r) = Recorded By, (t) = Taken By, (c) = Cosigned By    Initials Name Provider Type    CZ Joshua Esquivel, PT Physical Therapist        Therapy Charges for Today     Code Description Service Date Service Provider Modifiers Qty    77155355439 HC PT EVAL MOD COMPLEXITY 3 3/15/2020 Joshua Esquivel, PT GP 1          PT G-Codes  Outcome Measure Options: Tinetti  AM-PAC 6 Clicks Score (OT): 20  Tinetti Total Score: 21    Joshua Esquivel PT  3/15/2020

## 2020-03-15 NOTE — PLAN OF CARE
Problem: Patient Care Overview  Goal: Plan of Care Review  Outcome: Ongoing (interventions implemented as appropriate)  Flowsheets (Taken 3/15/2020 7357)  Plan of Care Reviewed With: patient  Outcome Summary: Initial PT evaluation complete; co-evaluation with OT.  Patient is alert and cooperative.  She demonstrates (I) with bed mobility and transfers, requires CGA with gait, ambulating 125'x1 with FWW, cues for proper use of walker. Resting HR elevated, nurse notified. Tinetti assessed: 21/28 or moderate fall risk. Recommend continued use of FWW during gait.  Also recommend O/P PT upon discharge home, to continue lowering fall risk.  Goals established, continue skilled PT.

## 2020-03-15 NOTE — PLAN OF CARE
Problem: Patient Care Overview  Goal: Plan of Care Review  Outcome: Ongoing (interventions implemented as appropriate)  Flowsheets (Taken 3/15/2020 1142)  Plan of Care Reviewed With: patient  Outcome Summary: OT eval completed as co-eval with PT. Pt pleasant and agreeable to therapy. Bed Mobility: Conditional Independent Transfers: Independent Funct Mob: CGA with use of RW for in hallway mobility LBD: Supervision for don/doff socks Grooming: Supervision for oral hygiene, combing hair, and washing hands while standing at sink. Toileting: CGA/Min A for kee care. Pt demos functional deficits d/t decreased strength, increased SOB with increased activity, decreased balance, decreased activity tolerance, and decreased safety awareness. Pt would benefit from continued skilled OT to address functional deficits. Recommend d/c home with HHOT.

## 2020-03-15 NOTE — H&P
HISTORY AND PHYSICAL  NAME: Lana Mata  : 1955  MRN: [id number]    DATE OF ADMISSION:  3/14/2020     DATE & TIME SEEN: 03/15/20 at 1245    PCP: Allie Martin MD    CODE STATUS: Full code    CHIEF COMPLAINT:  Abdominal pain    HPI:  Lana Mata is a 65 y.o. female with a CMH of HTN, paroxysmal afib, MINO/MDD who presents complaining of 4 days of abdominal pain.  Patient was seen in the emergency room [facility] on 3/10/2020 for left lower quadrant pain at which time she underwent a CAT scan which showed epiploic appendagitis.  She was prescribed Augmentin at that time and sent home.  She was told to return to the emergency room if she developed fever or pain worsened.  Earlier today she developed fever to 100.8.  She also reports over the last several days she has had diarrhea and multiple episodes of loose stool daily for which she has been taking Imodium with some relief of diarrhea.  She also reports nausea but denies any vomiting.  Pain has not gotten significantly worse.     CONCURRENT MEDICAL HISTORY:  Past Medical History:   Diagnosis Date   • Abnormal gait 2012   • Abrasion 2015   • Acute ankle pain 2016   • Acute bronchitis 2015   • Allergic rhinitis 2012   • Asthenia 2014   • Atrial fibrillation (CMS/HCC) 2016    - converted    • Attempted suicide (CMS/[facility])    • Benign essential hypertension 2016   • Cardiovascular stress test abnormal 2016   • Cellulitis of knee 2015   • Chest pain 2015   • Chronic pain disorder    • Common cold 2014   • Congestive heart failure (CMS/HCC) 2016   • Depressive disorder 2015    suspect more complicated psych issues      • Fibrocystic breast    • Gastroesophageal reflux disease 2013   • Generalized anxiety disorder 2016   • H/O echocardiogram     Normal LV function with Ef of 60-65%.Mildly dilated right ventricle with normal  function. Grade 1B diastolic dysfunction with mild CLVH.NO sig. valvular regurg. or stenosis.   • Headache 10/27/2014   • Hyperlipidemia 03/04/2016   • Low back pain 06/30/2016    Need for prophylactic vaccination against Streptococcus pneumoniae [pneumococcus]    01/23/2015    • Neck pain 06/30/2016   • Obstructive sleep apnea of adult 07/30/2015   • Obstructive sleep apnea syndrome    • Osteoarthritis    • Osteoporosis 06/30/2016   • Pain in right shoulder 06/30/2016   • Pain in right shoulder 04/24/2014   • Shortness of breath 04/02/2015   • Skin ulcer (CMS/HCC) 04/13/2015    left   • Thyrotoxicosis with or without goiter 10/24/2014   • Urinary incontinence 05/22/2014       PAST SURGICAL HISTORY:  Past Surgical History:   Procedure Laterality Date   • BACK SURGERY  08/07/2015    Removal of intstrumentation,L5-S1.Exploration of fusion,L5-S1.Posterolateral fusion,L4-L5.Posterior segmental spinal instrumentation L4-5.Transforaminal interbody fusion Through right sided approach.Performed at    • COLONOSCOPY  05/04/2016    will order cologuard   • INJECTION OF MEDICATION  03/23/2015    celestone   • INJECTION OF MEDICATION  05/04/2016    KENALOG(6)   • LUMBAR FUSION      discectomy fusion with rods L4-S1   • LUMBAR LAMINECTOMY  1995   • MANDIBLE FRACTURE SURGERY      JAW WIRED SHUT   • MOUTH SURGERY  10/05/2015    Fractured mandible. Removal of arch bars.   • MOUTH SURGERY  1985    Fractured mandible. Removal of arch bars.   • NASAL SEPTUM SURGERY     • RHINOPLASTY     • TUBAL ABDOMINAL LIGATION  1986       FAMILY HISTORY:  Family History   Problem Relation Age of Onset   • Hypertension Mother    • Hyperlipidemia Mother    • Mental illness Mother    • Thyroid disease Mother    • Diabetes Maternal Grandmother    • Cancer Maternal Grandmother    • Depression Other    • Diabetes Other    • Heart disease Other    • Hypertension Other    • Osteoporosis Other    • Thyroid disease Other    • Cancer Maternal Aunt    • Cancer  Paternal Aunt    • Breast cancer Paternal Aunt         SOCIAL HISTORY:  Social History     Socioeconomic History   • Marital status:      Spouse name: Not on file   • Number of children: Not on file   • Years of education: Not on file   • Highest education level: Not on file   Social Needs   • Financial resource strain: Not hard at all   • Food insecurity:     Worry: Never true     Inability: Never true   • Transportation needs:     Medical: No     Non-medical: No   Tobacco Use   • Smoking status: Never Smoker   • Smokeless tobacco: Never Used   • Tobacco comment: smoking cessation    Substance and Sexual Activity   • Alcohol use: No   • Drug use: No     Comment: lortab / oxycontin   • Sexual activity: Defer       HOME MEDICATIONS:  Prior to Admission medications    Medication Sig Start Date End Date Taking? Authorizing Provider   alendronate (FOSAMAX) 70 MG tablet Take 1 tablet by mouth Every 7 (Seven) Days. 11/14/19   Allie Martin MD   aMILoride (MIDAMOR) 5 MG tablet Take 1 tablet by mouth Daily. 5/27/18   Nette Mejia MD   ARIPiprazole (ABILIFY) 5 MG tablet Take 5 mg by mouth Daily. At bedtime    Provider, MD Gurdeep   aspirin 325 MG tablet Take 325 mg by mouth Daily. At bedtime    Provider, MD Gurdeep   atorvastatin (LIPITOR) 20 MG tablet Take 1 tablet by mouth Daily. 12/3/19   Kristyn Jurado MD   Calcium Carb-Cholecalciferol (CALCIUM-VITAMIN D) 500-200 MG-UNIT per tablet Take 1 tablet by mouth 2 (Two) Times a Day With Meals. 11/14/19   Allie Martin MD   choline fenofibrate (TRILIPIX) 135 MG capsule Take 1 capsule by mouth Daily. 4/15/19   Kristyn Jurado MD   dilTIAZem XR (DILT-XR) 180 MG 24 hr capsule Take 1 capsule by mouth Daily. 1/15/20   Kristyn Jurado MD   ferrous sulfate 325 (65 FE) MG tablet Take 1 tablet by mouth Daily With Breakfast. Take with vitamin C to help with absorption 11/12/18   Patel Clemente MD   ibuprofen (ADVIL,MOTRIN) 800 MG tablet  Take 1 tablet by mouth Every 6 (Six) Hours As Needed for Mild Pain . 3/11/20   Allie Martin MD   lisinopril (PRINIVIL,ZESTRIL) 20 MG tablet Take 20 mg by mouth Daily.    Gurdeep Castaneda MD   LORazepam (ATIVAN) 1 MG tablet Take 1 mg by mouth 2 (Two) Times a Day. Scheduled to take in the morning and one with dinner 3/22/18   Gurdeep Castaneda MD   methIMAzole (TAPAZOLE) 5 MG tablet Take 2.5 mg by mouth Daily.    Gurdeep Castaneda MD   methocarbamol (ROBAXIN) 500 MG tablet Take 1 tablet by mouth 4 (Four) Times a Day As Needed for Muscle Spasms. 3/11/20   Allie Martin MD   metoprolol tartrate (LOPRESSOR) 25 MG tablet TAKE 1 TABLET EVERY 12 HOURS 12/2/19   Nette Mejia MD   nitroglycerin (NITROSTAT) 0.4 MG SL tablet Place 1 tablet under the tongue See Admin Instructions. PRN cp, if not relieved in 5 min repeat and go to ER 12/15/16   Camron Hancock MD   omeprazole (priLOSEC) 20 MG capsule Take 1 capsule by mouth Daily. 11/19/19   Allie Martin MD   potassium chloride (K-DUR) 10 MEQ CR tablet Take 1 tablet by mouth 2 (Two) Times a Day With Meals. 3/13/19   Greyson Ruiz MD   rivaroxaban (XARELTO) 20 MG tablet Take 1 tablet by mouth Daily. 10/21/19   Kristyn Jurado MD   sertraline (ZOLOFT) 100 MG tablet Take 150 mg by mouth Daily. 3/22/18   Gurdeep Castaneda MD   amoxicillin-clavulanate (AUGMENTIN) 875-125 MG per tablet Take 1 tablet by mouth 2 (Two) Times a Day for 10 days. 3/11/20 3/15/20  Saad Hooker MD       ALLERGIES:  Codeine; Latex; Morphine and related; and Pravachol [pravastatin sodium]    REVIEW OF SYSTEMS  Review of Systems   Constitutional: Positive for chills and fever. Negative for activity change, appetite change and fatigue.   HENT: Negative for hearing loss, sneezing, sore throat and trouble swallowing.    Eyes: Negative for visual disturbance.   Respiratory: Negative for cough, chest tightness and shortness of breath.     Cardiovascular: Negative for chest pain, palpitations and leg swelling.   Gastrointestinal: Positive for abdominal pain, diarrhea and nausea. Negative for blood in stool, constipation and vomiting.   Genitourinary: Negative for difficulty urinating.   Musculoskeletal: Positive for back pain.   Skin: Negative for rash.   Allergic/Immunologic: Negative for environmental allergies and food allergies.   Neurological: Negative for dizziness, light-headedness, numbness and headaches.   Psychiatric/Behavioral: Negative for agitation, confusion, hallucinations and suicidal ideas.       PHYSICAL EXAM:  Temp:  [100.1 °F (37.8 °C)] 100.1 °F (37.8 °C)  Heart Rate:  [84-87] 84  Resp:  [20] 20  BP: (115-126)/(61-73) 126/61  Body mass index is 36.78 kg/m².  Physical Exam   Constitutional: She is oriented to person, place, and time. She appears well-developed and well-nourished. No distress.   HENT:   Head: Normocephalic and atraumatic.   Right Ear: External ear normal.   Left Ear: External ear normal.   Neck: Normal range of motion. Neck supple.   Cardiovascular: Normal rate, regular rhythm and normal heart sounds.   Pulmonary/Chest: Effort normal and breath sounds normal. No respiratory distress.   Abdominal: Soft. Bowel sounds are normal. She exhibits no distension. There is tenderness (Generalized  tenderness to deep palpation, worst in periumbilical and left lower quadrant).   Musculoskeletal: Normal range of motion. She exhibits no edema.   Neurological: She is alert and oriented to person, place, and time. She has normal reflexes.   Skin: Skin is warm and dry. She is not diaphoretic. No erythema.   Psychiatric: She has a normal mood and affect. Her behavior is normal.       DIAGNOSTIC DATA:   Lab Results (last 24 hours)     Procedure Component Value Units Date/Time    [place] Draw [[id number]] Collected:  03/14/20 2230    Specimen:  Blood Updated:  03/14/20 2330    Narrative:       The following orders were created for  panel order [place] Draw.  Procedure                               Abnormality         Status                     ---------                               -----------         ------                     Lavender Top[[id number]]                                     Final result               Gold Top - SST[[id number]]                                                                Please view results for these tests on the individual orders.    Lavender Top [[id number]] Collected:  03/14/20 2230    Specimen:  Blood Updated:  03/14/20 2330     Extra Tube hold for add-on     Comment: Auto resulted       Comprehensive Metabolic Panel [[id number]]  (Abnormal) Collected:  03/14/20 2159    Specimen:  Blood Updated:  03/14/20 2248     Glucose 113 mg/dL      BUN 14 mg/dL      Creatinine 1.12 mg/dL      Sodium 139 mmol/L      Potassium 4.2 mmol/L      Chloride 102 mmol/L      CO2 23.0 mmol/L      Calcium 9.2 mg/dL      Total Protein 7.2 g/dL      Albumin 3.90 g/dL      ALT (SGPT) 15 U/L      AST (SGOT) 16 U/L      Alkaline Phosphatase 58 U/L      Total Bilirubin 0.4 mg/dL      eGFR Non African Amer 49 mL/min/1.73      Globulin 3.3 gm/dL      A/G Ratio 1.2 g/dL      BUN/Creatinine Ratio 12.5     Anion Gap 14.0 mmol/L     Narrative:       GFR Normal >60  Chronic Kidney Disease <60  Kidney Failure <15      Urinalysis With Culture If Indicated - Urine, Clean Catch [[id number]]  (Abnormal) Collected:  03/14/20 2204    Specimen:  Urine, Clean Catch Updated:  03/14/20 2226     Color, UA Dark Yellow     Appearance, UA Cloudy     pH, UA 5.5     Specific Gravity, UA 1.034     Comment: Result obtained by Refractometer        Glucose, UA Negative     Ketones, UA Trace     Bilirubin, UA Negative     Blood, UA Negative     Protein, UA Negative     Leuk Esterase, UA Negative     Nitrite, UA Negative     Urobilinogen, UA 0.2 E.U./dL    Narrative:       Urine microscopic not indicated.    [place] Draw [[id number]] Collected:  03/14/20 2113    Specimen:   Blood Updated:  03/14/20 2215    Narrative:       The following orders were created for panel order [place] Draw.  Procedure                               Abnormality         Status                     ---------                               -----------         ------                     Light Blue Top[[id number]]                                   Final result               Green Top (Gel)[[id number]]                                  Final result               Lavender Top[[id number]]                                     Final result               Gold Top - SST[[id number]]                                   Final result                 Please view results for these tests on the individual orders.    Light Blue Top [[id number]] Collected:  03/14/20 2113    Specimen:  Blood Updated:  03/14/20 2215     Extra Tube hold for add-on     Comment: Auto resulted       Green Top (Gel) [[id number]] Collected:  03/14/20 2113    Specimen:  Blood Updated:  03/14/20 2215     Extra Tube Hold for add-ons.     Comment: Auto resulted.       Lavender Top [[id number]] Collected:  03/14/20 2113    Specimen:  Blood Updated:  03/14/20 2215     Extra Tube hold for add-on     Comment: Auto resulted       Gold Top - SST [[id number]] Collected:  03/14/20 2113    Specimen:  Blood Updated:  03/14/20 2215     Extra Tube Hold for add-ons.     Comment: Auto resulted.       Blood Culture - Blood, Hand, Left [[id number]] Collected:  03/14/20 2159    Specimen:  Blood from Hand, Left Updated:  03/14/20 2215    Lipase [[id number]]  (Normal) Collected:  03/14/20 2113    Specimen:  Blood Updated:  03/14/20 2142     Lipase 56 U/L     Lactic Acid, Plasma [[id number]]  (Normal) Collected:  03/14/20 2113    Specimen:  Blood Updated:  03/14/20 2138     Lactate 1.1 mmol/L     CBC & Differential [[id number]] Collected:  03/14/20 2113    Specimen:  Blood Updated:  03/14/20 2123    Narrative:       The following orders were created for panel order CBC &  Differential.  Procedure                               Abnormality         Status                     ---------                               -----------         ------                     CBC Auto Differential[[id number]]        Abnormal            Final result                 Please view results for these tests on the individual orders.    CBC Auto Differential [[id number]]  (Abnormal) Collected:  03/14/20 2113    Specimen:  Blood Updated:  03/14/20 2123     WBC 14.60 10*3/mm3      RBC 4.65 10*6/mm3      Hemoglobin 13.8 g/dL      Hematocrit 40.6 %      MCV 87.3 fL      MCH 29.7 pg      MCHC 34.0 g/dL      RDW 13.0 %      RDW-SD 41.0 fl      MPV 9.9 fL      Platelets 249 10*3/mm3      Neutrophil % 71.2 %      Lymphocyte % 16.5 %      Monocyte % 7.9 %      Eosinophil % 3.5 %      Basophil % 0.4 %      Immature Grans % 0.5 %      Neutrophils, Absolute 10.40 10*3/mm3      Lymphocytes, Absolute 2.41 10*3/mm3      Monocytes, Absolute 1.15 10*3/mm3      Eosinophils, Absolute 0.51 10*3/mm3      Basophils, Absolute 0.06 10*3/mm3      Immature Grans, Absolute 0.07 10*3/mm3      nRBC 0.0 /100 WBC     Blood Culture - Blood, Arm, Right [[id number]] Collected:  03/14/20 2113    Specimen:  Blood from Arm, Right Updated:  03/14/20 2121           Imaging Results (Last 24 Hours)     Procedure Component Value Units Date/Time    CT Abdomen Pelvis With Contrast [[id number]] Collected:  03/14/20 2327     Updated:  03/15/20 0007    Narrative:       CT abdomen and pelvis with contrast on  3/14/2020     CLINICAL INDICATION: Lower abdominal pain, fever    TECHNIQUE: Multiple axial images are obtained throughout the  abdomen and pelvis following the administration of IV contrast,  90 mL of Isovue-300 contrast was administered intravenously  without complication. This exam was performed according to our  departmental dose-optimization program, which includes automated  exposure control, adjustment of the mA and/or kV according to  patient size  and/or use of iterative reconstruction technique.   Total DLP is 713.1 mGy*cm.    COMPARISON: 3/11/2020    FINDINGS:  Abdomen: There is evidence of calcified granulomatous disease in  the lung bases. The lung bases are otherwise clear. The solid  abdominal organs are unremarkable. There is no abdominal  adenopathy. There is no free fluid or free air within the  abdomen. The abdominal portion of the GI tract is unremarkable.    Pelvis: There is some greater fat stranding around tubular fat  density off the distal descending colon in the left lower  quadrant again consistent with changes of epiploic appendagitis.  This is seen well on coronal image 30. Again this would account  for the patient's pain. No free fluid is noted in the pelvis.  Pelvic organs appear unremarkable by CT. There is no pelvic  adenopathy. Pelvic portion of the GI tract including the appendix  is otherwise unremarkable. There is a small to moderate-sized  left inguinal hernia containing only fat. Degenerative and  postsurgical changes are noted in the spine.      Impression:       1. Continued evidence of epiploic appendagitis in the left lower  quadrant which would account for the patient's pain.  2. Otherwise no acute abnormality.    Electronically signed by:  Nguyễn Anderson  3/15/2020 12:06 AM  CDT Workstation: [id number]            I reviewed the patient's new clinical results.    ASSESSMENT AND PLAN: This is a 65 y.o. female with:    Active Hospital Problems    Diagnosis POA   • **Epiploic appendagitis [K52.9] Yes     -as evidenced by CT scan on 3/10/20 and 3/14/20; no significant change from CT scan on 3/10, no evidence of free air  -normal lactic acid  -WBC 14 on admission, trend  -started on zosyn in ER, will continue  -given overall benign exam and grossly unremarkable labs, do not feel general surgery consult would be warranted at this time. Will consider gen surgery consult if exam changes significantly or if IV antibiotics fail to  improve pain  -IVFs     • Paroxysmal atrial fibrillation (CMS/HCC) [I48.0] Yes     -NSR on admission  -continue cardizem, lopressor, xarelto     • Benign essential hypertension [I10] Yes     -continue amiloride, cardizem, lisinopril, lopressor     • Generalized anxiety disorder [F41.1] Yes     -continue abilify, ativan, zoloft     • Depressive disorder [F32.9] Yes     -continue abilify, zoloft     • Gastroesophageal reflux disease [K21.9] Yes     -continue PPI         DVT prophylaxis: NOAC     Lana Mata and I have discussed pain goals for this hospitalization after reviewing her current clinical condition, medical history and prior pain experiences.  The goal is to keep the pain level less than 7.  To help achieve this, I plan to provide Dilaudid 0.5 mg every 4 hours as needed.    KAYLEIGH # [id number], reviewed and consistent with patient reported medications.    Expected Length of Stay: Anticipate discharge to home in 1 to 3 days    I discussed the patients findings and my recommendations with patient.     Rusty Urrutia MD is the attending on record at time of admission, He is aware of the patient's status and agrees with the above history and physical.        Angeline Bryson MD PGY3  [facility]  This document has been electronically signed by Angeline Bryson MD on March 15, 2020 01:25     EMR Dragon/Transcription disclaimer:   Much of this encounter note is an electronic transcription/translation of spoken language to printed text. The electronic translation of spoken language may permit erroneous, or at times, nonsensical words or phrases to be inadvertently transcribed; Although I have reviewed the note for such errors, some may still exist.

## 2020-03-15 NOTE — THERAPY EVALUATION
Acute Care - Occupational Therapy Initial Evaluation  [facility]     Patient Name: Lana Mata  : 1955  MRN: [id number]  Today's Date: 3/15/2020  Onset of Illness/Injury or Date of Surgery: 20  Date of Referral to OT: 20       Admit Date: 3/14/2020       ICD-10-CM ICD-9-CM   1. Epiploic appendagitis K52.9 558.9   2. Impaired mobility and activities of daily living Z74.09 799.89     Patient Active Problem List   Diagnosis   • Thyrotoxicosis with or without goiter   • Osteoporosis   • Pain in right shoulder   • Low back pain   • Hyperlipidemia   • Generalized anxiety disorder   • Depressive disorder   • Benign essential hypertension   • Asthenia   • Abnormal gait   • Obstructive sleep apnea of adult   • DDD (degenerative disc disease), lumbar   • Lumbar radiculopathy   • History of lumbar surgery   • Lumbar spondylolysis   • Long term (current) use of opiate analgesic   • Paroxysmal atrial fibrillation (CMS/HCC)   • Urinary incontinence   • Skin ulcer (CMS/HCC)   • Shortness of breath   • Shortness of breath   • Osteoarthritis   • Obstructive sleep apnea syndrome   • Neck pain   • Headache   • H/O echocardiogram   • Gastroesophageal reflux disease   • Common cold   • Chronic pain disorder   • Chest pain   • Cellulitis of knee   • Cardiovascular stress test abnormal   • Attempted suicide (CMS/HCC)   • Allergic rhinitis   • Acute bronchitis   • Acute ankle pain   • Abrasion   • Epiploic appendagitis     Past Medical History:   Diagnosis Date   • Abnormal gait 2012   • Abrasion 2015   • Acute ankle pain 2016   • Acute bronchitis 2015   • Allergic rhinitis 2012   • Asthenia 2014   • Atrial fibrillation (CMS/HCC) 2016    - converted    • Attempted suicide (CMS/HCC)    • Benign essential hypertension 2016   • Cardiovascular stress test abnormal 2016   • Cellulitis of knee 2015   • Chest pain 2015   • Chronic pain disorder    •  Common cold 06/24/2014   • Congestive heart failure (CMS/HCC) 02/04/2016   • Depressive disorder 05/22/2015    suspect more complicated psych issues      • Fibrocystic breast    • Gastroesophageal reflux disease 01/17/2013   • Generalized anxiety disorder 02/04/2016   • H/O echocardiogram     Normal LV function with Ef of 60-65%.Mildly dilated right ventricle with normal function. Grade 1B diastolic dysfunction with mild CLVH.NO sig. valvular regurg. or stenosis.   • Headache 10/27/2014   • Hyperlipidemia 03/04/2016   • Low back pain 06/30/2016    Need for prophylactic vaccination against Streptococcus pneumoniae [pneumococcus]    01/23/2015    • Neck pain 06/30/2016   • Obstructive sleep apnea of adult 07/30/2015   • Obstructive sleep apnea syndrome    • Osteoarthritis    • Osteoporosis 06/30/2016   • Pain in right shoulder 06/30/2016   • Pain in right shoulder 04/24/2014   • Shortness of breath 04/02/2015   • Skin ulcer (CMS/[facility]) 04/13/2015    left   • Thyrotoxicosis with or without goiter 10/24/2014   • Urinary incontinence 05/22/2014     Past Surgical History:   Procedure Laterality Date   • BACK SURGERY  08/07/2015    Removal of intstrumentation,L5-S1.Exploration of fusion,L5-S1.Posterolateral fusion,L4-L5.Posterior segmental spinal instrumentation L4-5.Transforaminal interbody fusion Through right sided approach.Performed at    • COLONOSCOPY  05/04/2016    will order cologuard   • INJECTION OF MEDICATION  03/23/2015    celestone   • INJECTION OF MEDICATION  05/04/2016    KENALOG(6)   • LUMBAR FUSION      discectomy fusion with rods L4-S1   • LUMBAR LAMINECTOMY  1995   • MANDIBLE FRACTURE SURGERY      JAW WIRED SHUT   • MOUTH SURGERY  10/05/2015    Fractured mandible. Removal of arch bars.   • MOUTH SURGERY  1985    Fractured mandible. Removal of arch bars.   • NASAL SEPTUM SURGERY     • RHINOPLASTY     • TUBAL ABDOMINAL LIGATION  1986          OT ASSESSMENT FLOWSHEET (last 12 hours)      Occupational Therapy  Evaluation     Row Name 03/15/20 0906                   OT Evaluation Time/Intention    Subjective Information  no complaints  -        Document Type  evaluation  -        Mode of Treatment  co-treatment;occupational therapy;physical therapy  -        Patient Effort  good  -           General Information    Patient Profile Reviewed?  yes  -        Onset of Illness/Injury or Date of Surgery  03/14/20  [facility]        Patient Observations  alert;cooperative;agree to therapy  -        Patient/Family Observations  None present  -        General Observations of Patient  Standing at sink  -        Prior Level of Function  independent:;all household mobility;community mobility;gait;transfer;ADL's Supervision for bathing from [facility], help with IADL's  -        Equipment Issued to Patient  gait belt  -        Risks Reviewed  patient:;LOB;nausea/vomiting;dizziness;increased discomfort;change in vital signs;increased drainage;lines disloged  -        Benefits Reviewed  patient:;improve function;increase independence;increase strength;increase balance;decrease pain;decrease risk of DVT;improve skin integrity;increase knowledge  -           Relationship/Environment    Primary Source of Support/Comfort  child(cande)  -        Lives With  alone  -        Family Caregiver if Needed  grandchild(cande), adult  -           Resource/Environmental Concerns    Current Living Arrangements  [facility]  -        Resource/Environmental Concerns  none  -           Home Main Entrance    Number of Stairs, Main Entrance  eight  -        Stair Railings, Main Entrance  railing on right side (ascending)  -           Stairs Within Home, Primary    Stairs, Within Home, Primary  Has second floor, does not use  [facility]        Number of Stairs, Within Home, Primary  ten  -        Stair Railings, Within Home, Primary  railing on right side (ascending)  -        Stairs Comment, Within Home, Primary  Has walk-in  "shower with shower seat and grab bar, hand held shower head, standard toilet height, no grab bars; grand daughter supervises bathing, cooking. Hx of falls (\"I get off balalnce.\"). Uses SPC, also has 4WW.   -           Cognitive Assessment/Interventions    Additional Documentation  Cognitive Assessment/Intervention (Group)  -           Cognitive Assessment/Intervention- PT/OT    Affect/Mental Status (Cognitive)  [facility]  -        Orientation Status (Cognition)  oriented x 4  -        Follows Commands (Cognition)  L  -        Cognitive Function (Cognitive)  [facility]  -           Safety Issues, Functional Mobility    Safety Issues Affecting Function (Mobility)  awareness of need for assistance;safety precaution awareness;safety precautions follow-through/compliance  -           Bed Mobility Assessment/Treatment    Bed Mobility Assessment/Treatment  bed mobility (all) activities  -        [place] Level (Bed Mobility)  conditional independence  -        Supine-Sit-Supine [place] (Bed Mobility)  conditional independence  -        Assistive Device (Bed Mobility)  bed rails;head of bed elevated  -           Transfer Assessment/Treatment    Transfer Assessment/Treatment  sit-stand transfer;stand-sit transfer;toilet transfer  -           Bed-Chair Transfer    Bed-Chair [place] (Transfers)  conditional independence  -        Assistive Device (Bed-Chair Transfers)  walker, front-wheeled  -           Sit-Stand Transfer    Sit-Stand [place] (Transfers)  independent  -           Stand-Sit Transfer    Stand-Sit [place] (Transfers)  independent  -           Toilet Transfer    Type (Toilet Transfer)  sit-stand;stand-sit  -        [place] Level (Toilet Transfer)  independent  -           ADL Assessment/Intervention    BADL Assessment/Intervention  toileting;grooming;lower body dressing  -           Lower Body Dressing Assessment/Training    Lower Body Dressing [place] " Level  doff;don;socks;supervision  -        Lower Body Dressing Position  edge of bed sitting  -           Grooming Assessment/Training    [place] Level (Grooming)  grooming skills;supervision;wash face, hands;oral care regimen;hair care, combing/brushing  -        Grooming Position  unsupported standing  -           Toileting Assessment/Training    [place] Level (Toileting)  toileting skills;contact guard assist  -        Toileting Position  supported sitting  -           BADL Safety/Performance    Impairments, BADL Safety/Performance  balance;endurance/activity tolerance;trunk/postural control;strength;shortness of breath;pain  -           General ROM    GENERAL ROM COMMENTS  BUE ROM WFL  -           MMT (Manual Muscle Testing)    General MMT Comments  [facility] MMT Strength 4/5 Grossly  -           Sensory Assessment/Intervention    Sensory General Assessment  no sensation deficits identified  -        Additional Documentation  Vision Assessment/Intervention (Group);Hearing Assessment (Group)  -           Hearing Assessment    Hearing Status  hearing impairment, left  -           Vision Assessment/Intervention    Visual Impairment/Limitations  corrective lenses full time  [facility]           Positioning and Restraints    Pre-Treatment Position  in bed  -        Post Treatment Position  bed  -JH        In Bed  supine;call light within reach;encouraged to call for assist;exit alarm on;side rails up x2;notified [facility]           Pain Assessment    Additional Documentation  Pain Scale: Numbers Pre/Post-Treatment (Group)  -           Pain Scale: Numbers Pre/Post-Treatment    Pain Scale: Numbers, Pretreatment  1/10  -        Pain Scale: Numbers, Post-Treatment  1/10  -        Pain Location - Orientation  lower  -        Pain Location  abdomen  -           Plan of Care Review    Plan of Care Reviewed With  patient  -           Clinical Impression (OT)    Date of Referral to OT   03/14/20  -        OT Diagnosis  Impaired Mobility and ADL's  -        Criteria for Skilled Therapeutic Interventions Met (OT Eval)  yes;treatment indicated  -        Rehab Potential (OT Eval)  good, to achieve stated therapy goals  -        Therapy Frequency (OT Eval)  other (see comments) 5-7 days per week  -        Predicted Duration of Therapy Intervention (Therapy Eval)  Until goals met or d/c  -        Care Plan Review (OT)  evaluation/treatment results reviewed;care plan/treatment goals reviewed;risks/benefits reviewed;current/potential barriers reviewed;patient/other agree to care plan  -        Anticipated Discharge Disposition (OT)  home with home health  -           Vital Signs    Pretreatment Heart Rate (beats/min)  123 115-133  -        Intratreatment Heart Rate (beats/min)  107  -        Posttreatment Heart Rate (beats/min)  132  -JH        Pre SpO2 (%)  97  -JH        O2 Delivery Pre Treatment  room air  -        Intra SpO2 (%)  96  -JH        O2 Delivery Intra Treatment  room air  -        Post SpO2 (%)  96  -        O2 Delivery Post Treatment  room air  -        Pre Patient Position  Sitting  -        Intra Patient Position  Sitting  -        Post Patient Position  Sitting  -           Planned OT Interventions    Planned Therapy Interventions (OT Eval)  activity tolerance training;adaptive equipment training;BADL retraining;transfer/mobility retraining;strengthening exercise;ROM/therapeutic exercise;patient/caregiver education/training;occupation/activity based interventions;neuromuscular control/coordination retraining;functional balance retraining  -           OT Goals    Bathing Goal Selection (OT)  bathing, OT goal 1  -        Dressing Goal Selection (OT)  dressing, OT goal 1  -        Toileting Goal Selection (OT)  toileting, OT goal 1  -        Activity Tolerance Goal Selection (OT)  activity tolerance, OT goal 1  -        Additional Documentation   Activity Tolerance Goal Selection (OT) (Row)  -           Bathing Goal 1 (OT)    Activity/Assistive Device (Bathing Goal 1, OT)  bathing skills, all  -        [place] Level/Cues Needed (Bathing Goal 1, OT)  standby assist;verbal cues required;tactile cues required;set-up required  -        Time Frame (Bathing Goal 1, OT)  long term goal (LTG);by discharge  -        Progress/Outcomes (Bathing Goal 1, OT)  goal not met  -           Dressing Goal 1 (OT)    Activity/Assistive Device (Dressing Goal 1, OT)  dressing skills, all  -        [place]/Cues Needed (Dressing Goal 1, OT)  standby assist;verbal cues required;tactile cues required;set-up required  -        Time Frame (Dressing Goal 1, OT)  long term goal (LTG);by discharge  -        Progress/Outcome (Dressing Goal 1, OT)  goal not met  -           Toileting Goal 1 (OT)    Activity/Device (Toileting Goal 1, OT)  toileting skills, all  -        [place] Level/Cues Needed (Toileting Goal 1, OT)  standby assist;verbal cues required;tactile cues required;set-up required  -        Time Frame (Toileting Goal 1, OT)  long term goal (LTG);by discharge  -        Progress/Outcome (Toileting Goal 1, OT)  goal not met  -            Activity Tolerance Goal 1 (OT)    Activity Level (Endurance Goal 1, OT)  -- 20 min functional activity with 1-2 rest breaks   -        Time Frame (Activity Tolerance Goal 1, OT)  long term goal (LTG);by discharge  -        Progress/Outcome (Activity Tolerance Goal 1, OT)  goal not met  -           Living Environment    Home Accessibility  stairs to enter home;stairs within home  -          User Key  (r) = Recorded By, (t) = Taken By, (c) = Cosigned By    Initials Name Effective Dates     Wing Acevedo, YUSUF 09/10/19 -                OT Recommendation and Plan  Outcome Summary/Treatment Plan (OT)  Anticipated Discharge Disposition (OT): home with home health  Planned Therapy Interventions (OT Eval):  activity tolerance training, adaptive equipment training, BADL retraining, transfer/mobility retraining, strengthening exercise, ROM/therapeutic exercise, patient/caregiver education/training, occupation/activity based interventions, neuromuscular control/coordination retraining, functional balance retraining  Therapy Frequency (OT Eval): other (see comments)(5-7 days per week)  Plan of Care Review  Plan of Care Reviewed With: patient  Plan of Care Reviewed With: patient  Outcome Summary: OT eval completed as co-eval with PT. Pt pleasant and agreeable to therapy. Bed Mobility: Conditional Independent Transfers: Independent Funct Mob: CGA with use of RW for in hallway mobility LBD: Supervision for don/doff socks Grooming: Supervision for oral hygiene, combing hair, and washing hands while standing at sink. Toileting: CGA/Min A for kee care. Pt demos functional deficits d/t decreased strength, increased SOB with increased activity, decreased balance, decreased activity tolerance, and decreased safety awareness. Pt would benefit from continued skilled OT to address functional deficits. Recommend d/c home with HHOT.    Outcome Measures     Row Name 03/15/20 0906 03/15/20 0900          How much help from another is currently needed...    Putting on and taking off regular lower body clothing?  3  -JH  --     Bathing (including washing, rinsing, and drying)  3  -JH  --     Toileting (which includes using toilet bed pan or urinal)  3  -JH  --     Putting on and taking off regular upper body clothing  3  -JH  --     Taking care of personal grooming (such as brushing teeth)  4  -JH  --     Eating meals  4  -JH  --     AM-PAC 6 Clicks Score (OT)  20  -JH  --        Functional Assessment    Outcome Measure Options  --  AM-PAC 6 Clicks Daily Activity (OT)  -       User Key  (r) = Recorded By, (t) = Taken By, (c) = Cosigned By    Initials Name Provider Type    Wing Fernandes OT Occupational Therapist          Time  Calculation:   Time Calculation- OT     Row Name 03/15/20 1138             Time Calculation- OT    OT Start Time  0905  -      OT Stop Time  0948  -      OT Time Calculation (min)  43 min  -      OT Received On  03/15/20  -      OT Goal Re-Cert Due Date  03/28/20  -        User Key  (r) = Recorded By, (t) = Taken By, (c) = Cosigned By    Initials Name Provider Type     Wing Acevedo OT Occupational Therapist        Therapy Charges for Today     Code Description Service Date Service Provider Modifiers Qty    [id number]  OT EVAL MOD COMPLEXITY 3 3/15/2020 Wing Acevedo OT GO 1               Wing Acevedo OT  3/15/2020

## 2020-03-15 NOTE — ED NOTES
Patient stated she was seen here on Tuesday and 2 times since then at PCP, but was told to return to the ed if she had a fever     Di Stoll RN  03/14/20 2039

## 2020-03-16 VITALS
DIASTOLIC BLOOD PRESSURE: 55 MMHG | HEIGHT: 65 IN | WEIGHT: 221.8 LBS | SYSTOLIC BLOOD PRESSURE: 114 MMHG | HEART RATE: 71 BPM | RESPIRATION RATE: 18 BRPM | BODY MASS INDEX: 36.96 KG/M2 | OXYGEN SATURATION: 95 % | TEMPERATURE: 97.5 F

## 2020-03-16 PROBLEM — K63.89 EPIPLOIC APPENDAGITIS: Status: RESOLVED | Noted: 2020-03-15 | Resolved: 2020-03-16

## 2020-03-16 LAB
ALBUMIN SERPL-MCNC: 3.5 G/DL (ref 3.5–5.2)
ALBUMIN/GLOB SERPL: 1.1 G/DL
ALP SERPL-CCNC: 55 U/L (ref 39–117)
ALT SERPL W P-5'-P-CCNC: 17 U/L (ref 1–33)
ANION GAP SERPL CALCULATED.3IONS-SCNC: 11 MMOL/L (ref 5–15)
AST SERPL-CCNC: 18 U/L (ref 1–32)
BILIRUB SERPL-MCNC: 0.7 MG/DL (ref 0.2–1.2)
BUN BLD-MCNC: 10 MG/DL (ref 8–23)
BUN/CREAT SERPL: 9.6 (ref 7–25)
CALCIUM SPEC-SCNC: 9.3 MG/DL (ref 8.6–10.5)
CHLORIDE SERPL-SCNC: 105 MMOL/L (ref 98–107)
CO2 SERPL-SCNC: 23 MMOL/L (ref 22–29)
CREAT BLD-MCNC: 1.04 MG/DL (ref 0.57–1)
GFR SERPL CREATININE-BSD FRML MDRD: 53 ML/MIN/1.73
GLOBULIN UR ELPH-MCNC: 3.1 GM/DL
GLUCOSE BLD-MCNC: 174 MG/DL (ref 65–99)
POTASSIUM BLD-SCNC: 3.6 MMOL/L (ref 3.5–5.2)
PROT SERPL-MCNC: 6.6 G/DL (ref 6–8.5)
SODIUM BLD-SCNC: 139 MMOL/L (ref 136–145)
WHOLE BLOOD HOLD SPECIMEN: NORMAL

## 2020-03-16 PROCEDURE — 97110 THERAPEUTIC EXERCISES: CPT

## 2020-03-16 PROCEDURE — 25010000002 PIPERACILLIN SOD-TAZOBACTAM PER 1 G: Performed by: STUDENT IN AN ORGANIZED HEALTH CARE EDUCATION/TRAINING PROGRAM

## 2020-03-16 PROCEDURE — G0378 HOSPITAL OBSERVATION PER HR: HCPCS

## 2020-03-16 PROCEDURE — 96366 THER/PROPH/DIAG IV INF ADDON: CPT

## 2020-03-16 PROCEDURE — 97535 SELF CARE MNGMENT TRAINING: CPT

## 2020-03-16 PROCEDURE — 99217 PR OBSERVATION CARE DISCHARGE MANAGEMENT: CPT | Performed by: STUDENT IN AN ORGANIZED HEALTH CARE EDUCATION/TRAINING PROGRAM

## 2020-03-16 PROCEDURE — 80053 COMPREHEN METABOLIC PANEL: CPT | Performed by: STUDENT IN AN ORGANIZED HEALTH CARE EDUCATION/TRAINING PROGRAM

## 2020-03-16 RX ORDER — METRONIDAZOLE 500 MG/1
500 TABLET ORAL 3 TIMES DAILY
Qty: 15 TABLET | Refills: 0 | Status: SHIPPED | OUTPATIENT
Start: 2020-03-16 | End: 2020-04-10

## 2020-03-16 RX ORDER — LEVOFLOXACIN 500 MG/1
500 TABLET, FILM COATED ORAL DAILY
Qty: 5 TABLET | Refills: 0 | Status: SHIPPED | OUTPATIENT
Start: 2020-03-16 | End: 2020-04-10

## 2020-03-16 RX ORDER — LOPERAMIDE HYDROCHLORIDE 2 MG/1
2 CAPSULE ORAL EVERY 4 HOURS PRN
Qty: 120 CAPSULE | Refills: 0 | Status: SHIPPED | OUTPATIENT
Start: 2020-03-16 | End: 2020-08-12 | Stop reason: SDUPTHER

## 2020-03-16 RX ORDER — ONDANSETRON 4 MG/1
4 TABLET, ORALLY DISINTEGRATING ORAL EVERY 8 HOURS PRN
Qty: 90 TABLET | Refills: 0 | Status: SHIPPED | OUTPATIENT
Start: 2020-03-16 | End: 2021-05-19

## 2020-03-16 RX ADMIN — LOPERAMIDE HYDROCHLORIDE 2 MG: 2 CAPSULE ORAL at 04:28

## 2020-03-16 RX ADMIN — RIVAROXABAN 20 MG: 10 TABLET, FILM COATED ORAL at 08:20

## 2020-03-16 RX ADMIN — PANTOPRAZOLE SODIUM 40 MG: 40 TABLET, DELAYED RELEASE ORAL at 05:52

## 2020-03-16 RX ADMIN — LORAZEPAM 1 MG: 1 TABLET ORAL at 08:20

## 2020-03-16 RX ADMIN — LOPERAMIDE HYDROCHLORIDE 2 MG: 2 CAPSULE ORAL at 09:20

## 2020-03-16 RX ADMIN — ATORVASTATIN CALCIUM 20 MG: 20 TABLET, FILM COATED ORAL at 08:20

## 2020-03-16 RX ADMIN — FENOFIBRATE 145 MG: 145 TABLET ORAL at 08:20

## 2020-03-16 RX ADMIN — METOPROLOL TARTRATE 25 MG: 25 TABLET, FILM COATED ORAL at 09:20

## 2020-03-16 RX ADMIN — LISINOPRIL 20 MG: 20 TABLET ORAL at 08:20

## 2020-03-16 RX ADMIN — SERTRALINE HYDROCHLORIDE 150 MG: 50 TABLET ORAL at 08:19

## 2020-03-16 RX ADMIN — PIPERACILLIN AND TAZOBACTAM 3.38 G: 3; .375 INJECTION, POWDER, FOR SOLUTION INTRAVENOUS at 05:53

## 2020-03-16 RX ADMIN — DILTIAZEM HYDROCHLORIDE 180 MG: 180 CAPSULE, COATED, EXTENDED RELEASE ORAL at 08:19

## 2020-03-16 RX ADMIN — SODIUM CHLORIDE, PRESERVATIVE FREE 10 ML: 5 INJECTION INTRAVENOUS at 08:20

## 2020-03-16 RX ADMIN — AMILORIDE HYDROCLORIDE 5 MG: 5 TABLET ORAL at 08:20

## 2020-03-16 RX ADMIN — FERROUS SULFATE TAB EC 324 MG (65 MG FE EQUIVALENT) 324 MG: 324 (65 FE) TABLET DELAYED RESPONSE at 08:20

## 2020-03-16 NOTE — PLAN OF CARE
Problem: Patient Care Overview  Goal: Plan of Care Review  Outcome: Ongoing (interventions implemented as appropriate)  Flowsheets  Taken 3/15/2020 2331 by Lashanda Sanches RN  Progress: no change  Taken 3/15/2020 1354 by Joshua Esquivel, PT  Plan of Care Reviewed With: patient  Goal: Individualization and Mutuality  Outcome: Ongoing (interventions implemented as appropriate)  Goal: Discharge Needs Assessment  Outcome: Ongoing (interventions implemented as appropriate)  Goal: Interprofessional Rounds/Family Conf  Outcome: Ongoing (interventions implemented as appropriate)     Problem: Pain, Chronic (Adult)  Goal: Identify Related Risk Factors and Signs and Symptoms  Outcome: Ongoing (interventions implemented as appropriate)  Goal: Acceptable Pain/Comfort Level and Functional Ability  Outcome: Ongoing (interventions implemented as appropriate)     Problem: Infection, Risk/Actual (Adult)  Goal: Identify Related Risk Factors and Signs and Symptoms  Outcome: Ongoing (interventions implemented as appropriate)  Goal: Infection Prevention/Resolution  Outcome: Ongoing (interventions implemented as appropriate)

## 2020-03-16 NOTE — DISCHARGE SUMMARY
DISCHARGE SUMMARY    [facility]       PHYSICIAN: Allie Martin MD  : 1955  MRN: [id number]    ADMITTED: 3/14/2020     DISCHARGED: 3/16/2020    ADMISSION DIAGNOSES:  Active Hospital Problems    Diagnosis  POA   • Paroxysmal atrial fibrillation (CMS/HCC) [I48.0]  Yes   • Benign essential hypertension [I10]  Yes   • Generalized anxiety disorder [F41.1]  Yes   • Depressive disorder [F32.9]  Yes   • Gastroesophageal reflux disease [K21.9]  Yes      Resolved Hospital Problems    Diagnosis Date Resolved POA   • **Epiploic appendagitis [K52.9] 2020 Yes     DISCHARGE DIAGNOSES:   Active Hospital Problems    Diagnosis  POA   • Paroxysmal atrial fibrillation (CMS/HCC) [I48.0]  Yes   • Benign essential hypertension [I10]  Yes   • Generalized anxiety disorder [F41.1]  Yes   • Depressive disorder [F32.9]  Yes   • Gastroesophageal reflux disease [K21.9]  Yes      Resolved Hospital Problems    Diagnosis Date Resolved POA   • **Epiploic appendagitis [K52.9] 2020 Yes       SERVICE: Family Medicine Residency  Attending: Rusty Urrutia MD  Resident: Allie Martin MD    CONSULTS:   Consult Orders (all) (From admission, onward)    None          PROCEDURES:   None     DIAGNOSTIC DATA:   Imaging Results (Last 72 Hours)     Procedure Component Value Units Date/Time    CT Abdomen Pelvis With Contrast [[id number]] Collected:  20 232     Updated:  03/15/20 0007    Narrative:       CT abdomen and pelvis with contrast on  3/14/2020     CLINICAL INDICATION: Lower abdominal pain, fever    TECHNIQUE: Multiple axial images are obtained throughout the  abdomen and pelvis following the administration of IV contrast,  90 mL of Isovue-300 contrast was administered intravenously  without complication. This exam was performed according to our  departmental dose-optimization program, which includes automated  exposure control, adjustment of the mA and/or kV according to  patient size and/or  use of iterative reconstruction technique.   Total DLP is 713.1 mGy*cm.    COMPARISON: 3/11/2020    FINDINGS:  Abdomen: There is evidence of calcified granulomatous disease in  the lung bases. The lung bases are otherwise clear. The solid  abdominal organs are unremarkable. There is no abdominal  adenopathy. There is no free fluid or free air within the  abdomen. The abdominal portion of the GI tract is unremarkable.    Pelvis: There is some greater fat stranding around tubular fat  density off the distal descending colon in the left lower  quadrant again consistent with changes of epiploic appendagitis.  This is seen well on coronal image 30. Again this would account  for the patient's pain. No free fluid is noted in the pelvis.  Pelvic organs appear unremarkable by CT. There is no pelvic  adenopathy. Pelvic portion of the GI tract including the appendix  is otherwise unremarkable. There is a small to moderate-sized  left inguinal hernia containing only fat. Degenerative and  postsurgical changes are noted in the spine.      Impression:       1. Continued evidence of epiploic appendagitis in the left lower  quadrant which would account for the patient's pain.  2. Otherwise no acute abnormality.    Electronically signed by:  Nguyễn Anderson  3/15/2020 12:06 AM  CDT Workstation: [id number]        Lab Results (last 72 hours)     Procedure Component Value Units Date/Time    Comprehensive Metabolic Panel [[id number]]  (Abnormal) Collected:  03/16/20 0534    Specimen:  Blood Updated:  03/16/20 0702     Glucose 174 mg/dL      BUN 10 mg/dL      Creatinine 1.04 mg/dL      Sodium 139 mmol/L      Potassium 3.6 mmol/L      Chloride 105 mmol/L      CO2 23.0 mmol/L      Calcium 9.3 mg/dL      Total Protein 6.6 g/dL      Albumin 3.50 g/dL      ALT (SGPT) 17 U/L      AST (SGOT) 18 U/L      Alkaline Phosphatase 55 U/L      Total Bilirubin 0.7 mg/dL      eGFR Non African Amer 53 mL/min/1.73      Globulin 3.1 gm/dL      A/G Ratio 1.1  g/dL      BUN/Creatinine Ratio 9.6     Anion Gap 11.0 mmol/L     Narrative:       GFR Normal >60  Chronic Kidney Disease <60  Kidney Failure <15      Extra Tubes [[id number]] Collected:  03/16/20 0534    Specimen:  Blood, Venous Line Updated:  03/16/20 0645    Narrative:       The following orders were created for panel order Extra Tubes.  Procedure                               Abnormality         Status                     ---------                               -----------         ------                     Lavender Top[[id number]]                                     Final result                 Please view results for these tests on the individual orders.    Lavender Top [[id number]] Collected:  03/16/20 0534    Specimen:  Blood Updated:  03/16/20 0645     Extra Tube hold for add-on     Comment: Auto resulted       Blood Culture - Blood, Hand, Left [[id number]] Collected:  03/14/20 2159    Specimen:  Blood from Hand, Left Updated:  03/15/20 2230     Blood Culture No growth at 24 hours    Blood Culture - Blood, Arm, Right [[id number]] Collected:  03/14/20 2113    Specimen:  Blood from Arm, Right Updated:  03/15/20 2130     Blood Culture No growth at 24 hours    CBC & Differential [[id number]] Collected:  03/15/20 0541    Specimen:  Blood Updated:  03/15/20 0912    Narrative:       The following orders were created for panel order CBC & Differential.  Procedure                               Abnormality         Status                     ---------                               -----------         ------                     CBC Auto Differential[[id number]]        Abnormal            Final result                 Please view results for these tests on the individual orders.    CBC Auto Differential [[id number]]  (Abnormal) Collected:  03/15/20 0541    Specimen:  Blood Updated:  03/15/20 0912     WBC 12.53 10*3/mm3      RBC 4.18 10*6/mm3      Hemoglobin 12.4 g/dL      Hematocrit 36.8 %      MCV 88.0 fL      MCH 29.7 pg       MCHC 33.7 g/dL      RDW 13.1 %      RDW-SD 42.0 fl      MPV 10.1 fL      Platelets 212 10*3/mm3      Neutrophil % 69.0 %      Lymphocyte % 18.0 %      Monocyte % 8.4 %      Eosinophil % 3.8 %      Basophil % 0.5 %      Immature Grans % 0.3 %      Neutrophils, Absolute 8.65 10*3/mm3      Lymphocytes, Absolute 2.25 10*3/mm3      Monocytes, Absolute 1.05 10*3/mm3      Eosinophils, Absolute 0.48 10*3/mm3      Basophils, Absolute 0.06 10*3/mm3      Immature Grans, Absolute 0.04 10*3/mm3      nRBC 0.0 /100 WBC     Comprehensive Metabolic Panel [[id number]]  (Abnormal) Collected:  03/15/20 0541    Specimen:  Blood Updated:  03/15/20 0618     Glucose 116 mg/dL      BUN 13 mg/dL      Creatinine 1.20 mg/dL      Sodium 138 mmol/L      Potassium 4.1 mmol/L      Chloride 103 mmol/L      CO2 23.0 mmol/L      Calcium 9.1 mg/dL      Total Protein 6.7 g/dL      Albumin 3.50 g/dL      ALT (SGPT) 14 U/L      AST (SGOT) 15 U/L      Alkaline Phosphatase 49 U/L      Total Bilirubin 0.7 mg/dL      eGFR Non African Amer 45 mL/min/1.73      Globulin 3.2 gm/dL      A/G Ratio 1.1 g/dL      BUN/Creatinine Ratio 10.8     Anion Gap 12.0 mmol/L     Narrative:       GFR Normal >60  Chronic Kidney Disease <60  Kidney Failure <15      CBC & Differential [[id number]] Collected:  03/14/20 2230    Specimen:  Blood Updated:  03/15/20 0140    Narrative:       The following orders were created for panel order CBC & Differential.  Procedure                               Abnormality         Status                     ---------                               -----------         ------                     CBC Auto Differential[[id number]]        Abnormal            Final result                 Please view results for these tests on the individual orders.    CBC Auto Differential [[id number]]  (Abnormal) Collected:  03/14/20 2230    Specimen:  Blood Updated:  03/15/20 0140     WBC 15.36 10*3/mm3      RBC 4.57 10*6/mm3      Hemoglobin 13.6 g/dL      Hematocrit 40.8  %      MCV 89.3 fL      MCH 29.8 pg      MCHC 33.3 g/dL      RDW 13.0 %      RDW-SD 42.6 fl      MPV 10.1 fL      Platelets 250 10*3/mm3      Neutrophil % 71.9 %      Lymphocyte % 15.3 %      Monocyte % 8.0 %      Eosinophil % 3.8 %      Basophil % 0.5 %      Immature Grans % 0.5 %      Neutrophils, Absolute 11.06 10*3/mm3      Lymphocytes, Absolute 2.35 10*3/mm3      Monocytes, Absolute 1.23 10*3/mm3      Eosinophils, Absolute 0.58 10*3/mm3      Basophils, Absolute 0.07 10*3/mm3      Immature Grans, Absolute 0.07 10*3/mm3      nRBC 0.0 /100 WBC     [place] Draw [[id number]] Collected:  03/14/20 2230    Specimen:  Blood Updated:  03/14/20 2330    Narrative:       The following orders were created for panel order [place] Draw.  Procedure                               Abnormality         Status                     ---------                               -----------         ------                     Lavender Top[[id number]]                                     Final result               Gold Top - SST[[id number]]                                                                Please view results for these tests on the individual orders.    Lavender Top [[id number]] Collected:  03/14/20 2230    Specimen:  Blood Updated:  03/14/20 2330     Extra Tube hold for add-on     Comment: Auto resulted       Comprehensive Metabolic Panel [[id number]]  (Abnormal) Collected:  03/14/20 2159    Specimen:  Blood Updated:  03/14/20 2248     Glucose 113 mg/dL      BUN 14 mg/dL      Creatinine 1.12 mg/dL      Sodium 139 mmol/L      Potassium 4.2 mmol/L      Chloride 102 mmol/L      CO2 23.0 mmol/L      Calcium 9.2 mg/dL      Total Protein 7.2 g/dL      Albumin 3.90 g/dL      ALT (SGPT) 15 U/L      AST (SGOT) 16 U/L      Alkaline Phosphatase 58 U/L      Total Bilirubin 0.4 mg/dL      eGFR Non African Amer 49 mL/min/1.73      Globulin 3.3 gm/dL      A/G Ratio 1.2 g/dL      BUN/Creatinine Ratio 12.5     Anion Gap 14.0 mmol/L     Narrative:        GFR Normal >60  Chronic Kidney Disease <60  Kidney Failure <15      Urinalysis With Culture If Indicated - Urine, Clean Catch [[id number]]  (Abnormal) Collected:  03/14/20 2204    Specimen:  Urine, Clean Catch Updated:  03/14/20 2226     Color, UA Dark Yellow     Appearance, UA Cloudy     pH, UA 5.5     Specific Gravity, UA 1.034     Comment: Result obtained by Refractometer        Glucose, UA Negative     Ketones, UA Trace     Bilirubin, UA Negative     Blood, UA Negative     Protein, UA Negative     Leuk Esterase, UA Negative     Nitrite, UA Negative     Urobilinogen, UA 0.2 E.U./dL    Narrative:       Urine microscopic not indicated.    [place] Draw [[id number]] Collected:  03/14/20 2113    Specimen:  Blood Updated:  03/14/20 2215    Narrative:       The following orders were created for panel order [place] Draw.  Procedure                               Abnormality         Status                     ---------                               -----------         ------                     Light Blue Top[[id number]]                                   Final result               Green Top (Gel)[[id number]]                                  Final result               Lavender Top[[id number]]                                     Final result               Gold Top - SST[[id number]]                                   Final result                 Please view results for these tests on the individual orders.    Light Blue Top [[id number]] Collected:  03/14/20 2113    Specimen:  Blood Updated:  03/14/20 2215     Extra Tube hold for add-on     Comment: Auto resulted       Green Top (Gel) [[id number]] Collected:  03/14/20 2113    Specimen:  Blood Updated:  03/14/20 2215     Extra Tube Hold for add-ons.     Comment: Auto resulted.       Lavender Top [[id number]] Collected:  03/14/20 2113    Specimen:  Blood Updated:  03/14/20 2215     Extra Tube hold for add-on     Comment: Auto resulted       Gold Top - SST [[id number]] Collected:  03/14/20  2113    Specimen:  Blood Updated:  03/14/20 2215     Extra Tube Hold for add-ons.     Comment: Auto resulted.       Lipase [[id number]]  (Normal) Collected:  03/14/20 2113    Specimen:  Blood Updated:  03/14/20 2142     Lipase 56 U/L     Lactic Acid, Plasma [[id number]]  (Normal) Collected:  03/14/20 2113    Specimen:  Blood Updated:  03/14/20 2138     Lactate 1.1 mmol/L     CBC & Differential [[id number]] Collected:  03/14/20 2113    Specimen:  Blood Updated:  03/14/20 2123    Narrative:       The following orders were created for panel order CBC & Differential.  Procedure                               Abnormality         Status                     ---------                               -----------         ------                     CBC Auto Differential[[id number]]        Abnormal            Final result                 Please view results for these tests on the individual orders.    CBC Auto Differential [[id number]]  (Abnormal) Collected:  03/14/20 2113    Specimen:  Blood Updated:  03/14/20 2123     WBC 14.60 10*3/mm3      RBC 4.65 10*6/mm3      Hemoglobin 13.8 g/dL      Hematocrit 40.6 %      MCV 87.3 fL      MCH 29.7 pg      MCHC 34.0 g/dL      RDW 13.0 %      RDW-SD 41.0 fl      MPV 9.9 fL      Platelets 249 10*3/mm3      Neutrophil % 71.2 %      Lymphocyte % 16.5 %      Monocyte % 7.9 %      Eosinophil % 3.5 %      Basophil % 0.4 %      Immature Grans % 0.5 %      Neutrophils, Absolute 10.40 10*3/mm3      Lymphocytes, Absolute 2.41 10*3/mm3      Monocytes, Absolute 1.15 10*3/mm3      Eosinophils, Absolute 0.51 10*3/mm3      Basophils, Absolute 0.06 10*3/mm3      Immature Grans, Absolute 0.07 10*3/mm3      nRBC 0.0 /100 WBC              HOSPITAL COURSE:  She was admitted for epiploic appendagitis. Repeat CT scan did show any significant change from prior scan. Her lactic acid was normal. WBC was elevated on admission and trended down. She was started on Zosyn and IVF. Her abdominal pain resolved during her  hospital course. She was able to tolerate a regular diet without any problems. She will be discharged home on Levaquin and Flagyl for 5 days. She will follow up with PCP this week to monitor for improvement. She will return to ED or call resident on call for worsening abdominal pain. She will be prescribed Imodium for diarrhea and Zofran for nausea.     DISCHARGE CONDITION:   Stable    DISPOSITION:  Home or Self Care    DISCHARGE MEDICATIONS     Discharge Medications      New Medications      Instructions Start Date   levoFLOXacin 500 MG tablet  Commonly known as:  LEVAQUIN   500 mg, Oral, Daily      loperamide 2 MG capsule  Commonly known as:  IMODIUM   2 mg, Oral, Every 4 Hours PRN      metroNIDAZOLE 500 MG tablet  Commonly known as:  FLAGYL   500 mg, Oral, 3 Times Daily      ondansetron ODT 4 MG disintegrating tablet  Commonly known as:  ZOFRAN ODT   4 mg, Translingual, Every 8 Hours PRN         Continue These Medications      Instructions Start Date   alendronate 70 MG tablet  Commonly known as:  FOSAMAX   70 mg, Oral, Every 7 Days      aMILoride 5 MG tablet  Commonly known as:  MIDAMOR   5 mg, Oral, Daily      ARIPiprazole 5 MG tablet  Commonly known as:  ABILIFY   5 mg, Oral, Daily, At bedtime      aspirin 325 MG tablet   325 mg, Oral, Daily, At bedtime      atorvastatin 20 MG tablet  Commonly known as:  LIPITOR   20 mg, Oral, Daily      calcium-vitamin D 500-200 MG-UNIT per tablet   1 tablet, Oral, 2 Times Daily With Meals      choline fenofibrate 135 MG capsule  Commonly known as:  TRILIPIX   135 mg, Oral, Daily      dilTIAZem  MG 24 hr capsule  Commonly known as:  DILT-XR   180 mg, Oral, Daily      ferrous sulfate 325 (65 FE) MG tablet   325 mg, Oral, Daily With Breakfast, Take with vitamin C to help with absorption      ibuprofen 800 MG tablet  Commonly known as:  ADVIL,MOTRIN   800 mg, Oral, Every 6 Hours PRN      lisinopril 20 MG tablet  Commonly known as:  PRINIVIL,ZESTRIL   20 mg, Oral, Daily       LORazepam 1 MG tablet  Commonly known as:  ATIVAN   1 mg, Oral, 2 Times Daily, Scheduled to take in the morning and one with dinner      methocarbamol 500 MG tablet  Commonly known as:  ROBAXIN   500 mg, Oral, 4 Times Daily PRN      metoprolol tartrate 25 MG tablet  Commonly known as:  LOPRESSOR   TAKE 1 TABLET EVERY 12 HOURS      nitroglycerin 0.4 MG SL tablet  Commonly known as:  NITROSTAT   0.4 mg, Sublingual, See Admin Instructions, PRN cp, if not relieved in 5 min repeat and go to ER      omeprazole 20 MG capsule  Commonly known as:  priLOSEC   20 mg, Oral, Daily      potassium chloride 10 MEQ CR tablet  Commonly known as:  K-DUR   10 mEq, Oral, 2 Times Daily With Meals      rivaroxaban 20 MG tablet  Commonly known as:  XARELTO   20 mg, Oral, Daily      sertraline 100 MG tablet  Commonly known as:  ZOLOFT   150 mg, Oral, Daily             INSTRUCTIONS:  Activity:   Activity Instructions     Activity as Tolerated          Diet:   Diet Instructions     Advance Diet As Tolerated            FOLLOW UP:   Additional Instructions for the Follow-ups that You Need to Schedule     Discharge Follow-up with PCP   As directed       Currently Documented PCP:    Allie Martin MD    PCP Phone Number:    [phone]     Follow Up Details:  3/17/2020 (please double book with ARTUR tomorrow)           Follow-up Information     Allie Martin MD .    Specialties:  Family Medicine, Urgent Care  Why:  3/17/2020 (please double book with ARTUR tomorrow)  Contact information:  [address] [place] [address] [address] [place] [address]  [phone]                   PENDING TEST RESULTS AT DISCHARGE   Order Current Status    [place] Draw In process    Blood Culture - Blood, Arm, Right Preliminary result    Blood Culture - Blood, Hand, Left Preliminary result          Time: >30 minutes was spent in discharge planning, medication reconciliation and coordination of care for this patient.    Rusty Urrutia MD is the attending at time of  discharge, He is aware of the patient's status and agrees with the above discharge summary.        This document has been electronically signed by Allie Martin MD on March 16, 2020 11:40

## 2020-03-16 NOTE — THERAPY TREATMENT NOTE
Acute Care - Occupational Therapy Treatment Note  [facility]     Patient Name: Lana Mata  : 1955  MRN: [id number]  Today's Date: 3/16/2020  Onset of Illness/Injury or Date of Surgery: 20  Date of Referral to OT: 20       Admit Date: 3/14/2020       ICD-10-CM ICD-9-CM   1. Epiploic appendagitis K52.9 558.9   2. Impaired mobility and activities of daily living Z74.09 799.89   3. Impaired physical mobility Z74.09 781.99     Patient Active Problem List   Diagnosis   • Thyrotoxicosis with or without goiter   • Osteoporosis   • Pain in right shoulder   • Low back pain   • Hyperlipidemia   • Generalized anxiety disorder   • Depressive disorder   • Benign essential hypertension   • Asthenia   • Abnormal gait   • Obstructive sleep apnea of adult   • DDD (degenerative disc disease), lumbar   • Lumbar radiculopathy   • History of lumbar surgery   • Lumbar spondylolysis   • Long term (current) use of opiate analgesic   • Paroxysmal atrial fibrillation (CMS/HCC)   • Urinary incontinence   • Skin ulcer (CMS/HCC)   • Shortness of breath   • Shortness of breath   • Osteoarthritis   • Obstructive sleep apnea syndrome   • Neck pain   • Headache   • H/O echocardiogram   • Gastroesophageal reflux disease   • Common cold   • Chronic pain disorder   • Chest pain   • Cellulitis of knee   • Cardiovascular stress test abnormal   • Attempted suicide (CMS/HCC)   • Allergic rhinitis   • Acute bronchitis   • Acute ankle pain   • Abrasion     Past Medical History:   Diagnosis Date   • Abnormal gait 2012   • Abrasion 2015   • Acute ankle pain 2016   • Acute bronchitis 2015   • Allergic rhinitis 2012   • Asthenia 2014   • Atrial fibrillation (CMS/HCC) 2016    - converted    • Attempted suicide (CMS/HCC)    • Benign essential hypertension 2016   • Cardiovascular stress test abnormal 2016   • Cellulitis of knee 2015   • Chest pain 2015   • Chronic  pain disorder    • Common cold 06/24/2014   • Congestive heart failure (CMS/HCC) 02/04/2016   • Depressive disorder 05/22/2015    suspect more complicated psych issues      • Fibrocystic breast    • Gastroesophageal reflux disease 01/17/2013   • Generalized anxiety disorder 02/04/2016   • H/O echocardiogram     Normal LV function with Ef of 60-65%.Mildly dilated right ventricle with normal function. Grade 1B diastolic dysfunction with mild CLVH.NO sig. valvular regurg. or stenosis.   • Headache 10/27/2014   • Hyperlipidemia 03/04/2016   • Low back pain 06/30/2016    Need for prophylactic vaccination against Streptococcus pneumoniae [pneumococcus]    01/23/2015    • Neck pain 06/30/2016   • Obstructive sleep apnea of adult 07/30/2015   • Obstructive sleep apnea syndrome    • Osteoarthritis    • Osteoporosis 06/30/2016   • Pain in right shoulder 06/30/2016   • Pain in right shoulder 04/24/2014   • Shortness of breath 04/02/2015   • Skin ulcer (CMS/[facility]) 04/13/2015    left   • Thyrotoxicosis with or without goiter 10/24/2014   • Urinary incontinence 05/22/2014     Past Surgical History:   Procedure Laterality Date   • BACK SURGERY  08/07/2015    Removal of intstrumentation,L5-S1.Exploration of fusion,L5-S1.Posterolateral fusion,L4-L5.Posterior segmental spinal instrumentation L4-5.Transforaminal interbody fusion Through right sided approach.Performed at    • COLONOSCOPY  05/04/2016    will order cologuard   • INJECTION OF MEDICATION  03/23/2015    celestone   • INJECTION OF MEDICATION  05/04/2016    KENALOG(6)   • LUMBAR FUSION      discectomy fusion with rods L4-S1   • LUMBAR LAMINECTOMY  1995   • MANDIBLE FRACTURE SURGERY      JAW WIRED SHUT   • MOUTH SURGERY  10/05/2015    Fractured mandible. Removal of arch bars.   • MOUTH SURGERY  1985    Fractured mandible. Removal of arch bars.   • NASAL SEPTUM SURGERY     • RHINOPLASTY     • TUBAL ABDOMINAL LIGATION  1986       Therapy Treatment    Rehabilitation Treatment  Summary     Row Name 03/16/20 0726 03/16/20 0725          Treatment Time/Intention    Discipline  --  occupational therapy assistant  -LW     Document Type  --  therapy note (daily note)  -LW     Subjective Information  --  no complaints  -LW     Mode of Treatment  --  occupational therapy  -LW     Patient/Family Observations  --  Pt supine in bed. No family present.   -LW     Care Plan Review  --  care plan/treatment goals reviewed  -LW     Total Minutes, Occupational Therapy Treatment  --  55  -LW     Therapy Frequency (OT Eval)  --  other (see comments) 5-7 days per week  -LW     Patient Effort  --  good  -LW     Comment  --  Pt stating she had a shower late last night.   -LW2     Existing Precautions/Restrictions  fall  -LW  --     Equipment Issued to Patient  --  gait belt  -LW     Recorded by [LW] Cortney Whitney COTA/ANTHONY 03/16/20 1238 [LW] Cortney Whitney COTA/L 03/16/20 1238  [LW2] Cortney Whitney COTA/L 03/16/20 1251     Row Name 03/16/20 0725             Vital Signs    Pre Systolic BP Rehab  114  -LW      Pre Treatment Diastolic BP  55  -LW      Pretreatment Heart Rate (beats/min)  79  -LW2      Posttreatment Heart Rate (beats/min)  83  -LW2      Pre SpO2 (%)  98  -LW2      O2 Delivery Pre Treatment  room air  -LW2      Post SpO2 (%)  97  -LW2      O2 Delivery Post Treatment  room air  -LW2      Pre Patient Position  Supine  -LW2      Post Patient Position  Standing  -LW2      Recorded by [LW] Cortney Whitney COTA/L 03/16/20 1238  [LW2] Cortney Whitney COTA/L 03/16/20 1244      Row Name 03/16/20 0725             Cognitive Assessment/Intervention- PT/OT    Affect/Mental Status (Cognitive)  WFL  -LW      Orientation Status (Cognition)  oriented x 4  -LW      Follows Commands (Cognition)  WFL  -LW      Cognitive Function (Cognitive)  WFL  -LW      Recorded by [LW] Cortney Whitney COTA/L 03/16/20 1244      Row Name 03/16/20 0725             Safety Issues, Functional Mobility    Safety Issues  Affecting Function (Mobility)  awareness of need for assistance  -LW      Impairments Affecting Function (Mobility)  endurance/activity tolerance  -LW      Comment, Safety Issues/Impairments (Mobility)  Educated pt on Home safety.  -LW2      Recorded by [LW] Cortney Whitney COTA/L 03/16/20 1244  [LW2] Cortney Whitney COTA/L 03/16/20 1251      Row Name 03/16/20 0725             Bed Mobility Assessment/Treatment    Bed Mobility Assessment/Treatment  bed mobility (all) activities;rolling left;supine-sit-supine  -LW      [place] Level (Bed Mobility)  independent  -LW      Rolling Left [place] (Bed Mobility)  independent  -LW      Supine-Sit-Supine [place] (Bed Mobility)  independent  -LW      Assistive Device (Bed Mobility)  bed rails  -LW      Recorded by [LW] Cortney Whitney COTA/L 03/16/20 1244      Row Name 03/16/20 0725             Functional Mobility    Functional Mobility- Ind. Level  supervision required  -LW      Functional Mobility- Device  rolling walker  -LW      Functional Mobility-Distance (Feet)  -- Bed to bathroom to BSChair  -LW      Functional Mobility- Safety Issues  balance decreased during turns  -LW      Recorded by [LW] Cortney Whitney COTA/L 03/16/20 1244      Row Name 03/16/20 0725             Transfer Assessment/Treatment    Transfer Assessment/Treatment  sit-stand transfer;bed-chair transfer;stand-sit transfer;toilet transfer  -LW      Recorded by [LW] Cortney Whitney COTA/L 03/16/20 1244      Row Name 03/16/20 0725             Bed-Chair Transfer    Bed-Chair [place] (Transfers)  conditional independence  -LW      Assistive Device (Bed-Chair Transfers)  walker, front-wheeled  -LW      Recorded by [LW] Cortney Whitney COTA/L 03/16/20 1244      Row Name 03/16/20 0725             Sit-Stand Transfer    Sit-Stand [place] (Transfers)  independent  -LW      Recorded by [LW] Cortney Whitney COTA/L 03/16/20 1244      Row Name 03/16/20 0725             Stand-Sit  Transfer    Stand-Sit [place] (Transfers)  independent  -LW      Recorded by [LW] Cortney Whitney ROMERO/L 03/16/20 1244      Row Name 03/16/20 0725             Toilet Transfer    Type (Toilet Transfer)  sit-stand;stand-sit  -LW      [place] Level (Toilet Transfer)  independent  -LW      Recorded by [LW] Cortney Whitney ROMERO/L 03/16/20 1244      Row Name 03/16/20 0725             ADL Assessment/Intervention    BADL Assessment/Intervention  upper body dressing;grooming;toileting  -LW      Recorded by [LW] Cortney Whitney ROMERO/L 03/16/20 1244      Row Name 03/16/20 0725             Upper Body Dressing Assessment/Training    Upper Body Dressing [place] Level  upper body dressing skills;doff;don;independent;other (see comments) Hospital gown  -LW      Upper Body Dressing Position  supported standing  -LW      Recorded by [LW] Cortney Whitney ROMERO/L 03/16/20 1244      Row Name 03/16/20 0725             Grooming Assessment/Training    [place] Level (Grooming)  grooming skills;hair care, combing/brushing;oral care regimen;wash face, hands;independent  -LW      Grooming Position  supported standing  -LW      Recorded by [LW] Cortney Whitney ROMERO/L 03/16/20 1244      Row Name 03/16/20 0725             Toileting Assessment/Training    [place] Level (Toileting)  toileting skills;adjust/manage clothing;perform perineal hygiene;independent  -LW      Toileting Position  supported sitting  -LW      Recorded by [LW] Cortney Whitney ROMERO/L 03/16/20 1244      Row Name 03/16/20 0725             BADL Safety/Performance    Impairments, BADL Safety/Performance  endurance/activity tolerance  -LW      Recorded by [LW] Cortney Whitney ROMERO/L 03/16/20 1251      Row Name 03/16/20 0725             Therapeutic Exercise    Upper Extremity Range of Motion (Therapeutic Exercise)  shoulder flexion/extension, bilateral;shoulder internal/external rotation, bilateral;elbow flexion/extension, bilateral;forearm  supination/pronation, bilateral;wrist flexion/extension, bilateral  -LW      Hand (Therapeutic Exercise)  finger flexion/extension, bilateral  -LW      Weight/Resistance (Therapeutic Exercise)  2 pounds  -LW      Exercise Type (Therapeutic Exercise)  AROM (active range of motion)  -LW      Position (Therapeutic Exercise)  seated  -LW      Sets/Reps (Therapeutic Exercise)  20X  -LW      Recorded by [LW] Cortney Whitney COTA/L 03/16/20 1251      Row Name 03/16/20 0725             Positioning and Restraints    Pre-Treatment Position  in bed  -LW      Post Treatment Position  chair  -LW      In Chair  notified nsg;call light within reach;encouraged to call for assist;exit alarm on  -LW      Recorded by [LW] Cortney Whitney COTA/L 03/16/20 1251      Row Name 03/16/20 0725             Pain Scale: Numbers Pre/Post-Treatment    Pain Scale: Numbers, Pretreatment  0/10 - no pain  -LW      Pain Scale: Numbers, Post-Treatment  0/10 - no pain  -LW      Recorded by [LW] Cortney Whitney COTA/L 03/16/20 1251      Row Name 03/16/20 0725             Coping    Observed Emotional State  cooperative  -LW      Recorded by [LW] Cortney Whitney COTA/L 03/16/20 1251      Row Name 03/16/20 0725             Plan of Care Review    Plan of Care Reviewed With  patient  -LW      Progress  improving  -LW      Recorded by [LW] Cortney Whitney COTA/L 03/16/20 1251      Row Name 03/16/20 0725             Outcome Summary/Treatment Plan (OT)    Daily Summary of Progress (OT)  progress toward functional goals as expected  -LW      Plan for Continued Treatment (OT)  Continue POC  -LW      Anticipated Discharge Disposition (OT)  anticipate therapy at next level of care  -LW      Recorded by [LW] Cortney Whitney COTA/L 03/16/20 1251        User Key  (r) = Recorded By, (t) = Taken By, (c) = Cosigned By    Initials Name Effective Dates Discipline    LW Cortney Whitney COTA/L 03/07/18 -  OT           Rehab Goal Summary     Row Name 03/16/20 0725              Occupational Therapy Goals    Bathing Goal Selection (OT)  bathing, OT goal 1  -LW      Dressing Goal Selection (OT)  dressing, OT goal 1  -LW      Toileting Goal Selection (OT)  toileting, OT goal 1  -LW      Activity Tolerance Goal Selection (OT)  activity tolerance, OT goal 1  -LW         Bathing Goal 1 (OT)    Activity/Assistive Device (Bathing Goal 1, OT)  bathing skills, all  -LW      [place] Level/Cues Needed (Bathing Goal 1, OT)  standby assist;verbal cues required;tactile cues required;set-up required  -LW      Time Frame (Bathing Goal 1, OT)  long term goal (LTG);by discharge  -LW      Progress/Outcomes (Bathing Goal 1, OT)  goal not met  -LW         Dressing Goal 1 (OT)    Activity/Assistive Device (Dressing Goal 1, OT)  dressing skills, all  -LW      [place]/Cues Needed (Dressing Goal 1, OT)  standby assist;verbal cues required;tactile cues required;set-up required  -LW      Time Frame (Dressing Goal 1, OT)  long term goal (LTG);by discharge  -LW      Progress/Outcome (Dressing Goal 1, OT)  goal not met  -LW         Toileting Goal 1 (OT)    Activity/Device (Toileting Goal 1, OT)  toileting skills, all  -LW      [place] Level/Cues Needed (Toileting Goal 1, OT)  standby assist;verbal cues required;tactile cues required;set-up required  -LW      Time Frame (Toileting Goal 1, OT)  long term goal (LTG);by discharge  -LW      Progress/Outcome (Toileting Goal 1, OT)  (S) goal met  -LW          Activity Tolerance Goal 1 (OT)    Activity Level (Endurance Goal 1, OT)  -- 20 min functional activity with 1-2 rest breaks   -LW      Time Frame (Activity Tolerance Goal 1, OT)  long term goal (LTG)  -LW      Progress/Outcome (Activity Tolerance Goal 1, OT)  goal not met  -LW        User Key  (r) = Recorded By, (t) = Taken By, (c) = Cosigned By    Initials Name Provider Type Discipline    Cortney Cruz COTA/L Occupational Therapy Assistant OT            OT Recommendation and  Plan  Outcome Summary/Treatment Plan (OT)  Daily Summary of Progress (OT): progress toward functional goals as expected  Plan for Continued Treatment (OT): Continue POC  Anticipated Discharge Disposition (OT): anticipate therapy at next level of care  Therapy Frequency (OT Eval): other (see comments)(5-7 days per week)  Daily Summary of Progress (OT): progress toward functional goals as expected  Plan of Care Review  Plan of Care Reviewed With: patient  Plan of Care Reviewed With: patient  Outcome Measures     Row Name 03/16/20 0725 03/15/20 0906 03/15/20 0900       How much help from another is currently needed...    Putting on and taking off regular lower body clothing?  3  -LW  3  -JH  --    Bathing (including washing, rinsing, and drying)  3  -LW  3  -JH  --    Toileting (which includes using toilet bed pan or urinal)  4  -LW  3  -JH  --    Putting on and taking off regular upper body clothing  4  -LW  3  -JH  --    Taking care of personal grooming (such as brushing teeth)  4  -LW  4  -JH  --    Eating meals  4  -LW  4  -JH  --    AM-PAC 6 Clicks Score (OT)  22 -LW  20  -JH  --       Functional Assessment    Outcome Measure Options  --  --  AM-PAC 6 Clicks Daily Activity (OT)  -      User Key  (r) = Recorded By, (t) = Taken By, (c) = Cosigned By    Initials Name Provider Type    Cortney Cruz COTA/L Occupational Therapy Assistant     Wing Acevedo, YUSUF Occupational Therapist           Time Calculation:   Time Calculation- OT     Row Name 03/16/20 1252             Time Calculation- OT    OT Start Time  0725  -      OT Stop Time  0820  -      OT Time Calculation (min)  55 min  -      Total Timed Code Minutes- OT  55 minute(s)  -      OT Received On  03/16/20  -        User Key  (r) = Recorded By, (t) = Taken By, (c) = Cosigned By    Initials Name Provider Type    Cortney Cruz COTA/L Occupational Therapy Assistant        Therapy Charges for Today     Code Description Service Date  Service Provider Modifiers Qty    81854799157 HC OT SELF CARE/MGMT/TRAIN EA 15 MIN 3/16/2020 Cortney Whitney COTA/L GO 2    75185594775 HC OT THER PROC EA 15 MIN 3/16/2020 Cortney Whitney COTA/ANTHONY GO 2               RIANA Wilson  3/16/2020

## 2020-03-16 NOTE — PROGRESS NOTES
FAMILY MEDICINE DAILY PROGRESS NOTE  NAME: Lana Ayala Attebury  : 1955  MRN: [id number]      LOS: 0 days     PROVIDER OF SERVICE: Allie Martin MD    Chief Complaint: Epiploic appendagitis    Subjective:     Interval History:      She was febrile overnight. Pain is about the same. She is tolerating a soft diet. She is having loose BM.     Review of Systems:   Review of Systems   Constitutional: Negative for chills, diaphoresis and fever.   HENT: Negative for sneezing and sore throat.    Eyes: Negative for pain and discharge.   Respiratory: Negative for cough and shortness of breath.    Gastrointestinal: Positive for abdominal pain and diarrhea. Negative for constipation, nausea and vomiting.   Endocrine: Negative for cold intolerance and heat intolerance.   Genitourinary: Negative for difficulty urinating, dysuria, frequency and urgency.   Musculoskeletal: Negative for arthralgias and myalgias.   Skin: Negative for color change and pallor.   Allergic/Immunologic: Negative for environmental allergies and food allergies.   Neurological: Negative for dizziness, syncope and weakness.   Psychiatric/Behavioral: Negative for confusion and sleep disturbance.       Objective:     Vital Signs  Temp:  [97.8 °F (36.6 °C)-101 °F (38.3 °C)] 97.8 °F (36.6 °C)  Heart Rate:  [85-91] 88  Resp:  [16-18] 18  BP: (112-125)/(58-76) 112/66  Body mass index is 36.91 kg/m².    Physical Exam  Physical Exam   Constitutional: She is oriented to person, place, and time. She appears well-developed and well-nourished. No distress.   HENT:   Head: Normocephalic and atraumatic.   Nose: Nose normal.   Eyes: Conjunctivae and EOM are normal.   Neck: Normal range of motion. Neck supple.   Cardiovascular: Normal rate, regular rhythm and normal heart sounds.   No murmur heard.  Pulmonary/Chest: Effort normal and breath sounds normal. No respiratory distress. She has no wheezes. She has no rales.   Abdominal: Soft. She exhibits  distension. Bowel sounds are increased. There is tenderness. There is no guarding.   Musculoskeletal: Normal range of motion.   Neurological: She is alert and oriented to person, place, and time.   Skin: Skin is warm and dry.   Psychiatric: She has a normal mood and affect. Her behavior is normal.   Vitals reviewed.      Medication Review    Current Facility-Administered Medications:   •  acetaminophen (TYLENOL) tablet 650 mg, 650 mg, Oral, Q6H PRN, Allie Martin MD, 650 mg at 03/15/20 2143  •  aMILoride (MIDAMOR) tablet 5 mg, 5 mg, Oral, Daily, Angeline Bryson MD, 5 mg at 03/15/20 0817  •  ARIPiprazole (ABILIFY) tablet 5 mg, 5 mg, Oral, Nightly, Angeline Bryson MD  •  atorvastatin (LIPITOR) tablet 20 mg, 20 mg, Oral, Daily, Angeline Bryson MD, 20 mg at 03/15/20 0817  •  dilTIAZem CD (CARDIZEM CD) 24 hr capsule 180 mg, 180 mg, Oral, Q24H, Angeline Bryson MD, 180 mg at 03/15/20 0817  •  fenofibrate (TRICOR) tablet 145 mg, 145 mg, Oral, Daily, Angeline Bryson MD, 145 mg at 03/15/20 0817  •  ferrous sulfate EC tablet 324 mg, 324 mg, Oral, Daily With Breakfast, Angeline Bryson MD, 324 mg at 03/15/20 0817  •  HYDROmorphone (DILAUDID) injection 0.5 mg, 0.5 mg, Intravenous, Q4H PRN, Angeline Bryson MD  •  lisinopril (PRINIVIL,ZESTRIL) tablet 20 mg, 20 mg, Oral, Daily, Angeline Bryson MD, 20 mg at 03/15/20 0817  •  loperamide (IMODIUM) capsule 2 mg, 2 mg, Oral, Q4H PRN, Angeline Bryson MD, 2 mg at 03/16/20 0428  •  LORazepam (ATIVAN) tablet 1 mg, 1 mg, Oral, BID, Angeline Bryson MD, 1 mg at 03/15/20 1705  •  methocarbamol (ROBAXIN) tablet 500 mg, 500 mg, Oral, 4x Daily PRN, Angeline Bryson MD  •  metoprolol tartrate (LOPRESSOR) tablet 25 mg, 25 mg, Oral, Q12H, Angeline Bryson MD, 25 mg at 03/15/20 2103  •  nitroglycerin (NITROSTAT) SL tablet 0.4 mg, 0.4 mg, Sublingual, Q5 Min PRN, Angeline Bryson MD  •  ondansetron (ZOFRAN) injection 4 mg, 4 mg, Intravenous, Q6H PRN, Angeline Bryson MD, 4  mg at 03/15/20 0554  •  pantoprazole (PROTONIX) EC tablet 40 mg, 40 mg, Oral, QAMAdelaide Samantha B, MD, 40 mg at 03/16/20 0552  •  Pharmacy to Dose Zosyn, , Does not apply, Continuous PRN, Angeline Bryson MD  •  piperacillin-tazobactam (ZOSYN) 3.375 g/100 mL 0.9% NS IVPB (mbp), 3.375 g, Intravenous, Q8H, Angeline Bryson MD, Last Rate: 0 mL/hr at 03/16/20 0158, 3.375 g at 03/16/20 0553  •  rivaroxaban (XARELTO) tablet 20 mg, 20 mg, Oral, Daily, Angeline Bryson MD, 20 mg at 03/15/20 0818  •  sertraline (ZOLOFT) tablet 150 mg, 150 mg, Oral, Daily, Angeline Bryson MD, 150 mg at 03/15/20 0817  •  sodium chloride 0.9 % flush 10 mL, 10 mL, Intravenous, Q12H, Angeline Bryson MD, 10 mL at 03/15/20 2104  •  sodium chloride 0.9 % flush 10 mL, 10 mL, Intravenous, PRN, Angeline Bryson MD, 10 mL at 03/15/20 0156  •  sodium chloride 0.9 % infusion, 100 mL/hr, Intravenous, Continuous, Angeline Bryson MD, Last Rate: 100 mL/hr at 03/15/20 0551, 100 mL/hr at 03/15/20 0551     Diagnostic Data    Lab Results (last 24 hours)     Procedure Component Value Units Date/Time    Comprehensive Metabolic Panel [[id number]]  (Abnormal) Collected:  03/16/20 0534    Specimen:  Blood Updated:  03/16/20 0702     Glucose 174 mg/dL      BUN 10 mg/dL      Creatinine 1.04 mg/dL      Sodium 139 mmol/L      Potassium 3.6 mmol/L      Chloride 105 mmol/L      CO2 23.0 mmol/L      Calcium 9.3 mg/dL      Total Protein 6.6 g/dL      Albumin 3.50 g/dL      ALT (SGPT) 17 U/L      AST (SGOT) 18 U/L      Alkaline Phosphatase 55 U/L      Total Bilirubin 0.7 mg/dL      eGFR Non African Amer 53 mL/min/1.73      Globulin 3.1 gm/dL      A/G Ratio 1.1 g/dL      BUN/Creatinine Ratio 9.6     Anion Gap 11.0 mmol/L     Narrative:       GFR Normal >60  Chronic Kidney Disease <60  Kidney Failure <15      Extra Tubes [[id number]] Collected:  03/16/20 0534    Specimen:  Blood, Venous Line Updated:  03/16/20 0645    Narrative:       The following orders were created  for panel order Extra Tubes.  Procedure                               Abnormality         Status                     ---------                               -----------         ------                     Lavender Top[[id number]]                                     Final result                 Please view results for these tests on the individual orders.    Lavender Top [[id number]] Collected:  03/16/20 0534    Specimen:  Blood Updated:  03/16/20 0645     Extra Tube hold for add-on     Comment: Auto resulted       Blood Culture - Blood, Hand, Left [[id number]] Collected:  03/14/20 2159    Specimen:  Blood from Hand, Left Updated:  03/15/20 2230     Blood Culture No growth at 24 hours    Blood Culture - Blood, Arm, Right [[id number]] Collected:  03/14/20 2113    Specimen:  Blood from Arm, Right Updated:  03/15/20 2130     Blood Culture No growth at 24 hours    CBC & Differential [[id number]] Collected:  03/15/20 0541    Specimen:  Blood Updated:  03/15/20 0912    Narrative:       The following orders were created for panel order CBC & Differential.  Procedure                               Abnormality         Status                     ---------                               -----------         ------                     CBC Auto Differential[[id number]]        Abnormal            Final result                 Please view results for these tests on the individual orders.    CBC Auto Differential [[id number]]  (Abnormal) Collected:  03/15/20 0541    Specimen:  Blood Updated:  03/15/20 0912     WBC 12.53 10*3/mm3      RBC 4.18 10*6/mm3      Hemoglobin 12.4 g/dL      Hematocrit 36.8 %      MCV 88.0 fL      MCH 29.7 pg      MCHC 33.7 g/dL      RDW 13.1 %      RDW-SD 42.0 fl      MPV 10.1 fL      Platelets 212 10*3/mm3      Neutrophil % 69.0 %      Lymphocyte % 18.0 %      Monocyte % 8.4 %      Eosinophil % 3.8 %      Basophil % 0.5 %      Immature Grans % 0.3 %      Neutrophils, Absolute 8.65 10*3/mm3      Lymphocytes, Absolute  2.25 10*3/mm3      Monocytes, Absolute 1.05 10*3/mm3      Eosinophils, Absolute 0.48 10*3/mm3      Basophils, Absolute 0.06 10*3/mm3      Immature Grans, Absolute 0.04 10*3/mm3      nRBC 0.0 /100 WBC             I reviewed the patient's new clinical results.    Assessment/Plan:     Active Hospital Problems    Diagnosis POA   • **Epiploic appendagitis [K52.9] Yes     -as evidenced by CT scan on 3/10/20 and 3/14/20; no significant change from CT scan on 3/10, no evidence of free air  -normal lactic acid  -WBC trending down  -started on zosyn in ER, will continue  -IVFs  -consider general surgery consult if pain continues      • Paroxysmal atrial fibrillation (CMS/HCC) [I48.0] Yes     -NSR on admission  -continue cardizem, lopressor, xarelto     • Benign essential hypertension [I10] Yes     -continue amiloride, cardizem, lisinopril, lopressor     • Generalized anxiety disorder [F41.1] Yes     -continue abilify, ativan, zoloft     • Depressive disorder [F32.9] Yes     -continue abilify, zoloft     • Gastroesophageal reflux disease [K21.9] Yes     -continue PPI         DVT prophylaxis: Xarelto  Code status is   Code Status and Medical Interventions:   Ordered at: 03/15/20 0109     Level Of Support Discussed With:    Patient     Code Status:    CPR     Medical Interventions (Level of Support Prior to Arrest):    Full       Plan for disposition:home in 0-2 days      Time: >30 min        This document has been electronically signed by Allie Martin MD on March 16, 2020 07:18

## 2020-03-17 ENCOUNTER — OFFICE VISIT (OUTPATIENT)
Dept: CARDIOLOGY | Facility: CLINIC | Age: 65
End: 2020-03-17

## 2020-03-17 ENCOUNTER — OFFICE VISIT (OUTPATIENT)
Dept: FAMILY MEDICINE CLINIC | Facility: CLINIC | Age: 65
End: 2020-03-17

## 2020-03-17 VITALS
HEART RATE: 67 BPM | BODY MASS INDEX: 37.49 KG/M2 | WEIGHT: 225 LBS | SYSTOLIC BLOOD PRESSURE: 120 MMHG | OXYGEN SATURATION: 99 % | HEIGHT: 65 IN | DIASTOLIC BLOOD PRESSURE: 80 MMHG

## 2020-03-17 VITALS
HEIGHT: 65 IN | SYSTOLIC BLOOD PRESSURE: 114 MMHG | DIASTOLIC BLOOD PRESSURE: 70 MMHG | OXYGEN SATURATION: 99 % | HEART RATE: 64 BPM | BODY MASS INDEX: 36.82 KG/M2 | WEIGHT: 221 LBS

## 2020-03-17 DIAGNOSIS — I10 BENIGN ESSENTIAL HYPERTENSION: ICD-10-CM

## 2020-03-17 DIAGNOSIS — K63.89 EPIPLOIC APPENDAGITIS: Primary | ICD-10-CM

## 2020-03-17 DIAGNOSIS — E78.2 MIXED HYPERLIPIDEMIA: Primary | ICD-10-CM

## 2020-03-17 DIAGNOSIS — I48.0 PAROXYSMAL ATRIAL FIBRILLATION (HCC): ICD-10-CM

## 2020-03-17 PROCEDURE — 99213 OFFICE O/P EST LOW 20 MIN: CPT | Performed by: STUDENT IN AN ORGANIZED HEALTH CARE EDUCATION/TRAINING PROGRAM

## 2020-03-17 PROCEDURE — 99213 OFFICE O/P EST LOW 20 MIN: CPT | Performed by: INTERNAL MEDICINE

## 2020-03-17 NOTE — PROGRESS NOTES
Lana Ayala [facility]  65 y.o. female    3/17/2020     1. Mixed hyperlipidemia    2. Paroxysmal atrial fibrillation (CMS/HCC)    3. Benign essential hypertension        History of Present Illness:    Body mass index is 36.78 kg/m². BMI is above normal parameters. Recommendations include: exercise counseling, nutrition counseling and referral to primary care.        65 years old patient presented for routine follow-up with  history of paroxysmal atrial fibrillation, hypertension and post electrical cardioversion on amiodarone unable to  keep sinus.  Patient evaluated by me in the office.  She doesn't want to pursue EP study and ablation at this stage.  She wanted try a different anti-arrhythmia medication such as Tikosyn with hypertensive heart disease, chronic back pain, exogenous obesity  No syncope or near syncopal episode reported.  No fever cough or chills reported.  She had failed Tikosyn  and started  AV kaya blocking drug spontaneously converted to sinus rhythm.    During previous office visit noted regular pulse and EKG confirmed the sinus rhythm  she continue Xarelto to decrease risk of cardiac embolic phenomena.  Previously ,Discussed with the patient regarding EP study and ablation she not interested and want to continue AV kaya blocking drug.     JOCY 1/2018  ·    · Transesophageal Echocardiogram with Color and Doppler  · Left Ventricle: Estimated EF appears to be in the range of 61 - 65%. Normal left ventricular cavity size noted  · Left ventricular wall thickness is consistent with mild concentric hypertrophy  · Right Ventricle: Normal right ventricular cavity size, wall thickness, systolic function and septal motion noted  · No evidence of a left atrial appendage thrombus was present. The interatrial septum appears redundant  · No evidence of a patent foramen ovale. No evidence of an atrial septal defect present. Saline test results are negative.  · Mild aortic valve regurgitation is present  · Mild  mitral valve regurgitation is present  4/2018  Total Protein 6.3 - 8.6 g/dL 7.5    Albumin 3.40 - 4.80 g/dL 4.30    ALT (SGPT) 9 - 52 U/L 30    AST (SGOT) 14 - 36 U/L 29    Alkaline Phosphatase 38 - 126 U/L 48    Total Bilirubin 0.2 - 1.3 mg/dL 0.8          1/2018  Total Cholesterol 0 - 199 mg/dL 165    Triglycerides 20 - 199 mg/dL 169    HDL Cholesterol 60 - 200 mg/dL 67    LDL Cholesterol  1 - 129 mg/dL 77    LDL/HDL Ratio 0.00 - 3.22 0.96              SUBJECTIVE:    Allergies   Allergen Reactions   • Codeine Nausea And Vomiting   • Latex Rash   • Morphine And Related Hallucinations   • Pravachol [Pravastatin Sodium] Myalgia         Past Medical History:   Diagnosis Date   • Abnormal gait 11/28/2012   • Abrasion 02/26/2015   • Acute ankle pain 03/31/2016   • Acute bronchitis 09/04/2015   • Allergic rhinitis 05/22/2012   • Asthenia 05/22/2014   • Atrial fibrillation (CMS/[facility]) 03/04/2016    - converted    • Attempted suicide (CMS/[facility])    • Benign essential hypertension 03/04/2016   • Cardiovascular stress test abnormal 03/04/2016   • Cellulitis of knee 03/03/2015   • Chest pain 04/02/2015   • Chronic pain disorder    • Common cold 06/24/2014   • Congestive heart failure (CMS/[facility]) 02/04/2016   • Depressive disorder 05/22/2015    suspect more complicated psych issues      • Fibrocystic breast    • Gastroesophageal reflux disease 01/17/2013   • Generalized anxiety disorder 02/04/2016   • H/O echocardiogram     Normal LV function with Ef of 60-65%.Mildly dilated right ventricle with normal function. Grade 1B diastolic dysfunction with mild CLVH.NO sig. valvular regurg. or stenosis.   • Headache 10/27/2014   • Hyperlipidemia 03/04/2016   • Low back pain 06/30/2016    Need for prophylactic vaccination against Streptococcus pneumoniae [pneumococcus]    01/23/2015    • Neck pain 06/30/2016   • Obstructive sleep apnea of adult 07/30/2015   • Obstructive sleep apnea syndrome    • Osteoarthritis    • Osteoporosis 06/30/2016    • Pain in right shoulder 06/30/2016   • Pain in right shoulder 04/24/2014   • Shortness of breath 04/02/2015   • Skin ulcer (CMS/HCC) 04/13/2015    left   • Thyrotoxicosis with or without goiter 10/24/2014   • Urinary incontinence 05/22/2014         Past Surgical History:   Procedure Laterality Date   • BACK SURGERY  08/07/2015    Removal of intstrumentation,L5-S1.Exploration of fusion,L5-S1.Posterolateral fusion,L4-L5.Posterior segmental spinal instrumentation L4-5.Transforaminal interbody fusion Through right sided approach.Performed at    • COLONOSCOPY  05/04/2016    will order cologuard   • INJECTION OF MEDICATION  03/23/2015    celestone   • INJECTION OF MEDICATION  05/04/2016    KENALOG(6)   • LUMBAR FUSION      discectomy fusion with rods L4-S1   • LUMBAR LAMINECTOMY  1995   • MANDIBLE FRACTURE SURGERY      JAW WIRED SHUT   • MOUTH SURGERY  10/05/2015    Fractured mandible. Removal of arch bars.   • MOUTH SURGERY  1985    Fractured mandible. Removal of arch bars.   • NASAL SEPTUM SURGERY     • RHINOPLASTY     • TUBAL ABDOMINAL LIGATION  1986         Family History   Problem Relation Age of Onset   • Hypertension Mother    • Hyperlipidemia Mother    • Mental illness Mother    • Thyroid disease Mother    • Diabetes Maternal Grandmother    • Cancer Maternal Grandmother    • Depression Other    • Diabetes Other    • Heart disease Other    • Hypertension Other    • Osteoporosis Other    • Thyroid disease Other    • Cancer Maternal Aunt    • Cancer Paternal Aunt    • Breast cancer Paternal Aunt          Social History     Socioeconomic History   • Marital status:      Spouse name: Not on file   • Number of children: Not on file   • Years of education: Not on file   • Highest education level: Not on file   Social Needs   • Financial resource strain: Not hard at all   • Food insecurity:     Worry: Never true     Inability: Never true   • Transportation needs:     Medical: No     Non-medical: No   Tobacco Use    • Smoking status: Never Smoker   • Smokeless tobacco: Never Used   • Tobacco comment: smoking cessation    Substance and Sexual Activity   • Alcohol use: No   • Drug use: No     Comment: lortab / oxycontin   • Sexual activity: Defer         Current Outpatient Medications   Medication Sig Dispense Refill   • alendronate (FOSAMAX) 70 MG tablet Take 1 tablet by mouth Every 7 (Seven) Days. 4 tablet 5   • aMILoride (MIDAMOR) 5 MG tablet Take 1 tablet by mouth Daily. 30 tablet 6   • ARIPiprazole (ABILIFY) 5 MG tablet Take 5 mg by mouth Daily. At bedtime     • aspirin 325 MG tablet Take 325 mg by mouth Daily. At bedtime     • atorvastatin (LIPITOR) 20 MG tablet Take 1 tablet by mouth Daily. 90 tablet 1   • Calcium Carb-Cholecalciferol (CALCIUM-VITAMIN D) 500-200 MG-UNIT per tablet Take 1 tablet by mouth 2 (Two) Times a Day With Meals. 60 tablet 2   • choline fenofibrate (TRILIPIX) 135 MG capsule Take 1 capsule by mouth Daily. 90 capsule 3   • dilTIAZem XR (DILT-XR) 180 MG 24 hr capsule Take 1 capsule by mouth Daily. 90 capsule 2   • ferrous sulfate 325 (65 FE) MG tablet Take 1 tablet by mouth Daily With Breakfast. Take with vitamin C to help with absorption 30 tablet 6   • ibuprofen (ADVIL,MOTRIN) 800 MG tablet Take 1 tablet by mouth Every 6 (Six) Hours As Needed for Mild Pain . 120 tablet 0   • levoFLOXacin (LEVAQUIN) 500 MG tablet Take 1 tablet by mouth Daily. 5 tablet 0   • lisinopril (PRINIVIL,ZESTRIL) 20 MG tablet Take 20 mg by mouth Daily.     • loperamide (IMODIUM) 2 MG capsule Take 1 capsule by mouth Every 4 (Four) Hours As Needed for Diarrhea. 120 capsule 0   • LORazepam (ATIVAN) 1 MG tablet Take 1 mg by mouth 2 (Two) Times a Day. Scheduled to take in the morning and one with dinner  0   • methocarbamol (ROBAXIN) 500 MG tablet Take 1 tablet by mouth 4 (Four) Times a Day As Needed for Muscle Spasms. 90 tablet 5   • metoprolol tartrate (LOPRESSOR) 25 MG tablet TAKE 1 TABLET EVERY 12 HOURS 60 tablet 6   •  metroNIDAZOLE (FLAGYL) 500 MG tablet Take 1 tablet by mouth 3 (Three) Times a Day. 15 tablet 0   • nitroglycerin (NITROSTAT) 0.4 MG SL tablet Place 1 tablet under the tongue See Admin Instructions. PRN cp, if not relieved in 5 min repeat and go to ER 25 tablet 12   • omeprazole (priLOSEC) 20 MG capsule Take 1 capsule by mouth Daily. 90 capsule 5   • ondansetron ODT (ZOFRAN ODT) 4 MG disintegrating tablet Dissolve 1 tablet on the tongue and swallow Every 8 (Eight) Hours As Needed for Nausea or Vomiting. 90 tablet 0   • potassium chloride (K-DUR) 10 MEQ CR tablet Take 1 tablet by mouth 2 (Two) Times a Day With Meals. 180 tablet 3   • rivaroxaban (XARELTO) 20 MG tablet Take 1 tablet by mouth Daily. 90 tablet 3   • sertraline (ZOLOFT) 100 MG tablet Take 150 mg by mouth Daily.  1     No current facility-administered medications for this visit.            Review of Systems:     Constitutional:  Denies recent weight loss, weight gain, fever or chills, no change in exercise tolerance.     HENT:  Denies any hearing loss, epistaxis, hoarseness, or difficulty speaking.     Eyes: Wears eyeglasses or contact lenses.    Respiratory:  Denies dyspnea with exertion,no cough, wheezing, or hemoptysis.     Cardiovascular: Negative for palpations, chest pain, orthopnea, PND, peripheral edema, syncope, or claudication.     Gastrointestinal:  Denies change in bowel habits, dyspepsia, ulcer disease, hematochezia, or melena.     Endocrine: Negative for cold intolerance, heat intolerance, polydipsia, polyphagia and polyuria. Denies any history of weight change, polydipsia, polyuria.     Genitourinary: Negative.      Musculoskeletal: Denies any history of arthritic symptoms or back problems.     Skin:  Denies any change in hair or nails, rashes, or skin lesions.     Allergic/Immunologic: Negative.  Negative for environmental allergies, food allergies and immunocompromised state.     Neurological:  Denies any history of recurrent headaches,  "strokes, TIA, or seizure disorder.     Hematological: Denies any food allergies, seasonal allergies, bleeding disorders, or lymphadenopathy.     Psychiatric/Behavioral: Denies any history of depression, substance abuse, or change in cognitive function.       OBJECTIVE:    /70   Pulse 64   Ht 165.1 cm (65\")   Wt 100 kg (221 lb)   SpO2 99%   BMI 36.78 kg/m²       Physical Exam:     Constitutional: Cooperative, alert and oriented, well-developed, well-nourished, in no acute distress.     HENT:   Head: Normocephalic, normal hair patterns, no masses or tenderness.  Ears, Nose, and Throat: No gross abnormalities. No pallor or cyanosis. Dentition good.   Eyes: EOMS intact, PERRL, conjunctivae and lids unremarkable. Fundoscopic exam and visual fields not performed.   Neck: No palpable masses or adenopathy, no thyromegaly, no JVD, carotid pulses are full and equal bilaterally and without  Bruits.     Cardiovascular: Regular rhythm, S1 and S2 normal, no S3 or S4. Apical impulse not displaced. No murmurs, gallops, or rubs detected.     Pulmonary/Chest: Chest: normal symmetry, no tenderness to palpation, normal respiratory excursion, no intercostal retraction, no use of accessory muscles. Pulmonary: Normal breath sounds. No rales or rhonchi.    Abdominal: Abdomen soft, bowel sounds normoactive, no masses, no hepatosplenomegaly, non-tender, no bruits.     Musculoskeletal: No deformities, clubbing, cyanosis, erythema, or edema observed. There are no spinal abnormalities noted. Normal muscle strength and tone. Pulses full and equal in all extremities, no bruits auscultated.     Neurological: No gross motor or sensory deficits noted, affect appropriate, oriented to time, person, place.     Skin: Warm and dry to the touch, no apparent skin lesions or masses noted.     Psychiatric: She has a normal mood and affect. Her behavior is normal. Judgment and thought content normal.         Procedures      Lab Results   Component " Value Date    WBC 12.53 (H) 03/15/2020    HGB 12.4 03/15/2020    HCT 36.8 03/15/2020    MCV 88.0 03/15/2020     03/15/2020     Lab Results   Component Value Date    GLUCOSE 174 (H) 03/16/2020    BUN 10 03/16/2020    CREATININE 1.04 (H) 03/16/2020    EGFRIFNONA 53 (L) 03/16/2020    BCR 9.6 03/16/2020    CO2 23.0 03/16/2020    CALCIUM 9.3 03/16/2020    ALBUMIN 3.50 03/16/2020    AST 18 03/16/2020    ALT 17 03/16/2020     Lab Results   Component Value Date    CHOL 148 11/18/2019    CHOL 165 01/31/2018     Lab Results   Component Value Date    TRIG 249 (H) 11/18/2019    TRIG 169 01/31/2018    TRIG 115 12/15/2016     Lab Results   Component Value Date    HDL 55 11/18/2019    HDL 67 01/31/2018    HDL 52 (L) 12/15/2016     No components found for: LDLCALC  Lab Results   Component Value Date    LDL 43 11/18/2019    LDL 77 01/31/2018    LDL 73 12/15/2016     No results found for: HDLLDLRATIO  No components found for: CHOLHDL  Lab Results   Component Value Date    HGBA1C 5.7 (H) 01/31/2018     Lab Results   Component Value Date    TSH 1.370 07/24/2019           ASSESSMENT AND PLAN:  #1  Paroxysmal atrial fibrillation failed amiodarone, failed Tikosyn on AV kaya blocking drug and doing well we will continue Xarelto to decrease risk of embolization  #2 hypertension with hypertensive heart disease we will continue metoprolol, calcium channel blocker, lisinopril for hypertension with hypertensive heart disease good blood pressure control.  #3 hyperlipidemia on atorvastatin  #4 palpitation     65 years old patient with history of paroxysmal atrial fibrillation failed amiodarone and failed Tikosyn now she is compliant with the CPAP.  .  I will continue AV kaya blocking drug and Xarelto to decrease risk of cardiac embolic phenomena.  Hypertension being managed with lisinopril and atorvastatin for management of hyperlipidemia.  We'll see her back in 6 month R depends on patient clinical conditions.   Patient does not want to  pursue EP study and ablations..  We'll resume the lisinopril for management of hypertension, Lipitor for management of hyperlipidemia.  Significantly low carbohydrate, low-fat, DASH diet and graded exercise discussed with the patient    Lana was seen today for follow-up.    Diagnoses and all orders for this visit:    Mixed hyperlipidemia    Paroxysmal atrial fibrillation (CMS/HCC)    Benign essential hypertension        Kristyn Jurado MD  3/17/2020  11:55

## 2020-03-19 LAB
BACTERIA SPEC AEROBE CULT: NORMAL
BACTERIA SPEC AEROBE CULT: NORMAL

## 2020-03-26 ENCOUNTER — TELEPHONE (OUTPATIENT)
Dept: CARDIOLOGY | Facility: CLINIC | Age: 65
End: 2020-03-26

## 2020-03-26 RX ORDER — POTASSIUM CHLORIDE 750 MG/1
10 TABLET, FILM COATED, EXTENDED RELEASE ORAL 2 TIMES DAILY WITH MEALS
Qty: 180 TABLET | Refills: 3 | Status: SHIPPED | OUTPATIENT
Start: 2020-03-26 | End: 2021-05-06 | Stop reason: SDUPTHER

## 2020-03-26 NOTE — TELEPHONE ENCOUNTER
----- Message from Kamryn Holt sent at 3/26/2020 11:12 AM CDT -----  Regarding: khushboo oakes  Contact: [phone]  Left a msg on v/m yesterday @ 12:02 about needing a rx sent to [facility] for her Potasium

## 2020-03-31 NOTE — PROGRESS NOTES
I have seen the patient.  I have reviewed the notes, assessments, and/or procedures performed by Dr. Allie Martin, I concur with her  documentation and assessment and plan for Lana Mata.          This document has been electronically signed by Moncho Zhao MD on March 31, 2020 14:30

## 2020-03-31 NOTE — PROGRESS NOTES
Family Medicine Residency   Allie Martin MD    Lana Mata is a 65 y.o. female who presents to family medicine residency clinic for the following:    Subjective:     She is here today for hospital follow-up for epiploic appendagitis. She does not have any abdominal pain or fever. She is tolerating a regular diet. Having some diarrhea.   No new complaints.     Past Medical Hx:   Past Medical History:   Diagnosis Date   • Abnormal gait 11/28/2012   • Abrasion 02/26/2015   • Acute ankle pain 03/31/2016   • Acute bronchitis 09/04/2015   • Allergic rhinitis 05/22/2012   • Asthenia 05/22/2014   • Atrial fibrillation (CMS/[facility]) 03/04/2016    - converted    • Attempted suicide (CMS/[facility])    • Benign essential hypertension 03/04/2016   • Cardiovascular stress test abnormal 03/04/2016   • Cellulitis of knee 03/03/2015   • Chest pain 04/02/2015   • Chronic pain disorder    • Common cold 06/24/2014   • Congestive heart failure (CMS/[facility]) 02/04/2016   • Depressive disorder 05/22/2015    suspect more complicated psych issues      • Fibrocystic breast    • Gastroesophageal reflux disease 01/17/2013   • Generalized anxiety disorder 02/04/2016   • H/O echocardiogram     Normal LV function with Ef of 60-65%.Mildly dilated right ventricle with normal function. Grade 1B diastolic dysfunction with mild CLVH.NO sig. valvular regurg. or stenosis.   • Headache 10/27/2014   • Hyperlipidemia 03/04/2016   • Low back pain 06/30/2016    Need for prophylactic vaccination against Streptococcus pneumoniae [pneumococcus]    01/23/2015    • Neck pain 06/30/2016   • Obstructive sleep apnea of adult 07/30/2015   • Obstructive sleep apnea syndrome    • Osteoarthritis    • Osteoporosis 06/30/2016   • Pain in right shoulder 06/30/2016   • Pain in right shoulder 04/24/2014   • Shortness of breath 04/02/2015   • Skin ulcer (CMS/[facility]) 04/13/2015    left   • Thyrotoxicosis with or without goiter 10/24/2014   • Urinary incontinence  05/22/2014       Past Surgical Hx:  Past Surgical History:   Procedure Laterality Date   • BACK SURGERY  08/07/2015    Removal of intstrumentation,L5-S1.Exploration of fusion,L5-S1.Posterolateral fusion,L4-L5.Posterior segmental spinal instrumentation L4-5.Transforaminal interbody fusion Through right sided approach.Performed at    • COLONOSCOPY  05/04/2016    will order cologuard   • INJECTION OF MEDICATION  03/23/2015    celestone   • INJECTION OF MEDICATION  05/04/2016    KENALOG(6)   • LUMBAR FUSION      discectomy fusion with rods L4-S1   • LUMBAR LAMINECTOMY  1995   • MANDIBLE FRACTURE SURGERY      JAW WIRED SHUT   • MOUTH SURGERY  10/05/2015    Fractured mandible. Removal of arch bars.   • MOUTH SURGERY  1985    Fractured mandible. Removal of arch bars.   • NASAL SEPTUM SURGERY     • RHINOPLASTY     • TUBAL ABDOMINAL LIGATION  1986       Current Meds:    Current Outpatient Medications:   •  alendronate (FOSAMAX) 70 MG tablet, Take 1 tablet by mouth Every 7 (Seven) Days., Disp: 4 tablet, Rfl: 5  •  aMILoride (MIDAMOR) 5 MG tablet, Take 1 tablet by mouth Daily., Disp: 30 tablet, Rfl: 6  •  ARIPiprazole (ABILIFY) 5 MG tablet, Take 5 mg by mouth Daily. At bedtime, Disp: , Rfl:   •  aspirin 325 MG tablet, Take 325 mg by mouth Daily. At bedtime, Disp: , Rfl:   •  atorvastatin (LIPITOR) 20 MG tablet, Take 1 tablet by mouth Daily., Disp: 90 tablet, Rfl: 1  •  Calcium Carb-Cholecalciferol (CALCIUM-VITAMIN D) 500-200 MG-UNIT per tablet, Take 1 tablet by mouth 2 (Two) Times a Day With Meals., Disp: 60 tablet, Rfl: 2  •  choline fenofibrate (TRILIPIX) 135 MG capsule, Take 1 capsule by mouth Daily., Disp: 90 capsule, Rfl: 3  •  dilTIAZem XR (DILT-XR) 180 MG 24 hr capsule, Take 1 capsule by mouth Daily., Disp: 90 capsule, Rfl: 2  •  ferrous sulfate 325 (65 FE) MG tablet, Take 1 tablet by mouth Daily With Breakfast. Take with vitamin C to help with absorption, Disp: 30 tablet, Rfl: 6  •  ibuprofen (ADVIL,MOTRIN) 800 MG  tablet, Take 1 tablet by mouth Every 6 (Six) Hours As Needed for Mild Pain ., Disp: 120 tablet, Rfl: 0  •  levoFLOXacin (LEVAQUIN) 500 MG tablet, Take 1 tablet by mouth Daily., Disp: 5 tablet, Rfl: 0  •  lisinopril (PRINIVIL,ZESTRIL) 20 MG tablet, Take 20 mg by mouth Daily., Disp: , Rfl:   •  loperamide (IMODIUM) 2 MG capsule, Take 1 capsule by mouth Every 4 (Four) Hours As Needed for Diarrhea., Disp: 120 capsule, Rfl: 0  •  LORazepam (ATIVAN) 1 MG tablet, Take 1 mg by mouth 2 (Two) Times a Day. Scheduled to take in the morning and one with dinner, Disp: , Rfl: 0  •  methocarbamol (ROBAXIN) 500 MG tablet, Take 1 tablet by mouth 4 (Four) Times a Day As Needed for Muscle Spasms., Disp: 90 tablet, Rfl: 5  •  metoprolol tartrate (LOPRESSOR) 25 MG tablet, TAKE 1 TABLET EVERY 12 HOURS, Disp: 60 tablet, Rfl: 6  •  metroNIDAZOLE (FLAGYL) 500 MG tablet, Take 1 tablet by mouth 3 (Three) Times a Day., Disp: 15 tablet, Rfl: 0  •  nitroglycerin (NITROSTAT) 0.4 MG SL tablet, Place 1 tablet under the tongue See Admin Instructions. PRN cp, if not relieved in 5 min repeat and go to ER, Disp: 25 tablet, Rfl: 12  •  omeprazole (priLOSEC) 20 MG capsule, Take 1 capsule by mouth Daily., Disp: 90 capsule, Rfl: 5  •  ondansetron ODT (ZOFRAN ODT) 4 MG disintegrating tablet, Dissolve 1 tablet on the tongue and swallow Every 8 (Eight) Hours As Needed for Nausea or Vomiting., Disp: 90 tablet, Rfl: 0  •  rivaroxaban (XARELTO) 20 MG tablet, Take 1 tablet by mouth Daily., Disp: 90 tablet, Rfl: 3  •  sertraline (ZOLOFT) 100 MG tablet, Take 150 mg by mouth Daily., Disp: , Rfl: 1  •  potassium chloride (K-DUR) 10 MEQ CR tablet, Take 1 tablet by mouth 2 (Two) Times a Day With Meals., Disp: 180 tablet, Rfl: 3    Allergies:  Codeine; Latex; Morphine and related; and Pravachol [pravastatin sodium]    Family Hx:  Family History   Problem Relation Age of Onset   • Hypertension Mother    • Hyperlipidemia Mother    • Mental illness Mother    • Thyroid  disease Mother    • Diabetes Maternal Grandmother    • Cancer Maternal Grandmother    • Depression Other    • Diabetes Other    • Heart disease Other    • Hypertension Other    • Osteoporosis Other    • Thyroid disease Other    • Cancer Maternal Aunt    • Cancer Paternal Aunt    • Breast cancer Paternal Aunt         Social History:  Social History     Socioeconomic History   • Marital status:      Spouse name: Not on file   • Number of children: Not on file   • Years of education: Not on file   • Highest education level: Not on file   Social Needs   • Financial resource strain: Not hard at all   • Food insecurity:     Worry: Never true     Inability: Never true   • Transportation needs:     Medical: No     Non-medical: No   Tobacco Use   • Smoking status: Never Smoker   • Smokeless tobacco: Never Used   • Tobacco comment: smoking cessation    Substance and Sexual Activity   • Alcohol use: No   • Drug use: No     Comment: lortab / oxycontin   • Sexual activity: Defer       Review of Systems  Review of Systems   Constitutional: Negative for chills, diaphoresis and fever.   HENT: Negative for sneezing and sore throat.    Eyes: Negative for pain and discharge.   Respiratory: Negative for cough and shortness of breath.    Gastrointestinal: Positive for diarrhea. Negative for abdominal pain, constipation, nausea and vomiting.   Endocrine: Negative for cold intolerance and heat intolerance.   Genitourinary: Negative for difficulty urinating, dysuria, frequency and urgency.   Musculoskeletal: Negative for arthralgias and myalgias.   Skin: Negative for color change and pallor.   Allergic/Immunologic: Negative for environmental allergies and food allergies.   Neurological: Negative for dizziness, syncope and weakness.   Psychiatric/Behavioral: Negative for confusion and sleep disturbance.       Physical Exam:  Vitals:    03/17/20 1329   BP: 120/80   Pulse: 67   SpO2: 99%       Body mass index is 37.44 kg/m².   Physical  Exam   Constitutional: She is oriented to person, place, and time. She appears well-developed and well-nourished. No distress.   HENT:   Head: Normocephalic and atraumatic.   Nose: Nose normal.   Eyes: Conjunctivae and EOM are normal.   Neck: Normal range of motion. Neck supple.   Cardiovascular: Normal rate, regular rhythm and normal heart sounds.   No murmur heard.  Pulmonary/Chest: Effort normal and breath sounds normal. No respiratory distress. She has no wheezes. She has no rales.   Abdominal: Soft. She exhibits no distension. Bowel sounds are increased. There is no tenderness. There is no guarding.   No tenderness   Musculoskeletal: Normal range of motion.   Neurological: She is alert and oriented to person, place, and time.   Skin: Skin is warm and dry.   Psychiatric: She has a normal mood and affect. Her behavior is normal.   Vitals reviewed.      Objective:     Data Reviewed:     LABS:   Lab Results   Component Value Date    GLUCOSE 174 (H) 03/16/2020    BUN 10 03/16/2020    CREATININE 1.04 (H) 03/16/2020    EGFRIFNONA 53 (L) 03/16/2020    BCR 9.6 03/16/2020    K 3.6 03/16/2020    CO2 23.0 03/16/2020    CALCIUM 9.3 03/16/2020    ALBUMIN 3.50 03/16/2020    AST 18 03/16/2020    ALT 17 03/16/2020     Lab Results   Component Value Date    WBC 12.53 (H) 03/15/2020    HGB 12.4 03/15/2020    HCT 36.8 03/15/2020    MCV 88.0 03/15/2020     03/15/2020     Lab Results   Component Value Date    CHOL 148 11/18/2019    CHLPL 148 12/15/2016    TRIG 249 (H) 11/18/2019    HDL 55 11/18/2019    LDL 43 11/18/2019     Lab Results   Component Value Date    TSH 1.370 07/24/2019     Lab Results   Component Value Date    HGBA1C 5.7 (H) 01/31/2018    HGBA1C 5.3 04/02/2015     Lab Results   Component Value Date    MICROALBUR <0.6 01/31/2018    CREATININE 1.04 (H) 03/16/2020     Lab Results   Component Value Date    FERRITIN 12.10 11/12/2018         Health Maintenance:   Weight  -Class: Obese Class II: 35-39.9kg/m2  -Patient's  Body mass index is 37.44 kg/m². BMI is above normal parameters. Recommendations include: exercise counseling and nutrition counseling.      Alcohol use:  reports that she does not drink alcohol.    Nicotine status  reports that she has never smoked. She has never used smokeless tobacco.   Lana Mata  reports that she has never smoked. She has never used smokeless tobacco.       Health Maintenance  Health Maintenance   Topic Date Due   • ZOSTER VACCINE (2 of 2) 02/09/2017   • MEDICARE ANNUAL WELLNESS  02/07/2019   • PAP SMEAR  12/15/2019   • Pneumococcal Vaccine Once at 65 Years Old  03/11/2021 (Originally 3/9/2020)   • LIPID PANEL  11/18/2020   • MAMMOGRAM  11/07/2021   • DXA SCAN  11/07/2021   • COLONOSCOPY  11/07/2026   • TDAP/TD VACCINES (5 - Td) 12/07/2029   • HEPATITIS C SCREENING  Completed   • INFLUENZA VACCINE  Completed        Goals:   Goals        Patient Stated    • management of chronic medical conditions (pt-stated)      Barriers to goals: none              Assessment/Plan:       Assessment/Plan   Lana was seen today for follow-up.    Diagnoses and all orders for this visit:    Epiploic appendagitis      Continue regular diet.   Contact us if diarrhea does not improve.   Follow up as needed.    FOLLOW-UP  Return if symptoms worsen or fail to improve.      RISK SCORE: 4        This document has been electronically signed by Allie Martin MD on March 31, 2020 13:07

## 2020-04-10 ENCOUNTER — OFFICE VISIT (OUTPATIENT)
Dept: FAMILY MEDICINE CLINIC | Facility: CLINIC | Age: 65
End: 2020-04-10

## 2020-04-10 VITALS — HEIGHT: 65 IN | BODY MASS INDEX: 37.49 KG/M2 | WEIGHT: 225 LBS

## 2020-04-10 DIAGNOSIS — R19.7 DIARRHEA, UNSPECIFIED TYPE: Primary | ICD-10-CM

## 2020-04-10 PROCEDURE — 99441 PR PHYS/QHP TELEPHONE EVALUATION 5-10 MIN: CPT | Performed by: STUDENT IN AN ORGANIZED HEALTH CARE EDUCATION/TRAINING PROGRAM

## 2020-04-10 NOTE — PROGRESS NOTES
Family Medicine Residency   lAlie Martin MD    Lana Mata is a 65 y.o. female presents today for a telephone encounter.     Subjective:   Today's visit is a telephone encounter.  You have chosen to receive care through a telephone visit today. Do you consent to use a telephone visit for your medical care today? Yes    She is recovering from epiploic appendagitis. She finished her course of antibiotics. She states the belly pain has improved, however she is still having some nausea and diarrhea. Nausea improves with Zofran. Diarrhea is liquid and not improving despite using imodium.       Past Medical Hx:   Past Medical History:   Diagnosis Date   • Abnormal gait 11/28/2012   • Abrasion 02/26/2015   • Acute ankle pain 03/31/2016   • Acute bronchitis 09/04/2015   • Allergic rhinitis 05/22/2012   • Asthenia 05/22/2014   • Atrial fibrillation (CMS/[facility]) 03/04/2016    - converted    • Attempted suicide (CMS/[facility])    • Benign essential hypertension 03/04/2016   • Cardiovascular stress test abnormal 03/04/2016   • Cellulitis of knee 03/03/2015   • Chest pain 04/02/2015   • Chronic pain disorder    • Common cold 06/24/2014   • Congestive heart failure (CMS/[facility]) 02/04/2016   • Depressive disorder 05/22/2015    suspect more complicated psych issues      • Fibrocystic breast    • Gastroesophageal reflux disease 01/17/2013   • Generalized anxiety disorder 02/04/2016   • H/O echocardiogram     Normal LV function with Ef of 60-65%.Mildly dilated right ventricle with normal function. Grade 1B diastolic dysfunction with mild CLVH.NO sig. valvular regurg. or stenosis.   • Headache 10/27/2014   • Hyperlipidemia 03/04/2016   • Low back pain 06/30/2016    Need for prophylactic vaccination against Streptococcus pneumoniae [pneumococcus]    01/23/2015    • Neck pain 06/30/2016   • Obstructive sleep apnea of adult 07/30/2015   • Obstructive sleep apnea syndrome    • Osteoarthritis    • Osteoporosis 06/30/2016   •  Pain in right shoulder 06/30/2016   • Pain in right shoulder 04/24/2014   • Shortness of breath 04/02/2015   • Skin ulcer (CMS/[facility]) 04/13/2015    left   • Thyrotoxicosis with or without goiter 10/24/2014   • Urinary incontinence 05/22/2014       Past Surgical Hx:  Past Surgical History:   Procedure Laterality Date   • BACK SURGERY  08/07/2015    Removal of intstrumentation,L5-S1.Exploration of fusion,L5-S1.Posterolateral fusion,L4-L5.Posterior segmental spinal instrumentation L4-5.Transforaminal interbody fusion Through right sided approach.Performed at    • COLONOSCOPY  05/04/2016    will order cologuard   • INJECTION OF MEDICATION  03/23/2015    celestone   • INJECTION OF MEDICATION  05/04/2016    KENALOG(6)   • LUMBAR FUSION      discectomy fusion with rods L4-S1   • LUMBAR LAMINECTOMY  1995   • MANDIBLE FRACTURE SURGERY      JAW WIRED SHUT   • MOUTH SURGERY  10/05/2015    Fractured mandible. Removal of arch bars.   • MOUTH SURGERY  1985    Fractured mandible. Removal of arch bars.   • NASAL SEPTUM SURGERY     • RHINOPLASTY     • TUBAL ABDOMINAL LIGATION  1986       Current Meds:    Current Outpatient Medications:   •  alendronate (FOSAMAX) 70 MG tablet, Take 1 tablet by mouth Every 7 (Seven) Days., Disp: 4 tablet, Rfl: 5  •  aMILoride (MIDAMOR) 5 MG tablet, Take 1 tablet by mouth Daily., Disp: 30 tablet, Rfl: 6  •  ARIPiprazole (ABILIFY) 5 MG tablet, Take 5 mg by mouth Daily. At bedtime, Disp: , Rfl:   •  aspirin 325 MG tablet, Take 325 mg by mouth Daily. At bedtime, Disp: , Rfl:   •  atorvastatin (LIPITOR) 20 MG tablet, Take 1 tablet by mouth Daily., Disp: 90 tablet, Rfl: 1  •  Calcium Carb-Cholecalciferol (CALCIUM-VITAMIN D) 500-200 MG-UNIT per tablet, Take 1 tablet by mouth 2 (Two) Times a Day With Meals., Disp: 60 tablet, Rfl: 2  •  choline fenofibrate (TRILIPIX) 135 MG capsule, Take 1 capsule by mouth Daily., Disp: 90 capsule, Rfl: 3  •  dilTIAZem XR (DILT-XR) 180 MG 24 hr capsule, Take 1 capsule by mouth  Daily., Disp: 90 capsule, Rfl: 2  •  ibuprofen (ADVIL,MOTRIN) 800 MG tablet, Take 1 tablet by mouth Every 6 (Six) Hours As Needed for Mild Pain ., Disp: 120 tablet, Rfl: 0  •  lisinopril (PRINIVIL,ZESTRIL) 20 MG tablet, Take 20 mg by mouth Daily., Disp: , Rfl:   •  loperamide (IMODIUM) 2 MG capsule, Take 1 capsule by mouth Every 4 (Four) Hours As Needed for Diarrhea., Disp: 120 capsule, Rfl: 0  •  LORazepam (ATIVAN) 1 MG tablet, Take 1 mg by mouth 2 (Two) Times a Day. Scheduled to take in the morning and one with dinner, Disp: , Rfl: 0  •  methocarbamol (ROBAXIN) 500 MG tablet, Take 1 tablet by mouth 4 (Four) Times a Day As Needed for Muscle Spasms., Disp: 90 tablet, Rfl: 5  •  metoprolol tartrate (LOPRESSOR) 25 MG tablet, TAKE 1 TABLET EVERY 12 HOURS, Disp: 60 tablet, Rfl: 6  •  nitroglycerin (NITROSTAT) 0.4 MG SL tablet, Place 1 tablet under the tongue See Admin Instructions. PRN cp, if not relieved in 5 min repeat and go to ER, Disp: 25 tablet, Rfl: 12  •  omeprazole (priLOSEC) 20 MG capsule, Take 1 capsule by mouth Daily., Disp: 90 capsule, Rfl: 5  •  ondansetron ODT (ZOFRAN ODT) 4 MG disintegrating tablet, Dissolve 1 tablet on the tongue and swallow Every 8 (Eight) Hours As Needed for Nausea or Vomiting., Disp: 90 tablet, Rfl: 0  •  potassium chloride (K-DUR) 10 MEQ CR tablet, Take 1 tablet by mouth 2 (Two) Times a Day With Meals., Disp: 180 tablet, Rfl: 3  •  rivaroxaban (XARELTO) 20 MG tablet, Take 1 tablet by mouth Daily., Disp: 90 tablet, Rfl: 3  •  sertraline (ZOLOFT) 100 MG tablet, Take 100 mg by mouth 2 (Two) Times a Day., Disp: , Rfl: 1    Allergies:  Codeine; Latex; Morphine and related; and Pravachol [pravastatin sodium]    Family Hx:  Family History   Problem Relation Age of Onset   • Hypertension Mother    • Hyperlipidemia Mother    • Mental illness Mother    • Thyroid disease Mother    • Diabetes Maternal Grandmother    • Cancer Maternal Grandmother    • Depression Other    • Diabetes Other    •  Heart disease Other    • Hypertension Other    • Osteoporosis Other    • Thyroid disease Other    • Cancer Maternal Aunt    • Cancer Paternal Aunt    • Breast cancer Paternal Aunt         Social History:  Social History     Socioeconomic History   • Marital status:      Spouse name: Not on file   • Number of children: Not on file   • Years of education: Not on file   • Highest education level: Not on file   Social Needs   • Financial resource strain: Not hard at all   • Food insecurity:     Worry: Never true     Inability: Never true   • Transportation needs:     Medical: No     Non-medical: No   Tobacco Use   • Smoking status: Never Smoker   • Smokeless tobacco: Never Used   • Tobacco comment: smoking cessation    Substance and Sexual Activity   • Alcohol use: No   • Drug use: No     Comment: lortab / oxycontin   • Sexual activity: Defer       Review of Systems  Review of Systems   Constitutional: Negative for chills, diaphoresis and fever.   HENT: Negative for sneezing and sore throat.    Eyes: Negative for pain and discharge.   Respiratory: Negative for cough and shortness of breath.    Gastrointestinal: Positive for diarrhea and nausea. Negative for constipation and vomiting.   Endocrine: Negative for cold intolerance and heat intolerance.   Genitourinary: Negative for difficulty urinating, dysuria, frequency and urgency.   Musculoskeletal: Negative for arthralgias and myalgias.   Skin: Negative for color change and pallor.   Allergic/Immunologic: Negative for environmental allergies and food allergies.   Neurological: Negative for dizziness, syncope and weakness.   Psychiatric/Behavioral: Negative for confusion and sleep disturbance.       Physical Exam:  No Physical Exam due to Telephone Encounter     Objective:     Data Reviewed:     LABS:   Lab Results   Component Value Date    GLUCOSE 174 (H) 03/16/2020    BUN 10 03/16/2020    CREATININE 1.04 (H) 03/16/2020    EGFRIFNONA 53 (L) 03/16/2020    BCR 9.6  03/16/2020    K 3.6 03/16/2020    CO2 23.0 03/16/2020    CALCIUM 9.3 03/16/2020    ALBUMIN 3.50 03/16/2020    AST 18 03/16/2020    ALT 17 03/16/2020     Lab Results   Component Value Date    WBC 12.53 (H) 03/15/2020    HGB 12.4 03/15/2020    HCT 36.8 03/15/2020    MCV 88.0 03/15/2020     03/15/2020     Lab Results   Component Value Date    CHOL 148 11/18/2019    CHLPL 148 12/15/2016    TRIG 249 (H) 11/18/2019    HDL 55 11/18/2019    LDL 43 11/18/2019     Lab Results   Component Value Date    TSH 1.370 07/24/2019     Lab Results   Component Value Date    HGBA1C 5.7 (H) 01/31/2018    HGBA1C 5.3 04/02/2015     Lab Results   Component Value Date    MICROALBUR <0.6 01/31/2018    CREATININE 1.04 (H) 03/16/2020     Lab Results   Component Value Date    FERRITIN 12.10 11/12/2018           Assessment/Plan:       Assessment/Plan   Lana was seen today for epiploic appendagitis.    Diagnoses and all orders for this visit:    Diarrhea, unspecified type  -     Gastrointestinal Panel, PCR - Stool, Per Rectum; Future  -     Stool Culture (Reference Lab) - Stool, Per Rectum; Future    Will obtain GI panel given recent antibiotic use.   Will follow up after results.    Time: 8 minutes     FOLLOW-UP  Return in about 1 week (around 4/17/2020) for Recheck.      RISK SCORE: 3        This document has been electronically signed by Allie Martin MD on April 10, 2020 11:25

## 2020-04-10 NOTE — PROGRESS NOTES
I have reviewed the patient.  I have reviewed the notes, assessments, and/or procedures performed by Dr Allie Martin, I concur with her  documentation and assessment and plan for Lana Mata.          This document has been electronically signed by Moncho Zhao MD on April 10, 2020 12:26

## 2020-04-15 PROCEDURE — 87427 SHIGA-LIKE TOXIN AG IA: CPT | Performed by: STUDENT IN AN ORGANIZED HEALTH CARE EDUCATION/TRAINING PROGRAM

## 2020-04-15 PROCEDURE — 87046 STOOL CULTR AEROBIC BACT EA: CPT | Performed by: STUDENT IN AN ORGANIZED HEALTH CARE EDUCATION/TRAINING PROGRAM

## 2020-04-15 PROCEDURE — 87045 FECES CULTURE AEROBIC BACT: CPT | Performed by: STUDENT IN AN ORGANIZED HEALTH CARE EDUCATION/TRAINING PROGRAM

## 2020-04-20 LAB
CAMPYLOBACTER STL CULT: NORMAL
E COLI SXT STL QL IA: NEGATIVE
Lab: NORMAL
Lab: NORMAL
SALM + SHIG STL CULT: NORMAL

## 2020-04-30 ENCOUNTER — TELEPHONE (OUTPATIENT)
Dept: FAMILY MEDICINE CLINIC | Facility: CLINIC | Age: 65
End: 2020-04-30

## 2020-05-08 ENCOUNTER — OFFICE VISIT (OUTPATIENT)
Dept: GASTROENTEROLOGY | Facility: CLINIC | Age: 65
End: 2020-05-08

## 2020-05-08 ENCOUNTER — HOSPITAL ENCOUNTER (OUTPATIENT)
Facility: HOSPITAL | Age: 65
Setting detail: HOSPITAL OUTPATIENT SURGERY
Discharge: HOME | End: 2020-05-08
Attending: INTERNAL MEDICINE | Admitting: INTERNAL MEDICINE

## 2020-05-08 VITALS
BODY MASS INDEX: 36.75 KG/M2 | HEART RATE: 81 BPM | WEIGHT: 220.6 LBS | SYSTOLIC BLOOD PRESSURE: 105 MMHG | DIASTOLIC BLOOD PRESSURE: 70 MMHG | HEIGHT: 65 IN

## 2020-05-08 DIAGNOSIS — R11.0 NAUSEA: ICD-10-CM

## 2020-05-08 DIAGNOSIS — R10.30 LOWER ABDOMINAL PAIN: ICD-10-CM

## 2020-05-08 DIAGNOSIS — K21.9 GASTROESOPHAGEAL REFLUX DISEASE, ESOPHAGITIS PRESENCE NOT SPECIFIED: Primary | ICD-10-CM

## 2020-05-08 DIAGNOSIS — R19.7 DIARRHEA, UNSPECIFIED TYPE: ICD-10-CM

## 2020-05-08 PROCEDURE — 99204 OFFICE O/P NEW MOD 45 MIN: CPT | Performed by: INTERNAL MEDICINE

## 2020-05-08 RX ORDER — DICYCLOMINE HCL 20 MG
20 TABLET ORAL EVERY 6 HOURS
Qty: 90 TABLET | Refills: 5 | Status: SHIPPED | OUTPATIENT
Start: 2020-05-08 | End: 2020-06-07

## 2020-05-08 RX ORDER — DEXTROSE AND SODIUM CHLORIDE 5; .45 G/100ML; G/100ML
30 INJECTION, SOLUTION INTRAVENOUS CONTINUOUS PRN
Status: CANCELLED | OUTPATIENT
Start: 2020-06-18

## 2020-05-08 RX ORDER — ONDANSETRON 4 MG/1
4 TABLET, FILM COATED ORAL EVERY 8 HOURS PRN
Qty: 25 TABLET | Refills: 0 | COMMUNITY
End: 2021-08-30

## 2020-05-08 NOTE — PROGRESS NOTES
[facility]      Chief Complaint:   Chief Complaint   Patient presents with   • Diarrhea   • Abdominal Pain       Subjective     HPI:   Ms. Mata is a 65-year-old  female with past medical history of atrial fibrillation, hypertension, chronic pain disorder, congestive heart failure, generalized anxiety disorder presenting for evaluation for chronic diarrhea of 2 years duration.  Patient reportedly has 5-8 soft to watery stools a day for past 2 years.  Symptoms are unchanged with dairy intake or food intake.  She takes Imodium on as-needed basis with some help.  Had a bout of lower abdominal pain last month requiring hospital admission for epiploic appendagitis.  She has GERD for past several years with symptoms well controlled on Prilosec daily.  Has intermittent nausea for which she takes Zofran on as-needed basis.  Denied rectal bleeding, dysphagia or weight loss.  Has not had a colonoscopy or EGD.  She takes aspirin daily and denied other NSAID usage.    Past Medical History:   Past Medical History:   Diagnosis Date   • Abnormal gait 11/28/2012   • Abrasion 02/26/2015   • Acute ankle pain 03/31/2016   • Acute bronchitis 09/04/2015   • Allergic rhinitis 05/22/2012   • Asthenia 05/22/2014   • Atrial fibrillation (CMS/HCC) 03/04/2016    - converted    • Attempted suicide (CMS/[facility])    • Benign essential hypertension 03/04/2016   • Cardiovascular stress test abnormal 03/04/2016   • Cellulitis of knee 03/03/2015   • Chest pain 04/02/2015   • Chronic pain disorder    • Common cold 06/24/2014   • Congestive heart failure (CMS/[facility]) 02/04/2016   • Depressive disorder 05/22/2015    suspect more complicated psych issues      • Fibrocystic breast    • Gastroesophageal reflux disease 01/17/2013   • Generalized anxiety disorder 02/04/2016   • H/O echocardiogram     Normal LV function with Ef of 60-65%.Mildly dilated right ventricle with normal function. Grade 1B diastolic dysfunction with  mild CLVH.NO sig. valvular regurg. or stenosis.   • Headache 10/27/2014   • Hyperlipidemia 03/04/2016   • Low back pain 06/30/2016    Need for prophylactic vaccination against Streptococcus pneumoniae [pneumococcus]    01/23/2015    • Neck pain 06/30/2016   • Obstructive sleep apnea of adult 07/30/2015   • Obstructive sleep apnea syndrome    • Osteoarthritis    • Osteoporosis 06/30/2016   • Pain in right shoulder 06/30/2016   • Pain in right shoulder 04/24/2014   • Shortness of breath 04/02/2015   • Skin ulcer (CMS/HCC) 04/13/2015    left   • Thyrotoxicosis with or without goiter 10/24/2014   • Urinary incontinence 05/22/2014       Past Surgical History:  Past Surgical History:   Procedure Laterality Date   • BACK SURGERY  08/07/2015    Removal of intstrumentation,L5-S1.Exploration of fusion,L5-S1.Posterolateral fusion,L4-L5.Posterior segmental spinal instrumentation L4-5.Transforaminal interbody fusion Through right sided approach.Performed at    • COLONOSCOPY  05/04/2016    will order cologuard   • INJECTION OF MEDICATION  03/23/2015    celestone   • INJECTION OF MEDICATION  05/04/2016    KENALOG(6)   • LUMBAR FUSION      discectomy fusion with rods L4-S1   • LUMBAR LAMINECTOMY  1995   • MANDIBLE FRACTURE SURGERY      JAW WIRED SHUT   • MOUTH SURGERY  10/05/2015    Fractured mandible. Removal of arch bars.   • MOUTH SURGERY  1985    Fractured mandible. Removal of arch bars.   • NASAL SEPTUM SURGERY     • RHINOPLASTY     • TUBAL ABDOMINAL LIGATION  1986       Family History:  Family History   Problem Relation Age of Onset   • Hypertension Mother    • Hyperlipidemia Mother    • Mental illness Mother    • Thyroid disease Mother    • Diabetes Maternal Grandmother    • Cancer Maternal Grandmother    • Depression Other    • Diabetes Other    • Heart disease Other    • Hypertension Other    • Osteoporosis Other    • Thyroid disease Other    • Cancer Maternal Aunt    • Cancer Paternal Aunt    • Breast cancer Paternal Aunt         Social History:   reports that she has never smoked. She has never used smokeless tobacco. She reports that she does not drink alcohol or use drugs.    Medications:   Prior to Admission medications    Medication Sig Start Date End Date Taking? Authorizing Provider   alendronate (FOSAMAX) 70 MG tablet Take 1 tablet by mouth Every 7 (Seven) Days. 11/14/19  Yes Allie Martin MD   aMILoride (MIDAMOR) 5 MG tablet Take 1 tablet by mouth Daily. 5/27/18  Yes Nette Mejia MD   ARIPiprazole (ABILIFY) 5 MG tablet Take 5 mg by mouth Daily. At bedtime   Yes Gurdeep Castaneda MD   aspirin 325 MG tablet Take 325 mg by mouth Daily. At bedtime   Yes Gurdeep Castaneda MD   atorvastatin (LIPITOR) 20 MG tablet Take 1 tablet by mouth Daily. 12/3/19  Yes Kristyn Jurado MD   Calcium Carb-Cholecalciferol (CALCIUM-VITAMIN D) 500-200 MG-UNIT per tablet Take 1 tablet by mouth 2 (Two) Times a Day With Meals. 11/14/19  Yes Allie Martin MD   choline fenofibrate (TRILIPIX) 135 MG capsule Take 1 capsule by mouth Daily. 4/15/19  Yes Kristyn Jurado MD   dilTIAZem XR (DILT-XR) 180 MG 24 hr capsule Take 1 capsule by mouth Daily. 1/15/20  Yes Kristyn Jurado MD   ibuprofen (ADVIL,MOTRIN) 800 MG tablet Take 1 tablet by mouth Every 6 (Six) Hours As Needed for Mild Pain . 3/11/20  Yes Allie Martin MD   lisinopril (PRINIVIL,ZESTRIL) 20 MG tablet Take 20 mg by mouth Daily.   Yes Gurdeep Castaneda MD   loperamide (IMODIUM) 2 MG capsule Take 1 capsule by mouth Every 4 (Four) Hours As Needed for Diarrhea. 3/16/20  Yes Allie Martin MD   LORazepam (ATIVAN) 1 MG tablet Take 1 mg by mouth 2 (Two) Times a Day. Scheduled to take in the morning and one with dinner 3/22/18  Yes Gurdeep Castaneda MD   methocarbamol (ROBAXIN) 500 MG tablet Take 1 tablet by mouth 4 (Four) Times a Day As Needed for Muscle Spasms. 3/11/20  Yes Allie Martin MD   metoprolol tartrate (LOPRESSOR) 25 MG tablet  TAKE 1 TABLET EVERY 12 HOURS 12/2/19  Yes Nette Mejia MD   nitroglycerin (NITROSTAT) 0.4 MG SL tablet Place 1 tablet under the tongue See Admin Instructions. PRN cp, if not relieved in 5 min repeat and go to ER 12/15/16  Yes Camron Hancock MD   nystatin (MYCOSTATIN) 479670 UNIT/ML suspension Swish and swallow 5 mL 4 (Four) Times a Day. 4/30/20  Yes Allie Martin MD   omeprazole (priLOSEC) 20 MG capsule Take 1 capsule by mouth Daily. 11/19/19  Yes Allie Martin MD   ondansetron ODT (ZOFRAN ODT) 4 MG disintegrating tablet Dissolve 1 tablet on the tongue and swallow Every 8 (Eight) Hours As Needed for Nausea or Vomiting. 3/16/20  Yes Allie Martin MD   potassium chloride (K-DUR) 10 MEQ CR tablet Take 1 tablet by mouth 2 (Two) Times a Day With Meals. 3/26/20  Yes Kristyn Jurado MD   rivaroxaban (XARELTO) 20 MG tablet Take 1 tablet by mouth Daily. 10/21/19  Yes Kristyn Jurado MD   sertraline (ZOLOFT) 100 MG tablet Take 100 mg by mouth 2 (Two) Times a Day. 3/22/18  Yes Gurdeep Castaneda MD   dicyclomine (BENTYL) 20 MG tablet Take 1 tablet by mouth Every 6 (Six) Hours for 30 days. 5/8/20 6/7/20  Mikie Gregg MD   ondansetron (Zofran) 4 MG tablet Take 1 tablet by mouth Every 8 (Eight) Hours As Needed for Nausea or Vomiting.    Mikie Gregg MD       Allergies:  Codeine; Latex; Morphine and related; and Pravachol [pravastatin sodium]    ROS:    Review of Systems   Constitutional: Negative for chills, fatigue, fever and unexpected weight change.   HENT: Negative for congestion, ear discharge, hearing loss, nosebleeds and sore throat.    Eyes: Negative for pain, discharge and redness.   Respiratory: Negative for cough, chest tightness, shortness of breath and wheezing.    Cardiovascular: Negative for chest pain and palpitations.   Gastrointestinal: Positive for abdominal pain, diarrhea and nausea. Negative for abdominal distention, blood in stool, constipation and  "vomiting.   Endocrine: Negative for cold intolerance, polydipsia, polyphagia and polyuria.   Genitourinary: Negative for dysuria, flank pain, frequency, hematuria and urgency.   Musculoskeletal: Negative for arthralgias, back pain, joint swelling and myalgias.   Skin: Negative for color change, pallor and rash.   Neurological: Negative for tremors, seizures, syncope, weakness and headaches.   Hematological: Negative for adenopathy. Does not bruise/bleed easily.   Psychiatric/Behavioral: Negative for behavioral problems, confusion, dysphoric mood, hallucinations and suicidal ideas. The patient is not nervous/anxious.    Has GERD.  Objective     Blood pressure 105/70, pulse 81, height 165.1 cm (65\"), weight 100 kg (220 lb 9.6 oz), not currently breastfeeding.    Physical Exam   Constitutional: She is oriented to person, place, and time. She appears well-developed and well-nourished.   HENT:   Head: Normocephalic and atraumatic.   Mouth/Throat: Oropharynx is clear and moist.   Eyes: Pupils are equal, round, and reactive to light. Conjunctivae and EOM are normal.   Neck: Normal range of motion. Neck supple. No thyromegaly present.   Cardiovascular: Normal rate, regular rhythm and normal heart sounds.   No murmur heard.  Pulmonary/Chest: Effort normal and breath sounds normal. She has no wheezes.   Abdominal: Soft. Bowel sounds are normal. She exhibits no distension and no mass. There is no tenderness. No hernia.   Genitourinary:   Genitourinary Comments: No lesions noted   Musculoskeletal: Normal range of motion. She exhibits no edema or tenderness.   Lymphadenopathy:     She has no cervical adenopathy.   Neurological: She is alert and oriented to person, place, and time. No cranial nerve deficit.   Skin: Skin is warm and dry. No rash noted.   Psychiatric: She has a normal mood and affect. Thought content normal.      Extremities: No edema, cyanosis or clubbing.    Assessment/Plan   Lana was seen today for diarrhea " and abdominal pain.    Diagnoses and all orders for this visit:    Gastroesophageal reflux disease, esophagitis presence not specified  -     Case Request; Standing  -     Case Request    Lower abdominal pain  -     Case Request; Standing  -     Case Request    Diarrhea, unspecified type  -     Case Request; Standing  -     Case Request    Nausea  -     Case Request; Standing  -     Case Request    Other orders  -     Follow Anesthesia Guidelines / Standing Orders; Future  -     Obtain Informed Consent; Future  -     dicyclomine (BENTYL) 20 MG tablet; Take 1 tablet by mouth Every 6 (Six) Hours for 30 days.    1.  Diarrhea, likely due to irritable bowel syndrome or medications side effect.  Could also be due to inflammatory bowel disease colorectal neoplasia.  Add Bentyl 20 mg p.o. 4 times daily.  Schedule colonoscopy for further evaluation.  Continue Imodium as needed.  2.  Longstanding GERD, rule out Boyd's.  Continue Prilosec.  Schedule EGD for Boyd's screening.  3.  Nausea rule out gastritis and peptic ulcer disease given the concomitant aspirin usage.  Continue Prilosec.  Schedule EGD for further evaluation.  Gastric emptying scan if EGD is unremarkable.  4.  Obesity, recommend exercise and diet control.      ESOPHAGOGASTRODUODENOSCOPY (N/A), COLONOSCOPY (N/A)     Diagnosis Plan   1. Gastroesophageal reflux disease, esophagitis presence not specified  Case Request    dextrose 5 % and sodium chloride 0.45 % infusion    Case Request   2. Lower abdominal pain  Case Request    dextrose 5 % and sodium chloride 0.45 % infusion    Case Request   3. Diarrhea, unspecified type  Case Request    dextrose 5 % and sodium chloride 0.45 % infusion    Case Request   4. Nausea  Case Request    dextrose 5 % and sodium chloride 0.45 % infusion    Case Request       Anticipated Surgical Procedure:  Orders Placed This Encounter   Procedures   • Follow Anesthesia Guidelines / Standing Orders     Standing Status:   Future   •  Obtain Informed Consent     Standing Status:   Future     Order Specific Question:   Informed Consent Given For     Answer:   egd and colonoscopy       The risks, benefits, and alternatives of this procedure have been discussed with the patient or the responsible party- the patient understands and agrees to proceed.            This document has been electronically signed by Mikie Gregg MD on May 8, 2020 10:30

## 2020-05-08 NOTE — PATIENT INSTRUCTIONS

## 2020-05-21 ENCOUNTER — OFFICE VISIT (OUTPATIENT)
Dept: GASTROENTEROLOGY | Facility: CLINIC | Age: 65
End: 2020-05-21

## 2020-05-21 VITALS — BODY MASS INDEX: 37.49 KG/M2 | WEIGHT: 225 LBS | HEIGHT: 65 IN

## 2020-05-21 DIAGNOSIS — R11.0 NAUSEA: ICD-10-CM

## 2020-05-21 DIAGNOSIS — R19.7 DIARRHEA, UNSPECIFIED TYPE: Primary | ICD-10-CM

## 2020-05-21 PROCEDURE — 99442 PR PHYS/QHP TELEPHONE EVALUATION 11-20 MIN: CPT | Performed by: INTERNAL MEDICINE

## 2020-05-21 NOTE — PROGRESS NOTES
Chief Complaint   Patient presents with   • Fecal Incontinence   • Mucous in stool   • Diarrhea   You have chosen to receive care through a telephone visit. Do you consent to use a telephone visit for your medical care today? Yes    Subjective    Lana Mata is a 65 y.o. female.    History of Present Illness  Patient had a 15-minute telephone follow-up visit today due to current pandemic.  She has continued symptoms with diarrhea and nausea.  Has 3-4 soft stools per day.  Also had a large amount of mucus passage in the stool yesterday.  Taking Bentyl daily with some help.  Denied vomiting, rectal bleeding or weight loss.  Has EGD and colonoscopy scheduled for 18th of next month.       The following portions of the patient's history were reviewed and updated as appropriate:   Past Medical History:   Diagnosis Date   • Abnormal gait 11/28/2012   • Abrasion 02/26/2015   • Acute ankle pain 03/31/2016   • Acute bronchitis 09/04/2015   • Allergic rhinitis 05/22/2012   • Asthenia 05/22/2014   • Atrial fibrillation (CMS/[facility]) 03/04/2016    - converted    • Attempted suicide (CMS/[facility])    • Benign essential hypertension 03/04/2016   • Cardiovascular stress test abnormal 03/04/2016   • Cellulitis of knee 03/03/2015   • Chest pain 04/02/2015   • Chronic pain disorder    • Common cold 06/24/2014   • Congestive heart failure (CMS/[facility]) 02/04/2016   • Depressive disorder 05/22/2015    suspect more complicated psych issues      • Fibrocystic breast    • Gastroesophageal reflux disease 01/17/2013   • Generalized anxiety disorder 02/04/2016   • H/O echocardiogram     Normal LV function with Ef of 60-65%.Mildly dilated right ventricle with normal function. Grade 1B diastolic dysfunction with mild CLVH.NO sig. valvular regurg. or stenosis.   • Headache 10/27/2014   • Hyperlipidemia 03/04/2016   • Low back pain 06/30/2016    Need for prophylactic vaccination against Streptococcus pneumoniae [pneumococcus]    01/23/2015    • Neck  pain 2016   • Obstructive sleep apnea of adult 2015   • Obstructive sleep apnea syndrome    • Osteoarthritis    • Osteoporosis 2016   • Pain in right shoulder 2016   • Pain in right shoulder 2014   • Shortness of breath 2015   • Skin ulcer (CMS/HCC) 2015    left   • Thyrotoxicosis with or without goiter 10/24/2014   • Urinary incontinence 2014     Past Surgical History:   Procedure Laterality Date   • BACK SURGERY  2015    Removal of intstrumentation,L5-S1.Exploration of fusion,L5-S1.Posterolateral fusion,L4-L5.Posterior segmental spinal instrumentation L4-5.Transforaminal interbody fusion Through right sided approach.Performed at    • COLONOSCOPY  2016    will order cologuard   • INJECTION OF MEDICATION  2015    celestone   • INJECTION OF MEDICATION  2016    KENALOG(6)   • LUMBAR FUSION      discectomy fusion with rods L4-S1   • LUMBAR LAMINECTOMY     • MANDIBLE FRACTURE SURGERY      JAW WIRED SHUT   • MOUTH SURGERY  10/05/2015    Fractured mandible. Removal of arch bars.   • MOUTH SURGERY      Fractured mandible. Removal of arch bars.   • NASAL SEPTUM SURGERY     • RHINOPLASTY     • TUBAL ABDOMINAL LIGATION       Family History   Problem Relation Age of Onset   • Hypertension Mother    • Hyperlipidemia Mother    • Mental illness Mother    • Thyroid disease Mother    • Diabetes Maternal Grandmother    • Cancer Maternal Grandmother    • Depression Other    • Diabetes Other    • Heart disease Other    • Hypertension Other    • Osteoporosis Other    • Thyroid disease Other    • Cancer Maternal Aunt    • Cancer Paternal Aunt    • Breast cancer Paternal Aunt      OB History        4    Para   4    Term   4            AB        Living           SAB        TAB        Ectopic        Molar        Multiple        Live Births                  Prior to Admission medications    Medication Sig Start Date End Date Taking? Authorizing  Provider   alendronate (FOSAMAX) 70 MG tablet Take 1 tablet by mouth Every 7 (Seven) Days. 11/14/19  Yes Allie Martin MD   aMILoride (MIDAMOR) 5 MG tablet Take 1 tablet by mouth Daily. 5/27/18  Yes Nette Mejia MD   ARIPiprazole (ABILIFY) 5 MG tablet Take 5 mg by mouth Daily. At bedtime   Yes Gurdeep Castaneda MD   aspirin 325 MG tablet Take 325 mg by mouth Daily. At bedtime   Yes Gurdeep Castaneda MD   atorvastatin (LIPITOR) 20 MG tablet Take 1 tablet by mouth Daily. 12/3/19  Yes Kristyn Jurado MD   Calcium Carb-Cholecalciferol (CALCIUM-VITAMIN D) 500-200 MG-UNIT per tablet Take 1 tablet by mouth 2 (Two) Times a Day With Meals. 11/14/19  Yes Allie Martin MD   choline fenofibrate (TRILIPIX) 135 MG capsule Take 1 capsule by mouth Daily. 4/15/19  Yes Kristyn Jurado MD   dicyclomine (BENTYL) 20 MG tablet Take 1 tablet by mouth Every 6 (Six) Hours for 30 days. 5/8/20 6/7/20 Yes Mikie Gregg MD   dilTIAZem XR (DILT-XR) 180 MG 24 hr capsule Take 1 capsule by mouth Daily. 1/15/20  Yes Kristyn Jurado MD   ibuprofen (ADVIL,MOTRIN) 800 MG tablet Take 1 tablet by mouth Every 6 (Six) Hours As Needed for Mild Pain . 3/11/20  Yes Allie Martin MD   lisinopril (PRINIVIL,ZESTRIL) 20 MG tablet Take 20 mg by mouth Daily.   Yes Gurdeep Castaneda MD   loperamide (IMODIUM) 2 MG capsule Take 1 capsule by mouth Every 4 (Four) Hours As Needed for Diarrhea. 3/16/20  Yes Allie Martin MD   LORazepam (ATIVAN) 1 MG tablet Take 1 mg by mouth 2 (Two) Times a Day. Scheduled to take in the morning and one with dinner 3/22/18  Yes Gurdeep Castaneda MD   methocarbamol (ROBAXIN) 500 MG tablet Take 1 tablet by mouth 4 (Four) Times a Day As Needed for Muscle Spasms. 3/11/20  Yes Allie Martin MD   metoprolol tartrate (LOPRESSOR) 25 MG tablet TAKE 1 TABLET EVERY 12 HOURS 12/2/19  Yes Nette Mejia MD   nitroglycerin (NITROSTAT) 0.4 MG SL tablet Place 1  tablet under the tongue See Admin Instructions. PRN cp, if not relieved in 5 min repeat and go to ER 12/15/16  Yes Camron Hancock MD   nystatin (MYCOSTATIN) 148748 UNIT/ML suspension Swish and swallow 5 mL 4 (Four) Times a Day. 4/30/20  Yes Allie Martin MD   omeprazole (priLOSEC) 20 MG capsule Take 1 capsule by mouth Daily. 11/19/19  Yes Allie Martin MD   ondansetron (Zofran) 4 MG tablet Take 1 tablet by mouth Every 8 (Eight) Hours As Needed for Nausea or Vomiting.   Yes Mikie Gregg MD   ondansetron ODT (ZOFRAN ODT) 4 MG disintegrating tablet Dissolve 1 tablet on the tongue and swallow Every 8 (Eight) Hours As Needed for Nausea or Vomiting. 3/16/20  Yes Allie Martin MD   potassium chloride (K-DUR) 10 MEQ CR tablet Take 1 tablet by mouth 2 (Two) Times a Day With Meals. 3/26/20  Yes Kristyn Jurado MD   rivaroxaban (XARELTO) 20 MG tablet Take 1 tablet by mouth Daily. 10/21/19  Yes Kristyn Jurado MD   sertraline (ZOLOFT) 100 MG tablet Take 100 mg by mouth 2 (Two) Times a Day. 3/22/18  Yes Gurdeep Castaneda MD   polyethylene glycol (GoLYTELY) 236 g solution Starting at noon on day prior to procedure, drink 8 ounces every 30 minutes until all gone or stools are clear. May add flavor packet. 5/8/20 5/21/20  Mikie Gregg MD     Allergies   Allergen Reactions   • Codeine Nausea And Vomiting   • Latex Rash   • Morphine And Related Hallucinations   • Pravachol [Pravastatin Sodium] Myalgia     Social History     Socioeconomic History   • Marital status:      Spouse name: Not on file   • Number of children: Not on file   • Years of education: Not on file   • Highest education level: Not on file   Social Needs   • Financial resource strain: Not hard at all   • Food insecurity:     Worry: Never true     Inability: Never true   • Transportation needs:     Medical: No     Non-medical: No   Tobacco Use   • Smoking status: Never Smoker   • Smokeless tobacco: Never Used   •  "Tobacco comment: smoking cessation    Substance and Sexual Activity   • Alcohol use: No   • Drug use: No     Comment: lortab / oxycontin   • Sexual activity: Defer       Review of Systems  Review of Systems   Constitutional: Negative for chills, fatigue, fever and unexpected weight change.   HENT: Negative for congestion, ear discharge, hearing loss, nosebleeds and sore throat.    Eyes: Negative for pain, discharge and redness.   Respiratory: Negative for cough, chest tightness, shortness of breath and wheezing.    Cardiovascular: Negative for chest pain and palpitations.   Gastrointestinal: Positive for diarrhea and nausea. Negative for abdominal distention, abdominal pain, blood in stool, constipation and vomiting.   Endocrine: Negative for cold intolerance, polydipsia, polyphagia and polyuria.   Genitourinary: Negative for dysuria, flank pain, frequency, hematuria and urgency.   Musculoskeletal: Negative for arthralgias, back pain, joint swelling and myalgias.   Skin: Negative for color change, pallor and rash.   Neurological: Negative for tremors, seizures, syncope, weakness and headaches.   Hematological: Negative for adenopathy. Does not bruise/bleed easily.   Psychiatric/Behavioral: Negative for behavioral problems, confusion, dysphoric mood, hallucinations and suicidal ideas. The patient is not nervous/anxious.         Ht 165.1 cm (65\")   Wt 102 kg (225 lb)   BMI 37.44 kg/m²     Objective    Physical Exam  Office Visit on 04/10/2020   Component Date Value Ref Range Status   • Salmonella/Shigella Screen 04/15/2020 Final report   Final   • Result 1 04/15/2020 Comment   Final    No Salmonella or Shigella recovered.   • Campylobacter Culture 04/15/2020 Final report   Final   • Result 1 04/15/2020 Comment   Final    No Campylobacter species isolated.   • E coli, Shiga toxin Assay 04/15/2020 Negative  Negative Final     Assessment/Plan    No diagnosis found..   1.  Diarrhea, likely due to irritable bowel " syndrome.  Could also be due to inflammatory bowel disease or colorectal neoplasia.  Continue Bentyl and add Imodium as needed.  Add probiotics daily.  Colonoscopy is scheduled for further evaluation.  2.  Longstanding GERD with symptoms well controlled with PPI.  Continue Prilosec and antireflux lifestyle.  EGD is scheduled.  3.  Nausea, likely due to gastritis or peptic ulcer disease.  Continue Prilosec.  EGD is scheduled for further evaluation.  Gastric emptying scan if unremarkable.  4.  Obesity, exercise and diet control    Orders placed during this encounter include:  No orders of the defined types were placed in this encounter.      * Surgery not found *    Review and/or summary of lab tests, radiology, procedures, medications. Review and summary of old records and obtaining of history. The risks and benefits of my recommendations, as well as other treatment options were discussed with the patient today. Questions were answered.    No orders of the defined types were placed in this encounter.      Follow-up: No follow-ups on file.               Results for orders placed or performed in visit on 04/10/20   Stool Culture (Reference Lab) - Stool, Per Rectum   Result Value Ref Range    Salmonella/Shigella Screen Final report     Result 1 Comment     Campylobacter Culture Final report     Result 1 Comment     E coli, Shiga toxin Assay Negative Negative   Results for orders placed or performed during the hospital encounter of 03/14/20   Gold Top - SST   Result Value Ref Range    Extra Tube Hold for add-ons.    Green Top (Gel)   Result Value Ref Range    Extra Tube Hold for add-ons.    Urinalysis With Culture If Indicated - Urine, Clean Catch   Result Value Ref Range    Color, UA Dark Yellow Yellow, Straw, Dark Yellow, Татьяна    Appearance, UA Cloudy (A) Clear    pH, UA 5.5 5.0 - 9.0    Specific Gravity, UA 1.034 (H) 1.003 - 1.030    Glucose, UA Negative Negative    Ketones, UA Trace (A) Negative    Bilirubin, UA  Negative Negative    Blood, UA Negative Negative    Protein, UA Negative Negative    Leuk Esterase, UA Negative Negative    Nitrite, UA Negative Negative    Urobilinogen, UA 0.2 E.U./dL 0.2 - 1.0 E.U./dL   CBC Auto Differential   Result Value Ref Range    WBC 12.53 (H) 3.40 - 10.80 10*3/mm3    RBC 4.18 3.77 - 5.28 10*6/mm3    Hemoglobin 12.4 12.0 - 15.9 g/dL    Hematocrit 36.8 34.0 - 46.6 %    MCV 88.0 79.0 - 97.0 fL    MCH 29.7 26.6 - 33.0 pg    MCHC 33.7 31.5 - 35.7 g/dL    RDW 13.1 12.3 - 15.4 %    RDW-SD 42.0 37.0 - 54.0 fl    MPV 10.1 6.0 - 12.0 fL    Platelets 212 140 - 450 10*3/mm3    Neutrophil % 69.0 42.7 - 76.0 %    Lymphocyte % 18.0 (L) 19.6 - 45.3 %    Monocyte % 8.4 5.0 - 12.0 %    Eosinophil % 3.8 0.3 - 6.2 %    Basophil % 0.5 0.0 - 1.5 %    Immature Grans % 0.3 0.0 - 0.5 %    Neutrophils, Absolute 8.65 (H) 1.70 - 7.00 10*3/mm3    Lymphocytes, Absolute 2.25 0.70 - 3.10 10*3/mm3    Monocytes, Absolute 1.05 (H) 0.10 - 0.90 10*3/mm3    Eosinophils, Absolute 0.48 (H) 0.00 - 0.40 10*3/mm3    Basophils, Absolute 0.06 0.00 - 0.20 10*3/mm3    Immature Grans, Absolute 0.04 0.00 - 0.05 10*3/mm3    nRBC 0.0 0.0 - 0.2 /100 WBC   CBC Auto Differential   Result Value Ref Range    WBC 15.36 (H) 3.40 - 10.80 10*3/mm3    RBC 4.57 3.77 - 5.28 10*6/mm3    Hemoglobin 13.6 12.0 - 15.9 g/dL    Hematocrit 40.8 34.0 - 46.6 %    MCV 89.3 79.0 - 97.0 fL    MCH 29.8 26.6 - 33.0 pg    MCHC 33.3 31.5 - 35.7 g/dL    RDW 13.0 12.3 - 15.4 %    RDW-SD 42.6 37.0 - 54.0 fl    MPV 10.1 6.0 - 12.0 fL    Platelets 250 140 - 450 10*3/mm3    Neutrophil % 71.9 42.7 - 76.0 %    Lymphocyte % 15.3 (L) 19.6 - 45.3 %    Monocyte % 8.0 5.0 - 12.0 %    Eosinophil % 3.8 0.3 - 6.2 %    Basophil % 0.5 0.0 - 1.5 %    Immature Grans % 0.5 0.0 - 0.5 %    Neutrophils, Absolute 11.06 (H) 1.70 - 7.00 10*3/mm3    Lymphocytes, Absolute 2.35 0.70 - 3.10 10*3/mm3    Monocytes, Absolute 1.23 (H) 0.10 - 0.90 10*3/mm3    Eosinophils, Absolute 0.58 (H) 0.00 - 0.40  10*3/mm3    Basophils, Absolute 0.07 0.00 - 0.20 10*3/mm3    Immature Grans, Absolute 0.07 (H) 0.00 - 0.05 10*3/mm3    nRBC 0.0 0.0 - 0.2 /100 WBC   CBC Auto Differential   Result Value Ref Range    WBC 14.60 (H) 3.40 - 10.80 10*3/mm3    RBC 4.65 3.77 - 5.28 10*6/mm3    Hemoglobin 13.8 12.0 - 15.9 g/dL    Hematocrit 40.6 34.0 - 46.6 %    MCV 87.3 79.0 - 97.0 fL    MCH 29.7 26.6 - 33.0 pg    MCHC 34.0 31.5 - 35.7 g/dL    RDW 13.0 12.3 - 15.4 %    RDW-SD 41.0 37.0 - 54.0 fl    MPV 9.9 6.0 - 12.0 fL    Platelets 249 140 - 450 10*3/mm3    Neutrophil % 71.2 42.7 - 76.0 %    Lymphocyte % 16.5 (L) 19.6 - 45.3 %    Monocyte % 7.9 5.0 - 12.0 %    Eosinophil % 3.5 0.3 - 6.2 %    Basophil % 0.4 0.0 - 1.5 %    Immature Grans % 0.5 0.0 - 0.5 %    Neutrophils, Absolute 10.40 (H) 1.70 - 7.00 10*3/mm3    Lymphocytes, Absolute 2.41 0.70 - 3.10 10*3/mm3    Monocytes, Absolute 1.15 (H) 0.10 - 0.90 10*3/mm3    Eosinophils, Absolute 0.51 (H) 0.00 - 0.40 10*3/mm3    Basophils, Absolute 0.06 0.00 - 0.20 10*3/mm3    Immature Grans, Absolute 0.07 (H) 0.00 - 0.05 10*3/mm3    nRBC 0.0 0.0 - 0.2 /100 WBC   Lavender Top   Result Value Ref Range    Extra Tube hold for add-on    Lavender Top   Result Value Ref Range    Extra Tube hold for add-on    Lavender Top   Result Value Ref Range    Extra Tube hold for add-on    Light Blue Top   Result Value Ref Range    Extra Tube hold for add-on    Blood Culture - Blood, Hand, Left   Result Value Ref Range    Blood Culture No growth at 5 days    Blood Culture - Blood, Arm, Right   Result Value Ref Range    Blood Culture No growth at 5 days    Lipase   Result Value Ref Range    Lipase 56 13 - 60 U/L   Lactic Acid, Plasma   Result Value Ref Range    Lactate 1.1 0.5 - 2.0 mmol/L   Comprehensive Metabolic Panel   Result Value Ref Range    Glucose 174 (H) 65 - 99 mg/dL    BUN 10 8 - 23 mg/dL    Creatinine 1.04 (H) 0.57 - 1.00 mg/dL    Sodium 139 136 - 145 mmol/L    Potassium 3.6 3.5 - 5.2 mmol/L    Chloride  105 98 - 107 mmol/L    CO2 23.0 22.0 - 29.0 mmol/L    Calcium 9.3 8.6 - 10.5 mg/dL    Total Protein 6.6 6.0 - 8.5 g/dL    Albumin 3.50 3.50 - 5.20 g/dL    ALT (SGPT) 17 1 - 33 U/L    AST (SGOT) 18 1 - 32 U/L    Alkaline Phosphatase 55 39 - 117 U/L    Total Bilirubin 0.7 0.2 - 1.2 mg/dL    eGFR Non African Amer 53 (L) >60 mL/min/1.73    Globulin 3.1 gm/dL    A/G Ratio 1.1 g/dL    BUN/Creatinine Ratio 9.6 7.0 - 25.0    Anion Gap 11.0 5.0 - 15.0 mmol/L     *Note: Due to a large number of results and/or encounters for the requested time period, some results have not been displayed. A complete set of results can be found in Results Review.         This document has been electronically signed by Mikie Gregg MD on May 21, 2020 14:14

## 2020-05-21 NOTE — PATIENT INSTRUCTIONS

## 2020-05-29 RX ORDER — ATORVASTATIN CALCIUM 20 MG/1
20 TABLET, FILM COATED ORAL DAILY
Qty: 90 TABLET | Refills: 1 | Status: SHIPPED | OUTPATIENT
Start: 2020-05-29 | End: 2020-12-15 | Stop reason: SDUPTHER

## 2020-05-29 RX ORDER — ATORVASTATIN CALCIUM 20 MG/1
TABLET, FILM COATED ORAL
Qty: 90 TABLET | Refills: 1 | Status: SHIPPED | OUTPATIENT
Start: 2020-05-29 | End: 2020-05-29 | Stop reason: SDUPTHER

## 2020-06-10 DIAGNOSIS — G47.33 OBSTRUCTIVE SLEEP APNEA, ADULT: Primary | ICD-10-CM

## 2020-06-15 PROCEDURE — U0003 INFECTIOUS AGENT DETECTION BY NUCLEIC ACID (DNA OR RNA); SEVERE ACUTE RESPIRATORY SYNDROME CORONAVIRUS 2 (SARS-COV-2) (CORONAVIRUS DISEASE [COVID-19]), AMPLIFIED PROBE TECHNIQUE, MAKING USE OF HIGH THROUGHPUT TECHNOLOGIES AS DESCRIBED BY CMS-2020-01-R: HCPCS | Performed by: INTERNAL MEDICINE

## 2020-06-16 LAB
COVID LABCORP PRIORITY: NORMAL
SARS-COV-2 RNA RESP QL NAA+PROBE: NOT DETECTED

## 2020-06-22 ENCOUNTER — OFFICE VISIT (OUTPATIENT)
Dept: SLEEP MEDICINE | Facility: HOSPITAL | Age: 65
End: 2020-06-22

## 2020-06-22 DIAGNOSIS — G25.81 RESTLESS LEGS SYNDROME (RLS): ICD-10-CM

## 2020-06-22 DIAGNOSIS — R06.09 DYSPNEA ON EXERTION: ICD-10-CM

## 2020-06-22 DIAGNOSIS — G47.33 OBSTRUCTIVE SLEEP APNEA, ADULT: Primary | ICD-10-CM

## 2020-06-22 PROCEDURE — 99442 PR PHYS/QHP TELEPHONE EVALUATION 11-20 MIN: CPT | Performed by: NURSE PRACTITIONER

## 2020-06-22 NOTE — PROGRESS NOTES
Sleep Clinic Follow Up      You have chosen to receive care through a telephone visit. Do you consent to use a telephone visit for your medical care today? Yes    Date: 2020  Primary Care Physician: Allie Martin MD    Last office visit: 2019 (I reviewed this note)    CC: Follow up: SOPHIA on CPAP      Interim History:  Since the last visit:    1) mild SOPHIA -  Lana Mata has reportedly remained compliant with CPAP. She denies mask and machine issues, dry mouth, headaches, or pressures intolerance. She denies abnormal dreams, sleep paralysis, nasal congestion, URI sx. Patient complains of worsening dyspnea on exertion within the past few months - she gets short of breath if she gets up and walks ~50 ft, or while performing simple tasks such as making a sandwich.    Sleep Testin. HST on 2018, AHI of 7.9   2. CPAP titration on 2015, recommended 7 cm H2O   3. Currently on 7 cm H2O    PAP Data:    Data unavailable  Mask type: Full face mask  DME: [facility]    Bed time: 9103-2793  Sleep latency: 30 minutes  Number of times awakens during the night: 3  Wake time: 7068-8325  Estimated total sleep time at night: 7-8 hours  Caffeine intake: 1-2 cups of coffee, 2-3 cups of tea, and 1-2 sodas per day  Alcohol intake: 0 drinks per week  Nap time: maybe 2 x per week   Sleepiness with Driving: none       2) Patient denies RLS symptoms.     PMHx, FH, SH reviewed and pertinent changes are: Has been in normal sinus rhythm.       REVIEW OF SYSTEMS:   Negative for chest pain, SOA, fever, chills, cough, N/V/D, abdominal pain.    Smoking:none      Exam:  Unable to perform physical exam due to conducting telephone visit    Past Medical History:   Diagnosis Date   • Abnormal gait 2012   • Abrasion 2015   • Acute ankle pain 2016   • Acute bronchitis 2015   • Allergic rhinitis 2012   • Asthenia 2014   • Atrial fibrillation (CMS/HCC) 2016    - converted    •  Attempted suicide (CMS/[facility])    • Benign essential hypertension 03/04/2016   • Cardiovascular stress test abnormal 03/04/2016   • Cellulitis of knee 03/03/2015   • Chest pain 04/02/2015   • Chronic pain disorder    • Common cold 06/24/2014   • Congestive heart failure (CMS/[facility]) 02/04/2016   • Depressive disorder 05/22/2015    suspect more complicated psych issues      • Fibrocystic breast    • Gastroesophageal reflux disease 01/17/2013   • Generalized anxiety disorder 02/04/2016   • H/O echocardiogram     Normal LV function with Ef of 60-65%.Mildly dilated right ventricle with normal function. Grade 1B diastolic dysfunction with mild CLVH.NO sig. valvular regurg. or stenosis.   • Headache 10/27/2014   • Hyperlipidemia 03/04/2016   • Low back pain 06/30/2016    Need for prophylactic vaccination against Streptococcus pneumoniae [pneumococcus]    01/23/2015    • Neck pain 06/30/2016   • Obstructive sleep apnea of adult 07/30/2015   • Obstructive sleep apnea syndrome    • Osteoarthritis    • Osteoporosis 06/30/2016   • Pain in right shoulder 06/30/2016   • Pain in right shoulder 04/24/2014   • Shortness of breath 04/02/2015   • Skin ulcer (CMS/[facility]) 04/13/2015    left   • Thyrotoxicosis with or without goiter 10/24/2014   • Urinary incontinence 05/22/2014       Current Outpatient Medications:   •  alendronate (FOSAMAX) 70 MG tablet, Take 1 tablet by mouth Every 7 (Seven) Days., Disp: 4 tablet, Rfl: 5  •  aMILoride (MIDAMOR) 5 MG tablet, Take 1 tablet by mouth Daily., Disp: 30 tablet, Rfl: 6  •  ARIPiprazole (ABILIFY) 5 MG tablet, Take 5 mg by mouth Daily. At bedtime, Disp: , Rfl:   •  aspirin 325 MG tablet, Take 325 mg by mouth Daily. At bedtime, Disp: , Rfl:   •  atorvastatin (LIPITOR) 20 MG tablet, Take 1 tablet by mouth Daily., Disp: 90 tablet, Rfl: 1  •  Calcium Carb-Cholecalciferol (CALCIUM-VITAMIN D) 500-200 MG-UNIT per tablet, Take 1 tablet by mouth 2 (Two) Times a Day With Meals., Disp: 60 tablet, Rfl: 2  •   choline fenofibrate (Trilipix) 135 MG capsule, Take 1 capsule by mouth Daily., Disp: 90 capsule, Rfl: 3  •  dilTIAZem XR (DILT-XR) 180 MG 24 hr capsule, Take 1 capsule by mouth Daily., Disp: 90 capsule, Rfl: 2  •  ibuprofen (ADVIL,MOTRIN) 800 MG tablet, Take 1 tablet by mouth Every 6 (Six) Hours As Needed for Mild Pain ., Disp: 120 tablet, Rfl: 0  •  lisinopril (PRINIVIL,ZESTRIL) 20 MG tablet, Take 20 mg by mouth Daily., Disp: , Rfl:   •  loperamide (IMODIUM) 2 MG capsule, Take 1 capsule by mouth Every 4 (Four) Hours As Needed for Diarrhea., Disp: 120 capsule, Rfl: 0  •  LORazepam (ATIVAN) 1 MG tablet, Take 1 mg by mouth 2 (Two) Times a Day. Scheduled to take in the morning and one with dinner, Disp: , Rfl: 0  •  methocarbamol (ROBAXIN) 500 MG tablet, Take 1 tablet by mouth 4 (Four) Times a Day As Needed for Muscle Spasms., Disp: 90 tablet, Rfl: 5  •  metoprolol tartrate (LOPRESSOR) 25 MG tablet, TAKE 1 TABLET EVERY 12 HOURS, Disp: 60 tablet, Rfl: 6  •  nitroglycerin (NITROSTAT) 0.4 MG SL tablet, Place 1 tablet under the tongue See Admin Instructions. PRN cp, if not relieved in 5 min repeat and go to ER, Disp: 25 tablet, Rfl: 12  •  nystatin (MYCOSTATIN) [id number] UNIT/ML suspension, Swish and swallow 5 mL 4 (Four) Times a Day., Disp: 473 mL, Rfl: 0  •  omeprazole (priLOSEC) 20 MG capsule, Take 1 capsule by mouth Daily., Disp: 90 capsule, Rfl: 5  •  ondansetron (Zofran) 4 MG tablet, Take 1 tablet by mouth Every 8 (Eight) Hours As Needed for Nausea or Vomiting., Disp: 25 tablet, Rfl: 0  •  ondansetron ODT (ZOFRAN ODT) 4 MG disintegrating tablet, Dissolve 1 tablet on the tongue and swallow Every 8 (Eight) Hours As Needed for Nausea or Vomiting., Disp: 90 tablet, Rfl: 0  •  potassium chloride (K-DUR) 10 MEQ CR tablet, Take 1 tablet by mouth 2 (Two) Times a Day With Meals., Disp: 180 tablet, Rfl: 3  •  rivaroxaban (XARELTO) 20 MG tablet, Take 1 tablet by mouth Daily., Disp: 90 tablet, Rfl: 3  •  sertraline (ZOLOFT) 100 MG  tablet, Take 100 mg by mouth 2 (Two) Times a Day., Disp: , Rfl: 1      Assessment and Plan:    1. Obstructive sleep apnea Established, stable (1)  1. Reportedly compliant with PAP therapy - obtain compliance report  2. Continue PAP as prescribed  3. Script for PAP supplies  4. Return to clinic in 1 year with compliance report unless change in symptoms in interim period  2. Restless leg syndrome/ Alanis-Ekbom disease (RLS/WED) Established, stable (1)        3. Dyspnea on exertion - New (to me), additional workup planned   1.   Refer back to Pulmonary - referral sent   2.   Already follows with Cardiology       This visit has been rescheduled as a phone visit to comply with patient safety concerns in accordance with CDC recommendations. Total time of discussion was 12 minutes.    8 of 12 minutes spent telephone counseling patient extensively regarding:   PAP therapy, PAP compliance and PAP maintenance      RTC in 12 months. Patient agrees to return sooner if changes in symptoms.        This document has been electronically signed by JOHN Rizo on June 22, 2020 15:40          CC: Allie Martin MD          No ref. provider found

## 2020-08-03 DIAGNOSIS — R06.02 SHORTNESS OF BREATH: Primary | ICD-10-CM

## 2020-08-03 PROBLEM — E66.01 CLASS 2 SEVERE OBESITY DUE TO EXCESS CALORIES WITH SERIOUS COMORBIDITY AND BODY MASS INDEX (BMI) OF 37.0 TO 37.9 IN ADULT: Status: ACTIVE | Noted: 2020-08-03

## 2020-08-12 RX ORDER — LOPERAMIDE HYDROCHLORIDE 2 MG/1
2 CAPSULE ORAL EVERY 4 HOURS PRN
Qty: 120 CAPSULE | Refills: 3 | Status: SHIPPED | OUTPATIENT
Start: 2020-08-12 | End: 2021-06-07

## 2020-08-12 NOTE — TELEPHONE ENCOUNTER
PATIENT CALLED AND LEFT A VOICEMAIL REGARDING NEEDING A REFILL ON HER loperamide (IMODIUM) 2 MG capsule AND IS NEEDING THIS CALLED INTO [facility]. HER NUMBER TO CALL BACK [phone].      THANK YOU,      STEW

## 2020-10-21 DIAGNOSIS — I48.0 PAROXYSMAL ATRIAL FIBRILLATION (HCC): Primary | ICD-10-CM

## 2020-10-21 RX ORDER — POTASSIUM CHLORIDE 750 MG/1
10 TABLET, FILM COATED, EXTENDED RELEASE ORAL 2 TIMES DAILY WITH MEALS
Qty: 180 TABLET | Refills: 3 | Status: CANCELLED | OUTPATIENT
Start: 2020-10-21

## 2020-10-27 RX ORDER — DILTIAZEM HYDROCHLORIDE 180 MG/1
CAPSULE, EXTENDED RELEASE ORAL
Qty: 90 CAPSULE | Refills: 2 | Status: SHIPPED | OUTPATIENT
Start: 2020-10-27 | End: 2021-08-05

## 2020-10-27 RX ORDER — RIVAROXABAN 20 MG/1
TABLET, FILM COATED ORAL
Qty: 90 TABLET | Refills: 3 | Status: SHIPPED | OUTPATIENT
Start: 2020-10-27 | End: 2021-08-05

## 2020-10-28 DIAGNOSIS — M79.605 LEFT LEG PAIN: Primary | ICD-10-CM

## 2020-10-29 ENCOUNTER — TRANSCRIBE ORDERS (OUTPATIENT)
Dept: LAB | Facility: HOSPITAL | Age: 65
End: 2020-10-29

## 2020-10-29 ENCOUNTER — LAB (OUTPATIENT)
Dept: LAB | Facility: HOSPITAL | Age: 65
End: 2020-10-29

## 2020-10-29 ENCOUNTER — OFFICE VISIT (OUTPATIENT)
Dept: ORTHOPEDIC SURGERY | Facility: CLINIC | Age: 65
End: 2020-10-29

## 2020-10-29 VITALS — BODY MASS INDEX: 36.74 KG/M2 | HEIGHT: 65 IN | WEIGHT: 220.5 LBS

## 2020-10-29 DIAGNOSIS — A49.9 UTI (URINARY TRACT INFECTION), BACTERIAL: Primary | ICD-10-CM

## 2020-10-29 DIAGNOSIS — G89.29 CHRONIC PAIN OF LEFT ANKLE: ICD-10-CM

## 2020-10-29 DIAGNOSIS — I10 ESSENTIAL (PRIMARY) HYPERTENSION: ICD-10-CM

## 2020-10-29 DIAGNOSIS — Z87.81 HISTORY OF TIBIAL FRACTURE: ICD-10-CM

## 2020-10-29 DIAGNOSIS — N39.0 UTI (URINARY TRACT INFECTION), BACTERIAL: Primary | ICD-10-CM

## 2020-10-29 DIAGNOSIS — N18.30 STAGE 3 CHRONIC KIDNEY DISEASE, UNSPECIFIED WHETHER STAGE 3A OR 3B CKD (HCC): Primary | ICD-10-CM

## 2020-10-29 DIAGNOSIS — E83.42 HYPOMAGNESEMIA: ICD-10-CM

## 2020-10-29 DIAGNOSIS — E87.6 HYPOKALEMIA: ICD-10-CM

## 2020-10-29 DIAGNOSIS — M21.41 PES PLANUS OF BOTH FEET: ICD-10-CM

## 2020-10-29 DIAGNOSIS — Z96.9 RETAINED ORTHOPEDIC HARDWARE: ICD-10-CM

## 2020-10-29 DIAGNOSIS — E05.90 HYPERTHYROIDISM: ICD-10-CM

## 2020-10-29 DIAGNOSIS — M25.572 CHRONIC PAIN OF LEFT ANKLE: ICD-10-CM

## 2020-10-29 DIAGNOSIS — M76.822 POSTERIOR TIBIAL TENDON DYSFUNCTION, LEFT: ICD-10-CM

## 2020-10-29 DIAGNOSIS — M21.42 PES PLANUS OF BOTH FEET: ICD-10-CM

## 2020-10-29 DIAGNOSIS — I48.91 ATRIAL FIBRILLATION, UNSPECIFIED TYPE (HCC): ICD-10-CM

## 2020-10-29 PROCEDURE — 80053 COMPREHEN METABOLIC PANEL: CPT | Performed by: INTERNAL MEDICINE

## 2020-10-29 PROCEDURE — 81001 URINALYSIS AUTO W/SCOPE: CPT | Performed by: NURSE PRACTITIONER

## 2020-10-29 PROCEDURE — 36415 COLL VENOUS BLD VENIPUNCTURE: CPT | Performed by: INTERNAL MEDICINE

## 2020-10-29 PROCEDURE — 82306 VITAMIN D 25 HYDROXY: CPT | Performed by: INTERNAL MEDICINE

## 2020-10-29 PROCEDURE — 82570 ASSAY OF URINE CREATININE: CPT | Performed by: INTERNAL MEDICINE

## 2020-10-29 PROCEDURE — 96372 THER/PROPH/DIAG INJ SC/IM: CPT | Performed by: NURSE PRACTITIONER

## 2020-10-29 PROCEDURE — 83735 ASSAY OF MAGNESIUM: CPT | Performed by: INTERNAL MEDICINE

## 2020-10-29 PROCEDURE — 84156 ASSAY OF PROTEIN URINE: CPT | Performed by: INTERNAL MEDICINE

## 2020-10-29 PROCEDURE — 99214 OFFICE O/P EST MOD 30 MIN: CPT | Performed by: NURSE PRACTITIONER

## 2020-10-29 RX ORDER — SULFAMETHOXAZOLE AND TRIMETHOPRIM 800; 160 MG/1; MG/1
1 TABLET ORAL 2 TIMES DAILY
Qty: 14 TABLET | Refills: 0 | Status: SHIPPED | OUTPATIENT
Start: 2020-10-29 | End: 2020-11-05

## 2020-10-29 RX ORDER — TRIAMCINOLONE ACETONIDE 40 MG/ML
80 INJECTION, SUSPENSION INTRA-ARTICULAR; INTRAMUSCULAR ONCE
Status: COMPLETED | OUTPATIENT
Start: 2020-10-29 | End: 2020-10-29

## 2020-10-29 RX ADMIN — TRIAMCINOLONE ACETONIDE 80 MG: 40 INJECTION, SUSPENSION INTRA-ARTICULAR; INTRAMUSCULAR at 15:42

## 2020-10-30 LAB
25(OH)D3 SERPL-MCNC: 32.2 NG/ML (ref 30–100)
ALBUMIN SERPL-MCNC: 4.4 G/DL (ref 3.5–5.2)
ALBUMIN/GLOB SERPL: 1.6 G/DL
ALP SERPL-CCNC: 46 U/L (ref 39–117)
ALT SERPL W P-5'-P-CCNC: 12 U/L (ref 1–33)
ANION GAP SERPL CALCULATED.3IONS-SCNC: 13.8 MMOL/L (ref 5–15)
AST SERPL-CCNC: 16 U/L (ref 1–32)
BACTERIA UR QL AUTO: ABNORMAL /HPF
BILIRUB SERPL-MCNC: 0.4 MG/DL (ref 0–1.2)
BILIRUB UR QL STRIP: NEGATIVE
BUN SERPL-MCNC: 23 MG/DL (ref 8–23)
BUN/CREAT SERPL: 19.8 (ref 7–25)
CALCIUM SPEC-SCNC: 9.4 MG/DL (ref 8.6–10.5)
CHLORIDE SERPL-SCNC: 103 MMOL/L (ref 98–107)
CLARITY UR: ABNORMAL
CO2 SERPL-SCNC: 22.2 MMOL/L (ref 22–29)
COLOR UR: YELLOW
CREAT SERPL-MCNC: 1.16 MG/DL (ref 0.57–1)
CREAT UR-MCNC: 116.1 MG/DL
GFR SERPL CREATININE-BSD FRML MDRD: 47 ML/MIN/1.73
GLOBULIN UR ELPH-MCNC: 2.8 GM/DL
GLUCOSE SERPL-MCNC: 86 MG/DL (ref 65–99)
GLUCOSE UR STRIP-MCNC: NEGATIVE MG/DL
HGB UR QL STRIP.AUTO: ABNORMAL
HYALINE CASTS UR QL AUTO: ABNORMAL /LPF
KETONES UR QL STRIP: NEGATIVE
LEUKOCYTE ESTERASE UR QL STRIP.AUTO: ABNORMAL
MAGNESIUM SERPL-MCNC: 2 MG/DL (ref 1.6–2.4)
NITRITE UR QL STRIP: NEGATIVE
PH UR STRIP.AUTO: 6 [PH] (ref 5–8)
POTASSIUM SERPL-SCNC: 4.3 MMOL/L (ref 3.5–5.2)
PROT SERPL-MCNC: 7.2 G/DL (ref 6–8.5)
PROT UR QL STRIP: ABNORMAL
PROT UR-MCNC: 71 MG/DL
PROT/CREAT UR: 611.5 MG/G CREA (ref 0–200)
RBC # UR: ABNORMAL /HPF
REF LAB TEST METHOD: ABNORMAL
SODIUM SERPL-SCNC: 139 MMOL/L (ref 136–145)
SP GR UR STRIP: 1.02 (ref 1–1.03)
SQUAMOUS #/AREA URNS HPF: ABNORMAL /HPF
UROBILINOGEN UR QL STRIP: ABNORMAL
WBC UR QL AUTO: ABNORMAL /HPF

## 2020-11-02 PROBLEM — Z96.9 RETAINED ORTHOPEDIC HARDWARE: Status: ACTIVE | Noted: 2020-11-02

## 2020-11-02 PROBLEM — A49.9 UTI (URINARY TRACT INFECTION), BACTERIAL: Status: ACTIVE | Noted: 2020-11-02

## 2020-11-02 PROBLEM — M21.40 FLAT FOOT: Status: ACTIVE | Noted: 2020-11-02

## 2020-11-02 PROBLEM — Z87.81 HISTORY OF TIBIAL FRACTURE: Status: ACTIVE | Noted: 2020-11-02

## 2020-11-02 PROBLEM — N39.0 UTI (URINARY TRACT INFECTION), BACTERIAL: Status: ACTIVE | Noted: 2020-11-02

## 2020-11-18 ENCOUNTER — OFFICE VISIT (OUTPATIENT)
Dept: CARDIOLOGY | Facility: CLINIC | Age: 65
End: 2020-11-18

## 2020-11-18 VITALS
WEIGHT: 222 LBS | DIASTOLIC BLOOD PRESSURE: 82 MMHG | HEART RATE: 70 BPM | BODY MASS INDEX: 36.99 KG/M2 | SYSTOLIC BLOOD PRESSURE: 142 MMHG | OXYGEN SATURATION: 96 % | HEIGHT: 65 IN

## 2020-11-18 DIAGNOSIS — I48.0 PAROXYSMAL ATRIAL FIBRILLATION (HCC): Primary | ICD-10-CM

## 2020-11-18 DIAGNOSIS — E78.2 MIXED HYPERLIPIDEMIA: ICD-10-CM

## 2020-11-18 DIAGNOSIS — I10 BENIGN ESSENTIAL HYPERTENSION: ICD-10-CM

## 2020-11-18 PROCEDURE — 93000 ELECTROCARDIOGRAM COMPLETE: CPT | Performed by: INTERNAL MEDICINE

## 2020-11-18 PROCEDURE — 99213 OFFICE O/P EST LOW 20 MIN: CPT | Performed by: INTERNAL MEDICINE

## 2020-11-18 NOTE — PROGRESS NOTES
Lana Ayala [facility]  65 y.o. female    11/18/2020    1. Paroxysmal atrial fibrillation (CMS/HCC)    2. Mixed hyperlipidemia    3. Benign essential hypertension        History of Present Illness:    Body mass index is 36.94 kg/m². BMI is above normal parameters. Recommendations include: exercise counseling, nutrition counseling and referral to primary care.        65 years old patient presented for routine follow-up with  history of paroxysmal atrial fibrillation, hypertension and post electrical cardioversion on amiodarone unable to  keep sinus.  Patient evaluated by me in the office.  She doesn't want to pursue EP study and ablation at this stage.  She wanted try a different anti-arrhythmia medication such as Tikosyn with hypertensive heart disease, chronic back pain, exogenous obesity  No syncope or near syncopal episode reported.  No fever cough or chills reported.  She had failed Tikosyn  and started  AV kaya blocking drug spontaneously converted to sinus rhythm.    During previous office visit noted regular pulse and EKG confirmed the sinus rhythm  and she is maintaining sinus rhythm with a simple medical management she continue Xarelto to decrease risk of cardiac embolic phenomena.  Previously ,Discussed with the patient regarding EP study and ablation she not interested and want to continue AV kaya blocking drug.     JOCY 1/2018  ·    · Transesophageal Echocardiogram with Color and Doppler  · Left Ventricle: Estimated EF appears to be in the range of 61 - 65%. Normal left ventricular cavity size noted  · Left ventricular wall thickness is consistent with mild concentric hypertrophy  · Right Ventricle: Normal right ventricular cavity size, wall thickness, systolic function and septal motion noted  · No evidence of a left atrial appendage thrombus was present. The interatrial septum appears redundant  · No evidence of a patent foramen ovale. No evidence of an atrial septal defect present. Saline test results  are negative.  · Mild aortic valve regurgitation is present  · Mild mitral valve regurgitation is present  4/2018  Total Protein 6.3 - 8.6 g/dL 7.5    Albumin 3.40 - 4.80 g/dL 4.30    ALT (SGPT) 9 - 52 U/L 30    AST (SGOT) 14 - 36 U/L 29    Alkaline Phosphatase 38 - 126 U/L 48    Total Bilirubin 0.2 - 1.3 mg/dL 0.8          1/2018  Total Cholesterol 0 - 199 mg/dL 165    Triglycerides 20 - 199 mg/dL 169    HDL Cholesterol 60 - 200 mg/dL 67    LDL Cholesterol  1 - 129 mg/dL 77    LDL/HDL Ratio 0.00 - 3.22 0.96              SUBJECTIVE:    Allergies   Allergen Reactions   • Codeine Nausea And Vomiting   • Latex Rash   • Morphine And Related Hallucinations   • Pravachol [Pravastatin Sodium] Myalgia         Past Medical History:   Diagnosis Date   • Abnormal gait 11/28/2012   • Abrasion 02/26/2015   • Acute ankle pain 03/31/2016   • Acute bronchitis 09/04/2015   • Allergic rhinitis 05/22/2012   • Asthenia 05/22/2014   • Atrial fibrillation (CMS/[facility]) 03/04/2016    - converted    • Attempted suicide (CMS/[facility])    • Benign essential hypertension 03/04/2016   • Cardiovascular stress test abnormal 03/04/2016   • Cellulitis of knee 03/03/2015   • Chest pain 04/02/2015   • Chronic pain disorder    • Common cold 06/24/2014   • Congestive heart failure (CMS/[facility]) 02/04/2016   • Depressive disorder 05/22/2015    suspect more complicated psych issues      • Fibrocystic breast    • Gastroesophageal reflux disease 01/17/2013   • Generalized anxiety disorder 02/04/2016   • H/O echocardiogram     Normal LV function with Ef of 60-65%.Mildly dilated right ventricle with normal function. Grade 1B diastolic dysfunction with mild CLVH.NO sig. valvular regurg. or stenosis.   • Headache 10/27/2014   • Hyperlipidemia 03/04/2016   • Low back pain 06/30/2016    Need for prophylactic vaccination against Streptococcus pneumoniae [pneumococcus]    01/23/2015    • Neck pain 06/30/2016   • Obstructive sleep apnea of adult 07/30/2015   • Obstructive  sleep apnea syndrome    • Osteoarthritis    • Osteoporosis 06/30/2016   • Pain in right shoulder 06/30/2016   • Pain in right shoulder 04/24/2014   • Shortness of breath 04/02/2015   • Skin ulcer (CMS/HCC) 04/13/2015    left   • Thyrotoxicosis with or without goiter 10/24/2014   • Urinary incontinence 05/22/2014         Past Surgical History:   Procedure Laterality Date   • BACK SURGERY  08/07/2015    Removal of intstrumentation,L5-S1.Exploration of fusion,L5-S1.Posterolateral fusion,L4-L5.Posterior segmental spinal instrumentation L4-5.Transforaminal interbody fusion Through right sided approach.Performed at    • COLONOSCOPY  05/04/2016    will order cologuard   • INJECTION OF MEDICATION  03/23/2015    celestone   • INJECTION OF MEDICATION  05/04/2016    KENALOG(6)   • LUMBAR FUSION      discectomy fusion with rods L4-S1   • LUMBAR LAMINECTOMY  1995   • MANDIBLE FRACTURE SURGERY      JAW WIRED SHUT   • MOUTH SURGERY  10/05/2015    Fractured mandible. Removal of arch bars.   • MOUTH SURGERY  1985    Fractured mandible. Removal of arch bars.   • NASAL SEPTUM SURGERY     • RHINOPLASTY     • TUBAL ABDOMINAL LIGATION  1986         Family History   Problem Relation Age of Onset   • Hypertension Mother    • Hyperlipidemia Mother    • Mental illness Mother    • Thyroid disease Mother    • Diabetes Maternal Grandmother    • Cancer Maternal Grandmother    • Depression Other    • Diabetes Other    • Heart disease Other    • Hypertension Other    • Osteoporosis Other    • Thyroid disease Other    • Cancer Maternal Aunt    • Cancer Paternal Aunt    • Breast cancer Paternal Aunt          Social History     Socioeconomic History   • Marital status:      Spouse name: Not on file   • Number of children: Not on file   • Years of education: Not on file   • Highest education level: Not on file   Social Needs   • Financial resource strain: Not hard at all   • Food insecurity     Worry: Never true     Inability: Never true   •  Transportation needs     Medical: No     Non-medical: No   Tobacco Use   • Smoking status: Never Smoker   • Smokeless tobacco: Never Used   • Tobacco comment: smoking cessation    Substance and Sexual Activity   • Alcohol use: No   • Drug use: No     Comment: lortab / oxycontin   • Sexual activity: Defer         Current Outpatient Medications   Medication Sig Dispense Refill   • alendronate (FOSAMAX) 70 MG tablet Take 1 tablet by mouth Every 7 (Seven) Days. 4 tablet 5   • aMILoride (MIDAMOR) 5 MG tablet Take 1 tablet by mouth Daily. 30 tablet 6   • ARIPiprazole (ABILIFY) 5 MG tablet Take 5 mg by mouth Daily. At bedtime     • aspirin 325 MG tablet Take 325 mg by mouth Daily. At bedtime     • atorvastatin (LIPITOR) 20 MG tablet Take 1 tablet by mouth Daily. 90 tablet 1   • Calcium Carb-Cholecalciferol (CALCIUM-VITAMIN D) 500-200 MG-UNIT per tablet Take 1 tablet by mouth 2 (Two) Times a Day With Meals. 60 tablet 2   • choline fenofibrate (Trilipix) 135 MG capsule Take 1 capsule by mouth Daily. 90 capsule 3   • Dilt- MG 24 hr capsule TAKE ONE CAPSULE BY MOUTH EVERY DAY 90 capsule 2   • ibuprofen (ADVIL,MOTRIN) 800 MG tablet Take 1 tablet by mouth Every 6 (Six) Hours As Needed for Mild Pain . 120 tablet 0   • lisinopril (PRINIVIL,ZESTRIL) 20 MG tablet Take 20 mg by mouth Daily.     • loperamide (IMODIUM) 2 MG capsule Take 1 capsule by mouth Every 4 (Four) Hours As Needed for Diarrhea. 120 capsule 3   • LORazepam (ATIVAN) 1 MG tablet Take 1 mg by mouth 2 (Two) Times a Day. Scheduled to take in the morning and one with dinner  0   • methocarbamol (ROBAXIN) 500 MG tablet Take 1 tablet by mouth 4 (Four) Times a Day As Needed for Muscle Spasms. 90 tablet 5   • metoprolol tartrate (LOPRESSOR) 25 MG tablet TAKE 1 TABLET EVERY 12 HOURS 60 tablet 5   • nitroglycerin (NITROSTAT) 0.4 MG SL tablet Place 1 tablet under the tongue See Admin Instructions. PRN cp, if not relieved in 5 min repeat and go to ER 25 tablet 12   •  nystatin (MYCOSTATIN) 548709 UNIT/ML suspension Swish and swallow 5 mL 4 (Four) Times a Day. 473 mL 0   • omeprazole (priLOSEC) 20 MG capsule Take 1 capsule by mouth Daily. 90 capsule 5   • ondansetron (Zofran) 4 MG tablet Take 1 tablet by mouth Every 8 (Eight) Hours As Needed for Nausea or Vomiting. 25 tablet 0   • ondansetron ODT (Zofran ODT) 4 MG disintegrating tablet Dissolve 1 tablet on the tongue and swallow Every 8 (Eight) Hours As Needed for Nausea or Vomiting. 90 tablet 0   • potassium chloride (K-DUR) 10 MEQ CR tablet Take 1 tablet by mouth 2 (Two) Times a Day With Meals. 180 tablet 3   • sertraline (ZOLOFT) 100 MG tablet Take 100 mg by mouth 2 (Two) Times a Day.  1   • Xarelto 20 MG tablet TAKE ONE TABLET BY MOUTH EVERY DAY - (TAKE WITH FOOD) 90 tablet 3     No current facility-administered medications for this visit.            Review of Systems:     Constitutional:  Denies recent weight loss, weight gain, fever or chills, no change in exercise tolerance.     HENT:  Denies any hearing loss, epistaxis, hoarseness, or difficulty speaking.     Eyes: No blurred    Respiratory: Mild baseline shortness of breath multifactorial element of physical deconditioning    Cardiovascular: See H&P    Gastrointestinal:  Denies change in bowel habits, dyspepsia, ulcer disease, hematochezia, or melena.     Endocrine: Negative for cold intolerance, heat intolerance, polydipsia, polyphagia and polyuria. Denies any history of weight change, polydipsia, polyuria.     Genitourinary: Negative.      Musculoskeletal: Denies any history of arthritic symptoms or back problems.     Skin:  Denies any change in hair or nails, rashes, or skin lesions.     Allergic/Immunologic: Negative.  Negative for environmental allergies, food allergies and immunocompromised state.     Neurological:  Denies any history of recurrent headaches, strokes, TIA, or seizure disorder.     Hematological: Denies any food allergies, seasonal allergies, bleeding  "disorders, or lymphadenopathy.     Psychiatric/Behavioral: Denies any history of depression, substance abuse, or change in cognitive function.       OBJECTIVE:    /82 (BP Location: Left arm, Patient Position: Sitting, Cuff Size: Adult)   Pulse 70   Ht 165.1 cm (65\")   Wt 101 kg (222 lb)   SpO2 96%   BMI 36.94 kg/m²       Physical Exam:     Constitutional: Cooperative, alert and oriented, well-developed, well-nourished, in no acute distress.     HENT:   Head: Normocephalic, normal hair patterns, no masses or tenderness.  Ears, Nose, and Throat: No gross abnormalities. No pallor or cyanosis. Dentition good.   Eyes: EOMS intact, PERRL, conjunctivae and lids unremarkable. Fundoscopic exam and visual fields not performed.   Neck: No palpable masses or adenopathy, no thyromegaly, no JVD, carotid pulses are full and equal bilaterally and without  Bruits.     Cardiovascular: Regular rhythm, S1 and S2 normal, no S3 or S4. Apical impulse not displaced. No murmurs, gallops, or rubs detected.     Pulmonary/Chest: Chest: normal symmetry, no tenderness to palpation, normal respiratory excursion, no intercostal retraction, no use of accessory muscles. Pulmonary: Normal breath sounds. No rales or rhonchi.    Abdominal: Abdomen soft, bowel sounds normoactive, no masses, no hepatosplenomegaly, non-tender, no bruits.     Musculoskeletal: No deformities, clubbing, cyanosis, erythema, or edema observed. There are no spinal abnormalities noted. Normal muscle strength and tone. Pulses full and equal in all extremities, no bruits auscultated.     Neurological: No gross motor or sensory deficits noted, affect appropriate, oriented to time, person, place.     Skin: Warm and dry to the touch, no apparent skin lesions or masses noted.     Psychiatric: She has a normal mood and affect. Her behavior is normal. Judgment and thought content normal.         Procedures      Lab Results   Component Value Date    WBC 12.53 (H) 03/15/2020    " HGB 12.4 03/15/2020    HCT 36.8 03/15/2020    MCV 88.0 03/15/2020     03/15/2020     Lab Results   Component Value Date    GLUCOSE 86 10/29/2020    BUN 23 10/29/2020    CREATININE 1.16 (H) 10/29/2020    EGFRIFNONA 47 (L) 10/29/2020    BCR 19.8 10/29/2020    CO2 22.2 10/29/2020    CALCIUM 9.4 10/29/2020    ALBUMIN 4.40 10/29/2020    AST 16 10/29/2020    ALT 12 10/29/2020     Lab Results   Component Value Date    CHOL 148 11/18/2019    CHOL 165 01/31/2018     Lab Results   Component Value Date    TRIG 249 (H) 11/18/2019    TRIG 169 01/31/2018    TRIG 115 12/15/2016     Lab Results   Component Value Date    HDL 55 11/18/2019    HDL 67 01/31/2018    HDL 52 (L) 12/15/2016     No components found for: LDLCALC  Lab Results   Component Value Date    LDL 43 11/18/2019    LDL 77 01/31/2018    LDL 73 12/15/2016     No results found for: HDLLDLRATIO  No components found for: CHOLHDL  Lab Results   Component Value Date    HGBA1C 5.7 (H) 01/31/2018     Lab Results   Component Value Date    TSH 1.370 07/24/2019           ASSESSMENT AND PLAN:  #1  Paroxysmal atrial fibrillation failed amiodarone, failed Tikosyn on AV kaya blocking drug and doing well we will continue Xarelto to decrease risk of embolization currently she is in sinus rhythm and will continue calcium channel and beta-blocker    #2 hypertension with hypertensive heart disease we will continue metoprolol, calcium channel blocker, lisinopril for hypertension with hypertensive heart disease good blood pressure control.    #3 hyperlipidemia on atorvastatin LDL with recommended range  #4 palpitation no further recurrence      65 years old patient with history of paroxysmal atrial fibrillation failed amiodarone and failed Tikosyn now she is compliant with the CPAP.  .  I will continue AV kaya blocking drug and Xarelto to decrease risk of cardiac embolic phenomena.  Hypertension being managed with lisinopril and atorvastatin for management of hyperlipidemia.  We'll  see her back in 6 month R depends on patient clinical condition.   Patient does not want to pursue EP study and ablations..  We'll resume the lisinopril for management of hypertension, Lipitor for management of hyperlipidemia.  Significantly low carbohydrate, low-fat, DASH diet and graded exercise discussed with the patient    Diagnoses and all orders for this visit:    1. Paroxysmal atrial fibrillation (CMS/HCC) (Primary)  -     ECG 12 Lead    2. Mixed hyperlipidemia    3. Benign essential hypertension        Kristyn Jurado MD  11/18/2020  08:17 CST

## 2020-11-19 LAB
QT INTERVAL: 372 MS
QTC INTERVAL: 401 MS

## 2020-11-20 ENCOUNTER — OFFICE VISIT (OUTPATIENT)
Dept: ORTHOPEDIC SURGERY | Facility: CLINIC | Age: 65
End: 2020-11-20

## 2020-11-20 VITALS — BODY MASS INDEX: 35.99 KG/M2 | WEIGHT: 216 LBS | HEIGHT: 65 IN

## 2020-11-20 DIAGNOSIS — Z87.81 HISTORY OF TIBIAL FRACTURE: ICD-10-CM

## 2020-11-20 DIAGNOSIS — M76.822 POSTERIOR TIBIAL TENDON DYSFUNCTION (PTTD) OF LEFT LOWER EXTREMITY: ICD-10-CM

## 2020-11-20 DIAGNOSIS — G89.29 CHRONIC PAIN OF LEFT ANKLE: Primary | ICD-10-CM

## 2020-11-20 DIAGNOSIS — M21.41 PES PLANUS OF BOTH FEET: ICD-10-CM

## 2020-11-20 DIAGNOSIS — M21.42 PES PLANUS OF BOTH FEET: ICD-10-CM

## 2020-11-20 DIAGNOSIS — M25.572 CHRONIC PAIN OF LEFT ANKLE: Primary | ICD-10-CM

## 2020-11-20 DIAGNOSIS — M79.605 LEFT LEG PAIN: ICD-10-CM

## 2020-11-20 PROCEDURE — 99213 OFFICE O/P EST LOW 20 MIN: CPT | Performed by: NURSE PRACTITIONER

## 2020-11-20 NOTE — PROGRESS NOTES
"Lana Mata is a 65 y.o. female returns for     Chief Complaint   Patient presents with   • Left Ankle - Follow-up       HISTORY OF PRESENT ILLNESS: Patient presents to office for follow-up of chronic left ankle pain that acutely worsened when she was last seen in office.  Patient has a history of an initial left tibia fracture in 2008 due to underlying osteoporosis.  Patient has pes planus bilaterally with abnormal positioning of her foot and suspected posterior tibial tendon dysfunction.  She was ordered a PTTD brace for her left ankle and this was sent to the [facility] but she states she has not heard anything from them.  Patient was given an IM injection of Kenalog at last office visit and instructed to utilize rest and modified weightbearing of her left ankle.  Patient states her left ankle has improved and she is doing better.  Patient states she is back to her baseline.  She continues to have some mild, intermittent left ankle pain with longer periods of weightbearing activity.  When her pain acutely worsened, she had been doing a lot more walking and lifting as she was moving.  No new complaints or concerns noted since last office visit.  Patient denies any pain at this time.     CONCURRENT MEDICAL HISTORY:    The following portions of the patient's history were reviewed and updated as appropriate: allergies, current medications, past family history, past medical history, past social history, past surgical history and problem list.     ROS  No fevers or chills.  No chest pain or shortness of air.  No GI or  disturbances.    PHYSICAL EXAMINATION:       Ht 165.1 cm (65\")   Wt 98 kg (216 lb)   BMI 35.94 kg/m²     Physical Exam  Vitals signs reviewed.   Constitutional:       General: She is not in acute distress.     Appearance: She is well-developed. She is obese. She is not ill-appearing.   HENT:      Head: Normocephalic.   Pulmonary:      Effort: Pulmonary effort is normal. No " respiratory distress.   Abdominal:      General: There is no distension.      Palpations: Abdomen is soft.   Musculoskeletal:         General: Swelling (Mild, left ankle) present. No tenderness or signs of injury.   Skin:     General: Skin is warm and dry.      Capillary Refill: Capillary refill takes less than 2 seconds.      Findings: No erythema.   Neurological:      Mental Status: She is alert and oriented to person, place, and time.      GCS: GCS eye subscore is 4. GCS verbal subscore is 5. GCS motor subscore is 6.   Psychiatric:         Speech: Speech normal.         Behavior: Behavior normal.         Thought Content: Thought content normal.         Judgment: Judgment normal.         GAIT:     []  Normal  [x]  Antalgic    Assistive device: [x]  None  []  Walker     []  Crutches  []  Cane     []  Wheelchair  []  Stretcher    Right Ankle Exam     Tenderness   The patient is experiencing no tenderness.  Swelling: none    Range of Motion   The patient has normal right ankle ROM.    Muscle Strength   The patient has normal right ankle strength.    Other   Erythema: absent  Sensation: normal  Pulse: present     Comments:  Pes planus noted.      Left Ankle Exam     Tenderness   The patient is experiencing no tenderness.   Swelling: mild    Range of Motion   Dorsiflexion: 20   Plantar flexion: 30   Eversion: 10   Inversion: 15     Muscle Strength   Dorsiflexion:  4/5   Plantar flexion:  4/5     Other   Erythema: absent  Sensation: normal  Pulse: present    Comments:  Mild limitations with range of motion.  Mild, generalized swelling noted.  Pes planus noted with deformity/positioning of the foot consistent with posterior tibial tendon dysfunction.  No erythema.  No warmth.  No signs of infection noted.  No ecchymosis.  No signs of injury noted.            Xr Tibia Fibula 2 View Left    Result Date: 10/29/2020  Narrative: AP and lateral views of the left tibia and fibula reveal no evidence of acute fracture or  dislocation.  There are postsurgical changes post-ORIF of the tibia with retained intramedullary cherelle and proximal and distal interlocking screws.  There appears to be an old, healed tibia fracture with mild deformity.  Anatomic alignment is acceptable and remains unchanged when compared with prior images from 3/3/2015.  No evidence of hardware failure or loosening is noted.  There is narrowing noted in the visualized portion of the medial knee joint.  There is a small calcaneal spur noted on the lateral view.  Soft tissues appear unremarkable.  No acute bony radiologic abnormalities are noted at this time.  No significant changes are noted when compared with prior images from 3/3/2015.10/29/20 at 16:56 CDT by JOHN Carl         ASSESSMENT:    Diagnoses and all orders for this visit:    Chronic pain of left ankle  -     Miscellaneous DME    Left leg pain    History of tibial fracture    Pes planus of both feet  -     Miscellaneous DME    Posterior tibial tendon dysfunction (PTTD) of left lower extremity  -     Miscellaneous DME    PLAN    Patient is doing better and states that her left ankle pain has improved since last office visit.  As previously noted, the patient has significant pes planus as well as suspected PT TD in the left ankle.  She has mild deformity/positioning of her foot, which is a chronic finding.  Her pain had acutely worsened after she had been moving and doing more walking and lifting.  Patient was treated with rest/activity modification, modified weightbearing and an IM injection of Kenalog, which has significantly improved her pain.  Patient states she has some mild, intermittent pain depending on her weightbearing activity.  I had ordered her PTTD brace from the [employer] but she states that she had never heard from them.  Our office is followed up on that today and they state they do not have those available.  I have spoke with Dr. Laird in podiatry regarding his  recommendations for bracing and he recommends a lace up ankle support brace.  A new order for a lace up ankle support brace is faxed to [facility] [employer] [facility] today for the patient.  Recommend to continue with activity as tolerated.  We discussed rest and activity modification during times of increased pain.  We also discussed modified weightbearing of the left ankle with use of a cane when needed.  Recommend elevation and ice therapy to the left ankle as needed to minimize pain/swelling/inflammation.  Recommend Tylenol as needed for pain/discomfort.  Patient is unable to take oral NSAIDs due to her current use of Xarelto.  Follow-up in 6 weeks for recheck as needed for any new, worsening or persistent symptoms.  Consider referral to podiatry if she continues to have issues with the left ankle.  Consider referral to physical therapy for conditioning/strengthening exercises as well as possible custom orthotics for her pes planus.    Return in about 6 weeks (around 1/1/2021), or if symptoms worsen or fail to improve, for Recheck.      This document has been electronically signed by JOHN Carl on November 23, 2020 08:44 CST    JOHN Carl

## 2020-12-07 ENCOUNTER — TELEPHONE (OUTPATIENT)
Dept: ORTHOPEDIC SURGERY | Facility: CLINIC | Age: 65
End: 2020-12-07

## 2020-12-15 ENCOUNTER — TELEPHONE (OUTPATIENT)
Dept: FAMILY MEDICINE CLINIC | Facility: CLINIC | Age: 65
End: 2020-12-15

## 2020-12-15 ENCOUNTER — PATIENT OUTREACH (OUTPATIENT)
Dept: FAMILY MEDICINE CLINIC | Facility: CLINIC | Age: 65
End: 2020-12-15

## 2020-12-15 DIAGNOSIS — K21.9 GASTROESOPHAGEAL REFLUX DISEASE WITHOUT ESOPHAGITIS: ICD-10-CM

## 2020-12-15 RX ORDER — ATORVASTATIN CALCIUM 20 MG/1
20 TABLET, FILM COATED ORAL DAILY
Qty: 90 TABLET | Refills: 3 | Status: SHIPPED | OUTPATIENT
Start: 2020-12-15 | End: 2022-01-10

## 2020-12-15 RX ORDER — OMEPRAZOLE 20 MG/1
20 CAPSULE, DELAYED RELEASE ORAL DAILY
Qty: 90 CAPSULE | Refills: 5 | Status: SHIPPED | OUTPATIENT
Start: 2020-12-15 | End: 2022-01-03 | Stop reason: SDUPTHER

## 2020-12-15 NOTE — TELEPHONE ENCOUNTER
Pt called requesting a refill of omeprazole (priLOSEC) 20 MG capsule sent to [facility].    Her call back number is [phone].    Thanks,   Carrie

## 2020-12-15 NOTE — TELEPHONE ENCOUNTER
----- Message from Allie Lopez sent at 12/15/2020 10:09 AM CST -----  Regarding: med refills  Pt called and asked for refills on Atorvastatin 20mg.    Sent to [facility] mail order pharmacy.     Thank you.

## 2021-05-07 RX ORDER — POTASSIUM CHLORIDE 750 MG/1
10 TABLET, FILM COATED, EXTENDED RELEASE ORAL 2 TIMES DAILY WITH MEALS
Qty: 180 TABLET | Refills: 3 | Status: SHIPPED | OUTPATIENT
Start: 2021-05-07 | End: 2021-10-26

## 2021-05-11 ENCOUNTER — APPOINTMENT (OUTPATIENT)
Dept: CT IMAGING | Facility: HOSPITAL | Age: 66
End: 2021-05-11

## 2021-05-11 ENCOUNTER — APPOINTMENT (OUTPATIENT)
Dept: GENERAL RADIOLOGY | Facility: HOSPITAL | Age: 66
End: 2021-05-11

## 2021-05-11 ENCOUNTER — HOSPITAL ENCOUNTER (EMERGENCY)
Facility: HOSPITAL | Age: 66
Discharge: HOME | End: 2021-05-12
Attending: EMERGENCY MEDICINE | Admitting: EMERGENCY MEDICINE

## 2021-05-11 DIAGNOSIS — R06.00 DYSPNEA, UNSPECIFIED TYPE: Primary | ICD-10-CM

## 2021-05-11 LAB
ALBUMIN SERPL-MCNC: 3.7 G/DL (ref 3.5–5.2)
ALBUMIN/GLOB SERPL: 1.4 G/DL
ALP SERPL-CCNC: 45 U/L (ref 39–117)
ALT SERPL W P-5'-P-CCNC: 15 U/L (ref 1–33)
ANION GAP SERPL CALCULATED.3IONS-SCNC: 9 MMOL/L (ref 5–15)
AST SERPL-CCNC: 21 U/L (ref 1–32)
BASOPHILS # BLD AUTO: 0.05 10*3/MM3 (ref 0–0.2)
BASOPHILS NFR BLD AUTO: 0.5 % (ref 0–1.5)
BILIRUB SERPL-MCNC: 0.3 MG/DL (ref 0–1.2)
BUN SERPL-MCNC: 23 MG/DL (ref 8–23)
BUN/CREAT SERPL: 18.3 (ref 7–25)
CALCIUM SPEC-SCNC: 9.6 MG/DL (ref 8.6–10.5)
CHLORIDE SERPL-SCNC: 103 MMOL/L (ref 98–107)
CO2 SERPL-SCNC: 26 MMOL/L (ref 22–29)
CREAT SERPL-MCNC: 1.26 MG/DL (ref 0.57–1)
DEPRECATED RDW RBC AUTO: 43 FL (ref 37–54)
EOSINOPHIL # BLD AUTO: 0.3 10*3/MM3 (ref 0–0.4)
EOSINOPHIL NFR BLD AUTO: 3 % (ref 0.3–6.2)
ERYTHROCYTE [DISTWIDTH] IN BLOOD BY AUTOMATED COUNT: 13.2 % (ref 12.3–15.4)
GFR SERPL CREATININE-BSD FRML MDRD: 42 ML/MIN/1.73
GLOBULIN UR ELPH-MCNC: 2.6 GM/DL
GLUCOSE SERPL-MCNC: 92 MG/DL (ref 65–99)
HCT VFR BLD AUTO: 36.8 % (ref 34–46.6)
HGB BLD-MCNC: 12.1 G/DL (ref 12–15.9)
HOLD SPECIMEN: NORMAL
HOLD SPECIMEN: NORMAL
IMM GRANULOCYTES # BLD AUTO: 0.05 10*3/MM3 (ref 0–0.05)
IMM GRANULOCYTES NFR BLD AUTO: 0.5 % (ref 0–0.5)
LYMPHOCYTES # BLD AUTO: 2.45 10*3/MM3 (ref 0.7–3.1)
LYMPHOCYTES NFR BLD AUTO: 24.1 % (ref 19.6–45.3)
MCH RBC QN AUTO: 29.3 PG (ref 26.6–33)
MCHC RBC AUTO-ENTMCNC: 32.9 G/DL (ref 31.5–35.7)
MCV RBC AUTO: 89.1 FL (ref 79–97)
MONOCYTES # BLD AUTO: 0.85 10*3/MM3 (ref 0.1–0.9)
MONOCYTES NFR BLD AUTO: 8.4 % (ref 5–12)
NEUTROPHILS NFR BLD AUTO: 6.46 10*3/MM3 (ref 1.7–7)
NEUTROPHILS NFR BLD AUTO: 63.5 % (ref 42.7–76)
NRBC BLD AUTO-RTO: 0 /100 WBC (ref 0–0.2)
NT-PROBNP SERPL-MCNC: 383.8 PG/ML (ref 0–900)
PLATELET # BLD AUTO: 228 10*3/MM3 (ref 140–450)
PMV BLD AUTO: 9.9 FL (ref 6–12)
POTASSIUM SERPL-SCNC: 4.4 MMOL/L (ref 3.5–5.2)
PROT SERPL-MCNC: 6.3 G/DL (ref 6–8.5)
RBC # BLD AUTO: 4.13 10*6/MM3 (ref 3.77–5.28)
SODIUM SERPL-SCNC: 138 MMOL/L (ref 136–145)
TROPONIN T SERPL-MCNC: <0.01 NG/ML (ref 0–0.03)
WBC # BLD AUTO: 10.16 10*3/MM3 (ref 3.4–10.8)
WHOLE BLOOD HOLD SPECIMEN: NORMAL
WHOLE BLOOD HOLD SPECIMEN: NORMAL

## 2021-05-11 PROCEDURE — 99284 EMERGENCY DEPT VISIT MOD MDM: CPT

## 2021-05-11 PROCEDURE — 70450 CT HEAD/BRAIN W/O DYE: CPT

## 2021-05-11 PROCEDURE — 71046 X-RAY EXAM CHEST 2 VIEWS: CPT

## 2021-05-11 PROCEDURE — 85025 COMPLETE CBC W/AUTO DIFF WBC: CPT | Performed by: EMERGENCY MEDICINE

## 2021-05-11 PROCEDURE — 93005 ELECTROCARDIOGRAM TRACING: CPT | Performed by: EMERGENCY MEDICINE

## 2021-05-11 PROCEDURE — 83880 ASSAY OF NATRIURETIC PEPTIDE: CPT | Performed by: EMERGENCY MEDICINE

## 2021-05-11 PROCEDURE — 93005 ELECTROCARDIOGRAM TRACING: CPT

## 2021-05-11 PROCEDURE — 93010 ELECTROCARDIOGRAM REPORT: CPT | Performed by: INTERNAL MEDICINE

## 2021-05-11 PROCEDURE — 84484 ASSAY OF TROPONIN QUANT: CPT | Performed by: EMERGENCY MEDICINE

## 2021-05-11 PROCEDURE — 80053 COMPREHEN METABOLIC PANEL: CPT | Performed by: EMERGENCY MEDICINE

## 2021-05-11 RX ORDER — SODIUM CHLORIDE 0.9 % (FLUSH) 0.9 %
10 SYRINGE (ML) INJECTION AS NEEDED
Status: DISCONTINUED | OUTPATIENT
Start: 2021-05-11 | End: 2021-05-12 | Stop reason: HOSPADM

## 2021-05-11 RX ORDER — FERROUS SULFATE 325(65) MG
325 TABLET ORAL
COMMUNITY
End: 2022-09-15

## 2021-05-12 ENCOUNTER — EPISODE CHANGES (OUTPATIENT)
Dept: CASE MANAGEMENT | Facility: OTHER | Age: 66
End: 2021-05-12

## 2021-05-12 VITALS
HEART RATE: 59 BPM | TEMPERATURE: 97.3 F | SYSTOLIC BLOOD PRESSURE: 137 MMHG | OXYGEN SATURATION: 96 % | BODY MASS INDEX: 38.41 KG/M2 | RESPIRATION RATE: 18 BRPM | WEIGHT: 225 LBS | DIASTOLIC BLOOD PRESSURE: 63 MMHG | HEIGHT: 64 IN

## 2021-05-12 LAB — TROPONIN T SERPL-MCNC: <0.01 NG/ML (ref 0–0.03)

## 2021-05-14 ENCOUNTER — LAB (OUTPATIENT)
Dept: LAB | Facility: HOSPITAL | Age: 66
End: 2021-05-14

## 2021-05-14 ENCOUNTER — OFFICE VISIT (OUTPATIENT)
Dept: FAMILY MEDICINE CLINIC | Facility: CLINIC | Age: 66
End: 2021-05-14

## 2021-05-14 VITALS
HEIGHT: 64 IN | DIASTOLIC BLOOD PRESSURE: 76 MMHG | SYSTOLIC BLOOD PRESSURE: 134 MMHG | TEMPERATURE: 96.8 F | WEIGHT: 224.2 LBS | OXYGEN SATURATION: 97 % | HEART RATE: 84 BPM | BODY MASS INDEX: 38.28 KG/M2

## 2021-05-14 DIAGNOSIS — Z86.39 HISTORY OF THYROID DISEASE: ICD-10-CM

## 2021-05-14 DIAGNOSIS — Z09 HOSPITAL DISCHARGE FOLLOW-UP: Primary | ICD-10-CM

## 2021-05-14 DIAGNOSIS — R32 URINARY INCONTINENCE, UNSPECIFIED TYPE: ICD-10-CM

## 2021-05-14 DIAGNOSIS — M54.50 CHRONIC LOW BACK PAIN, UNSPECIFIED BACK PAIN LATERALITY, UNSPECIFIED WHETHER SCIATICA PRESENT: ICD-10-CM

## 2021-05-14 DIAGNOSIS — M85.852 OSTEOPENIA OF NECK OF LEFT FEMUR: ICD-10-CM

## 2021-05-14 DIAGNOSIS — G89.29 CHRONIC LOW BACK PAIN, UNSPECIFIED BACK PAIN LATERALITY, UNSPECIFIED WHETHER SCIATICA PRESENT: ICD-10-CM

## 2021-05-14 DIAGNOSIS — R53.83 OTHER FATIGUE: ICD-10-CM

## 2021-05-14 PROCEDURE — 84439 ASSAY OF FREE THYROXINE: CPT

## 2021-05-14 PROCEDURE — 36415 COLL VENOUS BLD VENIPUNCTURE: CPT

## 2021-05-14 PROCEDURE — 84443 ASSAY THYROID STIM HORMONE: CPT

## 2021-05-14 PROCEDURE — 99213 OFFICE O/P EST LOW 20 MIN: CPT | Performed by: STUDENT IN AN ORGANIZED HEALTH CARE EDUCATION/TRAINING PROGRAM

## 2021-05-14 PROCEDURE — 96372 THER/PROPH/DIAG INJ SC/IM: CPT | Performed by: STUDENT IN AN ORGANIZED HEALTH CARE EDUCATION/TRAINING PROGRAM

## 2021-05-14 RX ORDER — TRIAMCINOLONE ACETONIDE 40 MG/ML
40 INJECTION, SUSPENSION INTRA-ARTICULAR; INTRAMUSCULAR ONCE
Status: COMPLETED | OUTPATIENT
Start: 2021-05-14 | End: 2021-05-14

## 2021-05-14 RX ORDER — CYANOCOBALAMIN 1000 UG/ML
1000 INJECTION, SOLUTION INTRAMUSCULAR; SUBCUTANEOUS
Status: DISCONTINUED | OUTPATIENT
Start: 2021-05-14 | End: 2021-11-24

## 2021-05-14 RX ADMIN — CYANOCOBALAMIN 1000 MCG: 1000 INJECTION, SOLUTION INTRAMUSCULAR; SUBCUTANEOUS at 15:14

## 2021-05-14 RX ADMIN — TRIAMCINOLONE ACETONIDE 40 MG: 40 INJECTION, SUSPENSION INTRA-ARTICULAR; INTRAMUSCULAR at 15:15

## 2021-05-15 LAB
T4 FREE SERPL-MCNC: 1.35 NG/DL (ref 0.93–1.7)
TSH SERPL DL<=0.05 MIU/L-ACNC: 1.3 UIU/ML (ref 0.27–4.2)

## 2021-05-16 RX ORDER — ALENDRONATE SODIUM 70 MG/1
70 TABLET ORAL
Qty: 4 TABLET | Refills: 11 | Status: SHIPPED | OUTPATIENT
Start: 2021-05-16 | End: 2022-06-17 | Stop reason: SDUPTHER

## 2021-05-16 NOTE — PROGRESS NOTES
Family Medicine Residency  Maxime Edwards MD    Subjective:     Lana Mata is a 66 y.o. female who presents for ER follow-up from May 11, 2021 for shortness of breath and leg swelling with associated chest pain. Vital signs are stable, ECG with sinus rhythm with no signs of ischemia, troponin was negative x2, thyroid levels were normal, BNP was normal, and CBC normal. Creatinine was elevated to 1.26 which appears to be around where she fluctuates. Chest x-ray showed no acute findings and CT head no acute intracranial findings. She states her shortness of breath is chronic but have been worsening for the last 4 days.  She denies any cough, fever, or sick contacts. Her swelling is improving.    Patient says would like to have thyroid checked as she was diagnosed with thyroid disease (she is not sure if hyper or hypo) previously.    In addition was placed on alendronate about 1 year ago for an abnormal DEXA scan and has been out of medication for 2 months. Chart review shows T score of -1.3 at left femoral neck    Lastly is complaining of back pain. She has a history of 3 back surgeries which she is usually controlling with Tylenol and Robaxin but has episodic flareups relieved with steroid injections. Last steroid injection was about 1 year ago according to the patient.    The following portions of the patient's history were reviewed and updated as appropriate: allergies, current medications, past family history, past medical history, past social history, past surgical history and problem list.    Past Medical Hx:  Past Medical History:   Diagnosis Date   • Abnormal gait 11/28/2012   • Abrasion 02/26/2015   • Acute ankle pain 03/31/2016   • Acute bronchitis 09/04/2015   • Allergic rhinitis 05/22/2012   • Asthenia 05/22/2014   • Atrial fibrillation (CMS/HCC) 03/04/2016    - converted    • Attempted suicide (CMS/[facility])    • Benign essential hypertension 03/04/2016   • Cardiovascular stress test abnormal  03/04/2016   • Cellulitis of knee 03/03/2015   • Chest pain 04/02/2015   • Chronic pain disorder    • Common cold 06/24/2014   • Congestive heart failure (CMS/HCC) 02/04/2016   • Depressive disorder 05/22/2015    suspect more complicated psych issues      • Fibrocystic breast    • Gastroesophageal reflux disease 01/17/2013   • Generalized anxiety disorder 02/04/2016   • H/O echocardiogram     Normal LV function with Ef of 60-65%.Mildly dilated right ventricle with normal function. Grade 1B diastolic dysfunction with mild CLVH.NO sig. valvular regurg. or stenosis.   • Headache 10/27/2014   • Hyperlipidemia 03/04/2016   • Low back pain 06/30/2016    Need for prophylactic vaccination against Streptococcus pneumoniae [pneumococcus]    01/23/2015    • Neck pain 06/30/2016   • Obstructive sleep apnea of adult 07/30/2015   • Obstructive sleep apnea syndrome    • Osteoarthritis    • Osteoporosis 06/30/2016   • Pain in right shoulder 06/30/2016   • Pain in right shoulder 04/24/2014   • Shortness of breath 04/02/2015   • Skin ulcer (CMS/[facility]) 04/13/2015    left   • Thyrotoxicosis with or without goiter 10/24/2014   • Urinary incontinence 05/22/2014       Past Surgical Hx:  Past Surgical History:   Procedure Laterality Date   • BACK SURGERY  08/07/2015    Removal of intstrumentation,L5-S1.Exploration of fusion,L5-S1.Posterolateral fusion,L4-L5.Posterior segmental spinal instrumentation L4-5.Transforaminal interbody fusion Through right sided approach.Performed at    • COLONOSCOPY  05/04/2016    will order cologuard   • INJECTION OF MEDICATION  03/23/2015    celestone   • INJECTION OF MEDICATION  05/04/2016    KENALOG(6)   • LUMBAR FUSION      discectomy fusion with rods L4-S1   • LUMBAR LAMINECTOMY  1995   • MANDIBLE FRACTURE SURGERY      JAW WIRED SHUT   • MOUTH SURGERY  10/05/2015    Fractured mandible. Removal of arch bars.   • MOUTH SURGERY  1985    Fractured mandible. Removal of arch bars.   • NASAL SEPTUM SURGERY     •  RHINOPLASTY     • TUBAL ABDOMINAL LIGATION  1986       Current Meds:    Current Outpatient Medications:   •  aMILoride (MIDAMOR) 5 MG tablet, Take 1 tablet by mouth Daily., Disp: 30 tablet, Rfl: 6  •  ARIPiprazole (ABILIFY) 5 MG tablet, Take 5 mg by mouth Daily. At bedtime, Disp: , Rfl:   •  aspirin 325 MG tablet, Take 325 mg by mouth Daily. At bedtime, Disp: , Rfl:   •  atorvastatin (LIPITOR) 20 MG tablet, Take 1 tablet by mouth Daily., Disp: 90 tablet, Rfl: 3  •  Calcium Carb-Cholecalciferol (CALCIUM-VITAMIN D) 500-200 MG-UNIT per tablet, Take 1 tablet by mouth 2 (Two) Times a Day With Meals., Disp: 60 tablet, Rfl: 2  •  choline fenofibrate (Trilipix) 135 MG capsule, Take 1 capsule by mouth Daily., Disp: 90 capsule, Rfl: 3  •  Dilt- MG 24 hr capsule, TAKE ONE CAPSULE BY MOUTH EVERY DAY, Disp: 90 capsule, Rfl: 2  •  ferrous sulfate 325 (65 FE) MG tablet, Take 325 mg by mouth Daily With Breakfast., Disp: , Rfl:   •  lisinopril (PRINIVIL,ZESTRIL) 20 MG tablet, Take 20 mg by mouth Daily., Disp: , Rfl:   •  loperamide (IMODIUM) 2 MG capsule, Take 1 capsule by mouth Every 4 (Four) Hours As Needed for Diarrhea., Disp: 120 capsule, Rfl: 3  •  LORazepam (ATIVAN) 1 MG tablet, Take 1 mg by mouth 2 (Two) Times a Day. Scheduled to take in the morning and one with dinner, Disp: , Rfl: 0  •  methocarbamol (ROBAXIN) 500 MG tablet, Take 1 tablet by mouth 4 (Four) Times a Day As Needed for Muscle Spasms., Disp: 90 tablet, Rfl: 5  •  metoprolol tartrate (LOPRESSOR) 25 MG tablet, TAKE 1 TABLET EVERY 12 HOURS, Disp: 60 tablet, Rfl: 4  •  omeprazole (priLOSEC) 20 MG capsule, Take 1 capsule by mouth Daily., Disp: 90 capsule, Rfl: 5  •  ondansetron (Zofran) 4 MG tablet, Take 1 tablet by mouth Every 8 (Eight) Hours As Needed for Nausea or Vomiting., Disp: 25 tablet, Rfl: 0  •  ondansetron ODT (Zofran ODT) 4 MG disintegrating tablet, Dissolve 1 tablet on the tongue and swallow Every 8 (Eight) Hours As Needed for Nausea or Vomiting.,  Disp: 90 tablet, Rfl: 0  •  potassium chloride 10 MEQ CR tablet, Take 1 tablet by mouth 2 (Two) Times a Day With Meals., Disp: 180 tablet, Rfl: 3  •  sertraline (ZOLOFT) 100 MG tablet, Take 100 mg by mouth 2 (Two) Times a Day., Disp: , Rfl: 1  •  Xarelto 20 MG tablet, TAKE ONE TABLET BY MOUTH EVERY DAY - (TAKE WITH FOOD), Disp: 90 tablet, Rfl: 3  •  alendronate (Fosamax) 70 MG tablet, Take 1 tablet by mouth Every 7 (Seven) Days., Disp: 4 tablet, Rfl: 11  •  Incontinence Supply Disposable (Depend Adjustable Underwear) misc, 1 application As Needed (Incontinence)., Disp: 90 each, Rfl: 11    Current Facility-Administered Medications:   •  cyanocobalamin injection 1,000 mcg, 1,000 mcg, Intramuscular, Q28 Days, Maxime Edwards MD, 1,000 mcg at 05/14/21 1514    Allergies:  Allergies   Allergen Reactions   • Codeine Nausea And Vomiting   • Latex Rash   • Morphine And Related Hallucinations   • Pravachol [Pravastatin Sodium] Myalgia       Family Hx:  Family History   Problem Relation Age of Onset   • Hypertension Mother    • Hyperlipidemia Mother    • Mental illness Mother    • Thyroid disease Mother    • Diabetes Maternal Grandmother    • Cancer Maternal Grandmother    • Depression Other    • Diabetes Other    • Heart disease Other    • Hypertension Other    • Osteoporosis Other    • Thyroid disease Other    • Cancer Maternal Aunt    • Cancer Paternal Aunt    • Breast cancer Paternal Aunt         Social History:  Social History     Socioeconomic History   • Marital status:      Spouse name: Not on file   • Number of children: Not on file   • Years of education: Not on file   • Highest education level: Not on file   Tobacco Use   • Smoking status: Never Smoker   • Smokeless tobacco: Never Used   • Tobacco comment: smoking cessation    Substance and Sexual Activity   • Alcohol use: No   • Drug use: No     Comment: lortab / oxycontin   • Sexual activity: Defer       Review of Systems  Review of Systems  "  Constitutional: Negative for activity change and appetite change.   HENT: Negative for congestion and trouble swallowing.    Respiratory: Positive for shortness of breath (Improving). Negative for chest tightness.    Cardiovascular: Positive for leg swelling (Improving). Negative for chest pain and palpitations.   Gastrointestinal: Negative for abdominal distention and abdominal pain.   Genitourinary: Negative for difficulty urinating and dysuria.        Incontinence   Musculoskeletal: Positive for back pain. Negative for arthralgias and myalgias.   Skin: Negative for color change and pallor.   Neurological: Negative for dizziness, light-headedness and headaches.   Psychiatric/Behavioral: Negative for agitation and behavioral problems.       Objective:     /76   Pulse 84   Temp 96.8 °F (36 °C)   Ht 162.6 cm (64\")   Wt 102 kg (224 lb 3.2 oz)   SpO2 97%   BMI 38.48 kg/m²   Physical Exam  Constitutional:       General: She is not in acute distress.     Appearance: She is well-developed.   HENT:      Head: Normocephalic and atraumatic.      Right Ear: External ear normal.      Left Ear: External ear normal.      Nose: Nose normal.   Eyes:      Extraocular Movements: Extraocular movements intact.      Pupils: Pupils are equal, round, and reactive to light.   Cardiovascular:      Rate and Rhythm: Normal rate and regular rhythm.      Heart sounds: Normal heart sounds. No murmur heard.   No friction rub. No gallop.    Pulmonary:      Effort: Pulmonary effort is normal. No respiratory distress.      Breath sounds: Normal breath sounds. No wheezing or rales.   Abdominal:      General: Bowel sounds are normal. There is no distension.      Palpations: Abdomen is soft.      Tenderness: There is no abdominal tenderness.   Musculoskeletal:         General: Normal range of motion.      Cervical back: Normal range of motion and neck supple.      Right lower leg: Edema (Mild) present.      Left lower leg: Edema (Mild) " present.   Skin:     General: Skin is warm.      Findings: No erythema.   Neurological:      Mental Status: She is alert and oriented to person, place, and time. Mental status is at baseline.   Psychiatric:         Mood and Affect: Mood normal.         Behavior: Behavior normal.          Assessment/Plan:     Diagnoses and all orders for this visit:    1. Hospital discharge follow-up (Primary)    2. History of thyroid disease  -     TSH; Future  -     T4, free; Future    3. Other fatigue  -     cyanocobalamin injection 1,000 mcg    4. Chronic low back pain, unspecified back pain laterality, unspecified whether sciatica present  -     triamcinolone acetonide (KENALOG-40) injection 40 mg    5. Osteopenia of neck of left femur  -     alendronate (Fosamax) 70 MG tablet; Take 1 tablet by mouth Every 7 (Seven) Days.  Dispense: 4 tablet; Refill: 11    6. Urinary incontinence, unspecified type  -     Incontinence Supply Disposable (Depend Adjustable Underwear) misc; 1 application As Needed (Incontinence).  Dispense: 90 each; Refill: 11    Hospital discharge follow-up for shortness of breath and leg swelling.  Swelling is improving but continues have shortness of breath of unknown etiology.  Recommend continue to monitor as she has lost 7 pounds since ED visit and has no signs of infectious cause with normal lung exam, chest x-ray in ED, and has been afebrile.  Will check thyroid due to history of previous abnormality.  Will restart alendronate due to osteopenia on previous DEXA scan.  Will provide Kenalog injection for back pain relief.    · Rx changes: We will restart alendronate  · Patient Education: Continue to monitor shortness of breath  · Compliance at present is estimated to be excellent.   · Efforts to improve compliance (if necessary) will be directed at Unnecessary at this time.     Follow-up:     Return in about 4 weeks (around 6/11/2021) for Recheck.    Preventative:  Health Maintenance   Topic Date Due   •  ZOSTER VACCINE (2 of 2) 02/09/2017   • ANNUAL WELLNESS VISIT  02/07/2019   • PAP SMEAR  12/15/2019   • Pneumococcal Vaccine 65+ (1 of 1 - PPSV23) 03/09/2020   • LIPID PANEL  11/18/2020   • INFLUENZA VACCINE  08/01/2021   • MAMMOGRAM  11/07/2021   • DXA SCAN  11/07/2021   • COLORECTAL CANCER SCREENING  11/07/2026   • TDAP/TD VACCINES (5 - Td) 12/07/2029   • HEPATITIS C SCREENING  Completed   • COVID-19 Vaccine  Completed       Recommended: none  Vaccine Counseling: N/A    Weight  -Class: Obese Class II: 35-39.9kg/m2  -Patient's Body mass index is 38.48 kg/m². BMI is above normal parameters. Recommendations include: exercise counseling and nutrition counseling.   eat more fruits and vegetables, decrease soda or juice intake, increase water intake, increase physical activity and reduce screen time    Alcohol use:  reports no history of alcohol use.  Nicotine status  reports that she has never smoked. She has never used smokeless tobacco.    Goals        Patient Stated    •  management of chronic medical conditions (pt-stated)       Barriers to goals: none            RISK SCORE: 3          This document has been electronically signed by Maxime Edwards MD on May 16, 2021 01:18 CDT

## 2021-05-17 LAB
QT INTERVAL: 410 MS
QTC INTERVAL: 422 MS

## 2021-05-18 ENCOUNTER — PATIENT OUTREACH (OUTPATIENT)
Dept: CASE MANAGEMENT | Facility: OTHER | Age: 66
End: 2021-05-18

## 2021-05-18 NOTE — OUTREACH NOTE
Patient Outreach Note    Jamison SMITH patient seen at [facility] ED 5-11-21 for SOB. Spoke with patient and she was seen by her PCP on 5-14-21. She has an appointment with her cardiologist tomorrow. She monitors her BP and pulse oximeter at home. Denies using extra salt on foods. Discussed wearing compression stockings and keeping legs elevated at rest. She denied insecurities with food, medication, or transportation. Provided with [facility] contact information.     Cyn Conner RN  Ambulatory     5/18/2021, 16:12 CDT

## 2021-05-19 ENCOUNTER — OFFICE VISIT (OUTPATIENT)
Dept: CARDIOLOGY | Facility: CLINIC | Age: 66
End: 2021-05-19

## 2021-05-19 VITALS
DIASTOLIC BLOOD PRESSURE: 86 MMHG | HEIGHT: 64 IN | WEIGHT: 226.4 LBS | BODY MASS INDEX: 38.65 KG/M2 | SYSTOLIC BLOOD PRESSURE: 130 MMHG | OXYGEN SATURATION: 98 % | HEART RATE: 73 BPM

## 2021-05-19 DIAGNOSIS — I10 BENIGN ESSENTIAL HYPERTENSION: ICD-10-CM

## 2021-05-19 DIAGNOSIS — E78.2 MIXED HYPERLIPIDEMIA: ICD-10-CM

## 2021-05-19 DIAGNOSIS — I48.91 ATRIAL FIBRILLATION, UNSPECIFIED TYPE (HCC): Primary | ICD-10-CM

## 2021-05-19 DIAGNOSIS — I48.0 PAROXYSMAL ATRIAL FIBRILLATION (HCC): ICD-10-CM

## 2021-05-19 PROCEDURE — 99213 OFFICE O/P EST LOW 20 MIN: CPT | Performed by: INTERNAL MEDICINE

## 2021-05-19 NOTE — PROGRESS NOTES
Lana Ayala [facility]  66 y.o. female    5/19/2021    1. Atrial fibrillation, unspecified type (CMS/HCC)    2. Mixed hyperlipidemia    3. Benign essential hypertension    4. Paroxysmal atrial fibrillation (CMS/HCC)        History of Present Illness:    Body mass index is 38.86 kg/m². BMI is above normal parameters. Recommendations include: exercise counseling, nutrition counseling and referral to primary care.      66 years old patient with history of paroxysmal atrial fibrillation status post electrical cardioversion on long-term oral anticoagulation not interested in EP study and ablations presented for routine follow-up.  No symptom of orthopnea PND chest pain lightheaded dizziness reported.  No intermittent claudications.  No recent palpitation or symptoms just of cardiac decompensations.  Had a recent EKG with normal sinus rhythm    JOCY 1/2018  ·    · Transesophageal Echocardiogram with Color and Doppler  · Left Ventricle: Estimated EF appears to be in the range of 61 - 65%. Normal left ventricular cavity size noted  · Left ventricular wall thickness is consistent with mild concentric hypertrophy  · Right Ventricle: Normal right ventricular cavity size, wall thickness, systolic function and septal motion noted  · No evidence of a left atrial appendage thrombus was present. The interatrial septum appears redundant  · No evidence of a patent foramen ovale. No evidence of an atrial septal defect present. Saline test results are negative.  · Mild aortic valve regurgitation is present  · Mild mitral valve regurgitation is present  4/2018  Total Protein 6.3 - 8.6 g/dL 7.5    Albumin 3.40 - 4.80 g/dL 4.30    ALT (SGPT) 9 - 52 U/L 30    AST (SGOT) 14 - 36 U/L 29    Alkaline Phosphatase 38 - 126 U/L 48    Total Bilirubin 0.2 - 1.3 mg/dL 0.8          1/2018  Total Cholesterol 0 - 199 mg/dL 165    Triglycerides 20 - 199 mg/dL 169    HDL Cholesterol 60 - 200 mg/dL 67    LDL Cholesterol  1 - 129 mg/dL 77    LDL/HDL Ratio 0.00  - 3.22 0.96              SUBJECTIVE:    Allergies   Allergen Reactions   • Codeine Nausea And Vomiting   • Latex Rash   • Morphine And Related Hallucinations   • Pravachol [Pravastatin Sodium] Myalgia         Past Medical History:   Diagnosis Date   • Abnormal gait 11/28/2012   • Abrasion 02/26/2015   • Acute ankle pain 03/31/2016   • Acute bronchitis 09/04/2015   • Allergic rhinitis 05/22/2012   • Asthenia 05/22/2014   • Atrial fibrillation (CMS/[facility]) 03/04/2016    - converted    • Attempted suicide (CMS/[facility])    • Benign essential hypertension 03/04/2016   • Cardiovascular stress test abnormal 03/04/2016   • Cellulitis of knee 03/03/2015   • Chest pain 04/02/2015   • Chronic pain disorder    • Common cold 06/24/2014   • Congestive heart failure (CMS/[facility]) 02/04/2016   • Depressive disorder 05/22/2015    suspect more complicated psych issues      • Fibrocystic breast    • Gastroesophageal reflux disease 01/17/2013   • Generalized anxiety disorder 02/04/2016   • H/O echocardiogram     Normal LV function with Ef of 60-65%.Mildly dilated right ventricle with normal function. Grade 1B diastolic dysfunction with mild CLVH.NO sig. valvular regurg. or stenosis.   • Headache 10/27/2014   • Hyperlipidemia 03/04/2016   • Low back pain 06/30/2016    Need for prophylactic vaccination against Streptococcus pneumoniae [pneumococcus]    01/23/2015    • Neck pain 06/30/2016   • Obstructive sleep apnea of adult 07/30/2015   • Obstructive sleep apnea syndrome    • Osteoarthritis    • Osteoporosis 06/30/2016   • Pain in right shoulder 06/30/2016   • Pain in right shoulder 04/24/2014   • Shortness of breath 04/02/2015   • Skin ulcer (CMS/[facility]) 04/13/2015    left   • Thyrotoxicosis with or without goiter 10/24/2014   • Urinary incontinence 05/22/2014         Past Surgical History:   Procedure Laterality Date   • BACK SURGERY  08/07/2015    Removal of intstrumentation,L5-S1.Exploration of fusion,L5-S1.Posterolateral fusion,L4-L5.Posterior  segmental spinal instrumentation L4-5.Transforaminal interbody fusion Through right sided approach.Performed at    • COLONOSCOPY  05/04/2016    will order cologuard   • INJECTION OF MEDICATION  03/23/2015    celestone   • INJECTION OF MEDICATION  05/04/2016    KENALOG(6)   • LUMBAR FUSION      discectomy fusion with rods L4-S1   • LUMBAR LAMINECTOMY  1995   • MANDIBLE FRACTURE SURGERY      JAW WIRED SHUT   • MOUTH SURGERY  10/05/2015    Fractured mandible. Removal of arch bars.   • MOUTH SURGERY  1985    Fractured mandible. Removal of arch bars.   • NASAL SEPTUM SURGERY     • RHINOPLASTY     • TUBAL ABDOMINAL LIGATION  1986         Family History   Problem Relation Age of Onset   • Hypertension Mother    • Hyperlipidemia Mother    • Mental illness Mother    • Thyroid disease Mother    • Diabetes Maternal Grandmother    • Cancer Maternal Grandmother    • Depression Other    • Diabetes Other    • Heart disease Other    • Hypertension Other    • Osteoporosis Other    • Thyroid disease Other    • Cancer Maternal Aunt    • Cancer Paternal Aunt    • Breast cancer Paternal Aunt          Social History     Socioeconomic History   • Marital status:      Spouse name: Not on file   • Number of children: Not on file   • Years of education: Not on file   • Highest education level: Not on file   Tobacco Use   • Smoking status: Never Smoker   • Smokeless tobacco: Never Used   • Tobacco comment: smoking cessation    Substance and Sexual Activity   • Alcohol use: No   • Drug use: No     Comment: lortab / oxycontin   • Sexual activity: Defer         Current Outpatient Medications   Medication Sig Dispense Refill   • alendronate (Fosamax) 70 MG tablet Take 1 tablet by mouth Every 7 (Seven) Days. 4 tablet 11   • aMILoride (MIDAMOR) 5 MG tablet Take 1 tablet by mouth Daily. 30 tablet 6   • ARIPiprazole (ABILIFY) 5 MG tablet Take 5 mg by mouth Daily. At bedtime     • aspirin 325 MG tablet Take 325 mg by mouth Daily. At bedtime      • atorvastatin (LIPITOR) 20 MG tablet Take 1 tablet by mouth Daily. 90 tablet 3   • Calcium Carb-Cholecalciferol (CALCIUM-VITAMIN D) 500-200 MG-UNIT per tablet Take 1 tablet by mouth 2 (Two) Times a Day With Meals. 60 tablet 2   • choline fenofibrate (Trilipix) 135 MG capsule Take 1 capsule by mouth Daily. 90 capsule 3   • Dilt- MG 24 hr capsule TAKE ONE CAPSULE BY MOUTH EVERY DAY 90 capsule 2   • ferrous sulfate 325 (65 FE) MG tablet Take 325 mg by mouth Daily With Breakfast.     • Incontinence Supply Disposable (Depend Adjustable Underwear) misc 1 application As Needed (Incontinence). 90 each 11   • lisinopril (PRINIVIL,ZESTRIL) 20 MG tablet Take 20 mg by mouth Daily.     • loperamide (IMODIUM) 2 MG capsule Take 1 capsule by mouth Every 4 (Four) Hours As Needed for Diarrhea. 120 capsule 3   • LORazepam (ATIVAN) 1 MG tablet Take 1 mg by mouth 2 (Two) Times a Day. Scheduled to take in the morning and one with dinner  0   • methocarbamol (ROBAXIN) 500 MG tablet Take 1 tablet by mouth 4 (Four) Times a Day As Needed for Muscle Spasms. 90 tablet 5   • metoprolol tartrate (LOPRESSOR) 25 MG tablet TAKE 1 TABLET EVERY 12 HOURS 60 tablet 4   • omeprazole (priLOSEC) 20 MG capsule Take 1 capsule by mouth Daily. 90 capsule 5   • ondansetron (Zofran) 4 MG tablet Take 1 tablet by mouth Every 8 (Eight) Hours As Needed for Nausea or Vomiting. 25 tablet 0   • potassium chloride 10 MEQ CR tablet Take 1 tablet by mouth 2 (Two) Times a Day With Meals. 180 tablet 3   • sertraline (ZOLOFT) 100 MG tablet Take 100 mg by mouth 2 (Two) Times a Day.  1   • Xarelto 20 MG tablet TAKE ONE TABLET BY MOUTH EVERY DAY - (TAKE WITH FOOD) 90 tablet 3   • ondansetron ODT (Zofran ODT) 4 MG disintegrating tablet Dissolve 1 tablet on the tongue and swallow Every 8 (Eight) Hours As Needed for Nausea or Vomiting. 90 tablet 0     Current Facility-Administered Medications   Medication Dose Route Frequency Provider Last Rate Last Admin   •  "cyanocobalamin injection 1,000 mcg  1,000 mcg Intramuscular Q28 Days Maxime Edwards MD   1,000 mcg at 05/14/21 1514           Review of Systems:     Constitutional:  Denies recent weight loss, weight gain, no change in exercise tolerance.     HENT:  Denies any hearing loss, epistaxis, hoarseness,  Eyes: No blurred    Respiratory: Mild baseline shortness of breath multifactorial element of physical deconditioning    Cardiovascular: See H&P    Gastrointestinal:  Denies change in bowel habits, dyspepsia, ulcer disease, hematochezia, or melena.     Endocrine: Negative for cold intolerance, heat intolerance, polydipsia, polyphagia and polyuria.     Genitourinary: Negative.      Musculoskeletal: Denies any history of arthritic symptoms or back problems.     Skin:  Denies any change in hair or nails, rashes, or skin lesions.     Allergic/Immunologic: Negative.  Negative for environmental allergies, food allergies     Neurological:  Denies any history of recurrent headaches, strokes,     Hematological: Denies any food allergies, seasonal allergies,    Psychiatric/Behavioral: Denies any history of depression      OBJECTIVE:    /86 (BP Location: Left arm, Patient Position: Sitting, Cuff Size: Large Adult)   Pulse 73   Ht 162.6 cm (64\")   Wt 103 kg (226 lb 6.4 oz)   SpO2 98%   BMI 38.86 kg/m²       Physical Exam:     Constitutional: Cooperative, alert and oriented, well-developed, well-nourished, in no acute distress.     HENT:   Head: Normocephalic, conjunctive is pink thyroid is nonpalpable trachea central    Cardiovascular: Regular rhythm, S1 and S2 normal, no S3 or S4. Apical impulse not displaced. No murmurs, gallops, or rubs detected.     Pulmonary/Chest: Chest: Good bilateral air entry no rales or wheezing    Abdominal: Abdomen soft, bowel sounds normoactive, no masses,     Musculoskeletal: No deformities, positive peripheral pulses no edema    Neurological: No gross motor or sensory deficits noted, " affect appropriate, oriented to time, person, place.     Skin: Warm and dry to the touch, no apparent skin lesions or masses noted.     Psychiatric: She has a normal mood and affect. Her behavior is normal.       Procedures      Lab Results   Component Value Date    WBC 10.16 05/11/2021    HGB 12.1 05/11/2021    HCT 36.8 05/11/2021    MCV 89.1 05/11/2021     05/11/2021     Lab Results   Component Value Date    GLUCOSE 92 05/11/2021    BUN 23 05/11/2021    CREATININE 1.26 (H) 05/11/2021    EGFRIFNONA 42 (L) 05/11/2021    BCR 18.3 05/11/2021    CO2 26.0 05/11/2021    CALCIUM 9.6 05/11/2021    ALBUMIN 3.70 05/11/2021    AST 21 05/11/2021    ALT 15 05/11/2021     Lab Results   Component Value Date    CHOL 148 11/18/2019    CHOL 165 01/31/2018     Lab Results   Component Value Date    TRIG 249 (H) 11/18/2019    TRIG 169 01/31/2018    TRIG 115 12/15/2016     Lab Results   Component Value Date    HDL 55 11/18/2019    HDL 67 01/31/2018    HDL 52 (L) 12/15/2016     No components found for: LDLCALC  Lab Results   Component Value Date    LDL 43 11/18/2019    LDL 77 01/31/2018    LDL 73 12/15/2016     No results found for: HDLLDLRATIO  No components found for: CHOLHDL  Lab Results   Component Value Date    HGBA1C 5.7 (H) 01/31/2018     Lab Results   Component Value Date    TSH 1.300 05/14/2021           ASSESSMENT AND PLAN:    #1  Paroxysmal atrial fibrillation failed amiodarone, failed Tikosyn on AV kaya blocking drug and doing well we will continue Xarelto to decrease risk of embolization currently she is in sinus rhythm and will continue calcium channel and beta-blocker.  Refused EP study and ablations    [id number] score is 2 she preferred oral anticoagulation    #2 hypertension with hypertensive heart disease     Good blood pressure control  continue metoprolol, calcium channel blocker, lisinopril    Patient was counseled and educated regarding food with high sodium content    #3 hyperlipidemia on atorvastatin  LDL with recommended range      Preventive   obesity with a BMI of 38    Patient was counseled educated regarding risk factor lifestyle modification low carbohydrate, low-fat, DASH diet graded exercise          Diagnoses and all orders for this visit:    1. Atrial fibrillation, unspecified type (CMS/HCC) (Primary)    2. Mixed hyperlipidemia    3. Benign essential hypertension    4. Paroxysmal atrial fibrillation (CMS/HCC)        Kristyn Jurado MD  5/19/2021  09:44 CDT

## 2021-05-21 NOTE — PROGRESS NOTES
I have reviewed the notes, assessments, and/or procedures performed by Maxime Edwards MD  , I concur with her/his documentation of Lana Mata.

## 2021-06-07 RX ORDER — LOPERAMIDE HYDROCHLORIDE 2 MG/1
CAPSULE ORAL
Qty: 120 CAPSULE | Refills: 2 | Status: SHIPPED | OUTPATIENT
Start: 2021-06-07 | End: 2022-04-22 | Stop reason: DRUGHIGH

## 2021-06-17 NOTE — TELEPHONE ENCOUNTER
----- Message from Parker Kumar Rep sent at 6/17/2021  9:01 AM CDT -----  Regarding: khushboo pt  Pt called for refill on choline fenofibrate (Trilipix) 135 MG capsule, she would like that sent to [facility] for a 90 day supply please.      Can reach pt at [phone]

## 2021-06-23 ENCOUNTER — OFFICE VISIT (OUTPATIENT)
Dept: FAMILY MEDICINE CLINIC | Facility: CLINIC | Age: 66
End: 2021-06-23

## 2021-06-23 ENCOUNTER — OFFICE VISIT (OUTPATIENT)
Dept: SLEEP MEDICINE | Facility: HOSPITAL | Age: 66
End: 2021-06-23

## 2021-06-23 VITALS
OXYGEN SATURATION: 97 % | WEIGHT: 221.2 LBS | SYSTOLIC BLOOD PRESSURE: 108 MMHG | DIASTOLIC BLOOD PRESSURE: 67 MMHG | HEART RATE: 83 BPM | BODY MASS INDEX: 37.76 KG/M2 | HEIGHT: 64 IN

## 2021-06-23 VITALS
OXYGEN SATURATION: 98 % | HEART RATE: 92 BPM | SYSTOLIC BLOOD PRESSURE: 122 MMHG | HEIGHT: 64 IN | TEMPERATURE: 96 F | DIASTOLIC BLOOD PRESSURE: 80 MMHG | BODY MASS INDEX: 37.56 KG/M2 | WEIGHT: 220 LBS

## 2021-06-23 DIAGNOSIS — E66.01 MORBID OBESITY (HCC): ICD-10-CM

## 2021-06-23 DIAGNOSIS — G47.33 OBSTRUCTIVE SLEEP APNEA, ADULT: Primary | ICD-10-CM

## 2021-06-23 DIAGNOSIS — R06.09 DYSPNEA ON EXERTION: ICD-10-CM

## 2021-06-23 DIAGNOSIS — R32 URINARY INCONTINENCE, UNSPECIFIED TYPE: ICD-10-CM

## 2021-06-23 DIAGNOSIS — R06.09 DYSPNEA ON EXERTION: Primary | ICD-10-CM

## 2021-06-23 PROCEDURE — 99213 OFFICE O/P EST LOW 20 MIN: CPT | Performed by: NURSE PRACTITIONER

## 2021-06-23 PROCEDURE — 99213 OFFICE O/P EST LOW 20 MIN: CPT | Performed by: STUDENT IN AN ORGANIZED HEALTH CARE EDUCATION/TRAINING PROGRAM

## 2021-06-23 NOTE — PROGRESS NOTES
Family Medicine Residency  Tima Salcido MD    Subjective:     Lana Mata is a 66 y.o. female who presents for follow up dyspnea and .    Exertional dyspnea; progressively worsening. Episodic, associated localized sharp left chest pain, 7/10. Denies diaphoresis, n/v.    Has SOPHIA, sleep med appointment today. Compliant CPAP therapy.     CKD 3, follows nephrology.     The following portions of the patient's history were reviewed and updated as appropriate: allergies, current medications, past family history, past medical history, past social history, past surgical history and problem list.       Past Medical Hx:  Past Medical History:   Diagnosis Date   • Abnormal gait 11/28/2012   • Abrasion 02/26/2015   • Acute ankle pain 03/31/2016   • Acute bronchitis 09/04/2015   • Allergic rhinitis 05/22/2012   • Asthenia 05/22/2014   • Atrial fibrillation (CMS/[facility]) 03/04/2016    - converted    • Attempted suicide (CMS/[facility])    • Benign essential hypertension 03/04/2016   • Cardiovascular stress test abnormal 03/04/2016   • Cellulitis of knee 03/03/2015   • Chest pain 04/02/2015   • Chronic pain disorder    • Common cold 06/24/2014   • Congestive heart failure (CMS/[facility]) 02/04/2016   • Depressive disorder 05/22/2015    suspect more complicated psych issues      • Fibrocystic breast    • Gastroesophageal reflux disease 01/17/2013   • Generalized anxiety disorder 02/04/2016   • H/O echocardiogram     Normal LV function with Ef of 60-65%.Mildly dilated right ventricle with normal function. Grade 1B diastolic dysfunction with mild CLVH.NO sig. valvular regurg. or stenosis.   • Headache 10/27/2014   • Hyperlipidemia 03/04/2016   • Low back pain 06/30/2016    Need for prophylactic vaccination against Streptococcus pneumoniae [pneumococcus]    01/23/2015    • Neck pain 06/30/2016   • Obstructive sleep apnea of adult 07/30/2015   • Obstructive sleep apnea syndrome    • Osteoarthritis    • Osteoporosis 06/30/2016   • Pain  in right shoulder 06/30/2016   • Pain in right shoulder 04/24/2014   • Shortness of breath 04/02/2015   • Skin ulcer (CMS/HCC) 04/13/2015    left   • Thyrotoxicosis with or without goiter 10/24/2014   • Urinary incontinence 05/22/2014       Past Surgical Hx:  Past Surgical History:   Procedure Laterality Date   • BACK SURGERY  08/07/2015    Removal of intstrumentation,L5-S1.Exploration of fusion,L5-S1.Posterolateral fusion,L4-L5.Posterior segmental spinal instrumentation L4-5.Transforaminal interbody fusion Through right sided approach.Performed at    • COLONOSCOPY  05/04/2016    will order cologuard   • INJECTION OF MEDICATION  03/23/2015    celestone   • INJECTION OF MEDICATION  05/04/2016    KENALOG(6)   • LUMBAR FUSION      discectomy fusion with rods L4-S1   • LUMBAR LAMINECTOMY  1995   • MANDIBLE FRACTURE SURGERY      JAW WIRED SHUT   • MOUTH SURGERY  10/05/2015    Fractured mandible. Removal of arch bars.   • MOUTH SURGERY  1985    Fractured mandible. Removal of arch bars.   • NASAL SEPTUM SURGERY     • RHINOPLASTY     • TUBAL ABDOMINAL LIGATION  1986       Current Meds:    Current Outpatient Medications:   •  alendronate (Fosamax) 70 MG tablet, Take 1 tablet by mouth Every 7 (Seven) Days., Disp: 4 tablet, Rfl: 11  •  aMILoride (MIDAMOR) 5 MG tablet, Take 1 tablet by mouth Daily., Disp: 30 tablet, Rfl: 6  •  ARIPiprazole (ABILIFY) 5 MG tablet, Take 5 mg by mouth Daily. At bedtime, Disp: , Rfl:   •  aspirin 325 MG tablet, Take 325 mg by mouth Daily. At bedtime, Disp: , Rfl:   •  atorvastatin (LIPITOR) 20 MG tablet, Take 1 tablet by mouth Daily., Disp: 90 tablet, Rfl: 3  •  Calcium Carb-Cholecalciferol (CALCIUM-VITAMIN D) 500-200 MG-UNIT per tablet, Take 1 tablet by mouth 2 (Two) Times a Day With Meals., Disp: 60 tablet, Rfl: 2  •  choline fenofibrate (Trilipix) 135 MG capsule, Take 1 capsule by mouth Daily., Disp: 90 capsule, Rfl: 3  •  Dilt- MG 24 hr capsule, TAKE ONE CAPSULE BY MOUTH EVERY DAY, Disp:  90 capsule, Rfl: 2  •  ferrous sulfate 325 (65 FE) MG tablet, Take 325 mg by mouth Daily With Breakfast., Disp: , Rfl:   •  Incontinence Supply Disposable (Depend Adjustable Underwear) misc, 1 application As Needed (Incontinence)., Disp: 90 each, Rfl: 11  •  lisinopril (PRINIVIL,ZESTRIL) 20 MG tablet, Take 20 mg by mouth Daily., Disp: , Rfl:   •  loperamide (IMODIUM) 2 MG capsule, TAKE 1 CAPSULE BY MOUTH EVERY FOUR HOURS AS NEEDED FOR DIARRHEA, Disp: 120 capsule, Rfl: 2  •  LORazepam (ATIVAN) 1 MG tablet, Take 1 mg by mouth 2 (Two) Times a Day. Scheduled to take in the morning and one with dinner, Disp: , Rfl: 0  •  methocarbamol (ROBAXIN) 500 MG tablet, Take 1 tablet by mouth 4 (Four) Times a Day As Needed for Muscle Spasms., Disp: 90 tablet, Rfl: 5  •  metoprolol tartrate (LOPRESSOR) 25 MG tablet, TAKE 1 TABLET EVERY 12 HOURS, Disp: 60 tablet, Rfl: 3  •  omeprazole (priLOSEC) 20 MG capsule, Take 1 capsule by mouth Daily., Disp: 90 capsule, Rfl: 5  •  ondansetron (Zofran) 4 MG tablet, Take 1 tablet by mouth Every 8 (Eight) Hours As Needed for Nausea or Vomiting., Disp: 25 tablet, Rfl: 0  •  potassium chloride 10 MEQ CR tablet, Take 1 tablet by mouth 2 (Two) Times a Day With Meals., Disp: 180 tablet, Rfl: 3  •  sertraline (ZOLOFT) 100 MG tablet, Take 100 mg by mouth 2 (Two) Times a Day., Disp: , Rfl: 1  •  Xarelto 20 MG tablet, TAKE ONE TABLET BY MOUTH EVERY DAY - (TAKE WITH FOOD), Disp: 90 tablet, Rfl: 3    Current Facility-Administered Medications:   •  cyanocobalamin injection 1,000 mcg, 1,000 mcg, Intramuscular, Q28 Days, Maxime Edwards MD, 1,000 mcg at 05/14/21 1514    Allergies:  Allergies   Allergen Reactions   • Codeine Nausea And Vomiting   • Latex Rash   • Morphine And Related Hallucinations   • Pravachol [Pravastatin Sodium] Myalgia       Family Hx:  Family History   Problem Relation Age of Onset   • Hypertension Mother    • Hyperlipidemia Mother    • Mental illness Mother    • Thyroid disease  "Mother    • Diabetes Maternal Grandmother    • Cancer Maternal Grandmother    • Depression Other    • Diabetes Other    • Heart disease Other    • Hypertension Other    • Osteoporosis Other    • Thyroid disease Other    • Cancer Maternal Aunt    • Cancer Paternal Aunt    • Breast cancer Paternal Aunt         Social History:  Social History     Socioeconomic History   • Marital status:      Spouse name: Not on file   • Number of children: Not on file   • Years of education: Not on file   • Highest education level: Not on file   Tobacco Use   • Smoking status: Never Smoker   • Smokeless tobacco: Never Used   • Tobacco comment: smoking cessation    Substance and Sexual Activity   • Alcohol use: No   • Drug use: No     Comment: lortab / oxycontin   • Sexual activity: Defer       Review of Systems  Review of Systems   Constitutional: Negative for appetite change, diaphoresis, fatigue and fever.   HENT: Negative for ear pain, postnasal drip, rhinorrhea and sore throat.    Eyes: Negative for pain and visual disturbance.   Respiratory: Negative for cough, chest tightness and shortness of breath.    Cardiovascular: Negative for chest pain, palpitations and leg swelling.   Gastrointestinal: Negative for abdominal pain, constipation, diarrhea, nausea and vomiting.   Genitourinary: Negative for dysuria, flank pain, frequency and urgency.   Musculoskeletal: Negative for arthralgias, back pain and myalgias.   Skin: Negative for pallor and rash.   Neurological: Negative for dizziness, seizures, syncope, weakness and numbness.   Psychiatric/Behavioral: Negative for agitation, confusion, decreased concentration and dysphoric mood.       Objective:     /80   Pulse 92   Temp 96 °F (35.6 °C)   Ht 162.6 cm (64\")   Wt 99.8 kg (220 lb)   SpO2 98%   BMI 37.76 kg/m²   Physical Exam  Constitutional:       General: She is not in acute distress.     Appearance: She is well-developed. She is obese. She is not ill-appearing, " toxic-appearing or diaphoretic.   HENT:      Head: Normocephalic and atraumatic.      Right Ear: External ear normal.      Left Ear: External ear normal.      Nose: Nose normal.      Mouth/Throat:      Mouth: Mucous membranes are moist.   Eyes:      Conjunctiva/sclera: Conjunctivae normal.      Pupils: Pupils are equal, round, and reactive to light.   Neck:      Thyroid: No thyromegaly.      Trachea: No tracheal deviation.   Cardiovascular:      Rate and Rhythm: Normal rate and regular rhythm.   Pulmonary:      Effort: Pulmonary effort is normal. No respiratory distress.      Breath sounds: Normal breath sounds. No wheezing.   Abdominal:      General: Bowel sounds are normal. There is no distension.      Palpations: Abdomen is soft.      Tenderness: There is no abdominal tenderness.   Musculoskeletal:         General: Normal range of motion.      Cervical back: Normal range of motion and neck supple.      Right lower leg: No edema.      Left lower leg: No edema.   Skin:     General: Skin is warm and dry.      Capillary Refill: Capillary refill takes less than 2 seconds.   Neurological:      General: No focal deficit present.      Mental Status: She is alert and oriented to person, place, and time.      Motor: No abnormal muscle tone.          Assessment/Plan:     Diagnoses and all orders for this visit:    1. Dyspnea on exertion (Primary)  Seen recently in ED for dyspnea ; no evidence of acute CHF or ACS. Since then having episodic sharp chest pain when exerting herself that is concerning for reversible ischemia. Will refer to Cardiology to evaluate.     2. Urinary incontinence, unspecified type  -     Incontinence Supply Disposable (Depend Adjustable Underwear) misc; 1 application As Needed (Incontinence).  Dispense: 90 each; Refill: 11        · Rx changes: n/a  · Patient Education: diet, excercise  · Compliance at present is estimated to be good.   · Efforts to improve compliance (if necessary) will be directed at  increased exercise.    Depression screening: Up to date; last screen 5/14/2021     Follow-up:     Return in about 2 months (around 8/23/2021) for Recheck.    Preventative:  Health Maintenance   Topic Date Due   • ZOSTER VACCINE (2 of 2) 02/09/2017   • ANNUAL WELLNESS VISIT  02/07/2019   • PAP SMEAR  12/15/2019   • Pneumococcal Vaccine 65+ (1 of 1 - PPSV23) 03/09/2020   • LIPID PANEL  11/18/2020   • INFLUENZA VACCINE  08/01/2021   • MAMMOGRAM  11/07/2021   • DXA SCAN  11/07/2021   • COLORECTAL CANCER SCREENING  11/07/2026   • TDAP/TD VACCINES (5 - Td or Tdap) 12/07/2029   • HEPATITIS C SCREENING  Completed   • COVID-19 Vaccine  Completed     Female Preventative: UTD  Recommended: none  Vaccine Counseling: N/A    Weight  -Class: Obese Class II: 35-39.9kg/m2  -Patient's Body mass index is 37.76 kg/m². indicating that she is obese (BMI >30). Obesity-related health conditions include the following: obstructive sleep apnea, hypertension and dyslipidemias. Obesity is improving with lifestyle modifications. BMI is is above average; BMI management plan is completed. We discussed portion control and increasing exercise..   eat more fruits and vegetables, decrease soda or juice intake, increase water intake, increase physical activity, reduce portion size, cut out extra servings and reduce fast food intake    Alcohol use:  reports no history of alcohol use.  Nicotine status  reports that she has never smoked. She has never used smokeless tobacco.    Goals        Patient Stated    •  management of chronic medical conditions (pt-stated)       Barriers to goals: none            RISK SCORE: 4      Tima Salcido M.D. PGY3  [facility] Family Medicine Residency  [address] [place], [place] [address]  Office: [phone]    This document has been electronically signed by Tima Salcido MD on June 24, 2021 09:08 CDT

## 2021-06-23 NOTE — PATIENT INSTRUCTIONS
*Call [employer] to register machine's serial number: [id number]  *[employer]' website: [email or web address]    *Switching supplies to [employer] for new machine

## 2021-06-23 NOTE — PROGRESS NOTES
Sleep Clinic Follow Up    Date: 2021  Primary Care Provider: Tima Salcido MD    Last office visit: 2020 (telephone visit) (I reviewed this note)    CC: Follow up: SOPHIA on CPAP      Interim History:  Since the last visit:    1) mild SOPHIA -  Lana Mata has remained compliant with CPAP. She denies mask issues, dry mouth, headaches, or pressures intolerance. She denies abnormal dreams, sleep paralysis, nasal congestion, URI sx. She states that her current machine is broken and she is needing a new one.    2) Patient reports occasional RLS symptoms.     3) Dyspnea on exertion - Patient is very concerned again with dyspnea on exertion. We had arrange for repeat visit with Pulmonology last year after our telephone visit, however, patient appears to have not followed up with this. She states that she gets very winded when she is up walking short distances and exerting herself at all. She has a home pulse oximeter and checks her SpO2 frequently. Typically, she stays around 92-98%, however, she has dropped to at least 88% with exertion according to her home pulse ox.    Sleep Testin. Split night PSG on 2015, AHI of 12.6, recommended CPAP @ 7 cm H2O  2. Repeat HST on 2018, AHI of 7.9  3. Currently on 7 cm H2O    PAP Data:    No new data  Mask type: Full face mask  DME: [facility]    Bed time: 2200  Sleep latency: 10-20 minutes  Number of times awakens during the night: 3  Wake time: 0700  Estimated total sleep time at night: 8 hours  Caffeine intake: 1 cups of coffee, 1 cups of tea, and 1 sodas per day  Alcohol intake: 0 drinks per week  Nap time: none   Sleepiness with Driving: none     [place] - 3    PMHx, FH, SH reviewed and pertinent changes are: Reportedly unchanged from last office visit with us.      REVIEW OF SYSTEMS:   Negative for chest pain, SOA, fever, chills, cough, N/V/D, abdominal pain.    Smoking:none    Lana Mata  reports that she has never smoked. She  has never used smokeless tobacco.     Exam:  Vitals:    06/23/21 1446   BP: 108/67   Pulse: 83   SpO2: 97%           06/23/21  1446   Weight: 100 kg (221 lb 3.2 oz)     Body mass index is 37.95 kg/m². Patient's Body mass index is 37.95 kg/m². indicating that she is morbidly obese (BMI > 40 or > 35 with obesity - related health condition). Obesity-related health conditions include the following: obstructive sleep apnea, hypertension, dyslipidemias and GERD. Obesity is unchanged. BMI is is above average; BMI management plan is completed. I recommend portion control and increasing exercise.      Gen:                No distress, conversant, pleasant, appears stated age, alert, oriented  Eyes:               Anicteric sclera, moist conjunctiva, no lid lag                           PERRL, EOMI   Heent:             NC/AT                          Oropharynx clear                          Normal hearing  Lungs:             Normal effort, non-labored breathing                          Clear to auscultation bilaterally          CV:                  Normal S1/S2, no murmur                          No lower extremity edema  ABD:               Soft, rounded, non-distended                          Normal bowel sounds                    Psych:             Appropriate affect  Neuro:             CN 2-12 appear intact    Past Medical History:   Diagnosis Date   • Abnormal gait 11/28/2012   • Abrasion 02/26/2015   • Acute ankle pain 03/31/2016   • Acute bronchitis 09/04/2015   • Allergic rhinitis 05/22/2012   • Asthenia 05/22/2014   • Atrial fibrillation (CMS/[facility]) 03/04/2016    - converted    • Attempted suicide (CMS/[facility])    • Benign essential hypertension 03/04/2016   • Cardiovascular stress test abnormal 03/04/2016   • Cellulitis of knee 03/03/2015   • Chest pain 04/02/2015   • Chronic pain disorder    • Common cold 06/24/2014   • Congestive heart failure (CMS/[facility]) 02/04/2016   • Depressive disorder 05/22/2015    suspect more  complicated psych issues      • Fibrocystic breast    • Gastroesophageal reflux disease 01/17/2013   • Generalized anxiety disorder 02/04/2016   • H/O echocardiogram     Normal LV function with Ef of 60-65%.Mildly dilated right ventricle with normal function. Grade 1B diastolic dysfunction with mild CLVH.NO sig. valvular regurg. or stenosis.   • Headache 10/27/2014   • Hyperlipidemia 03/04/2016   • Low back pain 06/30/2016    Need for prophylactic vaccination against Streptococcus pneumoniae [pneumococcus]    01/23/2015    • Neck pain 06/30/2016   • Obstructive sleep apnea of adult 07/30/2015   • Obstructive sleep apnea syndrome    • Osteoarthritis    • Osteoporosis 06/30/2016   • Pain in right shoulder 06/30/2016   • Pain in right shoulder 04/24/2014   • Shortness of breath 04/02/2015   • Skin ulcer (CMS/HCC) 04/13/2015    left   • Thyrotoxicosis with or without goiter 10/24/2014   • Urinary incontinence 05/22/2014       Current Outpatient Medications:   •  alendronate (Fosamax) 70 MG tablet, Take 1 tablet by mouth Every 7 (Seven) Days., Disp: 4 tablet, Rfl: 11  •  aMILoride (MIDAMOR) 5 MG tablet, Take 1 tablet by mouth Daily., Disp: 30 tablet, Rfl: 6  •  ARIPiprazole (ABILIFY) 5 MG tablet, Take 5 mg by mouth Daily. At bedtime, Disp: , Rfl:   •  aspirin 325 MG tablet, Take 325 mg by mouth Daily. At bedtime, Disp: , Rfl:   •  atorvastatin (LIPITOR) 20 MG tablet, Take 1 tablet by mouth Daily., Disp: 90 tablet, Rfl: 3  •  Calcium Carb-Cholecalciferol (CALCIUM-VITAMIN D) 500-200 MG-UNIT per tablet, Take 1 tablet by mouth 2 (Two) Times a Day With Meals., Disp: 60 tablet, Rfl: 2  •  choline fenofibrate (Trilipix) 135 MG capsule, Take 1 capsule by mouth Daily., Disp: 90 capsule, Rfl: 3  •  Dilt- MG 24 hr capsule, TAKE ONE CAPSULE BY MOUTH EVERY DAY, Disp: 90 capsule, Rfl: 2  •  ferrous sulfate 325 (65 FE) MG tablet, Take 325 mg by mouth Daily With Breakfast., Disp: , Rfl:   •  Incontinence Supply Disposable (Depend  Adjustable Underwear) misc, 1 application As Needed (Incontinence)., Disp: 90 each, Rfl: 11  •  lisinopril (PRINIVIL,ZESTRIL) 20 MG tablet, Take 20 mg by mouth Daily., Disp: , Rfl:   •  loperamide (IMODIUM) 2 MG capsule, TAKE 1 CAPSULE BY MOUTH EVERY FOUR HOURS AS NEEDED FOR DIARRHEA, Disp: 120 capsule, Rfl: 2  •  LORazepam (ATIVAN) 1 MG tablet, Take 1 mg by mouth 2 (Two) Times a Day. Scheduled to take in the morning and one with dinner, Disp: , Rfl: 0  •  methocarbamol (ROBAXIN) 500 MG tablet, Take 1 tablet by mouth 4 (Four) Times a Day As Needed for Muscle Spasms., Disp: 90 tablet, Rfl: 5  •  metoprolol tartrate (LOPRESSOR) 25 MG tablet, TAKE 1 TABLET EVERY 12 HOURS, Disp: 60 tablet, Rfl: 3  •  omeprazole (priLOSEC) 20 MG capsule, Take 1 capsule by mouth Daily., Disp: 90 capsule, Rfl: 5  •  ondansetron (Zofran) 4 MG tablet, Take 1 tablet by mouth Every 8 (Eight) Hours As Needed for Nausea or Vomiting., Disp: 25 tablet, Rfl: 0  •  potassium chloride 10 MEQ CR tablet, Take 1 tablet by mouth 2 (Two) Times a Day With Meals., Disp: 180 tablet, Rfl: 3  •  sertraline (ZOLOFT) 100 MG tablet, Take 100 mg by mouth 2 (Two) Times a Day., Disp: , Rfl: 1  •  Xarelto 20 MG tablet, TAKE ONE TABLET BY MOUTH EVERY DAY - (TAKE WITH FOOD), Disp: 90 tablet, Rfl: 3    Current Facility-Administered Medications:   •  cyanocobalamin injection 1,000 mcg, 1,000 mcg, Intramuscular, Q28 Days, Maxime Edwards MD, 1,000 mcg at 05/14/21 1514    WBC   Date Value Ref Range Status   05/11/2021 10.16 3.40 - 10.80 10*3/mm3 Final     RBC   Date Value Ref Range Status   05/11/2021 4.13 3.77 - 5.28 10*6/mm3 Final     Hemoglobin   Date Value Ref Range Status   05/11/2021 12.1 12.0 - 15.9 g/dL Final     Hematocrit   Date Value Ref Range Status   05/11/2021 36.8 34.0 - 46.6 % Final     MCV   Date Value Ref Range Status   05/11/2021 89.1 79.0 - 97.0 fL Final     MCH   Date Value Ref Range Status   05/11/2021 29.3 26.6 - 33.0 pg Final     MCHC      Date Value Ref Range Status   05/11/2021 32.9 31.5 - 35.7 g/dL Final     RDW   Date Value Ref Range Status   05/11/2021 13.2 12.3 - 15.4 % Final     RDW-SD   Date Value Ref Range Status   05/11/2021 43.0 37.0 - 54.0 fl Final     MPV   Date Value Ref Range Status   05/11/2021 9.9 6.0 - 12.0 fL Final     Platelets   Date Value Ref Range Status   05/11/2021 228 140 - 450 10*3/mm3 Final     Neutrophil %   Date Value Ref Range Status   05/11/2021 63.5 42.7 - 76.0 % Final     Lymphocyte %   Date Value Ref Range Status   05/11/2021 24.1 19.6 - 45.3 % Final     Monocyte %   Date Value Ref Range Status   05/11/2021 8.4 5.0 - 12.0 % Final     Eosinophil %   Date Value Ref Range Status   05/11/2021 3.0 0.3 - 6.2 % Final     Basophil %   Date Value Ref Range Status   05/11/2021 0.5 0.0 - 1.5 % Final     Immature Grans %   Date Value Ref Range Status   05/11/2021 0.5 0.0 - 0.5 % Final     Neutrophils, Absolute   Date Value Ref Range Status   05/11/2021 6.46 1.70 - 7.00 10*3/mm3 Final     Lymphocytes, Absolute   Date Value Ref Range Status   05/11/2021 2.45 0.70 - 3.10 10*3/mm3 Final     Monocytes, Absolute   Date Value Ref Range Status   05/11/2021 0.85 0.10 - 0.90 10*3/mm3 Final     Eosinophils, Absolute   Date Value Ref Range Status   05/11/2021 0.30 0.00 - 0.40 10*3/mm3 Final     Basophils, Absolute   Date Value Ref Range Status   05/11/2021 0.05 0.00 - 0.20 10*3/mm3 Final     Immature Grans, Absolute   Date Value Ref Range Status   05/11/2021 0.05 0.00 - 0.05 10*3/mm3 Final     nRBC   Date Value Ref Range Status   05/11/2021 0.0 0.0 - 0.2 /100 WBC Final       Lab Results   Component Value Date    GLUCOSE 92 05/11/2021    BUN 23 05/11/2021    CREATININE 1.26 (H) 05/11/2021    EGFRIFNONA 42 (L) 05/11/2021    BCR 18.3 05/11/2021    K 4.4 05/11/2021    CO2 26.0 05/11/2021    CALCIUM 9.6 05/11/2021    ALBUMIN 3.70 05/11/2021    AST 21 05/11/2021    ALT 15 05/11/2021       Assessment and Plan:    1. Obstructive sleep apnea -  Established, stable (1)  1. Compliant with PAP therapy  2. Advised patient of new safety recall of [employer] CPAP/BiPAP/AVAPS machines. We discussed the risk versus benefit of continuing usage of PAP therapy. The company has recommended that patients stop usage of their current machines. Instructed patient to call [employer] ([phone]) to check if the machine is under warranty for noise-cancelling foam replacement. [place] machine with serial number on website: [email or web address]. Follow up with DME company regarding any further questions. Advised to call us if any further questions or problems  3. Script for PAP supplies and new autoCPAP 5-15 cm H2O  4. Return to clinic in 31-90 days with compliance report unless change in symptoms in interim period  2. Restless leg syndrome/Periodic limb movement disorder (RLS/PLMD) - self-limited or minor problem  3. Morbid obesity - BMI 37.9 - stable chronic illness   4. Dyspnea on exertion - Established, not controlled  1. Referral to Pulmonology      I spent 20 minutes caring for Lana on this date of service. This time includes time spent by me in the following activities: preparing for the visit, reviewing tests, obtaining and/or reviewing a separately obtained history, performing a medically appropriate examination and/or evaluation , counseling and educating the patient/family/caregiver and documenting information in the medical record; discussing PAP therapy, PAP compliance and PAP maintenance    Patient to follow up in 31-90 days with compliance report. Patient agrees to return sooner if changes in symptoms.        This document has been electronically signed by JOHN Rizo on June 23, 2021 14:49 CDT          CC: Tima Salcido MD          No ref. provider found

## 2021-06-25 DIAGNOSIS — R07.9 CHEST PAIN, UNSPECIFIED TYPE: Primary | ICD-10-CM

## 2021-06-25 NOTE — TELEPHONE ENCOUNTER
Called patient to let her know that Dr. Jurado will send in her medication.     Also was reaching out to the patient since her PCP sent in a referral to see Dr. Jurado for some chest pain.   Dr. Jurado would like to do a two day stress test on her to see what could be causing her some issues.     Order was placed and we will follow up once the testing is done if need be.     Left voicemail for patient to call back.       ----- Message from Parker Dee sent at 6/25/2021 12:02 PM CDT -----  Regarding: [email or web address]  Contact: [phone]  Pt didn't receive her Trilipix with the rest of her meds she received. She's requesting they be called in to [facility] by mail.

## 2021-06-29 DIAGNOSIS — R07.9 CHEST PAIN, UNSPECIFIED TYPE: Primary | ICD-10-CM

## 2021-07-12 ENCOUNTER — HOSPITAL ENCOUNTER (OUTPATIENT)
Dept: NUCLEAR MEDICINE | Facility: HOSPITAL | Age: 66
Discharge: HOME | End: 2021-07-12

## 2021-07-12 DIAGNOSIS — R07.9 CHEST PAIN, UNSPECIFIED TYPE: ICD-10-CM

## 2021-07-12 PROCEDURE — 93018 CV STRESS TEST I&R ONLY: CPT | Performed by: INTERNAL MEDICINE

## 2021-07-12 PROCEDURE — A9500 TC99M SESTAMIBI: HCPCS | Performed by: INTERNAL MEDICINE

## 2021-07-12 PROCEDURE — 78452 HT MUSCLE IMAGE SPECT MULT: CPT

## 2021-07-12 PROCEDURE — 78452 HT MUSCLE IMAGE SPECT MULT: CPT | Performed by: INTERNAL MEDICINE

## 2021-07-12 PROCEDURE — 93017 CV STRESS TEST TRACING ONLY: CPT

## 2021-07-12 PROCEDURE — 0 TECHNETIUM SESTAMIBI: Performed by: INTERNAL MEDICINE

## 2021-07-12 PROCEDURE — 93016 CV STRESS TEST SUPVJ ONLY: CPT | Performed by: INTERNAL MEDICINE

## 2021-07-12 RX ADMIN — TECHNETIUM TC 99M SESTAMIBI 1 DOSE: 1 INJECTION INTRAVENOUS at 07:48

## 2021-07-13 ENCOUNTER — HOSPITAL ENCOUNTER (OUTPATIENT)
Dept: CARDIOLOGY | Facility: HOSPITAL | Age: 66
Discharge: HOME | End: 2021-07-13

## 2021-07-13 ENCOUNTER — HOSPITAL ENCOUNTER (OUTPATIENT)
Dept: NUCLEAR MEDICINE | Facility: HOSPITAL | Age: 66
Discharge: HOME | End: 2021-07-13

## 2021-07-13 LAB
BH CV REST NUCLEAR ISOTOPE DOSE: 34.9 MCI
BH CV STRESS COMMENTS STAGE 1: NORMAL
BH CV STRESS DOSE REGADENOSON STAGE 1: 0.4
BH CV STRESS DURATION MIN STAGE 1: 0
BH CV STRESS DURATION SEC STAGE 1: 10
BH CV STRESS NUCLEAR ISOTOPE DOSE: 34.1 MCI
BH CV STRESS PROTOCOL 1: NORMAL
BH CV STRESS RECOVERY BP: NORMAL MMHG
BH CV STRESS RECOVERY HR: 57 BPM
BH CV STRESS RECOVERY O2: 97 %
BH CV STRESS STAGE 1: 1
LV EF NUC BP: 81 %
MAXIMAL PREDICTED HEART RATE: 154 BPM
PERCENT MAX PREDICTED HR: 48.7 %
STRESS BASELINE BP: NORMAL MMHG
STRESS BASELINE HR: 59 BPM
STRESS O2 SAT REST: 96 %
STRESS PERCENT HR: 57 %
STRESS POST O2 SAT PEAK: 96 %
STRESS POST PEAK BP: NORMAL MMHG
STRESS POST PEAK HR: 75 BPM
STRESS TARGET HR: 131 BPM

## 2021-07-13 PROCEDURE — 0 TECHNETIUM SESTAMIBI: Performed by: INTERNAL MEDICINE

## 2021-07-13 PROCEDURE — A9500 TC99M SESTAMIBI: HCPCS | Performed by: INTERNAL MEDICINE

## 2021-07-13 PROCEDURE — 25010000002 REGADENOSON 0.4 MG/5ML SOLUTION: Performed by: INTERNAL MEDICINE

## 2021-07-13 RX ADMIN — TECHNETIUM TC 99M SESTAMIBI 1 DOSE: 1 INJECTION INTRAVENOUS at 08:27

## 2021-07-13 RX ADMIN — REGADENOSON 0.4 MG: 0.08 INJECTION, SOLUTION INTRAVENOUS at 08:26

## 2021-07-14 ENCOUNTER — TELEPHONE (OUTPATIENT)
Dept: CARDIOLOGY | Facility: CLINIC | Age: 66
End: 2021-07-14

## 2021-07-14 NOTE — TELEPHONE ENCOUNTER
Nuclear stress test results per Dr Jurado:    Normal   Low-risk    Called patient however there was no answer. Left message on VM

## 2021-07-19 ENCOUNTER — TELEPHONE (OUTPATIENT)
Dept: CARDIOLOGY | Facility: CLINIC | Age: 66
End: 2021-07-19

## 2021-08-05 RX ORDER — DILTIAZEM HYDROCHLORIDE 180 MG/1
CAPSULE, EXTENDED RELEASE ORAL
Qty: 90 CAPSULE | Refills: 3 | Status: SHIPPED | OUTPATIENT
Start: 2021-08-05 | End: 2021-08-13 | Stop reason: SDUPTHER

## 2021-08-05 RX ORDER — RIVAROXABAN 20 MG/1
TABLET, FILM COATED ORAL
Qty: 90 TABLET | Refills: 3 | Status: SHIPPED | OUTPATIENT
Start: 2021-08-05 | End: 2021-12-09 | Stop reason: SDUPTHER

## 2021-08-13 ENCOUNTER — TELEPHONE (OUTPATIENT)
Dept: CARDIOLOGY | Facility: CLINIC | Age: 66
End: 2021-08-13

## 2021-08-13 RX ORDER — DILTIAZEM HYDROCHLORIDE 180 MG/1
180 CAPSULE, EXTENDED RELEASE ORAL DAILY
Qty: 14 CAPSULE | Refills: 0 | Status: SHIPPED | OUTPATIENT
Start: 2021-08-13 | End: 2022-05-31 | Stop reason: SDUPTHER

## 2021-08-13 NOTE — TELEPHONE ENCOUNTER
----- Message from Kamryn Holt sent at 8/13/2021  9:27 AM CDT -----  Contact: [phone]  She is expecting a shipment of her Diltiazem from the mail order but she doesn't have enough to last her until she gets it. Can you call in enough to [facility] to cover her for 2 weeks

## 2021-08-13 NOTE — TELEPHONE ENCOUNTER
Returned patient call to let her know her 2 week Dilatizem 180 mg was sent into [facility] pharmacy to cover until she gets her mail order refills in. She voiced her understanding.

## 2021-08-23 ENCOUNTER — HOSPITAL ENCOUNTER (EMERGENCY)
Facility: HOSPITAL | Age: 66
Discharge: HOME | End: 2021-08-23
Attending: EMERGENCY MEDICINE | Admitting: EMERGENCY MEDICINE

## 2021-08-23 ENCOUNTER — APPOINTMENT (OUTPATIENT)
Dept: GENERAL RADIOLOGY | Facility: HOSPITAL | Age: 66
End: 2021-08-23

## 2021-08-23 VITALS
RESPIRATION RATE: 18 BRPM | TEMPERATURE: 97.7 F | HEIGHT: 64 IN | OXYGEN SATURATION: 97 % | HEART RATE: 76 BPM | DIASTOLIC BLOOD PRESSURE: 81 MMHG | SYSTOLIC BLOOD PRESSURE: 147 MMHG | BODY MASS INDEX: 37.56 KG/M2 | WEIGHT: 220 LBS

## 2021-08-23 DIAGNOSIS — M54.41 BILATERAL LOW BACK PAIN WITH BILATERAL SCIATICA, UNSPECIFIED CHRONICITY: Primary | ICD-10-CM

## 2021-08-23 DIAGNOSIS — M54.42 BILATERAL LOW BACK PAIN WITH BILATERAL SCIATICA, UNSPECIFIED CHRONICITY: Primary | ICD-10-CM

## 2021-08-23 PROCEDURE — 72100 X-RAY EXAM L-S SPINE 2/3 VWS: CPT

## 2021-08-23 PROCEDURE — 99283 EMERGENCY DEPT VISIT LOW MDM: CPT

## 2021-08-23 PROCEDURE — 25010000002 KETOROLAC TROMETHAMINE PER 15 MG: Performed by: EMERGENCY MEDICINE

## 2021-08-23 PROCEDURE — 96372 THER/PROPH/DIAG INJ SC/IM: CPT

## 2021-08-23 RX ORDER — METHOCARBAMOL 500 MG/1
500 TABLET, FILM COATED ORAL 4 TIMES DAILY PRN
Qty: 90 TABLET | Refills: 0 | Status: SHIPPED | OUTPATIENT
Start: 2021-08-23 | End: 2022-04-29 | Stop reason: SDUPTHER

## 2021-08-23 RX ORDER — METHYLPREDNISOLONE 4 MG/1
TABLET ORAL
Qty: 21 EACH | Refills: 0 | OUTPATIENT
Start: 2021-08-23 | End: 2021-08-30

## 2021-08-23 RX ORDER — KETOROLAC TROMETHAMINE 30 MG/ML
30 INJECTION, SOLUTION INTRAMUSCULAR; INTRAVENOUS ONCE
Status: COMPLETED | OUTPATIENT
Start: 2021-08-23 | End: 2021-08-23

## 2021-08-23 RX ADMIN — KETOROLAC TROMETHAMINE 30 MG: 30 INJECTION, SOLUTION INTRAMUSCULAR; INTRAVENOUS at 05:24

## 2021-08-23 NOTE — ED PROVIDER NOTES
Subjective   Patient presents emergency department with about a 1 month history of low back pain.  Patient notes she strained this trying to help out her elderly mother moving her around.  This was after a fall approximately a month and a half ago onto her low back.  Patient states she never followed up for these conditions.  Patient does have chronic pain disorders is not currently under a pain contract as she had some failure with her pain contract several years ago.  Patient notes no saddle anesthesia.  No significant bilateral radicular symptoms.  There is no urinary incontinence or urinary retention noted.  Patient does note some right greater than left sciatic type feeling cramping in the legs with the back pain.  Patient notes the pain was worse last night is what brought her to the ER this morning.          Review of Systems   Constitutional: Negative.  Negative for appetite change, chills and fever.   HENT: Negative.  Negative for congestion.    Eyes: Negative.  Negative for photophobia and visual disturbance.   Respiratory: Negative.  Negative for cough, chest tightness and shortness of breath.    Cardiovascular: Negative.  Negative for chest pain and palpitations.   Gastrointestinal: Negative.  Negative for abdominal pain, constipation, diarrhea, nausea and vomiting.   Endocrine: Negative.    Genitourinary: Negative.  Negative for decreased urine volume, dysuria, flank pain and hematuria.   Musculoskeletal: Positive for back pain. Negative for arthralgias, myalgias, neck pain and neck stiffness.   Skin: Negative.  Negative for pallor.   Neurological: Negative.  Negative for dizziness, syncope, weakness, light-headedness, numbness and headaches.   Psychiatric/Behavioral: Negative.  Negative for confusion and suicidal ideas. The patient is not nervous/anxious.    All other systems reviewed and are negative.      Past Medical History:   Diagnosis Date   • Abnormal gait 11/28/2012   • Abrasion 02/26/2015   •  Acute ankle pain 03/31/2016   • Acute bronchitis 09/04/2015   • Allergic rhinitis 05/22/2012   • Asthenia 05/22/2014   • Atrial fibrillation (CMS/[facility]) 03/04/2016    - converted    • Attempted suicide (CMS/[facility])    • Benign essential hypertension 03/04/2016   • Cardiovascular stress test abnormal 03/04/2016   • Cellulitis of knee 03/03/2015   • Chest pain 04/02/2015   • Chronic pain disorder    • Common cold 06/24/2014   • Congestive heart failure (CMS/[facility]) 02/04/2016   • Depressive disorder 05/22/2015    suspect more complicated psych issues      • Fibrocystic breast    • Gastroesophageal reflux disease 01/17/2013   • Generalized anxiety disorder 02/04/2016   • H/O echocardiogram     Normal LV function with Ef of 60-65%.Mildly dilated right ventricle with normal function. Grade 1B diastolic dysfunction with mild CLVH.NO sig. valvular regurg. or stenosis.   • Headache 10/27/2014   • Hyperlipidemia 03/04/2016   • Low back pain 06/30/2016    Need for prophylactic vaccination against Streptococcus pneumoniae [pneumococcus]    01/23/2015    • Neck pain 06/30/2016   • Obstructive sleep apnea of adult 07/30/2015   • Obstructive sleep apnea syndrome    • Osteoarthritis    • Osteoporosis 06/30/2016   • Pain in right shoulder 06/30/2016   • Pain in right shoulder 04/24/2014   • Shortness of breath 04/02/2015   • Skin ulcer (CMS/[facility]) 04/13/2015    left   • Thyrotoxicosis with or without goiter 10/24/2014   • Urinary incontinence 05/22/2014       Allergies   Allergen Reactions   • Codeine Nausea And Vomiting   • Latex Rash   • Morphine And Related Hallucinations   • Pravachol [Pravastatin Sodium] Myalgia       Past Surgical History:   Procedure Laterality Date   • BACK SURGERY  08/07/2015    Removal of intstrumentation,L5-S1.Exploration of fusion,L5-S1.Posterolateral fusion,L4-L5.Posterior segmental spinal instrumentation L4-5.Transforaminal interbody fusion Through right sided approach.Performed at    • COLONOSCOPY  05/04/2016     will order cologuard   • INJECTION OF MEDICATION  03/23/2015    celestone   • INJECTION OF MEDICATION  05/04/2016    KENALOG(6)   • LUMBAR FUSION      discectomy fusion with rods L4-S1   • LUMBAR LAMINECTOMY  1995   • MANDIBLE FRACTURE SURGERY      JAW WIRED SHUT   • MOUTH SURGERY  10/05/2015    Fractured mandible. Removal of arch bars.   • MOUTH SURGERY  1985    Fractured mandible. Removal of arch bars.   • NASAL SEPTUM SURGERY     • RHINOPLASTY     • TUBAL ABDOMINAL LIGATION  1986       Family History   Problem Relation Age of Onset   • Hypertension Mother    • Hyperlipidemia Mother    • Mental illness Mother    • Thyroid disease Mother    • Diabetes Maternal Grandmother    • Cancer Maternal Grandmother    • Depression Other    • Diabetes Other    • Heart disease Other    • Hypertension Other    • Osteoporosis Other    • Thyroid disease Other    • Cancer Maternal Aunt    • Cancer Paternal Aunt    • Breast cancer Paternal Aunt        Social History     Socioeconomic History   • Marital status:      Spouse name: Not on file   • Number of children: Not on file   • Years of education: Not on file   • Highest education level: Not on file   Tobacco Use   • Smoking status: Never Smoker   • Smokeless tobacco: Never Used   • Tobacco comment: smoking cessation    Substance and Sexual Activity   • Alcohol use: No   • Drug use: No     Comment: lortab / oxycontin   • Sexual activity: Defer           Objective   Physical Exam  Vitals and nursing note reviewed.   Constitutional:       General: She is not in acute distress.     Appearance: She is well-developed. She is not diaphoretic.   HENT:      Head: Normocephalic and atraumatic.      Nose: Nose normal.   Eyes:      General: No scleral icterus.     Conjunctiva/sclera: Conjunctivae normal.   Neck:      Vascular: No JVD.   Cardiovascular:      Rate and Rhythm: Normal rate and regular rhythm.      Heart sounds: Normal heart sounds. No murmur heard.   No friction rub.  No gallop.    Pulmonary:      Effort: Pulmonary effort is normal. No respiratory distress.      Breath sounds: No wheezing or rales.   Chest:      Chest wall: No tenderness.   Abdominal:      General: There is no distension.      Palpations: Abdomen is soft. There is no mass.      Tenderness: There is no abdominal tenderness. There is no guarding or rebound.   Musculoskeletal:         General: No tenderness or deformity. Normal range of motion.      Cervical back: Normal range of motion and neck supple.      Comments: Patient with tenderness lower lumbar region as well as some and mostly the paraspinal region into the buttocks bilaterally.   Lymphadenopathy:      Cervical: No cervical adenopathy.   Skin:     General: Skin is warm and dry.      Capillary Refill: Capillary refill takes less than 2 seconds.      Coloration: Skin is not pale.      Findings: No erythema or rash.   Neurological:      General: No focal deficit present.      Mental Status: She is alert and oriented to person, place, and time.      Cranial Nerves: No cranial nerve deficit.      Motor: No abnormal muscle tone.   Psychiatric:         Behavior: Behavior normal.         Thought Content: Thought content normal.         Judgment: Judgment normal.         Procedures           ED Course  ED Course as of Aug 23 0609   Mon Aug 23, 2021   0606 No cauda equina.  No significant subluxation or fracture.  Hardware intact.  Plan symptomatic care with outpatient referral to back specialist.  Patient is not interested in narcotic pain medications with prior difficulties with the same.    [PC]      ED Course User Index  [PC] Ron Schultz MD                                  \  Labs Reviewed - No data to display    XR Spine Lumbar 2 or 3 View   Final Result   No acute osseous finding               Electronically signed by:  Edwin Vernon MD  8/23/2021 6:07 AM   CDT Workstation: [id number]                    [facility]    Final diagnoses:   Bilateral low back pain  with bilateral sciatica, unspecified chronicity       ED Disposition  ED Disposition     ED Disposition Condition Comment    Discharge Stable           Leticia Tejeda MD  [address] [place] [id number] [phone]    On 8/26/2021  As needed, For further evaluation and management         Medication List      New Prescriptions    methylPREDNISolone 4 MG dose pack  Commonly known as: MEDROL  Take as directed on package instructions.           Where to Get Your Medications      These medications were sent to [place], [place] - [facility] [phone] [facility] [phone] [address] [address]    Phone: [phone]   · methocarbamol 500 MG tablet  · methylPREDNISolone 4 MG dose pack          Ron Schultz MD  08/23/21 1238

## 2021-08-23 NOTE — ED NOTES
Patient is at an increased risk for falls. Fall light activated, yellow falls bracelet and yellow non-skid socks placed on patient. Call light within reach and patient instructed to call for assistance. Side rails up x2, bed alarm activated, and gait belt readily accessible.          Alayna No, HELGA  08/23/21 0455

## 2021-08-23 NOTE — DISCHARGE INSTRUCTIONS
Follow-up with your primary care provider for further referral for your back issues. Return with any new or worsening symptoms, or any concerns.

## 2021-08-26 ENCOUNTER — LAB (OUTPATIENT)
Dept: LAB | Facility: HOSPITAL | Age: 66
End: 2021-08-26

## 2021-08-26 ENCOUNTER — OFFICE VISIT (OUTPATIENT)
Dept: FAMILY MEDICINE CLINIC | Facility: CLINIC | Age: 66
End: 2021-08-26

## 2021-08-26 VITALS
HEART RATE: 78 BPM | BODY MASS INDEX: 37 KG/M2 | DIASTOLIC BLOOD PRESSURE: 89 MMHG | TEMPERATURE: 96.6 F | SYSTOLIC BLOOD PRESSURE: 128 MMHG | OXYGEN SATURATION: 98 % | WEIGHT: 215.56 LBS

## 2021-08-26 DIAGNOSIS — I10 ESSENTIAL HYPERTENSION: ICD-10-CM

## 2021-08-26 DIAGNOSIS — G89.29 CHRONIC BILATERAL LOW BACK PAIN WITH LEFT-SIDED SCIATICA: Primary | ICD-10-CM

## 2021-08-26 DIAGNOSIS — M54.42 CHRONIC BILATERAL LOW BACK PAIN WITH LEFT-SIDED SCIATICA: Primary | ICD-10-CM

## 2021-08-26 DIAGNOSIS — E78.5 HYPERLIPIDEMIA, UNSPECIFIED HYPERLIPIDEMIA TYPE: ICD-10-CM

## 2021-08-26 DIAGNOSIS — N18.30 STAGE 3 CHRONIC KIDNEY DISEASE, UNSPECIFIED WHETHER STAGE 3A OR 3B CKD (HCC): ICD-10-CM

## 2021-08-26 PROCEDURE — 36415 COLL VENOUS BLD VENIPUNCTURE: CPT

## 2021-08-26 PROCEDURE — 80061 LIPID PANEL: CPT

## 2021-08-26 PROCEDURE — 99214 OFFICE O/P EST MOD 30 MIN: CPT | Performed by: FAMILY MEDICINE

## 2021-08-26 PROCEDURE — 80053 COMPREHEN METABOLIC PANEL: CPT

## 2021-08-26 RX ORDER — LISINOPRIL 10 MG/1
10 TABLET ORAL DAILY
COMMUNITY
Start: 2021-08-09

## 2021-08-26 NOTE — PROGRESS NOTES
Family Medicine Residency  Leticia Tejeda MD    Subjective:     Lana Mata is a 66 y.o. female pmh of 3 lumbar spinal surgeries, bilateral sciatica with neuropathy affecting left leg, who presents for back pain.     Bilateral Sciatica  She has noticed worsening control of her back pain with intermittent foot drop of her left leg that caused her to fall twice in the past.  She was referred over to pain management but did not receive the pharmacological intervention she had expected.  Though she is still able walk, she prefers to see a neurologist her daughter sees, to manage her sciatica.  Was unable to see him sooner as she was occupied with the care of her mother who had terminal cervical cancer. She has been controlled with methylprednisolone tapering dose and ketorolac injections.     SOPHIA on CPAP  Patient is compliant with her CPAP and, when following up on her previous desaturation to 88% on pulse oximetry, she reported this only occurs at night prior to putting on her CPAP when she is lying in bed watching tv. Reports no other concerns.     Medications for her paroxymal fibrillation, GERD, HTN, Osteoporosis, MINO and MDD were reviewed at this visit and updated. No reported concerns regarding these chronic conditions.       The following portions of the patient's history were reviewed and updated as appropriate: allergies, current medications, past family history, past medical history, past social history, past surgical history and problem list.    Past Medical Hx:  Past Medical History:   Diagnosis Date   • Abnormal gait 11/28/2012   • Abrasion 02/26/2015   • Acute ankle pain 03/31/2016   • Acute bronchitis 09/04/2015   • Allergic rhinitis 05/22/2012   • Asthenia 05/22/2014   • Atrial fibrillation (CMS/[facility]) 03/04/2016    - converted    • Attempted suicide (CMS/[facility])    • Benign essential hypertension 03/04/2016   • Cardiovascular stress test abnormal 03/04/2016   • Cellulitis of knee 03/03/2015   •  Chest pain 04/02/2015   • Chronic pain disorder    • Common cold 06/24/2014   • Congestive heart failure (CMS/HCC) 02/04/2016   • Depressive disorder 05/22/2015    suspect more complicated psych issues      • Fibrocystic breast    • Gastroesophageal reflux disease 01/17/2013   • Generalized anxiety disorder 02/04/2016   • H/O echocardiogram     Normal LV function with Ef of 60-65%.Mildly dilated right ventricle with normal function. Grade 1B diastolic dysfunction with mild CLVH.NO sig. valvular regurg. or stenosis.   • Headache 10/27/2014   • Hyperlipidemia 03/04/2016   • Low back pain 06/30/2016    Need for prophylactic vaccination against Streptococcus pneumoniae [pneumococcus]    01/23/2015    • Neck pain 06/30/2016   • Obstructive sleep apnea of adult 07/30/2015   • Obstructive sleep apnea syndrome    • Osteoarthritis    • Osteoporosis 06/30/2016   • Pain in right shoulder 06/30/2016   • Pain in right shoulder 04/24/2014   • Shortness of breath 04/02/2015   • Skin ulcer (CMS/HCC) 04/13/2015    left   • Thyrotoxicosis with or without goiter 10/24/2014   • Urinary incontinence 05/22/2014       Past Surgical Hx:  Past Surgical History:   Procedure Laterality Date   • BACK SURGERY  08/07/2015    Removal of intstrumentation,L5-S1.Exploration of fusion,L5-S1.Posterolateral fusion,L4-L5.Posterior segmental spinal instrumentation L4-5.Transforaminal interbody fusion Through right sided approach.Performed at    • COLONOSCOPY  05/04/2016    will order cologuard   • INJECTION OF MEDICATION  03/23/2015    celestone   • INJECTION OF MEDICATION  05/04/2016    KENALOG(6)   • LUMBAR FUSION      discectomy fusion with rods L4-S1   • LUMBAR LAMINECTOMY  1995   • MANDIBLE FRACTURE SURGERY      JAW WIRED SHUT   • MOUTH SURGERY  10/05/2015    Fractured mandible. Removal of arch bars.   • MOUTH SURGERY  1985    Fractured mandible. Removal of arch bars.   • NASAL SEPTUM SURGERY     • RHINOPLASTY     • TUBAL ABDOMINAL LIGATION  1986        Current Meds:    Current Outpatient Medications:   •  alendronate (Fosamax) 70 MG tablet, Take 1 tablet by mouth Every 7 (Seven) Days., Disp: 4 tablet, Rfl: 11  •  aMILoride (MIDAMOR) 5 MG tablet, Take 1 tablet by mouth Daily., Disp: 30 tablet, Rfl: 6  •  ARIPiprazole (ABILIFY) 5 MG tablet, Take 5 mg by mouth Daily. At bedtime, Disp: , Rfl:   •  aspirin 325 MG tablet, Take 325 mg by mouth Daily. At bedtime, Disp: , Rfl:   •  atorvastatin (LIPITOR) 20 MG tablet, Take 1 tablet by mouth Daily., Disp: 90 tablet, Rfl: 3  •  Calcium Carb-Cholecalciferol (CALCIUM-VITAMIN D) 500-200 MG-UNIT per tablet, Take 1 tablet by mouth 2 (Two) Times a Day With Meals., Disp: 60 tablet, Rfl: 2  •  choline fenofibrate (Trilipix) 135 MG capsule, Take 1 capsule by mouth Daily., Disp: 90 capsule, Rfl: 3  •  dilTIAZem XR (Dilt-XR) 180 MG 24 hr capsule, Take 1 capsule by mouth Daily., Disp: 14 capsule, Rfl: 0  •  ferrous sulfate 325 (65 FE) MG tablet, Take 325 mg by mouth Daily With Breakfast., Disp: , Rfl:   •  Incontinence Supply Disposable (Depend Adjustable Underwear) misc, 1 application As Needed (Incontinence)., Disp: 90 each, Rfl: 11  •  lisinopril (PRINIVIL,ZESTRIL) 10 MG tablet, Take 10 mg by mouth Daily., Disp: , Rfl:   •  loperamide (IMODIUM) 2 MG capsule, TAKE 1 CAPSULE BY MOUTH EVERY FOUR HOURS AS NEEDED FOR DIARRHEA, Disp: 120 capsule, Rfl: 2  •  LORazepam (ATIVAN) 1 MG tablet, Take 1 mg by mouth 2 (Two) Times a Day. Scheduled to take in the morning and one with dinner, Disp: , Rfl: 0  •  methocarbamol (ROBAXIN) 500 MG tablet, Take 1 tablet by mouth 4 (Four) Times a Day As Needed for Muscle Spasms., Disp: 90 tablet, Rfl: 0  •  methylPREDNISolone (MEDROL) 4 MG dose pack, Take as directed on package instructions., Disp: 21 each, Rfl: 0  •  metoprolol tartrate (LOPRESSOR) 25 MG tablet, TAKE 1 TABLET EVERY 12 HOURS, Disp: 60 tablet, Rfl: 3  •  omeprazole (priLOSEC) 20 MG capsule, Take 1 capsule by mouth Daily., Disp: 90  capsule, Rfl: 5  •  ondansetron (Zofran) 4 MG tablet, Take 1 tablet by mouth Every 8 (Eight) Hours As Needed for Nausea or Vomiting., Disp: 25 tablet, Rfl: 0  •  potassium chloride 10 MEQ CR tablet, Take 1 tablet by mouth 2 (Two) Times a Day With Meals., Disp: 180 tablet, Rfl: 3  •  sertraline (ZOLOFT) 100 MG tablet, Take 100 mg by mouth 2 (Two) Times a Day., Disp: , Rfl: 1  •  Xarelto 20 MG tablet, TAKE ONE TABLET BY MOUTH EVERY DAY - (TAKE WITH FOOD), Disp: 90 tablet, Rfl: 3    Current Facility-Administered Medications:   •  cyanocobalamin injection 1,000 mcg, 1,000 mcg, Intramuscular, Q28 Days, Maxime Edwards MD, 1,000 mcg at 05/14/21 1514    Allergies:  Allergies   Allergen Reactions   • Codeine Nausea And Vomiting   • Latex Rash   • Morphine And Related Hallucinations   • Pravachol [Pravastatin Sodium] Myalgia       Family Hx:  Family History   Problem Relation Age of Onset   • Hypertension Mother    • Hyperlipidemia Mother    • Mental illness Mother    • Thyroid disease Mother    • Diabetes Maternal Grandmother    • Cancer Maternal Grandmother    • Depression Other    • Diabetes Other    • Heart disease Other    • Hypertension Other    • Osteoporosis Other    • Thyroid disease Other    • Cancer Maternal Aunt    • Cancer Paternal Aunt    • Breast cancer Paternal Aunt         Social History:  Social History     Socioeconomic History   • Marital status:      Spouse name: Not on file   • Number of children: Not on file   • Years of education: Not on file   • Highest education level: Not on file   Tobacco Use   • Smoking status: Never Smoker   • Smokeless tobacco: Never Used   • Tobacco comment: smoking cessation    Substance and Sexual Activity   • Alcohol use: No   • Drug use: No     Comment: lortab / oxycontin   • Sexual activity: Defer       Review of Systems  Review of Systems   Constitutional: Positive for activity change (Impaired by neuropathy from sciatica). Negative for unexpected weight  change.   HENT: Positive for postnasal drip. Negative for rhinorrhea and sneezing.    Eyes: Negative for pain and visual disturbance.   Respiratory: Negative for shortness of breath.         No desaturation while ambulating at home.    Cardiovascular: Negative for leg swelling.   Gastrointestinal: Positive for diarrhea (controlled with imodium).   Endocrine: Negative for polydipsia and polyuria.   Genitourinary: Negative for hematuria and vaginal bleeding.   Musculoskeletal: Positive for arthralgias (Local to left shin where she had a cherelle placed) and back pain (Local to side of back surgery in paraspinal region at 2-4 lumbar levels.).   Allergic/Immunologic: Positive for environmental allergies.   Neurological: Positive for numbness. Negative for dizziness and weakness.   Psychiatric/Behavioral: Negative for dysphoric mood, sleep disturbance and suicidal ideas.       Objective:     /89   Pulse 78   Temp 96.6 °F (35.9 °C)   Wt 97.8 kg (215 lb 9 oz)   SpO2 98%   BMI 37.00 kg/m²   Physical Exam  Vitals reviewed.   Constitutional:       Appearance: Normal appearance. She is obese.   Eyes:      Conjunctiva/sclera: Conjunctivae normal.   Cardiovascular:      Rate and Rhythm: Normal rate and regular rhythm.      Pulses: Normal pulses.      Heart sounds: Murmur heard.     Pulmonary:      Effort: Pulmonary effort is normal.      Breath sounds: Normal breath sounds and air entry. No decreased breath sounds, wheezing, rhonchi or rales.   Musculoskeletal:         General: Tenderness (Over left shin) present. No swelling.        Back:       Right lower leg: No edema.      Left lower leg: No edema.      Right foot: Normal range of motion. No foot drop.      Left foot: Decreased range of motion. Foot drop present.   Skin:     General: Skin is warm and dry.      Capillary Refill: Capillary refill takes less than 2 seconds.   Neurological:      Mental Status: She is alert.      Motor: Motor function is intact.      Gait:  Gait abnormal (Slowed and leaning forward to shift wait from back. ).   Psychiatric:         Attention and Perception: Attention normal.         Mood and Affect: Mood and affect normal. Mood is not anxious or depressed.         Speech: Speech normal.         Behavior: Behavior normal. Behavior is cooperative.         Thought Content: Thought content normal.         Judgment: Judgment normal.          Assessment/Plan:     Diagnoses and all orders for this visit:    1. Chronic bilateral low back pain with left-sided sciatica (Primary)  -     Ambulatory Referral to Neurology    2. Body mass index (BMI) of 37.0 to 37.9 in adult  -     Lipid panel; Future    3. Stage 3 chronic kidney disease, unspecified whether stage 3a or 3b CKD (CMS/[facility])  -     Comprehensive metabolic panel; Future    4. Hyperlipidemia, unspecified hyperlipidemia type  -     Lipid panel; Future    5. Essential hypertension    Will continue monitoring weight and BP, will assess in 3 months to evaluate control over BP and weight. Will make appropriate medication changes pending results of her blood test. Patient verbalized agreement and understanding of the plan discussed.    · Rx changes: none  · Patient Education: yearly wellness visits.   · Compliance at present is estimated to be excellent.   · Efforts to improve compliance (if necessary) will be directed at increased exercise once the pain from her sciatica has been controlled.     Depression screening: Patient screened negative for depression based on a PHQ-9 score of 0 on 8/26/2021.    Follow-up:     Return in about 3 months (around 11/26/2021) for Annual, Medicare Wellness. At that time, if blood test orders today are not complete, will have them all done together in 3 months [December 2021].     Preventative:  Health Maintenance   Topic Date Due   • ZOSTER VACCINE (2 of 2) 02/09/2017   • ANNUAL WELLNESS VISIT  02/07/2019   • Pneumococcal Vaccine 65+ (1 of 1 - PPSV23) 03/09/2020   • LIPID PANEL   11/18/2020   • INFLUENZA VACCINE  10/01/2021   • MAMMOGRAM  11/07/2021   • DXA SCAN  11/07/2021   • COLORECTAL CANCER SCREENING  11/07/2026   • TDAP/TD VACCINES (5 - Td or Tdap) 12/07/2029   • HEPATITIS C SCREENING  Completed   • COVID-19 Vaccine  Completed   • PAP SMEAR  Discontinued     Female Preventative: Exercises regularly. Up to date on her Mammogram and no longer needs repeat pap smears. Patient is doing FOBT testing alternative yearly instead of colonoscopy because inability to tolerate go lytely cleansing.   Recommended: Will recommend Zoster vaccine and pneummococcal vaccine at annual visit scheduled in 3 months.   Vaccine Counseling: N/A    Weight  -Class: Obese Class II: 35-39.9kg/m2  -Patient's Body mass index is 37 kg/m². indicating that she is morbidly obese (BMI > 40 or > 35 with obesity - related health condition). Obesity-related health conditions include the following: obstructive sleep apnea, hypertension, dyslipidemias, GERD and osteoarthritis. Obesity is Continues to fluctuate 5-10lbs in the past 2 years.. BMI is above average and goals for improvement will be discussed in 3 month follow up once her sciatica concerns have been addressed. . We discussed portion control and increasing exercise..   increase water intake and increase physical activity    Alcohol use:  reports no history of alcohol use.  Nicotine status  reports that she has never smoked. She has never used smokeless tobacco.    Goals     •  management of chronic medical conditions (pt-stated)       Barriers to goals: none            RISK SCORE: 3          This document has been electronically signed by Leticia Tejeda MD on August 26, 2021 17:59 CDT

## 2021-08-27 ENCOUNTER — DOCUMENTATION (OUTPATIENT)
Dept: OBSTETRICS AND GYNECOLOGY | Facility: CLINIC | Age: 66
End: 2021-08-27

## 2021-08-27 LAB
ALBUMIN SERPL-MCNC: 4.2 G/DL (ref 3.5–5.2)
ALBUMIN/GLOB SERPL: 1.3 G/DL
ALP SERPL-CCNC: 41 U/L (ref 39–117)
ALT SERPL W P-5'-P-CCNC: 23 U/L (ref 1–33)
ANION GAP SERPL CALCULATED.3IONS-SCNC: 11.2 MMOL/L (ref 5–15)
AST SERPL-CCNC: 21 U/L (ref 1–32)
BILIRUB SERPL-MCNC: 0.3 MG/DL (ref 0–1.2)
BUN SERPL-MCNC: 26 MG/DL (ref 8–23)
BUN/CREAT SERPL: 20.3 (ref 7–25)
CALCIUM SPEC-SCNC: 9.3 MG/DL (ref 8.6–10.5)
CHLORIDE SERPL-SCNC: 103 MMOL/L (ref 98–107)
CHOLEST SERPL-MCNC: 156 MG/DL (ref 0–200)
CO2 SERPL-SCNC: 20.8 MMOL/L (ref 22–29)
CREAT SERPL-MCNC: 1.28 MG/DL (ref 0.57–1)
GFR SERPL CREATININE-BSD FRML MDRD: 42 ML/MIN/1.73
GLOBULIN UR ELPH-MCNC: 3.3 GM/DL
GLUCOSE SERPL-MCNC: 96 MG/DL (ref 65–99)
HDLC SERPL-MCNC: 71 MG/DL (ref 40–60)
LDLC SERPL CALC-MCNC: 66 MG/DL (ref 0–100)
LDLC/HDLC SERPL: 0.9 {RATIO}
POTASSIUM SERPL-SCNC: 5.7 MMOL/L (ref 3.5–5.2)
PROT SERPL-MCNC: 7.5 G/DL (ref 6–8.5)
SODIUM SERPL-SCNC: 135 MMOL/L (ref 136–145)
TRIGL SERPL-MCNC: 107 MG/DL (ref 0–150)
VLDLC SERPL-MCNC: 19 MG/DL (ref 5–40)

## 2021-08-30 PROCEDURE — 87635 SARS-COV-2 COVID-19 AMP PRB: CPT | Performed by: NURSE PRACTITIONER

## 2021-08-30 NOTE — PROGRESS NOTES
I saw and evaluated the patient with the resident. I was present in the room throughout the visit. I did help formulate the assessment and plan with Dr. Marlena Tejeda.   I discussed the case with the resident and agree with the findings and plan as documented in the residents note.  I have helped formulate and discussed the assessment and plan with Dr.Anne-Margaret Tejeda.           Subjective   Lana Mata is a 66 y.o. female.     History of Present Illness   Patient comes in for evaluation of multiple problems.  First problem is that she has continued low back pain and radiation into her left leg down to the knee.  She has had 2-3 back surgeries in the past has a current fusion.  She comes in today requesting a referral to neurosurgery specifically Dr. Dick Pizarro.  In the past she has had chronic pain management with opioids but had difficulty with those and does not take those.  She is recently completed a Medrol Dosepak and a injection of Toradol for pain.  She has had back pain for a long time.  She does have obstructive sleep apnea uses CPAP.  She has had an O2 sat of 91% and she says time to time she has an O2 monitor that drops down to 88% saturation before she puts her CPAP on.  She does have chronic kidney disease stage III followed by Dr. Buchanan  History of paroxysmal atrial fibrillation on anticoagulation  Does have a history of hypertension hyperlipidemia which she says are stable    The following portions of the patient's history were reviewed and updated as appropriate: allergies, current medications,  and problem list.    Review of Systems     She has good appetite.  She sleeps well using her CPAP  Does not have shortness of breath cough or wheezing.  Again noted is O2 sat of 88% before she puts her CPAP  Does not have desaturations during the day or if she uses CPAP  No chest pain she does not have any palpitations  Remainder the review of systems per Dr. Tejeda  Physical  Exam    Objective   On examination she is alert oriented she is not in any distress  Good historian  Remainder the examination per Dr. Tejeda    Assessment/Plan   Diagnoses and all orders for this visit:    1. Chronic bilateral low back pain with left-sided sciatica (Primary)  -     Ambulatory Referral to Neurology    2. Body mass index (BMI) of 37.0 to 37.9 in adult  -     Lipid panel; Future  - follow in 3 months for weight recheck  3. Stage 3 chronic kidney disease, unspecified whether stage 3a or 3b CKD (CMS/HCC)  -     Comprehensive metabolic panel; Future    4. Hyperlipidemia, unspecified hyperlipidemia type  -     Lipid panel; Future    5. Essential hypertension  Follow in 3 months for blood pressure recheck      She will be referred to neurosurgery as she requested Dr. Dick Pizarro.  [facility] #[id number] was reviewed she does take lorazepam 1 mg twice daily through [employer].  This should be of assistance with her back pain for muscle relaxation as well.  She will have a lipid profile and a CMP  She does not agree to a screening colonoscopy.  She says that she stands stool samples to her insurance yearly as requested  She does not have a family history of colon cancer  Mother recently  of cervical cancer    She will return to clinic in 3 months as needed           This document has been electronically signed by Moncho Zhao MD on 2021 09:27 CDT

## 2021-09-01 RX ORDER — AMILORIDE HYDROCHLORIDE 5 MG/1
5 TABLET ORAL DAILY
Qty: 30 TABLET | Refills: 2 | Status: SHIPPED | OUTPATIENT
Start: 2021-09-01 | End: 2021-11-30

## 2021-09-01 NOTE — TELEPHONE ENCOUNTER
Incoming Refill Request      Medication requested (name and dose): aMILoride (MIDAMOR) 5 MG tablet    Pharmacy where request should be sent: [facility]    Additional details provided by patient: COMPLETELY OUT    Best call back number: [phone]    Does the patient have less than a 3 day supply:  [x] Yes  [] No    Carrie Bernardo [employer]  09/01/21, 10:19 CDT

## 2021-09-10 ENCOUNTER — TELEPHONE (OUTPATIENT)
Dept: FAMILY MEDICINE CLINIC | Facility: CLINIC | Age: 66
End: 2021-09-10

## 2021-09-10 NOTE — TELEPHONE ENCOUNTER
PATIENT CALLED NEEDING YOU TO CALL HER ABOUT TAKING HER LORazepam (ATIVAN) 1 MG tablet 3 TIMES DAILY. SHE ADVISED HER SON PASSED AWAY THIS MORNING AND SHE NEEDS SOMETHING HELP HER.     PLEASE CALL [phone]

## 2021-09-14 ENCOUNTER — DOCUMENTATION (OUTPATIENT)
Dept: CARDIOLOGY | Facility: CLINIC | Age: 66
End: 2021-09-14

## 2021-09-14 ENCOUNTER — TELEPHONE (OUTPATIENT)
Dept: FAMILY MEDICINE CLINIC | Facility: CLINIC | Age: 66
End: 2021-09-14

## 2021-09-14 DIAGNOSIS — F51.02 ADJUSTMENT INSOMNIA: Primary | ICD-10-CM

## 2021-09-14 RX ORDER — HYDROXYZINE PAMOATE 25 MG/1
25 CAPSULE ORAL 3 TIMES DAILY PRN
Qty: 30 CAPSULE | Refills: 0 | Status: SHIPPED | OUTPATIENT
Start: 2021-09-14 | End: 2021-10-26

## 2021-09-14 RX ORDER — HYDROXYZINE PAMOATE 25 MG/1
25 CAPSULE ORAL 3 TIMES DAILY PRN
Qty: 30 CAPSULE | Refills: 0 | Status: SHIPPED | OUTPATIENT
Start: 2021-09-14 | End: 2021-09-14

## 2021-09-14 NOTE — PROGRESS NOTES
Called patient at 0718 on 9/14/2021 and left message for her to call back.  Patient requested Ativan.     Issues to discuss with her is that she was well-controlled on Sertraline and Abilify 5 mg daily on her visit on 8/26. PHQ-9 was 0 at that time.     As she has lost her son, it is important to review what symptoms of grieving have been impairing her quality of life.  Will call back again later today to explore these concerns and what resources are available.           This document has been electronically signed by Leticia Tejeda MD on September 14, 2021 07:27 CDT

## 2021-09-14 NOTE — PROGRESS NOTES
Patient called at 1724 on 9/14 about sending Ativan.  Discussed the safety issues of sending her ativan.  She was very cooperative with trying Vistaril 25 mg TID.  Instructed to call back if no improvement in 10 days so we can increase the dose to 50 mg. Patient reports no homicidal or suicidal ideations.  She is just unable to sleep or eat with her son passing away 5 days ago [Friday].      Vistaril 25 mg TID sent to her [facility] pharmacy.   Patient verbalized agreement and understanding of the plan discussed.          This document has been electronically signed by Leticia Tejeda MD on September 14, 2021 17:26 CDT

## 2021-09-30 ENCOUNTER — OFFICE VISIT (OUTPATIENT)
Dept: SLEEP MEDICINE | Facility: HOSPITAL | Age: 66
End: 2021-09-30

## 2021-09-30 VITALS
DIASTOLIC BLOOD PRESSURE: 79 MMHG | SYSTOLIC BLOOD PRESSURE: 151 MMHG | OXYGEN SATURATION: 95 % | WEIGHT: 220.8 LBS | HEART RATE: 74 BPM | HEIGHT: 64 IN | BODY MASS INDEX: 37.69 KG/M2

## 2021-09-30 DIAGNOSIS — M54.16 LUMBAR RADICULOPATHY: Primary | ICD-10-CM

## 2021-09-30 DIAGNOSIS — G47.33 OBSTRUCTIVE SLEEP APNEA, ADULT: Primary | ICD-10-CM

## 2021-09-30 DIAGNOSIS — E66.01 MORBID OBESITY (HCC): ICD-10-CM

## 2021-09-30 DIAGNOSIS — F43.21 GRIEF: ICD-10-CM

## 2021-09-30 PROCEDURE — 99214 OFFICE O/P EST MOD 30 MIN: CPT | Performed by: NURSE PRACTITIONER

## 2021-09-30 NOTE — PATIENT INSTRUCTIONS
-Mask liners ([employer] or [email or web address])  -Biotene or other dry mouth rinse  -Call to reschedule appointment with Pulmonology

## 2021-09-30 NOTE — PROGRESS NOTES
Sleep Clinic Follow Up    Date: 2021  Primary Care Provider: Leticia Tejeda MD    Last office visit: 2021 (I reviewed this note)    CC: Follow up: SOPHIA on CPAP, new machine F/U      Interim History:  Since the last visit:    1) mild SOPHIA -  Lana Mata has not remained fully compliant with CPAP. She denies machine issues, headaches, or pressures intolerance. She denies abnormal dreams, sleep paralysis, nasal congestion, URI sx. She reports recent non-use due to acute grief dealing with her son passing away 2 weeks ago as well as her mother passing away 2 months ago. She states that there are some nights that she lies awake and cries and just does not feel like putting her mask on. She also has been having some issues lately with dry mouth waking her up at night.    2) Patient reports occasional RLS symptoms.     3) Dyspnea on exertion - Patient mentions yet again experiencing dyspnea on exertion. She had another appointment scheduled with Pulmonary after her last visit, but she was unable to keep it due to losses in the family. I informed her to call the department to reschedule her appointment once she feels up to it.    Sleep Testin. Split night PSG on 2015, AHI of 12.6   2. CPAP titration on same day, recommended 7 cm H2O   3. Repeat HST on 2018, AHI of 7.9  4. Currently on 5-15 cm H2O    PAP Data:    Time frame: 2021-2021   Compliance: 66%  Average use on days used: 5 hrs 51 min  Percent of days with usage greater than or equal to 4 hours: 49%  PAP range: 5-15 cm H2O  Average 90% pressure: 11.5 cmH2O  Leak: 1.4 L/minute  Average AHI: 2.6 events/hr  Mask type: Full face mask  DME: [facility]    Bed time: 2200  Sleep latency: 10-30 minutes  Number of times awakens during the night: 3  Wake time: 0500  Estimated total sleep time at night: 6 hours  Caffeine intake: 1 cups of coffee, 0 cups of tea, and 5-6 sodas per day  Alcohol intake: 0 drinks per week  Nap time:  rare   Sleepiness with Driving: none     [place] - 6    PMHx, FH, SH reviewed and pertinent changes are: Mother passed away a couple of months ago. Son passed away 2 weeks ago.      REVIEW OF SYSTEMS:   Negative for chest pain, SOA, fever, chills, cough, N/V/D, abdominal pain.    Smoking:none    Lana [facility]  reports that she has never smoked. She has never used smokeless tobacco.    Exam:  Vitals:    09/30/21 0934   BP: 151/79   Pulse: 74   SpO2: 95%           09/30/21  0934   Weight: 100 kg (220 lb 12.8 oz)     Body mass index is 37.33 kg/m². Patient's Body mass index is 37.33 kg/m². indicating that she is morbidly obese (BMI > 40 or > 35 with obesity - related health condition). Obesity-related health conditions include the following: obstructive sleep apnea, hypertension, dyslipidemias and GERD. Obesity is unchanged. BMI is is above average; BMI management plan is completed. I recommend portion control and increasing exercise.      Gen:                No distress, conversant, pleasant, appears stated age, alert, oriented  Eyes:               Anicteric sclera, moist conjunctiva, no lid lag                           PERRL, EOMI   Heent:             NC/AT                          Oropharynx clear                          Normal hearing  Lungs:             Normal effort, non-labored breathing                          Clear to auscultation bilaterally          CV:                  Normal S1/S2, no murmur                          No lower extremity edema  ABD:               Rounded, non-distended                  Psych:             Appropriate affect  Neuro:             CN 2-12 appear intact    Past Medical History:   Diagnosis Date   • Abnormal gait 11/28/2012   • Abrasion 02/26/2015   • Acute ankle pain 03/31/2016   • Acute bronchitis 09/04/2015   • Allergic rhinitis 05/22/2012   • Asthenia 05/22/2014   • Atrial fibrillation (CMS/HCC) 03/04/2016    - converted    • Attempted suicide (CMS/[facility])    • Benign  essential hypertension 03/04/2016   • Cardiovascular stress test abnormal 03/04/2016   • Cellulitis of knee 03/03/2015   • Chest pain 04/02/2015   • Chronic pain disorder    • Common cold 06/24/2014   • Congestive heart failure (CMS/HCC) 02/04/2016   • Depressive disorder 05/22/2015    suspect more complicated psych issues      • Fibrocystic breast    • Gastroesophageal reflux disease 01/17/2013   • Generalized anxiety disorder 02/04/2016   • H/O echocardiogram     Normal LV function with Ef of 60-65%.Mildly dilated right ventricle with normal function. Grade 1B diastolic dysfunction with mild CLVH.NO sig. valvular regurg. or stenosis.   • Headache 10/27/2014   • Hyperlipidemia 03/04/2016   • Low back pain 06/30/2016    Need for prophylactic vaccination against Streptococcus pneumoniae [pneumococcus]    01/23/2015    • Neck pain 06/30/2016   • Obstructive sleep apnea of adult 07/30/2015   • Obstructive sleep apnea syndrome    • Osteoarthritis    • Osteoporosis 06/30/2016   • Pain in right shoulder 06/30/2016   • Pain in right shoulder 04/24/2014   • Shortness of breath 04/02/2015   • Skin ulcer (CMS/[facility]) 04/13/2015    left   • Thyrotoxicosis with or without goiter 10/24/2014   • Urinary incontinence 05/22/2014       Current Outpatient Medications:   •  alendronate (Fosamax) 70 MG tablet, Take 1 tablet by mouth Every 7 (Seven) Days., Disp: 4 tablet, Rfl: 11  •  aMILoride (MIDAMOR) 5 MG tablet, Take 1 tablet by mouth Daily for 90 days., Disp: 30 tablet, Rfl: 2  •  ARIPiprazole (ABILIFY) 5 MG tablet, Take 5 mg by mouth Daily. At bedtime, Disp: , Rfl:   •  aspirin 325 MG tablet, Take 325 mg by mouth Daily. At bedtime, Disp: , Rfl:   •  atorvastatin (LIPITOR) 20 MG tablet, Take 1 tablet by mouth Daily., Disp: 90 tablet, Rfl: 3  •  Calcium Carb-Cholecalciferol (CALCIUM-VITAMIN D) 500-200 MG-UNIT per tablet, Take 1 tablet by mouth 2 (Two) Times a Day With Meals., Disp: 60 tablet, Rfl: 2  •  choline fenofibrate (Trilipix)  135 MG capsule, Take 1 capsule by mouth Daily., Disp: 90 capsule, Rfl: 3  •  dilTIAZem XR (Dilt-XR) 180 MG 24 hr capsule, Take 1 capsule by mouth Daily., Disp: 14 capsule, Rfl: 0  •  ferrous sulfate 325 (65 FE) MG tablet, Take 325 mg by mouth Daily With Breakfast., Disp: , Rfl:   •  Incontinence Supply Disposable (Depend Adjustable Underwear) misc, 1 application As Needed (Incontinence)., Disp: 90 each, Rfl: 11  •  lisinopril (PRINIVIL,ZESTRIL) 10 MG tablet, Take 10 mg by mouth Daily., Disp: , Rfl:   •  loperamide (IMODIUM) 2 MG capsule, TAKE 1 CAPSULE BY MOUTH EVERY FOUR HOURS AS NEEDED FOR DIARRHEA, Disp: 120 capsule, Rfl: 2  •  LORazepam (ATIVAN) 1 MG tablet, Take 1 mg by mouth 2 (Two) Times a Day. Scheduled to take in the morning and one with dinner, Disp: , Rfl: 0  •  methocarbamol (ROBAXIN) 500 MG tablet, Take 1 tablet by mouth 4 (Four) Times a Day As Needed for Muscle Spasms., Disp: 90 tablet, Rfl: 0  •  metoprolol tartrate (LOPRESSOR) 25 MG tablet, TAKE 1 TABLET EVERY 12 HOURS, Disp: 60 tablet, Rfl: 3  •  omeprazole (priLOSEC) 20 MG capsule, Take 1 capsule by mouth Daily., Disp: 90 capsule, Rfl: 5  •  ondansetron ODT (Zofran ODT) 8 MG disintegrating tablet, Place 1 tablet on the tongue Every 8 (Eight) Hours As Needed for Nausea or Vomiting., Disp: 20 tablet, Rfl: 0  •  potassium chloride 10 MEQ CR tablet, Take 1 tablet by mouth 2 (Two) Times a Day With Meals., Disp: 180 tablet, Rfl: 3  •  promethazine-dextromethorphan (PROMETHAZINE-DM) 6.25-15 MG/5ML syrup, Take 5 mL by mouth At Night As Needed for Cough., Disp: 120 mL, Rfl: 0  •  sertraline (ZOLOFT) 100 MG tablet, Take 100 mg by mouth 2 (Two) Times a Day., Disp: , Rfl: 1  •  Xarelto 20 MG tablet, TAKE ONE TABLET BY MOUTH EVERY DAY - (TAKE WITH FOOD), Disp: 90 tablet, Rfl: 3  •  hydrOXYzine pamoate (Vistaril) 25 MG capsule, Take 1 capsule by mouth 3 (Three) Times a Day As Needed for Allergies (If no improvement in 10 days, will increase to 50 mg.) for up to  10 days., Disp: 30 capsule, Rfl: 0    Current Facility-Administered Medications:   •  cyanocobalamin injection 1,000 mcg, 1,000 mcg, Intramuscular, Q28 Days, Maxime Edwadrs MD, 1,000 mcg at 05/14/21 1514    WBC   Date Value Ref Range Status   05/11/2021 10.16 3.40 - 10.80 10*3/mm3 Final     RBC   Date Value Ref Range Status   05/11/2021 4.13 3.77 - 5.28 10*6/mm3 Final     Hemoglobin   Date Value Ref Range Status   05/11/2021 12.1 12.0 - 15.9 g/dL Final     Hematocrit   Date Value Ref Range Status   05/11/2021 36.8 34.0 - 46.6 % Final     MCV   Date Value Ref Range Status   05/11/2021 89.1 79.0 - 97.0 fL Final     MCH   Date Value Ref Range Status   05/11/2021 29.3 26.6 - 33.0 pg Final     MCHC   Date Value Ref Range Status   05/11/2021 32.9 31.5 - 35.7 g/dL Final     RDW   Date Value Ref Range Status   05/11/2021 13.2 12.3 - 15.4 % Final     RDW-SD   Date Value Ref Range Status   05/11/2021 43.0 37.0 - 54.0 fl Final     MPV   Date Value Ref Range Status   05/11/2021 9.9 6.0 - 12.0 fL Final     Platelets   Date Value Ref Range Status   05/11/2021 228 140 - 450 10*3/mm3 Final     Neutrophil %   Date Value Ref Range Status   05/11/2021 63.5 42.7 - 76.0 % Final     Lymphocyte %   Date Value Ref Range Status   05/11/2021 24.1 19.6 - 45.3 % Final     Monocyte %   Date Value Ref Range Status   05/11/2021 8.4 5.0 - 12.0 % Final     Eosinophil %   Date Value Ref Range Status   05/11/2021 3.0 0.3 - 6.2 % Final     Basophil %   Date Value Ref Range Status   05/11/2021 0.5 0.0 - 1.5 % Final     Immature Grans %   Date Value Ref Range Status   05/11/2021 0.5 0.0 - 0.5 % Final     Neutrophils, Absolute   Date Value Ref Range Status   05/11/2021 6.46 1.70 - 7.00 10*3/mm3 Final     Lymphocytes, Absolute   Date Value Ref Range Status   05/11/2021 2.45 0.70 - 3.10 10*3/mm3 Final     Monocytes, Absolute   Date Value Ref Range Status   05/11/2021 0.85 0.10 - 0.90 10*3/mm3 Final     Eosinophils, Absolute   Date Value Ref Range  Status   05/11/2021 0.30 0.00 - 0.40 10*3/mm3 Final     Basophils, Absolute   Date Value Ref Range Status   05/11/2021 0.05 0.00 - 0.20 10*3/mm3 Final     Immature Grans, Absolute   Date Value Ref Range Status   05/11/2021 0.05 0.00 - 0.05 10*3/mm3 Final     nRBC   Date Value Ref Range Status   05/11/2021 0.0 0.0 - 0.2 /100 WBC Final       Lab Results   Component Value Date    GLUCOSE 96 08/26/2021    BUN 26 (H) 08/26/2021    CREATININE 1.28 (H) 08/26/2021    EGFRIFNONA 42 (L) 08/26/2021    BCR 20.3 08/26/2021    K 5.7 (H) 08/26/2021    CO2 20.8 (L) 08/26/2021    CALCIUM 9.3 08/26/2021    ALBUMIN 4.20 08/26/2021    AST 21 08/26/2021    ALT 23 08/26/2021       Assessment and Plan:    1. Obstructive sleep apnea - Established, stable (1)  1. Recently non-compliant with PAP therapy due to losses in the family and acute grief - patient has discussed this with her [employer] company  2. Continue PAP as prescribed, encouraged increased compliance  3. Script for PAP supplies  4. Return to clinic in 1 year with compliance report unless change in symptoms in interim period. Advised patient to call if she continues to have trouble with compliance or dry mouth  5. Discussed Biotene or dry mouth rinse as well as adding mask liners if leak is noted  2. Restless leg syndrome/Periodic limb movement disorder (RLS/PLMD) - self-limited or minor problem  3. Acute grief - New (to me), no additional work-up planned (3)   1. Discussed relaxation techniques  2. Follow up with PCP or call if further assistance is needed  4. Morbid obesity - BMI 37.3 - stable chronic illness      I spent 20 minutes caring for Lana on this date of service. This time includes time spent by me in the following activities: preparing for the visit, reviewing tests, obtaining and/or reviewing a separately obtained history, performing a medically appropriate examination and/or evaluation , counseling and educating the patient/family/caregiver and documenting information  in the medical record; discussing PAP therapy, PAP compliance and PAP maintenance    RTC in 12 months. Patient agrees to return sooner if changes in symptoms.        This document has been electronically signed by JOHN Rizo on September 30, 2021 09:41 CDT          CC: Leticia Tejeda MD          No ref. provider found

## 2021-10-12 ENCOUNTER — HOSPITAL ENCOUNTER (OUTPATIENT)
Dept: MRI IMAGING | Facility: HOSPITAL | Age: 66
Discharge: HOME | End: 2021-10-12
Admitting: FAMILY MEDICINE

## 2021-10-12 DIAGNOSIS — M54.16 LUMBAR RADICULOPATHY: ICD-10-CM

## 2021-10-12 PROCEDURE — 72148 MRI LUMBAR SPINE W/O DYE: CPT

## 2021-10-13 ENCOUNTER — TELEPHONE (OUTPATIENT)
Dept: FAMILY MEDICINE CLINIC | Facility: CLINIC | Age: 66
End: 2021-10-13

## 2021-10-13 ENCOUNTER — HOSPITAL ENCOUNTER (EMERGENCY)
Facility: HOSPITAL | Age: 66
Discharge: HOME | End: 2021-10-13
Attending: EMERGENCY MEDICINE | Admitting: EMERGENCY MEDICINE

## 2021-10-13 VITALS
OXYGEN SATURATION: 99 % | HEIGHT: 64 IN | HEART RATE: 68 BPM | DIASTOLIC BLOOD PRESSURE: 73 MMHG | BODY MASS INDEX: 37.56 KG/M2 | WEIGHT: 220 LBS | SYSTOLIC BLOOD PRESSURE: 150 MMHG | TEMPERATURE: 97.3 F | RESPIRATION RATE: 18 BRPM

## 2021-10-13 DIAGNOSIS — M54.31 SCIATICA, RIGHT SIDE: ICD-10-CM

## 2021-10-13 DIAGNOSIS — M54.9 CHRONIC MIDLINE BACK PAIN, UNSPECIFIED BACK LOCATION: Primary | ICD-10-CM

## 2021-10-13 DIAGNOSIS — G89.29 CHRONIC MIDLINE BACK PAIN, UNSPECIFIED BACK LOCATION: Primary | ICD-10-CM

## 2021-10-13 PROCEDURE — 99283 EMERGENCY DEPT VISIT LOW MDM: CPT

## 2021-10-13 PROCEDURE — 96372 THER/PROPH/DIAG INJ SC/IM: CPT

## 2021-10-13 PROCEDURE — 25010000002 KETOROLAC TROMETHAMINE PER 15 MG: Performed by: EMERGENCY MEDICINE

## 2021-10-13 RX ORDER — KETOROLAC TROMETHAMINE 30 MG/ML
30 INJECTION, SOLUTION INTRAMUSCULAR; INTRAVENOUS ONCE
Status: COMPLETED | OUTPATIENT
Start: 2021-10-13 | End: 2021-10-13

## 2021-10-13 RX ORDER — HYDROCODONE BITARTRATE AND ACETAMINOPHEN 5; 325 MG/1; MG/1
1 TABLET ORAL EVERY 6 HOURS PRN
Qty: 15 TABLET | Refills: 0 | Status: SHIPPED | OUTPATIENT
Start: 2021-10-13 | End: 2021-10-26

## 2021-10-13 RX ORDER — LIDOCAINE 50 MG/G
1 PATCH TOPICAL EVERY 24 HOURS
Qty: 30 EACH | Refills: 0 | Status: SHIPPED | OUTPATIENT
Start: 2021-10-13

## 2021-10-13 RX ADMIN — KETOROLAC TROMETHAMINE 30 MG: 30 INJECTION, SOLUTION INTRAMUSCULAR; INTRAVENOUS at 03:08

## 2021-10-13 NOTE — ED NOTES
Pt states she has chronic back pain that she has had since 1996. She woke up and could not roll over in the bed or move because of the pain.      Ruba Mcdaniels RN  10/13/21 0251

## 2021-10-13 NOTE — ED PROVIDER NOTES
Subjective   Patient presents emergency department complaint of back pain.  Patient has a chronic back pain for which she is getting worked up with her primary care provider.  Patient states that she has had increased stressors over the past 5 weeks and losing both her mother and her son which has made her less resilient to her pain.  Patient states she normally takes nothing for pain but Robaxin because no one will write her anything more.  Patient denies any saddle anesthesia, no incontinence of urine or urinary retention.  No bilateral radicular symptoms but does have some radicular symptoms down the right leg from the hip down to the calf.  Patient had MRI yesterday.  Patient states that tonight in bed she could not get comfortable so came to the ER.      History provided by:  Patient      Review of Systems   Constitutional: Negative.  Negative for appetite change, chills and fever.   HENT: Negative.  Negative for congestion.    Eyes: Negative.  Negative for photophobia and visual disturbance.   Respiratory: Negative.  Negative for cough, chest tightness and shortness of breath.    Cardiovascular: Negative.  Negative for chest pain and palpitations.   Gastrointestinal: Negative.  Negative for abdominal pain, constipation, diarrhea, nausea and vomiting.   Endocrine: Negative.    Genitourinary: Negative.  Negative for decreased urine volume, dysuria, flank pain and hematuria.   Musculoskeletal: Positive for back pain. Negative for arthralgias, myalgias, neck pain and neck stiffness.   Skin: Negative.  Negative for pallor.   Neurological: Negative.  Negative for dizziness, syncope, weakness, light-headedness, numbness and headaches.   Psychiatric/Behavioral: Negative.  Negative for confusion and suicidal ideas. The patient is not nervous/anxious.    All other systems reviewed and are negative.      Past Medical History:   Diagnosis Date   • Abnormal gait 11/28/2012   • Abrasion 02/26/2015   • Acute ankle pain  03/31/2016   • Acute bronchitis 09/04/2015   • Allergic rhinitis 05/22/2012   • Asthenia 05/22/2014   • Atrial fibrillation (HCC) 03/04/2016    - converted    • Attempted suicide ([facility])    • Benign essential hypertension 03/04/2016   • Cardiovascular stress test abnormal 03/04/2016   • Cellulitis of knee 03/03/2015   • Chest pain 04/02/2015   • Chronic pain disorder    • Common cold 06/24/2014   • Congestive heart failure (HCC) 02/04/2016   • Depressive disorder 05/22/2015    suspect more complicated psych issues      • Fibrocystic breast    • Gastroesophageal reflux disease 01/17/2013   • Generalized anxiety disorder 02/04/2016   • H/O echocardiogram     Normal LV function with Ef of 60-65%.Mildly dilated right ventricle with normal function. Grade 1B diastolic dysfunction with mild CLVH.NO sig. valvular regurg. or stenosis.   • Headache 10/27/2014   • Hyperlipidemia 03/04/2016   • Low back pain 06/30/2016    Need for prophylactic vaccination against Streptococcus pneumoniae [pneumococcus]    01/23/2015    • Neck pain 06/30/2016   • Obstructive sleep apnea of adult 07/30/2015   • Obstructive sleep apnea syndrome    • Osteoarthritis    • Osteoporosis 06/30/2016   • Pain in right shoulder 06/30/2016   • Pain in right shoulder 04/24/2014   • Shortness of breath 04/02/2015   • Skin ulcer ([facility]) 04/13/2015    left   • Thyrotoxicosis with or without goiter 10/24/2014   • Urinary incontinence 05/22/2014       Allergies   Allergen Reactions   • Codeine Nausea And Vomiting   • Latex Rash   • Morphine And Related Hallucinations   • Pravachol [Pravastatin Sodium] Myalgia       Past Surgical History:   Procedure Laterality Date   • BACK SURGERY  08/07/2015    Removal of intstrumentation,L5-S1.Exploration of fusion,L5-S1.Posterolateral fusion,L4-L5.Posterior segmental spinal instrumentation L4-5.Transforaminal interbody fusion Through right sided approach.Performed at    • COLONOSCOPY  05/04/2016    will order cologuard   •  INJECTION OF MEDICATION  03/23/2015    celestone   • INJECTION OF MEDICATION  05/04/2016    KENALOG(6)   • LUMBAR FUSION      discectomy fusion with rods L4-S1   • LUMBAR LAMINECTOMY  1995   • MANDIBLE FRACTURE SURGERY      JAW WIRED SHUT   • MOUTH SURGERY  10/05/2015    Fractured mandible. Removal of arch bars.   • MOUTH SURGERY  1985    Fractured mandible. Removal of arch bars.   • NASAL SEPTUM SURGERY     • RHINOPLASTY     • TUBAL ABDOMINAL LIGATION  1986       Family History   Problem Relation Age of Onset   • Hypertension Mother    • Hyperlipidemia Mother    • Mental illness Mother    • Thyroid disease Mother    • Diabetes Maternal Grandmother    • Cancer Maternal Grandmother    • Depression Other    • Diabetes Other    • Heart disease Other    • Hypertension Other    • Osteoporosis Other    • Thyroid disease Other    • Cancer Maternal Aunt    • Cancer Paternal Aunt    • Breast cancer Paternal Aunt        Social History     Socioeconomic History   • Marital status:    Tobacco Use   • Smoking status: Never Smoker   • Smokeless tobacco: Never Used   • Tobacco comment: smoking cessation    Substance and Sexual Activity   • Alcohol use: No   • Drug use: No     Comment: lortab / oxycontin   • Sexual activity: Defer           Objective   Physical Exam  Vitals and nursing note reviewed.   Constitutional:       General: She is not in acute distress.     Appearance: Normal appearance. She is well-developed. She is not ill-appearing or diaphoretic.   HENT:      Head: Normocephalic and atraumatic.      Nose: Nose normal.      Mouth/Throat:      Mouth: Mucous membranes are moist.   Eyes:      General: No scleral icterus.     Extraocular Movements: Extraocular movements intact.      Conjunctiva/sclera: Conjunctivae normal.   Neck:      Vascular: No JVD.   Cardiovascular:      Rate and Rhythm: Normal rate and regular rhythm.      Heart sounds: Normal heart sounds. No murmur heard.  No friction rub. No gallop.     Pulmonary:      Effort: Pulmonary effort is normal. No respiratory distress.      Breath sounds: No wheezing or rales.   Chest:      Chest wall: No tenderness.   Abdominal:      General: There is no distension.      Palpations: Abdomen is soft. There is no mass.      Tenderness: There is no abdominal tenderness. There is no guarding or rebound.   Musculoskeletal:         General: Tenderness present. No deformity. Normal range of motion.      Cervical back: Normal range of motion and neck supple.      Comments: No atrophy or skin changes.  Tenderness in the piriformis area.     Lymphadenopathy:      Cervical: No cervical adenopathy.   Skin:     General: Skin is warm and dry.      Capillary Refill: Capillary refill takes less than 2 seconds.      Coloration: Skin is not pale.      Findings: No erythema or rash.   Neurological:      General: No focal deficit present.      Mental Status: She is alert and oriented to person, place, and time.      Cranial Nerves: No cranial nerve deficit.      Motor: No abnormal muscle tone.   Psychiatric:         Behavior: Behavior normal.         Thought Content: Thought content normal.         Judgment: Judgment normal.         Procedures           ED Course  ED Course as of 10/13/21 0406   Wed Oct 13, 2021   0322 Patient requesting toradol.  Patient is driving.  Plan symptomatic care with primary care followup.   [PC]      ED Course User Index  [PC] Ron Schultz MD                                 Labs Reviewed - No data to display    No orders to display               MDM    Final diagnoses:   Chronic midline back pain, unspecified back location   Sciatica, right side       ED Disposition  ED Disposition     ED Disposition Condition Comment    Discharge Stable           Leticia Tejeda MD  [address] [place] [address] [phone]    Schedule an appointment as soon as possible for a visit   For further evaluation and management, MRI results         Medication List       New Prescriptions    HYDROcodone-acetaminophen 5-325 MG per tablet  Commonly known as: NORCO  Take 1 tablet by mouth Every 6 (Six) Hours As Needed for Severe Pain .     lidocaine 5 %  Commonly known as: LIDODERM  Place 1 patch on the skin as directed by provider Daily. Remove & Discard patch within 12 hours or as directed by MD           Where to Get Your Medications      These medications were sent to [place], [place] - [address] [facility] [phone]  - [phone] [facility] [facility] [address]    Phone: [phone]   · HYDROcodone-acetaminophen 5-325 MG per tablet  · lidocaine 5 %          Ron Schultz MD  10/13/21 9128

## 2021-10-13 NOTE — DISCHARGE INSTRUCTIONS
Followup with Dr. Tejeda for further evaluation and management.  Return with any new or worsening symptoms, or any concerns.

## 2021-10-14 ENCOUNTER — OFFICE VISIT (OUTPATIENT)
Dept: FAMILY MEDICINE CLINIC | Facility: CLINIC | Age: 66
End: 2021-10-14

## 2021-10-14 VITALS
DIASTOLIC BLOOD PRESSURE: 80 MMHG | BODY MASS INDEX: 37.56 KG/M2 | WEIGHT: 220 LBS | HEART RATE: 80 BPM | HEIGHT: 64 IN | OXYGEN SATURATION: 98 % | SYSTOLIC BLOOD PRESSURE: 116 MMHG

## 2021-10-14 DIAGNOSIS — M25.561 ACUTE PAIN OF RIGHT KNEE: Primary | ICD-10-CM

## 2021-10-14 PROCEDURE — 99213 OFFICE O/P EST LOW 20 MIN: CPT | Performed by: STUDENT IN AN ORGANIZED HEALTH CARE EDUCATION/TRAINING PROGRAM

## 2021-10-14 NOTE — PROGRESS NOTES
Family Medicine Residency  Candida Espinal MD    Subjective:     Lana Mata is a 66 y.o. female with concurrent medical history of chronic bilateral low back pain with left sided sciatica, stage 3 CKD, hyperlipidemia and htn who presents for ED follow up,  back pain and knee pain.   Patient was seen in the ED on 10/13/21 due to worsening back pain. Patient was discharged with prescription of Norco and lidocaine patch.   Last night patient was standing at kitchen sink and felt sudden snap behind the right knee. She noticed the pain was constant and the developed swelling. Patient has chronic pain in her lumbar region. Today the pain is not as bad as yesterday. Got toradol shot in the ED and got hydrocone for pain.     The following portions of the patient's history were reviewed and updated as appropriate: allergies, current medications, past family history, past medical history, past social history, past surgical history and problem list.    Past Medical Hx:  Past Medical History:   Diagnosis Date   • Abnormal gait 11/28/2012   • Abrasion 02/26/2015   • Acute ankle pain 03/31/2016   • Acute bronchitis 09/04/2015   • Allergic rhinitis 05/22/2012   • Asthenia 05/22/2014   • Atrial fibrillation (HCC) 03/04/2016    - converted    • Attempted suicide ([facility])    • Benign essential hypertension 03/04/2016   • Cardiovascular stress test abnormal 03/04/2016   • Cellulitis of knee 03/03/2015   • Chest pain 04/02/2015   • Chronic pain disorder    • Common cold 06/24/2014   • Congestive heart failure (HCC) 02/04/2016   • Depressive disorder 05/22/2015    suspect more complicated psych issues      • Fibrocystic breast    • Gastroesophageal reflux disease 01/17/2013   • Generalized anxiety disorder 02/04/2016   • H/O echocardiogram     Normal LV function with Ef of 60-65%.Mildly dilated right ventricle with normal function. Grade 1B diastolic dysfunction with mild CLVH.NO sig. valvular regurg. or stenosis.   • Headache  10/27/2014   • Hyperlipidemia 03/04/2016   • Low back pain 06/30/2016    Need for prophylactic vaccination against Streptococcus pneumoniae [pneumococcus]    01/23/2015    • Neck pain 06/30/2016   • Obstructive sleep apnea of adult 07/30/2015   • Obstructive sleep apnea syndrome    • Osteoarthritis    • Osteoporosis 06/30/2016   • Pain in right shoulder 06/30/2016   • Pain in right shoulder 04/24/2014   • Shortness of breath 04/02/2015   • Skin ulcer (HCC) 04/13/2015    left   • Thyrotoxicosis with or without goiter 10/24/2014   • Urinary incontinence 05/22/2014       Past Surgical Hx:  Past Surgical History:   Procedure Laterality Date   • BACK SURGERY  08/07/2015    Removal of intstrumentation,L5-S1.Exploration of fusion,L5-S1.Posterolateral fusion,L4-L5.Posterior segmental spinal instrumentation L4-5.Transforaminal interbody fusion Through right sided approach.Performed at    • COLONOSCOPY  05/04/2016    will order cologuard   • INJECTION OF MEDICATION  03/23/2015    celestone   • INJECTION OF MEDICATION  05/04/2016    KENALOG(6)   • LUMBAR FUSION      discectomy fusion with rods L4-S1   • LUMBAR LAMINECTOMY  1995   • MANDIBLE FRACTURE SURGERY      JAW WIRED SHUT   • MOUTH SURGERY  10/05/2015    Fractured mandible. Removal of arch bars.   • MOUTH SURGERY  1985    Fractured mandible. Removal of arch bars.   • NASAL SEPTUM SURGERY     • RHINOPLASTY     • TUBAL ABDOMINAL LIGATION  1986       Current Meds:    Current Outpatient Medications:   •  alendronate (Fosamax) 70 MG tablet, Take 1 tablet by mouth Every 7 (Seven) Days., Disp: 4 tablet, Rfl: 11  •  aMILoride (MIDAMOR) 5 MG tablet, Take 1 tablet by mouth Daily for 90 days., Disp: 30 tablet, Rfl: 2  •  ARIPiprazole (ABILIFY) 5 MG tablet, Take 5 mg by mouth Daily. At bedtime, Disp: , Rfl:   •  aspirin 325 MG tablet, Take 325 mg by mouth Daily. At bedtime, Disp: , Rfl:   •  atorvastatin (LIPITOR) 20 MG tablet, Take 1 tablet by mouth Daily., Disp: 90 tablet, Rfl:  3  •  Calcium Carb-Cholecalciferol (CALCIUM-VITAMIN D) 500-200 MG-UNIT per tablet, Take 1 tablet by mouth 2 (Two) Times a Day With Meals., Disp: 60 tablet, Rfl: 2  •  choline fenofibrate (Trilipix) 135 MG capsule, Take 1 capsule by mouth Daily., Disp: 90 capsule, Rfl: 3  •  dilTIAZem XR (Dilt-XR) 180 MG 24 hr capsule, Take 1 capsule by mouth Daily., Disp: 14 capsule, Rfl: 0  •  ferrous sulfate 325 (65 FE) MG tablet, Take 325 mg by mouth Daily With Breakfast., Disp: , Rfl:   •  HYDROcodone-acetaminophen (NORCO) 5-325 MG per tablet, Take 1 tablet by mouth Every 6 (Six) Hours As Needed for Severe Pain ., Disp: 15 tablet, Rfl: 0  •  Incontinence Supply Disposable (Depend Adjustable Underwear) misc, 1 application As Needed (Incontinence)., Disp: 90 each, Rfl: 11  •  lidocaine (LIDODERM) 5 %, Place 1 patch on the skin as directed by provider Daily. Remove & Discard patch within 12 hours or as directed by MD, Disp: 30 each, Rfl: 0  •  lisinopril (PRINIVIL,ZESTRIL) 10 MG tablet, Take 10 mg by mouth Daily., Disp: , Rfl:   •  loperamide (IMODIUM) 2 MG capsule, TAKE 1 CAPSULE BY MOUTH EVERY FOUR HOURS AS NEEDED FOR DIARRHEA, Disp: 120 capsule, Rfl: 2  •  LORazepam (ATIVAN) 1 MG tablet, Take 1 mg by mouth 2 (Two) Times a Day. Scheduled to take in the morning and one with dinner, Disp: , Rfl: 0  •  methocarbamol (ROBAXIN) 500 MG tablet, Take 1 tablet by mouth 4 (Four) Times a Day As Needed for Muscle Spasms., Disp: 90 tablet, Rfl: 0  •  metoprolol tartrate (LOPRESSOR) 25 MG tablet, TAKE 1 TABLET EVERY 12 HOURS, Disp: 60 tablet, Rfl: 3  •  omeprazole (priLOSEC) 20 MG capsule, Take 1 capsule by mouth Daily., Disp: 90 capsule, Rfl: 5  •  ondansetron ODT (Zofran ODT) 8 MG disintegrating tablet, Place 1 tablet on the tongue Every 8 (Eight) Hours As Needed for Nausea or Vomiting., Disp: 20 tablet, Rfl: 0  •  potassium chloride 10 MEQ CR tablet, Take 1 tablet by mouth 2 (Two) Times a Day With Meals., Disp: 180 tablet, Rfl: 3  •   promethazine-dextromethorphan (PROMETHAZINE-DM) 6.25-15 MG/5ML syrup, Take 5 mL by mouth At Night As Needed for Cough., Disp: 120 mL, Rfl: 0  •  sertraline (ZOLOFT) 100 MG tablet, Take 100 mg by mouth 2 (Two) Times a Day., Disp: , Rfl: 1  •  Xarelto 20 MG tablet, TAKE ONE TABLET BY MOUTH EVERY DAY - (TAKE WITH FOOD), Disp: 90 tablet, Rfl: 3  •  hydrOXYzine pamoate (Vistaril) 25 MG capsule, Take 1 capsule by mouth 3 (Three) Times a Day As Needed for Allergies (If no improvement in 10 days, will increase to 50 mg.) for up to 10 days., Disp: 30 capsule, Rfl: 0    Current Facility-Administered Medications:   •  cyanocobalamin injection 1,000 mcg, 1,000 mcg, Intramuscular, Q28 Days, Maxime Edwards MD, 1,000 mcg at 05/14/21 1514    Allergies:  Allergies   Allergen Reactions   • Codeine Nausea And Vomiting   • Latex Rash   • Morphine And Related Hallucinations   • Pravachol [Pravastatin Sodium] Myalgia       Family Hx:  Family History   Problem Relation Age of Onset   • Hypertension Mother    • Hyperlipidemia Mother    • Mental illness Mother    • Thyroid disease Mother    • Diabetes Maternal Grandmother    • Cancer Maternal Grandmother    • Depression Other    • Diabetes Other    • Heart disease Other    • Hypertension Other    • Osteoporosis Other    • Thyroid disease Other    • Cancer Maternal Aunt    • Cancer Paternal Aunt    • Breast cancer Paternal Aunt         Social History:  Social History     Socioeconomic History   • Marital status:    Tobacco Use   • Smoking status: Never Smoker   • Smokeless tobacco: Never Used   • Tobacco comment: smoking cessation    Substance and Sexual Activity   • Alcohol use: No   • Drug use: No     Comment: lortab / oxycontin   • Sexual activity: Defer       Review of Systems  Review of Systems   Constitutional: Positive for activity change. Negative for appetite change, chills, diaphoresis, fatigue, fever and unexpected weight change.   Musculoskeletal: Positive for  "arthralgias, back pain and gait problem. Negative for joint swelling, myalgias, neck pain and neck stiffness.   Skin: Negative for color change and rash.   Neurological: Negative for dizziness, facial asymmetry, weakness, light-headedness and headaches.   Psychiatric/Behavioral: Negative for behavioral problems, confusion, decreased concentration, sleep disturbance and suicidal ideas.       Objective:     /80   Pulse 80   Ht 162.6 cm (64\")   Wt 99.8 kg (220 lb)   SpO2 98%   BMI 37.76 kg/m²   Physical Exam  Vitals and nursing note reviewed.   Constitutional:       General: She is not in acute distress.     Appearance: Normal appearance. She is obese. She is not ill-appearing, toxic-appearing or diaphoretic.      Comments: Ambulating on wheelchair    Cardiovascular:      Rate and Rhythm: Normal rate and regular rhythm.      Pulses: Normal pulses.      Heart sounds: Normal heart sounds.   Pulmonary:      Effort: Pulmonary effort is normal.      Breath sounds: Normal breath sounds.   Musculoskeletal:         General: No tenderness or deformity. Normal range of motion.      Right lower leg: No edema.      Left lower leg: No edema.      Comments: No swelling noted in the right knee    Skin:     General: Skin is warm and dry.      Capillary Refill: Capillary refill takes less than 2 seconds.   Neurological:      General: No focal deficit present.      Mental Status: She is alert and oriented to person, place, and time.   Psychiatric:         Mood and Affect: Mood normal.         Behavior: Behavior normal.          Assessment/Plan:   Lana Mata is a 66 y.o. female with concurrent medical history of chronic bilateral low back pain with left sided sciatica, stage 3 CKD, hyperlipidemia and htn who presents for ED follow up, back pain and knee pain. Will get right knee X-ray to see if there is any fractures or evidence of arthritis. Advised patient to continue taking pain medication and can apply topical " Voltaren gel or icy hot.     Diagnoses and all orders for this visit:    1. Acute pain of right knee (Primary)  -     Cancel: XR Knee 1 or 2 View Right (In Office)  -     XR knee 4+ vw right      · Rx changes: none  · Patient Education: none  · Compliance at present is estimated to be good.   · Efforts to improve compliance (if necessary) will be directed at increased exercise.    Depression screening: Up to date; last screen 8/26/2021     Follow-up:     Return in about 1 month (around 11/14/2021) for Recheck.    Preventative:  Health Maintenance   Topic Date Due   • ZOSTER VACCINE (2 of 2) 02/09/2017   • ANNUAL WELLNESS VISIT  02/07/2019   • Pneumococcal Vaccine 65+ (1 of 1 - PPSV23) 03/09/2020   • INFLUENZA VACCINE  08/01/2021   • MAMMOGRAM  11/07/2021   • DXA SCAN  11/07/2021   • LIPID PANEL  08/26/2022   • COLORECTAL CANCER SCREENING  11/07/2026   • TDAP/TD VACCINES (5 - Td or Tdap) 12/07/2029   • HEPATITIS C SCREENING  Completed   • COVID-19 Vaccine  Completed   • PAP SMEAR  Discontinued       Recommended: none  Vaccine Counseling: N/A    Weight  -Class: Obese Class II: 35-39.9kg/m2  -Patient's Body mass index is 37.76 kg/m². indicating that she is morbidly obese (BMI > 40 or > 35 with obesity - related health condition). Obesity-related health conditions include the following: obstructive sleep apnea, hypertension, coronary heart disease, diabetes mellitus, dyslipidemias, GERD, peripheral vascular disease, idiopathic intracranial hypertension and osteoarthritis. Obesity is unchanged. BMI is 37.76. We discussed low calorie, low carb based diet program, portion control and increasing exercise..   eat more fruits and vegetables, decrease soda or juice intake, increase water intake, increase physical activity, reduce screen time, reduce portion size, cut out extra servings, reduce fast food intake and plan meals    Alcohol use:  reports no history of alcohol use.  Nicotine status  reports that she has never  smoked. She has never used smokeless tobacco.    Goals     •  management of chronic medical conditions (pt-stated)       Barriers to goals: none            RISK SCORE: 3        Candida Espinal MD PGY-2  [facility] Family Medicine Residency   This document has been electronically signed by Candida Espinal MD on October 14, 2021 17:29 CDT

## 2021-10-15 NOTE — PROGRESS NOTES
I have spoken with the patient .   I have reviewed the notes, assessments, and/or procedures performed by Dr. Candida Espinal, I concur with his  documentation and assessment and plan for Lana Mata.          This document has been electronically signed by Moncho Zhao MD on October 15, 2021 09:55 CDT

## 2021-10-18 ENCOUNTER — PATIENT OUTREACH (OUTPATIENT)
Dept: CASE MANAGEMENT | Facility: OTHER | Age: 66
End: 2021-10-18

## 2021-10-18 ENCOUNTER — TELEPHONE (OUTPATIENT)
Dept: FAMILY MEDICINE CLINIC | Facility: CLINIC | Age: 66
End: 2021-10-18

## 2021-10-18 NOTE — OUTREACH NOTE
"Ambulatory Case Management Note    General & Health Literacy Assessment    Questions/Answers      Most Recent Value   Assessment Completed With Patient   Living Arrangement Alone   Type of Residence --  [lives in a \"small camper trailer\" on her son's property]   Home Care Services No   Equiptment Used at Home Cane   Bed or Wheelchair Confined No   Difficulty Keeping Appointments No   Chronic Pain Yes   Location of Chronic Pain back        Patient Outreach    Pt seen at [facility] ED 10/13/21 d/t worsening back pain. She states the pain in her back is not as bad but now she is having issues with her right knee. Unable to further outreach, pt stated she had to go but was agreeable to follow up later this week.    Roselyn Farrar RN  Ambulatory Case Management    10/18/2021, 14:51 EDT    "

## 2021-10-19 DIAGNOSIS — M25.561 ACUTE PAIN OF RIGHT KNEE: Primary | ICD-10-CM

## 2021-10-19 NOTE — PROGRESS NOTES
Spoke with patient today and discussed x-ray results of the right knee. Patient is still having some pain in the knee however it is improved some. Patient is would like to try topical NSAIDs, sent in prescription for Voltaren gel. Patient has an appointment with orthopedics.            Candida Espinal MD PGY-2  [facility] Family Medicine Residency   This document has been electronically signed by Candida Espinal MD on October 19, 2021 11:51 CDT

## 2021-10-21 ENCOUNTER — PATIENT OUTREACH (OUTPATIENT)
Dept: CASE MANAGEMENT | Facility: OTHER | Age: 66
End: 2021-10-21

## 2021-10-21 ENCOUNTER — TELEPHONE (OUTPATIENT)
Dept: FAMILY MEDICINE CLINIC | Facility: CLINIC | Age: 66
End: 2021-10-21

## 2021-10-21 NOTE — TELEPHONE ENCOUNTER
3rd attempt:    PT CALLED REQUESTING MRI RESULTS OF HER KNEE DONE ON 10/12/2021. HER CALL BACK NUMBER [phone].

## 2021-10-25 DIAGNOSIS — M54.41 MIDLINE LOW BACK PAIN WITH BILATERAL SCIATICA, UNSPECIFIED CHRONICITY: ICD-10-CM

## 2021-10-25 DIAGNOSIS — M25.561 ACUTE PAIN OF RIGHT KNEE: Primary | ICD-10-CM

## 2021-10-25 DIAGNOSIS — Z98.890 HISTORY OF LUMBAR SURGERY: Primary | ICD-10-CM

## 2021-10-25 DIAGNOSIS — M43.06 LUMBAR SPONDYLOLYSIS: ICD-10-CM

## 2021-10-25 DIAGNOSIS — M54.42 MIDLINE LOW BACK PAIN WITH BILATERAL SCIATICA, UNSPECIFIED CHRONICITY: ICD-10-CM

## 2021-10-25 NOTE — TELEPHONE ENCOUNTER
I have called the patient twice. She did not  the phone fist time I called. She picked up the phone at 6:45 10/25/2021, I discussed a MRI result, she is interested to be referred to Dr. Pizarro neurosurgeon in [place].

## 2021-10-26 ENCOUNTER — OFFICE VISIT (OUTPATIENT)
Dept: ORTHOPEDIC SURGERY | Facility: CLINIC | Age: 66
End: 2021-10-26

## 2021-10-26 VITALS — WEIGHT: 220 LBS | BODY MASS INDEX: 37.56 KG/M2 | HEIGHT: 64 IN

## 2021-10-26 DIAGNOSIS — M17.11 PRIMARY OSTEOARTHRITIS OF RIGHT KNEE: ICD-10-CM

## 2021-10-26 DIAGNOSIS — M25.461 EFFUSION OF RIGHT KNEE: ICD-10-CM

## 2021-10-26 DIAGNOSIS — M25.561 ACUTE PAIN OF RIGHT KNEE: Primary | ICD-10-CM

## 2021-10-26 PROCEDURE — 20610 DRAIN/INJ JOINT/BURSA W/O US: CPT | Performed by: NURSE PRACTITIONER

## 2021-10-26 PROCEDURE — 99214 OFFICE O/P EST MOD 30 MIN: CPT | Performed by: NURSE PRACTITIONER

## 2021-10-26 RX ADMIN — TRIAMCINOLONE ACETONIDE 40 MG: 40 INJECTION, SUSPENSION INTRA-ARTICULAR; INTRAMUSCULAR at 14:02

## 2021-10-26 RX ADMIN — LIDOCAINE HYDROCHLORIDE 2 ML: 10 INJECTION, SOLUTION INFILTRATION; PERINEURAL at 14:02

## 2021-10-26 NOTE — PROGRESS NOTES
Lana Mata is a 66 y.o. female   Primary provider:  Leticia Tejeda MD       Chief Complaint   Patient presents with   • Right Knee - Pain   • Establish Care     HISTORY OF PRESENT ILLNESS:    66-year-old female patient presents to office for evaluation of acute right knee pain.  Onset of pain occurred approximately one month ago with no known injury or incident.  Patient has experienced persistent right knee pain with swelling/effusion since the onset of her symptoms.  Patient also reports some milder pain in the anterior aspect of her left knee but primarily complains of right knee pain today. Patient was evaluated by primary care (Dr. Espinal) on 10/14/2021 with x-rays performed.  Pain is described as constant and moderate in severity.  Patient reports that she has a constant aching and pressure type pain in the right knee with intermittent sharp, stabbing pains.  Pain is described as aching and stabbing in nature with associated popping sensations and joint swelling.  Patient states that the swelling was initially worse and has been gradually improving.  Pain is worse with weightbearing activity, standing and walking.  Pain improves mildly with rest, use of a cane for modified weightbearing and ice therapy.  Weightbearing x-ray of the bilateral knees performed in office today.  Pain scale today is 3/10.    Pain  This is a new problem. The current episode started more than 1 month ago (About one month ago). The problem occurs constantly. The problem has been gradually improving. Associated symptoms include arthralgias, joint swelling, nausea and numbness. Associated symptoms comments: Aching, stabbing pain; joint swelling, limited ROM, popping sensations. The symptoms are aggravated by standing and walking. She has tried ice and rest (use of a cane) for the symptoms. The treatment provided mild relief.     CONCURRENT MEDICAL HISTORY:    Past Medical History:   Diagnosis Date   • Abnormal gait  11/28/2012   • Abrasion 02/26/2015   • Acute ankle pain 03/31/2016   • Acute bronchitis 09/04/2015   • Allergic rhinitis 05/22/2012   • Asthenia 05/22/2014   • Atrial fibrillation (HCC) 03/04/2016    - converted    • Attempted suicide ([facility])    • Benign essential hypertension 03/04/2016   • Cardiovascular stress test abnormal 03/04/2016   • Cellulitis of knee 03/03/2015   • Chest pain 04/02/2015   • Chronic pain disorder    • Common cold 06/24/2014   • Congestive heart failure (HCC) 02/04/2016   • Depressive disorder 05/22/2015    suspect more complicated psych issues      • Fibrocystic breast    • Gastroesophageal reflux disease 01/17/2013   • Generalized anxiety disorder 02/04/2016   • H/O echocardiogram     Normal LV function with Ef of 60-65%.Mildly dilated right ventricle with normal function. Grade 1B diastolic dysfunction with mild CLVH.NO sig. valvular regurg. or stenosis.   • Headache 10/27/2014   • Hyperlipidemia 03/04/2016   • Low back pain 06/30/2016    Need for prophylactic vaccination against Streptococcus pneumoniae [pneumococcus]    01/23/2015    • Neck pain 06/30/2016   • Obstructive sleep apnea of adult 07/30/2015   • Obstructive sleep apnea syndrome    • Osteoarthritis    • Osteoporosis 06/30/2016   • Pain in right shoulder 06/30/2016   • Pain in right shoulder 04/24/2014   • Shortness of breath 04/02/2015   • Skin ulcer ([facility]) 04/13/2015    left   • Thyrotoxicosis with or without goiter 10/24/2014   • Urinary incontinence 05/22/2014       Allergies   Allergen Reactions   • Codeine Nausea And Vomiting   • Latex Rash   • Morphine And Related Hallucinations   • Pravachol [Pravastatin Sodium] Myalgia         Current Outpatient Medications:   •  alendronate (Fosamax) 70 MG tablet, Take 1 tablet by mouth Every 7 (Seven) Days., Disp: 4 tablet, Rfl: 11  •  aMILoride (MIDAMOR) 5 MG tablet, Take 1 tablet by mouth Daily for 90 days., Disp: 30 tablet, Rfl: 2  •  ARIPiprazole (ABILIFY) 5 MG tablet, Take 5 mg by  mouth Daily. At bedtime, Disp: , Rfl:   •  aspirin 325 MG tablet, Take 325 mg by mouth Daily. At bedtime, Disp: , Rfl:   •  atorvastatin (LIPITOR) 20 MG tablet, Take 1 tablet by mouth Daily., Disp: 90 tablet, Rfl: 3  •  Calcium Carb-Cholecalciferol (CALCIUM-VITAMIN D) 500-200 MG-UNIT per tablet, Take 1 tablet by mouth 2 (Two) Times a Day With Meals., Disp: 60 tablet, Rfl: 2  •  choline fenofibrate (Trilipix) 135 MG capsule, Take 1 capsule by mouth Daily., Disp: 90 capsule, Rfl: 3  •  Diclofenac Sodium (VOLTAREN) 1 % gel gel, Apply 4 g topically to the appropriate area as directed 4 (Four) Times a Day As Needed (knee pain)., Disp: 100 g, Rfl: 0  •  dilTIAZem XR (Dilt-XR) 180 MG 24 hr capsule, Take 1 capsule by mouth Daily., Disp: 14 capsule, Rfl: 0  •  ferrous sulfate 325 (65 FE) MG tablet, Take 325 mg by mouth Daily With Breakfast., Disp: , Rfl:   •  Incontinence Supply Disposable (Depend Adjustable Underwear) misc, 1 application As Needed (Incontinence)., Disp: 90 each, Rfl: 11  •  lidocaine (LIDODERM) 5 %, Place 1 patch on the skin as directed by provider Daily. Remove & Discard patch within 12 hours or as directed by MD, Disp: 30 each, Rfl: 0  •  lisinopril (PRINIVIL,ZESTRIL) 10 MG tablet, Take 10 mg by mouth Daily., Disp: , Rfl:   •  loperamide (IMODIUM) 2 MG capsule, TAKE 1 CAPSULE BY MOUTH EVERY FOUR HOURS AS NEEDED FOR DIARRHEA, Disp: 120 capsule, Rfl: 2  •  LORazepam (ATIVAN) 1 MG tablet, Take 1 mg by mouth 2 (Two) Times a Day. Scheduled to take in the morning and one with dinner, Disp: , Rfl: 0  •  methocarbamol (ROBAXIN) 500 MG tablet, Take 1 tablet by mouth 4 (Four) Times a Day As Needed for Muscle Spasms., Disp: 90 tablet, Rfl: 0  •  metoprolol tartrate (LOPRESSOR) 25 MG tablet, TAKE 1 TABLET EVERY 12 HOURS, Disp: 60 tablet, Rfl: 3  •  omeprazole (priLOSEC) 20 MG capsule, Take 1 capsule by mouth Daily., Disp: 90 capsule, Rfl: 5  •  ondansetron ODT (Zofran ODT) 8 MG disintegrating tablet, Place 1 tablet  on the tongue Every 8 (Eight) Hours As Needed for Nausea or Vomiting., Disp: 20 tablet, Rfl: 0  •  promethazine-dextromethorphan (PROMETHAZINE-DM) 6.25-15 MG/5ML syrup, Take 5 mL by mouth At Night As Needed for Cough., Disp: 120 mL, Rfl: 0  •  sertraline (ZOLOFT) 100 MG tablet, Take 100 mg by mouth 2 (Two) Times a Day., Disp: , Rfl: 1  •  Xarelto 20 MG tablet, TAKE ONE TABLET BY MOUTH EVERY DAY - (TAKE WITH FOOD), Disp: 90 tablet, Rfl: 3    Current Facility-Administered Medications:   •  cyanocobalamin injection 1,000 mcg, 1,000 mcg, Intramuscular, Q28 Days, Maxime Edwards MD, 1,000 mcg at 05/14/21 1514    Past Surgical History:   Procedure Laterality Date   • BACK SURGERY  08/07/2015    Removal of intstrumentation,L5-S1.Exploration of fusion,L5-S1.Posterolateral fusion,L4-L5.Posterior segmental spinal instrumentation L4-5.Transforaminal interbody fusion Through right sided approach.Performed at    • COLONOSCOPY  05/04/2016    will order cologuard   • INJECTION OF MEDICATION  03/23/2015    celestone   • INJECTION OF MEDICATION  05/04/2016    KENALOG(6)   • LUMBAR FUSION      discectomy fusion with rods L4-S1   • LUMBAR LAMINECTOMY  1995   • MANDIBLE FRACTURE SURGERY      JAW WIRED SHUT   • MOUTH SURGERY  10/05/2015    Fractured mandible. Removal of arch bars.   • MOUTH SURGERY  1985    Fractured mandible. Removal of arch bars.   • NASAL SEPTUM SURGERY     • RHINOPLASTY     • TUBAL ABDOMINAL LIGATION  1986       Family History   Problem Relation Age of Onset   • Hypertension Mother    • Hyperlipidemia Mother    • Mental illness Mother    • Thyroid disease Mother    • Diabetes Maternal Grandmother    • Cancer Maternal Grandmother    • Depression Other    • Diabetes Other    • Heart disease Other    • Hypertension Other    • Osteoporosis Other    • Thyroid disease Other    • Cancer Maternal Aunt    • Cancer Paternal Aunt    • Breast cancer Paternal Aunt        Social History     Socioeconomic History   •  "Marital status:    Tobacco Use   • Smoking status: Never Smoker   • Smokeless tobacco: Never Used   • Tobacco comment: smoking cessation    Substance and Sexual Activity   • Alcohol use: No   • Drug use: No     Comment: lortab / oxycontin   • Sexual activity: Defer        Review of Systems   Constitutional: Positive for activity change.   HENT: Positive for hearing loss (chronic).    Eyes: Negative.    Respiratory: Negative.    Cardiovascular: Negative.    Gastrointestinal: Positive for nausea.   Endocrine: Negative.    Genitourinary: Negative.    Musculoskeletal: Positive for arthralgias, back pain, gait problem and joint swelling.        Stiffness, muscle pain. Bilateral knee pain, R > L   Skin: Negative.    Allergic/Immunologic: Negative.    Neurological: Positive for numbness.   Hematological: Bruises/bleeds easily.   Psychiatric/Behavioral: The patient is nervous/anxious.        PHYSICAL EXAMINATION:       Ht 162.6 cm (64\")   Wt 99.8 kg (220 lb)   BMI 37.76 kg/m²     Physical Exam  Vitals reviewed.   Constitutional:       General: She is not in acute distress.     Appearance: She is well-developed. She is not ill-appearing.   HENT:      Head: Normocephalic.   Pulmonary:      Effort: Pulmonary effort is normal. No respiratory distress.   Abdominal:      General: There is no distension.      Palpations: Abdomen is soft.   Musculoskeletal:         General: Swelling (Right knee) and tenderness (Right knee) present. No deformity or signs of injury.      Right knee: Effusion present.   Skin:     General: Skin is warm and dry.      Capillary Refill: Capillary refill takes less than 2 seconds.      Findings: No erythema.   Neurological:      Mental Status: She is alert and oriented to person, place, and time.      GCS: GCS eye subscore is 4. GCS verbal subscore is 5. GCS motor subscore is 6.   Psychiatric:         Speech: Speech normal.         Behavior: Behavior normal.         Thought Content: Thought " content normal.         Judgment: Judgment normal.         GAIT:     []  Normal  [x]  Antalgic    Assistive device: []  None  []  Walker     []  Crutches  [x]  Cane     []  Wheelchair  []  Stretcher    Right Knee Exam     Tenderness   The patient is experiencing tenderness in the medial joint line (Diffuse).    Range of Motion   Extension: 5   Flexion: 100     Tests   Varus: negative Valgus: negative    Other   Erythema: absent  Sensation: normal  Pulse: present  Swelling: moderate  Effusion: effusion present            XR knee 4+ vw right    Result Date: 10/14/2021  Narrative: Four view right knee HISTORY: Right knee pain AP, lateral, tunnel and oblique views obtained. COMPARISON: None FINDINGS: No fracture or dislocation. Small to moderate-sized patellar spurs and very small femoral and tibial spurs. Moderate narrowing of the medial joint space. Small suprapatellar effusion. No other osseous or articular abnormality.     Impression: CONCLUSION: Small to moderate-sized patellar spurs and very small femoral and tibial spurs. Moderate narrowing of the medial joint space. Small suprapatellar effusion. [id number] Electronically signed by:  Brian Helms MD  10/14/2021 6:15 PM CDT Workstation: [id number]    XR Knee Bilateral AP Standing    Result Date: 10/27/2021  Narrative: Procedure: XR KNEES ANTEROPOSTERIOR STANDING BILATERAL. History: pain, M25.561 Pain in right knee. Comparison: None Findings: One AP standing radiograph of both knees was obtained. There is an intramedullary cherelle in the proximal left tibia with two retaining screws present. Irregularity of the left fibular shaft is probably associated with an old healed fracture. There is narrowing of the bilateral medial tibiofemoral joint compartments. No new fracture is seen.     Impression: Impression: No radiographic evidence of an acute fracture. Postoperative changes in the left tibia. Electronically signed by:  Seferino Bingham MD  10/27/2021 4:11 PM CDT  Workstation: [id number]      ASSESSMENT:    Diagnoses and all orders for this visit:    Acute pain of right knee  -     Large Joint Arthrocentesis: R knee    Effusion of right knee  -     Large Joint Arthrocentesis: R knee    Primary osteoarthritis of right knee  -     Large Joint Arthrocentesis: R knee    PLAN    Large Joint Arthrocentesis: R knee  Date/Time: 10/26/2021 2:02 PM  Consent given by: patient  Timeout: Immediately prior to procedure a time out was called to verify the correct patient, procedure, equipment, support staff and site/side marked as required   Supporting Documentation  Indications: pain, joint swelling and diagnostic evaluation   Procedure Details  Location: knee - R knee  Preparation: Patient was prepped and draped in the usual sterile fashion  Needle size: 22 G  Approach: anterolateral  Medications administered: 40 mg triamcinolone acetonide 40 MG/ML; 2 mL lidocaine 1 %  Patient tolerance: patient tolerated the procedure well with no immediate complications        AP standing x-ray of the bilateral knees performed in office today reviewed with no acute findings noted.  Patient has advanced degenerative changes with bone-on-bone findings in the medial aspect of the right knee.  Patient has milder osteoarthritic changes noted in the medial aspect of the left knee.  X-rays previously performed on 10/14/2021 are also independently reviewed today and the patient is noted to have some patellofemoral osteoarthritic changes and joint effusion.  On physical exam, she has a moderate joint effusion with suprapatellar fullness.  I have offered to perform a therapeutic aspiration and the patient declines this today, citing a fear of needles.  We discussed proceeding with an intra-articular injection of steroid and after I explained the procedure, the patient is agreeable to proceed with this.  Recommend an intra-articular injection of steroid to the right knee today for management of joint  pain/inflammation/swelling/effusion.  The patient tolerated the injection very well despite her fear of needles.    Recommend the following:    -Rest and activity modification as tolerated and based on pain.  -Modified weightbearing of the affected extremity with use of a cane or walker as needed.  Patient is using a quad cane in office today.  -Gradual progression of weightbearing and activity as pain and swelling allow.  -Conditioning and strengthening exercises of the bilateral knees/legs.  -Elevation and ice therapy to the affected knee to minimize pain/swelling/inflammation.   -Tylenol as needed for pain/discomfort.  We will avoid oral NSAIDs due to her current use of Xarelto for chronic anticoagulation therapy and the increased risk for bleeding.    Follow-up in 3 weeks for recheck as needed for any new, worsening or persistent symptoms.  We discussed that if her right knee pain and swelling have resolved, she can follow-up on an as-needed basis.  Consider MRI imaging of the right knee or CT imaging of the right knee if her pain/swelling persists.  She does have end-stage osteoarthritic changes in the medial aspect of the right knee.  Patient also has a history of osteoporosis and is currently treated with Fosamax for this.  Suspicion for stress fracture at this time is low.    Time spent of a minimum of 30 minutes including the face to face evaluation, reviewing of medical history and prior medial records, reviewing of diagnostic studies, documentation, patient education and coordination of care.      EMR Dragon/Transciption Disclaimer: Some of this note may be an electronic transcription/translation of spoken language to printed text.  The electronic translation of spoken language may permit erroneous, or at times, nonsensical words or phrases to be inadvertently transcribed. Although I have reviewed the note for such errors, some may still exist.     Return in about 3 weeks (around 11/16/2021), or if  symptoms worsen or fail to improve, for Recheck.        This document has been electronically signed by JOHN Carl on October 27, 2021 16:20 CDT      JOHN Carl

## 2021-10-27 RX ORDER — TRIAMCINOLONE ACETONIDE 40 MG/ML
40 INJECTION, SUSPENSION INTRA-ARTICULAR; INTRAMUSCULAR
Status: COMPLETED | OUTPATIENT
Start: 2021-10-26 | End: 2021-10-26

## 2021-10-27 RX ORDER — LIDOCAINE HYDROCHLORIDE 10 MG/ML
2 INJECTION, SOLUTION INFILTRATION; PERINEURAL
Status: COMPLETED | OUTPATIENT
Start: 2021-10-26 | End: 2021-10-26

## 2021-11-03 ENCOUNTER — LAB (OUTPATIENT)
Dept: LAB | Facility: HOSPITAL | Age: 66
End: 2021-11-03

## 2021-11-03 ENCOUNTER — TRANSCRIBE ORDERS (OUTPATIENT)
Dept: LAB | Facility: HOSPITAL | Age: 66
End: 2021-11-03

## 2021-11-03 DIAGNOSIS — E78.5 HYPERLIPIDEMIA, UNSPECIFIED HYPERLIPIDEMIA TYPE: ICD-10-CM

## 2021-11-03 DIAGNOSIS — Z86.39 HISTORY OF THYROID DISEASE: ICD-10-CM

## 2021-11-03 DIAGNOSIS — N18.30 STAGE 3 CHRONIC KIDNEY DISEASE, UNSPECIFIED WHETHER STAGE 3A OR 3B CKD (HCC): ICD-10-CM

## 2021-11-03 DIAGNOSIS — N18.30 STAGE 3 CHRONIC KIDNEY DISEASE, UNSPECIFIED WHETHER STAGE 3A OR 3B CKD (HCC): Primary | ICD-10-CM

## 2021-11-03 LAB
25(OH)D3 SERPL-MCNC: 33.8 NG/ML
ALBUMIN SERPL-MCNC: 4.2 G/DL (ref 3.5–5.2)
ANION GAP SERPL CALCULATED.3IONS-SCNC: 10 MMOL/L (ref 5–15)
BACTERIA UR QL AUTO: NORMAL /HPF
BILIRUB UR QL STRIP: NEGATIVE
BUN SERPL-MCNC: 19 MG/DL (ref 8–23)
BUN/CREAT SERPL: 17.9 (ref 7–25)
CALCIUM SPEC-SCNC: 9 MG/DL (ref 8.6–10.5)
CHLORIDE SERPL-SCNC: 105 MMOL/L (ref 98–107)
CLARITY UR: CLEAR
CO2 SERPL-SCNC: 26 MMOL/L (ref 22–29)
COLOR UR: YELLOW
CREAT SERPL-MCNC: 1.06 MG/DL (ref 0.57–1)
CREAT UR-MCNC: 87.1 MG/DL
GFR SERPL CREATININE-BSD FRML MDRD: 52 ML/MIN/1.73
GLUCOSE SERPL-MCNC: 109 MG/DL (ref 65–99)
GLUCOSE UR STRIP-MCNC: NEGATIVE MG/DL
HGB UR QL STRIP.AUTO: NEGATIVE
HYALINE CASTS UR QL AUTO: NORMAL /LPF
KETONES UR QL STRIP: NEGATIVE
LEUKOCYTE ESTERASE UR QL STRIP.AUTO: ABNORMAL
NITRITE UR QL STRIP: NEGATIVE
PH UR STRIP.AUTO: 6.5 [PH] (ref 5–8)
PHOSPHATE SERPL-MCNC: 3.2 MG/DL (ref 2.5–4.5)
POTASSIUM SERPL-SCNC: 4.4 MMOL/L (ref 3.5–5.2)
PROT UR QL STRIP: NEGATIVE
PROT UR-MCNC: 8 MG/DL
PROT/CREAT UR: 91.8 MG/G CREA (ref 0–200)
PTH-INTACT SERPL-MCNC: 69.6 PG/ML (ref 15–65)
RBC # UR: NORMAL /HPF
REF LAB TEST METHOD: NORMAL
SODIUM SERPL-SCNC: 141 MMOL/L (ref 136–145)
SP GR UR STRIP: 1.02 (ref 1–1.03)
SQUAMOUS #/AREA URNS HPF: NORMAL /HPF
URATE SERPL-MCNC: 4.5 MG/DL (ref 2.4–5.7)
UROBILINOGEN UR QL STRIP: ABNORMAL
WBC UR QL AUTO: NORMAL /HPF

## 2021-11-03 PROCEDURE — 81001 URINALYSIS AUTO W/SCOPE: CPT

## 2021-11-03 PROCEDURE — 82570 ASSAY OF URINE CREATININE: CPT

## 2021-11-03 PROCEDURE — 36415 COLL VENOUS BLD VENIPUNCTURE: CPT

## 2021-11-03 PROCEDURE — 80069 RENAL FUNCTION PANEL: CPT

## 2021-11-03 PROCEDURE — 84156 ASSAY OF PROTEIN URINE: CPT

## 2021-11-03 PROCEDURE — 84550 ASSAY OF BLOOD/URIC ACID: CPT

## 2021-11-03 PROCEDURE — 82306 VITAMIN D 25 HYDROXY: CPT

## 2021-11-03 PROCEDURE — 83970 ASSAY OF PARATHORMONE: CPT

## 2021-11-18 ENCOUNTER — OFFICE VISIT (OUTPATIENT)
Dept: ORTHOPEDIC SURGERY | Facility: CLINIC | Age: 66
End: 2021-11-18

## 2021-11-18 VITALS — WEIGHT: 220 LBS | HEIGHT: 64 IN | BODY MASS INDEX: 37.56 KG/M2

## 2021-11-18 DIAGNOSIS — M25.561 ACUTE PAIN OF RIGHT KNEE: Primary | ICD-10-CM

## 2021-11-18 DIAGNOSIS — M17.11 PRIMARY OSTEOARTHRITIS OF RIGHT KNEE: ICD-10-CM

## 2021-11-18 DIAGNOSIS — M25.461 EFFUSION OF RIGHT KNEE: ICD-10-CM

## 2021-11-18 PROCEDURE — 99213 OFFICE O/P EST LOW 20 MIN: CPT | Performed by: NURSE PRACTITIONER

## 2021-11-18 NOTE — PROGRESS NOTES
"Lana Mata is a 66 y.o. female returns for     Chief Complaint   Patient presents with   • Right Knee - Follow-up       HISTORY OF PRESENT ILLNESS: Patient presents to office for follow-up of acute right knee pain.  Onset of pain occurred approximately 2 months ago with no known injury or incident.  Patient has known end-stage osteoarthritic changes in the right knee per previous x-ray imaging.  Patient had a notable effusion at last office visit but declined therapeutic aspiration.  Patient was given an intra-articular injection of steroid on 10/26/2021, which she states has improved her pain and swelling.  Patient continues to have some right knee pain that she localizes to the medial aspect of her right knee that is worse with weightbearing activity, but overall she is improved.  No new complaints or concerns noted since last office visit.  Patient is ambulating in office today with use of a quad cane and no significant difficulty.  Pain scale today is 4/10.     CONCURRENT MEDICAL HISTORY:    The following portions of the patient's history were reviewed and updated as appropriate: allergies, current medications, past family history, past medical history, past social history, past surgical history and problem list.     ROS  No fevers or chills.  No chest pain or shortness of air.  No GI or  disturbances.  Right knee pain.    PHYSICAL EXAMINATION:       Ht 162.6 cm (64\")   Wt 99.8 kg (220 lb)   BMI 37.76 kg/m²     Physical Exam  Vitals reviewed.   Constitutional:       General: She is not in acute distress.     Appearance: She is well-developed. She is obese. She is not ill-appearing.   HENT:      Head: Normocephalic.   Pulmonary:      Effort: Pulmonary effort is normal. No respiratory distress.   Abdominal:      General: There is no distension.      Palpations: Abdomen is soft.   Musculoskeletal:         General: Swelling (Mild, right knee) and tenderness (Mild, right knee) present. No deformity or " signs of injury.      Right knee: Effusion present.   Skin:     General: Skin is warm and dry.      Capillary Refill: Capillary refill takes less than 2 seconds.      Findings: No erythema.   Neurological:      Mental Status: She is alert and oriented to person, place, and time.      GCS: GCS eye subscore is 4. GCS verbal subscore is 5. GCS motor subscore is 6.   Psychiatric:         Speech: Speech normal.         Behavior: Behavior normal.         Thought Content: Thought content normal.         Judgment: Judgment normal.         GAIT:     []  Normal  [x]  Antalgic    Assistive device: []  None  []  Walker     []  Crutches  [x]  Cane     []  Wheelchair  []  Stretcher    Right Knee Exam     Tenderness   The patient is experiencing tenderness in the medial joint line (Mild).    Range of Motion   Extension: 0   Flexion: 110     Tests   Varus: negative Valgus: negative    Other   Erythema: absent  Sensation: normal  Pulse: present  Swelling: mild  Effusion: effusion present    Comments:  Swelling/effusion is mild and improved from prior exam.            XR Knee Bilateral AP Standing    Result Date: 10/27/2021  Narrative: Procedure: XR KNEES ANTEROPOSTERIOR STANDING BILATERAL. History: pain, M25.561 Pain in right knee. Comparison: None Findings: One AP standing radiograph of both knees was obtained. There is an intramedullary cherelle in the proximal left tibia with two retaining screws present. Irregularity of the left fibular shaft is probably associated with an old healed fracture. There is narrowing of the bilateral medial tibiofemoral joint compartments. No new fracture is seen.     Impression: Impression: No radiographic evidence of an acute fracture. Postoperative changes in the left tibia. Electronically signed by:  Seferino Bingham MD  10/27/2021 4:11 PM CDT Workstation: [id number]    XR knee 4+ vw right     Result Date: 10/14/2021  Narrative: Four view right knee HISTORY: Right knee pain AP, lateral, tunnel and  oblique views obtained. COMPARISON: None FINDINGS: No fracture or dislocation. Small to moderate-sized patellar spurs and very small femoral and tibial spurs. Moderate narrowing of the medial joint space. Small suprapatellar effusion. No other osseous or articular abnormality.      Impression: CONCLUSION: Small to moderate-sized patellar spurs and very small femoral and tibial spurs. Moderate narrowing of the medial joint space. Small suprapatellar effusion. [id number] Electronically signed by:  Brian Helms MD  10/14/2021 6:15 PM CDT Workstation: [id number]      ASSESSMENT:    Diagnoses and all orders for this visit:    Acute pain of right knee    Effusion of right knee    Primary osteoarthritis of right knee    PLAN    Patient is doing well overall and has improved right knee pain and swelling since last office visit.  Her previously noted joint effusion has also improved.  The patient continues to have some pain in the right knee that she localizes to the medial aspect for her knee.  Previous weightbearing x-rays show evidence of bone-on-bone findings with significant loss of joint spacing in the medial aspect of the right knee.  We discussed continued conservative treatment options versus surgical consult for knee arthroplasty.  Patient states she is not interested in surgical intervention and states that she had complications from prior back surgeries and was told that she needs to avoid any further surgical procedures.  We discussed other conservative treatment options for her current condition of the right knee including use of a medial  brace and viscosupplementation.  We also discussed a repeat intra-articular injection of steroid in the future if needed.  Oral NSAIDs are not an option due to her current use of Xarelto.  Patient currently declines viscosupplementation and a medial  brace but states she would consider them in the future.  Overall, she is doing better compared to prior  visit.    Recommend the following:     -Rest and activity modification as tolerated and based on pain.  -Modified weightbearing of the affected extremity with use of a cane or walker as needed.  Patient is using a quad cane in office today.  -Gradual progression of weightbearing and activity as pain and swelling allow.  -Conditioning and strengthening exercises of the bilateral knees/legs.  -Elevation and ice therapy to the affected knee to minimize pain/swelling/inflammation.   -Tylenol as needed for pain/discomfort.  We will avoid oral NSAIDs due to her current use of Xarelto for chronic anticoagulation therapy and the increased risk for bleeding.    Follow-up in 6 weeks for recheck as needed for any new, worsening or persistent symptoms.  We discussed if she is doing well, she can follow-up on an as-needed basis.  I have also encouraged patient to call me if she decides that she would like me to order viscosupplementation or the medial  brace as we discussed.    Time spent of a minimum of 20 minutes including the face to face evaluation, reviewing of medical history and prior medial records, reviewing of diagnostic studies, documentation, patient education and coordination of care.     EMR Dragon/Transciption Disclaimer: Some of this note may be an electronic transcription/translation of spoken language to printed text.  The electronic translation of spoken language may permit erroneous, or at times, nonsensical words or phrases to be inadvertently transcribed. Although I have reviewed the note for such errors, some may still exist.     Return in about 6 weeks (around 12/30/2021), or if symptoms worsen or fail to improve, for Recheck.        This document has been electronically signed by JOHN Carl on November 18, 2021 15:23 CST      JOHN Carl

## 2021-11-19 ENCOUNTER — PATIENT OUTREACH (OUTPATIENT)
Dept: CASE MANAGEMENT | Facility: OTHER | Age: 66
End: 2021-11-19

## 2021-11-19 NOTE — OUTREACH NOTE
Ambulatory Case Management Note    Care Evaluation    Questions/Answers      Most Recent Value   Annual Wellness Visit:  --  [reminder put in pcp appt notes]   Care Gaps Addressed Flu Shot,  Pneumonia Vaccine   Flu Shot Status Patient will Complete  [pt plans to get along with covid booster]   Pneumonia Vaccine Status Up to Date   Other Patient Education/Resources  Home Healthcare   Home Healthcare Education Method Verbal        Patient Outreach    Spoke with pt for follow up. She reports she is still having back and right knee pain. She saw ortho regarding her knee and was given a steroid injection which improved her pain. She is scheduled with neurosurgery in [place] on 11/22/21 for her back, however she notes she's had complications from prior back surgeries and should avoid further surgery. Discussed possibly HH for PT/OT, pt voiced interest, will route to pcp to see about ordering HH.    Roselyn Farrar RN  Ambulatory Case Management    11/19/2021, 13:20 EST

## 2021-11-23 ENCOUNTER — OFFICE VISIT (OUTPATIENT)
Dept: CARDIOLOGY | Facility: CLINIC | Age: 66
End: 2021-11-23

## 2021-11-23 VITALS
TEMPERATURE: 96.6 F | OXYGEN SATURATION: 96 % | HEIGHT: 64 IN | DIASTOLIC BLOOD PRESSURE: 76 MMHG | WEIGHT: 226 LBS | SYSTOLIC BLOOD PRESSURE: 120 MMHG | BODY MASS INDEX: 38.58 KG/M2 | HEART RATE: 73 BPM

## 2021-11-23 DIAGNOSIS — I10 BENIGN ESSENTIAL HYPERTENSION: ICD-10-CM

## 2021-11-23 DIAGNOSIS — E78.2 MIXED HYPERLIPIDEMIA: ICD-10-CM

## 2021-11-23 DIAGNOSIS — I48.19 ATRIAL FIBRILLATION, PERSISTENT (HCC): ICD-10-CM

## 2021-11-23 DIAGNOSIS — I48.91 ATRIAL FIBRILLATION, UNSPECIFIED TYPE (HCC): Primary | ICD-10-CM

## 2021-11-23 PROBLEM — I48.0 PAROXYSMAL ATRIAL FIBRILLATION: Status: RESOLVED | Noted: 2018-01-31 | Resolved: 2021-11-23

## 2021-11-23 PROCEDURE — 99213 OFFICE O/P EST LOW 20 MIN: CPT | Performed by: INTERNAL MEDICINE

## 2021-11-23 PROCEDURE — 93000 ELECTROCARDIOGRAM COMPLETE: CPT | Performed by: INTERNAL MEDICINE

## 2021-11-23 NOTE — PROGRESS NOTES
Lana Ayala [facility]  66 y.o. female    11/23/2021     Persistent atrial fibrillation  Hypertension  Hyperlipidemia      History of Present Illness:    Body mass index is 38.79 kg/m². BMI is above normal parameters. Recommendations include: exercise counseling, nutrition counseling and referral to primary care.      66 years old patient with a background history of paroxysmal to persistent atrial fibrillation.  Not interested in EP study and A. fib ablations.  Previously was cardioverted failed Tikosyn failed amiodarone.  Currently she is AV kaya blocking drug.  She does have history of paroxysmal atrial fibrillation now seems to be persistent.  EKG finding discussed with patient.  Not interested in EP study and ablation or invasive EP evaluations.  She denies symptom orthopnea PND chest pain.  She has a history of postural dizziness and never blacked out.    JOCY 1/2018  ·    · Transesophageal Echocardiogram with Color and Doppler  · Left Ventricle: Estimated EF appears to be in the range of 61 - 65%. Normal left ventricular cavity size noted  · Left ventricular wall thickness is consistent with mild concentric hypertrophy  · Right Ventricle: Normal right ventricular cavity size, wall thickness, systolic function and septal motion noted  · No evidence of a left atrial appendage thrombus was present. The interatrial septum appears redundant  · No evidence of a patent foramen ovale. No evidence of an atrial septal defect present. Saline test results are negative.  · Mild aortic valve regurgitation is present  · Mild mitral valve regurgitation is present  4/2018  Total Protein 6.3 - 8.6 g/dL 7.5    Albumin 3.40 - 4.80 g/dL 4.30    ALT (SGPT) 9 - 52 U/L 30    AST (SGOT) 14 - 36 U/L 29    Alkaline Phosphatase 38 - 126 U/L 48    Total Bilirubin 0.2 - 1.3 mg/dL 0.8          1/2018  Total Cholesterol 0 - 199 mg/dL 165    Triglycerides 20 - 199 mg/dL 169    HDL Cholesterol 60 - 200 mg/dL 67    LDL Cholesterol  1 - 129 mg/dL  77    LDL/HDL Ratio 0.00 - 3.22 0.96              SUBJECTIVE:    Allergies   Allergen Reactions   • Codeine Nausea And Vomiting   • Latex Rash   • Morphine And Related Hallucinations   • Pravachol [Pravastatin Sodium] Myalgia         Past Medical History:   Diagnosis Date   • Abnormal gait 11/28/2012   • Abrasion 02/26/2015   • Acute ankle pain 03/31/2016   • Acute bronchitis 09/04/2015   • Allergic rhinitis 05/22/2012   • Asthenia 05/22/2014   • Atrial fibrillation ([facility]) 03/04/2016    - converted    • Attempted suicide ([facility])    • Benign essential hypertension 03/04/2016   • Cardiovascular stress test abnormal 03/04/2016   • Cellulitis of knee 03/03/2015   • Chest pain 04/02/2015   • Chronic pain disorder    • Common cold 06/24/2014   • Congestive heart failure ([facility]) 02/04/2016   • Depressive disorder 05/22/2015    suspect more complicated psych issues      • Fibrocystic breast    • Gastroesophageal reflux disease 01/17/2013   • Generalized anxiety disorder 02/04/2016   • H/O echocardiogram     Normal LV function with Ef of 60-65%.Mildly dilated right ventricle with normal function. Grade 1B diastolic dysfunction with mild CLVH.NO sig. valvular regurg. or stenosis.   • Headache 10/27/2014   • Hyperlipidemia 03/04/2016   • Low back pain 06/30/2016    Need for prophylactic vaccination against Streptococcus pneumoniae [pneumococcus]    01/23/2015    • Neck pain 06/30/2016   • Obstructive sleep apnea of adult 07/30/2015   • Obstructive sleep apnea syndrome    • Osteoarthritis    • Osteoporosis 06/30/2016   • Pain in right shoulder 06/30/2016   • Pain in right shoulder 04/24/2014   • Shortness of breath 04/02/2015   • Skin ulcer ([facility]) 04/13/2015    left   • Thyrotoxicosis with or without goiter 10/24/2014   • Urinary incontinence 05/22/2014         Past Surgical History:   Procedure Laterality Date   • BACK SURGERY  08/07/2015    Removal of intstrumentation,L5-S1.Exploration of fusion,L5-S1.Posterolateral  fusion,L4-L5.Posterior segmental spinal instrumentation L4-5.Transforaminal interbody fusion Through right sided approach.Performed at    • COLONOSCOPY  05/04/2016    will order cologuard   • INJECTION OF MEDICATION  03/23/2015    celestone   • INJECTION OF MEDICATION  05/04/2016    KENALOG(6)   • LUMBAR FUSION      discectomy fusion with rods L4-S1   • LUMBAR LAMINECTOMY  1995   • MANDIBLE FRACTURE SURGERY      JAW WIRED SHUT   • MOUTH SURGERY  10/05/2015    Fractured mandible. Removal of arch bars.   • MOUTH SURGERY  1985    Fractured mandible. Removal of arch bars.   • NASAL SEPTUM SURGERY     • RHINOPLASTY     • TUBAL ABDOMINAL LIGATION  1986         Family History   Problem Relation Age of Onset   • Hypertension Mother    • Hyperlipidemia Mother    • Mental illness Mother    • Thyroid disease Mother    • Diabetes Maternal Grandmother    • Cancer Maternal Grandmother    • Depression Other    • Diabetes Other    • Heart disease Other    • Hypertension Other    • Osteoporosis Other    • Thyroid disease Other    • Cancer Maternal Aunt    • Cancer Paternal Aunt    • Breast cancer Paternal Aunt          Social History     Socioeconomic History   • Marital status:    Tobacco Use   • Smoking status: Never Smoker   • Smokeless tobacco: Never Used   • Tobacco comment: smoking cessation    Substance and Sexual Activity   • Alcohol use: No   • Drug use: No     Comment: lortab / oxycontin   • Sexual activity: Defer         Current Outpatient Medications   Medication Sig Dispense Refill   • alendronate (Fosamax) 70 MG tablet Take 1 tablet by mouth Every 7 (Seven) Days. 4 tablet 11   • aMILoride (MIDAMOR) 5 MG tablet Take 1 tablet by mouth Daily for 90 days. 30 tablet 2   • ARIPiprazole (ABILIFY) 5 MG tablet Take 5 mg by mouth Daily. At bedtime     • aspirin 325 MG tablet Take 325 mg by mouth Daily. At bedtime     • atorvastatin (LIPITOR) 20 MG tablet Take 1 tablet by mouth Daily. 90 tablet 3   • Calcium  Carb-Cholecalciferol (CALCIUM-VITAMIN D) 500-200 MG-UNIT per tablet Take 1 tablet by mouth 2 (Two) Times a Day With Meals. 60 tablet 2   • choline fenofibrate (Trilipix) 135 MG capsule Take 1 capsule by mouth Daily. 90 capsule 3   • Diclofenac Sodium (VOLTAREN) 1 % gel gel Apply 4 g topically to the appropriate area as directed 4 (Four) Times a Day As Needed (knee pain). 100 g 0   • dilTIAZem XR (Dilt-XR) 180 MG 24 hr capsule Take 1 capsule by mouth Daily. 14 capsule 0   • ferrous sulfate 325 (65 FE) MG tablet Take 325 mg by mouth Daily With Breakfast.     • Incontinence Supply Disposable (Depend Adjustable Underwear) misc 1 application As Needed (Incontinence). 90 each 11   • lidocaine (LIDODERM) 5 % Place 1 patch on the skin as directed by provider Daily. Remove & Discard patch within 12 hours or as directed by MD 30 each 0   • lisinopril (PRINIVIL,ZESTRIL) 10 MG tablet Take 10 mg by mouth Daily.     • loperamide (IMODIUM) 2 MG capsule TAKE 1 CAPSULE BY MOUTH EVERY FOUR HOURS AS NEEDED FOR DIARRHEA 120 capsule 2   • LORazepam (ATIVAN) 1 MG tablet Take 1 mg by mouth 2 (Two) Times a Day. Scheduled to take in the morning and one with dinner  0   • methocarbamol (ROBAXIN) 500 MG tablet Take 1 tablet by mouth 4 (Four) Times a Day As Needed for Muscle Spasms. 90 tablet 0   • metoprolol tartrate (LOPRESSOR) 25 MG tablet TAKE 1 TABLET EVERY 12 HOURS 60 tablet 5   • omeprazole (priLOSEC) 20 MG capsule Take 1 capsule by mouth Daily. 90 capsule 5   • ondansetron ODT (Zofran ODT) 8 MG disintegrating tablet Place 1 tablet on the tongue Every 8 (Eight) Hours As Needed for Nausea or Vomiting. 20 tablet 0   • sertraline (ZOLOFT) 100 MG tablet Take 100 mg by mouth 2 (Two) Times a Day.  1   • Xarelto 20 MG tablet TAKE ONE TABLET BY MOUTH EVERY DAY - (TAKE WITH FOOD) 90 tablet 3   • promethazine-dextromethorphan (PROMETHAZINE-DM) 6.25-15 MG/5ML syrup Take 5 mL by mouth At Night As Needed for Cough. 120 mL 0     Current  "Facility-Administered Medications   Medication Dose Route Frequency Provider Last Rate Last Admin   • cyanocobalamin injection 1,000 mcg  1,000 mcg Intramuscular Q28 Days Maxime Edwards MD   1,000 mcg at 05/14/21 1514           Review of Systems:     Constitutional:  Denies recent weight loss, weight gain, no change in exercise tolerance.     HENT:  Denies any hearing loss, epistaxis, hoarseness,  Eyes: No blurred    Respiratory: Mild baseline shortness of breath multifactorial element of physical deconditioning    Cardiovascular: See H&P    Gastrointestinal:  Denies change in bowel habits, dyspepsia, ulcer disease, hematochezia, or melena.     Endocrine: Negative for cold intolerance, heat intolerance, polydipsia, polyphagia and polyuria.     Genitourinary: Negative.      Musculoskeletal: Denies any history of arthritic symptoms or back problems.     Skin:  Denies any change in hair or nails, rashes, or skin lesions.     Allergic/Immunologic: Negative.  Negative for environmental allergies, food allergies     Neurological:  Denies any history of recurrent headaches, strokes,     Hematological: Denies any food allergies, seasonal allergies,    Psychiatric/Behavioral: Denies any history of depression      OBJECTIVE:    /76 (BP Location: Left arm, Patient Position: Sitting, Cuff Size: Adult)   Pulse 73   Temp 96.6 °F (35.9 °C)   Ht 162.6 cm (64\")   Wt 103 kg (226 lb)   SpO2 96%   BMI 38.79 kg/m²       Physical Exam:     Constitutional: Cooperative, alert and oriented, well-developed, well-nourished, in no acute distress.     HENT:   Head: Normocephalic, conjunctive is pink thyroid is nonpalpable trachea central    Cardiovascular: Regular rhythm, S1 and S2 normal, no S3 or S4. Apical impulse not displaced. No murmurs, gallops, or rubs detected.     Pulmonary/Chest: Chest: Good bilateral air entry no rales or wheezing    Abdominal: Abdomen soft, bowel sounds normoactive, no masses, "     Musculoskeletal: No deformities, positive peripheral pulses no edema    Neurological: No gross motor or sensory deficits noted, affect appropriate, oriented to time, person, place.     Skin: Warm and dry to the touch, no apparent skin lesions or masses noted.     Psychiatric: She has a normal mood and affect. Her behavior is normal.       Procedures      Lab Results   Component Value Date    WBC 10.16 05/11/2021    HGB 12.1 05/11/2021    HCT 36.8 05/11/2021    MCV 89.1 05/11/2021     05/11/2021     Lab Results   Component Value Date    GLUCOSE 109 (H) 11/03/2021    BUN 19 11/03/2021    CREATININE 1.06 (H) 11/03/2021    EGFRIFNONA 52 (L) 11/03/2021    BCR 17.9 11/03/2021    CO2 26.0 11/03/2021    CALCIUM 9.0 11/03/2021    ALBUMIN 4.20 11/03/2021    AST 21 08/26/2021    ALT 23 08/26/2021     Lab Results   Component Value Date    CHOL 156 08/26/2021    CHOL 148 11/18/2019    CHOL 165 01/31/2018     Lab Results   Component Value Date    TRIG 107 08/26/2021    TRIG 249 (H) 11/18/2019    TRIG 169 01/31/2018     Lab Results   Component Value Date    HDL 71 (H) 08/26/2021    HDL 55 11/18/2019    HDL 67 01/31/2018     No components found for: LDLCALC  Lab Results   Component Value Date    LDL 66 08/26/2021    LDL 43 11/18/2019    LDL 77 01/31/2018     No results found for: HDLLDLRATIO  No components found for: CHOLHDL  Lab Results   Component Value Date    HGBA1C 5.7 (H) 01/31/2018     Lab Results   Component Value Date    TSH 1.300 05/14/2021           ASSESSMENT AND PLAN:    #1  Paroxysmal/persistent atrial fibrillation failed amiodarone, failed Tikosyn on AV kaya blocking drug and doing well we will continue Xarelto to decrease risk of embolization currently she is in sinus rhythm and will continue calcium channel and beta-blocker.  Refused EP study and ablations    [id number] score is 2 she preferred oral anticoagulation    #2 hypertension     Good blood pressure control  continue metoprolol, calcium  channel blocker, lisinopril    Patient was counseled and educated regarding food with high sodium content    #3 hyperlipidemia on atorvastatin LDL with recommended range      Preventive   obesity with a BMI of 38 with history of hypertension paroxysmal atrial fibrillation    Patient was counseled educated regarding risk factor lifestyle modification low carbohydrate, low-fat, DASH diet graded exercise          Diagnoses and all orders for this visit:    1. Atrial fibrillation, unspecified type (HCC) (Primary)  -     ECG 12 Lead    2. Mixed hyperlipidemia    3. Benign essential hypertension    4. Atrial fibrillation, persistent (HCC)        Kristyn Jurado MD  11/23/2021  10:46 CST

## 2021-11-24 ENCOUNTER — OFFICE VISIT (OUTPATIENT)
Dept: FAMILY MEDICINE CLINIC | Facility: CLINIC | Age: 66
End: 2021-11-24

## 2021-11-24 VITALS
OXYGEN SATURATION: 96 % | DIASTOLIC BLOOD PRESSURE: 76 MMHG | SYSTOLIC BLOOD PRESSURE: 118 MMHG | WEIGHT: 221 LBS | TEMPERATURE: 95.7 F | HEART RATE: 65 BPM | BODY MASS INDEX: 37.73 KG/M2 | HEIGHT: 64 IN

## 2021-11-24 DIAGNOSIS — E53.8 VITAMIN B 12 DEFICIENCY: Primary | ICD-10-CM

## 2021-11-24 DIAGNOSIS — M17.11 PRIMARY OSTEOARTHRITIS OF RIGHT KNEE: ICD-10-CM

## 2021-11-24 DIAGNOSIS — Z00.00 ENCOUNTER FOR ROUTINE ADULT HEALTH EXAMINATION WITHOUT ABNORMAL FINDINGS: ICD-10-CM

## 2021-11-24 PROCEDURE — 99214 OFFICE O/P EST MOD 30 MIN: CPT | Performed by: FAMILY MEDICINE

## 2021-11-24 RX ORDER — CYANOCOBALAMIN 1000 UG/ML
1000 INJECTION, SOLUTION INTRAMUSCULAR; SUBCUTANEOUS
Status: SHIPPED | OUTPATIENT
Start: 2021-11-24

## 2021-11-24 NOTE — PROGRESS NOTES
Family Medicine Residency  Leticia Tejeda MD    Subjective:     Lana Mata is a 66 y.o. female with h/o persistent atrial fibrillation, HTN, hyperlipidemia, BMI of 38,  who presents for follow up of her back and right leg pain, her grieving and her annual physical exam.     Back and right leg pain  Patient reports that she followed with Neurosurgeon in [place] who did not recommend any surgical intervention at this time.  She is well controlled with voltaren 1% gel and lidocaine 5% topical treatment.      Grieving  Patient was mainly concerned by the recent passing of her son 3 months ago. Has been eating and sleeping without difficulty at this time. Denies any guilt or hallucinations.     Vitamin B12 deficiency:   Requested Vitamin B 12 injection.     Annual physical exam  Open to receiving shingrix and flu shot.  BMI of 37.3 stable but activity restricted by Primary osteoarthritis of right knee and of bilateral lower back.     The following portions of the patient's history were reviewed and updated as appropriate: allergies, current medications, past family history, past medical history, past social history, past surgical history and problem list.    Past Medical Hx:  Past Medical History:   Diagnosis Date   • Abnormal gait 11/28/2012   • Abrasion 02/26/2015   • Acute ankle pain 03/31/2016   • Acute bronchitis 09/04/2015   • Allergic rhinitis 05/22/2012   • Asthenia 05/22/2014   • Atrial fibrillation (HCC) 03/04/2016    - converted    • Attempted suicide ([facility])    • Benign essential hypertension 03/04/2016   • Cardiovascular stress test abnormal 03/04/2016   • Cellulitis of knee 03/03/2015   • Chest pain 04/02/2015   • Chronic pain disorder    • Common cold 06/24/2014   • Congestive heart failure (HCC) 02/04/2016   • Depressive disorder 05/22/2015    suspect more complicated psych issues      • Fibrocystic breast    • Gastroesophageal reflux disease 01/17/2013   • Generalized anxiety disorder  02/04/2016   • H/O echocardiogram     Normal LV function with Ef of 60-65%.Mildly dilated right ventricle with normal function. Grade 1B diastolic dysfunction with mild CLVH.NO sig. valvular regurg. or stenosis.   • Headache 10/27/2014   • Hyperlipidemia 03/04/2016   • Low back pain 06/30/2016    Need for prophylactic vaccination against Streptococcus pneumoniae [pneumococcus]    01/23/2015    • Neck pain 06/30/2016   • Obstructive sleep apnea of adult 07/30/2015   • Obstructive sleep apnea syndrome    • Osteoarthritis    • Osteoporosis 06/30/2016   • Pain in right shoulder 06/30/2016   • Pain in right shoulder 04/24/2014   • Shortness of breath 04/02/2015   • Skin ulcer (HCC) 04/13/2015    left   • Thyrotoxicosis with or without goiter 10/24/2014   • Urinary incontinence 05/22/2014       Past Surgical Hx:  Past Surgical History:   Procedure Laterality Date   • BACK SURGERY  08/07/2015    Removal of intstrumentation,L5-S1.Exploration of fusion,L5-S1.Posterolateral fusion,L4-L5.Posterior segmental spinal instrumentation L4-5.Transforaminal interbody fusion Through right sided approach.Performed at    • COLONOSCOPY  05/04/2016    will order cologuard   • INJECTION OF MEDICATION  03/23/2015    celestone   • INJECTION OF MEDICATION  05/04/2016    KENALOG(6)   • LUMBAR FUSION      discectomy fusion with rods L4-S1   • LUMBAR LAMINECTOMY  1995   • MANDIBLE FRACTURE SURGERY      JAW WIRED SHUT   • MOUTH SURGERY  10/05/2015    Fractured mandible. Removal of arch bars.   • MOUTH SURGERY  1985    Fractured mandible. Removal of arch bars.   • NASAL SEPTUM SURGERY     • RHINOPLASTY     • TUBAL ABDOMINAL LIGATION  1986       Current Meds:    Current Outpatient Medications:   •  alendronate (Fosamax) 70 MG tablet, Take 1 tablet by mouth Every 7 (Seven) Days., Disp: 4 tablet, Rfl: 11  •  aMILoride (MIDAMOR) 5 MG tablet, Take 1 tablet by mouth Daily for 90 days., Disp: 30 tablet, Rfl: 2  •  ARIPiprazole (ABILIFY) 5 MG tablet, Take  5 mg by mouth Daily. At bedtime, Disp: , Rfl:   •  aspirin 325 MG tablet, Take 325 mg by mouth Daily. At bedtime, Disp: , Rfl:   •  atorvastatin (LIPITOR) 20 MG tablet, Take 1 tablet by mouth Daily., Disp: 90 tablet, Rfl: 3  •  Calcium Carb-Cholecalciferol (CALCIUM-VITAMIN D) 500-200 MG-UNIT per tablet, Take 1 tablet by mouth 2 (Two) Times a Day With Meals., Disp: 60 tablet, Rfl: 2  •  choline fenofibrate (Trilipix) 135 MG capsule, Take 1 capsule by mouth Daily., Disp: 90 capsule, Rfl: 3  •  Diclofenac Sodium (VOLTAREN) 1 % gel gel, Apply 4 g topically to the appropriate area as directed 4 (Four) Times a Day As Needed (knee pain)., Disp: 100 g, Rfl: 0  •  dilTIAZem XR (Dilt-XR) 180 MG 24 hr capsule, Take 1 capsule by mouth Daily., Disp: 14 capsule, Rfl: 0  •  ferrous sulfate 325 (65 FE) MG tablet, Take 325 mg by mouth Daily With Breakfast., Disp: , Rfl:   •  Incontinence Supply Disposable (Depend Adjustable Underwear) misc, 1 application As Needed (Incontinence)., Disp: 90 each, Rfl: 11  •  lidocaine (LIDODERM) 5 %, Place 1 patch on the skin as directed by provider Daily. Remove & Discard patch within 12 hours or as directed by MD, Disp: 30 each, Rfl: 0  •  lisinopril (PRINIVIL,ZESTRIL) 10 MG tablet, Take 10 mg by mouth Daily., Disp: , Rfl:   •  loperamide (IMODIUM) 2 MG capsule, TAKE 1 CAPSULE BY MOUTH EVERY FOUR HOURS AS NEEDED FOR DIARRHEA, Disp: 120 capsule, Rfl: 2  •  LORazepam (ATIVAN) 1 MG tablet, Take 1 mg by mouth 2 (Two) Times a Day. Scheduled to take in the morning and one with dinner, Disp: , Rfl: 0  •  methocarbamol (ROBAXIN) 500 MG tablet, Take 1 tablet by mouth 4 (Four) Times a Day As Needed for Muscle Spasms., Disp: 90 tablet, Rfl: 0  •  metoprolol tartrate (LOPRESSOR) 25 MG tablet, TAKE 1 TABLET EVERY 12 HOURS, Disp: 60 tablet, Rfl: 5  •  omeprazole (priLOSEC) 20 MG capsule, Take 1 capsule by mouth Daily., Disp: 90 capsule, Rfl: 5  •  ondansetron ODT (Zofran ODT) 8 MG disintegrating tablet, Place 1  tablet on the tongue Every 8 (Eight) Hours As Needed for Nausea or Vomiting., Disp: 20 tablet, Rfl: 0  •  promethazine-dextromethorphan (PROMETHAZINE-DM) 6.25-15 MG/5ML syrup, Take 5 mL by mouth At Night As Needed for Cough., Disp: 120 mL, Rfl: 0  •  sertraline (ZOLOFT) 100 MG tablet, Take 100 mg by mouth 2 (Two) Times a Day., Disp: , Rfl: 1  •  Xarelto 20 MG tablet, TAKE ONE TABLET BY MOUTH EVERY DAY - (TAKE WITH FOOD), Disp: 90 tablet, Rfl: 3    Current Facility-Administered Medications:   •  cyanocobalamin injection 1,000 mcg, 1,000 mcg, Intramuscular, Q28 Days, Leticia Tejeda MD    Allergies:  Allergies   Allergen Reactions   • Codeine Nausea And Vomiting   • Latex Rash   • Morphine And Related Hallucinations   • Pravachol [Pravastatin Sodium] Myalgia       Family Hx:  Family History   Problem Relation Age of Onset   • Hypertension Mother    • Hyperlipidemia Mother    • Mental illness Mother    • Thyroid disease Mother    • Diabetes Maternal Grandmother    • Cancer Maternal Grandmother    • Depression Other    • Diabetes Other    • Heart disease Other    • Hypertension Other    • Osteoporosis Other    • Thyroid disease Other    • Cancer Maternal Aunt    • Cancer Paternal Aunt    • Breast cancer Paternal Aunt         Social History:  Social History     Socioeconomic History   • Marital status:    Tobacco Use   • Smoking status: Never Smoker   • Smokeless tobacco: Never Used   • Tobacco comment: smoking cessation    Substance and Sexual Activity   • Alcohol use: No   • Drug use: No     Comment: lortab / oxycontin   • Sexual activity: Defer       Review of Systems  Review of Systems   Constitutional: Negative for chills, fatigue and fever.   HENT: Negative for rhinorrhea, sore throat and trouble swallowing.    Eyes: Negative for pain, itching and visual disturbance.   Respiratory: Negative for cough and shortness of breath.    Cardiovascular: Negative for chest pain and leg swelling.   Gastrointestinal:  "Negative for abdominal pain, constipation, diarrhea and vomiting.   Endocrine: Negative for polydipsia and polyuria.   Genitourinary: Negative for dysuria and hematuria.   Musculoskeletal: Positive for arthralgias and back pain.   Skin: Negative for rash.   Allergic/Immunologic: Negative for immunocompromised state.   Neurological: Negative for dizziness, weakness and headaches.   Hematological: Does not bruise/bleed easily.   Psychiatric/Behavioral: Positive for dysphoric mood. Negative for confusion, hallucinations, self-injury and sleep disturbance.       Objective:     /76   Pulse 65   Temp 95.7 °F (35.4 °C)   Ht 162.6 cm (64\")   Wt 100 kg (221 lb)   SpO2 96%   BMI 37.93 kg/m²   Physical Exam  Constitutional:       Appearance: Normal appearance.   HENT:      Head: Normocephalic and atraumatic.      Mouth/Throat:      Mouth: Mucous membranes are moist.      Pharynx: Oropharynx is clear.   Eyes:      Extraocular Movements: Extraocular movements intact.      Conjunctiva/sclera: Conjunctivae normal.   Cardiovascular:      Rate and Rhythm: Normal rate. Rhythm irregular.      Pulses: Normal pulses.           Radial pulses are 2+ on the right side and 2+ on the left side.      Heart sounds: Normal heart sounds.   Pulmonary:      Effort: Pulmonary effort is normal.      Breath sounds: Normal breath sounds and air entry. No decreased breath sounds, wheezing, rhonchi or rales.   Abdominal:      General: Abdomen is flat. Bowel sounds are normal.      Palpations: Abdomen is soft.   Musculoskeletal:         General: No swelling. Normal range of motion.      Cervical back: Normal range of motion and neck supple.      Right lower leg: No edema.      Left lower leg: No edema.   Skin:     General: Skin is warm and dry.      Capillary Refill: Capillary refill takes less than 2 seconds.   Neurological:      Mental Status: She is alert and oriented to person, place, and time.   Psychiatric:         Attention and " Perception: Attention and perception normal.         Mood and Affect: Affect is tearful.         Speech: Speech normal.         Behavior: Behavior normal. Behavior is cooperative.         Thought Content: Thought content normal.         Cognition and Memory: Cognition and memory normal.         Judgment: Judgment normal.          Assessment/Plan:     Diagnoses and all orders for this visit:    1. Vitamin B 12 deficiency (Primary)  -     cyanocobalamin injection 1,000 mcg    2. Encounter for routine adult health examination without abnormal findings   - Up to date with uric acid, vitamin D level, free T4 and TSH and Lipid panel.  Repeat renal function done 11/3/2021 improvement in renal function with resolution of hyperkalemia.     3. Primary osteoarthritis of right knee   - Following with Orthopedist and neurosurgery.    - Pain well controlled at this time. Encouraged to continue to follow recommendations of Ortho and neurosurgery.       · Rx changes: None  · Patient Education: Focused on grieving and assessing patient's need for  to help find community sources of support.   · Compliance at present is estimated to be excellent.   · Efforts to improve compliance (if necessary) will be directed at nothing at the time.  Patient has been compliant to office visits and preventive care. .    Depression screening: Up to date; last screen 8/26/2021     Follow-up:     Return in about 3 months (around 2/24/2022) for Recheck for Sciatica and normal grieving. .    Preventative:  Health Maintenance   Topic Date Due   • ZOSTER VACCINE (2 of 2) 02/09/2017   • Pneumococcal Vaccine 65+ (1 of 1 - PPSV23) 03/09/2020   • COVID-19 Vaccine (2 - Booster for Leilani series) 06/07/2021   • INFLUENZA VACCINE  08/01/2021   • MAMMOGRAM  11/07/2021   • DXA SCAN  11/07/2021   • LIPID PANEL  08/26/2022   • ANNUAL WELLNESS VISIT  11/24/2022   • COLORECTAL CANCER SCREENING  11/07/2026   • TDAP/TD VACCINES (5 - Td or Tdap) 12/07/2029    • HEPATITIS C SCREENING  Completed   • PAP SMEAR  Discontinued     Female Preventative: Mammogram is due  Last mammogram was 11/7/2019  Recommended: Influenza and Shingrix  Vaccine Counseling: Patient was counseled on R/B/A to Influenza and Zoster vaccine(s). Vaccine components reviewed and all questions answered.  VIS version(s) not given. Patient allowed to accept or refuse vaccine.  Informed consent obtained. Scripts given for administration in her pharmacy.     Weight  -Class: Obese Class II: 35-39.9kg/m2  -Patient's Body mass index is 37.93 kg/m². indicating that she is morbidly obese (BMI > 40 or > 35 with obesity - related health condition). Obesity-related health conditions include the following: obstructive sleep apnea, hypertension, dyslipidemias, GERD and osteoarthritis. Obesity is unchanged. BMI is is above average; BMI management plan is completed. We discussed portion control, consulting a Bariatric surgeon and starting pharmacological treatment. ..   increase water intake, increase physical activity and reduce portion size    Alcohol use:  reports no history of alcohol use.  Nicotine status  reports that she has never smoked. She has never used smokeless tobacco.    Goals     •  management of chronic medical conditions (pt-stated)       Barriers to goals: none            RISK SCORE: 3          This document has been electronically signed by Leticia Tejeda MD on November 24, 2021 09:45 CST

## 2021-11-30 LAB
QT INTERVAL: 386 MS
QTC INTERVAL: 425 MS

## 2021-12-09 ENCOUNTER — TELEPHONE (OUTPATIENT)
Dept: CARDIOLOGY | Facility: CLINIC | Age: 66
End: 2021-12-09

## 2021-12-09 NOTE — TELEPHONE ENCOUNTER
----- Message from Roni Orozco sent at 12/9/2021  8:26 AM CST -----  Dr. Jurado     She needs refills [facility] on her xarelto.  She has one pill left and it will take [facility] by mail about 14 days to get it to her, so she also needs a 2 week supply called into [facility].     Two weeks to [facility] and   All refills to [facility] by mail     Thanks    Roni

## 2021-12-21 ENCOUNTER — PATIENT OUTREACH (OUTPATIENT)
Dept: CASE MANAGEMENT | Facility: OTHER | Age: 66
End: 2021-12-21

## 2021-12-21 NOTE — OUTREACH NOTE
Ambulatory Case Management Note    Care Evaluation    Questions/Answers      Most Recent Value   Annual Wellness Visit:  Patient Has Completed   Care Gaps Addressed Colon Cancer Screening,  Flu Shot,  Mammogram,  Pneumonia Vaccine,  Other (See Comment)  [got covid booster at [facility]]   Colon Cancer Screening Type Cologuard   Cologuard Status Up to Date (< 3 yrs)  [pt believes she completed 2 years ago and it was negative]   Flu Shot Status Up to Date   Mammogram Status Care Advisor to Coordinate  [and DEXA]   Pneumonia Vaccine Status Up to Date   Advanced Directives: Not Interested At This Time   Medication Adherence Medications understood   Healthy Lifestyle (Self-Efficacy) self-monitors vital signs appropriately,  recognizes when to contact medical assistance,  self-reports important symptoms to medical professional        Patient Outreach    Spoke with pt for follow up. She reports she is doing pretty good. Has chronic sob with exertion but is not any worse than usual. She denies chest pain or increased leg swelling. She monitors her BP and O2 sats at home. She has SOPHIA and uses cpap at night. Discussed need for mammogram and DEXA, pt agreeable to complete, will route msg to pcp about ordering.     Roselyn Farrar RN  Ambulatory Case Management    12/21/2021, 16:19 EST

## 2021-12-28 DIAGNOSIS — Z12.31 ENCOUNTER FOR SCREENING MAMMOGRAM FOR MALIGNANT NEOPLASM OF BREAST: ICD-10-CM

## 2021-12-28 DIAGNOSIS — M85.852 OSTEOPENIA OF NECK OF LEFT FEMUR: Primary | ICD-10-CM

## 2021-12-28 DIAGNOSIS — M81.0 POSTMENOPAUSAL OSTEOPOROSIS: ICD-10-CM

## 2022-01-03 ENCOUNTER — TELEPHONE (OUTPATIENT)
Dept: FAMILY MEDICINE CLINIC | Facility: CLINIC | Age: 67
End: 2022-01-03

## 2022-01-03 DIAGNOSIS — K21.9 GASTROESOPHAGEAL REFLUX DISEASE WITHOUT ESOPHAGITIS: ICD-10-CM

## 2022-01-03 RX ORDER — OMEPRAZOLE 20 MG/1
20 CAPSULE, DELAYED RELEASE ORAL DAILY
Qty: 90 CAPSULE | Refills: 5 | Status: SHIPPED | OUTPATIENT
Start: 2022-01-03

## 2022-01-03 NOTE — TELEPHONE ENCOUNTER
Incoming Refill Request      Medication requested (name and dose):   omeprazole (priLOSEC) 20 MG capsule  Pharmacy where request should be sent:   [facility]  Additional details provided by patient:    Best call back number: [phone]    Does the patient have less than a 3 day supply:  [x] Yes  [] No    Parker Rosales Rep  01/03/22, 13:56 CST

## 2022-01-10 ENCOUNTER — TELEPHONE (OUTPATIENT)
Dept: CARDIOLOGY | Facility: CLINIC | Age: 67
End: 2022-01-10

## 2022-01-10 RX ORDER — ATORVASTATIN CALCIUM 20 MG/1
TABLET, FILM COATED ORAL
Qty: 90 TABLET | Refills: 3 | Status: SHIPPED | OUTPATIENT
Start: 2022-01-10

## 2022-01-10 NOTE — TELEPHONE ENCOUNTER
Contacted patient to let her know that Heron LOPEZ with Dr. Huggins office would be calling her. The patient stated that she had spoke to them and that she really appreciated our office getting in touch with the Kidney doctor for them.     Patient voiced understanding.       ----- Message from JOHN Cortez sent at 1/10/2022  3:52 PM CST -----  Heron LOPEZ with Dr. Huggins office said he would call her. If you could let patient know to be expecting a phone call from kidney doctor office      ----- Message -----  From: Epley, Kendall, MA  Sent: 1/10/2022   3:47 PM CST  To: JOHN Cortez    I am getting ready to leave for the day if heron calls back.   ----- Message -----  From: Roni Orozco  Sent: 1/10/2022   2:31 PM CST  To: Kendall Epley, MA    I told her I wasn't sure if there was anything Dr. Jurado could do, but here is the situation.  Dr. Buchanan is not answering his phone, for over a week now, and the patient says she has been to his office and no one is there.  He put her on amiloride 1/2 a tablet.  She says she gets so much fluid on  her she can't breathe and has been taking a whole pill.  She wants to know if that is safe and if it is ok and I told her she needed to get ahold of Dr. Buchanan and again she repeated all of the above.    Can  you call  her at [phone].     Thanks    Roni

## 2022-01-11 ENCOUNTER — TRANSCRIBE ORDERS (OUTPATIENT)
Dept: LAB | Facility: HOSPITAL | Age: 67
End: 2022-01-11

## 2022-01-11 DIAGNOSIS — N18.30 STAGE 3 CHRONIC KIDNEY DISEASE, UNSPECIFIED WHETHER STAGE 3A OR 3B CKD: Primary | ICD-10-CM

## 2022-01-11 DIAGNOSIS — E83.42 HYPOMAGNESEMIA: ICD-10-CM

## 2022-01-14 DIAGNOSIS — M25.572 ACUTE BILATERAL ANKLE PAIN: Primary | ICD-10-CM

## 2022-01-14 DIAGNOSIS — M25.571 ACUTE BILATERAL ANKLE PAIN: Primary | ICD-10-CM

## 2022-01-18 ENCOUNTER — OFFICE VISIT (OUTPATIENT)
Dept: ORTHOPEDIC SURGERY | Facility: CLINIC | Age: 67
End: 2022-01-18

## 2022-01-18 ENCOUNTER — DOCUMENTATION (OUTPATIENT)
Dept: FAMILY MEDICINE CLINIC | Facility: CLINIC | Age: 67
End: 2022-01-18

## 2022-01-18 VITALS — HEIGHT: 65 IN | BODY MASS INDEX: 37.82 KG/M2 | WEIGHT: 227 LBS

## 2022-01-18 DIAGNOSIS — G89.29 CHRONIC PAIN OF BOTH ANKLES: Primary | ICD-10-CM

## 2022-01-18 DIAGNOSIS — M21.42 PES PLANUS OF BOTH FEET: ICD-10-CM

## 2022-01-18 DIAGNOSIS — M19.071 PRIMARY OSTEOARTHRITIS OF BOTH ANKLES: ICD-10-CM

## 2022-01-18 DIAGNOSIS — R29.898 ANKLE WEAKNESS: ICD-10-CM

## 2022-01-18 DIAGNOSIS — M25.571 CHRONIC PAIN OF BOTH ANKLES: Primary | ICD-10-CM

## 2022-01-18 DIAGNOSIS — M21.41 PES PLANUS OF BOTH FEET: ICD-10-CM

## 2022-01-18 DIAGNOSIS — M19.072 PRIMARY OSTEOARTHRITIS OF BOTH ANKLES: ICD-10-CM

## 2022-01-18 DIAGNOSIS — M25.572 CHRONIC PAIN OF BOTH ANKLES: Primary | ICD-10-CM

## 2022-01-18 PROCEDURE — 99214 OFFICE O/P EST MOD 30 MIN: CPT | Performed by: NURSE PRACTITIONER

## 2022-01-18 NOTE — PROGRESS NOTES
Lana Mata is a 66 y.o. female   Primary provider:  Leticia Tejeda MD       Chief Complaint   Patient presents with   • Right Ankle - Pain, Initial Evaluation   • Left Ankle - Pain, Initial Evaluation     HISTORY OF PRESENT ILLNESS:    66-year-old female patient presents to office for evaluation of chronic bilateral ankle pain.  Patient reports she has worse pain in the left ankle compared to the right.  The patient has a history of a prior left tibia fracture that occurred 8 years ago due to a fall while walking, requiring surgical fixation.  She has retained hardware in the left tibia.  The patient states that she has experienced progressively worsening chronic bilateral ankle pain for several years.  The patient also reports she has a history of pes planus and has chronic issues with abnormal positioning of her ankles.  Pain is described as intermittent and moderate to severe.  Pain is described as stabbing in nature with associated joint swelling.  Pain is worse with weightbearing activity, standing, walking.  Pain improves with rest.  Patient also uses a cane for modified weightbearing and ambulatory assistance.  X-rays of the bilateral ankles performed in office today.  Pain scale currently is 3/10.       CONCURRENT MEDICAL HISTORY:    Past Medical History:   Diagnosis Date   • Abnormal gait 11/28/2012   • Abrasion 02/26/2015   • Acute ankle pain 03/31/2016   • Acute bronchitis 09/04/2015   • Allergic rhinitis 05/22/2012   • Asthenia 05/22/2014   • Atrial fibrillation (HCC) 03/04/2016    - converted    • Attempted suicide ([facility])    • Benign essential hypertension 03/04/2016   • Cardiovascular stress test abnormal 03/04/2016   • Cellulitis of knee 03/03/2015   • Chest pain 04/02/2015   • Chronic pain disorder    • Common cold 06/24/2014   • Congestive heart failure (HCC) 02/04/2016   • Depressive disorder 05/22/2015    suspect more complicated psych issues      • Fibrocystic breast    •  Gastroesophageal reflux disease 01/17/2013   • Generalized anxiety disorder 02/04/2016   • H/O echocardiogram     Normal LV function with Ef of 60-65%.Mildly dilated right ventricle with normal function. Grade 1B diastolic dysfunction with mild CLVH.NO sig. valvular regurg. or stenosis.   • Headache 10/27/2014   • Hyperlipidemia 03/04/2016   • Low back pain 06/30/2016    Need for prophylactic vaccination against Streptococcus pneumoniae [pneumococcus]    01/23/2015    • Neck pain 06/30/2016   • Obstructive sleep apnea of adult 07/30/2015   • Obstructive sleep apnea syndrome    • Osteoarthritis    • Osteoporosis 06/30/2016   • Pain in right shoulder 06/30/2016   • Pain in right shoulder 04/24/2014   • Shortness of breath 04/02/2015   • Skin ulcer (HCC) 04/13/2015    left   • Thyrotoxicosis with or without goiter 10/24/2014   • Urinary incontinence 05/22/2014       Allergies   Allergen Reactions   • Codeine Nausea And Vomiting   • Latex Rash   • Morphine And Related Hallucinations   • Pravachol [Pravastatin Sodium] Myalgia         Current Outpatient Medications:   •  alendronate (Fosamax) 70 MG tablet, Take 1 tablet by mouth Every 7 (Seven) Days., Disp: 4 tablet, Rfl: 11  •  ARIPiprazole (ABILIFY) 5 MG tablet, Take 5 mg by mouth Daily. At bedtime, Disp: , Rfl:   •  aspirin 325 MG tablet, Take 325 mg by mouth Daily. At bedtime, Disp: , Rfl:   •  atorvastatin (LIPITOR) 20 MG tablet, TAKE ONE TABLET BY MOUTH EVERY DAY, Disp: 90 tablet, Rfl: 3  •  Calcium Carb-Cholecalciferol (CALCIUM-VITAMIN D) 500-200 MG-UNIT per tablet, Take 1 tablet by mouth 2 (Two) Times a Day With Meals., Disp: 60 tablet, Rfl: 2  •  choline fenofibrate (Trilipix) 135 MG capsule, Take 1 capsule by mouth Daily., Disp: 90 capsule, Rfl: 3  •  Diclofenac Sodium (VOLTAREN) 1 % gel gel, Apply 4 g topically to the appropriate area as directed 4 (Four) Times a Day As Needed (knee pain)., Disp: 100 g, Rfl: 0  •  dilTIAZem XR (Dilt-XR) 180 MG 24 hr capsule,  Take 1 capsule by mouth Daily., Disp: 14 capsule, Rfl: 0  •  ferrous sulfate 325 (65 FE) MG tablet, Take 325 mg by mouth Daily With Breakfast., Disp: , Rfl:   •  lidocaine (LIDODERM) 5 %, Place 1 patch on the skin as directed by provider Daily. Remove & Discard patch within 12 hours or as directed by MD, Disp: 30 each, Rfl: 0  •  lisinopril (PRINIVIL,ZESTRIL) 10 MG tablet, Take 10 mg by mouth Daily., Disp: , Rfl:   •  loperamide (IMODIUM) 2 MG capsule, TAKE 1 CAPSULE BY MOUTH EVERY FOUR HOURS AS NEEDED FOR DIARRHEA, Disp: 120 capsule, Rfl: 2  •  LORazepam (ATIVAN) 1 MG tablet, Take 1 mg by mouth 2 (Two) Times a Day. Scheduled to take in the morning and one with dinner, Disp: , Rfl: 0  •  methocarbamol (ROBAXIN) 500 MG tablet, Take 1 tablet by mouth 4 (Four) Times a Day As Needed for Muscle Spasms., Disp: 90 tablet, Rfl: 0  •  metoprolol tartrate (LOPRESSOR) 25 MG tablet, TAKE 1 TABLET EVERY 12 HOURS, Disp: 60 tablet, Rfl: 5  •  omeprazole (priLOSEC) 20 MG capsule, Take 1 capsule by mouth Daily., Disp: 90 capsule, Rfl: 5  •  ondansetron ODT (Zofran ODT) 8 MG disintegrating tablet, Place 1 tablet on the tongue Every 8 (Eight) Hours As Needed for Nausea or Vomiting., Disp: 20 tablet, Rfl: 0  •  rivaroxaban (Xarelto) 20 MG tablet, Take one tablet by mouth every day- take with food, Disp: 30 tablet, Rfl: 0  •  sertraline (ZOLOFT) 100 MG tablet, Take 100 mg by mouth 2 (Two) Times a Day., Disp: , Rfl: 1  •  Incontinence Supply Disposable (Depend Adjustable Underwear) misc, 1 application As Needed (Incontinence)., Disp: 90 each, Rfl: 11    Current Facility-Administered Medications:   •  cyanocobalamin injection 1,000 mcg, 1,000 mcg, Intramuscular, Q28 Days, Leticia Tejeda MD    Past Surgical History:   Procedure Laterality Date   • BACK SURGERY  08/07/2015    Removal of intstrumentation,L5-S1.Exploration of fusion,L5-S1.Posterolateral fusion,L4-L5.Posterior segmental spinal instrumentation L4-5.Transforaminal interbody  fusion Through right sided approach.Performed at    • COLONOSCOPY  05/04/2016    will order cologuard   • INJECTION OF MEDICATION  03/23/2015    celestone   • INJECTION OF MEDICATION  05/04/2016    KENALOG(6)   • LUMBAR FUSION      discectomy fusion with rods L4-S1   • LUMBAR LAMINECTOMY  1995   • MANDIBLE FRACTURE SURGERY      JAW WIRED SHUT   • MOUTH SURGERY  10/05/2015    Fractured mandible. Removal of arch bars.   • MOUTH SURGERY  1985    Fractured mandible. Removal of arch bars.   • NASAL SEPTUM SURGERY     • RHINOPLASTY     • TUBAL ABDOMINAL LIGATION  1986       Family History   Problem Relation Age of Onset   • Hypertension Mother    • Hyperlipidemia Mother    • Mental illness Mother    • Thyroid disease Mother    • Diabetes Maternal Grandmother    • Cancer Maternal Grandmother    • Depression Other    • Diabetes Other    • Heart disease Other    • Hypertension Other    • Osteoporosis Other    • Thyroid disease Other    • Cancer Maternal Aunt    • Cancer Paternal Aunt    • Breast cancer Paternal Aunt        Social History     Socioeconomic History   • Marital status:    Tobacco Use   • Smoking status: Never Smoker   • Smokeless tobacco: Never Used   • Tobacco comment: smoking cessation    Substance and Sexual Activity   • Alcohol use: No   • Drug use: No     Comment: lortab / oxycontin   • Sexual activity: Defer        Review of Systems   Constitutional: Negative.    HENT: Positive for hearing loss, postnasal drip and tinnitus.    Eyes: Positive for pain and visual disturbance.   Respiratory: Negative.         Difficulties   Cardiovascular: Negative.    Gastrointestinal: Negative.    Endocrine: Negative.    Genitourinary: Negative.    Musculoskeletal: Positive for arthralgias, gait problem and joint swelling.        Muscle pain. Joint stiffness. Bilateral ankle pain, L > R   Skin: Negative.    Allergic/Immunologic: Negative.    Neurological: Positive for dizziness.   Hematological: Bruises/bleeds  "easily.   Psychiatric/Behavioral: The patient is nervous/anxious.         Mood swings       PHYSICAL EXAMINATION:       Ht 165.1 cm (65\")   Wt 103 kg (227 lb)   BMI 37.77 kg/m²     Physical Exam  Vitals reviewed.   Constitutional:       General: She is not in acute distress.     Appearance: She is well-developed. She is obese. She is not ill-appearing.   HENT:      Head: Normocephalic.   Pulmonary:      Effort: Pulmonary effort is normal. No respiratory distress.   Abdominal:      General: There is no distension.      Palpations: Abdomen is soft.   Musculoskeletal:         General: Swelling (Mild, bilateral ankles) and tenderness (Mild, bilateral ankles) present. No deformity or signs of injury.   Skin:     General: Skin is warm and dry.      Capillary Refill: Capillary refill takes less than 2 seconds.      Findings: No erythema.   Neurological:      Mental Status: She is alert and oriented to person, place, and time.      GCS: GCS eye subscore is 4. GCS verbal subscore is 5. GCS motor subscore is 6.   Psychiatric:         Speech: Speech normal.         Behavior: Behavior normal.         Thought Content: Thought content normal.         Judgment: Judgment normal.         GAIT:     []  Normal  [x]  Antalgic    Assistive device: []  None  []  Walker     []  Crutches  [x]  Cane     []  Wheelchair  []  Stretcher    Right Ankle Exam     Tenderness   Right ankle tenderness location: Mild, diffuse.  Swelling: mild    Range of Motion   Dorsiflexion: abnormal   Plantar flexion: abnormal     Muscle Strength   Dorsiflexion:  4/5  Plantar flexion:  4/5    Other   Erythema: absent  Sensation: normal  Pulse: present     Comments:  Mild pain and limitations with range of motion of the ankle joint.  Mild, generalized swelling noted.  Pes planus noted.  Overpronation of the ankle noted with weightbearing.      Left Ankle Exam     Tenderness   Left ankle tenderness location: Mild, diffuse.   Swelling: mild    Range of Motion "   Dorsiflexion: abnormal   Plantar flexion: abnormal     Muscle Strength   Dorsiflexion:  4/5   Plantar flexion:  4/5     Other   Erythema: absent  Sensation: normal  Pulse: present    Comments:  Mild pain and limitations with range of motion of the ankle joint.  Mild, generalized swelling noted.  Pes planus noted.  Overpronation of the ankle noted with weightbearing.              XR Ankle 3+ View Bilateral    Result Date: 1/18/2022  Narrative: Procedure: XR ANKLE 3 VIEWS BILATERAL. History: pain, M25.571 Pain in right ankle and joints of right foot M25.572 Pain in left ankle and joints of left foot. Comparison: None Findings: Three x-rays of the right ankle and three x-rays of the left ankle. There are degenerative changes of the right ankle and tarsal bones. There is a right plantar calcaneal spur. Postoperative changes from intramedullary cherelle and screws in the left distal tibia are noted. There are degenerative changes of the left ankle and tarsal bones. There is a left plantar calcaneal spur. No radiographic evidence of acute fracture or dislocation.     Impression: Impression: Multifocal bilateral degenerative changes. Postoperative changes of the left distal tibia. Electronically signed by:  Seferino Bingham MD  1/18/2022 12:18 PM CST Workstation: [id number]      ASSESSMENT:    Diagnoses and all orders for this visit:    Chronic pain of both ankles  -     Ambulatory Referral to Physical Therapy Evaluate and treat (custom orthotics); (as indicated); Stretching, ROM, Strengthening    Pes planus of both feet  -     Ambulatory Referral to Physical Therapy Evaluate and treat (custom orthotics); (as indicated); Stretching, ROM, Strengthening    Primary osteoarthritis of both ankles  -     Ambulatory Referral to Physical Therapy Evaluate and treat (custom orthotics); (as indicated); Stretching, ROM, Strengthening    Ankle weakness  -     Ambulatory Referral to Physical Therapy Evaluate and treat (custom orthotics);  (as indicated); Stretching, ROM, Strengthening    PLAN    X-rays of the bilateral ankles performed in office today and reviewed with no acute findings noted.  Patient has degenerative changes noted in both ankle joints.  She also has retained hardware in the left tibia, which appears stable and unchanged.  The patient has experienced ongoing and progressively worsening bilateral ankle pain for several years.  She has worse pain in the left ankle.  The patient also has a history of pes planus since childhood per her report.  On physical exam, she has fairly significant overpronation of both ankles.  Recommend trial of some lace up ankle support braces.  These are placed/fitted in office today.  Recommend a referral to physical therapy for conditioning and strengthening exercises of her bilateral ankles and also for fitting with custom orthotics.    Recommend rest and activity modification during times of increased pain.  Recommend to continue with modified weightbearing with use of her cane.  Recommend elevation and ice therapy to the bilateral ankles as needed to minimize pain/swelling/inflammation.  Recommend Tylenol as needed for pain/discomfort.  The patient cannot take oral NSAIDs due to her current use of Xarelto for chronic anticoagulation therapy.    Follow-up in 4 to 6 weeks for recheck.    Time spent of a minimum of 30 minutes including the face to face evaluation, reviewing of medical history and prior medial records, reviewing of diagnostic studies, ordering additional treatments, documentation, patient education and coordination of care.     EMR Dragon/Transciption Disclaimer: Some of this note may be an electronic transcription/translation of spoken language to printed text.  The electronic translation of spoken language may permit erroneous, or at times, nonsensical words or phrases to be inadvertently transcribed. Although I have reviewed the note for such errors, some may still exist.     Return in  about 4 weeks (around 2/15/2022) for Recheck.        This document has been electronically signed by JOHN Carl on January 23, 2022 20:53 CST      JOHN Carl

## 2022-01-19 NOTE — PROGRESS NOTES
Called patient at 1800 on 1/18/22.  Patient picked up and we discussed:     1. Mammogram: Notified her the findings of microcalcifications on her left breast and how these differ from the fibroids she reports having in the past.  Patient reported that she received a call today to return for further x-rays to assess her breast findings. Was grateful to know the description of the findings that elicited the need for the x-rays.  Requested a call back for after her x-rays are done tomorrow. Plan is to call and follow up with her on 1/21 about any concerns she may have on her x-rays.     2. DEXA: Notified her that her DEXA has improved in score from previously while on alendronate. Will have her continue use without changes at this time.  She had no questions about this at this time.            This document has been electronically signed by Leticia Tejeda MD on January 18, 2022 18:10 CST

## 2022-01-20 DIAGNOSIS — M17.11 PRIMARY OSTEOARTHRITIS OF RIGHT KNEE: ICD-10-CM

## 2022-01-20 DIAGNOSIS — M25.561 ACUTE PAIN OF RIGHT KNEE: ICD-10-CM

## 2022-01-20 DIAGNOSIS — M85.852 OSTEOPENIA OF NECK OF LEFT FEMUR: Primary | ICD-10-CM

## 2022-01-20 DIAGNOSIS — M54.16 LUMBAR RADICULOPATHY: ICD-10-CM

## 2022-01-23 PROBLEM — M19.072 PRIMARY OSTEOARTHRITIS OF BOTH ANKLES: Status: ACTIVE | Noted: 2022-01-23

## 2022-01-23 PROBLEM — M19.071 PRIMARY OSTEOARTHRITIS OF BOTH ANKLES: Status: ACTIVE | Noted: 2022-01-23

## 2022-01-24 ENCOUNTER — LAB (OUTPATIENT)
Dept: LAB | Facility: HOSPITAL | Age: 67
End: 2022-01-24

## 2022-01-24 DIAGNOSIS — E83.42 HYPOMAGNESEMIA: ICD-10-CM

## 2022-01-24 DIAGNOSIS — N18.30 STAGE 3 CHRONIC KIDNEY DISEASE, UNSPECIFIED WHETHER STAGE 3A OR 3B CKD: ICD-10-CM

## 2022-01-24 PROCEDURE — 36415 COLL VENOUS BLD VENIPUNCTURE: CPT

## 2022-01-24 PROCEDURE — 80069 RENAL FUNCTION PANEL: CPT

## 2022-01-25 LAB
ALBUMIN SERPL-MCNC: 4.4 G/DL (ref 3.5–5.2)
ANION GAP SERPL CALCULATED.3IONS-SCNC: 12.8 MMOL/L (ref 5–15)
BUN SERPL-MCNC: 25 MG/DL (ref 8–23)
BUN/CREAT SERPL: 21.4 (ref 7–25)
CALCIUM SPEC-SCNC: 9.5 MG/DL (ref 8.6–10.5)
CHLORIDE SERPL-SCNC: 101 MMOL/L (ref 98–107)
CO2 SERPL-SCNC: 25.2 MMOL/L (ref 22–29)
CREAT SERPL-MCNC: 1.17 MG/DL (ref 0.57–1)
GFR SERPL CREATININE-BSD FRML MDRD: 46 ML/MIN/1.73
GLUCOSE SERPL-MCNC: 146 MG/DL (ref 65–99)
PHOSPHATE SERPL-MCNC: 3.5 MG/DL (ref 2.5–4.5)
POTASSIUM SERPL-SCNC: 4.6 MMOL/L (ref 3.5–5.2)
SODIUM SERPL-SCNC: 139 MMOL/L (ref 136–145)

## 2022-01-27 ENCOUNTER — HOSPITAL ENCOUNTER (OUTPATIENT)
Dept: PHYSICAL THERAPY | Facility: HOSPITAL | Age: 67
Setting detail: THERAPIES SERIES
Discharge: HOME | End: 2022-01-27

## 2022-01-27 DIAGNOSIS — M25.572 CHRONIC PAIN OF BOTH ANKLES: Primary | ICD-10-CM

## 2022-01-27 DIAGNOSIS — M25.571 CHRONIC PAIN OF BOTH ANKLES: Primary | ICD-10-CM

## 2022-01-27 DIAGNOSIS — G89.29 CHRONIC PAIN OF BOTH ANKLES: Primary | ICD-10-CM

## 2022-01-27 PROCEDURE — 97162 PT EVAL MOD COMPLEX 30 MIN: CPT | Performed by: PHYSICAL THERAPIST

## 2022-01-27 PROCEDURE — 97161 PT EVAL LOW COMPLEX 20 MIN: CPT | Performed by: PHYSICAL THERAPIST

## 2022-01-27 NOTE — THERAPY EVALUATION
Outpatient Physical Therapy Ortho Initial Evaluation  [facility]     Patient Name: Lana Mata  : 1955  MRN: [id number]  Today's Date: 2022      Visit Date: 2022  Attendance:   (TBD)  Subjective % Improvement: n/a  Recert Date: 22  MD appointment:tbd     Therapy Diagnosis: Bilateral ankle pain     Patient Active Problem List   Diagnosis   • Thyrotoxicosis with or without goiter   • Osteoporosis   • Pain in right shoulder   • Low back pain   • Hyperlipidemia   • Generalized anxiety disorder   • Depressive disorder   • Benign essential hypertension   • Asthenia   • Abnormal gait   • Obstructive sleep apnea of adult   • DDD (degenerative disc disease), lumbar   • Lumbar radiculopathy   • History of lumbar surgery   • Lumbar spondylolysis   • Long term (current) use of opiate analgesic   • Urinary incontinence   • Skin ulcer ([facility])   • Shortness of breath   • Shortness of breath   • Osteoarthritis   • Obstructive sleep apnea syndrome   • Neck pain   • Headache   • H/O echocardiogram   • Gastroesophageal reflux disease   • Common cold   • Chronic pain disorder   • Chest pain   • Cellulitis of knee   • Attempted suicide ([facility])   • Allergic rhinitis   • Acute bronchitis   • Acute ankle pain   • Abrasion   • Lower abdominal pain   • Diarrhea   • Nausea   • Class 2 severe obesity due to excess calories with serious comorbidity and body mass index (BMI) of 37.0 to 37.9 in adult ([facility])   • Chronic pain of left ankle   • Posterior tibial tendon dysfunction, left   • Retained orthopedic hardware   • History of tibial fracture   • Flat foot   • UTI (urinary tract infection), bacterial   • Posterior tibial tendon dysfunction (PTTD) of left lower extremity   • Left leg pain   • Primary osteoarthritis of right knee   • Effusion of right knee   • Acute pain of right knee   • Atrial fibrillation, persistent ([facility])   • Primary osteoarthritis of both ankles        Past Medical History:   Diagnosis  Date   • Abnormal gait 11/28/2012   • Abrasion 02/26/2015   • Acute ankle pain 03/31/2016   • Acute bronchitis 09/04/2015   • Allergic rhinitis 05/22/2012   • Asthenia 05/22/2014   • Atrial fibrillation (HCC) 03/04/2016    - converted    • Attempted suicide (HCC)    • Benign essential hypertension 03/04/2016   • Cardiovascular stress test abnormal 03/04/2016   • Cellulitis of knee 03/03/2015   • Chest pain 04/02/2015   • Chronic pain disorder    • Common cold 06/24/2014   • Congestive heart failure (HCC) 02/04/2016   • Depressive disorder 05/22/2015    suspect more complicated psych issues      • Fibrocystic breast    • Gastroesophageal reflux disease 01/17/2013   • Generalized anxiety disorder 02/04/2016   • H/O echocardiogram     Normal LV function with Ef of 60-65%.Mildly dilated right ventricle with normal function. Grade 1B diastolic dysfunction with mild CLVH.NO sig. valvular regurg. or stenosis.   • Headache 10/27/2014   • Hyperlipidemia 03/04/2016   • Low back pain 06/30/2016    Need for prophylactic vaccination against Streptococcus pneumoniae [pneumococcus]    01/23/2015    • Neck pain 06/30/2016   • Obstructive sleep apnea of adult 07/30/2015   • Obstructive sleep apnea syndrome    • Osteoarthritis    • Osteoporosis 06/30/2016   • Pain in right shoulder 06/30/2016   • Pain in right shoulder 04/24/2014   • Shortness of breath 04/02/2015   • Skin ulcer ([facility]) 04/13/2015    left   • Thyrotoxicosis with or without goiter 10/24/2014   • Urinary incontinence 05/22/2014        Past Surgical History:   Procedure Laterality Date   • BACK SURGERY  08/07/2015    Removal of intstrumentation,L5-S1.Exploration of fusion,L5-S1.Posterolateral fusion,L4-L5.Posterior segmental spinal instrumentation L4-5.Transforaminal interbody fusion Through right sided approach.Performed at    • COLONOSCOPY  05/04/2016    will order cologuard   • INJECTION OF MEDICATION  03/23/2015    celestone   • INJECTION OF MEDICATION  05/04/2016     KENALOG(6)   • LUMBAR FUSION      discectomy fusion with rods L4-S1   • LUMBAR LAMINECTOMY  1995   • MANDIBLE FRACTURE SURGERY      JAW WIRED SHUT   • MOUTH SURGERY  10/05/2015    Fractured mandible. Removal of arch bars.   • MOUTH SURGERY  1985    Fractured mandible. Removal of arch bars.   • NASAL SEPTUM SURGERY     • RHINOPLASTY     • TUBAL ABDOMINAL LIGATION  1986       Visit Dx:     ICD-10-CM ICD-9-CM   1. Chronic pain of both ankles  M25.571 719.47    G89.29 338.29    M25.572           Patient History     Row Name 01/27/22 0845             History    Brief Description of Current Complaint Present today with chronic bilateral ankle pain. Chronic flat feet. Notes L>R ankle pain. Notes insoles as a child. Notes pain can be severe enough to make her want to fall.  -BB      Patient/Caregiver Goals Improve strength; Return to prior level of function  -BB      Hand Dominance right-handed  -BB      Occupation/sports/leisure activities Does not work  -BB              Pain     Pain Location Ankle  -BB      Pain at Present 1  -BB              Fall Risk Assessment    Any falls in the past year: Yes  -BB      Number of falls reported in the last 12 months --  not for about 6 months, had been falling monthly  -BB            User Key  (r) = Recorded By, (t) = Taken By, (c) = Cosigned By    Initials Name Provider Type    Jennifer Kramer, PT DPT Physical Therapist                 PT Ortho     Row Name 01/27/22 1045       Subjective Comments    Subjective Comments see pt hx  -BB       Precautions and Contraindications    Precautions/Limitations fall precautions  -BB       Subjective Pain    Able to rate subjective pain? yes  -BB    Pre-Treatment Pain Level 1  -BB    Post-Treatment Pain Level 1  -BB       Posture/Observations    Posture/Observations Comments present ambulating very slow and gurarded. Has bilateral ASOs donned. IS noted to have severe ankle valgus, pes planus and overpronation. Skin is in tact. Callusing of  toes surrounding nail beds L>R.  -BB       Special Tests/Palpation    Special Tests/Palpation --  generalized tenderness of ankles  -BB       General ROM    GENERAL ROM COMMENTS DF in subtalar neutral position at about 5 degrees bilaterally. If allows ankle eversion DF at 10 degrees (compensatory movement). Eversion and inversion bilaterally are WNL  -BB       MMT (Manual Muscle Testing)    General MMT Comments bilateral ankles are grossly 4/5 in all planes tested in sitting  -BB       Sensation    Sensation WNL? WNL  -BB          User Key  (r) = Recorded By, (t) = Taken By, (c) = Cosigned By    Initials Name Provider Type    BB Jennifer Reid, PT DPT Physical Therapist                                   PT OP Goals     Row Name 01/27/22 0845          PT Short Term Goals    STG Date to Achieve 02/17/22  -BB     STG 1 Indepedent in orthotic wear schedule and skin inspection  -BB     STG 2 Independent in HEP for ankles/LE  -BB            Time Calculation    PT Goal Re-Cert Due Date 02/17/22  -BB           User Key  (r) = Recorded By, (t) = Taken By, (c) = Cosigned By    Initials Name Provider Type    Jennifer Kramer, PT DPT Physical Therapist                 PT Assessment/Plan     Row Name 01/27/22 0845          PT Assessment    Functional Limitations Limitations in functional capacity and performance; Decreased safety during functional activities; Impaired locomotion; Impaired gait  -BB     Impairments Gait; Balance; Pain; Muscle strength; Poor body mechanics; Posture; Range of motion; Joint mobility  -BB     Assessment Comments Patient presents today with multi comorbidities limiting functional gait and mobility and is noted to have bilateral ankle valgus and pes planus. Patient could benefit from custom orthotics for improved ankle support and will have tenderfoot mediums fabricated. PAtient also could benefit from development of HEP for ankle, foot and LE strength to assist in mobility.  -BB     Rehab Potential  Good  -BB     Patient/caregiver participated in establishment of treatment plan and goals Yes  -BB     Patient would benefit from skilled therapy intervention Yes  -BB            PT Plan    PT Frequency 1x/week  -BB     Predicted Duration of Therapy Intervention (PT) 3-4 visits  -BB     Planned CPT's? PT EVAL MOD COMPLELITY: 63663; PT RE-EVAL: 95946; PT THER PROC EA 15 MIN: 53128; PT THER ACT EA 15 MIN: 96604; PT THER SUPP EA 15 MIN; PT INITIAL ORTHOTIC MGMT/TRAIN EA 15 MIN: 39501  -BB     PT Plan Comments Progress HEP for ankle, foot and LEs, ice/heat PRN, perform gentle activities and allow breaks often.  -BB           User Key  (r) = Recorded By, (t) = Taken By, (c) = Cosigned By    Initials Name Provider Type    Jennifer Kramer PT DPT Physical Therapist                   OP Exercises     Row Name 01/27/22 0845             Subjective Comments    Subjective Comments see pt hx  -BB              Subjective Pain    Able to rate subjective pain? yes  -BB      Pre-Treatment Pain Level 1  -BB      Post-Treatment Pain Level 1  -BB              Exercise 1    Exercise Name 1 sitting cast mold  -BB              Exercise 2    Exercise Name 2 HEP for TR/HR, ankle 4 way yellow tband  -BB              Exercise 3    Exercise Name 3 taken to car via wheelchair for energy and safety  -BB            User Key  (r) = Recorded By, (t) = Taken By, (c) = Cosigned By    Initials Name Provider Type    Jennifer Kramer PT DPT Physical Therapist                                        Time Calculation:     Start Time: 0845  Stop Time: 0930  Time Calculation (min): 45 min     Therapy Charges for Today     Code Description Service Date Service Provider Modifiers Qty    16113470131  PT-CUSTOM ORTHOTICS-LEVEL 2 1/27/2022 Jennifer Reid PT DPT  1    [id number]  PT EVAL MOD COMPLEXITY 2 1/27/2022 Jennifer Reid PT DPT GP 1                    RAVIN BlairT  1/28/2022       Yes:

## 2022-01-28 ENCOUNTER — HOME HEALTH ADMISSION (OUTPATIENT)
Dept: HOME HEALTH SERVICES | Facility: HOME HEALTHCARE | Age: 67
End: 2022-01-28

## 2022-02-09 ENCOUNTER — APPOINTMENT (OUTPATIENT)
Dept: PHYSICAL THERAPY | Facility: HOSPITAL | Age: 67
End: 2022-02-09

## 2022-02-10 ENCOUNTER — HOSPITAL ENCOUNTER (OUTPATIENT)
Dept: PHYSICAL THERAPY | Facility: HOSPITAL | Age: 67
Setting detail: THERAPIES SERIES
Discharge: HOME | End: 2022-02-10

## 2022-02-10 DIAGNOSIS — M25.572 CHRONIC PAIN OF BOTH ANKLES: Primary | ICD-10-CM

## 2022-02-10 DIAGNOSIS — M25.571 CHRONIC PAIN OF BOTH ANKLES: Primary | ICD-10-CM

## 2022-02-10 DIAGNOSIS — G89.29 CHRONIC PAIN OF BOTH ANKLES: Primary | ICD-10-CM

## 2022-02-10 PROCEDURE — 97110 THERAPEUTIC EXERCISES: CPT

## 2022-02-10 NOTE — THERAPY TREATMENT NOTE
Outpatient Physical Therapy Ortho Treatment Note  [facility]     Patient Name: Lana Mata  : 1955  MRN: [id number]  Today's Date: 2/10/2022      Visit Date: 02/10/2022  Subjective Improvement: n/a  MD visit: CHEKO  Visit Number:   Total Approved:3 visits  Recert Date: 2022  Visit Dx:    ICD-10-CM ICD-9-CM   1. Chronic pain of both ankles  M25.571 719.47    G89.29 338.29    M25.572        Patient Active Problem List   Diagnosis   • Thyrotoxicosis with or without goiter   • Osteoporosis   • Pain in right shoulder   • Low back pain   • Hyperlipidemia   • Generalized anxiety disorder   • Depressive disorder   • Benign essential hypertension   • Asthenia   • Abnormal gait   • Obstructive sleep apnea of adult   • DDD (degenerative disc disease), lumbar   • Lumbar radiculopathy   • History of lumbar surgery   • Lumbar spondylolysis   • Long term (current) use of opiate analgesic   • Urinary incontinence   • Skin ulcer ([facility])   • Shortness of breath   • Shortness of breath   • Osteoarthritis   • Obstructive sleep apnea syndrome   • Neck pain   • Headache   • H/O echocardiogram   • Gastroesophageal reflux disease   • Common cold   • Chronic pain disorder   • Chest pain   • Cellulitis of knee   • Attempted suicide ([facility])   • Allergic rhinitis   • Acute bronchitis   • Acute ankle pain   • Abrasion   • Lower abdominal pain   • Diarrhea   • Nausea   • Class 2 severe obesity due to excess calories with serious comorbidity and body mass index (BMI) of 37.0 to 37.9 in adult ([facility])   • Chronic pain of left ankle   • Posterior tibial tendon dysfunction, left   • Retained orthopedic hardware   • History of tibial fracture   • Flat foot   • UTI (urinary tract infection), bacterial   • Posterior tibial tendon dysfunction (PTTD) of left lower extremity   • Left leg pain   • Primary osteoarthritis of right knee   • Effusion of right knee   • Acute pain of right knee   • Atrial fibrillation, persistent ([facility])   •  Primary osteoarthritis of both ankles        Past Medical History:   Diagnosis Date   • Abnormal gait 11/28/2012   • Abrasion 02/26/2015   • Acute ankle pain 03/31/2016   • Acute bronchitis 09/04/2015   • Allergic rhinitis 05/22/2012   • Asthenia 05/22/2014   • Atrial fibrillation (HCC) 03/04/2016    - converted    • Attempted suicide (HCC)    • Benign essential hypertension 03/04/2016   • Cardiovascular stress test abnormal 03/04/2016   • Cellulitis of knee 03/03/2015   • Chest pain 04/02/2015   • Chronic pain disorder    • Common cold 06/24/2014   • Congestive heart failure (HCC) 02/04/2016   • Depressive disorder 05/22/2015    suspect more complicated psych issues      • Fibrocystic breast    • Gastroesophageal reflux disease 01/17/2013   • Generalized anxiety disorder 02/04/2016   • H/O echocardiogram     Normal LV function with Ef of 60-65%.Mildly dilated right ventricle with normal function. Grade 1B diastolic dysfunction with mild CLVH.NO sig. valvular regurg. or stenosis.   • Headache 10/27/2014   • Hyperlipidemia 03/04/2016   • Low back pain 06/30/2016    Need for prophylactic vaccination against Streptococcus pneumoniae [pneumococcus]    01/23/2015    • Neck pain 06/30/2016   • Obstructive sleep apnea of adult 07/30/2015   • Obstructive sleep apnea syndrome    • Osteoarthritis    • Osteoporosis 06/30/2016   • Pain in right shoulder 06/30/2016   • Pain in right shoulder 04/24/2014   • Shortness of breath 04/02/2015   • Skin ulcer ([facility]) 04/13/2015    left   • Thyrotoxicosis with or without goiter 10/24/2014   • Urinary incontinence 05/22/2014        Past Surgical History:   Procedure Laterality Date   • BACK SURGERY  08/07/2015    Removal of intstrumentation,L5-S1.Exploration of fusion,L5-S1.Posterolateral fusion,L4-L5.Posterior segmental spinal instrumentation L4-5.Transforaminal interbody fusion Through right sided approach.Performed at    • COLONOSCOPY  05/04/2016    will order cologuard   • INJECTION OF  MEDICATION  03/23/2015    celestone   • INJECTION OF MEDICATION  05/04/2016    KENALOG(6)   • LUMBAR FUSION      discectomy fusion with rods L4-S1   • LUMBAR LAMINECTOMY  1995   • MANDIBLE FRACTURE SURGERY      JAW WIRED SHUT   • MOUTH SURGERY  10/05/2015    Fractured mandible. Removal of arch bars.   • MOUTH SURGERY  1985    Fractured mandible. Removal of arch bars.   • NASAL SEPTUM SURGERY     • RHINOPLASTY     • TUBAL ABDOMINAL LIGATION  1986        PT Ortho     Row Name 02/10/22 1100       Subjective Comments    Subjective Comments Pt reports that she uses a cane at home  -TL       Precautions and Contraindications    Precautions/Limitations fall precautions  -TL       Subjective Pain    Able to rate subjective pain? yes  -TL    Pre-Treatment Pain Level 3  -TL          User Key  (r) = Recorded By, (t) = Taken By, (c) = Cosigned By    Initials Name Provider Type    Akila Reyes PTA Physical Therapy Assistant                             PT Assessment/Plan     Row Name 02/10/22 1100          PT Assessment    Assessment Comments Started pt on HEP. Pt given written instructions with pictures. PTA instructed let pain be her guide and rest as needed in between ex. No new goals met at this time.  See educational tab for HEP  -TL            PT Plan    PT Frequency 1x/week  -TL     Predicted Duration of Therapy Intervention (PT) 3-4 weeks  -TL     PT Plan Comments add  marbles, Review HEP from this visit and last.  -TL           User Key  (r) = Recorded By, (t) = Taken By, (c) = Cosigned By    Initials Name Provider Type    Akila Reyes PTA Physical Therapy Assistant                   OP Exercises     Row Name 02/10/22 1100             Subjective Comments    Subjective Comments Pt reports that she uses a cane at home  -TL              Subjective Pain    Able to rate subjective pain? yes  -TL      Pre-Treatment Pain Level 3  -TL      Post-Treatment Pain Level 3  -TL              Total Minutes     21998 - PT Therapeutic Exercise Minutes 49  -TL              Exercise 1    Exercise Name 1 seated arom heelraises/Toe raises  -TL      Reps 1 20  -TL      Additional Comments both  -TL              Exercise 2    Exercise Name 2 Seated arch roll  -TL      Time 2 2 min  -TL      Additional Comments both feet  -TL              Exercise 3    Exercise Name 3 towel scrutches  -TL      Time 3 3 mins  -TL              Exercise 4    Exercise Name 4 LAQ  -TL      Sets 4 2  -TL      Reps 4 10  -TL      Time 4 5 sec hold  -TL              Exercise 5    Exercise Name 5 marching seated  -TL      Sets 5 2  -TL      Reps 5 10  -TL              Exercise 6    Exercise Name 6 ball squeeses seated  -TL      Sets 6 2  -TL      Reps 6 10  -TL      Time 6 5 sec hold  -TL              Exercise 7    Exercise Name 7 towel S  -TL      Sets 7 2  -TL      Time 7 30 sec hold  -TL              Exercise 8    Exercise Name 8 assisted pt by w/c to car for safety.  -TL            User Key  (r) = Recorded By, (t) = Taken By, (c) = Cosigned By    Initials Name Provider Type    TL Akila Garza PTA Physical Therapy Assistant                              PT OP Goals     Row Name 02/10/22 1100          PT Short Term Goals    STG Date to Achieve 02/17/22  -TL     STG 1 Indepedent in orthotic wear schedule and skin inspection  -TL     STG 1 Progress Ongoing  -TL     STG 2 Independent in HEP for ankles/LE  -TL     STG 2 Progress Ongoing; Progressing  -TL            Time Calculation    PT Goal Re-Cert Due Date 02/17/22  -TL           User Key  (r) = Recorded By, (t) = Taken By, (c) = Cosigned By    Initials Name Provider Type    Akila Reyes PTA Physical Therapy Assistant                Therapy Education  Education Details: seated TR/HR, arch roll, toe scrutches, towel stretch, LAQ, seated march, seated hip add ball squeezes.  Instructed pt to let pain be her guide  Given: HEP  Program: New, Reinforced  How Provided: Verbal, Demonstration,  Written  Provided to: Patient  Level of Understanding: Verbalized, Demonstrated              Time Calculation:   Start Time: 1059  Stop Time: 1148  Time Calculation (min): 49 min  Timed Charges  56748 - PT Therapeutic Exercise Minutes: 49  Total Minutes  Timed Charges Total Minutes: 49   Total Minutes: 49  Therapy Charges for Today     Code Description Service Date Service Provider Modifiers Qty    77096138601 HC PT THER PROC EA 15 MIN 2/10/2022 Akila Garza PTA GP, CQ 3                    Akila Garza PTA  2/10/2022

## 2022-02-11 ENCOUNTER — APPOINTMENT (OUTPATIENT)
Dept: PHYSICAL THERAPY | Facility: HOSPITAL | Age: 67
End: 2022-02-11

## 2022-02-16 ENCOUNTER — APPOINTMENT (OUTPATIENT)
Dept: PHYSICAL THERAPY | Facility: HOSPITAL | Age: 67
End: 2022-02-16

## 2022-02-17 ENCOUNTER — APPOINTMENT (OUTPATIENT)
Dept: GENERAL RADIOLOGY | Facility: HOSPITAL | Age: 67
End: 2022-02-17

## 2022-02-17 ENCOUNTER — HOSPITAL ENCOUNTER (OUTPATIENT)
Facility: HOSPITAL | Age: 67
Setting detail: OBSERVATION
Discharge: HOME | End: 2022-02-19
Attending: STUDENT IN AN ORGANIZED HEALTH CARE EDUCATION/TRAINING PROGRAM | Admitting: INTERNAL MEDICINE

## 2022-02-17 DIAGNOSIS — R06.02 SOB (SHORTNESS OF BREATH): Primary | ICD-10-CM

## 2022-02-17 LAB
BASOPHILS # BLD AUTO: 0.06 10*3/MM3 (ref 0–0.2)
BASOPHILS NFR BLD AUTO: 0.5 % (ref 0–1.5)
DEPRECATED RDW RBC AUTO: 42.5 FL (ref 37–54)
EOSINOPHIL # BLD AUTO: 0.38 10*3/MM3 (ref 0–0.4)
EOSINOPHIL NFR BLD AUTO: 3.5 % (ref 0.3–6.2)
ERYTHROCYTE [DISTWIDTH] IN BLOOD BY AUTOMATED COUNT: 13.4 % (ref 12.3–15.4)
HCT VFR BLD AUTO: 41.8 % (ref 34–46.6)
HGB BLD-MCNC: 14.2 G/DL (ref 12–15.9)
IMM GRANULOCYTES # BLD AUTO: 0.09 10*3/MM3 (ref 0–0.05)
IMM GRANULOCYTES NFR BLD AUTO: 0.8 % (ref 0–0.5)
LYMPHOCYTES # BLD AUTO: 2.61 10*3/MM3 (ref 0.7–3.1)
LYMPHOCYTES NFR BLD AUTO: 23.9 % (ref 19.6–45.3)
MCH RBC QN AUTO: 29.5 PG (ref 26.6–33)
MCHC RBC AUTO-ENTMCNC: 34 G/DL (ref 31.5–35.7)
MCV RBC AUTO: 86.9 FL (ref 79–97)
MONOCYTES # BLD AUTO: 0.77 10*3/MM3 (ref 0.1–0.9)
MONOCYTES NFR BLD AUTO: 7 % (ref 5–12)
NEUTROPHILS NFR BLD AUTO: 64.3 % (ref 42.7–76)
NEUTROPHILS NFR BLD AUTO: 7.02 10*3/MM3 (ref 1.7–7)
NRBC BLD AUTO-RTO: 0 /100 WBC (ref 0–0.2)
PLATELET # BLD AUTO: 234 10*3/MM3 (ref 140–450)
PMV BLD AUTO: 9.7 FL (ref 6–12)
RBC # BLD AUTO: 4.81 10*6/MM3 (ref 3.77–5.28)
WBC NRBC COR # BLD: 10.93 10*3/MM3 (ref 3.4–10.8)

## 2022-02-17 PROCEDURE — 80053 COMPREHEN METABOLIC PANEL: CPT | Performed by: STUDENT IN AN ORGANIZED HEALTH CARE EDUCATION/TRAINING PROGRAM

## 2022-02-17 PROCEDURE — 85610 PROTHROMBIN TIME: CPT | Performed by: STUDENT IN AN ORGANIZED HEALTH CARE EDUCATION/TRAINING PROGRAM

## 2022-02-17 PROCEDURE — 85025 COMPLETE CBC W/AUTO DIFF WBC: CPT | Performed by: STUDENT IN AN ORGANIZED HEALTH CARE EDUCATION/TRAINING PROGRAM

## 2022-02-17 PROCEDURE — 83880 ASSAY OF NATRIURETIC PEPTIDE: CPT | Performed by: STUDENT IN AN ORGANIZED HEALTH CARE EDUCATION/TRAINING PROGRAM

## 2022-02-17 PROCEDURE — 84443 ASSAY THYROID STIM HORMONE: CPT | Performed by: STUDENT IN AN ORGANIZED HEALTH CARE EDUCATION/TRAINING PROGRAM

## 2022-02-17 PROCEDURE — 93010 ELECTROCARDIOGRAM REPORT: CPT | Performed by: INTERNAL MEDICINE

## 2022-02-17 PROCEDURE — 83735 ASSAY OF MAGNESIUM: CPT | Performed by: STUDENT IN AN ORGANIZED HEALTH CARE EDUCATION/TRAINING PROGRAM

## 2022-02-17 PROCEDURE — 84484 ASSAY OF TROPONIN QUANT: CPT | Performed by: STUDENT IN AN ORGANIZED HEALTH CARE EDUCATION/TRAINING PROGRAM

## 2022-02-17 PROCEDURE — 93005 ELECTROCARDIOGRAM TRACING: CPT | Performed by: STUDENT IN AN ORGANIZED HEALTH CARE EDUCATION/TRAINING PROGRAM

## 2022-02-17 PROCEDURE — 99285 EMERGENCY DEPT VISIT HI MDM: CPT

## 2022-02-17 PROCEDURE — 71046 X-RAY EXAM CHEST 2 VIEWS: CPT

## 2022-02-17 RX ORDER — AMILORIDE HYDROCHLORIDE 5 MG/1
5 TABLET ORAL DAILY
COMMUNITY
Start: 2022-02-07 | End: 2022-02-19 | Stop reason: HOSPADM

## 2022-02-17 RX ORDER — SODIUM CHLORIDE 0.9 % (FLUSH) 0.9 %
10 SYRINGE (ML) INJECTION AS NEEDED
Status: DISCONTINUED | OUTPATIENT
Start: 2022-02-17 | End: 2022-02-19 | Stop reason: HOSPADM

## 2022-02-18 ENCOUNTER — APPOINTMENT (OUTPATIENT)
Dept: CARDIOLOGY | Facility: HOSPITAL | Age: 67
End: 2022-02-18

## 2022-02-18 PROBLEM — N39.0 UTI (URINARY TRACT INFECTION): Status: ACTIVE | Noted: 2022-02-18

## 2022-02-18 PROBLEM — I50.33 ACUTE ON CHRONIC DIASTOLIC CONGESTIVE HEART FAILURE (HCC): Status: ACTIVE | Noted: 2022-02-18

## 2022-02-18 PROBLEM — R06.03 ACUTE RESPIRATORY DISTRESS: Status: ACTIVE | Noted: 2022-02-18

## 2022-02-18 PROBLEM — I48.11 LONGSTANDING PERSISTENT ATRIAL FIBRILLATION (HCC): Status: ACTIVE | Noted: 2018-01-31

## 2022-02-18 PROBLEM — N18.32 STAGE 3B CHRONIC KIDNEY DISEASE (HCC): Status: ACTIVE | Noted: 2022-02-18

## 2022-02-18 PROBLEM — I50.30 (HFPEF) HEART FAILURE WITH PRESERVED EJECTION FRACTION (HCC): Status: ACTIVE | Noted: 2022-02-18

## 2022-02-18 PROBLEM — R06.02 SOB (SHORTNESS OF BREATH): Status: ACTIVE | Noted: 2022-02-18

## 2022-02-18 LAB
ALBUMIN SERPL-MCNC: 3.9 G/DL (ref 3.5–5.2)
ALBUMIN/GLOB SERPL: 1.4 G/DL
ALP SERPL-CCNC: 44 U/L (ref 39–117)
ALT SERPL W P-5'-P-CCNC: 15 U/L (ref 1–33)
AMPHET+METHAMPHET UR QL: NEGATIVE
AMPHETAMINES UR QL: NEGATIVE
ANION GAP SERPL CALCULATED.3IONS-SCNC: 10 MMOL/L (ref 5–15)
AST SERPL-CCNC: 18 U/L (ref 1–32)
BACTERIA UR QL AUTO: ABNORMAL /HPF
BARBITURATES UR QL SCN: NEGATIVE
BENZODIAZ UR QL SCN: NEGATIVE
BH CV ECHO MEAS - AI DEC SLOPE: 169 CM/SEC^2
BH CV ECHO MEAS - AI MAX PG: 63 MMHG
BH CV ECHO MEAS - AI MAX VEL: 397 CM/SEC
BH CV ECHO MEAS - AI P1/2T: 688 MSEC
BH CV ECHO MEAS - AO MAX PG (FULL): 1.9 MMHG
BH CV ECHO MEAS - AO MAX PG: 5.8 MMHG
BH CV ECHO MEAS - AO MEAN PG (FULL): 1 MMHG
BH CV ECHO MEAS - AO MEAN PG: 3 MMHG
BH CV ECHO MEAS - AO V2 MAX: 120 CM/SEC
BH CV ECHO MEAS - AO V2 MEAN: 82.4 CM/SEC
BH CV ECHO MEAS - AO V2 VTI: 24.2 CM
BH CV ECHO MEAS - ASC AORTA: 3.1 CM
BH CV ECHO MEAS - AVA(I,A): 3 CM^2
BH CV ECHO MEAS - AVA(I,D): 3 CM^2
BH CV ECHO MEAS - AVA(V,A): 3.1 CM^2
BH CV ECHO MEAS - AVA(V,D): 3.1 CM^2
BH CV ECHO MEAS - BSA(HAYCOCK): 2.2 M^2
BH CV ECHO MEAS - BSA: 2.1 M^2
BH CV ECHO MEAS - BZI_BMI: 36.6 KILOGRAMS/M^2
BH CV ECHO MEAS - BZI_METRIC_HEIGHT: 165.1 CM
BH CV ECHO MEAS - BZI_METRIC_WEIGHT: 99.8 KG
BH CV ECHO MEAS - EDV(CUBED): 65.9 ML
BH CV ECHO MEAS - EDV(MOD-SP2): 71.4 ML
BH CV ECHO MEAS - EDV(MOD-SP4): 56.7 ML
BH CV ECHO MEAS - EDV(TEICH): 71.7 ML
BH CV ECHO MEAS - EF(CUBED): 79.6 %
BH CV ECHO MEAS - EF(MOD-SP2): 71 %
BH CV ECHO MEAS - EF(MOD-SP4): 66 %
BH CV ECHO MEAS - EF(TEICH): 72.5 %
BH CV ECHO MEAS - ESV(CUBED): 13.5 ML
BH CV ECHO MEAS - ESV(MOD-SP2): 20.7 ML
BH CV ECHO MEAS - ESV(MOD-SP4): 19.3 ML
BH CV ECHO MEAS - ESV(TEICH): 19.7 ML
BH CV ECHO MEAS - FS: 41.1 %
BH CV ECHO MEAS - IVS/LVPW: 0.98
BH CV ECHO MEAS - IVSD: 1.1 CM
BH CV ECHO MEAS - LA DIMENSION: 4.2 CM
BH CV ECHO MEAS - LV DIASTOLIC VOL/BSA (35-75): 27.5 ML/M^2
BH CV ECHO MEAS - LV MASS(C)D: 147.9 GRAMS
BH CV ECHO MEAS - LV MASS(C)DI: 71.8 GRAMS/M^2
BH CV ECHO MEAS - LV MAX PG: 3.9 MMHG
BH CV ECHO MEAS - LV MEAN PG: 2 MMHG
BH CV ECHO MEAS - LV SYSTOLIC VOL/BSA (12-30): 9.4 ML/M^2
BH CV ECHO MEAS - LV V1 MAX: 98.3 CM/SEC
BH CV ECHO MEAS - LV V1 MEAN: 62 CM/SEC
BH CV ECHO MEAS - LV V1 VTI: 19.2 CM
BH CV ECHO MEAS - LVIDD: 4 CM
BH CV ECHO MEAS - LVIDS: 2.4 CM
BH CV ECHO MEAS - LVLD AP2: 6.8 CM
BH CV ECHO MEAS - LVLD AP4: 6.7 CM
BH CV ECHO MEAS - LVLS AP2: 5.4 CM
BH CV ECHO MEAS - LVLS AP4: 5.6 CM
BH CV ECHO MEAS - LVOT AREA (M): 3.8 CM^2
BH CV ECHO MEAS - LVOT AREA: 3.8 CM^2
BH CV ECHO MEAS - LVOT DIAM: 2.2 CM
BH CV ECHO MEAS - LVPWD: 1.1 CM
BH CV ECHO MEAS - MR MAX PG: 89.5 MMHG
BH CV ECHO MEAS - MR MAX VEL: 473 CM/SEC
BH CV ECHO MEAS - MV A MAX VEL: 52.7 CM/SEC
BH CV ECHO MEAS - MV DEC SLOPE: 723 CM/SEC^2
BH CV ECHO MEAS - MV E MAX VEL: 111 CM/SEC
BH CV ECHO MEAS - MV E/A: 2.1
BH CV ECHO MEAS - MV P1/2T MAX VEL: 148 CM/SEC
BH CV ECHO MEAS - MV P1/2T: 60 MSEC
BH CV ECHO MEAS - MVA P1/2T LCG: 1.5 CM^2
BH CV ECHO MEAS - MVA(P1/2T): 3.7 CM^2
BH CV ECHO MEAS - PA MAX PG (FULL): 1.2 MMHG
BH CV ECHO MEAS - PA MAX PG: 2.1 MMHG
BH CV ECHO MEAS - PA V2 MAX: 72.4 CM/SEC
BH CV ECHO MEAS - RAP SYSTOLE: 5 MMHG
BH CV ECHO MEAS - RV MAX PG: 0.93 MMHG
BH CV ECHO MEAS - RV MEAN PG: 1 MMHG
BH CV ECHO MEAS - RV V1 MAX: 48.3 CM/SEC
BH CV ECHO MEAS - RV V1 MEAN: 33.2 CM/SEC
BH CV ECHO MEAS - RV V1 VTI: 11.2 CM
BH CV ECHO MEAS - RVDD: 3.6 CM
BH CV ECHO MEAS - RVSP: 47.8 MMHG
BH CV ECHO MEAS - SI(CUBED): 25.5 ML/M^2
BH CV ECHO MEAS - SI(LVOT): 35.4 ML/M^2
BH CV ECHO MEAS - SI(MOD-SP2): 24.6 ML/M^2
BH CV ECHO MEAS - SI(MOD-SP4): 18.2 ML/M^2
BH CV ECHO MEAS - SI(TEICH): 25.2 ML/M^2
BH CV ECHO MEAS - SV(CUBED): 52.5 ML
BH CV ECHO MEAS - SV(LVOT): 73 ML
BH CV ECHO MEAS - SV(MOD-SP2): 50.7 ML
BH CV ECHO MEAS - SV(MOD-SP4): 37.4 ML
BH CV ECHO MEAS - SV(TEICH): 51.9 ML
BH CV ECHO MEAS - TR MAX VEL: 327 CM/SEC
BILIRUB SERPL-MCNC: 0.5 MG/DL (ref 0–1.2)
BILIRUB UR QL STRIP: NEGATIVE
BUN SERPL-MCNC: 13 MG/DL (ref 8–23)
BUN/CREAT SERPL: 11.4 (ref 7–25)
BUPRENORPHINE SERPL-MCNC: NEGATIVE NG/ML
CALCIUM SPEC-SCNC: 9 MG/DL (ref 8.6–10.5)
CANNABINOIDS SERPL QL: NEGATIVE
CHLORIDE SERPL-SCNC: 106 MMOL/L (ref 98–107)
CLARITY UR: CLEAR
CO2 SERPL-SCNC: 23 MMOL/L (ref 22–29)
COCAINE UR QL: NEGATIVE
COLOR UR: YELLOW
CREAT SERPL-MCNC: 1.14 MG/DL (ref 0.57–1)
FLUAV RNA RESP QL NAA+PROBE: NOT DETECTED
FLUBV RNA RESP QL NAA+PROBE: NOT DETECTED
GFR SERPL CREATININE-BSD FRML MDRD: 48 ML/MIN/1.73
GLOBULIN UR ELPH-MCNC: 2.8 GM/DL
GLUCOSE SERPL-MCNC: 100 MG/DL (ref 65–99)
GLUCOSE UR STRIP-MCNC: NEGATIVE MG/DL
HGB UR QL STRIP.AUTO: ABNORMAL
HOLD SPECIMEN: NORMAL
HYALINE CASTS UR QL AUTO: ABNORMAL /LPF
INR PPP: 1.66 (ref 0.8–1.2)
KETONES UR QL STRIP: NEGATIVE
LEUKOCYTE ESTERASE UR QL STRIP.AUTO: ABNORMAL
LV EF 2D ECHO EST: 58 %
MAGNESIUM SERPL-MCNC: 2 MG/DL (ref 1.6–2.4)
MAXIMAL PREDICTED HEART RATE: 154 BPM
METHADONE UR QL SCN: NEGATIVE
NITRITE UR QL STRIP: NEGATIVE
NT-PROBNP SERPL-MCNC: 1778 PG/ML (ref 0–900)
OPIATES UR QL: NEGATIVE
OXYCODONE UR QL SCN: NEGATIVE
PCP UR QL SCN: NEGATIVE
PH UR STRIP.AUTO: <=5 [PH] (ref 5–9)
POTASSIUM SERPL-SCNC: 4.5 MMOL/L (ref 3.5–5.2)
PROCALCITONIN SERPL-MCNC: 0.05 NG/ML (ref 0–0.25)
PROPOXYPH UR QL: NEGATIVE
PROT SERPL-MCNC: 6.7 G/DL (ref 6–8.5)
PROT UR QL STRIP: ABNORMAL
PROTHROMBIN TIME: 19.3 SECONDS (ref 11.1–15.3)
QT INTERVAL: 372 MS
QTC INTERVAL: 429 MS
RBC # UR STRIP: ABNORMAL /HPF
REF LAB TEST METHOD: ABNORMAL
SARS-COV-2 RNA RESP QL NAA+PROBE: NOT DETECTED
SODIUM SERPL-SCNC: 139 MMOL/L (ref 136–145)
SP GR UR STRIP: 1.02 (ref 1–1.03)
SQUAMOUS #/AREA URNS HPF: ABNORMAL /HPF
STRESS TARGET HR: 131 BPM
TRICYCLICS UR QL SCN: NEGATIVE
TROPONIN T SERPL-MCNC: <0.01 NG/ML (ref 0–0.03)
TSH SERPL DL<=0.05 MIU/L-ACNC: 2.9 UIU/ML (ref 0.27–4.2)
UROBILINOGEN UR QL STRIP: ABNORMAL
WBC # UR STRIP: ABNORMAL /HPF
WHOLE BLOOD HOLD SPECIMEN: NORMAL
WHOLE BLOOD HOLD SPECIMEN: NORMAL
YEAST URNS QL MICRO: ABNORMAL /HPF

## 2022-02-18 PROCEDURE — 80306 DRUG TEST PRSMV INSTRMNT: CPT | Performed by: STUDENT IN AN ORGANIZED HEALTH CARE EDUCATION/TRAINING PROGRAM

## 2022-02-18 PROCEDURE — G0378 HOSPITAL OBSERVATION PER HR: HCPCS

## 2022-02-18 PROCEDURE — 81001 URINALYSIS AUTO W/SCOPE: CPT | Performed by: STUDENT IN AN ORGANIZED HEALTH CARE EDUCATION/TRAINING PROGRAM

## 2022-02-18 PROCEDURE — 84145 PROCALCITONIN (PCT): CPT | Performed by: STUDENT IN AN ORGANIZED HEALTH CARE EDUCATION/TRAINING PROGRAM

## 2022-02-18 PROCEDURE — 87086 URINE CULTURE/COLONY COUNT: CPT | Performed by: STUDENT IN AN ORGANIZED HEALTH CARE EDUCATION/TRAINING PROGRAM

## 2022-02-18 PROCEDURE — 94640 AIRWAY INHALATION TREATMENT: CPT

## 2022-02-18 PROCEDURE — 93306 TTE W/DOPPLER COMPLETE: CPT

## 2022-02-18 PROCEDURE — 99218 PR INITIAL OBSERVATION CARE/DAY 30 MINUTES: CPT | Performed by: STUDENT IN AN ORGANIZED HEALTH CARE EDUCATION/TRAINING PROGRAM

## 2022-02-18 PROCEDURE — 96365 THER/PROPH/DIAG IV INF INIT: CPT

## 2022-02-18 PROCEDURE — 87636 SARSCOV2 & INF A&B AMP PRB: CPT | Performed by: STUDENT IN AN ORGANIZED HEALTH CARE EDUCATION/TRAINING PROGRAM

## 2022-02-18 PROCEDURE — 25010000002 CEFTRIAXONE PER 250 MG: Performed by: FAMILY MEDICINE

## 2022-02-18 PROCEDURE — 94760 N-INVAS EAR/PLS OXIMETRY 1: CPT

## 2022-02-18 PROCEDURE — 25010000002 PERFLUTREN (DEFINITY) 8.476 MG IN SODIUM CHLORIDE (PF) 0.9 % 10 ML INJECTION: Performed by: FAMILY MEDICINE

## 2022-02-18 PROCEDURE — 25010000002 FUROSEMIDE PER 20 MG: Performed by: FAMILY MEDICINE

## 2022-02-18 PROCEDURE — 96375 TX/PRO/DX INJ NEW DRUG ADDON: CPT

## 2022-02-18 PROCEDURE — 93306 TTE W/DOPPLER COMPLETE: CPT | Performed by: INTERNAL MEDICINE

## 2022-02-18 RX ORDER — ATORVASTATIN CALCIUM 20 MG/1
20 TABLET, FILM COATED ORAL DAILY
Status: DISCONTINUED | OUTPATIENT
Start: 2022-02-18 | End: 2022-02-19 | Stop reason: HOSPADM

## 2022-02-18 RX ORDER — AMILORIDE HYDROCHLORIDE 5 MG/1
5 TABLET ORAL DAILY
Status: DISCONTINUED | OUTPATIENT
Start: 2022-02-18 | End: 2022-02-18

## 2022-02-18 RX ORDER — LISINOPRIL 10 MG/1
10 TABLET ORAL DAILY
Status: DISCONTINUED | OUTPATIENT
Start: 2022-02-18 | End: 2022-02-19 | Stop reason: HOSPADM

## 2022-02-18 RX ORDER — FENOFIBRATE 145 MG/1
145 TABLET, COATED ORAL DAILY
Status: DISCONTINUED | OUTPATIENT
Start: 2022-02-18 | End: 2022-02-19 | Stop reason: HOSPADM

## 2022-02-18 RX ORDER — FERROUS SULFATE TAB EC 324 MG (65 MG FE EQUIVALENT) 324 (65 FE) MG
324 TABLET DELAYED RESPONSE ORAL
Status: DISCONTINUED | OUTPATIENT
Start: 2022-02-18 | End: 2022-02-19 | Stop reason: HOSPADM

## 2022-02-18 RX ORDER — SODIUM CHLORIDE 0.9 % (FLUSH) 0.9 %
10 SYRINGE (ML) INJECTION AS NEEDED
Status: DISCONTINUED | OUTPATIENT
Start: 2022-02-18 | End: 2022-02-19 | Stop reason: HOSPADM

## 2022-02-18 RX ORDER — FAMOTIDINE 40 MG/1
40 TABLET, FILM COATED ORAL DAILY
Status: DISCONTINUED | OUTPATIENT
Start: 2022-02-18 | End: 2022-02-19 | Stop reason: HOSPADM

## 2022-02-18 RX ORDER — ACETAMINOPHEN 325 MG/1
650 TABLET ORAL EVERY 4 HOURS PRN
Status: DISCONTINUED | OUTPATIENT
Start: 2022-02-18 | End: 2022-02-19 | Stop reason: HOSPADM

## 2022-02-18 RX ORDER — IPRATROPIUM BROMIDE AND ALBUTEROL SULFATE 2.5; .5 MG/3ML; MG/3ML
3 SOLUTION RESPIRATORY (INHALATION) ONCE
Status: COMPLETED | OUTPATIENT
Start: 2022-02-18 | End: 2022-02-18

## 2022-02-18 RX ORDER — LORAZEPAM 0.5 MG/1
1 TABLET ORAL 2 TIMES DAILY
Status: DISCONTINUED | OUTPATIENT
Start: 2022-02-18 | End: 2022-02-19 | Stop reason: HOSPADM

## 2022-02-18 RX ORDER — LANOLIN ALCOHOL/MO/W.PET/CERES
5 CREAM (GRAM) TOPICAL NIGHTLY PRN
Status: DISCONTINUED | OUTPATIENT
Start: 2022-02-18 | End: 2022-02-19 | Stop reason: HOSPADM

## 2022-02-18 RX ORDER — PANTOPRAZOLE SODIUM 40 MG/1
40 TABLET, DELAYED RELEASE ORAL EVERY MORNING
Status: DISCONTINUED | OUTPATIENT
Start: 2022-02-18 | End: 2022-02-19 | Stop reason: HOSPADM

## 2022-02-18 RX ORDER — ARIPIPRAZOLE 5 MG/1
5 TABLET ORAL DAILY
Status: DISCONTINUED | OUTPATIENT
Start: 2022-02-18 | End: 2022-02-19 | Stop reason: HOSPADM

## 2022-02-18 RX ORDER — ASPIRIN 325 MG
325 TABLET ORAL DAILY
Status: DISCONTINUED | OUTPATIENT
Start: 2022-02-18 | End: 2022-02-19 | Stop reason: HOSPADM

## 2022-02-18 RX ORDER — ONDANSETRON 2 MG/ML
4 INJECTION INTRAMUSCULAR; INTRAVENOUS EVERY 6 HOURS PRN
Status: DISCONTINUED | OUTPATIENT
Start: 2022-02-18 | End: 2022-02-19 | Stop reason: HOSPADM

## 2022-02-18 RX ORDER — ONDANSETRON 4 MG/1
4 TABLET, FILM COATED ORAL EVERY 6 HOURS PRN
Status: DISCONTINUED | OUTPATIENT
Start: 2022-02-18 | End: 2022-02-19 | Stop reason: HOSPADM

## 2022-02-18 RX ORDER — FUROSEMIDE 10 MG/ML
20 INJECTION INTRAMUSCULAR; INTRAVENOUS
Status: DISCONTINUED | OUTPATIENT
Start: 2022-02-18 | End: 2022-02-19 | Stop reason: HOSPADM

## 2022-02-18 RX ORDER — SODIUM CHLORIDE 0.9 % (FLUSH) 0.9 %
10 SYRINGE (ML) INJECTION EVERY 12 HOURS SCHEDULED
Status: DISCONTINUED | OUTPATIENT
Start: 2022-02-18 | End: 2022-02-19 | Stop reason: HOSPADM

## 2022-02-18 RX ORDER — DILTIAZEM HYDROCHLORIDE 180 MG/1
180 CAPSULE, COATED, EXTENDED RELEASE ORAL
Status: DISCONTINUED | OUTPATIENT
Start: 2022-02-18 | End: 2022-02-19 | Stop reason: HOSPADM

## 2022-02-18 RX ADMIN — FUROSEMIDE 20 MG: 10 INJECTION, SOLUTION INTRAMUSCULAR; INTRAVENOUS at 17:09

## 2022-02-18 RX ADMIN — CEFTRIAXONE SODIUM 1 G: 1 INJECTION, POWDER, FOR SOLUTION INTRAMUSCULAR; INTRAVENOUS at 10:45

## 2022-02-18 RX ADMIN — ATORVASTATIN CALCIUM 20 MG: 20 TABLET, FILM COATED ORAL at 08:20

## 2022-02-18 RX ADMIN — ACETAMINOPHEN 650 MG: 325 TABLET, FILM COATED ORAL at 20:16

## 2022-02-18 RX ADMIN — RIVAROXABAN 20 MG: 10 TABLET, FILM COATED ORAL at 17:09

## 2022-02-18 RX ADMIN — LISINOPRIL 10 MG: 10 TABLET ORAL at 08:19

## 2022-02-18 RX ADMIN — METOPROLOL TARTRATE 25 MG: 25 TABLET, FILM COATED ORAL at 17:09

## 2022-02-18 RX ADMIN — ARIPIPRAZOLE 5 MG: 5 TABLET ORAL at 08:50

## 2022-02-18 RX ADMIN — PERFLUTREN 1.5 ML: 6.52 INJECTION, SUSPENSION INTRAVENOUS at 15:09

## 2022-02-18 RX ADMIN — ASPIRIN 325 MG: 325 TABLET ORAL at 08:19

## 2022-02-18 RX ADMIN — SODIUM CHLORIDE, PRESERVATIVE FREE 10 ML: 5 INJECTION INTRAVENOUS at 20:16

## 2022-02-18 RX ADMIN — SERTRALINE 100 MG: 50 TABLET, FILM COATED ORAL at 08:19

## 2022-02-18 RX ADMIN — LORAZEPAM 1 MG: 0.5 TABLET ORAL at 20:16

## 2022-02-18 RX ADMIN — PANTOPRAZOLE SODIUM 40 MG: 40 TABLET, DELAYED RELEASE ORAL at 08:19

## 2022-02-18 RX ADMIN — DILTIAZEM HYDROCHLORIDE 180 MG: 180 CAPSULE, COATED, EXTENDED RELEASE ORAL at 08:50

## 2022-02-18 RX ADMIN — FAMOTIDINE 40 MG: 40 TABLET ORAL at 08:19

## 2022-02-18 RX ADMIN — SERTRALINE 100 MG: 50 TABLET, FILM COATED ORAL at 20:16

## 2022-02-18 RX ADMIN — LORAZEPAM 1 MG: 0.5 TABLET ORAL at 08:19

## 2022-02-18 RX ADMIN — AMILORIDE HYDROCLORIDE 5 MG: 5 TABLET ORAL at 08:50

## 2022-02-18 RX ADMIN — SODIUM CHLORIDE, PRESERVATIVE FREE 10 ML: 5 INJECTION INTRAVENOUS at 08:22

## 2022-02-18 RX ADMIN — IPRATROPIUM BROMIDE AND ALBUTEROL SULFATE 3 ML: .5; 2.5 SOLUTION RESPIRATORY (INHALATION) at 02:35

## 2022-02-18 NOTE — ACP (ADVANCE CARE PLANNING)
Patient would like to be FULL CODE during this hospitalization. Patient would like her son Fabian Mata Phone no [phone] to make medical decision for on behalf of her in an event she is unable to do so.        Candida Espinal MD PGY-2  [facility]   This document has been electronically signed by Candida Espinal MD on February 18, 2022 02:55 CST

## 2022-02-18 NOTE — PROGRESS NOTES
FAMILY MEDICINE RESIDENCY SERVICE  DAILY PROGRESS NOTE    NAME: Lana Mata  : 1955  MRN: [id number]      LOS: 0 days     PROVIDER OF SERVICE: Leticia Tejeda MD    Chief Complaint: Acute respiratory distress    Subjective:     Interval History:    Lana Mata is a 67 yo female, CMH of paroxysmal a fib, HFpEF, SOPHIA, MINO and HTN, reports shortness of breath has improved. Over the past 3 days, dyspnea with exertion had worsened to a point where she felt short of breath just bending over and lying flat. Not relieved with CPAP.     Stress test done 2021 showed low risk with EF of 70%.   Last TTE done in 2018 showed 61-65% EF.    Repeat TTE ordered this morning.     Review of Systems:   Review of Systems   Constitutional: Negative for chills, fatigue and fever.   HENT: Negative for rhinorrhea, sore throat and trouble swallowing.    Eyes: Positive for itching. Negative for pain and visual disturbance.   Respiratory: Positive for shortness of breath (When speaking with mask on. ).    Cardiovascular: Negative for chest pain and leg swelling.   Gastrointestinal: Positive for abdominal pain (Chronic soreness, reports requiring imodium daily or else she will have constant diarrhea. ). Negative for constipation, diarrhea, nausea and vomiting.   Endocrine: Negative for polydipsia and polyuria.   Genitourinary: Negative for dysuria and urgency.   Musculoskeletal: Negative for arthralgias and back pain.   Skin: Negative for rash.   Allergic/Immunologic: Negative for immunocompromised state.   Neurological: Negative for dizziness, weakness and headaches.   Hematological: Does not bruise/bleed easily.   Psychiatric/Behavioral: Negative for confusion, hallucinations and sleep disturbance.       Objective:     Vital Signs  Temp:  [98.2 °F (36.8 °C)] 98.2 °F (36.8 °C)  Heart Rate:  [68-95] 95  Resp:  [16-20] 16  BP: (132-177)/(59-95) 177/95   Body mass index is 36.61 kg/m².    Physical Exam  Physical  Exam  Vitals reviewed.   Constitutional:       General: She is not in acute distress.     Appearance: She is not diaphoretic.   HENT:      Nose: No congestion or rhinorrhea.   Eyes:      Conjunctiva/sclera: Conjunctivae normal.   Cardiovascular:      Rate and Rhythm: Normal rate. Rhythm irregular.      Pulses: Normal pulses.      Heart sounds: Normal heart sounds. No murmur heard.  No friction rub. No gallop.    Pulmonary:      Effort: Pulmonary effort is normal.      Breath sounds: Normal breath sounds. No wheezing, rhonchi or rales.      Comments: No decreased breath sounds  Abdominal:      General: Abdomen is flat. There is no distension.      Palpations: Abdomen is soft.      Tenderness: There is abdominal tenderness (over lower abdomen.). There is no guarding or rebound.      Comments: Hyperactive bowel sounds   Musculoskeletal:      Cervical back: Neck supple.      Right lower leg: Edema present.      Left lower leg: Edema present.      Comments: Trace bilateral pitting edema local from knee to ankle   Skin:     General: Skin is warm and dry.   Neurological:      Mental Status: She is alert and oriented to person, place, and time.   Psychiatric:         Behavior: Behavior normal.      Comments: Teary eyed when discussing her son and her mom passing away.          Scheduled Meds   aMILoride, 5 mg, Oral, Daily  ARIPiprazole, 5 mg, Oral, Daily  aspirin, 325 mg, Oral, Daily  atorvastatin, 20 mg, Oral, Daily  cefTRIAXone, 1 g, Intravenous, Q24H  cyanocobalamin, 1,000 mcg, Intramuscular, Q28 Days  dilTIAZem CD, 180 mg, Oral, Q24H  famotidine, 40 mg, Oral, Daily  fenofibrate, 145 mg, Oral, Daily  ferrous sulfate, 324 mg, Oral, Daily With Breakfast  lisinopril, 10 mg, Oral, Daily  LORazepam, 1 mg, Oral, BID  metoprolol tartrate, 25 mg, Oral, Q12H  pantoprazole, 40 mg, Oral, QAM  rivaroxaban, 20 mg, Oral, Daily With Dinner  sertraline, 100 mg, Oral, BID  sodium chloride, 10 mL, Intravenous, Q12H       PRN Meds   •   acetaminophen  •  melatonin  •  ondansetron **OR** ondansetron  •  sodium chloride  •  sodium chloride      Diagnostic Data    Results from last 7 days   Lab Units 02/17/22  2345   WBC 10*3/mm3 10.93*   HEMOGLOBIN g/dL 14.2   HEMATOCRIT % 41.8   PLATELETS 10*3/mm3 234   GLUCOSE mg/dL 100*   CREATININE mg/dL 1.14*   BUN mg/dL 13   SODIUM mmol/L 139   POTASSIUM mmol/L 4.5   AST (SGOT) U/L 18   ALT (SGPT) U/L 15   ALK PHOS U/L 44   BILIRUBIN mg/dL 0.5   ANION GAP mmol/L 10.0       XR Chest 2 View    Result Date: 2/18/2022  1.  No focal infiltrate or significant pulmonary edema Electronically signed by:  Georges Kelley MD  2/18/2022 12:23 AM CST Workstation: [id number]    I reviewed the patient's new clinical results.    Assessment/Plan:     Active Hospital Problems    Diagnosis  POA   • **Acute respiratory distress [R06.03]  Yes     Presented with orthopnea and dyspnea on exertion  Vitals stable. S/p duoneb treatment in ED   Ambulatory vitals did not show oxygen desaturations  Uses CPAP at night  proBNP elevated on admission. Will order echocardiogram looking for any worsening of cardiac function  H/o Paroxysmal Afib   No fluid overload evidence on physical exam and CXR   Cardiac stress test done 6/25/21 showed low risk study with normal EF  Last echocardiogram done 1/31/2018 showed EF 61 - 65%, mild concentric hypertrophy and mild RV dilation              • Stage 3b chronic kidney disease (HCC) [N18.32]  Yes   • (HFpEF) heart failure with preserved ejection fraction (HCC) [I50.30]  Yes     Repeat echo pending     • UTI (urinary tract infection) [N39.0]  Unknown     UA pos leuk esterase with suprapubic pain on exam. Started on 1 dose rocephin.     • Longstanding persistent atrial fibrillation (HCC) [I48.11]  No     Atrial fibrillation on telemetry. Continue home Cardizem, Lopressor, Xarelto and Diltiazem.  Dr. Jurado cardiologist. Will refer to outpatient therapy with Dr. Jurado     • Hyperlipidemia [E78.5]  Yes      Continue home Lipitor 20 mg daily and Choline fenofibrate 135 mg daily      • Benign essential hypertension [I10]  Yes     Continue home  Amiloride, Cardizem, Lisinopril, Lopressor, Diltiazem      • Generalized anxiety disorder [F41.1]  Yes     Continue abilify, ativan, zoloft     • Obstructive sleep apnea of adult [G47.33]  Yes     Uses CPAP machine  Continue oxygen monitoring      • Gastroesophageal reflux disease [K21.9]  Yes     Will continue PPI       DVT prophylaxis: NOAC  Code status is   Code Status and Medical Interventions:   Ordered at: 02/18/22 0245     Level Of Support Discussed With:    Patient     Code Status (Patient has no pulse and is not breathing):    CPR (Attempt to Resuscitate)     Medical Interventions (Patient has pulse or is breathing):    Full Support       Plan for disposition:Where: home and When:  today      Time: 15 minutes         This document has been electronically signed by Leticia Tejeda MD on February 18, 2022 08:53 CST

## 2022-02-18 NOTE — ED NOTES
Repositioned pt and in and out cath.  Pt has a dressed wound to coccxy. Heal protectors on pt feet.     Keily Logan RN  02/18/22 2827

## 2022-02-18 NOTE — ED NOTES
Pt arrives to ER with c/o SOB x3 days that worsened tonight. Pt has a history of CHF and afib. Denies history of COPD. Lung sounds diminished on expiration in posterior bases, but otherwise clear. Pt denies any increased swelling in her extremities lately. Pt denies chest pain at this time. Pt states she has a mild discomfort across upper abd that radiates through to back.      Lesli Paredes, RN  02/17/22 1918

## 2022-02-18 NOTE — ED PROVIDER NOTES
Subjective   66-year-old female comes to the ER chief complaint shortness of breath.  Patient reports that she has a history of chronic shortness of breath, but it got worse yesterday.  She tried adding an extra pillow to sleep, but was not helpful.  Patient reports having a chronic cough.  She does not have much of an appetite due to the shortness of breath.  She endorses having a history of COPD, heart failure.  She does not wear oxygen at home.      History provided by:  Patient   used: No        Review of Systems   Constitutional: Positive for activity change. Negative for chills and fever.   HENT: Negative for congestion and rhinorrhea.    Respiratory: Positive for cough and shortness of breath.    Cardiovascular: Negative for chest pain, palpitations and leg swelling.   Gastrointestinal: Negative for abdominal pain and nausea.   Genitourinary: Negative for dysuria and flank pain.   Skin: Negative for color change and rash.   Neurological: Negative for dizziness and headaches.   Psychiatric/Behavioral: Negative for agitation. The patient is not nervous/anxious.        Past Medical History:   Diagnosis Date   • Abnormal gait 11/28/2012   • Abrasion 02/26/2015   • Acute ankle pain 03/31/2016   • Acute bronchitis 09/04/2015   • Allergic rhinitis 05/22/2012   • Asthenia 05/22/2014   • Atrial fibrillation (HCC) 03/04/2016    - converted    • Attempted suicide ([facility])    • Benign essential hypertension 03/04/2016   • Cardiovascular stress test abnormal 03/04/2016   • Cellulitis of knee 03/03/2015   • Chest pain 04/02/2015   • Chronic pain disorder    • Common cold 06/24/2014   • Congestive heart failure (HCC) 02/04/2016   • Depressive disorder 05/22/2015    suspect more complicated psych issues      • Fibrocystic breast    • Gastroesophageal reflux disease 01/17/2013   • Generalized anxiety disorder 02/04/2016   • H/O echocardiogram     Normal LV function with Ef of 60-65%.Mildly dilated right  ventricle with normal function. Grade 1B diastolic dysfunction with mild CLVH.NO sig. valvular regurg. or stenosis.   • Headache 10/27/2014   • Hyperlipidemia 03/04/2016   • Low back pain 06/30/2016    Need for prophylactic vaccination against Streptococcus pneumoniae [pneumococcus]    01/23/2015    • Neck pain 06/30/2016   • Obstructive sleep apnea of adult 07/30/2015   • Obstructive sleep apnea syndrome    • Osteoarthritis    • Osteoporosis 06/30/2016   • Pain in right shoulder 06/30/2016   • Pain in right shoulder 04/24/2014   • Shortness of breath 04/02/2015   • Skin ulcer (HCC) 04/13/2015    left   • Thyrotoxicosis with or without goiter 10/24/2014   • Urinary incontinence 05/22/2014       Allergies   Allergen Reactions   • Codeine Nausea And Vomiting   • Latex Rash   • Morphine And Related Hallucinations   • Pravachol [Pravastatin Sodium] Myalgia       Past Surgical History:   Procedure Laterality Date   • BACK SURGERY  08/07/2015    Removal of intstrumentation,L5-S1.Exploration of fusion,L5-S1.Posterolateral fusion,L4-L5.Posterior segmental spinal instrumentation L4-5.Transforaminal interbody fusion Through right sided approach.Performed at    • COLONOSCOPY  05/04/2016    will order cologuard   • INJECTION OF MEDICATION  03/23/2015    celestone   • INJECTION OF MEDICATION  05/04/2016    KENALOG(6)   • LUMBAR FUSION      discectomy fusion with rods L4-S1   • LUMBAR LAMINECTOMY  1995   • MANDIBLE FRACTURE SURGERY      JAW WIRED SHUT   • MOUTH SURGERY  10/05/2015    Fractured mandible. Removal of arch bars.   • MOUTH SURGERY  1985    Fractured mandible. Removal of arch bars.   • NASAL SEPTUM SURGERY     • RHINOPLASTY     • TUBAL ABDOMINAL LIGATION  1986       Family History   Problem Relation Age of Onset   • Hypertension Mother    • Hyperlipidemia Mother    • Mental illness Mother    • Thyroid disease Mother    • Diabetes Maternal Grandmother    • Cancer Maternal Grandmother    • Depression Other    • Diabetes  Other    • Heart disease Other    • Hypertension Other    • Osteoporosis Other    • Thyroid disease Other    • Cancer Maternal Aunt    • Cancer Paternal Aunt    • Breast cancer Paternal Aunt        Social History     Socioeconomic History   • Marital status:    Tobacco Use   • Smoking status: Never Smoker   • Smokeless tobacco: Never Used   • Tobacco comment: smoking cessation    Substance and Sexual Activity   • Alcohol use: No   • Drug use: No     Comment: lortab / oxycontin   • Sexual activity: Defer           Objective    Vitals:    02/18/22 0053 02/18/22 0112 02/18/22 0137 02/18/22 0144   BP:       BP Location:       Patient Position:       Pulse: 74  72 74   Resp:  20     Temp:       TempSrc:       SpO2: 95%  96% 95%   Weight:       Height:           Physical Exam  Vitals and nursing note reviewed.   Constitutional:       General: She is not in acute distress.     Appearance: She is well-developed. She is obese. She is ill-appearing. She is not toxic-appearing or diaphoretic.   HENT:      Head: Normocephalic.      Right Ear: External ear normal.      Left Ear: External ear normal.   Pulmonary:      Effort: Pulmonary effort is normal. No accessory muscle usage or respiratory distress.      Breath sounds: No wheezing.   Chest:      Chest wall: No tenderness.   Abdominal:      General: Bowel sounds are normal.      Palpations: Abdomen is soft.      Tenderness: There is no abdominal tenderness (deep palpation).   Musculoskeletal:      Right lower leg: No edema.      Left lower leg: No edema.   Skin:     General: Skin is warm and dry.   Neurological:      Mental Status: She is alert and oriented to person, place, and time.   Psychiatric:         Behavior: Behavior normal.         ECG 12 Lead      Date/Time: 2/18/2022 2:08 AM  Performed by: Earl Gomez MD  Authorized by: Earl Gomez MD   Interpreted by physician  Rhythm: atrial fibrillation  Rate: normal  QRS axis: normal  ST segment elevation  noted on lead: none.  Clinical impression: abnormal ECG                 ED Course      Results for orders placed or performed during the hospital encounter of 02/17/22   COVID-19 and FLU A/B PCR - Swab, Nasopharynx    Specimen: Nasopharynx; Swab   Result Value Ref Range    COVID19 Not Detected Not Detected - Ref. Range    Influenza A PCR Not Detected Not Detected    Influenza B PCR Not Detected Not Detected   Comprehensive Metabolic Panel    Specimen: Blood   Result Value Ref Range    Glucose 100 (H) 65 - 99 mg/dL    BUN 13 8 - 23 mg/dL    Creatinine 1.14 (H) 0.57 - 1.00 mg/dL    Sodium 139 136 - 145 mmol/L    Potassium 4.5 3.5 - 5.2 mmol/L    Chloride 106 98 - 107 mmol/L    CO2 23.0 22.0 - 29.0 mmol/L    Calcium 9.0 8.6 - 10.5 mg/dL    Total Protein 6.7 6.0 - 8.5 g/dL    Albumin 3.90 3.50 - 5.20 g/dL    ALT (SGPT) 15 1 - 33 U/L    AST (SGOT) 18 1 - 32 U/L    Alkaline Phosphatase 44 39 - 117 U/L    Total Bilirubin 0.5 0.0 - 1.2 mg/dL    eGFR Non African Amer 48 (L) >60 mL/min/1.73    Globulin 2.8 gm/dL    A/G Ratio 1.4 g/dL    BUN/Creatinine Ratio 11.4 7.0 - 25.0    Anion Gap 10.0 5.0 - 15.0 mmol/L   BNP    Specimen: Blood   Result Value Ref Range    proBNP 1,778.0 (H) 0.0 - 900.0 pg/mL   Troponin    Specimen: Blood   Result Value Ref Range    Troponin T <0.010 0.000 - 0.030 ng/mL   CBC Auto Differential    Specimen: Blood   Result Value Ref Range    WBC 10.93 (H) 3.40 - 10.80 10*3/mm3    RBC 4.81 3.77 - 5.28 10*6/mm3    Hemoglobin 14.2 12.0 - 15.9 g/dL    Hematocrit 41.8 34.0 - 46.6 %    MCV 86.9 79.0 - 97.0 fL    MCH 29.5 26.6 - 33.0 pg    MCHC 34.0 31.5 - 35.7 g/dL    RDW 13.4 12.3 - 15.4 %    RDW-SD 42.5 37.0 - 54.0 fl    MPV 9.7 6.0 - 12.0 fL    Platelets 234 140 - 450 10*3/mm3    Neutrophil % 64.3 42.7 - 76.0 %    Lymphocyte % 23.9 19.6 - 45.3 %    Monocyte % 7.0 5.0 - 12.0 %    Eosinophil % 3.5 0.3 - 6.2 %    Basophil % 0.5 0.0 - 1.5 %    Immature Grans % 0.8 (H) 0.0 - 0.5 %    Neutrophils, Absolute 7.02 (H)  1.70 - 7.00 10*3/mm3    Lymphocytes, Absolute 2.61 0.70 - 3.10 10*3/mm3    Monocytes, Absolute 0.77 0.10 - 0.90 10*3/mm3    Eosinophils, Absolute 0.38 0.00 - 0.40 10*3/mm3    Basophils, Absolute 0.06 0.00 - 0.20 10*3/mm3    Immature Grans, Absolute 0.09 (H) 0.00 - 0.05 10*3/mm3    nRBC 0.0 0.0 - 0.2 /100 WBC   Green Top (Gel)   Result Value Ref Range    Extra Tube Hold for add-ons.    Lavender Top   Result Value Ref Range    Extra Tube hold for add-on    Light Blue Top   Result Value Ref Range    Extra Tube hold for add-on      XR Chest 2 View   Final Result      1.  No focal infiltrate or significant pulmonary edema      Electronically signed by:  Georges Kelley MD  2/18/2022 12:23 AM [facility]   Workstation: [id number]              [facility]  Number of Diagnoses or Management Options  SOB (shortness of breath)  Diagnosis management comments: Vital signs are stable, afebrile.  Labs are remarkable for a BNP of 1800.  Troponin negative x1.  Patient is nontacky.  Covid and flu negative.  She sats well even while ambulating, but does become clinicly dyspneic.  Spoke with the on-call resident who agrees to admit.       Amount and/or Complexity of Data Reviewed  Decide to obtain previous medical records or to obtain history from someone other than the patient: yes        Final diagnoses:   SOB (shortness of breath)       ED Disposition  ED Disposition     ED Disposition Condition Comment    Decision to Admit  Level of Care: Telemetry [5]   Diagnosis: SOB (shortness of breath) [114574]   Admitting Physician: TRISTON JENNINGS [[id number]]   Attending Physician: TRISTON JENNINGS [[id number]]            No follow-up provider specified.       Medication List      No changes were made to your prescriptions during this visit.          Earl Gomez MD  02/18/22 0208

## 2022-02-18 NOTE — H&P
HISTORY AND PHYSICAL  NAME: Lana Mata  : 1955  MRN: [id number]    DATE OF ADMISSION:  2022     DATE & TIME SEEN: 22 at 2:30    PCP: Leticia Tejeda MD    CODE STATUS: Full code    CHIEF COMPLAINT:  Orthopnea and dyspnea on exertion     HPI:  Lana Mata is a 66 y.o. female with a CMH of MINO, Paroxysmal Afib, HTN, Osteoporosis, GERD who presents complaining of 3 days history of worsening orthopnea and dyspnea on exertion. Patient reports she had these symptoms for many years however over the last 3 days it has worsened. She gets winded just going from sitting position to going to bathroom. Denies any leg swelling, chest pain, lightheadedness associated with it. Patient also noticed she is having to use extra pillow to sleep and wakes up gasping for air. She does have obstructive sleep apnea and uses CPAP machine at night which has not been helping her symptoms. In ED, vitals were stable, no desaturations were noted however she did get dyspneic on exertion. She received one duoneb treatment and admitted for over night obs. CXR was unremarkable.      CONCURRENT MEDICAL HISTORY:  Past Medical History:   Diagnosis Date   • Abnormal gait 2012   • Abrasion 2015   • Acute ankle pain 2016   • Acute bronchitis 2015   • Allergic rhinitis 2012   • Asthenia 2014   • Atrial fibrillation (HCC) 2016    - converted    • Attempted suicide (HCC)    • Benign essential hypertension 2016   • Cardiovascular stress test abnormal 2016   • Cellulitis of knee 2015   • Chest pain 2015   • Chronic pain disorder    • Common cold 2014   • Congestive heart failure (HCC) 2016   • Depressive disorder 2015    suspect more complicated psych issues      • Fibrocystic breast    • Gastroesophageal reflux disease 2013   • Generalized anxiety disorder 2016   • H/O echocardiogram     Normal LV function with Ef of  60-65%.Mildly dilated right ventricle with normal function. Grade 1B diastolic dysfunction with mild CLVH.NO sig. valvular regurg. or stenosis.   • Headache 10/27/2014   • Hyperlipidemia 03/04/2016   • Low back pain 06/30/2016    Need for prophylactic vaccination against Streptococcus pneumoniae [pneumococcus]    01/23/2015    • Neck pain 06/30/2016   • Obstructive sleep apnea of adult 07/30/2015   • Obstructive sleep apnea syndrome    • Osteoarthritis    • Osteoporosis 06/30/2016   • Pain in right shoulder 06/30/2016   • Pain in right shoulder 04/24/2014   • Shortness of breath 04/02/2015   • Skin ulcer (HCC) 04/13/2015    left   • Thyrotoxicosis with or without goiter 10/24/2014   • Urinary incontinence 05/22/2014       PAST SURGICAL HISTORY:  Past Surgical History:   Procedure Laterality Date   • BACK SURGERY  08/07/2015    Removal of intstrumentation,L5-S1.Exploration of fusion,L5-S1.Posterolateral fusion,L4-L5.Posterior segmental spinal instrumentation L4-5.Transforaminal interbody fusion Through right sided approach.Performed at    • COLONOSCOPY  05/04/2016    will order cologuard   • INJECTION OF MEDICATION  03/23/2015    celestone   • INJECTION OF MEDICATION  05/04/2016    KENALOG(6)   • LUMBAR FUSION      discectomy fusion with rods L4-S1   • LUMBAR LAMINECTOMY  1995   • MANDIBLE FRACTURE SURGERY      JAW WIRED SHUT   • MOUTH SURGERY  10/05/2015    Fractured mandible. Removal of arch bars.   • MOUTH SURGERY  1985    Fractured mandible. Removal of arch bars.   • NASAL SEPTUM SURGERY     • RHINOPLASTY     • TUBAL ABDOMINAL LIGATION  1986       FAMILY HISTORY:  Family History   Problem Relation Age of Onset   • Hypertension Mother    • Hyperlipidemia Mother    • Mental illness Mother    • Thyroid disease Mother    • Diabetes Maternal Grandmother    • Cancer Maternal Grandmother    • Depression Other    • Diabetes Other    • Heart disease Other    • Hypertension Other    • Osteoporosis Other    • Thyroid  disease Other    • Cancer Maternal Aunt    • Cancer Paternal Aunt    • Breast cancer Paternal Aunt         SOCIAL HISTORY:  Social History     Socioeconomic History   • Marital status:    Tobacco Use   • Smoking status: Never Smoker   • Smokeless tobacco: Never Used   • Tobacco comment: smoking cessation    Substance and Sexual Activity   • Alcohol use: No   • Drug use: No     Comment: lortab / oxycontin   • Sexual activity: Defer       HOME MEDICATIONS:  Prior to Admission medications    Medication Sig Start Date End Date Taking? Authorizing Provider   aMILoride (MIDAMOR) 5 MG tablet Take 5 mg by mouth Daily. 2/7/22 2/8/23 Yes Gurdeep Castaneda MD   alendronate (Fosamax) 70 MG tablet Take 1 tablet by mouth Every 7 (Seven) Days. 5/16/21   Maxime Edwards MD   ARIPiprazole (ABILIFY) 5 MG tablet Take 5 mg by mouth Daily. At bedtime    Gurdeep Castaneda MD   aspirin 325 MG tablet Take 325 mg by mouth Daily. At bedtime    Gurdeep Castaneda MD   atorvastatin (LIPITOR) 20 MG tablet TAKE ONE TABLET BY MOUTH EVERY DAY 1/10/22   Kristyn Jurado MD   Calcium Carb-Cholecalciferol (CALCIUM-VITAMIN D) 500-200 MG-UNIT per tablet Take 1 tablet by mouth 2 (Two) Times a Day With Meals. 11/14/19   Allie Martin MD   choline fenofibrate (Trilipix) 135 MG capsule Take 1 capsule by mouth Daily. 6/25/21   Kristyn Jurado MD   Diclofenac Sodium (VOLTAREN) 1 % gel gel Apply 4 g topically to the appropriate area as directed 4 (Four) Times a Day As Needed (knee pain). 10/19/21   Candida Espinal MD   dilTIAZem XR (Dilt-XR) 180 MG 24 hr capsule Take 1 capsule by mouth Daily. 8/13/21   Kristyn Jurado MD   ferrous sulfate 325 (65 FE) MG tablet Take 325 mg by mouth Daily With Breakfast.    ProviderGurdeep MD   Incontinence Supply Disposable (Depend Adjustable Underwear) misc 1 application As Needed (Incontinence). 6/23/21   Tima Salcido MD   lidocaine (LIDODERM) 5 % Place 1 patch on the skin as  directed by provider Daily. Remove & Discard patch within 12 hours or as directed by MD 10/13/21   Ron Schultz MD   lisinopril (PRINIVIL,ZESTRIL) 10 MG tablet Take 10 mg by mouth Daily. 8/9/21   Gurdeep Castaneda MD   loperamide (IMODIUM) 2 MG capsule TAKE 1 CAPSULE BY MOUTH EVERY FOUR HOURS AS NEEDED FOR DIARRHEA 6/7/21   Maxime Edwards MD   LORazepam (ATIVAN) 1 MG tablet Take 1 mg by mouth 2 (Two) Times a Day. Scheduled to take in the morning and one with dinner 3/22/18   Gurdeep Castaneda MD   methocarbamol (ROBAXIN) 500 MG tablet Take 1 tablet by mouth 4 (Four) Times a Day As Needed for Muscle Spasms. 8/23/21   Ron Schultz MD   metoprolol tartrate (LOPRESSOR) 25 MG tablet TAKE 1 TABLET EVERY 12 HOURS 11/8/21   Kristyn Jurado MD   omeprazole (priLOSEC) 20 MG capsule Take 1 capsule by mouth Daily. 1/3/22   Mnia Porter MD   ondansetron ODT (Zofran ODT) 8 MG disintegrating tablet Place 1 tablet on the tongue Every 8 (Eight) Hours As Needed for Nausea or Vomiting. 8/30/21   Siobhan Velasco APRN   rivaroxaban (Xarelto) 20 MG tablet Take one tablet by mouth every day- take with food 12/9/21   Kristyn Jurado MD   sertraline (ZOLOFT) 100 MG tablet Take 100 mg by mouth 2 (Two) Times a Day. 3/22/18   Gurdeep Castaneda MD       ALLERGIES:  Codeine, Latex, Morphine and related, and Pravachol [pravastatin sodium]    REVIEW OF SYSTEMS  Review of Systems   Constitutional: Positive for activity change, diaphoresis and fatigue. Negative for appetite change, chills, fever and unexpected weight change.   Respiratory: Positive for shortness of breath and wheezing. Negative for cough and chest tightness.    Cardiovascular: Positive for palpitations and leg swelling. Negative for chest pain.   Gastrointestinal: Positive for abdominal pain (RUQ). Negative for constipation, diarrhea, nausea and vomiting.   Genitourinary: Negative for difficulty urinating, dysuria, flank pain and urgency.    Musculoskeletal: Positive for arthralgias. Negative for gait problem, joint swelling and myalgias.   Skin: Negative for color change and rash.   Neurological: Negative for dizziness, weakness, light-headedness and headaches.   Psychiatric/Behavioral: Positive for sleep disturbance. Negative for agitation, behavioral problems, confusion, decreased concentration, self-injury and suicidal ideas. The patient is nervous/anxious.        PHYSICAL EXAM:  Temp:  [98.2 °F (36.8 °C)] 98.2 °F (36.8 °C)  Heart Rate:  [68-78] 76  Resp:  [16-20] 16  BP: (132-175)/(59-86) 146/65  Body mass index is 36.61 kg/m².  Physical Exam  Vitals and nursing note reviewed.   Constitutional:       General: She is not in acute distress.     Appearance: Normal appearance. She is obese. She is not ill-appearing, toxic-appearing or diaphoretic.   HENT:      Head: Normocephalic and atraumatic.      Nose: Nose normal.      Mouth/Throat:      Mouth: Mucous membranes are dry.      Pharynx: Oropharynx is clear.   Eyes:      Extraocular Movements: Extraocular movements intact.      Conjunctiva/sclera: Conjunctivae normal.      Pupils: Pupils are equal, round, and reactive to light.   Cardiovascular:      Rate and Rhythm: Normal rate and regular rhythm.      Pulses: Normal pulses.      Heart sounds: Normal heart sounds. No murmur heard.      Pulmonary:      Effort: Pulmonary effort is normal.      Breath sounds: Normal breath sounds. No wheezing, rhonchi or rales.   Musculoskeletal:         General: Normal range of motion.      Cervical back: Normal range of motion and neck supple.      Right lower leg: No edema.      Left lower leg: Edema (trace ) present.   Skin:     General: Skin is warm and dry.      Capillary Refill: Capillary refill takes 2 to 3 seconds.   Neurological:      General: No focal deficit present.      Mental Status: She is oriented to person, place, and time.   Psychiatric:         Mood and Affect: Mood normal.         Behavior:  Behavior normal.         DIAGNOSTIC DATA:   Lab Results (last 24 hours)     Procedure Component Value Units Date/Time    Procalcitonin [[id number]] Collected: 02/18/22 0249    Specimen: Blood Updated: 02/18/22 0254    COVID-19 and FLU A/B PCR - Swab, Nasopharynx [[id number]]  (Normal) Collected: 02/18/22 0120    Specimen: Swab from Nasopharynx Updated: 02/18/22 0153     COVID19 Not Detected     Influenza A PCR Not Detected     Influenza B PCR Not Detected    Narrative:      Fact sheet for providers: https://www.fda.gov/media/987070/download    Fact sheet for patients: https://www.fda.gov/media/243183/download    Test performed by PCR.    [place] Draw [[id number]] Collected: 02/17/22 2345    Specimen: Blood Updated: 02/18/22 0101    Narrative:      The following orders were created for panel order [place] Draw.  Procedure                               Abnormality         Status                     ---------                               -----------         ------                     Green Top (Gel)[[id number]]                                  Final result               Lavender Top[[id number]]                                     Final result               Gold Top - SST[[id number]]                                                              Light Blue Top[[id number]]                                   Final result                 Please view results for these tests on the individual orders.    Light Blue Top [[id number]] Collected: 02/17/22 2345    Specimen: Blood Updated: 02/18/22 0101     Extra Tube hold for add-on     Comment: Auto resulted       Green Top (Gel) [[id number]] Collected: 02/17/22 2345    Specimen: Blood Updated: 02/18/22 0101     Extra Tube Hold for add-ons.     Comment: Auto resulted.       Lavender Top [[id number]] Collected: 02/17/22 2345    Specimen: Blood Updated: 02/18/22 0101     Extra Tube hold for add-on     Comment: Auto resulted       Comprehensive Metabolic Panel [[id number]]  (Abnormal) Collected:  02/17/22 2345    Specimen: Blood Updated: 02/18/22 0008     Glucose 100 mg/dL      BUN 13 mg/dL      Creatinine 1.14 mg/dL      Sodium 139 mmol/L      Potassium 4.5 mmol/L      Comment: Slight hemolysis detected by analyzer. Results may be affected.        Chloride 106 mmol/L      CO2 23.0 mmol/L      Calcium 9.0 mg/dL      Total Protein 6.7 g/dL      Albumin 3.90 g/dL      ALT (SGPT) 15 U/L      AST (SGOT) 18 U/L      Alkaline Phosphatase 44 U/L      Total Bilirubin 0.5 mg/dL      eGFR Non African Amer 48 mL/min/1.73      Globulin 2.8 gm/dL      A/G Ratio 1.4 g/dL      BUN/Creatinine Ratio 11.4     Anion Gap 10.0 mmol/L     Narrative:      GFR Normal >60  Chronic Kidney Disease <60  Kidney Failure <15      Troponin [[id number]]  (Normal) Collected: 02/17/22 2345    Specimen: Blood Updated: 02/18/22 0008     Troponin T <0.010 ng/mL     Narrative:      Troponin T Reference Range:  <= 0.03 ng/mL-   Negative for AMI  >0.03 ng/mL-     Abnormal for myocardial necrosis.  Clinicians would have to utilize clinical acumen, EKG, Troponin and serial changes to determine if it is an Acute Myocardial Infarction or myocardial injury due to an underlying chronic condition.       Results may be falsely decreased if patient taking Biotin.      BNP [[id number]]  (Abnormal) Collected: 02/17/22 2345    Specimen: Blood Updated: 02/18/22 0006     proBNP 1,778.0 pg/mL     Narrative:      Among patients with dyspnea, NT-proBNP is highly sensitive for the detection of acute congestive heart failure. In addition NT-proBNP of <300 pg/ml effectively rules out acute congestive heart failure with 99% negative predictive value.    Results may be falsely decreased if patient taking Biotin.      CBC & Differential [[id number]]  (Abnormal) Collected: 02/17/22 2345    Specimen: Blood Updated: 02/17/22 2351    Narrative:      The following orders were created for panel order CBC & Differential.  Procedure                               Abnormality          Status                     ---------                               -----------         ------                     CBC Auto Differential[[id number]]        Abnormal            Final result                 Please view results for these tests on the individual orders.    CBC Auto Differential [[id number]]  (Abnormal) Collected: 02/17/22 2345    Specimen: Blood Updated: 02/17/22 2351     WBC 10.93 10*3/mm3      RBC 4.81 10*6/mm3      Hemoglobin 14.2 g/dL      Hematocrit 41.8 %      MCV 86.9 fL      MCH 29.5 pg      MCHC 34.0 g/dL      RDW 13.4 %      RDW-SD 42.5 fl      MPV 9.7 fL      Platelets 234 10*3/mm3      Neutrophil % 64.3 %      Lymphocyte % 23.9 %      Monocyte % 7.0 %      Eosinophil % 3.5 %      Basophil % 0.5 %      Immature Grans % 0.8 %      Neutrophils, Absolute 7.02 10*3/mm3      Lymphocytes, Absolute 2.61 10*3/mm3      Monocytes, Absolute 0.77 10*3/mm3      Eosinophils, Absolute 0.38 10*3/mm3      Basophils, Absolute 0.06 10*3/mm3      Immature Grans, Absolute 0.09 10*3/mm3      nRBC 0.0 /100 WBC            Imaging Results (Last 24 Hours)     Procedure Component Value Units Date/Time    XR Chest 2 View [[id number]] Collected: 02/17/22 2348     Updated: 02/18/22 0025    Narrative:      EXAM DESCRIPTION:  Site:  XR CHEST 2 VW  RP: XR CHEST 2 VIEWS   Views:  2     CLINICAL HISTORY:  66 years  Female;  CHF/COPD Protocol;    COMPARISON:  05/11/2021    FINDINGS:   Lungs: Mild hyperinflation. No focal acute infiltrates. No  significant interstitial edema. No pneumothorax or pleural  effusion.  Mediastinum: Mediastinum is within normal limits for this  positioning.  Bones: Bony structures are unremarkable.      Impression:        1.  No focal infiltrate or significant pulmonary edema    Electronically signed by:  Georges Kelley MD  2/18/2022 12:23 AM [facility]  Workstation: [id number]            I reviewed the patient's new clinical results.    ASSESSMENT AND PLAN: This is a 66 y.o. female with:    [facility]  Problems    Diagnosis  POA   • **Acute respiratory distress [R06.03]  Yes     Priority: Medium     Presented with orthopnea and dyspnea on exertion  Vitals stable. S/p duoneb treatment in ED   Ambulatory vitals did not show oxygen desaturations  Uses CPAP at night  proBNP elevated on admission. Will order echocardiogram looking for any worsening of cardiac function  H/o Paroxysmal Afib   No fluid overload evidence on physical exam and CXR   Cardiac stress test done 6/25/21 showed low risk study with normal EF  Last echocardiogram done 1/31/2018 showed EF 61 - 65%, mild concentric hypertrophy and mild RV dilation              • Paroxysmal atrial fibrillation (HCC) [I48.0]  No     NSR on admission  Continue home Cardizem, Lopressor, Xarelto and Diltiazem  Dr. Jurado cardiologist      • Hyperlipidemia [E78.5]  Yes     Continue home Lipitor 20 mg daily and Choline fenofibrate 135 mg daily      • Benign essential hypertension [I10]  Yes     Continue home  Amiloride, Cardizem, Lisinopril, Lopressor, Diltiazem      • Generalized anxiety disorder [F41.1]  Yes     Continue abilify, ativan, zoloft     • Obstructive sleep apnea of adult [G47.33]  Yes     Uses CPAP machine  Continue oxygen monitoring      • Gastroesophageal reflux disease [K21.9]  Yes     Will continue PPI         DVT prophylaxis: VINOD Mata and I have discussed pain goals for this hospitalization after reviewing her current clinical condition, medical history and prior pain experiences.  The goal is to keep the pain level minimal.  To help achieve this, I plan to treat appropriately.     PDMP, reviewed and consistent with patient reported medications.    Expected Length of Stay: Where: home and When:  tomorrow    I discussed the patient's findings and my recommendations with patient and nursing staff.     Darío Mcneil MD  is the attending on record at time of admission, He is aware of the patient's status and agrees with the above history and  physical.        Candida Espinal MD PGY-2  [facility]   This document has been electronically signed by Candida Espinal MD on February 18, 2022 03:04 CST

## 2022-02-18 NOTE — ED NOTES
This tech ambulated pt around room. Pt exercised for 7 minutes. Pt replicated several at home activities that bring out increased shortness of breath. Pt shortness of breath worsened with these activities. Pt O2 never dropped below 92% throughout duration of ambulation.      Daniel Jones  02/18/22 0131

## 2022-02-19 ENCOUNTER — READMISSION MANAGEMENT (OUTPATIENT)
Dept: CALL CENTER | Facility: HOSPITAL | Age: 67
End: 2022-02-19

## 2022-02-19 VITALS
SYSTOLIC BLOOD PRESSURE: 126 MMHG | OXYGEN SATURATION: 96 % | HEIGHT: 65 IN | BODY MASS INDEX: 36.69 KG/M2 | WEIGHT: 220.2 LBS | TEMPERATURE: 97 F | DIASTOLIC BLOOD PRESSURE: 73 MMHG | RESPIRATION RATE: 17 BRPM | HEART RATE: 92 BPM

## 2022-02-19 LAB
ALBUMIN SERPL-MCNC: 3.9 G/DL (ref 3.5–5.2)
ALBUMIN/GLOB SERPL: 1.4 G/DL
ALP SERPL-CCNC: 44 U/L (ref 39–117)
ALT SERPL W P-5'-P-CCNC: 12 U/L (ref 1–33)
ANION GAP SERPL CALCULATED.3IONS-SCNC: 9 MMOL/L (ref 5–15)
AST SERPL-CCNC: 14 U/L (ref 1–32)
BACTERIA SPEC AEROBE CULT: ABNORMAL
BASOPHILS # BLD AUTO: 0.05 10*3/MM3 (ref 0–0.2)
BASOPHILS NFR BLD AUTO: 0.7 % (ref 0–1.5)
BILIRUB SERPL-MCNC: 0.7 MG/DL (ref 0–1.2)
BUN SERPL-MCNC: 18 MG/DL (ref 8–23)
BUN/CREAT SERPL: 12.2 (ref 7–25)
CALCIUM SPEC-SCNC: 9.5 MG/DL (ref 8.6–10.5)
CHLORIDE SERPL-SCNC: 103 MMOL/L (ref 98–107)
CO2 SERPL-SCNC: 28 MMOL/L (ref 22–29)
CREAT SERPL-MCNC: 1.48 MG/DL (ref 0.57–1)
DEPRECATED RDW RBC AUTO: 41.6 FL (ref 37–54)
EOSINOPHIL # BLD AUTO: 0.43 10*3/MM3 (ref 0–0.4)
EOSINOPHIL NFR BLD AUTO: 5.9 % (ref 0.3–6.2)
ERYTHROCYTE [DISTWIDTH] IN BLOOD BY AUTOMATED COUNT: 13.3 % (ref 12.3–15.4)
GFR SERPL CREATININE-BSD FRML MDRD: 35 ML/MIN/1.73
GLOBULIN UR ELPH-MCNC: 2.8 GM/DL
GLUCOSE SERPL-MCNC: 106 MG/DL (ref 65–99)
HCT VFR BLD AUTO: 44.2 % (ref 34–46.6)
HGB BLD-MCNC: 15 G/DL (ref 12–15.9)
IMM GRANULOCYTES # BLD AUTO: 0.03 10*3/MM3 (ref 0–0.05)
IMM GRANULOCYTES NFR BLD AUTO: 0.4 % (ref 0–0.5)
LYMPHOCYTES # BLD AUTO: 1.75 10*3/MM3 (ref 0.7–3.1)
LYMPHOCYTES NFR BLD AUTO: 23.9 % (ref 19.6–45.3)
MCH RBC QN AUTO: 29 PG (ref 26.6–33)
MCHC RBC AUTO-ENTMCNC: 33.9 G/DL (ref 31.5–35.7)
MCV RBC AUTO: 85.3 FL (ref 79–97)
MONOCYTES # BLD AUTO: 0.62 10*3/MM3 (ref 0.1–0.9)
MONOCYTES NFR BLD AUTO: 8.5 % (ref 5–12)
NEUTROPHILS NFR BLD AUTO: 4.45 10*3/MM3 (ref 1.7–7)
NEUTROPHILS NFR BLD AUTO: 60.6 % (ref 42.7–76)
NRBC BLD AUTO-RTO: 0 /100 WBC (ref 0–0.2)
PLATELET # BLD AUTO: 200 10*3/MM3 (ref 140–450)
PMV BLD AUTO: 9.7 FL (ref 6–12)
POTASSIUM SERPL-SCNC: 4.2 MMOL/L (ref 3.5–5.2)
PROT SERPL-MCNC: 6.7 G/DL (ref 6–8.5)
RBC # BLD AUTO: 5.18 10*6/MM3 (ref 3.77–5.28)
SODIUM SERPL-SCNC: 140 MMOL/L (ref 136–145)
WBC NRBC COR # BLD: 7.33 10*3/MM3 (ref 3.4–10.8)

## 2022-02-19 PROCEDURE — 96376 TX/PRO/DX INJ SAME DRUG ADON: CPT

## 2022-02-19 PROCEDURE — 85025 COMPLETE CBC W/AUTO DIFF WBC: CPT | Performed by: STUDENT IN AN ORGANIZED HEALTH CARE EDUCATION/TRAINING PROGRAM

## 2022-02-19 PROCEDURE — 25010000002 FUROSEMIDE PER 20 MG: Performed by: FAMILY MEDICINE

## 2022-02-19 PROCEDURE — 99217 PR OBSERVATION CARE DISCHARGE MANAGEMENT: CPT | Performed by: FAMILY MEDICINE

## 2022-02-19 PROCEDURE — G0378 HOSPITAL OBSERVATION PER HR: HCPCS

## 2022-02-19 PROCEDURE — 80053 COMPREHEN METABOLIC PANEL: CPT | Performed by: STUDENT IN AN ORGANIZED HEALTH CARE EDUCATION/TRAINING PROGRAM

## 2022-02-19 RX ORDER — GRANULES FOR ORAL 3 G/1
3 POWDER ORAL ONCE
Status: COMPLETED | OUTPATIENT
Start: 2022-02-19 | End: 2022-02-19

## 2022-02-19 RX ORDER — FUROSEMIDE 20 MG/1
20 TABLET ORAL 2 TIMES DAILY
Qty: 60 TABLET | Refills: 0 | Status: SHIPPED | OUTPATIENT
Start: 2022-02-19 | End: 2022-03-01 | Stop reason: ALTCHOICE

## 2022-02-19 RX ORDER — LOPERAMIDE HYDROCHLORIDE 2 MG/1
2 CAPSULE ORAL 4 TIMES DAILY PRN
Status: DISCONTINUED | OUTPATIENT
Start: 2022-02-19 | End: 2022-02-19 | Stop reason: HOSPADM

## 2022-02-19 RX ADMIN — FOSFOMYCIN TROMETHAMINE 3 G: 3 POWDER ORAL at 09:22

## 2022-02-19 RX ADMIN — FENOFIBRATE 145 MG: 145 TABLET ORAL at 08:12

## 2022-02-19 RX ADMIN — FUROSEMIDE 20 MG: 10 INJECTION, SOLUTION INTRAMUSCULAR; INTRAVENOUS at 08:12

## 2022-02-19 RX ADMIN — SERTRALINE 100 MG: 50 TABLET, FILM COATED ORAL at 08:12

## 2022-02-19 RX ADMIN — PANTOPRAZOLE SODIUM 40 MG: 40 TABLET, DELAYED RELEASE ORAL at 04:30

## 2022-02-19 RX ADMIN — LOPERAMIDE HYDROCHLORIDE 2 MG: 2 CAPSULE ORAL at 13:03

## 2022-02-19 RX ADMIN — SODIUM CHLORIDE, PRESERVATIVE FREE 10 ML: 5 INJECTION INTRAVENOUS at 08:14

## 2022-02-19 RX ADMIN — FAMOTIDINE 40 MG: 40 TABLET ORAL at 08:12

## 2022-02-19 RX ADMIN — ASPIRIN 325 MG: 325 TABLET ORAL at 08:12

## 2022-02-19 RX ADMIN — DILTIAZEM HYDROCHLORIDE 180 MG: 180 CAPSULE, COATED, EXTENDED RELEASE ORAL at 08:10

## 2022-02-19 RX ADMIN — FERROUS SULFATE TAB EC 324 MG (65 MG FE EQUIVALENT) 324 MG: 324 (65 FE) TABLET DELAYED RESPONSE at 08:10

## 2022-02-19 RX ADMIN — LOPERAMIDE HYDROCHLORIDE 2 MG: 2 CAPSULE ORAL at 09:39

## 2022-02-19 RX ADMIN — LISINOPRIL 10 MG: 10 TABLET ORAL at 08:12

## 2022-02-19 RX ADMIN — METOPROLOL TARTRATE 25 MG: 25 TABLET, FILM COATED ORAL at 04:30

## 2022-02-19 RX ADMIN — LORAZEPAM 1 MG: 0.5 TABLET ORAL at 08:12

## 2022-02-19 RX ADMIN — ATORVASTATIN CALCIUM 20 MG: 20 TABLET, FILM COATED ORAL at 08:10

## 2022-02-19 RX ADMIN — ARIPIPRAZOLE 5 MG: 5 TABLET ORAL at 08:12

## 2022-02-19 NOTE — DISCHARGE INSTRUCTIONS
If develop swelling in bilateral legs, swelling over abdomen and or shortness of breath with activity, please call cardiologist to make an appointment.

## 2022-02-19 NOTE — PLAN OF CARE
Goal Outcome Evaluation:  Plan of Care Reviewed With: patient        Progress: improving  Outcome Summary: VSS, pt remains on room air. no acute events overnight.

## 2022-02-19 NOTE — OUTREACH NOTE
Prep Survey      Responses   [facility] patient discharged from? [place]   Is LACE score < 7 ? No   Emergency Room discharge w/ pulse ox? No   Eligibility [facility]   Date of Admission 02/17/22   Date of Discharge 02/19/22   Discharge Disposition Home or Self Care   Discharge diagnosis Acute on chronic diastolic congestive heart failure   Does the patient have one of the following disease processes/diagnoses(primary or secondary)? CHF   Does the patient have Home health ordered? No   Is there a DME ordered? No   Prep survey completed? Yes          Gale Liao RN

## 2022-02-19 NOTE — CONSULTS
"Adult Nutrition  Assessment    Patient Name:  Lana Ayala Attebury  YOB: 1955  MRN: [id number]  Admit Date:  2/17/2022    Assessment Date:  2/19/2022    Comments:  RD consult for Low Na diet ed r/t CHF. Pt admit w/acute resp distress, no evidence of fluid overload. Pt says she is familiar w/Low Na diet but \"doesn't do it very good\". RD reviewed diet ed info and pt accepted diet copy. Also, pt indicates she is w/o her dentures and needs meats chopped--RD adjusted diet order. Will monitor.      Reason for Assessment     Row Name 02/19/22 1031          Reason for Assessment    Reason For Assessment per organizational policy     Diagnosis cardiac disease     Identified At Risk by Screening Criteria need for education                Nutrition/Diet History     Row Name 02/19/22 1031          Nutrition/Diet History    Typical Food/Fluid Intake Pt says she tries to watch her salt intake \"but i don't do a very good job\". Receptive to reviewing Low Na recs and accepted diet copy. Also, pt w/o her dentures and would do better w/soft/chopped consistency.                Anthropometrics     Row Name 02/19/22 0429          Anthropometrics    Weight 99.9 kg (220 lb 3.2 oz)                               Electronically signed by:  Sarai Kelley RD  02/19/22 10:40 CST  "

## 2022-02-19 NOTE — DISCHARGE SUMMARY
DISCHARGE SUMMARY    PATIENT NAME: Lana Mata       PHYSICIAN: Leticia Tejeda MD  : 1955  MRN: [id number]    ADMITTED: 2022     DISCHARGED: 2022    ADMISSION DIAGNOSES:  Active Hospital Problems    Diagnosis  POA   • **Acute on chronic diastolic congestive heart failure (HCC) [I50.33]  Yes   • Stage 3b chronic kidney disease (HCC) [N18.32]  Yes     Priority: Medium   • Longstanding persistent atrial fibrillation (HCC) [I48.11]  No     Priority: Medium   • (HFpEF) heart failure with preserved ejection fraction (HCC) [I50.30]  Yes   • UTI (urinary tract infection) [N39.0]  Yes   • Hyperlipidemia [E78.5]  Yes   • Benign essential hypertension [I10]  Yes   • Generalized anxiety disorder [F41.1]  Yes   • Obstructive sleep apnea of adult [G47.33]  Yes   • Gastroesophageal reflux disease [K21.9]  Yes      Resolved Hospital Problems   No resolved problems to display.     DISCHARGE DIAGNOSES:   Active Hospital Problems    Diagnosis  POA   • **Acute on chronic diastolic congestive heart failure (HCC) [I50.33]  Yes   • Stage 3b chronic kidney disease (HCC) [N18.32]  Yes     Priority: Medium   • Longstanding persistent atrial fibrillation (HCC) [I48.11]  No     Priority: Medium   • (HFpEF) heart failure with preserved ejection fraction (HCC) [I50.30]  Yes   • UTI (urinary tract infection) [N39.0]  Yes   • Hyperlipidemia [E78.5]  Yes   • Benign essential hypertension [I10]  Yes   • Generalized anxiety disorder [F41.1]  Yes   • Obstructive sleep apnea of adult [G47.33]  Yes   • Gastroesophageal reflux disease [K21.9]  Yes      Resolved Hospital Problems   No resolved problems to display.       SERVICE: Family Medicine Residency  Attending: Dr. Tarun Chanel  Resident: Leticia Tejeda MD    CONSULTS:   Consult Orders (all) (From admission, onward)    None          PROCEDURES:   None    HISTORY OF PRESENT ILLNESS:   Note taken from Candida Espinal MD on 2022.   Lana Mata is a 66 y.o.  female with a CMH of MINO, Paroxysmal Afib, HTN, Osteoporosis, GERD who presents complaining of 3 days history of worsening orthopnea and dyspnea on exertion. Patient reports she had these symptoms for many years however over the last 3 days it has worsened. She gets winded just going from sitting position to going to bathroom. Denies any leg swelling, chest pain, lightheadedness associated with it. Patient also noticed she is having to use extra pillow to sleep and wakes up gasping for air. She does have obstructive sleep apnea and uses CPAP machine at night which has not been helping her symptoms. In ED, vitals were stable, no desaturations were noted however she did get dyspneic on exertion. She received one duoneb treatment and admitted for over night obs. CXR was unremarkable.      DIAGNOSTIC DATA:   Results for orders placed during the hospital encounter of 02/17/22    Adult Transthoracic Echo Complete W/ Cont if Necessary Per Protocol    Interpretation Summary  · Left ventricular wall thickness is consistent with borderline concentric hypertrophy.  · Estimated left ventricular EF = 58% Left ventricular ejection fraction appears to be 56 - 60%. Left ventricular systolic function is normal.  · Mild to moderate mitral valve regurgitation is present. Trace to mild aortic valve regurgitation is present.  · Estimated right ventricular systolic pressure from tricuspid regurgitation is moderately elevated (45-55 mmHg).  · The right atrial cavity is mildly dilated. The left atrial cavity is mild to moderately dilated.  · There is no evidence of pericardial effusion.     HOSPITAL COURSE:  Patient with CHF exacerbation received lasix and discontinued amiloride on day 1 of admission. Workup for dyspnea on exertion included troponins, ekg, cbc, procal and CXR without acute findings.  UA positive for UTI. ProBNP was elevated and patient was found to have bilateral pitting edema.  Patient received lasix 20 mg IV BID and her  dyspnea upon exertion and pitting edema resolved. She also received one dose of rocephin on admission and prior to discharge received fosfomycin 3 g PO once for her UTI.  Echo was ordered and found no acute findings.     On day of discharge vital signs were stable.  Patient was walking from bathroom to bed comfortably and tolerating meals well. She was discharged with lasix 20 mg PO twice a day and instructed to follow up with PCP within 1 week and cardiologist, Dr. Jurado, within 2 weeks as cardiac medication was changed. Prior to follow up visit, instructed to have blood draw to recheck her kidney function.     DISCHARGE CONDITION:   stable    DISPOSITION:  Home or Self CareHome    DISCHARGE MEDICATIONS     Discharge Medications      New Medications      Instructions Start Date   furosemide 20 MG tablet  Commonly known as: Lasix   20 mg, Oral, 2 Times Daily         Changes to Medications      Instructions Start Date   loperamide 2 MG capsule  Commonly known as: IMODIUM  What changed: See the new instructions.   TAKE 1 CAPSULE BY MOUTH EVERY FOUR HOURS AS NEEDED FOR DIARRHEA         Continue These Medications      Instructions Start Date   alendronate 70 MG tablet  Commonly known as: Fosamax   70 mg, Oral, Every 7 Days      ARIPiprazole 5 MG tablet  Commonly known as: ABILIFY   5 mg, Oral, Daily, At bedtime      aspirin 325 MG tablet   325 mg, Oral, Daily, At bedtime      atorvastatin 20 MG tablet  Commonly known as: LIPITOR   TAKE ONE TABLET BY MOUTH EVERY DAY      calcium-vitamin D 500-200 MG-UNIT per tablet   1 tablet, Oral, 2 Times Daily With Meals      choline fenofibrate 135 MG capsule  Commonly known as: Trilipix   135 mg, Oral, Daily      Depend Adjustable Underwear misc   1 application, Does not apply, As Needed      dilTIAZem  MG 24 hr capsule  Commonly known as: Dilt-XR   180 mg, Oral, Daily      ferrous sulfate 325 (65 FE) MG tablet   325 mg, Oral, Daily With Breakfast      lidocaine 5 %  Commonly  known as: LIDODERM   1 patch, Transdermal, Every 24 Hours, Remove & Discard patch within 12 hours or as directed by MD      lisinopril 10 MG tablet  Commonly known as: PRINIVIL,ZESTRIL   10 mg, Oral, Daily      LORazepam 1 MG tablet  Commonly known as: ATIVAN   1 mg, Oral, 2 Times Daily, Scheduled to take in the morning and one with dinner      methocarbamol 500 MG tablet  Commonly known as: ROBAXIN   500 mg, Oral, 4 Times Daily PRN      metoprolol tartrate 25 MG tablet  Commonly known as: LOPRESSOR   TAKE 1 TABLET EVERY 12 HOURS      omeprazole 20 MG capsule  Commonly known as: priLOSEC   20 mg, Oral, Daily      ondansetron ODT 8 MG disintegrating tablet  Commonly known as: Zofran ODT   8 mg, Translingual, Every 8 Hours PRN      rivaroxaban 20 MG tablet  Commonly known as: Xarelto   Take one tablet by mouth every day- take with food      sertraline 100 MG tablet  Commonly known as: ZOLOFT   100 mg, Oral, 2 Times Daily         Stop These Medications    aMILoride 5 MG tablet  Commonly known as: MIDAMOR            INSTRUCTIONS:  Activity:   Activity Instructions     Activity as Tolerated          Diet:   Diet Instructions     Diet: Cardiac      Discharge Diet: Cardiac          FOLLOW UP:   Additional Instructions for the Follow-ups that You Need to Schedule     Call MD With Problems / Concerns   As directed      Instructions: If unable to urinate for over 24 hours, chest pain or palpitations, please call your PCP, Dr. Tejeda, for appointment and or return to ED.    Order Comments: Instructions: If unable to urinate for over 24 hours, chest pain or palpitations, please call your PCP, Dr. Tejeda, for appointment and or return to ED.          Discharge Follow-up with PCP   As directed       Currently Documented PCP:    Leticia Tejeda MD    PCP Phone Number:    [phone]     Follow Up Details: Prior to 1 week hospital follow up, please get blood test done to recheck kidney function.         Discharge Follow-up  with Specified Provider: Dr. Jurado, Cardiologist; 1 Week   As directed      To: Dr. Jurado, Cardiologist    Follow Up: 1 Week    Follow Up Details: For changes in diuretic medications.            Follow-up Information     Leticia Tejeda MD .    Specialty: Family Medicine  Why: Prior to 1 week hospital follow up, please get blood test done to recheck kidney function.  Contact information:  [facility]   [place] [address] [phone]                         PENDING TEST RESULTS AT DISCHARGE      Time: >30 minutes were spent in discharge planning, medication reconciliation and coordination of care for this patient.    Tarun Chanel MD is the attending at time of discharge, He is aware of the patient's status and agrees with the above discharge summary.            This document has been electronically signed by Leticia Tejeda MD on February 19, 2022 12:47 CST

## 2022-02-21 ENCOUNTER — TRANSITIONAL CARE MANAGEMENT TELEPHONE ENCOUNTER (OUTPATIENT)
Dept: CALL CENTER | Facility: HOSPITAL | Age: 67
End: 2022-02-21

## 2022-02-21 NOTE — OUTREACH NOTE
Call Center TCM Note      Responses   [facility] patient discharged from? [place]   Does the patient have one of the following disease processes/diagnoses(primary or secondary)? CHF   TCM attempt successful? Yes   Call start time 1513   Call end time 1520   Discharge diagnosis Acute on chronic diastolic congestive heart failure   Meds reviewed with patient/caregiver? Yes   Is the patient having any side effects they believe may be caused by any medication additions or changes? No   Does the patient have all medications ordered at discharge? Yes   Is the patient taking all medications as directed (includes completed medication regime)? Yes   Medication comments Pt taking lasix as ordered   Comments regarding appointments Pt needs to schedule cardiology appt   Does the patient have an appointment with their PCP within 7 days of discharge? Yes   Comments regarding PCP Hospital PCP FOLLOW UP APPOINTMENT IS 2/22/22@130pm   Has the patient kept scheduled appointments due by today? N/A   Has home health visited the patient within 72 hours of discharge? N/A   Pulse Ox monitoring None   Did the patient receive a copy of their discharge instructions? Yes   Nursing interventions Reviewed instructions with patient   What is the patient's perception of their health status since discharge? Same  [Occasioanl chest discomfort but nothing new for her as she relates to Afib.  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 Pt reports her urination has slowed since discharge, no edema noted.]   Nursing interventions Nurse provided patient education  [Reviewed weight gain parameters of 3# day/5 # week require MD notification-encouraged to weigh daily and record to take to MD appts for review---v/u]   Is the patient weighing daily? Yes   Does the patient have scales? No  [Pt needs to get her scale from son]   Daily weight interventions Education provided on importance of daily weight   Is the  patient able to teach back Heart Failure diet management? Yes   Is the patient able to teach back Heart Failure Zones? Yes  [REviewed in detail]   Is the patient able to teach back signs and symptoms of worsening condition? (i.e. weight gain, shortness of air, etc.) Yes   Is the patient/caregiver able to teach back the hierarchy of who to call/visit for symptoms/problems? PCP, Specialist, Home health nurse, Urgent Care, ED, 911 Yes   TCM call completed? Yes           Asha Simpson RN    2/21/2022, 15:24 EST

## 2022-02-22 ENCOUNTER — OFFICE VISIT (OUTPATIENT)
Dept: FAMILY MEDICINE CLINIC | Facility: CLINIC | Age: 67
End: 2022-02-22

## 2022-02-22 ENCOUNTER — LAB (OUTPATIENT)
Dept: LAB | Facility: HOSPITAL | Age: 67
End: 2022-02-22

## 2022-02-22 VITALS
HEIGHT: 65 IN | BODY MASS INDEX: 36.65 KG/M2 | WEIGHT: 220 LBS | HEART RATE: 90 BPM | TEMPERATURE: 96.4 F | OXYGEN SATURATION: 92 % | DIASTOLIC BLOOD PRESSURE: 76 MMHG | SYSTOLIC BLOOD PRESSURE: 120 MMHG

## 2022-02-22 DIAGNOSIS — N17.9 AKI (ACUTE KIDNEY INJURY): ICD-10-CM

## 2022-02-22 DIAGNOSIS — M17.11 PRIMARY OSTEOARTHRITIS OF RIGHT KNEE: ICD-10-CM

## 2022-02-22 DIAGNOSIS — Z09 HOSPITAL DISCHARGE FOLLOW-UP: Primary | ICD-10-CM

## 2022-02-22 DIAGNOSIS — I50.20 HFREF (HEART FAILURE WITH REDUCED EJECTION FRACTION): ICD-10-CM

## 2022-02-22 DIAGNOSIS — F41.9 ANXIETY: ICD-10-CM

## 2022-02-22 DIAGNOSIS — M54.16 LUMBAR RADICULOPATHY: ICD-10-CM

## 2022-02-22 PROCEDURE — 80048 BASIC METABOLIC PNL TOTAL CA: CPT

## 2022-02-22 PROCEDURE — 36415 COLL VENOUS BLD VENIPUNCTURE: CPT

## 2022-02-22 PROCEDURE — 99496 TRANSJ CARE MGMT HIGH F2F 7D: CPT | Performed by: FAMILY MEDICINE

## 2022-02-22 PROCEDURE — 1111F DSCHRG MED/CURRENT MED MERGE: CPT | Performed by: FAMILY MEDICINE

## 2022-02-22 NOTE — PROGRESS NOTES
Transitional Care Follow Up Visit  Subjective     Lana Mata is a 66 y.o. female who presents for a transitional care management visit.    Within 48 business hours after discharge our office contacted her via telephone to coordinate her care and needs.      I reviewed and discussed the details of that call along with the discharge summary, hospital problems, inpatient lab results, inpatient diagnostic studies, and consultation reports with Lana.     Current outpatient and discharge medications have been reconciled for the patient.  Reviewed by: Leticia Tejeda MD      Date of TCM Phone Call 2/19/2022   [facility]   Date of Admission 2/17/2022   Date of Discharge 2/19/2022   Discharge Disposition Home or Self Care     Risk for Readmission (LACE) Score: 8 (2/19/2022  5:02 AM)      Lana Mata is a 66 year old female with CMH of COPD, osteoporosis, Anxiety, dyslipidemia, pes planus bilaterally and GERD who presents for hospital follow up of CHF exacerbation.  Reports that she feels 75% improved from discharge, she continues to have a bit of dyspnea with exertion and concern with home health. Home health had told her costs for outpatient PT for which she cannot afford. At this visit we also reassessed her mood for which she takes zoloft and ativan.      Course During Hospital Stay:  Patient with CHF exacerbation received lasix and discontinued amiloride on day 1 of admission. Workup for dyspnea on exertion included troponins, ekg, cbc, procal and CXR without acute findings.  UA positive for UTI. ProBNP was elevated and patient was found to have bilateral pitting edema.  Patient received lasix 20 mg IV BID and her dyspnea upon exertion and pitting edema resolved. She also received one dose of rocephin on admission and prior to discharge received fosfomycin 3 g PO once for her UTI.  Echo was ordered and found no acute findings.      On day of discharge vital signs  were stable.  Patient was walking from bathroom to bed comfortably and tolerating meals well. She was discharged with lasix 20 mg PO twice a day and instructed to follow up with PCP within 1 week and cardiologist, Dr. Jurado, within 2 weeks as cardiac medication was changed. Prior to follow up visit, instructed to have blood draw to recheck her kidney function.      The following portions of the patient's history were reviewed and updated as appropriate: allergies, current medications, past family history, past medical history, past social history, past surgical history and problem list.    Review of Systems   Constitutional: Negative for chills and fever.   HENT: Negative for rhinorrhea and sore throat.    Eyes: Negative for pain, itching and visual disturbance.   Respiratory: Positive for shortness of breath (Dyspnea on exersion back to baseline. ). Negative for cough.    Cardiovascular: Negative for chest pain and leg swelling.   Gastrointestinal: Negative for abdominal pain, constipation, diarrhea and vomiting.   Endocrine: Negative for polydipsia and polyuria.   Genitourinary: Positive for decreased urine volume. Negative for dysuria and hematuria.   Musculoskeletal: Positive for arthralgias, back pain and gait problem.   Skin: Negative for rash.   Allergic/Immunologic: Negative for immunocompromised state.   Neurological: Negative for dizziness, weakness and headaches.   Hematological: Does not bruise/bleed easily.   Psychiatric/Behavioral: Negative for confusion, hallucinations and sleep disturbance.     Objective   Physical Exam  Constitutional:       General: She is not in acute distress.     Appearance: Normal appearance. She is not diaphoretic.   HENT:      Head: Normocephalic and atraumatic.      Mouth/Throat:      Mouth: Mucous membranes are moist.      Pharynx: Oropharynx is clear.   Eyes:      Extraocular Movements: Extraocular movements intact.      Conjunctiva/sclera: Conjunctivae normal.    Cardiovascular:      Rate and Rhythm: Normal rate. Rhythm irregular.      Pulses: Normal pulses.      Heart sounds: Normal heart sounds.   Pulmonary:      Effort: Pulmonary effort is normal.      Breath sounds: Normal breath sounds. No wheezing, rhonchi or rales.      Comments: Negative decreased breath sounds.   Chest:      Chest wall: No tenderness.   Abdominal:      General: Abdomen is flat. Bowel sounds are normal.      Palpations: Abdomen is soft.   Musculoskeletal:         General: Tenderness (to palpation of bilateral shin bone areas and calfs. ) present.      Cervical back: Normal range of motion and neck supple.      Comments: Trace pitting edema bilaterally. Over ankles and foot, wearing braces bilaterally.    Skin:     General: Skin is warm and dry.      Capillary Refill: Capillary refill takes less than 2 seconds.   Neurological:      Mental Status: She is alert and oriented to person, place, and time.   Psychiatric:         Thought Content: Thought content normal.         Assessment/Plan   Diagnoses and all orders for this visit:    1.Hospital discharge follow-up  2. HFrEF (heart failure with reduced ejection fraction) ([facility])     3. MURTAZA (acute kidney injury) ([facility]) (Primary)-   -     Basic metabolic panel; Future recheck  Plan: Recheck BMP today changes in diuretic medication pending blood test results. Has follow up with Dr. Jurado in March.   Next visit: Consider stopping lasix vs changing to Bumex.      2. Lumbar radiculopathy  -     Ambulatory Referral to Social Work   Plan: Bhavana, will contact patient and discuss services for physical therapy covered by patient's insurance.   3. Primary osteoarthritis of right knee  -     Ambulatory Referral to Social Work   Plan: Same as above. Continue use of voltaren cream and lidocaine on joints as instructed by ortho.   4. Anxiety- uncontrolled   Plan: Will monitor. Continue on Abilify 5 mg QD and Ativan 1 mg.           This document has been  electronically signed by Leticia Tejeda MD on February 22, 2022 15:52 CST

## 2022-02-23 LAB
ANION GAP SERPL CALCULATED.3IONS-SCNC: 14.6 MMOL/L (ref 5–15)
BUN SERPL-MCNC: 31 MG/DL (ref 8–23)
BUN/CREAT SERPL: 20.8 (ref 7–25)
CALCIUM SPEC-SCNC: 10 MG/DL (ref 8.6–10.5)
CHLORIDE SERPL-SCNC: 94 MMOL/L (ref 98–107)
CO2 SERPL-SCNC: 27.4 MMOL/L (ref 22–29)
CREAT SERPL-MCNC: 1.49 MG/DL (ref 0.57–1)
GFR SERPL CREATININE-BSD FRML MDRD: 35 ML/MIN/1.73
GLUCOSE SERPL-MCNC: 100 MG/DL (ref 65–99)
POTASSIUM SERPL-SCNC: 4.2 MMOL/L (ref 3.5–5.2)
SODIUM SERPL-SCNC: 136 MMOL/L (ref 136–145)

## 2022-02-24 NOTE — PROGRESS NOTES
"Patient is a 66-year-old female here today for TCM visit.  She states that she feels \"75% better.  I saw the patient alongside Dr. Tejeda please see her note for more detailed information regarding today's encounter physical exam findings and plan of care.  I was present onsite throughout the encounter and discussed the patient's presentation and plan of care at length with Dr. Tejeda.  I have seen the patient.  I have reviewed the notes, assessments, and/or procedures performed by Leticia Tejeda MD, I concur with her/his documentation and assessment and plan for Lana Ayala [facility].               This document has been electronically signed by Mina Porter MD on February 24, 2022 15:10 CST    "

## 2022-03-01 ENCOUNTER — READMISSION MANAGEMENT (OUTPATIENT)
Dept: CALL CENTER | Facility: HOSPITAL | Age: 67
End: 2022-03-01

## 2022-03-01 DIAGNOSIS — I50.32 CHRONIC HEART FAILURE WITH PRESERVED EJECTION FRACTION: ICD-10-CM

## 2022-03-01 DIAGNOSIS — N18.30 STAGE 3 CHRONIC KIDNEY DISEASE, UNSPECIFIED WHETHER STAGE 3A OR 3B CKD: Primary | ICD-10-CM

## 2022-03-01 RX ORDER — BUMETANIDE 0.5 MG/1
0.5 TABLET ORAL 2 TIMES DAILY
Qty: 30 TABLET | Refills: 0 | Status: SHIPPED | OUTPATIENT
Start: 2022-03-01 | End: 2022-03-18 | Stop reason: SDUPTHER

## 2022-03-01 NOTE — PROGRESS NOTES
SIGNIFICANT NOTE  NAME: Lana Ayala [place]  : 1955  MRN: [id number]    Called patient on 3/1/2022 at 15:06- 15:12 CST.   Discussed the followin. HFpEF: Patient is currently on lasix and notes that she has not been been urinating and has noticed her weight increasing from 222 to 225 to 227. At this point plan is to discontinue lasix and start patient on Bumex 1 mg daily and follow up with her on 3/8.     2. CKD: As patient's GFR has been below 60 for the last 9 months and she has been resistant to diuretics [on lasix, and previously amiloride], consult to nephrology ordered.     3. Follow up on Home health, patient reports she no longer needs home health.    4. Follow up on mammogram: Patient refused to have biopsy for abnormal mammogram done this year. Prefers to have a repeat mammogram without biopsy done 2022.            This document has been electronically signed by Leticia Tejeda MD on 2022 15:12 CST

## 2022-03-01 NOTE — OUTREACH NOTE
CHF Week 2 Survey      Responses   [facility] patient discharged from? [place]   Does the patient have one of the following disease processes/diagnoses(primary or secondary)? CHF   Week 2 attempt successful? No   Unsuccessful attempts Attempt 1          Rachelle Pascal RN

## 2022-03-02 ENCOUNTER — READMISSION MANAGEMENT (OUTPATIENT)
Dept: CALL CENTER | Facility: HOSPITAL | Age: 67
End: 2022-03-02

## 2022-03-02 NOTE — OUTREACH NOTE
CHF Week 2 Survey      Responses   [facility] patient discharged from? [place]   Does the patient have one of the following disease processes/diagnoses(primary or secondary)? CHF   Week 2 attempt successful? Yes   Call start time 1526   Rescheduled Rescheduled-pt requested   Call end time 1527          Aurora Walter RN

## 2022-03-04 ENCOUNTER — READMISSION MANAGEMENT (OUTPATIENT)
Dept: CALL CENTER | Facility: HOSPITAL | Age: 67
End: 2022-03-04

## 2022-03-04 NOTE — OUTREACH NOTE
CHF Week 2 Survey      Responses   [facility] patient discharged from? [place]   Does the patient have one of the following disease processes/diagnoses(primary or secondary)? CHF   Week 2 attempt successful? No   Rescheduled Revoked  [No answer after asking to reschedule]          Bryant Garcia RN

## 2022-03-08 ENCOUNTER — OFFICE VISIT (OUTPATIENT)
Dept: ORTHOPEDIC SURGERY | Facility: CLINIC | Age: 67
End: 2022-03-08

## 2022-03-08 VITALS — WEIGHT: 226 LBS | BODY MASS INDEX: 37.65 KG/M2 | HEIGHT: 65 IN

## 2022-03-08 DIAGNOSIS — M25.572 CHRONIC PAIN OF BOTH ANKLES: Primary | ICD-10-CM

## 2022-03-08 DIAGNOSIS — M21.42 PES PLANUS OF BOTH FEET: ICD-10-CM

## 2022-03-08 DIAGNOSIS — M21.41 PES PLANUS OF BOTH FEET: ICD-10-CM

## 2022-03-08 DIAGNOSIS — R29.898 ANKLE WEAKNESS: ICD-10-CM

## 2022-03-08 DIAGNOSIS — M25.571 CHRONIC PAIN OF BOTH ANKLES: Primary | ICD-10-CM

## 2022-03-08 DIAGNOSIS — G89.29 CHRONIC PAIN OF BOTH ANKLES: Primary | ICD-10-CM

## 2022-03-08 DIAGNOSIS — M19.072 PRIMARY OSTEOARTHRITIS OF BOTH ANKLES: ICD-10-CM

## 2022-03-08 DIAGNOSIS — M19.071 PRIMARY OSTEOARTHRITIS OF BOTH ANKLES: ICD-10-CM

## 2022-03-08 PROCEDURE — 99213 OFFICE O/P EST LOW 20 MIN: CPT | Performed by: NURSE PRACTITIONER

## 2022-03-08 NOTE — PROGRESS NOTES
Lana Mata is a 66 y.o. female returns for     Chief Complaint   Patient presents with   • Left Ankle - Follow-up, Pain   • Right Ankle - Follow-up, Pain     HISTORY OF PRESENT ILLNESS: Patient presents to office for follow-up of chronic bilateral ankle pain.  The patient has experienced progressively worsening bilateral ankle pain for several years with worse pain in the left ankle.  The patient has a history of a prior left tibia fracture that occurred 8 years ago due to a fall while walking, requiring ORIF.  The patient also has a history of pes planus bilaterally and has chronic issues with abnormal positioning of her ankles (overpronation).  Patient reports her bilateral ankle pain is improved from last office visit.. The patient attended 2 visits of physical therapy to learn a home exercise program and also for custom orthotics.  She continues to wear the custom orthotics and states that they are beneficial.  The patient continues to use lace up ankle support braces as needed, which she states are beneficial during weightbearing activity.  The patient continues to do her home exercises that she learned in physical therapy.  There is one home exercise that causes some increased sciatica type pain (seated marching/leg lifts) so she has avoided this particular exercise.  The patient states that she has improved pain overall and is able to be more active.  She uses a cane on occasion as needed but is not currently using a cane in office today.  Overall, she is improved.  No new complaints or concerns noted in regards to her bilateral ankles since last office visit.  No falls or injuries reported since last office visit.  The patient was admitted to the hospital about 3 weeks ago with congestive heart failure and she is doing better now but continues to have some generalized fatigue/weakness and mild shortness of breath.  She has also been wearing compression stockings to her lower extremities for swelling  "control.  Pain scale today is 1/10.    CONCURRENT MEDICAL HISTORY:    The following portions of the patient's history were reviewed and updated as appropriate: allergies, current medications, past family history, past medical history, past social history, past surgical history and problem list.     ROS  No fevers or chills.  No chest pain or shortness of air.  No GI or  disturbances.  Bilateral ankle pain.    PHYSICAL EXAMINATION:       Ht 165.1 cm (65\")   Wt 103 kg (226 lb)   BMI 37.61 kg/m²     Physical Exam  Vitals reviewed.   Constitutional:       General: She is not in acute distress.     Appearance: She is well-developed. She is not ill-appearing.   HENT:      Head: Normocephalic.   Pulmonary:      Effort: Pulmonary effort is normal. No respiratory distress.   Abdominal:      General: There is no distension.      Palpations: Abdomen is soft.   Musculoskeletal:         General: Swelling (Mild, bilateral ankles) and tenderness (Mild, bilateral ankles) present. No signs of injury.   Skin:     General: Skin is warm and dry.      Capillary Refill: Capillary refill takes less than 2 seconds.      Findings: No erythema.   Neurological:      Mental Status: She is alert and oriented to person, place, and time.      GCS: GCS eye subscore is 4. GCS verbal subscore is 5. GCS motor subscore is 6.   Psychiatric:         Speech: Speech normal.         Behavior: Behavior normal.         Thought Content: Thought content normal.         Judgment: Judgment normal.         GAIT:     []  Normal  [x]  Antalgic    Assistive device: [x]  None  []  Walker     []  Crutches  []  Cane     []  Wheelchair  []  Stretcher    Right Ankle Exam     Tenderness   Right ankle tenderness location: Mild, diffuse.  Swelling: mild    Range of Motion   Dorsiflexion: abnormal   Plantar flexion: abnormal     Muscle Strength   Dorsiflexion:  4/5  Plantar flexion:  4/5    Other   Erythema: absent  Sensation: normal  Pulse: present     Comments:  Mild " pain and limitations with range of motion of the ankle joint.  Mild, generalized swelling noted.  Pes planus noted.  Overpronation of the ankle noted with weightbearing.      Left Ankle Exam     Tenderness   Left ankle tenderness location: Mild, diffuse.   Swelling: mild    Range of Motion   Dorsiflexion: abnormal   Plantar flexion: abnormal     Muscle Strength   Dorsiflexion:  4/5   Plantar flexion:  4/5     Other   Erythema: absent  Sensation: normal  Pulse: present    Comments:  Mild pain and limitations with range of motion of the ankle joint.  Mild, generalized swelling noted.  Pes planus noted.  Overpronation of the ankle noted with weightbearing.            XR Ankle 3+ View Bilateral     Result Date: 1/18/2022  Narrative: Procedure: XR ANKLE 3 VIEWS BILATERAL. History: pain, M25.571 Pain in right ankle and joints of right foot M25.572 Pain in left ankle and joints of left foot. Comparison: None Findings: Three x-rays of the right ankle and three x-rays of the left ankle. There are degenerative changes of the right ankle and tarsal bones. There is a right plantar calcaneal spur. Postoperative changes from intramedullary cherelle and screws in the left distal tibia are noted. There are degenerative changes of the left ankle and tarsal bones. There is a left plantar calcaneal spur. No radiographic evidence of acute fracture or dislocation.      Impression: Impression: Multifocal bilateral degenerative changes. Postoperative changes of the left distal tibia. Electronically signed by: Seferino Bingham MD  1/18/2022 12:18 PM CST Workstation: [id number]      ASSESSMENT:    Diagnoses and all orders for this visit:    Chronic pain of both ankles    Pes planus of both feet    Primary osteoarthritis of both ankles    Ankle weakness    PLAN    Patient is doing well overall and reports that her chronic bilateral ankle pain is improved.  She was previously referred to physical therapy to learn a home exercise program and also  for custom orthotics, both of which have been beneficial.  The patient is wearing good supportive shoes along with her custom orthotics, which she states make her ankles feel better.  She has been compliant with her home exercises.  There is one exercise that she reports causes her to have some increased back pain and sciatica type pain and we discussed that she can avoid or modify this activity as needed.  We discussed any home exercises should be based on her pain.  I have encouraged her to continue with her home exercise program for conditioning and strengthening exercises of her bilateral ankles.  As previously noted, she has a history of pes planus since childhood and has significant overpronation of both ankles with weightbearing.  She has also continued to use the lace-up ankle support braces as needed and I have encouraged her to continue to use these at her discretion for added support/stability, especially during weightbearing activity.    Recommend rest and activity modification during times of increased pain.  Recommend to continue with modified weightbearing with use of her cane as needed.  Recommend elevation and ice therapy to the bilateral ankles as needed to minimize pain/swelling/inflammation.  Recommend Tylenol as needed for pain/discomfort. The patient cannot take oral NSAIDs due to her current use of Xarelto for chronic anticoagulation therapy.  Overall, the patient is improved and is pleased with her progress.    Follow-up as needed for any new, worsening or persistent symptoms.    Time spent of a minimum of 20 minutes including the face to face evaluation, reviewing of medical history and prior medial records, reviewing of diagnostic studies, documentation, patient education and coordination of care.     EMR Dragon/Transciption Disclaimer: Some of this note may be an electronic transcription/translation of spoken language to printed text.  The electronic translation of spoken language may  permit erroneous, or at times, nonsensical words or phrases to be inadvertently transcribed. Although I have reviewed the note for such errors, some may still exist.     Return if symptoms worsen or fail to improve.        This document has been electronically signed by JOHN Carl on March 8, 2022 18:39 CST      JOHN Carl

## 2022-03-09 ENCOUNTER — LAB (OUTPATIENT)
Dept: LAB | Facility: HOSPITAL | Age: 67
End: 2022-03-09

## 2022-03-09 ENCOUNTER — OFFICE VISIT (OUTPATIENT)
Dept: FAMILY MEDICINE CLINIC | Facility: CLINIC | Age: 67
End: 2022-03-09

## 2022-03-09 VITALS
DIASTOLIC BLOOD PRESSURE: 76 MMHG | HEIGHT: 65 IN | WEIGHT: 224.7 LBS | HEART RATE: 80 BPM | SYSTOLIC BLOOD PRESSURE: 124 MMHG | OXYGEN SATURATION: 94 % | BODY MASS INDEX: 37.44 KG/M2 | TEMPERATURE: 96.4 F

## 2022-03-09 DIAGNOSIS — R73.03 PREDIABETES: ICD-10-CM

## 2022-03-09 DIAGNOSIS — N17.9 AKI (ACUTE KIDNEY INJURY): ICD-10-CM

## 2022-03-09 DIAGNOSIS — M51.35 DDD (DEGENERATIVE DISC DISEASE), THORACOLUMBAR: ICD-10-CM

## 2022-03-09 DIAGNOSIS — M17.11 PRIMARY OSTEOARTHRITIS OF RIGHT KNEE: ICD-10-CM

## 2022-03-09 DIAGNOSIS — I48.19 PERSISTENT ATRIAL FIBRILLATION: ICD-10-CM

## 2022-03-09 DIAGNOSIS — R35.89 POLYURIA: ICD-10-CM

## 2022-03-09 DIAGNOSIS — N17.9 AKI (ACUTE KIDNEY INJURY): Primary | ICD-10-CM

## 2022-03-09 DIAGNOSIS — R63.1 POLYDIPSIA: ICD-10-CM

## 2022-03-09 PROCEDURE — 36415 COLL VENOUS BLD VENIPUNCTURE: CPT | Performed by: FAMILY MEDICINE

## 2022-03-09 PROCEDURE — 99213 OFFICE O/P EST LOW 20 MIN: CPT | Performed by: FAMILY MEDICINE

## 2022-03-09 PROCEDURE — 80048 BASIC METABOLIC PNL TOTAL CA: CPT

## 2022-03-09 PROCEDURE — 83036 HEMOGLOBIN GLYCOSYLATED A1C: CPT | Performed by: FAMILY MEDICINE

## 2022-03-09 NOTE — PROGRESS NOTES
Family Medicine Residency  Leticia Tejeda MD    Subjective:     Lana Mata is a 67 y.o. female who presents for CHF follow up. Reports that all her joints ache. This has had partial relief with lidoderm patch and tylenol. No other concerns noted at this visit.     The following portions of the patient's history were reviewed and updated as appropriate: allergies, current medications, past family history, past medical history, past social history, past surgical history and problem list.    Past Medical Hx:  Past Medical History:   Diagnosis Date   • Abnormal gait 11/28/2012   • Abrasion 02/26/2015   • Acute ankle pain 03/31/2016   • Acute bronchitis 09/04/2015   • Allergic rhinitis 05/22/2012   • Asthenia 05/22/2014   • Atrial fibrillation (HCC) 03/04/2016    - converted    • Attempted suicide ([facility])    • Benign essential hypertension 03/04/2016   • Cardiovascular stress test abnormal 03/04/2016   • Cellulitis of knee 03/03/2015   • Chest pain 04/02/2015   • Chronic pain disorder    • Common cold 06/24/2014   • Congestive heart failure (HCC) 02/04/2016   • Depressive disorder 05/22/2015    suspect more complicated psych issues      • Fibrocystic breast    • Gastroesophageal reflux disease 01/17/2013   • Generalized anxiety disorder 02/04/2016   • H/O echocardiogram     Normal LV function with Ef of 60-65%.Mildly dilated right ventricle with normal function. Grade 1B diastolic dysfunction with mild CLV[facility].NO sig. valvular regurg. or stenosis.   • Headache 10/27/2014   • Hyperlipidemia 03/04/2016   • Low back pain 06/30/2016    Need for prophylactic vaccination against Streptococcus pneumoniae [pneumococcus]    01/23/2015    • Neck pain 06/30/2016   • Obstructive sleep apnea of adult 07/30/2015   • Obstructive sleep apnea syndrome    • Osteoarthritis    • Osteoporosis 06/30/2016   • Pain in right shoulder 06/30/2016   • Pain in right shoulder 04/24/2014   • Shortness of breath 04/02/2015   • Skin ulcer (HCC)  04/13/2015    left   • Thyrotoxicosis with or without goiter 10/24/2014   • Urinary incontinence 05/22/2014       Past Surgical Hx:  Past Surgical History:   Procedure Laterality Date   • BACK SURGERY  08/07/2015    Removal of intstrumentation,L5-S1.Exploration of fusion,L5-S1.Posterolateral fusion,L4-L5.Posterior segmental spinal instrumentation L4-5.Transforaminal interbody fusion Through right sided approach.Performed at    • COLONOSCOPY  05/04/2016    will order cologuard   • INJECTION OF MEDICATION  03/23/2015    celestone   • INJECTION OF MEDICATION  05/04/2016    KENALOG(6)   • LUMBAR FUSION      discectomy fusion with rods L4-S1   • LUMBAR LAMINECTOMY  1995   • MANDIBLE FRACTURE SURGERY      JAW WIRED SHUT   • MOUTH SURGERY  10/05/2015    Fractured mandible. Removal of arch bars.   • MOUTH SURGERY  1985    Fractured mandible. Removal of arch bars.   • NASAL SEPTUM SURGERY     • RHINOPLASTY     • TUBAL ABDOMINAL LIGATION  1986       Current Meds:    Current Outpatient Medications:   •  alendronate (Fosamax) 70 MG tablet, Take 1 tablet by mouth Every 7 (Seven) Days., Disp: 4 tablet, Rfl: 11  •  ARIPiprazole (ABILIFY) 5 MG tablet, Take 5 mg by mouth Daily. At bedtime, Disp: , Rfl:   •  aspirin 325 MG tablet, Take 325 mg by mouth Daily. At bedtime, Disp: , Rfl:   •  atorvastatin (LIPITOR) 20 MG tablet, TAKE ONE TABLET BY MOUTH EVERY DAY, Disp: 90 tablet, Rfl: 3  •  bumetanide (BUMEX) 0.5 MG tablet, Take 1 tablet by mouth 2 (Two) Times a Day for 15 days., Disp: 30 tablet, Rfl: 0  •  Calcium Carb-Cholecalciferol (CALCIUM-VITAMIN D) 500-200 MG-UNIT per tablet, Take 1 tablet by mouth 2 (Two) Times a Day With Meals., Disp: 60 tablet, Rfl: 2  •  choline fenofibrate (Trilipix) 135 MG capsule, Take 1 capsule by mouth Daily., Disp: 90 capsule, Rfl: 3  •  Diclofenac Sodium (VOLTAREN) 1 % gel gel, Apply 4 g topically to the appropriate area as directed 4 (Four) Times a Day As Needed (Joint pains in back, knees and  hands.)., Disp: 350 g, Rfl: 3  •  dilTIAZem XR (Dilt-XR) 180 MG 24 hr capsule, Take 1 capsule by mouth Daily., Disp: 14 capsule, Rfl: 0  •  ferrous sulfate 325 (65 FE) MG tablet, Take 325 mg by mouth Daily With Breakfast., Disp: , Rfl:   •  Incontinence Supply Disposable (Depend Adjustable Underwear) misc, 1 application As Needed (Incontinence)., Disp: 90 each, Rfl: 11  •  lidocaine (LIDODERM) 5 %, Place 1 patch on the skin as directed by provider Daily. Remove & Discard patch within 12 hours or as directed by MD, Disp: 30 each, Rfl: 0  •  lisinopril (PRINIVIL,ZESTRIL) 10 MG tablet, Take 10 mg by mouth Daily., Disp: , Rfl:   •  loperamide (IMODIUM) 2 MG capsule, TAKE 1 CAPSULE BY MOUTH EVERY FOUR HOURS AS NEEDED FOR DIARRHEA (Patient taking differently: Take 2 mg by mouth Daily.), Disp: 120 capsule, Rfl: 2  •  LORazepam (ATIVAN) 1 MG tablet, Take 1 mg by mouth 2 (Two) Times a Day. Scheduled to take in the morning and one with dinner, Disp: , Rfl: 0  •  methocarbamol (ROBAXIN) 500 MG tablet, Take 1 tablet by mouth 4 (Four) Times a Day As Needed for Muscle Spasms., Disp: 90 tablet, Rfl: 0  •  metoprolol tartrate (LOPRESSOR) 25 MG tablet, TAKE 1 TABLET EVERY 12 HOURS, Disp: 60 tablet, Rfl: 5  •  omeprazole (priLOSEC) 20 MG capsule, Take 1 capsule by mouth Daily., Disp: 90 capsule, Rfl: 5  •  ondansetron ODT (Zofran ODT) 8 MG disintegrating tablet, Place 1 tablet on the tongue Every 8 (Eight) Hours As Needed for Nausea or Vomiting., Disp: 20 tablet, Rfl: 0  •  rivaroxaban (Xarelto) 20 MG tablet, Take one tablet by mouth every day- take with food, Disp: 30 tablet, Rfl: 0  •  sertraline (ZOLOFT) 100 MG tablet, Take 100 mg by mouth 2 (Two) Times a Day., Disp: , Rfl: 1    Current Facility-Administered Medications:   •  cyanocobalamin injection 1,000 mcg, 1,000 mcg, Intramuscular, Q28 Days, Leticia Tejeda MD    Allergies:  Allergies   Allergen Reactions   • Codeine Nausea And Vomiting   • Latex Rash   • Morphine And  "Related Hallucinations   • Pravachol [Pravastatin Sodium] Myalgia       Family Hx:  Family History   Problem Relation Age of Onset   • Hypertension Mother    • Hyperlipidemia Mother    • Mental illness Mother    • Thyroid disease Mother    • Diabetes Maternal Grandmother    • Cancer Maternal Grandmother    • Depression Other    • Diabetes Other    • Heart disease Other    • Hypertension Other    • Osteoporosis Other    • Thyroid disease Other    • Cancer Maternal Aunt    • Cancer Paternal Aunt    • Breast cancer Paternal Aunt         Social History:  Social History     Socioeconomic History   • Marital status:    Tobacco Use   • Smoking status: Never Smoker   • Smokeless tobacco: Never Used   • Tobacco comment: smoking cessation    Substance and Sexual Activity   • Alcohol use: No   • Drug use: No     Comment: lortab / oxycontin   • Sexual activity: Defer       Review of Systems  Review of Systems   Constitutional: Negative for chills, fatigue and fever.   HENT: Negative for rhinorrhea, sore throat and trouble swallowing.    Eyes: Negative for pain, itching and visual disturbance.   Respiratory: Negative for cough and shortness of breath.    Cardiovascular: Positive for leg swelling. Negative for chest pain.   Gastrointestinal: Negative for abdominal pain, constipation, diarrhea and vomiting.   Endocrine: Negative for polydipsia and polyuria.   Genitourinary: Negative for dysuria and hematuria.        Nocturia 2x   Musculoskeletal: Positive for arthralgias. Negative for back pain.   Skin: Negative for rash.   Allergic/Immunologic: Negative for immunocompromised state.   Neurological: Negative for dizziness, weakness and headaches.   Hematological: Does not bruise/bleed easily.   Psychiatric/Behavioral: Negative for confusion, hallucinations and sleep disturbance.       Objective:     /76   Pulse 80   Temp 96.4 °F (35.8 °C)   Ht 165.1 cm (65\")   Wt 102 kg (224 lb 11.2 oz)   SpO2 94%   BMI 37.39 " kg/m²   Physical Exam  Constitutional:       Appearance: Normal appearance.   HENT:      Nose: No congestion or rhinorrhea.   Eyes:      Conjunctiva/sclera: Conjunctivae normal.   Cardiovascular:      Rate and Rhythm: Rhythm irregular.      Heart sounds: Normal heart sounds.   Pulmonary:      Effort: Pulmonary effort is normal.      Breath sounds: Normal breath sounds. No wheezing, rhonchi or rales.   Abdominal:      General: There is no distension.      Palpations: Abdomen is soft.      Tenderness: There is no abdominal tenderness.   Musculoskeletal:         General: Swelling present.   Skin:     General: Skin is warm and dry.   Neurological:      Mental Status: She is alert. Mental status is at baseline.   Psychiatric:         Mood and Affect: Mood normal.         Behavior: Behavior normal.          Assessment/Plan:     Diagnoses and all orders for this visit:    1. MURTAZA (acute kidney injury) (HCC) (Primary)  -     Basic metabolic panel; Future  Plan: Repeat BMP today to recheck kidney function.  Next visit: If improved or stable kidney function, then continue bumex .5mg BID. If kidney function continuing to worsen, then will decrease to bumex .5 mg once a day.     2. Polydipsia  -     Hemoglobin A1c  3. Polyuria  -     Hemoglobin A1c  4. Prediabetes  -     Hemoglobin A1c  Plan: Rule out diabetes. Likely polydipsia related to diuretic-induced polyuria.  Next visit: If A1c worsens or patient develops diabetes And CrCl>30 , then start on Metformin 500 mg BID. If A1c has improved or stable, then RTC for A1c recheck in 3 months.     5. Persistent atrial fibrillation (HCC)  Plan: Patient is on Xarelto 20 mg for anticoagulation.  Next visit: If has not rescheduled her cardiology visit with , encourage to reschedule.     6. Primary osteoarthritis of right knee  -     Diclofenac Sodium (VOLTAREN) 1 % gel gel; Apply 4 g topically to the appropriate area as directed 4 (Four) Times a Day As Needed (Joint pains in  back, knees and hands.).  Dispense: 350 g; Refill: 3  7. DDD (degenerative disc disease), thoracolumbar  -     Diclofenac Sodium (VOLTAREN) 1 % gel gel; Apply 4 g topically to the appropriate area as directed 4 (Four) Times a Day As Needed (Joint pains in back, knees and hands.).  Dispense: 350 g; Refill: 3  Plan:  Start voltaren cream and use as needed on affected joints as instructed. Continue with Lidoderm patches as needed. Last knee X-rays done 10/2021 with no new fractures and signs of bone spurs and narrowing of medial joint space. At this time feels she does not need physical therapy.   Next visit: Reassess joint pains and functional impairment if improved with voltaren, then continue. If worsening, encourage outpatient physical therapy.     Rx changes: Started voltaren 1% gel.   · Compliance at present is estimated to be excellent.     Depression screening: Depression screening performed today; result negative; no follow up needed. Continue with zoloft 100 mg PO BID and follow up as needed.   Next Annual visit due on 11/24/2022.      Follow-up:     Return in about 1 month (around 4/9/2022) for Recheck labs and pain control for degenerative disk disease.    Preventative:  Health Maintenance   Topic Date Due   • ZOSTER VACCINE (2 of 2) 02/09/2017   • Pneumococcal Vaccine 65+ (1 of 1 - PPSV23) 03/09/2020   • LIPID PANEL  08/26/2022   • ANNUAL WELLNESS VISIT  11/24/2022   • MAMMOGRAM  01/14/2024   • DXA SCAN  01/14/2024   • COLORECTAL CANCER SCREENING  11/07/2026   • TDAP/TD VACCINES (5 - Td or Tdap) 12/07/2029   • HEPATITIS C SCREENING  Completed   • COVID-19 Vaccine  Completed   • INFLUENZA VACCINE  Completed   • PAP SMEAR  Discontinued     Female Preventative: Mammogram is due and ordered 1/14/2022  Last DEXA was done on 1/14/2022  Recommended: On follow up visit: Discuss completion of Varicella vaccination.   Vaccine Counseling: N/A    Weight  -Class: Obese Class II: 35-39.9kg/m2  -Patient's Body mass index  is 37.39 kg/m². indicating that she is morbidly obese (BMI > 40 or > 35 with obesity - related health condition). Obesity-related health conditions include the following: hypertension, coronary heart disease, dyslipidemias, GERD and osteoarthritis. Obesity is unchanged.     Alcohol use:  reports no history of alcohol use.  Nicotine status  reports that she has never smoked. She has never used smokeless tobacco.     Goals     •  management of chronic medical conditions (pt-stated)       Barriers to goals: none              RISK SCORE: 3          This document has been electronically signed by Leticia Tejeda MD on March 9, 2022 18:20 CST

## 2022-03-10 LAB
ANION GAP SERPL CALCULATED.3IONS-SCNC: 8.7 MMOL/L (ref 5–15)
BUN SERPL-MCNC: 18 MG/DL (ref 8–23)
BUN/CREAT SERPL: 15.9 (ref 7–25)
CALCIUM SPEC-SCNC: 9.6 MG/DL (ref 8.6–10.5)
CHLORIDE SERPL-SCNC: 100 MMOL/L (ref 98–107)
CO2 SERPL-SCNC: 30.3 MMOL/L (ref 22–29)
CREAT SERPL-MCNC: 1.13 MG/DL (ref 0.57–1)
EGFRCR SERPLBLD CKD-EPI 2021: 53.4 ML/MIN/1.73
GLUCOSE SERPL-MCNC: 123 MG/DL (ref 65–99)
HBA1C MFR BLD: 5.6 % (ref 4.8–5.6)
POTASSIUM SERPL-SCNC: 3.5 MMOL/L (ref 3.5–5.2)
SODIUM SERPL-SCNC: 139 MMOL/L (ref 136–145)

## 2022-03-11 ENCOUNTER — PATIENT OUTREACH (OUTPATIENT)
Dept: CASE MANAGEMENT | Facility: OTHER | Age: 67
End: 2022-03-11

## 2022-03-11 NOTE — OUTREACH NOTE
"AMBULATORY CASE MANAGEMENT NOTE    Name and Relationship of Patient/Support Person: Esperanza Lana Ayala - Self    Patient Outreach    Pt hospitalized 2/18/22-2/19/22 for CHF. She reports to be doing better, continues to have mild sob with exertion. She monitors her weight daily and states she has lost weight. She watches her sodium and fluid intake as directed. She states she is eating fresh fruits and veggies and likes making smoothies in the mornings. Discussed need for f/u with cardiologist. She stated she will call to schedule.    Adult Patient Profile  Questions/Answers    Flowsheet Row Most Recent Value   Symptoms/Conditions Managed at Home cardiovascular   Cardiovascular Symptoms/Conditions heart failure   Cardiovascular Management Strategies diet modification, fluid modification, medication therapy, weight management, other (see comments)  [monitors BP, O2 sats, and weight daily, wears compression stockings daily which her son helps to put on and take off]   Taking Medications Not Prescribed --  [pt reports she is taking a \"liver cleanser\" supplement]   Missed Doses of Prescribed Medications During Past Week no   Barriers to Taking Medication as Prescribed none   Equipment Currently Used at Home cane, quad, cpap, pulse ox, bp cuff, scales, other (see comments)  [compression stockings]        Send Education  Questions/Answers    Flowsheet Row Most Recent Value   Other Patient Education/Resources  MyChart   MyChart Education Method Send Materials        ELIS QUINTERO  Ambulatory Case Management    3/11/2022, 16:27 EST  "

## 2022-03-18 ENCOUNTER — TELEPHONE (OUTPATIENT)
Dept: FAMILY MEDICINE CLINIC | Facility: CLINIC | Age: 67
End: 2022-03-18

## 2022-03-18 DIAGNOSIS — I50.32 CHRONIC HEART FAILURE WITH PRESERVED EJECTION FRACTION: ICD-10-CM

## 2022-03-18 RX ORDER — BUMETANIDE 0.5 MG/1
0.5 TABLET ORAL 2 TIMES DAILY
Qty: 60 TABLET | Refills: 0 | Status: SHIPPED | OUTPATIENT
Start: 2022-03-18 | End: 2022-04-22 | Stop reason: SDUPTHER

## 2022-03-18 NOTE — PROGRESS NOTES
Subjective:     Lana Mata is a 67 y.o. female who presents for   Chief Complaint   Patient presents with   • Follow-up     Here for CHF f/u but also c/o multiple joint pains. Has tired lidoderm and tylenol with only partial relief. Being followed by Dr Jurado, on bumex and anticoagulated for A.fib. No significant weight change noted.    For more detailed HPI, see resident note.        Past Medical Hx:  Past Medical History:   Diagnosis Date   • Abnormal gait 11/28/2012   • Abrasion 02/26/2015   • Acute ankle pain 03/31/2016   • Acute bronchitis 09/04/2015   • Allergic rhinitis 05/22/2012   • Asthenia 05/22/2014   • Atrial fibrillation (HCC) 03/04/2016    - converted    • Attempted suicide ([facility])    • Benign essential hypertension 03/04/2016   • Cardiovascular stress test abnormal 03/04/2016   • Cellulitis of knee 03/03/2015   • Chest pain 04/02/2015   • Chronic pain disorder    • Common cold 06/24/2014   • Congestive heart failure (HCC) 02/04/2016   • Depressive disorder 05/22/2015    suspect more complicated psych issues      • Fibrocystic breast    • Gastroesophageal reflux disease 01/17/2013   • Generalized anxiety disorder 02/04/2016   • H/O echocardiogram     Normal LV function with Ef of 60-65%.Mildly dilated right ventricle with normal function. Grade 1B diastolic dysfunction with mild CLVH.NO sig. valvular regurg. or stenosis.   • Headache 10/27/2014   • Hyperlipidemia 03/04/2016   • Low back pain 06/30/2016    Need for prophylactic vaccination against Streptococcus pneumoniae [pneumococcus]    01/23/2015    • Neck pain 06/30/2016   • Obstructive sleep apnea of adult 07/30/2015   • Obstructive sleep apnea syndrome    • Osteoarthritis    • Osteoporosis 06/30/2016   • Pain in right shoulder 06/30/2016   • Pain in right shoulder 04/24/2014   • Shortness of breath 04/02/2015   • Skin ulcer (HCC) 04/13/2015    left   • Thyrotoxicosis with or without goiter 10/24/2014   • Urinary incontinence 05/22/2014        Past Surgical Hx:  Past Surgical History:   Procedure Laterality Date   • BACK SURGERY  08/07/2015    Removal of intstrumentation,L5-S1.Exploration of fusion,L5-S1.Posterolateral fusion,L4-L5.Posterior segmental spinal instrumentation L4-5.Transforaminal interbody fusion Through right sided approach.Performed at    • COLONOSCOPY  05/04/2016    will order cologuard   • INJECTION OF MEDICATION  03/23/2015    celestone   • INJECTION OF MEDICATION  05/04/2016    KENALOG(6)   • LUMBAR FUSION      discectomy fusion with rods L4-S1   • LUMBAR LAMINECTOMY  1995   • MANDIBLE FRACTURE SURGERY      JAW WIRED SHUT   • MOUTH SURGERY  10/05/2015    Fractured mandible. Removal of arch bars.   • MOUTH SURGERY  1985    Fractured mandible. Removal of arch bars.   • NASAL SEPTUM SURGERY     • RHINOPLASTY     • TUBAL ABDOMINAL LIGATION  1986       Health Maintenance:  Health Maintenance   Topic Date Due   • ZOSTER VACCINE (2 of 2) 02/09/2017   • Pneumococcal Vaccine 65+ (1 of 1 - PPSV23) 03/09/2020   • LIPID PANEL  08/26/2022   • ANNUAL WELLNESS VISIT  11/24/2022   • MAMMOGRAM  01/14/2024   • DXA SCAN  01/14/2024   • COLORECTAL CANCER SCREENING  11/07/2026   • TDAP/TD VACCINES (5 - Td or Tdap) 12/07/2029   • HEPATITIS C SCREENING  Completed   • COVID-19 Vaccine  Completed   • INFLUENZA VACCINE  Completed   • PAP SMEAR  Discontinued       Current Meds:    Current Outpatient Medications:   •  alendronate (Fosamax) 70 MG tablet, Take 1 tablet by mouth Every 7 (Seven) Days., Disp: 4 tablet, Rfl: 11  •  ARIPiprazole (ABILIFY) 5 MG tablet, Take 5 mg by mouth Daily. At bedtime, Disp: , Rfl:   •  aspirin 325 MG tablet, Take 325 mg by mouth Daily. At bedtime, Disp: , Rfl:   •  atorvastatin (LIPITOR) 20 MG tablet, TAKE ONE TABLET BY MOUTH EVERY DAY, Disp: 90 tablet, Rfl: 3  •  bumetanide (BUMEX) 0.5 MG tablet, Take 1 tablet by mouth 2 (Two) Times a Day for 15 days., Disp: 30 tablet, Rfl: 0  •  Calcium Carb-Cholecalciferol (CALCIUM-VITAMIN  D) 500-200 MG-UNIT per tablet, Take 1 tablet by mouth 2 (Two) Times a Day With Meals., Disp: 60 tablet, Rfl: 2  •  choline fenofibrate (Trilipix) 135 MG capsule, Take 1 capsule by mouth Daily., Disp: 90 capsule, Rfl: 3  •  Diclofenac Sodium (VOLTAREN) 1 % gel gel, Apply 4 g topically to the appropriate area as directed 4 (Four) Times a Day As Needed (Joint pains in back, knees and hands.)., Disp: 350 g, Rfl: 3  •  dilTIAZem XR (Dilt-XR) 180 MG 24 hr capsule, Take 1 capsule by mouth Daily., Disp: 14 capsule, Rfl: 0  •  ferrous sulfate 325 (65 FE) MG tablet, Take 325 mg by mouth Daily With Breakfast., Disp: , Rfl:   •  Incontinence Supply Disposable (Depend Adjustable Underwear) misc, 1 application As Needed (Incontinence)., Disp: 90 each, Rfl: 11  •  lidocaine (LIDODERM) 5 %, Place 1 patch on the skin as directed by provider Daily. Remove & Discard patch within 12 hours or as directed by MD, Disp: 30 each, Rfl: 0  •  lisinopril (PRINIVIL,ZESTRIL) 10 MG tablet, Take 10 mg by mouth Daily., Disp: , Rfl:   •  loperamide (IMODIUM) 2 MG capsule, TAKE 1 CAPSULE BY MOUTH EVERY FOUR HOURS AS NEEDED FOR DIARRHEA (Patient taking differently: Take 2 mg by mouth Daily.), Disp: 120 capsule, Rfl: 2  •  LORazepam (ATIVAN) 1 MG tablet, Take 1 mg by mouth 2 (Two) Times a Day. Scheduled to take in the morning and one with dinner, Disp: , Rfl: 0  •  methocarbamol (ROBAXIN) 500 MG tablet, Take 1 tablet by mouth 4 (Four) Times a Day As Needed for Muscle Spasms., Disp: 90 tablet, Rfl: 0  •  metoprolol tartrate (LOPRESSOR) 25 MG tablet, TAKE 1 TABLET EVERY 12 HOURS, Disp: 60 tablet, Rfl: 5  •  omeprazole (priLOSEC) 20 MG capsule, Take 1 capsule by mouth Daily., Disp: 90 capsule, Rfl: 5  •  ondansetron ODT (Zofran ODT) 8 MG disintegrating tablet, Place 1 tablet on the tongue Every 8 (Eight) Hours As Needed for Nausea or Vomiting., Disp: 20 tablet, Rfl: 0  •  rivaroxaban (Xarelto) 20 MG tablet, Take one tablet by mouth every day- take with  "food, Disp: 30 tablet, Rfl: 0  •  sertraline (ZOLOFT) 100 MG tablet, Take 100 mg by mouth 2 (Two) Times a Day., Disp: , Rfl: 1    Current Facility-Administered Medications:   •  cyanocobalamin injection 1,000 mcg, 1,000 mcg, Intramuscular, Q28 Days, Leticia Tejeda MD    Allergies:  Codeine, Latex, Morphine and related, and Pravachol [pravastatin sodium]    Family Hx:  Family History   Problem Relation Age of Onset   • Hypertension Mother    • Hyperlipidemia Mother    • Mental illness Mother    • Thyroid disease Mother    • Diabetes Maternal Grandmother    • Cancer Maternal Grandmother    • Depression Other    • Diabetes Other    • Heart disease Other    • Hypertension Other    • Osteoporosis Other    • Thyroid disease Other    • Cancer Maternal Aunt    • Cancer Paternal Aunt    • Breast cancer Paternal Aunt         Social History:  Social History     Socioeconomic History   • Marital status:    Tobacco Use   • Smoking status: Never Smoker   • Smokeless tobacco: Never Used   • Tobacco comment: smoking cessation    Substance and Sexual Activity   • Alcohol use: No   • Drug use: No     Comment: lortab / oxycontin   • Sexual activity: Defer       Review of Systems  For ROS see resident note    Objective:     /76   Pulse 80   Temp 96.4 °F (35.8 °C)   Ht 165.1 cm (65\")   Wt 102 kg (224 lb 11.2 oz)   SpO2 94%   BMI 37.39 kg/m²   For PE see resident note    Lab Review  Results for orders placed or performed in visit on 03/09/22   Hemoglobin A1c    Specimen: Blood   Result Value Ref Range    Hemoglobin A1C 5.60 4.80 - 5.60 %            Assessment:     Diagnoses and all orders for this visit:    1. MURTAZA (acute kidney injury) (HCC) (Primary)  -     Basic metabolic panel; Future    2. Polydipsia  -     Hemoglobin A1c    3. Polyuria  -     Hemoglobin A1c    4. Prediabetes  -     Hemoglobin A1c    5. Persistent atrial fibrillation (HCC)    6. Primary osteoarthritis of right knee  -     Diclofenac Sodium " (VOLTAREN) 1 % gel gel; Apply 4 g topically to the appropriate area as directed 4 (Four) Times a Day As Needed (Joint pains in back, knees and hands.).  Dispense: 350 g; Refill: 3    7. DDD (degenerative disc disease), thoracolumbar  -     Diclofenac Sodium (VOLTAREN) 1 % gel gel; Apply 4 g topically to the appropriate area as directed 4 (Four) Times a Day As Needed (Joint pains in back, knees and hands.).  Dispense: 350 g; Refill: 3        Plan:     I have seen and examined the patient.  I have reviewed the notes, assessments, and/or procedures performed by Leticia Tejeda MD, I concur with her/his documentation and assessment and plan for Lana Ayala [facility].            This document has been electronically signed by Pasha Girard MD on March 18, 2022 06:58 CDT

## 2022-03-18 NOTE — TELEPHONE ENCOUNTER
Incoming Refill Request      Medication requested (name and dose):   bumetanide (BUMEX) 0.5 MG tablet ()  Pharmacy where request should be sent:   [facility]    Additional details provided by patient: PATIENT WILL BE OUT TOMORROW    Best call back number: [phone]    Does the patient have less than a 3 day supply:  [x] Yes  [] No    Parker Rosales Rep  22, 13:37 CDT

## 2022-04-22 ENCOUNTER — OFFICE VISIT (OUTPATIENT)
Dept: FAMILY MEDICINE CLINIC | Facility: CLINIC | Age: 67
End: 2022-04-22

## 2022-04-22 VITALS
DIASTOLIC BLOOD PRESSURE: 76 MMHG | TEMPERATURE: 96.4 F | HEART RATE: 77 BPM | HEIGHT: 65 IN | WEIGHT: 223.1 LBS | BODY MASS INDEX: 37.17 KG/M2 | OXYGEN SATURATION: 93 % | SYSTOLIC BLOOD PRESSURE: 130 MMHG

## 2022-04-22 DIAGNOSIS — Z23 NEED FOR PROPHYLACTIC VACCINATION AGAINST STREPTOCOCCUS PNEUMONIAE (PNEUMOCOCCUS): ICD-10-CM

## 2022-04-22 DIAGNOSIS — K58.0 IRRITABLE BOWEL SYNDROME WITH DIARRHEA: ICD-10-CM

## 2022-04-22 DIAGNOSIS — I50.32 CHRONIC HEART FAILURE WITH PRESERVED EJECTION FRACTION: Primary | ICD-10-CM

## 2022-04-22 DIAGNOSIS — F34.1 DYSTHYMIC DISORDER: ICD-10-CM

## 2022-04-22 DIAGNOSIS — Z99.89 OSA ON CPAP: ICD-10-CM

## 2022-04-22 DIAGNOSIS — Z12.11 COLON CANCER SCREENING: ICD-10-CM

## 2022-04-22 DIAGNOSIS — M15.9 OSTEOARTHRITIS OF MULTIPLE JOINTS, UNSPECIFIED OSTEOARTHRITIS TYPE: ICD-10-CM

## 2022-04-22 DIAGNOSIS — I48.19 PERSISTENT ATRIAL FIBRILLATION: ICD-10-CM

## 2022-04-22 DIAGNOSIS — G47.33 OSA ON CPAP: ICD-10-CM

## 2022-04-22 DIAGNOSIS — Z23 NEED FOR ZOSTER VACCINE: ICD-10-CM

## 2022-04-22 DIAGNOSIS — Z71.85 VACCINE COUNSELING: ICD-10-CM

## 2022-04-22 PROCEDURE — 99213 OFFICE O/P EST LOW 20 MIN: CPT | Performed by: FAMILY MEDICINE

## 2022-04-22 RX ORDER — BUMETANIDE 0.5 MG/1
0.5 TABLET ORAL 2 TIMES DAILY
Qty: 60 TABLET | Refills: 0 | Status: SHIPPED | OUTPATIENT
Start: 2022-04-22 | End: 2022-05-23

## 2022-04-22 RX ORDER — LOPERAMIDE HYDROCHLORIDE 2 MG/1
2 CAPSULE ORAL DAILY
Status: SHIPPED | OUTPATIENT
Start: 2022-04-22

## 2022-04-22 NOTE — PROGRESS NOTES
Family Medicine Residency  Leticia Tejeda MD    Subjective:     Lana Mata is a 67 y.o. female who presents for follow up of CKD stage 3b and heart failure follow up. At this visit she also has two chief concerns.     She reports pain in her neck with long drives. Notes that though she does walking, arm exercises and ankle exercises, does not do stretching of her neck.     CKD stage 3B: She reports her pattern of urinating varies. She noticed she urinates more when drinking black tea.  Patient reports dyspnea with left side worse than right worsened with eating.     CHF: Reports feeling tired especially on exertion. Notes this week was much busier than usual. Went grocery shopping and walking.        The following portions of the patient's history were reviewed and updated as appropriate: current medications, past medical history and problem list.  Past Medical Hx:  Past Medical History:   Diagnosis Date   • Abnormal gait 11/28/2012   • Abrasion 02/26/2015   • Acute ankle pain 03/31/2016   • Acute bronchitis 09/04/2015   • Allergic rhinitis 05/22/2012   • Asthenia 05/22/2014   • Atrial fibrillation (HCC) 03/04/2016    - converted    • Attempted suicide ([facility])    • Benign essential hypertension 03/04/2016   • Cardiovascular stress test abnormal 03/04/2016   • Cellulitis of knee 03/03/2015   • Chest pain 04/02/2015   • Chronic pain disorder    • Common cold 06/24/2014   • Congestive heart failure (HCC) 02/04/2016   • Depressive disorder 05/22/2015    suspect more complicated psych issues      • Fibrocystic breast    • Gastroesophageal reflux disease 01/17/2013   • Generalized anxiety disorder 02/04/2016   • H/O echocardiogram     Normal LV function with Ef of 60-65%.Mildly dilated right ventricle with normal function. Grade 1B diastolic dysfunction with mild CLVH.NO sig. valvular regurg. or stenosis.   • Headache 10/27/2014   • Hyperlipidemia 03/04/2016   • Low back pain 06/30/2016    Need for  prophylactic vaccination against Streptococcus pneumoniae [pneumococcus]    01/23/2015    • Neck pain 06/30/2016   • Obstructive sleep apnea of adult 07/30/2015   • Obstructive sleep apnea syndrome    • Osteoarthritis    • Osteoporosis 06/30/2016   • Pain in right shoulder 06/30/2016   • Pain in right shoulder 04/24/2014   • Shortness of breath 04/02/2015   • Skin ulcer (HCC) 04/13/2015    left   • Thyrotoxicosis with or without goiter 10/24/2014   • Urinary incontinence 05/22/2014       Past Surgical Hx:  Past Surgical History:   Procedure Laterality Date   • BACK SURGERY  08/07/2015    Removal of intstrumentation,L5-S1.Exploration of fusion,L5-S1.Posterolateral fusion,L4-L5.Posterior segmental spinal instrumentation L4-5.Transforaminal interbody fusion Through right sided approach.Performed at    • COLONOSCOPY  05/04/2016    will order cologuard   • INJECTION OF MEDICATION  03/23/2015    celestone   • INJECTION OF MEDICATION  05/04/2016    KENALOG(6)   • LUMBAR FUSION      discectomy fusion with rods L4-S1   • LUMBAR LAMINECTOMY  1995   • MANDIBLE FRACTURE SURGERY      JAW WIRED SHUT   • MOUTH SURGERY  10/05/2015    Fractured mandible. Removal of arch bars.   • MOUTH SURGERY  1985    Fractured mandible. Removal of arch bars.   • NASAL SEPTUM SURGERY     • RHINOPLASTY     • TUBAL ABDOMINAL LIGATION  1986     Current Meds:    Current Outpatient Medications:   •  bumetanide (BUMEX) 0.5 MG tablet, Take 1 tablet by mouth 2 (Two) Times a Day., Disp: 60 tablet, Rfl: 0  •  alendronate (Fosamax) 70 MG tablet, Take 1 tablet by mouth Every 7 (Seven) Days., Disp: 4 tablet, Rfl: 11  •  ARIPiprazole (ABILIFY) 5 MG tablet, Take 5 mg by mouth Daily. At bedtime, Disp: , Rfl:   •  aspirin 325 MG tablet, Take 500 mg by mouth Daily. At bedtime, instructed to take a half tablet of 500 aspirin. , Disp: , Rfl:   •  atorvastatin (LIPITOR) 20 MG tablet, TAKE ONE TABLET BY MOUTH EVERY DAY, Disp: 90 tablet, Rfl: 3  •  Calcium  Carb-Cholecalciferol (CALCIUM-VITAMIN D) 500-200 MG-UNIT per tablet, Take 1 tablet by mouth 2 (Two) Times a Day With Meals., Disp: 60 tablet, Rfl: 2  •  choline fenofibrate (Trilipix) 135 MG capsule, Take 1 capsule by mouth Daily., Disp: 90 capsule, Rfl: 3  •  Diclofenac Sodium (VOLTAREN) 1 % gel gel, Apply 4 g topically to the appropriate area as directed 4 (Four) Times a Day As Needed (Joint pains in back, knees and hands.)., Disp: 350 g, Rfl: 3  •  dilTIAZem XR (Dilt-XR) 180 MG 24 hr capsule, Take 1 capsule by mouth Daily., Disp: 14 capsule, Rfl: 0  •  ferrous sulfate 325 (65 FE) MG tablet, Take 325 mg by mouth Daily With Breakfast., Disp: , Rfl:   •  Incontinence Supply Disposable (Depend Adjustable Underwear) misc, 1 application As Needed (Incontinence)., Disp: 90 each, Rfl: 11  •  lidocaine (LIDODERM) 5 %, Place 1 patch on the skin as directed by provider Daily. Remove & Discard patch within 12 hours or as directed by MD, Disp: 30 each, Rfl: 0  •  lisinopril (PRINIVIL,ZESTRIL) 10 MG tablet, Take 10 mg by mouth Daily., Disp: , Rfl:   •  LORazepam (ATIVAN) 1 MG tablet, Take 1 mg by mouth 2 (Two) Times a Day. Scheduled to take in the morning and one with dinner, Disp: , Rfl: 0  •  methocarbamol (ROBAXIN) 500 MG tablet, Take 1 tablet by mouth 4 (Four) Times a Day As Needed for Muscle Spasms., Disp: 90 tablet, Rfl: 0  •  metoprolol tartrate (LOPRESSOR) 25 MG tablet, TAKE 1 TABLET EVERY 12 HOURS, Disp: 60 tablet, Rfl: 5  •  omeprazole (priLOSEC) 20 MG capsule, Take 1 capsule by mouth Daily., Disp: 90 capsule, Rfl: 5  •  rivaroxaban (Xarelto) 20 MG tablet, Take one tablet by mouth every day- take with food, Disp: 30 tablet, Rfl: 0  •  sertraline (ZOLOFT) 100 MG tablet, Take 100 mg by mouth 2 (Two) Times a Day., Disp: , Rfl: 1    Current Facility-Administered Medications:   •  cyanocobalamin injection 1,000 mcg, 1,000 mcg, Intramuscular, Q28 Days, Leticia Tejeda MD  •  loperamide (IMODIUM) capsule 2 mg, 2 mg,  Oral, Daily, Leticia Tejeda MD    Allergies:  Allergies   Allergen Reactions   • Codeine Nausea And Vomiting   • Latex Rash   • Morphine And Related Hallucinations   • Pravachol [Pravastatin Sodium] Myalgia     Family Hx:  Family History   Problem Relation Age of Onset   • Hypertension Mother    • Hyperlipidemia Mother    • Mental illness Mother    • Thyroid disease Mother    • Diabetes Maternal Grandmother    • Cancer Maternal Grandmother    • Depression Other    • Diabetes Other    • Heart disease Other    • Hypertension Other    • Osteoporosis Other    • Thyroid disease Other    • Cancer Maternal Aunt    • Cancer Paternal Aunt    • Breast cancer Paternal Aunt         Social History:  Social History     Socioeconomic History   • Marital status:    Tobacco Use   • Smoking status: Never Smoker   • Smokeless tobacco: Never Used   • Tobacco comment: smoking cessation    Substance and Sexual Activity   • Alcohol use: No   • Drug use: No     Comment: lortab / oxycontin   • Sexual activity: Defer     Review of Systems  Review of Systems   Constitutional: Positive for fatigue. Negative for chills and fever.   HENT: Negative for rhinorrhea, sore throat and trouble swallowing.    Eyes: Negative for pain, itching and visual disturbance.   Respiratory: Positive for shortness of breath. Negative for cough.    Cardiovascular: Negative for chest pain and leg swelling.   Gastrointestinal: Negative for abdominal pain, constipation, diarrhea and vomiting.   Endocrine: Negative for polydipsia and polyuria.   Genitourinary: Positive for decreased urine volume (unless drinks black tea. ). Negative for dysuria and hematuria.   Musculoskeletal: Negative for arthralgias and back pain.   Skin: Negative for rash.   Allergic/Immunologic: Negative for immunocompromised state.   Neurological: Negative for dizziness, weakness and headaches.   Hematological: Does not bruise/bleed easily.   Psychiatric/Behavioral: Negative for  "confusion, hallucinations and sleep disturbance.     Objective:     /76   Pulse 77   Temp 96.4 °F (35.8 °C)   Ht 165.1 cm (65\")   Wt 101 kg (223 lb 1.6 oz)   SpO2 93%   BMI 37.13 kg/m²   Physical Exam  Vitals reviewed.   Constitutional:       Appearance: Normal appearance.   HENT:      Head: Normocephalic and atraumatic.      Mouth/Throat:      Mouth: Mucous membranes are moist.      Pharynx: Oropharynx is clear.   Eyes:      Extraocular Movements: Extraocular movements intact.      Conjunctiva/sclera: Conjunctivae normal.   Cardiovascular:      Rate and Rhythm: Normal rate and regular rhythm.      Pulses: Normal pulses.      Heart sounds: Normal heart sounds.   Pulmonary:      Effort: Pulmonary effort is normal.      Breath sounds: Normal breath sounds. No wheezing, rhonchi or rales.   Abdominal:      Palpations: Abdomen is soft.   Musculoskeletal:      Cervical back: Normal range of motion and neck supple.      Right lower leg: No edema.      Left lower leg: No edema.   Skin:     General: Skin is warm and dry.      Capillary Refill: Capillary refill takes less than 2 seconds.   Neurological:      Mental Status: She is alert and oriented to person, place, and time.   Psychiatric:         Mood and Affect: Mood normal.         Behavior: Behavior normal.         Thought Content: Thought content normal.        Assessment/Plan:     Diagnoses and all orders for this visit:  1. Chronic heart failure with preserved ejection fraction (HCC) (Primary)-stable  -     bumetanide (BUMEX) 0.5 MG tablet; Take 1 tablet by mouth 2 (Two) Times a Day.  Dispense: 60 tablet; Refill: 0  Continues to have exertional dyspnea, edema predominantly settles around abdomen. As renal function has improved, can continue with current dose of bumex.   Plan: Continue with Bumex 0.5 mg BID and return in 3 months for recheck of kidney function, and CHF symptoms.     2. Vaccine counseling  -     Varicella-Zoster Vaccine Subcutaneous  -     " Pneumococcal Polysaccharide Vaccine 23-Valent (PPSV23) Greater Than or Equal To 3yo Subcutaneous / IM  3. Need for prophylactic vaccination against Streptococcus pneumoniae (pneumococcus)  -     Pneumococcal Polysaccharide Vaccine 23-Valent (PPSV23) Greater Than or Equal To 3yo Subcutaneous / IM  4. Need for zoster vaccine  -     Varicella-Zoster Vaccine Subcutaneous    5. Colon cancer screening  -     Cologuard - Stool, Per Rectum; Future  Patient had a poor experience with [facility] and not having transport to her colonoscopy visit and would not like to repeat the process. Is agreeable to have home cologuard kit sent over. Will follow up on results and discuss them in 3 months.     6. Irritable bowel syndrome with diarrhea- well controlled on imodium  -     loperamide (IMODIUM) capsule 2 mg  Previously given alternative medication from GI after being diagnosed with IBS. Preferred to continue with Imodium. Refilled Imodium on this visit. Follow up in 3 months or sooner if IBS symptoms worsen.     7. Dysthymic disorder- well controlled  Plan: continue with recommendations of Evelina Christiansen and Vandana for psychiatric medication and counseling, respectively.     8. Persistent atrial fibrillation (HCC)- well controlled  Plan: Continue with combination of Aspirin 250 mg (Half tablet of aspirin 500 mg) nightly and Xarelto 20 mg as patient reports was the approved plan from her Cardiologist. As she is concerned of bleeding risk with a full dose of xarelto.     9. SOPHIA on CPAP- well controlled  Plan: Continue with current management and follow up as needed.     10. Osteoarthritis of multiple joints, unspecified osteoarthritis type- localized neck soreness  Plan: Instructed to do up to twice daily, neck exercises of 30 sets of chin-to-shoulder and nose-to-armpit for left and right side to prevent future neck strains. She is agreeable with this lifestyle change.     Rx changes: None   · Patient Education: Praised on  using caffeine as diuretic. Educated on some teas having a high sugar content that she could monitor to stay away from the prediabetic range.   · Compliance at present is estimated to be excellent.   · Efforts to improve compliance is not necessary as patient is bright, cooperative and open-minded to lifestyle changes for addressing her health concerns.   Follow-up:     Return in about 3 months (around 7/22/2022) for follow up of ckd and heart failure.    Preventative:  Health Maintenance   Topic Date Due   • Pneumococcal Vaccine 65+ (1 - PCV) 03/09/1961   • ZOSTER VACCINE (2 of 2) 02/09/2017   • INFLUENZA VACCINE  08/01/2022   • LIPID PANEL  08/26/2022   • ANNUAL WELLNESS VISIT  11/24/2022   • MAMMOGRAM  01/14/2024   • DXA SCAN  01/14/2024   • COLORECTAL CANCER SCREENING  11/07/2026   • TDAP/TD VACCINES (5 - Td or Tdap) 12/07/2029   • HEPATITIS C SCREENING  Completed   • COVID-19 Vaccine  Completed   • PAP SMEAR  Discontinued   Alcohol use:  reports no history of alcohol use.  Nicotine status  reports that she has never smoked. She has never used smokeless tobacco.     Goals     •  management of chronic medical conditions (pt-stated)       Barriers to goals: none              RISK SCORE: 3          This document has been electronically signed by Leticia Tejeda MD on April 22, 2022 19:02 CDT

## 2022-04-25 ENCOUNTER — DOCUMENTATION (OUTPATIENT)
Dept: FAMILY MEDICINE CLINIC | Facility: CLINIC | Age: 67
End: 2022-04-25

## 2022-04-29 ENCOUNTER — OFFICE VISIT (OUTPATIENT)
Dept: FAMILY MEDICINE CLINIC | Facility: CLINIC | Age: 67
End: 2022-04-29

## 2022-04-29 VITALS
HEIGHT: 65 IN | TEMPERATURE: 97.8 F | SYSTOLIC BLOOD PRESSURE: 128 MMHG | DIASTOLIC BLOOD PRESSURE: 72 MMHG | HEART RATE: 84 BPM | OXYGEN SATURATION: 97 % | BODY MASS INDEX: 37.47 KG/M2 | WEIGHT: 224.9 LBS

## 2022-04-29 DIAGNOSIS — M62.838 NECK MUSCLE SPASM: ICD-10-CM

## 2022-04-29 DIAGNOSIS — M51.35 DDD (DEGENERATIVE DISC DISEASE), THORACOLUMBAR: Primary | ICD-10-CM

## 2022-04-29 PROCEDURE — 99213 OFFICE O/P EST LOW 20 MIN: CPT | Performed by: FAMILY MEDICINE

## 2022-04-29 RX ORDER — METHOCARBAMOL 500 MG/1
500 TABLET, FILM COATED ORAL 4 TIMES DAILY PRN
Qty: 90 TABLET | Refills: 4 | Status: SHIPPED | OUTPATIENT
Start: 2022-04-29 | End: 2022-09-05 | Stop reason: SDUPTHER

## 2022-04-29 NOTE — PROGRESS NOTES
Family Medicine Residency  Leticia Tejeda MD    Subjective:     Lana Mata is a 67 y.o. female history of degenerative disk disease, Osteopenia and CKD stage 3b who presents for neck and back pain.     Had a fall two weeks ago onto asphalt forward onto arms and then eventually landing on her hip. No bruising of or limitation in upper extremity motion but noted worsening neck and back aches. Does see Dr. Pizarro in [place] [Neurosurgeon]. Per patient, he offered to administer injections to control pain.   At this visit requesting toradol and kenalog injections for pain.   The following portions of the patient's history were reviewed and updated as appropriate: past medical history.    Past Medical Hx:  Past Medical History:   Diagnosis Date   • Abnormal gait 11/28/2012   • Abrasion 02/26/2015   • Acute ankle pain 03/31/2016   • Acute bronchitis 09/04/2015   • Allergic rhinitis 05/22/2012   • Asthenia 05/22/2014   • Atrial fibrillation (HCC) 03/04/2016    - converted    • Attempted suicide ([facility])    • Benign essential hypertension 03/04/2016   • Cardiovascular stress test abnormal 03/04/2016   • Cellulitis of knee 03/03/2015   • Chest pain 04/02/2015   • Chronic pain disorder    • Common cold 06/24/2014   • Congestive heart failure (HCC) 02/04/2016   • Depressive disorder 05/22/2015    suspect more complicated psych issues      • Fibrocystic breast    • Gastroesophageal reflux disease 01/17/2013   • Generalized anxiety disorder 02/04/2016   • H/O echocardiogram     Normal LV function with Ef of 60-65%.Mildly dilated right ventricle with normal function. Grade 1B diastolic dysfunction with mild CLVH.NO sig. valvular regurg. or stenosis.   • Headache 10/27/2014   • Hyperlipidemia 03/04/2016   • Low back pain 06/30/2016    Need for prophylactic vaccination against Streptococcus pneumoniae [pneumococcus]    01/23/2015    • Neck pain 06/30/2016   • Obstructive sleep apnea of adult 07/30/2015   • Obstructive  sleep apnea syndrome    • Osteoarthritis    • Osteoporosis 06/30/2016   • Pain in right shoulder 06/30/2016   • Pain in right shoulder 04/24/2014   • Shortness of breath 04/02/2015   • Skin ulcer (HCC) 04/13/2015    left   • Thyrotoxicosis with or without goiter 10/24/2014   • Urinary incontinence 05/22/2014       Past Surgical Hx:  Past Surgical History:   Procedure Laterality Date   • BACK SURGERY  08/07/2015    Removal of intstrumentation,L5-S1.Exploration of fusion,L5-S1.Posterolateral fusion,L4-L5.Posterior segmental spinal instrumentation L4-5.Transforaminal interbody fusion Through right sided approach.Performed at    • COLONOSCOPY  05/04/2016    will order cologuard   • INJECTION OF MEDICATION  03/23/2015    celestone   • INJECTION OF MEDICATION  05/04/2016    KENALOG(6)   • LUMBAR FUSION      discectomy fusion with rods L4-S1   • LUMBAR LAMINECTOMY  1995   • MANDIBLE FRACTURE SURGERY      JAW WIRED SHUT   • MOUTH SURGERY  10/05/2015    Fractured mandible. Removal of arch bars.   • MOUTH SURGERY  1985    Fractured mandible. Removal of arch bars.   • NASAL SEPTUM SURGERY     • RHINOPLASTY     • TUBAL ABDOMINAL LIGATION  1986       Current Meds:    Current Outpatient Medications:   •  alendronate (Fosamax) 70 MG tablet, Take 1 tablet by mouth Every 7 (Seven) Days., Disp: 4 tablet, Rfl: 11  •  ARIPiprazole (ABILIFY) 5 MG tablet, Take 5 mg by mouth Daily. At bedtime, Disp: , Rfl:   •  aspirin 325 MG tablet, Take 500 mg by mouth Daily. At bedtime, instructed to take a half tablet of 500 aspirin. , Disp: , Rfl:   •  atorvastatin (LIPITOR) 20 MG tablet, TAKE ONE TABLET BY MOUTH EVERY DAY, Disp: 90 tablet, Rfl: 3  •  bumetanide (BUMEX) 0.5 MG tablet, Take 1 tablet by mouth 2 (Two) Times a Day., Disp: 60 tablet, Rfl: 0  •  Calcium Carb-Cholecalciferol (CALCIUM-VITAMIN D) 500-200 MG-UNIT per tablet, Take 1 tablet by mouth 2 (Two) Times a Day With Meals., Disp: 60 tablet, Rfl: 2  •  choline fenofibrate (Trilipix) 135  MG capsule, Take 1 capsule by mouth Daily., Disp: 90 capsule, Rfl: 3  •  Diclofenac Sodium (VOLTAREN) 1 % gel gel, Apply 4 g topically to the appropriate area as directed 4 (Four) Times a Day As Needed (Joint pains in back, knees and hands.)., Disp: 350 g, Rfl: 3  •  dilTIAZem XR (Dilt-XR) 180 MG 24 hr capsule, Take 1 capsule by mouth Daily., Disp: 14 capsule, Rfl: 0  •  ferrous sulfate 325 (65 FE) MG tablet, Take 325 mg by mouth Daily With Breakfast., Disp: , Rfl:   •  Incontinence Supply Disposable (Depend Adjustable Underwear) misc, 1 application As Needed (Incontinence)., Disp: 90 each, Rfl: 11  •  lidocaine (LIDODERM) 5 %, Place 1 patch on the skin as directed by provider Daily. Remove & Discard patch within 12 hours or as directed by MD, Disp: 30 each, Rfl: 0  •  lisinopril (PRINIVIL,ZESTRIL) 10 MG tablet, Take 10 mg by mouth Daily., Disp: , Rfl:   •  LORazepam (ATIVAN) 1 MG tablet, Take 1 mg by mouth 2 (Two) Times a Day. Scheduled to take in the morning and one with dinner, Disp: , Rfl: 0  •  methocarbamol (ROBAXIN) 500 MG tablet, Take 1 tablet by mouth 4 (Four) Times a Day As Needed for Muscle Spasms., Disp: 90 tablet, Rfl: 4  •  metoprolol tartrate (LOPRESSOR) 25 MG tablet, TAKE 1 TABLET EVERY 12 HOURS, Disp: 60 tablet, Rfl: 4  •  omeprazole (priLOSEC) 20 MG capsule, Take 1 capsule by mouth Daily., Disp: 90 capsule, Rfl: 5  •  rivaroxaban (Xarelto) 20 MG tablet, Take one tablet by mouth every day- take with food, Disp: 30 tablet, Rfl: 0  •  sertraline (ZOLOFT) 100 MG tablet, Take 100 mg by mouth 2 (Two) Times a Day., Disp: , Rfl: 1    Current Facility-Administered Medications:   •  cyanocobalamin injection 1,000 mcg, 1,000 mcg, Intramuscular, Q28 Days, Leticia Tejeda MD  •  loperamide (IMODIUM) capsule 2 mg, 2 mg, Oral, Daily, Leticia Tejeda MD    Allergies:  Allergies   Allergen Reactions   • Codeine Nausea And Vomiting   • Latex Rash   • Morphine And Related Hallucinations   • Pravachol  "[Pravastatin Sodium] Myalgia       Family Hx:  Family History   Problem Relation Age of Onset   • Hypertension Mother    • Hyperlipidemia Mother    • Mental illness Mother    • Thyroid disease Mother    • Diabetes Maternal Grandmother    • Cancer Maternal Grandmother    • Depression Other    • Diabetes Other    • Heart disease Other    • Hypertension Other    • Osteoporosis Other    • Thyroid disease Other    • Cancer Maternal Aunt    • Cancer Paternal Aunt    • Breast cancer Paternal Aunt         Social History:  Social History     Socioeconomic History   • Marital status:    Tobacco Use   • Smoking status: Never Smoker   • Smokeless tobacco: Never Used   • Tobacco comment: smoking cessation    Substance and Sexual Activity   • Alcohol use: No   • Drug use: No     Comment: lortab / oxycontin   • Sexual activity: Defer       Review of Systems  Review of Systems   Constitutional: Positive for fatigue. Negative for fever.   Musculoskeletal: Positive for back pain, gait problem and neck pain.   Psychiatric/Behavioral: Positive for decreased concentration.        Forgetfullness.        Objective:     /72   Pulse 84   Temp 97.8 °F (36.6 °C)   Ht 165.1 cm (65\")   Wt 102 kg (224 lb 14.4 oz)   SpO2 97%   BMI 37.43 kg/m²   Physical Exam  Vitals reviewed.   Constitutional:       General: She is not in acute distress.     Appearance: She is not ill-appearing.      Comments: Appears slightly uncomfortable. Demonstrates difficulty rising and lowering herself into chair as well as intermittent instability with walking without her cane down the hallway.   HENT:      Nose: No rhinorrhea.   Eyes:      Conjunctiva/sclera: Conjunctivae normal.   Pulmonary:      Effort: Pulmonary effort is normal.   Musculoskeletal:         General: Tenderness (Right paraspinal tenderness at lumbar spine region. ) present.   Skin:     Findings: No bruising, erythema, lesion or rash.   Neurological:      Mental Status: She is alert. "      Gait: Gait abnormal.   Psychiatric:         Thought Content: Thought content normal.      Comments: Cooperative and attentive to plan and discussion          Assessment/Plan:     Diagnoses and all orders for this visit:    1. DDD (degenerative disc disease), thoracolumbar (Primary)- partial control but worsening  -     methocarbamol (ROBAXIN) 500 MG tablet; Take 1 tablet by mouth 4 (Four) Times a Day As Needed for Muscle Spasms.  Dispense: 90 tablet; Refill: 4  2. Neck muscle spasm- partial control but worsening  -     methocarbamol (ROBAXIN) 500 MG tablet; Take 1 tablet by mouth 4 (Four) Times a Day As Needed for Muscle Spasms.  Dispense: 90 tablet; Refill: 4  - Ordered repeat CT of cervical spine with and without contrast to reassess degenerative disc disease progression.   Plan : Instructed to use lidoderm patch, voltaren cream, get over the counter capsaicin cream and try on small area to see if tolerates the burning prior to applying to neck. Warned to wash hands after application and not get into eyes as product can burn. Instructed to call if need another referral to Dr. Pizarro and to call Dr. Pizarro for earlier appointment and receive spinal injections for pain control.   Rx changes: None  · Patient Education: Side effects of IM steroid risk of worsening Osteopenia and IM NSAID worsening renal function.  Educated that safest method of pain control would be to do intraarticular joint injections from neuro surgeon.  If not effective for her pain control, instructed to call back so we can discuss further options.    · Compliance at present is estimated to be excellent.     Follow-up:     Return in about 4 weeks (around 5/27/2022) for Formal Memory loss evaluation.    Preventative:  Health Maintenance   Topic Date Due   • Pneumococcal Vaccine 65+ (1 - PCV) 03/09/1961   • ZOSTER VACCINE (2 of 2) 02/09/2017   • INFLUENZA VACCINE  08/01/2022   • LIPID PANEL  08/26/2022   • ANNUAL WELLNESS VISIT  11/24/2022   •  MAMMOGRAM  01/14/2024   • DXA SCAN  01/14/2024   • COLORECTAL CANCER SCREENING  11/07/2026   • TDAP/TD VACCINES (5 - Td or Tdap) 12/07/2029   • HEPATITIS C SCREENING  Completed   • COVID-19 Vaccine  Completed   • PAP SMEAR  Discontinued      Goals     •  management of chronic medical conditions (pt-stated)       Barriers to goals: none              RISK SCORE: 3          This document has been electronically signed by Leticia Tejeda MD on April 29, 2022 12:07 CDT

## 2022-05-11 ENCOUNTER — APPOINTMENT (OUTPATIENT)
Dept: GENERAL RADIOLOGY | Facility: HOSPITAL | Age: 67
End: 2022-05-11

## 2022-05-11 ENCOUNTER — TELEPHONE (OUTPATIENT)
Dept: CARDIOLOGY | Facility: CLINIC | Age: 67
End: 2022-05-11

## 2022-05-11 ENCOUNTER — HOSPITAL ENCOUNTER (OUTPATIENT)
Facility: HOSPITAL | Age: 67
Setting detail: OBSERVATION
Discharge: HOME | End: 2022-05-13
Attending: EMERGENCY MEDICINE | Admitting: FAMILY MEDICINE

## 2022-05-11 DIAGNOSIS — R07.9 CARDIAC CHEST PAIN: ICD-10-CM

## 2022-05-11 DIAGNOSIS — N30.90 CYSTITIS: Primary | ICD-10-CM

## 2022-05-11 DIAGNOSIS — K58.0 IRRITABLE BOWEL SYNDROME WITH DIARRHEA: ICD-10-CM

## 2022-05-11 LAB
ALBUMIN SERPL-MCNC: 4 G/DL (ref 3.5–5.2)
ALBUMIN/GLOB SERPL: 1.3 G/DL
ALP SERPL-CCNC: 45 U/L (ref 39–117)
ALT SERPL W P-5'-P-CCNC: 13 U/L (ref 1–33)
ANION GAP SERPL CALCULATED.3IONS-SCNC: 12 MMOL/L (ref 5–15)
AST SERPL-CCNC: 20 U/L (ref 1–32)
BACTERIA UR QL AUTO: ABNORMAL /HPF
BASOPHILS # BLD AUTO: 0.08 10*3/MM3 (ref 0–0.2)
BASOPHILS NFR BLD AUTO: 0.8 % (ref 0–1.5)
BILIRUB SERPL-MCNC: 0.6 MG/DL (ref 0–1.2)
BILIRUB UR QL STRIP: NEGATIVE
BUN SERPL-MCNC: 21 MG/DL (ref 8–23)
BUN/CREAT SERPL: 17.4 (ref 7–25)
CALCIUM SPEC-SCNC: 10.2 MG/DL (ref 8.6–10.5)
CHLORIDE SERPL-SCNC: 101 MMOL/L (ref 98–107)
CLARITY UR: CLEAR
CO2 SERPL-SCNC: 26 MMOL/L (ref 22–29)
COLOR UR: YELLOW
CREAT SERPL-MCNC: 1.21 MG/DL (ref 0.57–1)
DEPRECATED RDW RBC AUTO: 41.6 FL (ref 37–54)
EGFRCR SERPLBLD CKD-EPI 2021: 49.2 ML/MIN/1.73
EOSINOPHIL # BLD AUTO: 0.46 10*3/MM3 (ref 0–0.4)
EOSINOPHIL NFR BLD AUTO: 4.4 % (ref 0.3–6.2)
ERYTHROCYTE [DISTWIDTH] IN BLOOD BY AUTOMATED COUNT: 13.1 % (ref 12.3–15.4)
FLUAV RNA RESP QL NAA+PROBE: NOT DETECTED
FLUBV RNA RESP QL NAA+PROBE: NOT DETECTED
GLOBULIN UR ELPH-MCNC: 3.1 GM/DL
GLUCOSE SERPL-MCNC: 105 MG/DL (ref 65–99)
GLUCOSE UR STRIP-MCNC: NEGATIVE MG/DL
HCT VFR BLD AUTO: 41.2 % (ref 34–46.6)
HGB BLD-MCNC: 14.3 G/DL (ref 12–15.9)
HGB UR QL STRIP.AUTO: NEGATIVE
HOLD SPECIMEN: NORMAL
HOLD SPECIMEN: NORMAL
HYALINE CASTS UR QL AUTO: ABNORMAL /LPF
IMM GRANULOCYTES # BLD AUTO: 0.06 10*3/MM3 (ref 0–0.05)
IMM GRANULOCYTES NFR BLD AUTO: 0.6 % (ref 0–0.5)
KETONES UR QL STRIP: NEGATIVE
LEUKOCYTE ESTERASE UR QL STRIP.AUTO: ABNORMAL
LYMPHOCYTES # BLD AUTO: 2.94 10*3/MM3 (ref 0.7–3.1)
LYMPHOCYTES NFR BLD AUTO: 27.9 % (ref 19.6–45.3)
MCH RBC QN AUTO: 30.2 PG (ref 26.6–33)
MCHC RBC AUTO-ENTMCNC: 34.7 G/DL (ref 31.5–35.7)
MCV RBC AUTO: 86.9 FL (ref 79–97)
MONOCYTES # BLD AUTO: 0.91 10*3/MM3 (ref 0.1–0.9)
MONOCYTES NFR BLD AUTO: 8.7 % (ref 5–12)
NEUTROPHILS NFR BLD AUTO: 57.6 % (ref 42.7–76)
NEUTROPHILS NFR BLD AUTO: 6.07 10*3/MM3 (ref 1.7–7)
NITRITE UR QL STRIP: NEGATIVE
NRBC BLD AUTO-RTO: 0 /100 WBC (ref 0–0.2)
NT-PROBNP SERPL-MCNC: 1474 PG/ML (ref 0–900)
PH UR STRIP.AUTO: 6 [PH] (ref 5–9)
PLATELET # BLD AUTO: 231 10*3/MM3 (ref 140–450)
PMV BLD AUTO: 9.9 FL (ref 6–12)
POTASSIUM SERPL-SCNC: 3.6 MMOL/L (ref 3.5–5.2)
PROT SERPL-MCNC: 7.1 G/DL (ref 6–8.5)
PROT UR QL STRIP: NEGATIVE
RBC # BLD AUTO: 4.74 10*6/MM3 (ref 3.77–5.28)
RBC # UR STRIP: ABNORMAL /HPF
REF LAB TEST METHOD: ABNORMAL
SARS-COV-2 RNA RESP QL NAA+PROBE: NOT DETECTED
SODIUM SERPL-SCNC: 139 MMOL/L (ref 136–145)
SP GR UR STRIP: 1.01 (ref 1–1.03)
SQUAMOUS #/AREA URNS HPF: ABNORMAL /HPF
TROPONIN T SERPL-MCNC: <0.01 NG/ML (ref 0–0.03)
UROBILINOGEN UR QL STRIP: ABNORMAL
WBC # UR STRIP: ABNORMAL /HPF
WBC NRBC COR # BLD: 10.52 10*3/MM3 (ref 3.4–10.8)
WHOLE BLOOD HOLD COAG: NORMAL
WHOLE BLOOD HOLD SPECIMEN: NORMAL

## 2022-05-11 PROCEDURE — 84484 ASSAY OF TROPONIN QUANT: CPT | Performed by: CHIROPRACTOR

## 2022-05-11 PROCEDURE — 51798 US URINE CAPACITY MEASURE: CPT

## 2022-05-11 PROCEDURE — 93010 ELECTROCARDIOGRAM REPORT: CPT | Performed by: INTERNAL MEDICINE

## 2022-05-11 PROCEDURE — 87636 SARSCOV2 & INF A&B AMP PRB: CPT | Performed by: EMERGENCY MEDICINE

## 2022-05-11 PROCEDURE — 80053 COMPREHEN METABOLIC PANEL: CPT | Performed by: EMERGENCY MEDICINE

## 2022-05-11 PROCEDURE — 93005 ELECTROCARDIOGRAM TRACING: CPT | Performed by: EMERGENCY MEDICINE

## 2022-05-11 PROCEDURE — 85025 COMPLETE CBC W/AUTO DIFF WBC: CPT | Performed by: EMERGENCY MEDICINE

## 2022-05-11 PROCEDURE — G0378 HOSPITAL OBSERVATION PER HR: HCPCS

## 2022-05-11 PROCEDURE — 36415 COLL VENOUS BLD VENIPUNCTURE: CPT | Performed by: EMERGENCY MEDICINE

## 2022-05-11 PROCEDURE — 83880 ASSAY OF NATRIURETIC PEPTIDE: CPT | Performed by: EMERGENCY MEDICINE

## 2022-05-11 PROCEDURE — C9803 HOPD COVID-19 SPEC COLLECT: HCPCS

## 2022-05-11 PROCEDURE — 96374 THER/PROPH/DIAG INJ IV PUSH: CPT

## 2022-05-11 PROCEDURE — 71045 X-RAY EXAM CHEST 1 VIEW: CPT

## 2022-05-11 PROCEDURE — 84484 ASSAY OF TROPONIN QUANT: CPT | Performed by: EMERGENCY MEDICINE

## 2022-05-11 PROCEDURE — 99284 EMERGENCY DEPT VISIT MOD MDM: CPT

## 2022-05-11 PROCEDURE — 81001 URINALYSIS AUTO W/SCOPE: CPT | Performed by: EMERGENCY MEDICINE

## 2022-05-11 PROCEDURE — 93005 ELECTROCARDIOGRAM TRACING: CPT

## 2022-05-11 RX ORDER — BUMETANIDE 0.25 MG/ML
0.5 INJECTION INTRAMUSCULAR; INTRAVENOUS ONCE
Status: COMPLETED | OUTPATIENT
Start: 2022-05-11 | End: 2022-05-11

## 2022-05-11 RX ORDER — SODIUM CHLORIDE 0.9 % (FLUSH) 0.9 %
10 SYRINGE (ML) INJECTION AS NEEDED
Status: DISCONTINUED | OUTPATIENT
Start: 2022-05-11 | End: 2022-05-13 | Stop reason: HOSPADM

## 2022-05-11 RX ORDER — NITROGLYCERIN 0.4 MG/1
0.4 TABLET SUBLINGUAL
Status: DISCONTINUED | OUTPATIENT
Start: 2022-05-11 | End: 2022-05-13 | Stop reason: HOSPADM

## 2022-05-11 RX ORDER — MORPHINE SULFATE 2 MG/ML
2 INJECTION, SOLUTION INTRAMUSCULAR; INTRAVENOUS EVERY 4 HOURS PRN
Status: DISCONTINUED | OUTPATIENT
Start: 2022-05-11 | End: 2022-05-13 | Stop reason: HOSPADM

## 2022-05-11 RX ORDER — POLYETHYLENE GLYCOL 3350 17 G/17G
17 POWDER, FOR SOLUTION ORAL DAILY PRN
Status: DISCONTINUED | OUTPATIENT
Start: 2022-05-11 | End: 2022-05-13 | Stop reason: HOSPADM

## 2022-05-11 RX ORDER — LIDOCAINE 50 MG/G
1 PATCH TOPICAL EVERY 24 HOURS
Status: DISCONTINUED | OUTPATIENT
Start: 2022-05-11 | End: 2022-05-13 | Stop reason: HOSPADM

## 2022-05-11 RX ORDER — ACETAMINOPHEN 160 MG/5ML
650 SOLUTION ORAL EVERY 4 HOURS PRN
Status: DISCONTINUED | OUTPATIENT
Start: 2022-05-11 | End: 2022-05-13 | Stop reason: HOSPADM

## 2022-05-11 RX ORDER — ACETAMINOPHEN 650 MG/1
650 SUPPOSITORY RECTAL EVERY 4 HOURS PRN
Status: DISCONTINUED | OUTPATIENT
Start: 2022-05-11 | End: 2022-05-13 | Stop reason: HOSPADM

## 2022-05-11 RX ORDER — ATORVASTATIN CALCIUM 20 MG/1
20 TABLET, FILM COATED ORAL DAILY
Status: DISCONTINUED | OUTPATIENT
Start: 2022-05-11 | End: 2022-05-13 | Stop reason: HOSPADM

## 2022-05-11 RX ORDER — AMOXICILLIN 250 MG
2 CAPSULE ORAL 2 TIMES DAILY
Status: DISCONTINUED | OUTPATIENT
Start: 2022-05-11 | End: 2022-05-13 | Stop reason: HOSPADM

## 2022-05-11 RX ORDER — ASPIRIN 325 MG
500 TABLET ORAL DAILY
Status: DISCONTINUED | OUTPATIENT
Start: 2022-05-12 | End: 2022-05-11 | Stop reason: DRUGHIGH

## 2022-05-11 RX ORDER — METHOCARBAMOL 500 MG/1
500 TABLET, FILM COATED ORAL 4 TIMES DAILY PRN
Status: DISCONTINUED | OUTPATIENT
Start: 2022-05-11 | End: 2022-05-13 | Stop reason: HOSPADM

## 2022-05-11 RX ORDER — ASPIRIN 325 MG
325 TABLET, DELAYED RELEASE (ENTERIC COATED) ORAL DAILY
Status: DISCONTINUED | OUTPATIENT
Start: 2022-05-12 | End: 2022-05-13 | Stop reason: HOSPADM

## 2022-05-11 RX ORDER — ARIPIPRAZOLE 5 MG/1
5 TABLET ORAL DAILY
Status: DISCONTINUED | OUTPATIENT
Start: 2022-05-11 | End: 2022-05-13 | Stop reason: HOSPADM

## 2022-05-11 RX ORDER — BUMETANIDE 0.5 MG/1
0.5 TABLET ORAL 2 TIMES DAILY
Status: DISCONTINUED | OUTPATIENT
Start: 2022-05-11 | End: 2022-05-13 | Stop reason: HOSPADM

## 2022-05-11 RX ORDER — ACETAMINOPHEN 325 MG/1
650 TABLET ORAL EVERY 4 HOURS PRN
Status: DISCONTINUED | OUTPATIENT
Start: 2022-05-11 | End: 2022-05-13 | Stop reason: HOSPADM

## 2022-05-11 RX ORDER — ONDANSETRON 4 MG/1
4 TABLET, FILM COATED ORAL EVERY 6 HOURS PRN
Status: DISCONTINUED | OUTPATIENT
Start: 2022-05-11 | End: 2022-05-13 | Stop reason: HOSPADM

## 2022-05-11 RX ORDER — LISINOPRIL 10 MG/1
10 TABLET ORAL DAILY
Status: DISCONTINUED | OUTPATIENT
Start: 2022-05-12 | End: 2022-05-13 | Stop reason: HOSPADM

## 2022-05-11 RX ORDER — DILTIAZEM HYDROCHLORIDE 180 MG/1
180 CAPSULE, COATED, EXTENDED RELEASE ORAL
Status: DISCONTINUED | OUTPATIENT
Start: 2022-05-12 | End: 2022-05-13 | Stop reason: HOSPADM

## 2022-05-11 RX ORDER — PANTOPRAZOLE SODIUM 40 MG/1
40 TABLET, DELAYED RELEASE ORAL EVERY MORNING
Status: DISCONTINUED | OUTPATIENT
Start: 2022-05-12 | End: 2022-05-13 | Stop reason: HOSPADM

## 2022-05-11 RX ORDER — LOPERAMIDE HYDROCHLORIDE 2 MG/1
2 CAPSULE ORAL DAILY
Status: DISCONTINUED | OUTPATIENT
Start: 2022-05-12 | End: 2022-05-11 | Stop reason: DRUGHIGH

## 2022-05-11 RX ORDER — SODIUM CHLORIDE 0.9 % (FLUSH) 0.9 %
10 SYRINGE (ML) INJECTION EVERY 12 HOURS SCHEDULED
Status: DISCONTINUED | OUTPATIENT
Start: 2022-05-11 | End: 2022-05-13 | Stop reason: HOSPADM

## 2022-05-11 RX ORDER — FENOFIBRATE 145 MG/1
145 TABLET, COATED ORAL DAILY
Status: DISCONTINUED | OUTPATIENT
Start: 2022-05-11 | End: 2022-05-13 | Stop reason: HOSPADM

## 2022-05-11 RX ORDER — LORAZEPAM 0.5 MG/1
1 TABLET ORAL EVERY 12 HOURS SCHEDULED
Status: DISCONTINUED | OUTPATIENT
Start: 2022-05-12 | End: 2022-05-13 | Stop reason: HOSPADM

## 2022-05-11 RX ORDER — CEPHALEXIN 500 MG/1
500 CAPSULE ORAL EVERY 6 HOURS SCHEDULED
Status: DISCONTINUED | OUTPATIENT
Start: 2022-05-11 | End: 2022-05-12

## 2022-05-11 RX ORDER — SODIUM CHLORIDE 9 MG/ML
75 INJECTION, SOLUTION INTRAVENOUS CONTINUOUS
Status: DISCONTINUED | OUTPATIENT
Start: 2022-05-11 | End: 2022-05-13 | Stop reason: HOSPADM

## 2022-05-11 RX ORDER — LOPERAMIDE HYDROCHLORIDE 2 MG/1
2 CAPSULE ORAL 4 TIMES DAILY PRN
Status: DISCONTINUED | OUTPATIENT
Start: 2022-05-11 | End: 2022-05-13 | Stop reason: HOSPADM

## 2022-05-11 RX ORDER — ASPIRIN 81 MG/1
324 TABLET, CHEWABLE ORAL ONCE
Status: COMPLETED | OUTPATIENT
Start: 2022-05-11 | End: 2022-05-11

## 2022-05-11 RX ORDER — BISACODYL 5 MG/1
5 TABLET, DELAYED RELEASE ORAL DAILY PRN
Status: DISCONTINUED | OUTPATIENT
Start: 2022-05-11 | End: 2022-05-13 | Stop reason: HOSPADM

## 2022-05-11 RX ORDER — BISACODYL 10 MG
10 SUPPOSITORY, RECTAL RECTAL DAILY PRN
Status: DISCONTINUED | OUTPATIENT
Start: 2022-05-11 | End: 2022-05-13 | Stop reason: HOSPADM

## 2022-05-11 RX ADMIN — METOPROLOL TARTRATE 25 MG: 25 TABLET, FILM COATED ORAL at 22:18

## 2022-05-11 RX ADMIN — ATORVASTATIN CALCIUM 20 MG: 20 TABLET, FILM COATED ORAL at 23:29

## 2022-05-11 RX ADMIN — CEPHALEXIN 500 MG: 500 CAPSULE ORAL at 22:17

## 2022-05-11 RX ADMIN — FENOFIBRATE 145 MG: 145 TABLET, FILM COATED ORAL at 23:29

## 2022-05-11 RX ADMIN — BUMETANIDE 0.5 MG: 0.25 INJECTION INTRAMUSCULAR; INTRAVENOUS at 15:46

## 2022-05-11 RX ADMIN — CEPHALEXIN 500 MG: 500 CAPSULE ORAL at 18:39

## 2022-05-11 RX ADMIN — SODIUM CHLORIDE 75 ML/HR: 9 INJECTION, SOLUTION INTRAVENOUS at 22:15

## 2022-05-11 RX ADMIN — LORAZEPAM 1 MG: 0.5 TABLET ORAL at 23:28

## 2022-05-11 RX ADMIN — ASPIRIN 324 MG: 81 TABLET, CHEWABLE ORAL at 15:46

## 2022-05-11 RX ADMIN — BUMETANIDE 0.5 MG: 0.5 TABLET ORAL at 23:32

## 2022-05-11 RX ADMIN — LIDOCAINE 1 PATCH: 50 PATCH CUTANEOUS at 22:16

## 2022-05-11 RX ADMIN — ARIPIPRAZOLE 5 MG: 10 TABLET ORAL at 23:29

## 2022-05-11 NOTE — ED PROVIDER NOTES
Subjective   Ms.Pamela Lucy Mata is a pleasant 67 year old female with CMH of HFpEF with EF 58% (JOCY 2/18/22 ), who presents with progressive shortness of breath and abdominal edema. She is non-compliant with her low salt diet as she wants more instruction, 3 days ago she noticed poor urine output without changes to her oral fluid intake accompanied with dyspnea, abdominal swelling and, last night, developed pressure-like chest discomfort.   Patient has been eating salted popcorn, breaded fried fish, canned ravioli, [employer] fish fillet and Lo Mein.   Her chest pain starts at the left side of her chest and gradually radiates over to her right side and up towards her neck.  The pain has decreased from a 4/10 severity to 2/10 severity after using heat pads on her chest.         Review of Systems   Constitutional: Positive for fatigue. Negative for chills and fever.   HENT: Negative for congestion, rhinorrhea and sore throat.    Eyes: Negative.    Respiratory: Positive for cough, chest tightness and shortness of breath.    Cardiovascular: Positive for chest pain. Negative for leg swelling.   Gastrointestinal: Positive for diarrhea. Negative for constipation, nausea and vomiting.   Endocrine: Negative.    Genitourinary: Positive for decreased urine volume. Negative for dysuria, flank pain and hematuria.   Musculoskeletal: Negative.    Skin: Negative.    Neurological: Negative.    Hematological: Negative.    Psychiatric/Behavioral: Negative.        Past Medical History:   Diagnosis Date   • Abnormal gait 11/28/2012   • Abrasion 02/26/2015   • Acute ankle pain 03/31/2016   • Acute bronchitis 09/04/2015   • Allergic rhinitis 05/22/2012   • Asthenia 05/22/2014   • Atrial fibrillation (HCC) 03/04/2016    - converted    • Attempted suicide (HCC)    • Benign essential hypertension 03/04/2016   • Cardiovascular stress test abnormal 03/04/2016   • Cellulitis of knee 03/03/2015   • Chest pain 04/02/2015   • Chronic pain  disorder    • Common cold 06/24/2014   • Congestive heart failure (HCC) 02/04/2016   • Depressive disorder 05/22/2015    suspect more complicated psych issues      • Fibrocystic breast    • Gastroesophageal reflux disease 01/17/2013   • Generalized anxiety disorder 02/04/2016   • H/O echocardiogram     Normal LV function with Ef of 60-65%.Mildly dilated right ventricle with normal function. Grade 1B diastolic dysfunction with mild CLVH.NO sig. valvular regurg. or stenosis.   • Headache 10/27/2014   • Hyperlipidemia 03/04/2016   • Low back pain 06/30/2016    Need for prophylactic vaccination against Streptococcus pneumoniae [pneumococcus]    01/23/2015    • Neck pain 06/30/2016   • Obstructive sleep apnea of adult 07/30/2015   • Obstructive sleep apnea syndrome    • Osteoarthritis    • Osteoporosis 06/30/2016   • Pain in right shoulder 06/30/2016   • Pain in right shoulder 04/24/2014   • Shortness of breath 04/02/2015   • Skin ulcer (HCC) 04/13/2015    left   • Thyrotoxicosis with or without goiter 10/24/2014   • Urinary incontinence 05/22/2014       Allergies   Allergen Reactions   • Codeine Nausea And Vomiting   • Latex Rash   • Morphine And Related Hallucinations   • Pravachol [Pravastatin Sodium] Myalgia       Past Surgical History:   Procedure Laterality Date   • BACK SURGERY  08/07/2015    Removal of intstrumentation,L5-S1.Exploration of fusion,L5-S1.Posterolateral fusion,L4-L5.Posterior segmental spinal instrumentation L4-5.Transforaminal interbody fusion Through right sided approach.Performed at    • COLONOSCOPY  05/04/2016    will order cologuard   • INJECTION OF MEDICATION  03/23/2015    celestone   • INJECTION OF MEDICATION  05/04/2016    KENALOG(6)   • LUMBAR FUSION      discectomy fusion with rods L4-S1   • LUMBAR LAMINECTOMY  1995   • MANDIBLE FRACTURE SURGERY      JAW WIRED SHUT   • MOUTH SURGERY  10/05/2015    Fractured mandible. Removal of arch bars.   • MOUTH SURGERY  1985    Fractured mandible.  "Removal of arch bars.   • NASAL SEPTUM SURGERY     • RHINOPLASTY     • TUBAL ABDOMINAL LIGATION  1986       Family History   Problem Relation Age of Onset   • Hypertension Mother    • Hyperlipidemia Mother    • Mental illness Mother    • Thyroid disease Mother    • Diabetes Maternal Grandmother    • Cancer Maternal Grandmother    • Depression Other    • Diabetes Other    • Heart disease Other    • Hypertension Other    • Osteoporosis Other    • Thyroid disease Other    • Cancer Maternal Aunt    • Cancer Paternal Aunt    • Breast cancer Paternal Aunt        Social History     Socioeconomic History   • Marital status:    Tobacco Use   • Smoking status: Never Smoker   • Smokeless tobacco: Never Used   • Tobacco comment: smoking cessation    Substance and Sexual Activity   • Alcohol use: No   • Drug use: No     Comment: lortab / oxycontin   • Sexual activity: Defer     Objective    Vitals:    05/11/22 1456 05/11/22 1546 05/11/22 1547 05/11/22 1701   BP: 142/70 130/73 130/73 132/82   BP Location: Right arm      Patient Position: Sitting      Pulse: 84 70 72 78   Resp: 24      Temp: 98.4 °F (36.9 °C)      TempSrc: Infrared      SpO2: 94%  97% 95%   Weight: 99.8 kg (220 lb)      Height: 165.1 cm (65\")        Physical Exam  Vitals reviewed.   Constitutional:       General: She is not in acute distress.     Appearance: She is obese. She is not ill-appearing.   Cardiovascular:      Rate and Rhythm: Normal rate and regular rhythm.      Pulses:           Carotid pulses are 2+ on the right side and 2+ on the left side.       Radial pulses are 2+ on the right side and 2+ on the left side.        Dorsalis pedis pulses are 2+ on the right side and 2+ on the left side.        Posterior tibial pulses are 2+ on the right side and 2+ on the left side.      Heart sounds: Murmur heard.    Diastolic murmur is present.  Pulmonary:      Effort: Pulmonary effort is normal. No respiratory distress.      Breath sounds: No decreased " breath sounds, wheezing, rhonchi or rales.   Chest:      Chest wall: No tenderness.   Abdominal:      General: Bowel sounds are normal. There is distension.      Palpations: Abdomen is soft.      Tenderness: There is no abdominal tenderness.      Comments: Dullness on percussion of flanks. Tympanic at central area of abdomen.    Musculoskeletal:      Right lower leg: No edema.      Left lower leg: No edema.   Skin:     General: Skin is warm and dry.   Neurological:      Mental Status: She is alert and oriented to person, place, and time.   Psychiatric:         Mood and Affect: Mood normal.         Behavior: Behavior normal.       Procedures  EKG: Sinus arrhythmia  CXR: No acute processes  UA positive for moderate leukocytes, elevated proBNP of 1474, and renal function is at her baseline around 1.2.   HEART Score for Major Cardiac Events from [employer]  on 5/11/2022  ** All calculations should be rechecked by clinician prior to use **    RESULT SUMMARY:  3 points  Low Score (0-3 points)  Risk of MACE of 0.9-1.7%.  INPUTS:  History --> 0 = Slightly suspicious  EKG --> 1 = Non-specific repolarization disturbance  Age --> 2 = ?65  Risk factors --> 0 = No known risk factors  Initial troponin --> 0 = ?normal limit     ED Course   Patient presents to ED for 3 days of decreased urine output accompanied by dyspnea, chest pain, abdominal fullness and dry cough after high-salt oral intake. CXR and EKG are unremarkable showing no acute processes and sinus arrhythmia, respectively.   Troponins x1 is negative, second series pending. UA positive for UTI with leukoesterase. Patient received aspirin 324 mg and bumex 0.5 mg with improvement of saturation from 94% to 97%. Due to concern about her chest pain and difficulty getting appointment with , she prefers observation stay rather than outpatient cardiology follow up. Has a history of bouncing back after Chest pain visits. At this time will notify Family Medicine inpatient  team.     For PCP:  Instructed to follow up with me, Dr. Tejeda, for ED follow up in 1 week to recheck resolution of peripheral edema and referral for dietician. Will recheck labs for resolution of BNP and UTI.       MDM    Final diagnoses:   Cystitis   Cardiac chest pain       ED Disposition  ED Disposition     ED Disposition   Decision to Admit    Condition   --    Comment   --               This document has been electronically signed by Leticia Tejeda MD on May 11, 2022 17:49 CDT     Leticia Tejeda MD  Resident  05/11/22 [id number]

## 2022-05-11 NOTE — TELEPHONE ENCOUNTER
----- Message from Parker Dee sent at 5/11/2022  1:57 PM CDT -----  Contact: [phone]  Pt called having chest pain and to see if Dr. Jurado would see her. I advised she go straight to the ER and she agreed to go.

## 2022-05-11 NOTE — ACP (ADVANCE CARE PLANNING)
Patient wishes to be a full code. They do wish to have chest compressions, emergency medications and be intubated and placed on a ventilator should their heart stop or they stop breathing. They do do not have a living will or advanced care directive. They would like to designate her son Fabian Mata to make medical decisions on their behalf should they not be able to.  She says that her son does not often answer his phone and show she gave me the number of her daughter-in-law Dominique Mata her number is [phone].  If you will call the daughter-in-law she will put you in contact with the son.        This document has been electronically signed by Negro Kim MD on May 11, 2022 19:00 CDT

## 2022-05-12 ENCOUNTER — APPOINTMENT (OUTPATIENT)
Dept: NUCLEAR MEDICINE | Facility: HOSPITAL | Age: 67
End: 2022-05-12

## 2022-05-12 LAB
ALBUMIN SERPL-MCNC: 3.9 G/DL (ref 3.5–5.2)
ALBUMIN/GLOB SERPL: 1.3 G/DL
ALP SERPL-CCNC: 43 U/L (ref 39–117)
ALT SERPL W P-5'-P-CCNC: 13 U/L (ref 1–33)
ANION GAP SERPL CALCULATED.3IONS-SCNC: 12 MMOL/L (ref 5–15)
AST SERPL-CCNC: 20 U/L (ref 1–32)
BASOPHILS # BLD AUTO: 0.08 10*3/MM3 (ref 0–0.2)
BASOPHILS NFR BLD AUTO: 0.9 % (ref 0–1.5)
BILIRUB SERPL-MCNC: 0.7 MG/DL (ref 0–1.2)
BUN SERPL-MCNC: 20 MG/DL (ref 8–23)
BUN/CREAT SERPL: 18.3 (ref 7–25)
CALCIUM SPEC-SCNC: 9.6 MG/DL (ref 8.6–10.5)
CHLORIDE SERPL-SCNC: 101 MMOL/L (ref 98–107)
CO2 SERPL-SCNC: 26 MMOL/L (ref 22–29)
CREAT SERPL-MCNC: 1.09 MG/DL (ref 0.57–1)
DEPRECATED RDW RBC AUTO: 40.7 FL (ref 37–54)
EGFRCR SERPLBLD CKD-EPI 2021: 55.8 ML/MIN/1.73
EOSINOPHIL # BLD AUTO: 0.48 10*3/MM3 (ref 0–0.4)
EOSINOPHIL NFR BLD AUTO: 5.3 % (ref 0.3–6.2)
ERYTHROCYTE [DISTWIDTH] IN BLOOD BY AUTOMATED COUNT: 13.1 % (ref 12.3–15.4)
GLOBULIN UR ELPH-MCNC: 3 GM/DL
GLUCOSE SERPL-MCNC: 122 MG/DL (ref 65–99)
HCT VFR BLD AUTO: 42.7 % (ref 34–46.6)
HGB BLD-MCNC: 14.6 G/DL (ref 12–15.9)
IMM GRANULOCYTES # BLD AUTO: 0.04 10*3/MM3 (ref 0–0.05)
IMM GRANULOCYTES NFR BLD AUTO: 0.4 % (ref 0–0.5)
LYMPHOCYTES # BLD AUTO: 2.6 10*3/MM3 (ref 0.7–3.1)
LYMPHOCYTES NFR BLD AUTO: 28.5 % (ref 19.6–45.3)
MCH RBC QN AUTO: 29.4 PG (ref 26.6–33)
MCHC RBC AUTO-ENTMCNC: 34.2 G/DL (ref 31.5–35.7)
MCV RBC AUTO: 86.1 FL (ref 79–97)
MONOCYTES # BLD AUTO: 0.64 10*3/MM3 (ref 0.1–0.9)
MONOCYTES NFR BLD AUTO: 7 % (ref 5–12)
NEUTROPHILS NFR BLD AUTO: 5.29 10*3/MM3 (ref 1.7–7)
NEUTROPHILS NFR BLD AUTO: 57.9 % (ref 42.7–76)
NRBC BLD AUTO-RTO: 0 /100 WBC (ref 0–0.2)
PLATELET # BLD AUTO: 237 10*3/MM3 (ref 140–450)
PMV BLD AUTO: 10 FL (ref 6–12)
POTASSIUM SERPL-SCNC: 3.8 MMOL/L (ref 3.5–5.2)
PROT SERPL-MCNC: 6.9 G/DL (ref 6–8.5)
RBC # BLD AUTO: 4.96 10*6/MM3 (ref 3.77–5.28)
SODIUM SERPL-SCNC: 139 MMOL/L (ref 136–145)
WBC NRBC COR # BLD: 9.13 10*3/MM3 (ref 3.4–10.8)

## 2022-05-12 PROCEDURE — 0 TECHNETIUM SESTAMIBI: Performed by: CHIROPRACTOR

## 2022-05-12 PROCEDURE — G0378 HOSPITAL OBSERVATION PER HR: HCPCS

## 2022-05-12 PROCEDURE — 80053 COMPREHEN METABOLIC PANEL: CPT | Performed by: STUDENT IN AN ORGANIZED HEALTH CARE EDUCATION/TRAINING PROGRAM

## 2022-05-12 PROCEDURE — 78452 HT MUSCLE IMAGE SPECT MULT: CPT

## 2022-05-12 PROCEDURE — 93017 CV STRESS TEST TRACING ONLY: CPT

## 2022-05-12 PROCEDURE — 99214 OFFICE O/P EST MOD 30 MIN: CPT | Performed by: INTERNAL MEDICINE

## 2022-05-12 PROCEDURE — 85025 COMPLETE CBC W/AUTO DIFF WBC: CPT | Performed by: STUDENT IN AN ORGANIZED HEALTH CARE EDUCATION/TRAINING PROGRAM

## 2022-05-12 PROCEDURE — A9500 TC99M SESTAMIBI: HCPCS | Performed by: CHIROPRACTOR

## 2022-05-12 RX ORDER — GRANULES FOR ORAL 3 G/1
3 POWDER ORAL ONCE
Status: COMPLETED | OUTPATIENT
Start: 2022-05-12 | End: 2022-05-12

## 2022-05-12 RX ORDER — METOPROLOL TARTRATE 50 MG/1
50 TABLET, FILM COATED ORAL EVERY 12 HOURS
Status: DISCONTINUED | OUTPATIENT
Start: 2022-05-12 | End: 2022-05-12

## 2022-05-12 RX ADMIN — BUMETANIDE 0.5 MG: 0.5 TABLET ORAL at 20:56

## 2022-05-12 RX ADMIN — LISINOPRIL 10 MG: 10 TABLET ORAL at 08:33

## 2022-05-12 RX ADMIN — LIDOCAINE 1 PATCH: 50 PATCH CUTANEOUS at 20:57

## 2022-05-12 RX ADMIN — TECHNETIUM TC 99M SESTAMIBI 1 DOSE: 1 INJECTION INTRAVENOUS at 10:13

## 2022-05-12 RX ADMIN — ATORVASTATIN CALCIUM 20 MG: 20 TABLET, FILM COATED ORAL at 20:56

## 2022-05-12 RX ADMIN — METHOCARBAMOL 500 MG: 500 TABLET, FILM COATED ORAL at 16:43

## 2022-05-12 RX ADMIN — LORAZEPAM 1 MG: 0.5 TABLET ORAL at 20:56

## 2022-05-12 RX ADMIN — RIVAROXABAN 20 MG: 10 TABLET, FILM COATED ORAL at 18:16

## 2022-05-12 RX ADMIN — ACETAMINOPHEN 650 MG: 325 TABLET ORAL at 16:10

## 2022-05-12 RX ADMIN — LORAZEPAM 1 MG: 0.5 TABLET ORAL at 08:41

## 2022-05-12 RX ADMIN — LOPERAMIDE HYDROCHLORIDE 2 MG: 2 CAPSULE ORAL at 08:41

## 2022-05-12 RX ADMIN — LOPERAMIDE HYDROCHLORIDE 2 MG: 2 CAPSULE ORAL at 16:10

## 2022-05-12 RX ADMIN — SERTRALINE 100 MG: 50 TABLET, FILM COATED ORAL at 08:33

## 2022-05-12 RX ADMIN — ARIPIPRAZOLE 5 MG: 10 TABLET ORAL at 20:57

## 2022-05-12 RX ADMIN — Medication 10 ML: at 08:41

## 2022-05-12 RX ADMIN — METOPROLOL TARTRATE 25 MG: 25 TABLET, FILM COATED ORAL at 20:56

## 2022-05-12 RX ADMIN — METOPROLOL TARTRATE 25 MG: 25 TABLET, FILM COATED ORAL at 08:45

## 2022-05-12 RX ADMIN — CEPHALEXIN 500 MG: 500 CAPSULE ORAL at 13:38

## 2022-05-12 RX ADMIN — SODIUM CHLORIDE 75 ML/HR: 9 INJECTION, SOLUTION INTRAVENOUS at 13:37

## 2022-05-12 RX ADMIN — BUMETANIDE 0.5 MG: 0.5 TABLET ORAL at 08:33

## 2022-05-12 RX ADMIN — ASPIRIN 325 MG: 325 TABLET, COATED ORAL at 08:33

## 2022-05-12 RX ADMIN — DILTIAZEM HYDROCHLORIDE 180 MG: 180 CAPSULE, COATED, EXTENDED RELEASE ORAL at 08:33

## 2022-05-12 RX ADMIN — FENOFIBRATE 145 MG: 145 TABLET, FILM COATED ORAL at 20:57

## 2022-05-12 RX ADMIN — PANTOPRAZOLE SODIUM 40 MG: 40 TABLET, DELAYED RELEASE ORAL at 06:00

## 2022-05-12 RX ADMIN — CEPHALEXIN 500 MG: 500 CAPSULE ORAL at 06:00

## 2022-05-12 RX ADMIN — FOSFOMYCIN TROMETHAMINE 3 G: 3 POWDER ORAL at 13:39

## 2022-05-12 NOTE — H&P
HISTORY AND PHYSICAL  NAME: Lana Mata  : 1955  MRN: [id number]    DATE OF ADMISSION:  2022     DATE & TIME SEEN: 22 at 1815    PCP: Leticia Tejeda MD    CODE STATUS: Full code    CHIEF COMPLAINT: Chest pain    HPI:  Lana Mata is a 67 y.o. female with a CMH of heart failure with preserved ejection fraction, chronic low back pain, GERD, MINO, and hyperlipidemia who presents complaining of acute onset of chest pain.  She states that yesterday evening she began experiencing pain in the left side of her chest.  It then began to radiate up into the left side of her neck.  She denies any left arm involvements.  She also reports increasing shortness of breath and fatigue.  She endorses progressive abdominal edema and advises that she is not compliant with a low-sodium diet.  3 days ago she began noticing decreased urine output in spite of maintaining normal oral intake of fluids.     CONCURRENT MEDICAL HISTORY:  Past Medical History:   Diagnosis Date   • Abnormal gait 2012   • Abrasion 2015   • Acute ankle pain 2016   • Acute bronchitis 2015   • Allergic rhinitis 2012   • Asthenia 2014   • Atrial fibrillation (HCC) 2016    - converted    • Attempted suicide ([facility])    • Benign essential hypertension 2016   • Cardiovascular stress test abnormal 2016   • Cellulitis of knee 2015   • Chest pain 2015   • Chronic pain disorder    • Common cold 2014   • Congestive heart failure (HCC) 2016   • Depressive disorder 2015    suspect more complicated psych issues      • Fibrocystic breast    • Gastroesophageal reflux disease 2013   • Generalized anxiety disorder 2016   • H/O echocardiogram     Normal LV function with Ef of 60-65%.Mildly dilated right ventricle with normal function. Grade 1B diastolic dysfunction with mild CLVH.NO sig. valvular regurg. or stenosis.   • Headache 10/27/2014   •  Hyperlipidemia 03/04/2016   • Low back pain 06/30/2016    Need for prophylactic vaccination against Streptococcus pneumoniae [pneumococcus]    01/23/2015    • Neck pain 06/30/2016   • Obstructive sleep apnea of adult 07/30/2015   • Obstructive sleep apnea syndrome    • Osteoarthritis    • Osteoporosis 06/30/2016   • Pain in right shoulder 06/30/2016   • Pain in right shoulder 04/24/2014   • Shortness of breath 04/02/2015   • Skin ulcer (HCC) 04/13/2015    left   • Thyrotoxicosis with or without goiter 10/24/2014   • Urinary incontinence 05/22/2014       PAST SURGICAL HISTORY:  Past Surgical History:   Procedure Laterality Date   • BACK SURGERY  08/07/2015    Removal of intstrumentation,L5-S1.Exploration of fusion,L5-S1.Posterolateral fusion,L4-L5.Posterior segmental spinal instrumentation L4-5.Transforaminal interbody fusion Through right sided approach.Performed at    • COLONOSCOPY  05/04/2016    will order cologuard   • INJECTION OF MEDICATION  03/23/2015    celestone   • INJECTION OF MEDICATION  05/04/2016    KENALOG(6)   • LUMBAR FUSION      discectomy fusion with rods L4-S1   • LUMBAR LAMINECTOMY  1995   • MANDIBLE FRACTURE SURGERY      JAW WIRED SHUT   • MOUTH SURGERY  10/05/2015    Fractured mandible. Removal of arch bars.   • MOUTH SURGERY  1985    Fractured mandible. Removal of arch bars.   • NASAL SEPTUM SURGERY     • RHINOPLASTY     • TUBAL ABDOMINAL LIGATION  1986       FAMILY HISTORY:  Family History   Problem Relation Age of Onset   • Hypertension Mother    • Hyperlipidemia Mother    • Mental illness Mother    • Thyroid disease Mother    • Diabetes Maternal Grandmother    • Cancer Maternal Grandmother    • Depression Other    • Diabetes Other    • Heart disease Other    • Hypertension Other    • Osteoporosis Other    • Thyroid disease Other    • Cancer Maternal Aunt    • Cancer Paternal Aunt    • Breast cancer Paternal Aunt         SOCIAL HISTORY:  Social History     Socioeconomic History   • Marital  status:    Tobacco Use   • Smoking status: Never Smoker   • Smokeless tobacco: Never Used   • Tobacco comment: smoking cessation    Substance and Sexual Activity   • Alcohol use: No   • Drug use: No     Comment: lortab / oxycontin   • Sexual activity: Defer       HOME MEDICATIONS:  Prior to Admission medications    Medication Sig Start Date End Date Taking? Authorizing Provider   alendronate (Fosamax) 70 MG tablet Take 1 tablet by mouth Every 7 (Seven) Days. 5/16/21  Yes Maxime Edwards MD   atorvastatin (LIPITOR) 20 MG tablet TAKE ONE TABLET BY MOUTH EVERY DAY 1/10/22  Yes Kristyn Jurado MD   bumetanide (BUMEX) 0.5 MG tablet Take 1 tablet by mouth 2 (Two) Times a Day. 4/22/22  Yes Butch Duong MD   Calcium Carb-Cholecalciferol (CALCIUM-VITAMIN D) 500-200 MG-UNIT per tablet Take 1 tablet by mouth 2 (Two) Times a Day With Meals. 11/14/19  Yes Allie Martin MD   choline fenofibrate (Trilipix) 135 MG capsule Take 1 capsule by mouth Daily. 6/25/21  Yes Kristyn Jurado MD   dilTIAZem XR (Dilt-XR) 180 MG 24 hr capsule Take 1 capsule by mouth Daily. 8/13/21  Yes Kristyn Jurado MD   Incontinence Supply Disposable (Depend Adjustable Underwear) misc 1 application As Needed (Incontinence). 6/23/21  Yes Tima Salcido MD   lidocaine (LIDODERM) 5 % Place 1 patch on the skin as directed by provider Daily. Remove & Discard patch within 12 hours or as directed by MD 10/13/21  Yes Ron Schultz MD   methocarbamol (ROBAXIN) 500 MG tablet Take 1 tablet by mouth 4 (Four) Times a Day As Needed for Muscle Spasms. 4/29/22  Yes Butch Duong MD   metoprolol tartrate (LOPRESSOR) 25 MG tablet TAKE 1 TABLET EVERY 12 HOURS 4/25/22  Yes Kristyn Jurado MD   omeprazole (priLOSEC) 20 MG capsule Take 1 capsule by mouth Daily. 1/3/22  Yes Mina Porter MD   rivaroxaban (Xarelto) 20 MG tablet Take one tablet by mouth every day- take with food 12/9/21  Yes Kristyn Jurado MD   ARIPiprazole  (ABILIFY) 5 MG tablet Take 5 mg by mouth Daily. At bedtime    Gurdeep Castaneda MD   aspirin 325 MG tablet Take 500 mg by mouth Daily. At bedtime, instructed to take a half tablet of 500 aspirin.     Gurdeep Castaneda MD   Diclofenac Sodium (VOLTAREN) 1 % gel gel Apply 4 g topically to the appropriate area as directed 4 (Four) Times a Day As Needed (Joint pains in back, knees and hands.). 3/9/22   Pasha Girard MD   ferrous sulfate 325 (65 FE) MG tablet Take 325 mg by mouth Daily With Breakfast.    Gurdeep Castaneda MD   lisinopril (PRINIVIL,ZESTRIL) 10 MG tablet Take 10 mg by mouth Daily. 8/9/21   Gurdeep Castaneda MD   LORazepam (ATIVAN) 1 MG tablet Take 1 mg by mouth 2 (Two) Times a Day. Scheduled to take in the morning and one with dinner 3/22/18   Gurdeep Castaneda MD   sertraline (ZOLOFT) 100 MG tablet Take 100 mg by mouth 2 (Two) Times a Day. 3/22/18   Gurdeep Castaneda MD       ALLERGIES:  Codeine, Latex, Morphine and related, and Pravachol [pravastatin sodium]    REVIEW OF SYSTEMS  Review of Systems   Constitutional: Positive for fatigue. Negative for chills, fever and unexpected weight change.   HENT: Negative for congestion, mouth sores and trouble swallowing.    Respiratory: Positive for cough, chest tightness and shortness of breath.    Cardiovascular: Positive for chest pain. Negative for palpitations and leg swelling.   Gastrointestinal: Positive for abdominal pain and diarrhea. Negative for blood in stool, nausea and vomiting.   Endocrine: Negative for polydipsia.   Genitourinary: Positive for decreased urine volume. Negative for dysuria, flank pain and hematuria.   Musculoskeletal: Positive for arthralgias, back pain and gait problem.   Skin: Negative for rash and wound.   Neurological: Positive for dizziness and light-headedness.       PHYSICAL EXAM:  Temp:  [98.4 °F (36.9 °C)] 98.4 °F (36.9 °C)  Heart Rate:  [70-84] 74  Resp:  [20-24] 20  BP: (127-150)/(70-89)  145/81  Body mass index is 36.61 kg/m².  Physical Exam  Vitals reviewed.   Constitutional:       Appearance: She is obese. She is not ill-appearing or diaphoretic.   HENT:      Head: Normocephalic and atraumatic.      Nose: No rhinorrhea.      Mouth/Throat:      Mouth: Mucous membranes are moist.      Comments: Poor oral hygiene.  Eyes:      General: No scleral icterus.        Right eye: No discharge.         Left eye: No discharge.      Extraocular Movements: Extraocular movements intact.      Pupils: Pupils are equal, round, and reactive to light.   Neck:      Vascular: No carotid bruit.   Cardiovascular:      Rate and Rhythm: Normal rate. Rhythm irregular.      Heart sounds: No murmur heard.     Comments: I was not able to appreciate any heart murmur on my examination.  Pulmonary:      Breath sounds: No wheezing or rales.   Abdominal:      General: There is distension.      Tenderness: There is abdominal tenderness.      Comments: Abdominal tenderness midline.  No suprapubic tenderness.  No flank tenderness.   Musculoskeletal:      Right lower leg: No edema.      Left lower leg: No edema.   Lymphadenopathy:      Cervical: No cervical adenopathy.   Skin:     Capillary Refill: Capillary refill takes less than 2 seconds.      Coloration: Skin is not jaundiced.      Findings: No lesion or rash.   Neurological:      General: No focal deficit present.      Mental Status: She is alert.      Cranial Nerves: No cranial nerve deficit.      Motor: No weakness.      Gait: Gait abnormal.      Comments: She uses a four-point cane in her right hand for ambulation.  It is at bedside.   Psychiatric:         Mood and Affect: Mood normal.         Behavior: Behavior normal.         Thought Content: Thought content normal.         Judgment: Judgment normal.         DIAGNOSTIC DATA:   Lab Results (last 24 hours)     Procedure Component Value Units Date/Time    COVID-19 and FLU A/B PCR - Swab, Nasopharynx [[id number]]  (Normal)  Collected: 05/11/22 1840    Specimen: Swab from Nasopharynx Updated: 05/11/22 1933     COVID19 Not Detected     Influenza A PCR Not Detected     Influenza B PCR Not Detected    Narrative:      Fact sheet for providers: https://www.fda.gov/media/348880/download    Fact sheet for patients: https://www.fda.gov/media/727272/download    Test performed by PCR.    Troponin [[id number]]  (Normal) Collected: 05/11/22 1839    Specimen: Blood Updated: 05/11/22 1917     Troponin T <0.010 ng/mL     Narrative:      Troponin T Reference Range:  <= 0.03 ng/mL-   Negative for AMI  >0.03 ng/mL-     Abnormal for myocardial necrosis.  Clinicians would have to utilize clinical acumen, EKG, Troponin and serial changes to determine if it is an Acute Myocardial Infarction or myocardial injury due to an underlying chronic condition.       Results may be falsely decreased if patient taking Biotin.      Urinalysis, Microscopic Only - Urine, Clean Catch [[id number]]  (Abnormal) Collected: 05/11/22 1632    Specimen: Urine, Clean Catch Updated: 05/11/22 1647     RBC, UA 0-2 /HPF      WBC, UA 21-30 /HPF      Bacteria, UA None Seen /HPF      Squamous Epithelial Cells, UA 0-2 /HPF      Hyaline Casts, UA None Seen /LPF      Methodology Automated Microscopy    Urinalysis With Microscopic If Indicated (No Culture) - Urine, Clean Catch [[id number]]  (Abnormal) Collected: 05/11/22 1632    Specimen: Urine, Clean Catch Updated: 05/11/22 1642     Color, UA Yellow     Appearance, UA Clear     pH, UA 6.0     Specific Gravity, UA 1.010     Glucose, UA Negative     Ketones, UA Negative     Bilirubin, UA Negative     Blood, UA Negative     Protein, UA Negative     Leuk Esterase, UA Moderate (2+)     Nitrite, UA Negative     Urobilinogen, UA 1.0 E.U./dL    [place] Draw [[id number]] Collected: 05/11/22 1518    Specimen: Blood Updated: 05/11/22 1634    Narrative:      The following orders were created for panel order [place] Draw.  Procedure                                Abnormality         Status                     ---------                               -----------         ------                     Green Top (Gel)[[id number]]                                  Final result               Lavender Top[[id number]]                                     Final result               Gold Top - SST[[id number]]                                   Final result               Light Blue Top[[id number]]                                   Final result                 Please view results for these tests on the individual orders.    Gold Top - SST [[id number]] Collected: 05/11/22 1518    Specimen: Blood Updated: 05/11/22 1634     Extra Tube Hold for add-ons.     Comment: Auto resulted.       Lavender Top [[id number]] Collected: 05/11/22 1518    Specimen: Blood Updated: 05/11/22 1634     Extra Tube hold for add-on     Comment: Auto resulted       Light Blue Top [[id number]] Collected: 05/11/22 1518    Specimen: Blood Updated: 05/11/22 1634     Extra Tube Hold for add-ons.     Comment: Auto resulted       Green Top (Gel) [[id number]] Collected: 05/11/22 1518    Specimen: Blood Updated: 05/11/22 1634     Extra Tube Hold for add-ons.     Comment: Auto resulted.       Troponin [[id number]]  (Normal) Collected: 05/11/22 1518    Specimen: Blood Updated: 05/11/22 1550     Troponin T <0.010 ng/mL     Narrative:      Troponin T Reference Range:  <= 0.03 ng/mL-   Negative for AMI  >0.03 ng/mL-     Abnormal for myocardial necrosis.  Clinicians would have to utilize clinical acumen, EKG, Troponin and serial changes to determine if it is an Acute Myocardial Infarction or myocardial injury due to an underlying chronic condition.       Results may be falsely decreased if patient taking Biotin.      Comprehensive Metabolic Panel [[id number]]  (Abnormal) Collected: 05/11/22 1518    Specimen: Blood Updated: 05/11/22 1550     Glucose 105 mg/dL      BUN 21 mg/dL      Creatinine 1.21 mg/dL      Sodium 139 mmol/L      Potassium 3.6  mmol/L      Chloride 101 mmol/L      CO2 26.0 mmol/L      Calcium 10.2 mg/dL      Total Protein 7.1 g/dL      Albumin 4.00 g/dL      ALT (SGPT) 13 U/L      AST (SGOT) 20 U/L      Alkaline Phosphatase 45 U/L      Total Bilirubin 0.6 mg/dL      Globulin 3.1 gm/dL      A/G Ratio 1.3 g/dL      BUN/Creatinine Ratio 17.4     Anion Gap 12.0 mmol/L      eGFR 49.2 mL/min/1.73      Comment: National Kidney Foundation and American Society of Nephrology (ASN) Task Force recommended calculation based on the Chronic Kidney Disease Epidemiology Collaboration (CKD-EPI) equation refit without adjustment for race.       Narrative:      GFR Normal >60  Chronic Kidney Disease <60  Kidney Failure <15      BNP [[id number]]  (Abnormal) Collected: 05/11/22 1518    Specimen: Blood Updated: 05/11/22 1548     proBNP 1,474.0 pg/mL     Narrative:      Among patients with dyspnea, NT-proBNP is highly sensitive for the detection of acute congestive heart failure. In addition NT-proBNP of <300 pg/ml effectively rules out acute congestive heart failure with 99% negative predictive value.    Results may be falsely decreased if patient taking Biotin.      CBC & Differential [[id number]]  (Abnormal) Collected: 05/11/22 1518    Specimen: Blood Updated: 05/11/22 1525    Narrative:      The following orders were created for panel order CBC & Differential.  Procedure                               Abnormality         Status                     ---------                               -----------         ------                     CBC Auto Differential[[id number]]        Abnormal            Final result                 Please view results for these tests on the individual orders.    CBC Auto Differential [[id number]]  (Abnormal) Collected: 05/11/22 1518    Specimen: Blood Updated: 05/11/22 1525     WBC 10.52 10*3/mm3      RBC 4.74 10*6/mm3      Hemoglobin 14.3 g/dL      Hematocrit 41.2 %      MCV 86.9 fL      MCH 30.2 pg      MCHC 34.7 g/dL      RDW 13.1 %       RDW-SD 41.6 fl      MPV 9.9 fL      Platelets 231 10*3/mm3      Neutrophil % 57.6 %      Lymphocyte % 27.9 %      Monocyte % 8.7 %      Eosinophil % 4.4 %      Basophil % 0.8 %      Immature Grans % 0.6 %      Neutrophils, Absolute 6.07 10*3/mm3      Lymphocytes, Absolute 2.94 10*3/mm3      Monocytes, Absolute 0.91 10*3/mm3      Eosinophils, Absolute 0.46 10*3/mm3      Basophils, Absolute 0.08 10*3/mm3      Immature Grans, Absolute 0.06 10*3/mm3      nRBC 0.0 /100 WBC            Imaging Results (Last 24 Hours)     Procedure Component Value Units Date/Time    XR Chest 1 View [[id number]] Collected: 05/11/22 1530     Updated: 05/11/22 1557    Narrative:      EXAMINATION:  XR CHEST 1 VW    CLINICAL HISTORY:  67 years Female,Chest pain protocol    COMPARISON:  Chest x-ray dated 2/17/2022    FINDINGS:  Low lung volumes. No focal airspace consolidation. No  pleural effusion or pneumothorax. Normal heart size and pulmonary  vascularity. No significant change relative to 2/18/2022.      Impression:      Stable exam without acute cardiopulmonary process.    Electronically signed by:  Jean Harkins MD  5/11/2022 3:55 PM CDT  Workstation: [id number]            I reviewed the patient's new clinical results.    ASSESSMENT AND PLAN: This is a 67 y.o. female with:    Active Hospital Problems    Diagnosis  POA   • **Atypical chest pain [R07.89]  Unknown     Priority: High     Patient into the ER with 1 day of left anterior chest chest pain radiating up to the left side of the neck.  Negative left arm involvement.  Some shortness of breath and fatigue.  - BNP 1474  - EKG showing atrial fibrillation  - Troponins negative x2 follow for third.  - Patient's cardiologist Dr. Jurado consulted.  - Morphine and/or nitroglycerin  - Continuous cardiac monitoring  - Repeat echocardiogram     • Cystitis [N30.90]  Yes     Priority: Medium     Patient endorses 3-day history of decreased urine output and spite of maintaining normal p.o. intake of  fluids.  - UA showing moderate leukocytes, 21-30 white blood cells.  - Continue Keflex  - IV normal saline 75 mils an hour.  - Bladder scan     • (HFpEF) heart failure with preserved ejection fraction (HCC) [I50.30]  Yes     Priority: Medium     Previous echocardiogram done 2/18 showing ejection fraction 58% and mild to moderate mitral valve regurgitation.  - Repeat echocardiogram today  - Consult Dr. Jurado, patient's cardiologist     • Atrial fibrillation, persistent (HCC) [I48.19]  Yes     Priority: Medium     ECG today show atrial fibrillation with heart rate of 71 bpm.  -Initial troponins negative  -Continue troponins x2  - BNP in ER 1474  - Continue home Xarelto for anticoagulation  - Consult patient's cardiologist Dr. Jurado     • Generalized anxiety disorder [F41.1]  Yes     Priority: Low     Continue abilify, ativan, zoloft       • Low back pain [M54.50]  Yes     Patient with history of laminectomy with insertion of rods.  - Continue home muscle relaxants         DVT prophylaxis: Xarelto     Lana Mata and I have discussed pain goals for this hospitalization after reviewing her current clinical condition, medical history and prior pain experiences.  The goal is to keep the pain level mild to moderate.  To help achieve this, I plan to Tylenol for mild to moderate pain, morphine and/or nitroglycerin for severe chest pain..    KAYLEIGH #PDMP, reviewed and consistent with patient reported medications.    Expected Length of Stay: Overnight observation    I discussed the patient's findings and my recommendations with patient.     Rusty Urrutia MD is the attending on record at time of admission, He is aware of the patient's status and agrees with the above history and physical.        This document has been electronically signed by Negro Kim MD on May 11, 2022 19:48 CDT

## 2022-05-12 NOTE — CONSULTS
Cardiology at [facility]  Cardiovascular Consultation Note      Lana Ayala Attebury  327/1  [id number]  1955    DATE OF ADMISSION: 5/11/2022  DATE OF CONSULTATION:  5/12/2022    Leticia Tejeda MD  Treatment Team:   Attending Provider: Negro Kim MD  Consulting Physician: Kristyn Jurado MD  Admitting Provider: Rusty Urrutia MD    Chief Complaint   Patient presents with   • Chest Pain   • Edema       Chief complaint/ Reason for Consultation  : Precordial chest pain      History of Present Illness  Body mass index is 36.79 kg/m². BMI is above normal parameters. Recommendations include: exercise counseling, nutrition counseling and referral to primary care.    67 years old patient with a background history of chronic persistent atrial fibrillation failed amiodarone refused EP study and ablation failed Tikosyn continue with AV kaya blocking drug with a background history of hypertension hyperlipidemia morbid obesity presented for evaluation chest pain described as pressure-like feeling on the left precordium currently she is pain-free.  Baseline EKG is atrial fibrillation with good resting heart rate .She is resting comfortably in bed hemodynamically stable.  Monitor no significant bradycardia or tachyarrhythmia noted.  Cardiac enzymes are negative.  Patient has history of postural dizziness but never blacked out           JOCY 1/2018  ·    · Transesophageal Echocardiogram with Color and Doppler  · Left Ventricle: Estimated EF appears to be in the range of 61 - 65%. Normal left ventricular cavity size noted  · Left ventricular wall thickness is consistent with mild concentric hypertrophy  · Right Ventricle: Normal right ventricular cavity size, wall thickness, systolic function and septal motion noted  · No evidence of a left atrial appendage thrombus was present. The interatrial septum appears redundant  · No evidence of a patent foramen ovale. No evidence of an atrial septal  defect present. Saline test results are negative.  · Mild aortic valve regurgitation is present  · Mild mitral valve regurgitation is present    Past Medical History:   Diagnosis Date   • Abnormal gait 11/28/2012   • Abrasion 02/26/2015   • Acute ankle pain 03/31/2016   • Acute bronchitis 09/04/2015   • Allergic rhinitis 05/22/2012   • Asthenia 05/22/2014   • Atrial fibrillation (HCC) 03/04/2016    - converted    • Attempted suicide ([facility])    • Benign essential hypertension 03/04/2016   • Cardiovascular stress test abnormal 03/04/2016   • Cellulitis of knee 03/03/2015   • Chest pain 04/02/2015   • Chronic pain disorder    • Common cold 06/24/2014   • Congestive heart failure (HCC) 02/04/2016   • Depressive disorder 05/22/2015    suspect more complicated psych issues      • Fibrocystic breast    • Gastroesophageal reflux disease 01/17/2013   • Generalized anxiety disorder 02/04/2016   • H/O echocardiogram     Normal LV function with Ef of 60-65%.Mildly dilated right ventricle with normal function. Grade 1B diastolic dysfunction with mild CLVH.NO sig. valvular regurg. or stenosis.   • Headache 10/27/2014   • Hyperlipidemia 03/04/2016   • Low back pain 06/30/2016    Need for prophylactic vaccination against Streptococcus pneumoniae [pneumococcus]    01/23/2015    • Neck pain 06/30/2016   • Obstructive sleep apnea of adult 07/30/2015   • Obstructive sleep apnea syndrome    • Osteoarthritis    • Osteoporosis 06/30/2016   • Pain in right shoulder 06/30/2016   • Pain in right shoulder 04/24/2014   • Shortness of breath 04/02/2015   • Skin ulcer ([facility]) 04/13/2015    left   • Thyrotoxicosis with or without goiter 10/24/2014   • Urinary incontinence 05/22/2014       Past Surgical History:   Procedure Laterality Date   • BACK SURGERY  08/07/2015    Removal of intstrumentation,L5-S1.Exploration of fusion,L5-S1.Posterolateral fusion,L4-L5.Posterior segmental spinal instrumentation L4-5.Transforaminal interbody fusion Through right  sided approach.Performed at    • COLONOSCOPY  05/04/2016    will order cologuard   • INJECTION OF MEDICATION  03/23/2015    celestone   • INJECTION OF MEDICATION  05/04/2016    KENALOG(6)   • LUMBAR FUSION      discectomy fusion with rods L4-S1   • LUMBAR LAMINECTOMY  1995   • MANDIBLE FRACTURE SURGERY      JAW WIRED SHUT   • MOUTH SURGERY  10/05/2015    Fractured mandible. Removal of arch bars.   • MOUTH SURGERY  1985    Fractured mandible. Removal of arch bars.   • NASAL SEPTUM SURGERY     • RHINOPLASTY     • TUBAL ABDOMINAL LIGATION  1986       Allergies   Allergen Reactions   • Codeine Nausea And Vomiting   • Latex Rash   • Morphine And Related Hallucinations   • Pravachol [Pravastatin Sodium] Myalgia       No current facility-administered medications on file prior to encounter.     Current Outpatient Medications on File Prior to Encounter   Medication Sig Dispense Refill   • alendronate (Fosamax) 70 MG tablet Take 1 tablet by mouth Every 7 (Seven) Days. 4 tablet 11   • atorvastatin (LIPITOR) 20 MG tablet TAKE ONE TABLET BY MOUTH EVERY DAY 90 tablet 3   • bumetanide (BUMEX) 0.5 MG tablet Take 1 tablet by mouth 2 (Two) Times a Day. 60 tablet 0   • Calcium Carb-Cholecalciferol (CALCIUM-VITAMIN D) 500-200 MG-UNIT per tablet Take 1 tablet by mouth 2 (Two) Times a Day With Meals. 60 tablet 2   • choline fenofibrate (Trilipix) 135 MG capsule Take 1 capsule by mouth Daily. 90 capsule 3   • dilTIAZem XR (Dilt-XR) 180 MG 24 hr capsule Take 1 capsule by mouth Daily. 14 capsule 0   • Incontinence Supply Disposable (Depend Adjustable Underwear) misc 1 application As Needed (Incontinence). 90 each 11   • lidocaine (LIDODERM) 5 % Place 1 patch on the skin as directed by provider Daily. Remove & Discard patch within 12 hours or as directed by MD 30 each 0   • methocarbamol (ROBAXIN) 500 MG tablet Take 1 tablet by mouth 4 (Four) Times a Day As Needed for Muscle Spasms. 90 tablet 4   • metoprolol tartrate (LOPRESSOR) 25 MG  tablet TAKE 1 TABLET EVERY 12 HOURS 60 tablet 4   • omeprazole (priLOSEC) 20 MG capsule Take 1 capsule by mouth Daily. 90 capsule 5   • rivaroxaban (Xarelto) 20 MG tablet Take one tablet by mouth every day- take with food 30 tablet 0   • ARIPiprazole (ABILIFY) 5 MG tablet Take 5 mg by mouth Daily. At bedtime     • aspirin 325 MG tablet Take 500 mg by mouth Daily. At bedtime, instructed to take a half tablet of 500 aspirin.      • Diclofenac Sodium (VOLTAREN) 1 % gel gel Apply 4 g topically to the appropriate area as directed 4 (Four) Times a Day As Needed (Joint pains in back, knees and hands.). 350 g 3   • ferrous sulfate 325 (65 FE) MG tablet Take 325 mg by mouth Daily With Breakfast.     • lisinopril (PRINIVIL,ZESTRIL) 10 MG tablet Take 10 mg by mouth Daily.     • LORazepam (ATIVAN) 1 MG tablet Take 1 mg by mouth 2 (Two) Times a Day. Scheduled to take in the morning and one with dinner  0   • sertraline (ZOLOFT) 100 MG tablet Take 100 mg by mouth 2 (Two) Times a Day.  1       Social History     Socioeconomic History   • Marital status:    Tobacco Use   • Smoking status: Never Smoker   • Smokeless tobacco: Never Used   • Tobacco comment: smoking cessation    Substance and Sexual Activity   • Alcohol use: No   • Drug use: No     Comment: lortab / oxycontin   • Sexual activity: Defer           Review of Systems:     Constitution:  Denies any fatigue, fever or chills.  Positive for activity change    HENT:  Denies any headache, hearing impairment.    Eyes:  Denies any blurring of vision, or photophobia.     Cardiovascular:  As per history of present illness.     Respiratory: Baseline shortness of breath multifactorial etiology     Endocrine: No polydipsia polyuria       Musculoskeletal: History of osteoarthritis    Gastrointestinal:  No nausea, vomiting, or melena.    Genitourinary:  No dysuria or hematuria.    Neurological:  No history of seizure disorder, stroke, or memory  "problems.    Psychiatric/Behavioral:  No history of depression, bipolar disorder or schizophrenia.     Hematological:  No history of easy bruising.         Objective:     Vitals:    05/12/22 0352 05/12/22 0500 05/12/22 0718 05/12/22 0735   BP: 137/85  158/93    BP Location: Left arm  Left arm    Patient Position: Lying  Lying    Pulse: 78  94 73   Resp: 20  18    Temp: 97.9 °F (36.6 °C)  97.8 °F (36.6 °C)    TempSrc: Oral  Temporal    SpO2: 93%  96%    Weight:  100 kg (221 lb 1.6 oz)     Height:         Body mass index is 36.79 kg/m².  Flowsheet Rows    Flowsheet Row First Filed Value   Admission Height 165.1 cm (65\") Documented at 05/11/2022 1456   Admission Weight 99.8 kg (220 lb) Documented at 05/11/2022 1456          Intake/Output Summary (Last 24 hours) at 5/12/2022 1302  Last data filed at 5/12/2022 0352  Gross per 24 hour   Intake --   Output 700 ml   Net -700 ml         Physical Exam   Constitutional: Cooperative, alert and oriented, well developed, well nourished, in no acute distress.     HENT:   Head: Normocephalic, conjunctivae is a pink, thyroid is nonpalpable no carotid bruit and trachea central.     Cardiovascular: Irregular rate and rhythm, S1 and S2 normal, no S3 or S4. Apical impulse not displaced. No murmurs    Pulmonary/Chest: Chest: No chest wall tenderness no rales and wheezing    Abdominal: Abdomen soft, bowel sounds normoactive, no masses.    Musculoskeletal: No deformities, clubbing, cyanosis, erythema. Positive mild edema.    Neurological: No gross motor or sensory deficits noted    Skin: Warm and dry to the touch, no apparent skin lesions .     Psychiatric: Normal mood and affect. Behavior is normal.    Radiology Review    XR Chest 1 View   Final Result   Stable exam without acute cardiopulmonary process.      Electronically signed by:  Jean Harkins MD  5/11/2022 3:55 PM CDT   Workstation: [id number]          Lab Results:  Lab Results (last 24 hours)     Procedure Component Value Units " Date/Time    Comprehensive Metabolic Panel [[id number]]  (Abnormal) Collected: 05/12/22 0717    Specimen: Blood Updated: 05/12/22 0802     Glucose 122 mg/dL      BUN 20 mg/dL      Creatinine 1.09 mg/dL      Sodium 139 mmol/L      Potassium 3.8 mmol/L      Chloride 101 mmol/L      CO2 26.0 mmol/L      Calcium 9.6 mg/dL      Total Protein 6.9 g/dL      Albumin 3.90 g/dL      ALT (SGPT) 13 U/L      AST (SGOT) 20 U/L      Alkaline Phosphatase 43 U/L      Total Bilirubin 0.7 mg/dL      Globulin 3.0 gm/dL      A/G Ratio 1.3 g/dL      BUN/Creatinine Ratio 18.3     Anion Gap 12.0 mmol/L      eGFR 55.8 mL/min/1.73      Comment: National Kidney Foundation and American Society of Nephrology (ASN) Task Force recommended calculation based on the Chronic Kidney Disease Epidemiology Collaboration (CKD-EPI) equation refit without adjustment for race.       Narrative:      GFR Normal >60  Chronic Kidney Disease <60  Kidney Failure <15      CBC & Differential [[id number]]  (Abnormal) Collected: 05/12/22 0717    Specimen: Blood Updated: 05/12/22 0744    Narrative:      The following orders were created for panel order CBC & Differential.  Procedure                               Abnormality         Status                     ---------                               -----------         ------                     CBC Auto Differential[[id number]]        Abnormal            Final result                 Please view results for these tests on the individual orders.    CBC Auto Differential [[id number]]  (Abnormal) Collected: 05/12/22 0717    Specimen: Blood Updated: 05/12/22 0744     WBC 9.13 10*3/mm3      RBC 4.96 10*6/mm3      Hemoglobin 14.6 g/dL      Hematocrit 42.7 %      MCV 86.1 fL      MCH 29.4 pg      MCHC 34.2 g/dL      RDW 13.1 %      RDW-SD 40.7 fl      MPV 10.0 fL      Platelets 237 10*3/mm3      Neutrophil % 57.9 %      Lymphocyte % 28.5 %      Monocyte % 7.0 %      Eosinophil % 5.3 %      Basophil % 0.9 %      Immature Grans % 0.4  %      Neutrophils, Absolute 5.29 10*3/mm3      Lymphocytes, Absolute 2.60 10*3/mm3      Monocytes, Absolute 0.64 10*3/mm3      Eosinophils, Absolute 0.48 10*3/mm3      Basophils, Absolute 0.08 10*3/mm3      Immature Grans, Absolute 0.04 10*3/mm3      nRBC 0.0 /100 WBC     Troponin [[id number]]  (Normal) Collected: 05/11/22 2135    Specimen: Blood Updated: 05/11/22 2204     Troponin T <0.010 ng/mL     Narrative:      Troponin T Reference Range:  <= 0.03 ng/mL-   Negative for AMI  >0.03 ng/mL-     Abnormal for myocardial necrosis.  Clinicians would have to utilize clinical acumen, EKG, Troponin and serial changes to determine if it is an Acute Myocardial Infarction or myocardial injury due to an underlying chronic condition.       Results may be falsely decreased if patient taking Biotin.      COVID-19 and FLU A/B PCR - Swab, Nasopharynx [[id number]]  (Normal) Collected: 05/11/22 1840    Specimen: Swab from Nasopharynx Updated: 05/11/22 1933     COVID19 Not Detected     Influenza A PCR Not Detected     Influenza B PCR Not Detected    Narrative:      Fact sheet for providers: https://www.fda.gov/media/466687/download    Fact sheet for patients: https://www.fda.gov/media/269329/download    Test performed by PCR.    Troponin [[id number]]  (Normal) Collected: 05/11/22 1839    Specimen: Blood Updated: 05/11/22 1917     Troponin T <0.010 ng/mL     Narrative:      Troponin T Reference Range:  <= 0.03 ng/mL-   Negative for AMI  >0.03 ng/mL-     Abnormal for myocardial necrosis.  Clinicians would have to utilize clinical acumen, EKG, Troponin and serial changes to determine if it is an Acute Myocardial Infarction or myocardial injury due to an underlying chronic condition.       Results may be falsely decreased if patient taking Biotin.      Urinalysis, Microscopic Only - Urine, Clean Catch [[id number]]  (Abnormal) Collected: 05/11/22 1632    Specimen: Urine, Clean Catch Updated: 05/11/22 1647     RBC, UA 0-2 /HPF      WBC, UA  21-30 /HPF      Bacteria, UA None Seen /HPF      Squamous Epithelial Cells, UA 0-2 /HPF      Hyaline Casts, UA None Seen /LPF      Methodology Automated Microscopy    Urinalysis With Microscopic If Indicated (No Culture) - Urine, Clean Catch [[id number]]  (Abnormal) Collected: 05/11/22 1632    Specimen: Urine, Clean Catch Updated: 05/11/22 1642     Color, UA Yellow     Appearance, UA Clear     pH, UA 6.0     Specific Gravity, UA 1.010     Glucose, UA Negative     Ketones, UA Negative     Bilirubin, UA Negative     Blood, UA Negative     Protein, UA Negative     Leuk Esterase, UA Moderate (2+)     Nitrite, UA Negative     Urobilinogen, UA 1.0 E.U./dL    [place] Draw [[id number]] Collected: 05/11/22 1518    Specimen: Blood Updated: 05/11/22 1634    Narrative:      The following orders were created for panel order [place] Draw.  Procedure                               Abnormality         Status                     ---------                               -----------         ------                     Green Top (Gel)[[id number]]                                  Final result               Lavender Top[[id number]]                                     Final result               Gold Top - SST[[id number]]                                   Final result               Light Blue Top[[id number]]                                   Final result                 Please view results for these tests on the individual orders.    Gold Top - SST [[id number]] Collected: 05/11/22 1518    Specimen: Blood Updated: 05/11/22 1634     Extra Tube Hold for add-ons.     Comment: Auto resulted.       Lavender Top [[id number]] Collected: 05/11/22 1518    Specimen: Blood Updated: 05/11/22 1634     Extra Tube hold for add-on     Comment: Auto resulted       Light Blue Top [[id number]] Collected: 05/11/22 1518    Specimen: Blood Updated: 05/11/22 1634     Extra Tube Hold for add-ons.     Comment: Auto resulted       Green Top (Gel) [[id number]] Collected: 05/11/22  1518    Specimen: Blood Updated: 05/11/22 1634     Extra Tube Hold for add-ons.     Comment: Auto resulted.       Troponin [[id number]]  (Normal) Collected: 05/11/22 1518    Specimen: Blood Updated: 05/11/22 1550     Troponin T <0.010 ng/mL     Narrative:      Troponin T Reference Range:  <= 0.03 ng/mL-   Negative for AMI  >0.03 ng/mL-     Abnormal for myocardial necrosis.  Clinicians would have to utilize clinical acumen, EKG, Troponin and serial changes to determine if it is an Acute Myocardial Infarction or myocardial injury due to an underlying chronic condition.       Results may be falsely decreased if patient taking Biotin.      Comprehensive Metabolic Panel [[id number]]  (Abnormal) Collected: 05/11/22 1518    Specimen: Blood Updated: 05/11/22 1550     Glucose 105 mg/dL      BUN 21 mg/dL      Creatinine 1.21 mg/dL      Sodium 139 mmol/L      Potassium 3.6 mmol/L      Chloride 101 mmol/L      CO2 26.0 mmol/L      Calcium 10.2 mg/dL      Total Protein 7.1 g/dL      Albumin 4.00 g/dL      ALT (SGPT) 13 U/L      AST (SGOT) 20 U/L      Alkaline Phosphatase 45 U/L      Total Bilirubin 0.6 mg/dL      Globulin 3.1 gm/dL      A/G Ratio 1.3 g/dL      BUN/Creatinine Ratio 17.4     Anion Gap 12.0 mmol/L      eGFR 49.2 mL/min/1.73      Comment: National Kidney Foundation and American Society of Nephrology (ASN) Task Force recommended calculation based on the Chronic Kidney Disease Epidemiology Collaboration (CKD-EPI) equation refit without adjustment for race.       Narrative:      GFR Normal >60  Chronic Kidney Disease <60  Kidney Failure <15      BNP [[id number]]  (Abnormal) Collected: 05/11/22 1518    Specimen: Blood Updated: 05/11/22 1548     proBNP 1,474.0 pg/mL     Narrative:      Among patients with dyspnea, NT-proBNP is highly sensitive for the detection of acute congestive heart failure. In addition NT-proBNP of <300 pg/ml effectively rules out acute congestive heart failure with 99% negative predictive  value.    Results may be falsely decreased if patient taking Biotin.      CBC & Differential [[id number]]  (Abnormal) Collected: 05/11/22 1518    Specimen: Blood Updated: 05/11/22 1525    Narrative:      The following orders were created for panel order CBC & Differential.  Procedure                               Abnormality         Status                     ---------                               -----------         ------                     CBC Auto Differential[[id number]]        Abnormal            Final result                 Please view results for these tests on the individual orders.    CBC Auto Differential [[id number]]  (Abnormal) Collected: 05/11/22 1518    Specimen: Blood Updated: 05/11/22 1525     WBC 10.52 10*3/mm3      RBC 4.74 10*6/mm3      Hemoglobin 14.3 g/dL      Hematocrit 41.2 %      MCV 86.9 fL      MCH 30.2 pg      MCHC 34.7 g/dL      RDW 13.1 %      RDW-SD 41.6 fl      MPV 9.9 fL      Platelets 231 10*3/mm3      Neutrophil % 57.6 %      Lymphocyte % 27.9 %      Monocyte % 8.7 %      Eosinophil % 4.4 %      Basophil % 0.8 %      Immature Grans % 0.6 %      Neutrophils, Absolute 6.07 10*3/mm3      Lymphocytes, Absolute 2.94 10*3/mm3      Monocytes, Absolute 0.91 10*3/mm3      Eosinophils, Absolute 0.46 10*3/mm3      Basophils, Absolute 0.08 10*3/mm3      Immature Grans, Absolute 0.06 10*3/mm3      nRBC 0.0 /100 WBC           I personally viewed and interpreted the patient's EKG/Telemetry data       Assessment/Plan:       #1 chest pain    Patient admitted for evaluation of chest pain described as pressure-like feeling over the left precordium nonradiating no other associated symptoms she is a pain-free at the time of evaluation.  EKG atrial fibrillation without acute ST-T wave changes cardiac regular doctor.  Given the clinical description of chest pain and the risk factor it seems appropriate to risk stratify with a Lexiscan Cardiolite 2 days protocol given the patient's body habitus.  Procedure  risk pros and cons discussed understand willing to proceed forward    #2  Paroxysmal/persistent atrial fibrillation failed amiodarone, failed Tikosyn on AV kaya blocking drug and doing well we will continue Xarelto to decrease risk of embolization currently ,will continue calcium channel and beta-blocker.         [id number] score is 2 she preferred oral anticoagulation     #2 hypertension      Good blood pressure control  continue metoprolol, calcium channel blocker, lisinopril     Patient was counseled and educated regarding food with high sodium content     #3 hyperlipidemia on atorvastatin LDL with recommended range        Preventive   obesity with a BMI of 36.79 with history of hypertension paroxysmal atrial fibrillation     Patient was counseled educated regarding risk factor lifestyle modification low carbohydrate, low-fat, DASH diet graded exercise              This document has been electronically signed by Kristyn Jurado MD on May 12, 2022 13:15 CDT      Thank you for allowing me to participate in the care of Lana Mata. Feel free to contact me directly with any further questions or concerns.    Kristyn Jurado MD  05/12/22  13:02 CDT.      Part of this note may be an electronic transcription/translation of spoken language to printed text using the Dragon Dictation system.

## 2022-05-12 NOTE — PROGRESS NOTES
FAMILY MEDICINE RESIDENCY SERVICE  DAILY PROGRESS NOTE    NAME: Lana Mata  : 1955  MRN: [id number]      LOS: 1 day     PROVIDER OF SERVICE: Priscila Aleman MD    Chief Complaint: Atypical chest pain    Subjective:     Interval History:  History taken from: patient  Overnight event: no acute overnight event.  Today, chest pain resolved. No acute question or concern.     Review of Systems:   Review of Systems   Constitutional: Negative for activity change, appetite change, chills and fever.   HENT: Negative for sore throat, trouble swallowing and voice change.    Eyes: Negative for discharge and visual disturbance.   Respiratory: Negative for cough, shortness of breath and wheezing.    Cardiovascular: Negative for chest pain, palpitations and leg swelling.   Gastrointestinal: Negative for abdominal distention, abdominal pain, constipation, diarrhea, nausea and vomiting.   Genitourinary: Negative for difficulty urinating.   Musculoskeletal: Negative for arthralgias, back pain, joint swelling and myalgias.   Skin: Negative for color change.   Allergic/Immunologic: Negative for environmental allergies.   Neurological: Negative for dizziness, weakness, light-headedness, numbness and headaches.   Psychiatric/Behavioral: Negative for agitation, confusion and sleep disturbance. The patient is not nervous/anxious.        Objective:     Vital Signs  Temp:  [97.8 °F (36.6 °C)-98.7 °F (37.1 °C)] 97.8 °F (36.6 °C)  Heart Rate:  [70-94] 73  Resp:  [18-24] 18  BP: (120-158)/(70-93) 158/93   Body mass index is 36.79 kg/m².    Physical Exam  Physical Exam  Vitals reviewed.   Constitutional:       Appearance: Normal appearance.   HENT:      Head: Normocephalic and atraumatic.      Nose: Nose normal.      Mouth/Throat:      Mouth: Mucous membranes are moist.   Eyes:      Conjunctiva/sclera: Conjunctivae normal.      Pupils: Pupils are equal, round, and reactive to light.   Cardiovascular:      Rate and Rhythm: Normal  rate and regular rhythm.      Pulses: Normal pulses.      Heart sounds: Normal heart sounds.   Pulmonary:      Effort: Pulmonary effort is normal.      Breath sounds: Normal breath sounds.   Abdominal:      General: Bowel sounds are normal.      Palpations: Abdomen is soft.   Musculoskeletal:         General: Normal range of motion.      Cervical back: Normal range of motion and neck supple.   Skin:     General: Skin is warm.      Capillary Refill: Capillary refill takes less than 2 seconds.   Neurological:      General: No focal deficit present.      Mental Status: She is alert. Mental status is at baseline.   Psychiatric:         Mood and Affect: Mood normal.         Behavior: Behavior normal.         Scheduled Meds   ARIPiprazole, 5 mg, Oral, Daily  aspirin, 325 mg, Oral, Daily  atorvastatin, 20 mg, Oral, Daily  bumetanide, 0.5 mg, Oral, BID  cephalexin, 500 mg, Oral, Q6H  dilTIAZem CD, 180 mg, Oral, Q24H  fenofibrate, 145 mg, Oral, Daily  lidocaine, 1 patch, Transdermal, Q24H  lisinopril, 10 mg, Oral, Daily  LORazepam, 1 mg, Oral, Q12H  metoprolol tartrate, 25 mg, Oral, Q12H  pantoprazole, 40 mg, Oral, QAM  rivaroxaban, 20 mg, Oral, Daily With Dinner  senna-docusate sodium, 2 tablet, Oral, BID  sertraline, 100 mg, Oral, Daily  sodium chloride, 10 mL, Intravenous, Q12H       PRN Meds   •  acetaminophen **OR** acetaminophen **OR** acetaminophen  •  senna-docusate sodium **AND** polyethylene glycol **AND** bisacodyl **AND** bisacodyl  •  loperamide  •  methocarbamol  •  Morphine  •  nitroglycerin  •  ondansetron  •  sodium chloride  •  sodium chloride      Diagnostic Data    Results from last 7 days   Lab Units 05/12/22  0717   WBC 10*3/mm3 9.13   HEMOGLOBIN g/dL 14.6   HEMATOCRIT % 42.7   PLATELETS 10*3/mm3 237   GLUCOSE mg/dL 122*   CREATININE mg/dL 1.09*   BUN mg/dL 20   SODIUM mmol/L 139   POTASSIUM mmol/L 3.8   AST (SGOT) U/L 20   ALT (SGPT) U/L 13   ALK PHOS U/L 43   BILIRUBIN mg/dL 0.7   ANION GAP mmol/L  12.0       XR Chest 1 View    Result Date: 5/11/2022  Stable exam without acute cardiopulmonary process. Electronically signed by:  Jean Harkins MD  5/11/2022 3:55 PM CDT Workstation: [id number]        I reviewed the patient's new clinical results.    Assessment/Plan:     Active Hospital Problems    Diagnosis  POA   • **Atypical chest pain [R07.89]  Unknown     Patient into the ER with 1 day of left anterior chest chest pain radiating up to the left side of the neck.  Negative left arm involvement.  Some shortness of breath and fatigue.  - BNP 1474  - EKG showing atrial fibrillation- Dr. Jurado is her cardiologist  - Troponins negative x2 follow for third.  - Patient's cardiologist Dr. Jurado consulted.  - Morphine and/or nitroglycerin  - Continuous cardiac monitoring  - Repeat echocardiogram     • Cystitis [N30.90]  Yes     Patient endorses 3-day history of decreased urine output and spite of maintaining normal p.o. intake of fluids.  - UA showing moderate leukocytes, 21-30 white blood cells.  - Continue Keflex  - IV normal saline 75 mils an hour.  - Bladder scan consideration      • (HFpEF) heart failure with preserved ejection fraction (HCC) [I50.30]  Yes     Previous echocardiogram done 2/18/22 showing ejection fraction 56-60% and mild to moderate mitral valve regurgitation, concentric hypertrophy.  - Repeat echocardiogram today  - Consult Dr. Jurado, patient's cardiologist     • Atrial fibrillation, persistent (HCC) [I48.19]  Yes     ECG today show atrial fibrillation with heart rate of 71 bpm.  -Initial troponins negative  -Continue troponins x2  - BNP in ER 1474  - Continue home Xarelto for anticoagulation  - Consult patient's cardiologist Dr. Jurado   -on CCB, ACE, B blocker, ASA  -per Dr. Jurado previous 11/23/21 note -->failed amio, and tikosyn, refused EP/ a fib ablation     • SOPHIA (obstructive sleep apnea) [G47.33]  Unknown     Per pulm note on 2/2018  Recs: cpap for sophia, caution w albuterol due to increase  in HR. No new note since then.      • Low back pain [M54.50]  Yes     Patient with history of laminectomy with insertion of rods.  - Continue home muscle relaxants     • Generalized anxiety disorder [F41.1]  Yes     Continue abilify, ativan, zoloft           DVT prophylaxis: xarelto  Code status is   Code Status and Medical Interventions:   Ordered at: 05/11/22 2055     Level Of Support Discussed With:    Patient     Code Status (Patient has no pulse and is not breathing):    CPR (Attempt to Resuscitate)     Medical Interventions (Patient has pulse or is breathing):    Full Support       Plan for disposition:Where: current living arrangements and When:  1-2days      Time: 15 min     This document has been electronically signed by Priscila Aleman MD on May 12, 2022 08:09 CDT

## 2022-05-13 ENCOUNTER — APPOINTMENT (OUTPATIENT)
Dept: NUCLEAR MEDICINE | Facility: HOSPITAL | Age: 67
End: 2022-05-13

## 2022-05-13 ENCOUNTER — APPOINTMENT (OUTPATIENT)
Dept: CARDIOLOGY | Facility: HOSPITAL | Age: 67
End: 2022-05-13

## 2022-05-13 ENCOUNTER — READMISSION MANAGEMENT (OUTPATIENT)
Dept: CALL CENTER | Facility: HOSPITAL | Age: 67
End: 2022-05-13

## 2022-05-13 VITALS
RESPIRATION RATE: 16 BRPM | OXYGEN SATURATION: 94 % | BODY MASS INDEX: 37.29 KG/M2 | DIASTOLIC BLOOD PRESSURE: 77 MMHG | WEIGHT: 223.8 LBS | HEART RATE: 85 BPM | SYSTOLIC BLOOD PRESSURE: 136 MMHG | HEIGHT: 65 IN | TEMPERATURE: 97.5 F

## 2022-05-13 PROBLEM — N30.90 CYSTITIS: Status: RESOLVED | Noted: 2022-05-11 | Resolved: 2022-05-13

## 2022-05-13 LAB
ALBUMIN SERPL-MCNC: 3.5 G/DL (ref 3.5–5.2)
ALBUMIN/GLOB SERPL: 1.2 G/DL
ALP SERPL-CCNC: 45 U/L (ref 39–117)
ALT SERPL W P-5'-P-CCNC: 12 U/L (ref 1–33)
ANION GAP SERPL CALCULATED.3IONS-SCNC: 10 MMOL/L (ref 5–15)
AST SERPL-CCNC: 16 U/L (ref 1–32)
BASOPHILS # BLD AUTO: 0.09 10*3/MM3 (ref 0–0.2)
BASOPHILS NFR BLD AUTO: 1.2 % (ref 0–1.5)
BH CV REST NUCLEAR ISOTOPE DOSE: 34.6 MCI
BH CV STRESS COMMENTS STAGE 1: NORMAL
BH CV STRESS DOSE REGADENOSON STAGE 1: 0.4
BH CV STRESS DURATION MIN STAGE 1: 0
BH CV STRESS DURATION SEC STAGE 1: 10
BH CV STRESS HR STAGE 1: 69
BH CV STRESS NUCLEAR ISOTOPE DOSE: 36.2 MCI
BH CV STRESS PROTOCOL 1: NORMAL
BH CV STRESS RECOVERY BP: NORMAL MMHG
BH CV STRESS RECOVERY HR: 77 BPM
BH CV STRESS STAGE 1: 1
BILIRUB SERPL-MCNC: 0.5 MG/DL (ref 0–1.2)
BUN SERPL-MCNC: 17 MG/DL (ref 8–23)
BUN/CREAT SERPL: 15 (ref 7–25)
CALCIUM SPEC-SCNC: 8.6 MG/DL (ref 8.6–10.5)
CHLORIDE SERPL-SCNC: 106 MMOL/L (ref 98–107)
CO2 SERPL-SCNC: 26 MMOL/L (ref 22–29)
CREAT SERPL-MCNC: 1.13 MG/DL (ref 0.57–1)
DEPRECATED RDW RBC AUTO: 41.1 FL (ref 37–54)
EGFRCR SERPLBLD CKD-EPI 2021: 53.4 ML/MIN/1.73
EOSINOPHIL # BLD AUTO: 0.47 10*3/MM3 (ref 0–0.4)
EOSINOPHIL NFR BLD AUTO: 6.2 % (ref 0.3–6.2)
ERYTHROCYTE [DISTWIDTH] IN BLOOD BY AUTOMATED COUNT: 13.2 % (ref 12.3–15.4)
GLOBULIN UR ELPH-MCNC: 2.9 GM/DL
GLUCOSE SERPL-MCNC: 112 MG/DL (ref 65–99)
HCT VFR BLD AUTO: 39.9 % (ref 34–46.6)
HGB BLD-MCNC: 13.7 G/DL (ref 12–15.9)
IMM GRANULOCYTES # BLD AUTO: 0.06 10*3/MM3 (ref 0–0.05)
IMM GRANULOCYTES NFR BLD AUTO: 0.8 % (ref 0–0.5)
LV EF NUC BP: 75 %
LYMPHOCYTES # BLD AUTO: 1.9 10*3/MM3 (ref 0.7–3.1)
LYMPHOCYTES NFR BLD AUTO: 24.9 % (ref 19.6–45.3)
MAXIMAL PREDICTED HEART RATE: 153 BPM
MCH RBC QN AUTO: 29.3 PG (ref 26.6–33)
MCHC RBC AUTO-ENTMCNC: 34.3 G/DL (ref 31.5–35.7)
MCV RBC AUTO: 85.4 FL (ref 79–97)
MONOCYTES # BLD AUTO: 0.61 10*3/MM3 (ref 0.1–0.9)
MONOCYTES NFR BLD AUTO: 8 % (ref 5–12)
NEUTROPHILS NFR BLD AUTO: 4.5 10*3/MM3 (ref 1.7–7)
NEUTROPHILS NFR BLD AUTO: 58.9 % (ref 42.7–76)
NRBC BLD AUTO-RTO: 0 /100 WBC (ref 0–0.2)
PERCENT MAX PREDICTED HR: 61.44 %
PLATELET # BLD AUTO: 196 10*3/MM3 (ref 140–450)
PMV BLD AUTO: 9.8 FL (ref 6–12)
POTASSIUM SERPL-SCNC: 3.6 MMOL/L (ref 3.5–5.2)
PROT SERPL-MCNC: 6.4 G/DL (ref 6–8.5)
RBC # BLD AUTO: 4.67 10*6/MM3 (ref 3.77–5.28)
SODIUM SERPL-SCNC: 142 MMOL/L (ref 136–145)
STRESS BASELINE BP: NORMAL MMHG
STRESS BASELINE HR: 78 BPM
STRESS PERCENT HR: 72 %
STRESS POST ESTIMATED WORKLOAD: 1 METS
STRESS POST EXERCISE DUR SEC: 46 SEC
STRESS POST PEAK BP: NORMAL MMHG
STRESS POST PEAK HR: 94 BPM
STRESS TARGET HR: 130 BPM
WBC NRBC COR # BLD: 7.63 10*3/MM3 (ref 3.4–10.8)

## 2022-05-13 PROCEDURE — 78452 HT MUSCLE IMAGE SPECT MULT: CPT | Performed by: INTERNAL MEDICINE

## 2022-05-13 PROCEDURE — 0 TECHNETIUM SESTAMIBI: Performed by: CHIROPRACTOR

## 2022-05-13 PROCEDURE — 25010000002 REGADENOSON 0.4 MG/5ML SOLUTION: Performed by: INTERNAL MEDICINE

## 2022-05-13 PROCEDURE — 93018 CV STRESS TEST I&R ONLY: CPT | Performed by: INTERNAL MEDICINE

## 2022-05-13 PROCEDURE — 93016 CV STRESS TEST SUPVJ ONLY: CPT | Performed by: INTERNAL MEDICINE

## 2022-05-13 PROCEDURE — 85025 COMPLETE CBC W/AUTO DIFF WBC: CPT | Performed by: STUDENT IN AN ORGANIZED HEALTH CARE EDUCATION/TRAINING PROGRAM

## 2022-05-13 PROCEDURE — A9500 TC99M SESTAMIBI: HCPCS | Performed by: CHIROPRACTOR

## 2022-05-13 PROCEDURE — 80053 COMPREHEN METABOLIC PANEL: CPT | Performed by: STUDENT IN AN ORGANIZED HEALTH CARE EDUCATION/TRAINING PROGRAM

## 2022-05-13 PROCEDURE — G0378 HOSPITAL OBSERVATION PER HR: HCPCS

## 2022-05-13 PROCEDURE — 96361 HYDRATE IV INFUSION ADD-ON: CPT

## 2022-05-13 RX ORDER — ISOSORBIDE MONONITRATE 30 MG/1
30 TABLET, EXTENDED RELEASE ORAL
Qty: 30 TABLET | Refills: 3 | Status: SHIPPED | OUTPATIENT
Start: 2022-05-13 | End: 2022-09-06 | Stop reason: SDUPTHER

## 2022-05-13 RX ORDER — SODIUM CHLORIDE 0.9 % (FLUSH) 0.9 %
10 SYRINGE (ML) INJECTION ONCE
Status: COMPLETED | OUTPATIENT
Start: 2022-05-13 | End: 2022-05-13

## 2022-05-13 RX ORDER — ISOSORBIDE MONONITRATE 30 MG/1
30 TABLET, EXTENDED RELEASE ORAL
Status: DISCONTINUED | OUTPATIENT
Start: 2022-05-13 | End: 2022-05-13 | Stop reason: HOSPADM

## 2022-05-13 RX ADMIN — BUMETANIDE 0.5 MG: 0.5 TABLET ORAL at 10:07

## 2022-05-13 RX ADMIN — DILTIAZEM HYDROCHLORIDE 180 MG: 180 CAPSULE, COATED, EXTENDED RELEASE ORAL at 10:07

## 2022-05-13 RX ADMIN — ACETAMINOPHEN 650 MG: 325 TABLET ORAL at 13:30

## 2022-05-13 RX ADMIN — REGADENOSON 0.4 MG: 0.08 INJECTION, SOLUTION INTRAVENOUS at 08:28

## 2022-05-13 RX ADMIN — SODIUM CHLORIDE 75 ML/HR: 9 INJECTION, SOLUTION INTRAVENOUS at 04:49

## 2022-05-13 RX ADMIN — ISOSORBIDE MONONITRATE 30 MG: 30 TABLET, EXTENDED RELEASE ORAL at 14:34

## 2022-05-13 RX ADMIN — SERTRALINE 100 MG: 50 TABLET, FILM COATED ORAL at 10:07

## 2022-05-13 RX ADMIN — ASPIRIN 325 MG: 325 TABLET, COATED ORAL at 10:07

## 2022-05-13 RX ADMIN — LOPERAMIDE HYDROCHLORIDE 2 MG: 2 CAPSULE ORAL at 05:58

## 2022-05-13 RX ADMIN — METOPROLOL TARTRATE 25 MG: 25 TABLET, FILM COATED ORAL at 10:07

## 2022-05-13 RX ADMIN — Medication 10 ML: at 10:07

## 2022-05-13 RX ADMIN — Medication 10 ML: at 08:29

## 2022-05-13 RX ADMIN — TECHNETIUM TC 99M SESTAMIBI 1 DOSE: 1 INJECTION INTRAVENOUS at 08:29

## 2022-05-13 RX ADMIN — LISINOPRIL 10 MG: 10 TABLET ORAL at 10:07

## 2022-05-13 RX ADMIN — PANTOPRAZOLE SODIUM 40 MG: 40 TABLET, DELAYED RELEASE ORAL at 05:58

## 2022-05-13 RX ADMIN — LORAZEPAM 1 MG: 0.5 TABLET ORAL at 10:07

## 2022-05-13 RX ADMIN — METHOCARBAMOL 500 MG: 500 TABLET, FILM COATED ORAL at 13:29

## 2022-05-13 NOTE — DISCHARGE SUMMARY
"    DISCHARGE SUMMARY    PATIENT NAME: Lana Mata       PHYSICIAN: Priscila Aleman MD  : 1955  MRN: [id number]    ADMITTED: 2022     DISCHARGED: 2022    ADMISSION DIAGNOSES:  Active Hospital Problems    Diagnosis  POA   • (HFpEF) heart failure with preserved ejection fraction (HCC) [I50.30]  Yes   • Atrial fibrillation, persistent (HCC) [I48.19]  Yes   • SOPHIA (obstructive sleep apnea) [G47.33]  Unknown   • Low back pain [M54.50]  Yes   • Generalized anxiety disorder [F41.1]  Yes      Resolved Hospital Problems    Diagnosis Date Resolved POA   • **Atypical chest pain [R07.89] 2022 Unknown   • Cystitis [N30.90] 2022 Yes     DISCHARGE DIAGNOSES:   Active Hospital Problems    Diagnosis  POA   • (HFpEF) heart failure with preserved ejection fraction (HCC) [I50.30]  Yes   • Atrial fibrillation, persistent (HCC) [I48.19]  Yes   • SOPHIA (obstructive sleep apnea) [G47.33]  Unknown   • Low back pain [M54.50]  Yes   • Generalized anxiety disorder [F41.1]  Yes      Resolved Hospital Problems    Diagnosis Date Resolved POA   • **Atypical chest pain [R07.89] 2022 Unknown   • Cystitis [N30.90] 2022 Yes       SERVICE: Family Medicine Res[facility]  Attending: Rusty Urrutia MD  Resident: Priscila Aleman MD    CONSULTS:   Consult Orders (all) (From admission, onward)     Start     Ordered    22  Inpatient Cardiology Consult  Once        Specialty:  Cardiology  Provider:  Kristyn Jurado MD    22                PROCEDURES:       HISTORY OF PRESENT ILLNESS: copied from Dr. Kim H&P on 2022:    \"Lana Mata is a 67 y.o. female with a CMH of heart failure with preserved ejection fraction, chronic low back pain, GERD, MINO, and hyperlipidemia who presents complaining of acute onset of chest pain.  She states that yesterday evening she began experiencing pain in the left side of her chest.  It then began to radiate up into the left side of her neck.  She denies any " "left arm involvements.  She also reports increasing shortness of breath and fatigue.  She endorses progressive abdominal edema and advises that she is not compliant with a low-sodium diet.  3 days ago she began noticing decreased urine output in spite of maintaining normal oral intake of fluids. \"    DIAGNOSTIC DATA:   Lab Results (last 24 hours)     Procedure Component Value Units Date/Time    Comprehensive Metabolic Panel [[id number]]  (Abnormal) Collected: 05/13/22 0538    Specimen: Blood Updated: 05/13/22 0636     Glucose 112 mg/dL      BUN 17 mg/dL      Creatinine 1.13 mg/dL      Sodium 142 mmol/L      Potassium 3.6 mmol/L      Chloride 106 mmol/L      CO2 26.0 mmol/L      Calcium 8.6 mg/dL      Total Protein 6.4 g/dL      Albumin 3.50 g/dL      ALT (SGPT) 12 U/L      AST (SGOT) 16 U/L      Alkaline Phosphatase 45 U/L      Total Bilirubin 0.5 mg/dL      Globulin 2.9 gm/dL      A/G Ratio 1.2 g/dL      BUN/Creatinine Ratio 15.0     Anion Gap 10.0 mmol/L      eGFR 53.4 mL/min/1.73      Comment: National Kidney Foundation and American Society of Nephrology (ASN) Task Force recommended calculation based on the Chronic Kidney Disease Epidemiology Collaboration (CKD-EPI) equation refit without adjustment for race.       Narrative:      GFR Normal >60  Chronic Kidney Disease <60  Kidney Failure <15      CBC & Differential [[id number]]  (Abnormal) Collected: 05/13/22 0538    Specimen: Blood Updated: 05/13/22 0606    Narrative:      The following orders were created for panel order CBC & Differential.  Procedure                               Abnormality         Status                     ---------                               -----------         ------                     CBC Auto Differential[[id number]]        Abnormal            Final result                 Please view results for these tests on the individual orders.    CBC Auto Differential [[id number]]  (Abnormal) Collected: 05/13/22 0538    Specimen: Blood Updated: " 05/13/22 0606     WBC 7.63 10*3/mm3      RBC 4.67 10*6/mm3      Hemoglobin 13.7 g/dL      Hematocrit 39.9 %      MCV 85.4 fL      MCH 29.3 pg      MCHC 34.3 g/dL      RDW 13.2 %      RDW-SD 41.1 fl      MPV 9.8 fL      Platelets 196 10*3/mm3      Neutrophil % 58.9 %      Lymphocyte % 24.9 %      Monocyte % 8.0 %      Eosinophil % 6.2 %      Basophil % 1.2 %      Immature Grans % 0.8 %      Neutrophils, Absolute 4.50 10*3/mm3      Lymphocytes, Absolute 1.90 10*3/mm3      Monocytes, Absolute 0.61 10*3/mm3      Eosinophils, Absolute 0.47 10*3/mm3      Basophils, Absolute 0.09 10*3/mm3      Immature Grans, Absolute 0.06 10*3/mm3      nRBC 0.0 /100 WBC         Imaging Results (Last 48 Hours)     Procedure Component Value Units Date/Time    XR Chest 1 View [[id number]] Collected: 05/11/22 1530     Updated: 05/11/22 1557    Narrative:      EXAMINATION:  XR CHEST 1 VW    CLINICAL HISTORY:  67 years Female,Chest pain protocol    COMPARISON:  Chest x-ray dated 2/17/2022    FINDINGS:  Low lung volumes. No focal airspace consolidation. No  pleural effusion or pneumothorax. Normal heart size and pulmonary  vascularity. No significant change relative to 2/18/2022.      Impression:      Stable exam without acute cardiopulmonary process.    Electronically signed by:  Jean Harkins MD  5/11/2022 3:55 PM CDT  Workstation: [id number]           HOSPITAL COURSE:    66 yo female was admitted for ACS rule out. Has a history of paroxysmal A-fib failed amiodarone, and Tikosyn.  Was seen by Dr. Jurado, patient refused EP study, and ablation, currently on kaya blocking drug.    Upon arrival in the ED with chest pain, Cardiology was consulted. Patient underwent two days Lexiscan Cardiolite given the patient's body habitus. Impression of the study is consistent with low risk study. EF >70%. Cardiology recommends IMDUR 30 QD, and is cleared for D/C. Patient will follow up with PCP, and cardiology as an outpatient.   Upon admission patient had  cystitis, she was treated with a dose of fosfomycin. Symptoms resolved.         DISCHARGE CONDITION:     Patient is stable for discharge. Verbalizes understanding. All her questions answered.       DISPOSITION:  Home or Self Care    DISCHARGE MEDICATIONS     Discharge Medications      New Medications      Instructions Start Date   isosorbide mononitrate 30 MG 24 hr tablet  Commonly known as: IMDUR   30 mg, Oral, Every 24 Hours Scheduled         Continue These Medications      Instructions Start Date   alendronate 70 MG tablet  Commonly known as: Fosamax   70 mg, Oral, Every 7 Days      ARIPiprazole 5 MG tablet  Commonly known as: ABILIFY   5 mg, Oral, Daily, At bedtime      aspirin 325 MG tablet   500 mg, Oral, Daily, At bedtime, instructed to take a half tablet of 500 aspirin.       atorvastatin 20 MG tablet  Commonly known as: LIPITOR   TAKE ONE TABLET BY MOUTH EVERY DAY      bumetanide 0.5 MG tablet  Commonly known as: BUMEX   0.5 mg, Oral, 2 Times Daily      calcium-vitamin D 500-200 MG-UNIT per tablet   1 tablet, Oral, 2 Times Daily With Meals      choline fenofibrate 135 MG capsule  Commonly known as: Trilipix   135 mg, Oral, Daily      Depend Adjustable Underwear misc   1 application, Does not apply, As Needed      Diclofenac Sodium 1 % gel gel  Commonly known as: VOLTAREN   4 g, Topical, 4 Times Daily PRN      dilTIAZem  MG 24 hr capsule  Commonly known as: Dilt-XR   180 mg, Oral, Daily      ferrous sulfate 325 (65 FE) MG tablet   325 mg, Oral, Daily With Breakfast      lidocaine 5 %  Commonly known as: LIDODERM   1 patch, Transdermal, Every 24 Hours, Remove & Discard patch within 12 hours or as directed by MD      lisinopril 10 MG tablet  Commonly known as: PRINIVIL,ZESTRIL   10 mg, Oral, Daily      LORazepam 1 MG tablet  Commonly known as: ATIVAN   1 mg, Oral, 2 Times Daily, Scheduled to take in the morning and one with dinner      methocarbamol 500 MG tablet  Commonly known as: ROBAXIN   500 mg,  Oral, 4 Times Daily PRN      metoprolol tartrate 25 MG tablet  Commonly known as: LOPRESSOR   TAKE 1 TABLET EVERY 12 HOURS      omeprazole 20 MG capsule  Commonly known as: priLOSEC   20 mg, Oral, Daily      rivaroxaban 20 MG tablet  Commonly known as: Xarelto   Take one tablet by mouth every day- take with food      sertraline 100 MG tablet  Commonly known as: ZOLOFT   100 mg, Oral, 2 Times Daily             INSTRUCTIONS:  Activity:   Activity Instructions     Activity as Tolerated          Diet:   Diet Instructions     Diet: Regular, Consistent Carbohydrate, Cardiac      Discharge Diet:  Regular  Consistent Carbohydrate  Cardiac             FOLLOW UP:   Additional Instructions for the Follow-ups that You Need to Schedule     Call MD With Problems / Concerns   As directed      Call MD or go to ED or Urgent care if symptoms worsen.    Order Comments: Call MD or go to ED or Urgent care if symptoms worsen.          Discharge Follow-up with PCP   As directed       Currently Documented PCP:    Leticia Tejeda MD    PCP Phone Number:    [phone]     Follow Up Details: Hospital follow-up within 1 week         Discharge Follow-up with Specialty: follow up with cardiology Dr. Jurado; 2 Weeks   As directed      Specialty: follow up with cardiology Dr. Jurado    Follow Up: 2 Weeks    Follow Up Details: already has an appt on 5/25.            Follow-up Information     Leticia Tejeda MD .    Specialty: Family Medicine  Why: Hospital follow-up within 1 week  Contact information:  [address] [place] [address] [phone]                         PENDING TEST RESULTS AT DISCHARGE      Time: >30 minutes were spent in discharge planning, medication reconciliation and coordination of care for this patient.    Rusty Urrutia MD is the attending at time of discharge, He is aware of the patient's status and agrees with the above discharge summary.      This document has been electronically signed by Priscila Aleman MD on  May 13, 2022 14:35 CDT

## 2022-05-13 NOTE — PROGRESS NOTES
FAMILY MEDICINE RESIDENCY SERVICE  DAILY PROGRESS NOTE    NAME: Lana Mata  : 1955  MRN: [id number]      LOS: 1 day     PROVIDER OF SERVICE: Priscila Aleman MD    Chief Complaint: Atypical chest pain    Subjective:     Interval History:  History taken from: patient  Overnight events: N.p.o. after midnight for second round of Lexiscan per cardiology  Today, patient denies dysuria, frequency or urgency.  Denies chest pain.  Endorses shortness of air when she walks to the bathroom.  Had x3 loose stool this morning, this is her baseline, on Imodium as needed.     Review of Systems:   Review of Systems   Constitutional: Negative for activity change, appetite change, chills and fever.   HENT: Negative for sore throat, trouble swallowing and voice change.    Eyes: Negative for discharge and visual disturbance.   Respiratory: Positive for shortness of breath (Baseline). Negative for cough and wheezing.    Cardiovascular: Negative for chest pain, palpitations and leg swelling.   Gastrointestinal: Positive for diarrhea (  Loose stool is her baseline). Negative for abdominal distention, abdominal pain, constipation, nausea and vomiting.   Genitourinary: Negative for difficulty urinating, frequency and pelvic pain.   Musculoskeletal: Negative for arthralgias, back pain, joint swelling and myalgias.   Skin: Negative for color change.   Neurological: Negative for dizziness, weakness, light-headedness, numbness and headaches.   Psychiatric/Behavioral: Negative for agitation, confusion and sleep disturbance. The patient is not nervous/anxious.        Objective:     Vital Signs  Temp:  [97.8 °F (36.6 °C)-98.3 °F (36.8 °C)] 98.2 °F (36.8 °C)  Heart Rate:  [] 76  Resp:  [15-18] 16  BP: (115-143)/(58-81) 135/81   Body mass index is 37.24 kg/m².    Physical Exam  Physical Exam  Vitals reviewed.   Constitutional:       Appearance: Normal appearance.   HENT:      Head: Normocephalic and atraumatic.      Nose: Nose  normal.      Mouth/Throat:      Mouth: Mucous membranes are moist.   Eyes:      Conjunctiva/sclera: Conjunctivae normal.      Pupils: Pupils are equal, round, and reactive to light.   Cardiovascular:      Rate and Rhythm: Normal rate and regular rhythm.      Pulses: Normal pulses.      Heart sounds: Normal heart sounds.   Pulmonary:      Effort: Pulmonary effort is normal.      Breath sounds: Normal breath sounds.   Abdominal:      General: Bowel sounds are normal.      Palpations: Abdomen is soft.   Musculoskeletal:         General: Normal range of motion.      Cervical back: Normal range of motion and neck supple.   Skin:     General: Skin is warm.      Capillary Refill: Capillary refill takes less than 2 seconds.   Neurological:      General: No focal deficit present.      Mental Status: She is alert. Mental status is at baseline.   Psychiatric:         Mood and Affect: Mood normal.         Behavior: Behavior normal.         Scheduled Meds   ARIPiprazole, 5 mg, Oral, Daily  aspirin, 325 mg, Oral, Daily  atorvastatin, 20 mg, Oral, Daily  bumetanide, 0.5 mg, Oral, BID  dilTIAZem CD, 180 mg, Oral, Q24H  fenofibrate, 145 mg, Oral, Daily  lidocaine, 1 patch, Transdermal, Q24H  lisinopril, 10 mg, Oral, Daily  LORazepam, 1 mg, Oral, Q12H  metoprolol tartrate, 25 mg, Oral, Q12H  pantoprazole, 40 mg, Oral, QAM  regadenoson, 0.4 mg, Intravenous, Once  rivaroxaban, 20 mg, Oral, Daily With Dinner  senna-docusate sodium, 2 tablet, Oral, BID  sertraline, 100 mg, Oral, Daily  sodium chloride, 10 mL, Intravenous, Q12H  sodium chloride, 10 mL, Intravenous, Once       PRN Meds   •  acetaminophen **OR** acetaminophen **OR** acetaminophen  •  senna-docusate sodium **AND** polyethylene glycol **AND** bisacodyl **AND** bisacodyl  •  loperamide  •  methocarbamol  •  Morphine  •  nitroglycerin  •  ondansetron  •  sodium chloride  •  sodium chloride      Diagnostic Data    Results from last 7 days   Lab Units 05/13/22  0538   WBC  10*3/mm3 7.63   HEMOGLOBIN g/dL 13.7   HEMATOCRIT % 39.9   PLATELETS 10*3/mm3 196   GLUCOSE mg/dL 112*   CREATININE mg/dL 1.13*   BUN mg/dL 17   SODIUM mmol/L 142   POTASSIUM mmol/L 3.6   AST (SGOT) U/L 16   ALT (SGPT) U/L 12   ALK PHOS U/L 45   BILIRUBIN mg/dL 0.5   ANION GAP mmol/L 10.0       XR Chest 1 View    Result Date: 5/11/2022  Stable exam without acute cardiopulmonary process. Electronically signed by:  Jean Harkins MD  5/11/2022 3:55 PM CDT Workstation: [id number]        I reviewed the patient's new clinical results.    Assessment/Plan:     Active Hospital Problems    Diagnosis  POA   • **Atypical chest pain [R07.89]  Unknown     - Second round of Lexiscan today     • Cystitis [N30.90]  Yes     - Received 1 dose of fosfomycin.    -symptoms Resolved     • (HFpEF) heart failure with preserved ejection fraction (HCC) [I50.30]  Yes     Previous echocardiogram done 2/18/22 showing ejection fraction 56-60% and mild to moderate mitral valve regurgitation, concentric hypertrophy.  - Consult Dr. Jurado, patient's cardiologist     • Atrial fibrillation, persistent (HCC) [I48.19]  Yes     - Continue home Xarelto for anticoagulation  -on CCB, ACE, B blocker, ASA  -per Dr. Jurado previous 11/23/21 note -->failed amio, and tikosyn, refused EP/ a fib ablation  -cardio on board     • SOPHIA (obstructive sleep apnea) [G47.33]  Unknown     Per pulm note on 2/2018  Recs: cpap for sophia, caution w albuterol due to increase in HR. No new note since then.      • Low back pain [M54.50]  Yes     Patient with history of laminectomy with insertion of rods.  - Continue home muscle relaxants     • Generalized anxiety disorder [F41.1]  Yes     Continue abilify, ativan, zoloft           DVT prophylaxis: xarelto, asa  Code status is   Code Status and Medical Interventions:   Ordered at: 05/11/22 2055     Level Of Support Discussed With:    Patient     Code Status (Patient has no pulse and is not breathing):    CPR (Attempt to Resuscitate)      Medical Interventions (Patient has pulse or is breathing):    Full Support       Plan for disposition:Where: current living arrangements and When:  1-2days      Time: 15 min     This document has been electronically signed by Priscila Aleman MD on May 13, 2022 08:15 CDT

## 2022-05-14 NOTE — OUTREACH NOTE
Prep Survey    Flowsheet Row Responses   [facility] facility patient discharged from? [place]   Is LACE score < 7 ? No   Emergency Room discharge w/ pulse ox? No   Eligibility [facility]   Date of Admission 05/11/22   Date of Discharge 05/13/22   Discharge Disposition Home or Self Care   Discharge diagnosis atypical chest pain, paroxysmal A-fib, chronic CHF,    Does the patient have one of the following disease processes/diagnoses(primary or secondary)? Other   Does the patient have Home health ordered? No   Is there a DME ordered? No   Prep survey completed? Yes          CONOR KIRBY - Registered Nurse

## 2022-05-16 ENCOUNTER — TRANSITIONAL CARE MANAGEMENT TELEPHONE ENCOUNTER (OUTPATIENT)
Dept: CALL CENTER | Facility: HOSPITAL | Age: 67
End: 2022-05-16

## 2022-05-16 ENCOUNTER — TELEPHONE (OUTPATIENT)
Dept: FAMILY MEDICINE CLINIC | Facility: CLINIC | Age: 67
End: 2022-05-16

## 2022-05-16 NOTE — OUTREACH NOTE
Call Center TCM Note    Flowsheet Row Responses   [facility] patient discharged from? [place]   Does the patient have one of the following disease processes/diagnoses(primary or secondary)? Other   TCM attempt successful? Yes   Call start time 1537   Call end time 1543   Discharge diagnosis atypical chest pain, paroxysmal A-fib, chronic CHF,    Meds reviewed with patient/caregiver? Yes   Is the patient having any side effects they believe may be caused by any medication additions or changes? Yes   Side effects comments  Imdur is causing her to have headaches as she is getting adjusted to it,  she is taking tylenol and will update MD prn   Does the patient have all medications ordered at discharge? Yes   Is the patient taking all medications as directed (includes completed medication regime)? Yes   Comments regarding appointments SEE AVS   Does the patient have a primary care provider?  Yes   Does the patient have an appointment with their PCP within 7 days of discharge? Yes   Comments regarding PCP Hospital PCP FOLLOW UP APPOINTMENT IS 5/20/22@245pm   Has the patient kept scheduled appointments due by today? N/A   Has home health visited the patient within 72 hours of discharge? N/A   Has all DME been delivered? No   Did the patient receive a copy of their discharge instructions? Yes   Nursing interventions Reviewed instructions with patient   What is the patient's perception of their health status since discharge? Improving  [Patient is tired today and has occasional SOA, denies CP.  Reports headache from new Imdur.  Patient denies edema, weight gain and understands to monitor for increases. ]   Is the patient/caregiver able to teach back signs and symptoms related to disease process for when to call PCP? Yes   Is the patient/caregiver able to teach back signs and symptoms related to disease process for when to call 911? Yes   TCM call completed? Yes          Asha Simpson RN    5/16/2022, 15:45  EDT

## 2022-05-16 NOTE — TELEPHONE ENCOUNTER
PT RECEIVED A LETTER FROM INSURANCE COMPANY STATING THEY WOULD NOT PAY FOR COLO-GUARD STOOL SAMPLE BECAUSE DR. MARVIN ORDERED IT AND SHE IS NOT COVERED UNDER THEIR PROGRAM BECAUSE SHE IS A RESIDENT. SHE IS WANTING TO KNOW IF THIS CAN BE REORDERED BY SOMEONE WHO IS COVERED.     PT CALL BACK NUMBER [phone]

## 2022-05-23 DIAGNOSIS — I50.32 CHRONIC HEART FAILURE WITH PRESERVED EJECTION FRACTION: ICD-10-CM

## 2022-05-23 RX ORDER — BUMETANIDE 0.5 MG/1
TABLET ORAL
Qty: 60 TABLET | Refills: 0 | Status: SHIPPED | OUTPATIENT
Start: 2022-05-23 | End: 2022-06-24

## 2022-05-24 ENCOUNTER — READMISSION MANAGEMENT (OUTPATIENT)
Dept: CALL CENTER | Facility: HOSPITAL | Age: 67
End: 2022-05-24

## 2022-05-24 LAB
QT INTERVAL: 388 MS
QT INTERVAL: 398 MS
QTC INTERVAL: 432 MS
QTC INTERVAL: 439 MS

## 2022-05-24 NOTE — OUTREACH NOTE
Medical Week 2 Survey    Flowsheet Row Responses   [facility] patient discharged from? [place]   Does the patient have one of the following disease processes/diagnoses(primary or secondary)? Other   Week 2 attempt successful? No   Unsuccessful attempts Attempt 1   Discharge diagnosis atypical chest pain, paroxysmal A-fib, chronic CHF,           MICHELLE DONNA - Registered Nurse

## 2022-05-25 ENCOUNTER — OFFICE VISIT (OUTPATIENT)
Dept: CARDIOLOGY | Facility: CLINIC | Age: 67
End: 2022-05-25

## 2022-05-25 VITALS
SYSTOLIC BLOOD PRESSURE: 108 MMHG | HEART RATE: 102 BPM | BODY MASS INDEX: 37.65 KG/M2 | DIASTOLIC BLOOD PRESSURE: 58 MMHG | HEIGHT: 65 IN | OXYGEN SATURATION: 96 % | WEIGHT: 226 LBS

## 2022-05-25 DIAGNOSIS — E78.2 MIXED HYPERLIPIDEMIA: ICD-10-CM

## 2022-05-25 DIAGNOSIS — I10 BENIGN ESSENTIAL HYPERTENSION: ICD-10-CM

## 2022-05-25 DIAGNOSIS — I48.11 LONGSTANDING PERSISTENT ATRIAL FIBRILLATION: ICD-10-CM

## 2022-05-25 DIAGNOSIS — I50.32 CHRONIC HEART FAILURE WITH PRESERVED EJECTION FRACTION: Primary | ICD-10-CM

## 2022-05-25 PROBLEM — I48.19 ATRIAL FIBRILLATION, PERSISTENT (HCC): Status: RESOLVED | Noted: 2021-11-23 | Resolved: 2022-05-25

## 2022-05-25 PROCEDURE — 99214 OFFICE O/P EST MOD 30 MIN: CPT | Performed by: INTERNAL MEDICINE

## 2022-05-25 NOTE — PROGRESS NOTES
Lana Ayala [facility]  67 y.o. female    5/25/2022     Persistent atrial fibrillation  Hypertension  Hyperlipidemia  Exertional dyspnea with history of diastolic dysfunction    History of Present Illness:    Body mass index is 37.61 kg/m². BMI is above normal parameters. Recommendations include: exercise counseling, nutrition counseling and referral to primary care.        67 years old patient recently discharged from [facility] when she presented for evaluation of chest pain.  Normal cardiac enzyme underlying atrial fibrillation with good resting heart rate risk stratify with a stress test ejection is greater than 70% no evidence of ischemia low risk study subsequent discharge with a background history of chronic persistent atrial fibrillation failed amiodarone refused EP study and ablation failed Tikosyn continue with AV kaya blocking drug with a background history of hypertension hyperlipidemia morbid obesity presented for evaluation chest pain described as pressure-like feeling on the left precordium currently she is pain-free.  Baseline EKG is atrial fibrillation with good resting heart rate .She is resting comfortably in bed hemodynamically stable.  Monitor no significant bradycardia or tachyarrhythmia noted.  Cardiac enzymes are negative.  Patient has history of postural dizziness but never blacked out      Stress test May 2022    · Findings consistent with an equivocal ECG stress test.  · Left ventricular ejection fraction is hyperdynamic (Calculated EF > 70%). .  · Myocardial perfusion imaging indicates a normal myocardial perfusion study with no evidence of ischemia.  · Impressions are consistent with a low risk study.      JOCY 1/2018  ·    · Transesophageal Echocardiogram with Color and Doppler  · Left Ventricle: Estimated EF appears to be in the range of 61 - 65%. Normal left ventricular cavity size noted  · Left ventricular wall thickness is consistent with mild concentric  hypertrophy  · Right Ventricle: Normal right ventricular cavity size, wall thickness, systolic function and septal motion noted  · No evidence of a left atrial appendage thrombus was present. The interatrial septum appears redundant  · No evidence of a patent foramen ovale. No evidence of an atrial septal defect present. Saline test results are negative.  · Mild aortic valve regurgitation is present  · Mild mitral valve regurgitation is present  4/2018  Total Protein 6.3 - 8.6 g/dL 7.5    Albumin 3.40 - 4.80 g/dL 4.30    ALT (SGPT) 9 - 52 U/L 30    AST (SGOT) 14 - 36 U/L 29    Alkaline Phosphatase 38 - 126 U/L 48    Total Bilirubin 0.2 - 1.3 mg/dL 0.8          1/2018  Total Cholesterol 0 - 199 mg/dL 165    Triglycerides 20 - 199 mg/dL 169    HDL Cholesterol 60 - 200 mg/dL 67    LDL Cholesterol  1 - 129 mg/dL 77    LDL/HDL Ratio 0.00 - 3.22 0.96              SUBJECTIVE:    Allergies   Allergen Reactions   • Codeine Nausea And Vomiting   • Latex Rash   • Morphine And Related Hallucinations   • Pravachol [Pravastatin Sodium] Myalgia         Past Medical History:   Diagnosis Date   • Abnormal gait 11/28/2012   • Abrasion 02/26/2015   • Acute ankle pain 03/31/2016   • Acute bronchitis 09/04/2015   • Allergic rhinitis 05/22/2012   • Asthenia 05/22/2014   • Atrial fibrillation (HCC) 03/04/2016    - converted    • Attempted suicide ([facility])    • Benign essential hypertension 03/04/2016   • Cardiovascular stress test abnormal 03/04/2016   • Cellulitis of knee 03/03/2015   • Chest pain 04/02/2015   • Chronic pain disorder    • Common cold 06/24/2014   • Congestive heart failure (HCC) 02/04/2016   • Depressive disorder 05/22/2015    suspect more complicated psych issues      • Fibrocystic breast    • Gastroesophageal reflux disease 01/17/2013   • Generalized anxiety disorder 02/04/2016   • H/O echocardiogram     Normal LV function with Ef of 60-65%.Mildly dilated right ventricle with normal function. Grade 1B diastolic  dysfunction with mild CLVH.NO sig. valvular regurg. or stenosis.   • Headache 10/27/2014   • Hyperlipidemia 03/04/2016   • Low back pain 06/30/2016    Need for prophylactic vaccination against Streptococcus pneumoniae [pneumococcus]    01/23/2015    • Neck pain 06/30/2016   • Obstructive sleep apnea of adult 07/30/2015   • Obstructive sleep apnea syndrome    • Osteoarthritis    • Osteoporosis 06/30/2016   • Pain in right shoulder 06/30/2016   • Pain in right shoulder 04/24/2014   • Shortness of breath 04/02/2015   • Skin ulcer (HCC) 04/13/2015    left   • Thyrotoxicosis with or without goiter 10/24/2014   • Urinary incontinence 05/22/2014         Past Surgical History:   Procedure Laterality Date   • BACK SURGERY  08/07/2015    Removal of intstrumentation,L5-S1.Exploration of fusion,L5-S1.Posterolateral fusion,L4-L5.Posterior segmental spinal instrumentation L4-5.Transforaminal interbody fusion Through right sided approach.Performed at    • COLONOSCOPY  05/04/2016    will order cologuard   • INJECTION OF MEDICATION  03/23/2015    celestone   • INJECTION OF MEDICATION  05/04/2016    KENALOG(6)   • LUMBAR FUSION      discectomy fusion with rods L4-S1   • LUMBAR LAMINECTOMY  1995   • MANDIBLE FRACTURE SURGERY      JAW WIRED SHUT   • MOUTH SURGERY  10/05/2015    Fractured mandible. Removal of arch bars.   • MOUTH SURGERY  1985    Fractured mandible. Removal of arch bars.   • NASAL SEPTUM SURGERY     • RHINOPLASTY     • TUBAL ABDOMINAL LIGATION  1986         Family History   Problem Relation Age of Onset   • Hypertension Mother    • Hyperlipidemia Mother    • Mental illness Mother    • Thyroid disease Mother    • Diabetes Maternal Grandmother    • Cancer Maternal Grandmother    • Depression Other    • Diabetes Other    • Heart disease Other    • Hypertension Other    • Osteoporosis Other    • Thyroid disease Other    • Cancer Maternal Aunt    • Cancer Paternal Aunt    • Breast cancer Paternal Aunt          Social History      Socioeconomic History   • Marital status:    Tobacco Use   • Smoking status: Never Smoker   • Smokeless tobacco: Never Used   • Tobacco comment: smoking cessation    Substance and Sexual Activity   • Alcohol use: No   • Drug use: No     Comment: lortab / oxycontin   • Sexual activity: Defer         Current Outpatient Medications   Medication Sig Dispense Refill   • alendronate (Fosamax) 70 MG tablet Take 1 tablet by mouth Every 7 (Seven) Days. 4 tablet 11   • ARIPiprazole (ABILIFY) 5 MG tablet Take 5 mg by mouth Daily. At bedtime     • aspirin 325 MG tablet Take 500 mg by mouth Daily. At bedtime, instructed to take a half tablet of 500 aspirin.      • atorvastatin (LIPITOR) 20 MG tablet TAKE ONE TABLET BY MOUTH EVERY DAY 90 tablet 3   • bumetanide (BUMEX) 0.5 MG tablet TAKE 1 TABLET TWICE DAILY 60 tablet 0   • Calcium Carb-Cholecalciferol (CALCIUM-VITAMIN D) 500-200 MG-UNIT per tablet Take 1 tablet by mouth 2 (Two) Times a Day With Meals. 60 tablet 2   • choline fenofibrate (Trilipix) 135 MG capsule Take 1 capsule by mouth Daily. 90 capsule 3   • Diclofenac Sodium (VOLTAREN) 1 % gel gel Apply 4 g topically to the appropriate area as directed 4 (Four) Times a Day As Needed (Joint pains in back, knees and hands.). 350 g 3   • dilTIAZem XR (Dilt-XR) 180 MG 24 hr capsule Take 1 capsule by mouth Daily. 14 capsule 0   • ferrous sulfate 325 (65 FE) MG tablet Take 325 mg by mouth Daily With Breakfast.     • Incontinence Supply Disposable (Depend Adjustable Underwear) misc 1 application As Needed (Incontinence). 90 each 11   • isosorbide mononitrate (IMDUR) 30 MG 24 hr tablet Take 1 tablet by mouth Daily for 30 days. 30 tablet 3   • lidocaine (LIDODERM) 5 % Place 1 patch on the skin as directed by provider Daily. Remove & Discard patch within 12 hours or as directed by MD 30 each 0   • lisinopril (PRINIVIL,ZESTRIL) 10 MG tablet Take 10 mg by mouth Daily.     • LORazepam (ATIVAN) 1 MG tablet Take 1 mg by mouth 2  (Two) Times a Day. Scheduled to take in the morning and one with dinner  0   • methocarbamol (ROBAXIN) 500 MG tablet Take 1 tablet by mouth 4 (Four) Times a Day As Needed for Muscle Spasms. 90 tablet 4   • metoprolol tartrate (LOPRESSOR) 25 MG tablet TAKE 1 TABLET EVERY 12 HOURS 60 tablet 4   • omeprazole (priLOSEC) 20 MG capsule Take 1 capsule by mouth Daily. 90 capsule 5   • rivaroxaban (Xarelto) 20 MG tablet Take one tablet by mouth every day- take with food 30 tablet 0   • sertraline (ZOLOFT) 100 MG tablet Take 100 mg by mouth 2 (Two) Times a Day.  1     Current Facility-Administered Medications   Medication Dose Route Frequency Provider Last Rate Last Admin   • cyanocobalamin injection 1,000 mcg  1,000 mcg Intramuscular Q28 Days Leticia Tejeda MD       • loperamide (IMODIUM) capsule 2 mg  2 mg Oral Daily Leticia Tejeda MD               Review of Systems:     Constitutional:  Denies recent weight loss, weight gain, no change in exercise tolerance.     HENT:  Denies any hearing loss, epistaxis, hoarseness,  Eyes: No blurred    Respiratory: Mild baseline shortness of breath multifactorial element of physical deconditioning    Cardiovascular: See H&P    Gastrointestinal:  Denies change in bowel habits, dyspepsia, ulcer disease, hematochezia, or melena.     Endocrine: Negative for cold intolerance, heat intolerance, polydipsia, polyphagia and polyuria.     Genitourinary: Negative.      Musculoskeletal: Denies any history of arthritic symptoms or back problems.     Skin:  Denies any change in hair or nails, rashes, or skin lesions.     Allergic/Immunologic: Negative.  Negative for environmental allergies, food allergies     Neurological:  Denies any history of recurrent headaches, strokes,     Hematological: Denies any food allergies, seasonal allergies,    Psychiatric/Behavioral: Denies any history of depression      OBJECTIVE:    /58 (BP Location: Left arm, Patient Position: Sitting, Cuff Size: Adult)  "  Pulse 102   Ht 165.1 cm (65\")   Wt 103 kg (226 lb)   SpO2 96%   BMI 37.61 kg/m²       Physical Exam:     Constitutional: Cooperative, alert and oriented, well-developed, well-nourished, in no acute distress.     HENT:   Head: Normocephalic, conjunctive is pink thyroid is nonpalpable trachea central    Cardiovascular: Regular rhythm, S1 and S2 normal, no S3 or S4. Apical impulse not displaced. No murmurs, gallops, or rubs detected.     Pulmonary/Chest: Chest: Good bilateral air entry no rales or wheezing    Abdominal: Abdomen soft, bowel sounds normoactive, no masses,     Musculoskeletal: No deformities, positive peripheral pulses no edema    Neurological: No gross motor or sensory deficits noted, affect appropriate, oriented to time, person, place.     Skin: Warm and dry to the touch, no apparent skin lesions or masses noted.     Psychiatric: She has a normal mood and affect. Her behavior is normal.       Procedures      Lab Results   Component Value Date    WBC 7.63 05/13/2022    HGB 13.7 05/13/2022    HCT 39.9 05/13/2022    MCV 85.4 05/13/2022     05/13/2022     Lab Results   Component Value Date    GLUCOSE 112 (H) 05/13/2022    BUN 17 05/13/2022    CREATININE 1.13 (H) 05/13/2022    EGFRIFNONA 35 (L) 02/22/2022    BCR 15.0 05/13/2022    CO2 26.0 05/13/2022    CALCIUM 8.6 05/13/2022    ALBUMIN 3.50 05/13/2022    AST 16 05/13/2022    ALT 12 05/13/2022     Lab Results   Component Value Date    CHOL 156 08/26/2021    CHOL 148 11/18/2019    CHOL 165 01/31/2018     Lab Results   Component Value Date    TRIG 107 08/26/2021    TRIG 249 (H) 11/18/2019    TRIG 169 01/31/2018     Lab Results   Component Value Date    HDL 71 (H) 08/26/2021    HDL 55 11/18/2019    HDL 67 01/31/2018     No components found for: LDLCALC  Lab Results   Component Value Date    LDL 66 08/26/2021    LDL 43 11/18/2019    LDL 77 01/31/2018     No results found for: HDLLDLRATIO  No components found for: CHOLHDL  Lab Results   Component " Value Date    HGBA1C 5.60 03/09/2022     Lab Results   Component Value Date    TSH 2.900 02/17/2022           ASSESSMENT AND PLAN:    #1  Paroxysmal/persistent atrial fibrillation failed amiodarone, failed Tikosyn on AV kaya blocking drug and doing well we will continue Xarelto to decrease risk of embolization currently she is in sinus rhythm and will continue calcium channel and beta-blocker.  Refused EP study and ablations    [id number] score is 2 she preferred oral anticoagulation    #2 hypertension     Good blood pressure control  continue metoprolol, calcium channel blocker, lisinopril    Patient was counseled and educated regarding food with high sodium content    #3 hyperlipidemia on atorvastatin LDL with recommended range    #4 exertional dyspnea with diastolic dysfunction    Compensated at the time of evaluation.    Chest pain atypical with normal stress test we will continue Imdur as there is no further records  Preventive   obesity with a BMI of 37.24 with history of hypertension paroxysmal atrial fibrillation    Patient was counseled educated regarding risk factor lifestyle modification low carbohydrate, low-fat, DASH diet graded exercise    I spent 15 minutes caring for Lana on this date of service. This time includes time spent by me of counseling/coordination of care as relates to the presenting problem and any ordered procedures/tests as outlined above.           This document has been electronically signed by Kristyn Jurado MD on May 25, 2022 10:32 CDT        Diagnoses and all orders for this visit:    1. Chronic heart failure with preserved ejection fraction (HCC) (Primary)    2. Longstanding persistent atrial fibrillation (HCC)    3. Benign essential hypertension    4. Mixed hyperlipidemia        Kristyn Jurado MD  5/25/2022  10:29 CDT

## 2022-05-26 ENCOUNTER — HOSPITAL ENCOUNTER (OUTPATIENT)
Dept: CT IMAGING | Facility: HOSPITAL | Age: 67
Discharge: HOME | End: 2022-05-26
Admitting: FAMILY MEDICINE

## 2022-05-26 DIAGNOSIS — M51.35 DDD (DEGENERATIVE DISC DISEASE), THORACOLUMBAR: ICD-10-CM

## 2022-05-26 PROCEDURE — 72127 CT NECK SPINE W/O & W/DYE: CPT

## 2022-05-26 PROCEDURE — 25010000002 IOPAMIDOL 61 % SOLUTION: Performed by: FAMILY MEDICINE

## 2022-05-26 RX ADMIN — IOPAMIDOL 90 ML: 612 INJECTION, SOLUTION INTRAVENOUS at 12:03

## 2022-05-31 ENCOUNTER — TELEPHONE (OUTPATIENT)
Dept: CARDIOLOGY | Facility: CLINIC | Age: 67
End: 2022-05-31

## 2022-05-31 RX ORDER — DILTIAZEM HYDROCHLORIDE 180 MG/1
180 CAPSULE, EXTENDED RELEASE ORAL DAILY
Qty: 30 CAPSULE | Refills: 0 | Status: SHIPPED | OUTPATIENT
Start: 2022-05-31 | End: 2022-06-27

## 2022-06-01 ENCOUNTER — READMISSION MANAGEMENT (OUTPATIENT)
Dept: CALL CENTER | Facility: HOSPITAL | Age: 67
End: 2022-06-01

## 2022-06-01 NOTE — OUTREACH NOTE
Medical Week 3 Survey    Flowsheet Row Responses   [facility] patient discharged from? [place]   Does the patient have one of the following disease processes/diagnoses(primary or secondary)? Other   Week 3 attempt successful? Yes   Call start time 1157   Call end time 1158   Discharge diagnosis atypical chest pain, paroxysmal A-fib, chronic CHF,    Meds reviewed with patient/caregiver? Yes   Is the patient taking all medications as directed (includes completed medication regime)? Yes   Has the patient kept scheduled appointments due by today? N/A  [pt rescheduled PCP apt until regular PCP returns ]   What is the patient's perception of their health status since discharge? Improving   Is the patient/caregiver able to teach back the hierarchy of who to call/visit for symptoms/problems? PCP, Specialist, Home health nurse, Urgent Care, ED, 911 Yes   Week 3 Call Completed? Yes   Wrap up additional comments Patient at [employer] lunch - call was brief- reports she is feeling better than when she was in the hospital           YOLANDA MARIN - Registered Nurse

## 2022-06-07 DIAGNOSIS — Z12.11 COLON CANCER SCREENING: Primary | ICD-10-CM

## 2022-06-08 ENCOUNTER — READMISSION MANAGEMENT (OUTPATIENT)
Dept: CALL CENTER | Facility: HOSPITAL | Age: 67
End: 2022-06-08

## 2022-06-08 DIAGNOSIS — E11.649 TYPE 2 DIABETES MELLITUS WITH HYPOGLYCEMIA WITHOUT COMA, WITHOUT LONG-TERM CURRENT USE OF INSULIN: ICD-10-CM

## 2022-06-08 DIAGNOSIS — E11.65 TYPE 2 DIABETES MELLITUS WITH HYPERGLYCEMIA, WITHOUT LONG-TERM CURRENT USE OF INSULIN: ICD-10-CM

## 2022-06-08 DIAGNOSIS — E43 SEVERE PROTEIN-CALORIE MALNUTRITION: Primary | ICD-10-CM

## 2022-06-08 NOTE — OUTREACH NOTE
Medical Week 4 Survey    Flowsheet Row Responses   [facility] patient discharged from? [place]   Does the patient have one of the following disease processes/diagnoses(primary or secondary)? Other   Week 4 attempt successful? Yes   Call start time 1451   Call end time 1453   Discharge diagnosis atypical chest pain, paroxysmal A-fib, chronic CHF,    Meds reviewed with patient/caregiver? Yes   Is the patient having any side effects they believe may be caused by any medication additions or changes? No   Is the patient taking all medications as directed (includes completed medication regime)? Yes   Has the patient kept scheduled appointments due by today? Yes   Is the patient still receiving Home Health Services? N/A   Psychosocial issues? No   What is the patient's perception of their health status since discharge? Improving   Is the patient/caregiver able to teach back signs and symptoms related to disease process for when to call PCP? Yes   Is the patient/caregiver able to teach back signs and symptoms related to disease process for when to call 911? Yes   Is the patient/caregiver able to teach back the hierarchy of who to call/visit for symptoms/problems? PCP, Specialist, Home health nurse, Urgent Care, ED, 911 Yes   If the patient is a current smoker, are they able to teach back resources for cessation? Not a smoker   Week 4 Call Completed? Yes   Would the patient like one additional call? No   Graduated Yes   Is the patient interested in additional calls from an ambulatory ?  NOTE:  applies to high risk patients requiring additional follow-up. No   Did the patient feel the follow up calls were helpful during their recovery period? Yes   Was the number of calls appropriate? Yes          TANYA KIRBY - Licensed Nurse

## 2022-06-14 ENCOUNTER — TELEPHONE (OUTPATIENT)
Dept: FAMILY MEDICINE CLINIC | Facility: CLINIC | Age: 67
End: 2022-06-14

## 2022-06-14 NOTE — TELEPHONE ENCOUNTER
[employer] WAS CALLING TO GIVE  AN ABNORMAL COLAGUARD RESULT. SENT A MESSAGE PER STEW.. THEIR CALL BACK NUMBER [phone]

## 2022-06-17 ENCOUNTER — OFFICE VISIT (OUTPATIENT)
Dept: FAMILY MEDICINE CLINIC | Facility: CLINIC | Age: 67
End: 2022-06-17

## 2022-06-17 VITALS
HEIGHT: 65 IN | BODY MASS INDEX: 36.54 KG/M2 | OXYGEN SATURATION: 97 % | HEART RATE: 81 BPM | SYSTOLIC BLOOD PRESSURE: 140 MMHG | DIASTOLIC BLOOD PRESSURE: 88 MMHG | WEIGHT: 219.3 LBS

## 2022-06-17 DIAGNOSIS — R19.5 POSITIVE COLORECTAL CANCER SCREENING USING COLOGUARD TEST: ICD-10-CM

## 2022-06-17 DIAGNOSIS — R53.81 PHYSICAL DECONDITIONING: ICD-10-CM

## 2022-06-17 DIAGNOSIS — F41.9 ANXIETY: ICD-10-CM

## 2022-06-17 DIAGNOSIS — M51.35 DDD (DEGENERATIVE DISC DISEASE), THORACOLUMBAR: Primary | ICD-10-CM

## 2022-06-17 DIAGNOSIS — M85.852 OSTEOPENIA OF NECK OF LEFT FEMUR: ICD-10-CM

## 2022-06-17 DIAGNOSIS — M48.10 DISH (DIFFUSE IDIOPATHIC SKELETAL HYPEROSTOSIS): ICD-10-CM

## 2022-06-17 PROCEDURE — 99213 OFFICE O/P EST LOW 20 MIN: CPT | Performed by: FAMILY MEDICINE

## 2022-06-17 RX ORDER — ALENDRONATE SODIUM 70 MG/1
70 TABLET ORAL
Qty: 8 TABLET | Refills: 2 | Status: SHIPPED | OUTPATIENT
Start: 2022-06-17

## 2022-06-17 RX ORDER — DULOXETIN HYDROCHLORIDE 60 MG/1
60 CAPSULE, DELAYED RELEASE ORAL DAILY
Qty: 30 CAPSULE | Refills: 2 | Status: SHIPPED | OUTPATIENT
Start: 2022-06-17 | End: 2022-09-30 | Stop reason: SDUPTHER

## 2022-06-17 NOTE — PROGRESS NOTES
Family [facility]  Leticia Tejeda MD    Subjective:     Lana Mata is a 67 y.o. female with CMH of past fall, ostearthritis, DDD, atrial fibrillation on Xarelto, osteoporosis on alendronate, and anxiety well-controlled on Zoloft who presents for follow up of test results.      Neck, bilateral shoulder and upper arm pains: Reports she lifts herself out of her bed with a pull up bar at least up to 20 times a day. Pain worsens with movement.     Positive Cologuard: Patient agreeable to having colonoscopy for if needs polyp resection.     Diffuse idiopathic skeletal hyperostosis: 6-8/ 10 pain that at times wakes her up from sleep. Reports that she uses her Asprin 250 mg nightly plus a full aspirin tablet of 500 mg three times a week. Denies any dizziness, light-headedness or blood in her stool. Has been using Tylenol 2000 mg daily for pain management with aspirin and intermittent lidocaine patch. Limited use of voltaren gel as patient is unable to reach back and would require assistance. 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 Afraid of falling as she has in the past and dislikes using her cane. Patient notes that she exerts herself showering and finds her legs weak after a shower. As a result tries to stay off her feet when possible. Does do once daily stretching of her arms and legs.     Osteoporosis: Ran out of alendronate 2 weeks ago and thought that the pharmacist had called the clinic for a refill. Otherwise was tolerated well.     Anxiety: Patient reports this is not a problem doing well on zoloft.     The following portions of the patient's history were reviewed and updated as appropriate: current medications, past medical history, past surgical history and problem list.    Past Medical Hx:  Past Medical History:   Diagnosis Date   • Abnormal gait 11/28/2012   • Abrasion 02/26/2015   • Acute ankle pain 03/31/2016   • Acute bronchitis 09/04/2015   • Allergic rhinitis  05/22/2012   • Asthenia 05/22/2014   • Atrial fibrillation (HCC) 03/04/2016    - converted    • Attempted suicide (HCC)    • Benign essential hypertension 03/04/2016   • Cardiovascular stress test abnormal 03/04/2016   • Cellulitis of knee 03/03/2015   • Chest pain 04/02/2015   • Chronic pain disorder    • Common cold 06/24/2014   • Congestive heart failure (HCC) 02/04/2016   • Depressive disorder 05/22/2015    suspect more complicated psych issues      • Fibrocystic breast    • Gastroesophageal reflux disease 01/17/2013   • Generalized anxiety disorder 02/04/2016   • H/O echocardiogram     Normal LV function with Ef of 60-65%.Mildly dilated right ventricle with normal function. Grade 1B diastolic dysfunction with mild CLVH.NO sig. valvular regurg. or stenosis.   • Headache 10/27/2014   • Hyperlipidemia 03/04/2016   • Low back pain 06/30/2016    Need for prophylactic vaccination against Streptococcus pneumoniae [pneumococcus]    01/23/2015    • Neck pain 06/30/2016   • Obstructive sleep apnea of adult 07/30/2015   • Obstructive sleep apnea syndrome    • Osteoarthritis    • Osteoporosis 06/30/2016   • Pain in right shoulder 06/30/2016   • Pain in right shoulder 04/24/2014   • Shortness of breath 04/02/2015   • Skin ulcer (HCC) 04/13/2015    left   • Thyrotoxicosis with or without goiter 10/24/2014   • Urinary incontinence 05/22/2014       Past Surgical Hx:  Past Surgical History:   Procedure Laterality Date   • BACK SURGERY  08/07/2015    Removal of intstrumentation,L5-S1.Exploration of fusion,L5-S1.Posterolateral fusion,L4-L5.Posterior segmental spinal instrumentation L4-5.Transforaminal interbody fusion Through right sided approach.Performed at    • COLONOSCOPY  05/04/2016    will order cologuard   • INJECTION OF MEDICATION  03/23/2015    celestone   • INJECTION OF MEDICATION  05/04/2016    KENALOG(6)   • LUMBAR FUSION      discectomy fusion with rods L4-S1   • LUMBAR LAMINECTOMY  1995   • MANDIBLE FRACTURE SURGERY       JAW WIRED SHUT   • MOUTH SURGERY  10/05/2015    Fractured mandible. Removal of arch bars.   • MOUTH SURGERY  1985    Fractured mandible. Removal of arch bars.   • NASAL SEPTUM SURGERY     • RHINOPLASTY     • TUBAL ABDOMINAL LIGATION  1986       Current Meds:    Current Outpatient Medications:   •  alendronate (Fosamax) 70 MG tablet, Take 1 tablet by mouth Every 7 (Seven) Days., Disp: 8 tablet, Rfl: 2  •  ARIPiprazole (ABILIFY) 5 MG tablet, Take 5 mg by mouth Daily. At bedtime, Disp: , Rfl:   •  aspirin 325 MG tablet, Take 500 mg by mouth Daily. At bedtime, instructed to take a half tablet of 500 aspirin. , Disp: , Rfl:   •  atorvastatin (LIPITOR) 20 MG tablet, TAKE ONE TABLET BY MOUTH EVERY DAY, Disp: 90 tablet, Rfl: 3  •  bumetanide (BUMEX) 0.5 MG tablet, TAKE 1 TABLET TWICE DAILY, Disp: 60 tablet, Rfl: 0  •  Calcium Carb-Cholecalciferol (CALCIUM-VITAMIN D) 500-200 MG-UNIT per tablet, Take 1 tablet by mouth 2 (Two) Times a Day With Meals., Disp: 60 tablet, Rfl: 2  •  choline fenofibrate (Trilipix) 135 MG capsule, Take 1 capsule by mouth Daily., Disp: 90 capsule, Rfl: 3  •  Diclofenac Sodium (VOLTAREN) 1 % gel gel, Apply 4 g topically to the appropriate area as directed 4 (Four) Times a Day As Needed (Joint pains in back, knees and hands.)., Disp: 350 g, Rfl: 3  •  dilTIAZem XR (Dilt-XR) 180 MG 24 hr capsule, Take 1 capsule by mouth Daily., Disp: 30 capsule, Rfl: 0  •  ferrous sulfate 325 (65 FE) MG tablet, Take 325 mg by mouth Daily With Breakfast., Disp: , Rfl:   •  Incontinence Supply Disposable (Depend Adjustable Underwear) misc, 1 application As Needed (Incontinence)., Disp: 90 each, Rfl: 11  •  lidocaine (LIDODERM) 5 %, Place 1 patch on the skin as directed by provider Daily. Remove & Discard patch within 12 hours or as directed by MD, Disp: 30 each, Rfl: 0  •  lisinopril (PRINIVIL,ZESTRIL) 10 MG tablet, Take 10 mg by mouth Daily., Disp: , Rfl:   •  LORazepam (ATIVAN) 1 MG tablet, Take 1 mg by mouth 2  (Two) Times a Day. Scheduled to take in the morning and one with dinner, Disp: , Rfl: 0  •  methocarbamol (ROBAXIN) 500 MG tablet, Take 1 tablet by mouth 4 (Four) Times a Day As Needed for Muscle Spasms., Disp: 90 tablet, Rfl: 4  •  metoprolol tartrate (LOPRESSOR) 25 MG tablet, TAKE 1 TABLET EVERY 12 HOURS, Disp: 60 tablet, Rfl: 4  •  omeprazole (priLOSEC) 20 MG capsule, Take 1 capsule by mouth Daily., Disp: 90 capsule, Rfl: 5  •  rivaroxaban (Xarelto) 20 MG tablet, Take one tablet by mouth every day- take with food, Disp: 30 tablet, Rfl: 0  •  DULoxetine (Cymbalta) 60 MG capsule, Take 1 capsule by mouth Daily for 30 days., Disp: 30 capsule, Rfl: 2  •  isosorbide mononitrate (IMDUR) 30 MG 24 hr tablet, Take 1 tablet by mouth Daily for 30 days., Disp: 30 tablet, Rfl: 3    Current Facility-Administered Medications:   •  cyanocobalamin injection 1,000 mcg, 1,000 mcg, Intramuscular, Q28 Days, Leticia Tejeda MD  •  loperamide (IMODIUM) capsule 2 mg, 2 mg, Oral, Daily, Leticia Tejeda MD    Allergies:  Allergies   Allergen Reactions   • Codeine Nausea And Vomiting   • Latex Rash   • Morphine And Related Hallucinations   • Pravachol [Pravastatin Sodium] Myalgia       Family Hx:  Family History   Problem Relation Age of Onset   • Hypertension Mother    • Hyperlipidemia Mother    • Mental illness Mother    • Thyroid disease Mother    • Diabetes Maternal Grandmother    • Cancer Maternal Grandmother    • Depression Other    • Diabetes Other    • Heart disease Other    • Hypertension Other    • Osteoporosis Other    • Thyroid disease Other    • Cancer Maternal Aunt    • Cancer Paternal Aunt    • Breast cancer Paternal Aunt         Social History:  Social History     Socioeconomic History   • Marital status:    Tobacco Use   • Smoking status: Never Smoker   • Smokeless tobacco: Never Used   • Tobacco comment: smoking cessation    Substance and Sexual Activity   • Alcohol use: No   • Drug use: No     Comment: lortab  "/ oxycontin   • Sexual activity: Defer       Review of Systems  Review of Systems   Constitutional: Positive for activity change. Negative for chills and fever.   HENT: Negative for nosebleeds, sore throat and trouble swallowing.    Respiratory: Negative for cough, chest tightness and shortness of breath.    Cardiovascular: Positive for leg swelling. Negative for palpitations.   Gastrointestinal: Positive for diarrhea. Negative for abdominal distention, abdominal pain, blood in stool and vomiting.   Genitourinary: Negative for hematuria.   Musculoskeletal: Positive for arthralgias, back pain, gait problem, joint swelling, myalgias and neck pain.   Skin: Negative for wound.   Neurological: Positive for weakness. Negative for dizziness, light-headedness and headaches.   Psychiatric/Behavioral: Negative for dysphoric mood. The patient is not nervous/anxious.        Objective:     /88   Pulse 81   Ht 165.1 cm (65\")   Wt 99.5 kg (219 lb 4.8 oz)   SpO2 97%   BMI 36.49 kg/m²   Physical Exam  Vitals reviewed.   Constitutional:       General: She is not in acute distress.     Appearance: Normal appearance. She is not ill-appearing.   HENT:      Nose: No rhinorrhea.   Eyes:      Extraocular Movements: Extraocular movements intact.      Conjunctiva/sclera: Conjunctivae normal.   Cardiovascular:      Rate and Rhythm: Normal rate. Rhythm irregular.      Pulses: Normal pulses.   Pulmonary:      Effort: Pulmonary effort is normal.      Breath sounds: Normal breath sounds. No wheezing, rhonchi or rales.   Musculoskeletal:      Right upper arm: Tenderness present. No deformity or bony tenderness.      Left upper arm: Tenderness present. No deformity or bony tenderness.      Right elbow: No swelling. Normal range of motion.      Left elbow: No swelling. Normal range of motion.      Right wrist: Normal range of motion.      Left wrist: Normal range of motion.      Cervical back: Spasms present. No deformity, tenderness or " bony tenderness.      Thoracic back: No deformity, tenderness or bony tenderness.      Lumbar back: Bony tenderness present. No tenderness.      Right lower leg: Pitting Edema (trace) present.      Left lower leg: Pitting Edema (trace) present.      Comments: Well defined bicep muscles.    Lymphadenopathy:      Cervical: No cervical adenopathy.   Skin:     General: Skin is warm and dry.      Coloration: Skin is not pale.      Findings: No bruising or rash.   Neurological:      Mental Status: She is alert.      Motor: Weakness present.      Gait: Gait normal.   Psychiatric:         Mood and Affect: Mood normal.         Behavior: Behavior normal.         Thought Content: Thought content normal.         Judgment: Judgment normal.        Assessment/Plan:     Diagnoses and all orders for this visit:    1. DDD (degenerative disc disease), thoracolumbar (Primary)-uncontrolled  -     DULoxetine (Cymbalta) 60 MG capsule; Take 1 capsule by mouth Daily for 30 days.  Dispense: 30 capsule; Refill: 2  Plan: Discussed pain management and orthopedic referral with patient for which she refused as she preferred not to rely on injection pain medication or addictive medication. Educated that it is vital that she stop taking the full dose aspirin three times a week. Encouraged to use up to 3 grams of tylenol daily for pain control. Will hold off on screening for anemia or kidney failure as patient is hemodynamically stable and urinating.     2. Osteopenia of neck of left femur-uncontrolled  -     alendronate (Fosamax) 70 MG tablet; Take 1 tablet by mouth Every 7 (Seven) Days.  Dispense: 8 tablet; Refill: 2  Plan: Restarted on Alendronate for which she has been off for 2 weeks. Encouraged physical weight bearing exercises.     3. DISH (diffuse idiopathic skeletal hyperostosis)-uncontrolled  -     DULoxetine (Cymbalta) 60 MG capsule; Take 1 capsule by mouth Daily for 30 days.  Dispense: 30 capsule; Refill: 2  Patient prefers physical  therapy and open to switch to duloxetine from zoloft to address her pulling leg pains. Follow up in 4 weeks for back pain.     4. Anxiety-well-controlled  -     DULoxetine (Cymbalta) 60 MG capsule; Take 1 capsule by mouth Daily for 30 days.  Dispense: 30 capsule; Refill: 2  Plan: Stopped zoloft today and will do duloxetine tomorrow. Follow up in 4 weeks. Do PHQ 9 and MINO 7. Encouraged to stop ativan for increasing fall risk.     5. Physical deconditioning-uncontrolled  -     Ambulatory Referral to Physical Therapy Evaluate and treat; Stretching, Strengthening  Plan: Patient agreeable to doing physical therapy as long as she can tolerate.   Discussed injections and consideration of calcitonin or zanaflex in the future.   Not yet discussed back brace at this time. Feel TENS unit would be difficult for her to place, limiting effectiveness.     6. Positive colorectal cancer screening using Cologuard test-pending  -     Ambulatory Referral For Screening Colonoscopy  Plan: Patient agreeable to colonoscopy even though she can not be hospitalized for procedure.     Rx changes: Transitioned from zoloft 100 mg BID to duloxetine 60 mg daily.   · Patient Education: Strongly encouraged   · Compliance at present is estimated to be excellent.   · Efforts to improve compliance (if necessary) will be directed at increased exercise and stretching. .    Depression screening: Up to date; last screen 3/9/2022  Follow-up:     Return in about 1 month (around 7/17/2022) for low back pain.    Preventative:  Health Maintenance   Topic Date Due   • Pneumococcal Vaccine 65+ (1 - PCV) 03/09/1961   • DIABETIC FOOT EXAM  Never done   • DIABETIC EYE EXAM  Never done   • ZOSTER VACCINE (2 of 2) 02/09/2017   • COVID-19 Vaccine (3 - Booster for Leilani series) 03/30/2022   • LIPID PANEL  08/26/2022   • HEMOGLOBIN A1C  09/09/2022   • INFLUENZA VACCINE  10/01/2022   • URINE MICROALBUMIN  11/03/2022   • ANNUAL WELLNESS VISIT  11/24/2022   • MAMMOGRAM   01/14/2024   • DXA SCAN  01/14/2024   • COLORECTAL CANCER SCREENING  11/07/2026   • TDAP/TD VACCINES (5 - Td or Tdap) 12/07/2029   • HEPATITIS C SCREENING  Completed   • PAP SMEAR  Discontinued   Weight  -Class: Obese Class II: 35-39.9kg/m2  -Class 2 Severe Obesity (BMI >=35 and <=39.9). Obesity-related health conditions include the following: hypertension, coronary heart disease and osteoarthritis. Obesity is improving with lifestyle modifications. BMI is is above average; BMI management plan is completed. We discussed portion control and physical therapy to help with weakened extremities from physical deconditioning. .     Alcohol use:  reports no history of alcohol use.  Nicotine status  reports that she has never smoked. She has never used smokeless tobacco.     Goals     •  management of chronic medical conditions (pt-stated)       Barriers to goals: none            RISK SCORE: 3      This document has been electronically signed by Leticia Tejeda MD on June 17, 2022 15:43 CDT

## 2022-06-23 DIAGNOSIS — I50.32 CHRONIC HEART FAILURE WITH PRESERVED EJECTION FRACTION: ICD-10-CM

## 2022-06-24 RX ORDER — BUMETANIDE 0.5 MG/1
TABLET ORAL
Qty: 60 TABLET | Refills: 0 | Status: SHIPPED | OUTPATIENT
Start: 2022-06-24 | End: 2022-07-22 | Stop reason: SDUPTHER

## 2022-06-27 RX ORDER — DILTIAZEM HYDROCHLORIDE 180 MG/1
CAPSULE, EXTENDED RELEASE ORAL
Qty: 30 CAPSULE | Refills: 0 | Status: SHIPPED | OUTPATIENT
Start: 2022-06-27 | End: 2022-07-25

## 2022-06-29 ENCOUNTER — HOSPITAL ENCOUNTER (OUTPATIENT)
Dept: PHYSICAL THERAPY | Facility: HOSPITAL | Age: 67
Setting detail: THERAPIES SERIES
Discharge: HOME | End: 2022-06-29

## 2022-06-29 DIAGNOSIS — R53.81 PHYSICAL DECONDITIONING: Primary | ICD-10-CM

## 2022-06-29 PROCEDURE — 97162 PT EVAL MOD COMPLEX 30 MIN: CPT | Performed by: PHYSICAL THERAPIST

## 2022-06-29 NOTE — THERAPY EVALUATION
Outpatient Physical Therapy Ortho Initial Evaluation  [facility]     Patient Name: Lana Mata  : 1955  MRN: [id number]  Today's Date: 2022      Visit Date: 2022  Visit   Return to MD: CHEKO  Re-cert date: 22  Patient Active Problem List   Diagnosis   • Thyrotoxicosis with or without goiter   • Osteoporosis   • Pain in right shoulder   • Low back pain   • Hyperlipidemia   • Generalized anxiety disorder   • Depressive disorder   • Congestive heart failure (HCC)   • Benign essential hypertension   • Asthenia   • Abnormal gait   • Obstructive sleep apnea of adult   • DDD (degenerative disc disease), lumbar   • Lumbar radiculopathy   • History of lumbar surgery   • Lumbar spondylolysis   • Long term (current) use of opiate analgesic   • Longstanding persistent atrial fibrillation ([facility])   • Urinary incontinence   • Skin ulcer ([facility])   • Shortness of breath   • Shortness of breath   • Osteoarthritis   • SOPHIA (obstructive sleep apnea)   • Neck pain   • Headache   • H/O echocardiogram   • Gastroesophageal reflux disease   • Common cold   • Chronic pain disorder   • Cellulitis of knee   • Attempted suicide ([facility])   • Allergic rhinitis   • Acute bronchitis   • Acute ankle pain   • Abrasion   • Lower abdominal pain   • Diarrhea   • Nausea   • Class 2 severe obesity due to excess calories with serious comorbidity and body mass index (BMI) of 37.0 to 37.9 in adult ([facility])   • Chronic pain of left ankle   • Posterior tibial tendon dysfunction, left   • Retained orthopedic hardware   • History of tibial fracture   • Flat foot   • UTI (urinary tract infection), bacterial   • Posterior tibial tendon dysfunction (PTTD) of left lower extremity   • Left leg pain   • Primary osteoarthritis of right knee   • Effusion of right knee   • Acute pain of right knee   • Primary osteoarthritis of both ankles   • SOB (shortness of breath)   • Acute on chronic diastolic congestive heart failure (HCC)   • Stage  3b chronic kidney disease (HCC)   • (HFpEF) heart failure with preserved ejection fraction ([facility])   • UTI (urinary tract infection)   • Ankle weakness   • Chronic pain of both ankles        Past Medical History:   Diagnosis Date   • Abnormal gait 11/28/2012   • Abrasion 02/26/2015   • Acute ankle pain 03/31/2016   • Acute bronchitis 09/04/2015   • Allergic rhinitis 05/22/2012   • Asthenia 05/22/2014   • Atrial fibrillation ([facility]) 03/04/2016    - converted    • Attempted suicide ([facility])    • Benign essential hypertension 03/04/2016   • Cardiovascular stress test abnormal 03/04/2016   • Cellulitis of knee 03/03/2015   • Chest pain 04/02/2015   • Chronic pain disorder    • Common cold 06/24/2014   • Congestive heart failure ([facility]) 02/04/2016   • Depressive disorder 05/22/2015    suspect more complicated psych issues      • Fibrocystic breast    • Gastroesophageal reflux disease 01/17/2013   • Generalized anxiety disorder 02/04/2016   • H/O echocardiogram     Normal LV function with Ef of 60-65%.Mildly dilated right ventricle with normal function. Grade 1B diastolic dysfunction with mild CLVH.NO sig. valvular regurg. or stenosis.   • Headache 10/27/2014   • Hyperlipidemia 03/04/2016   • Low back pain 06/30/2016    Need for prophylactic vaccination against Streptococcus pneumoniae [pneumococcus]    01/23/2015    • Neck pain 06/30/2016   • Obstructive sleep apnea of adult 07/30/2015   • Obstructive sleep apnea syndrome    • Osteoarthritis    • Osteoporosis 06/30/2016   • Pain in right shoulder 06/30/2016   • Pain in right shoulder 04/24/2014   • Shortness of breath 04/02/2015   • Skin ulcer ([facility]) 04/13/2015    left   • Thyrotoxicosis with or without goiter 10/24/2014   • Urinary incontinence 05/22/2014        Past Surgical History:   Procedure Laterality Date   • BACK SURGERY  08/07/2015    Removal of intstrumentation,L5-S1.Exploration of fusion,L5-S1.Posterolateral fusion,L4-L5.Posterior segmental spinal instrumentation  L4-5.Transforaminal interbody fusion Through right sided approach.Performed at    • COLONOSCOPY  2016    will order cologuard   • INJECTION OF MEDICATION  2015    celestone   • INJECTION OF MEDICATION  2016    KENALOG(6)   • LUMBAR FUSION      discectomy fusion with rods L4-S1   • LUMBAR LAMINECTOMY     • MANDIBLE FRACTURE SURGERY      JAW WIRED SHUT   • MOUTH SURGERY  10/05/2015    Fractured mandible. Removal of arch bars.   • MOUTH SURGERY      Fractured mandible. Removal of arch bars.   • NASAL SEPTUM SURGERY     • RHINOPLASTY     • TUBAL ABDOMINAL LIGATION         Visit Dx:     ICD-10-CM ICD-9-CM   1. Physical deconditioning  R53.81 799.3     Medications (Admitted on 2022)       Outpatient Medications    alendronate (Fosamax) 70 MG tablet    ARIPiprazole (ABILIFY) 5 MG tablet    aspirin 325 MG tablet    atorvastatin (LIPITOR) 20 MG tablet    bumetanide (BUMEX) 0.5 MG tablet    Calcium Carb-Cholecalciferol (CALCIUM-VITAMIN D) 500-200 MG-UNIT per tablet    choline fenofibrate (Trilipix) 135 MG capsule    Diclofenac Sodium (VOLTAREN) 1 % gel gel    Dilt- MG 24 hr capsule    DULoxetine (Cymbalta) 60 MG capsule    ferrous sulfate 325 (65 FE) MG tablet    Incontinence Supply Disposable (Depend Adjustable Underwear) misc    isosorbide mononitrate (IMDUR) 30 MG 24 hr tablet ()    lidocaine (LIDODERM) 5 %    lisinopril (PRINIVIL,ZESTRIL) 10 MG tablet    LORazepam (ATIVAN) 1 MG tablet    methocarbamol (ROBAXIN) 500 MG tablet    metoprolol tartrate (LOPRESSOR) 25 MG tablet    omeprazole (priLOSEC) 20 MG capsule    rivaroxaban (Xarelto) 20 MG tablet     Clinic-Administered Medications    cyanocobalamin injection 1,000 mcg    loperamide (IMODIUM) capsule 2 mg    Allergies        CodeineNausea And Vomiting   LatexRash   Morphine And RelatedHallucinations   Pravachol [Pravastatin Sodium]Myalgia     Patient History     Row Name [id number]             History    Chief Complaint  Balance Problems;Difficulty Walking;Difficulty with daily activities;Falls/history of falls;Fatigue/poor endurance;Joint stiffness;Joint swelling;Muscle tenderness;Muscle weakness;Numbness;Pain;Problems breathing/shortness of breath;Swelling;Tightness;Tinglings (P)    -patient      Type of Pain Ankle pain;Back pain;Chest pain;Hip pain;Knee pain;Lower Extremity / Leg;Neck pain;Rib pain;Shoulder pain;Upper Extremity / Arm (P)    -patient      Date Current Problem(s) Began 12/30/21 (P)    -patient      Brief Description of Current Complaint Pain ,SOB ,mobility (P)    -patient      Patient/Caregiver Goals Relieve pain;Improve mobility (P)    -patient      Hand Dominance right-handed (P)    -patient      Occupation/sports/leisure activities None (P)    -patient      Patient seeing anyone else for problem(s)? , Dr. Parisi (P)    -patient      What clinical tests have you had for this problem? X-ray;CT scan;MRI;Bone Density;Blood Work (P)    -patient      Are you or can you be pregnant No (P)    -patient              Fall Risk Assessment    Any falls in the past year: Yes (P)    -patient      Factors that contributed to the fall: Tripped (P)    -patient              Services    Prior Rehab/Home Health Experiences No (P)    -patient      Are you currently receiving Home Health services No (P)    -patient      Do you plan to receive Home Health services in the near future No (P)    -patient              Daily Activities    Primary Language English (P)    -patient      Are you able to read Yes (P)    -patient      Are you able to write Yes (P)    -patient      How does patient learn best? Listening;Pictures/Video (P)    -patient              Safety    Are you being hurt, hit, or frightened by anyone at home or in your life? No (P)    -patient      Are you being neglected by a caregiver No (P)    -patient      Have you had any of the following issues with Depression;Anxiety;Panic Attacks (P)    -patient             User Key  (r) = Recorded By, (t) = Taken By, (c) = Cosigned By    Initials Name Provider Type    patient Lana Ayala Atte[place] --                 PT Ortho     Row Name 06/29/22 0852       Subjective Comments    Subjective Comments 68 yo female with diagnosis of physical deconditioning. Has been in a weakened state for the past 2 years. Has multiple co-morbidities including a h/o lumbar fusion, CHF, atrial fib and State III kidney disease. Has had 2 falls over the past 6 months. Uses a cane to assist self with ambulation. Lives alone, , in a camper currently on her son's property. Ramp access to enter the camper, 3 steps (indoor) to her bedroom.  -BS       Precautions and Contraindications    Precautions/Limitations fall precautions  -BS    Precautions h/o atrial fib  -BS       Subjective Pain    Able to rate subjective pain? yes  -BS    Pre-Treatment Pain Level 5  neck, low back, B shoulders  -BS    Post-Treatment Pain Level 6  -BS       Posture/Observations    Posture/Observations Comments ambulates mod I with cane, slow and unsteady ghada  -BS       General ROM    GENERAL ROM COMMENTS AROM: L LE-hip flex 105 hip IR 18 knee 0-120 ankle DF neutral PF grossly WFL R LE-hip flex 110 hip IR 26 knee 0-117  ankle DF neutral PF grossly WFL  -BS       MMT (Manual Muscle Testing)    General MMT Comments R LE-hip flex 5/5 knee ext 4+/5 knee flex 5/5 ankle DF 4+/5 inv 4/5 calderon 5/5 L LE-hip flex 5/5 knee flex 5/5 knee ext 4+/5 ankle 5/5  -BS       Sensation    Light Touch Severe deficits in the LLE;Partial deficits in the RLE  severe L foot (all 5 toes), partial numbness L med/lat lower legs and R foot-toes numb  -BS    Additional Comments L LE ataxia-heel to toe taps, R ankle WFL grossly with heel to toe taps  -BS       Flexibility    Flexibility Tested? Lower Extremity  -BS       Lower Extremity Flexibility    Hamstrings Bilateral:;Mildly limited  -BS    Hip External Rotators Bilateral:;Severely limited  -BS          User  Key  (r) = Recorded By, (t) = Taken By, (c) = Cosigned By    Initials Name Provider Type    Osman Benson PT Physical Therapist                                   PT OP Goals     Row Name 06/29/22 0800          PT Short Term Goals    STG Date to Achieve 07/13/22  -BS     STG 1 Improve bilateral hip external rotator flexibility to moderate limit  -BS     STG 1 Progress New  -BS     STG 2 Improve 5x's sit to stand (STS) test to 15 sec  -BS     STG 2 Progress New  -BS            Long Term Goals    LTG Date to Achieve 07/27/22  -BS     LTG 1 Improve bilateral hip external rotator flexibility to minimal limit  -BS     LTG 1 Progress New  -BS     LTG 2 Improve 5x's sit to stand (STS) test to 14 sec or less  -BS     LTG 2 Progress New  -BS     LTG 3 Improve LEFS score to 36 or more  -BS     LTG 3 Progress New  -BS            Time Calculation    PT Goal Re-Cert Due Date 07/20/22  -BS           User Key  (r) = Recorded By, (t) = Taken By, (c) = Cosigned By    Initials Name Provider Type    Osman Benson PT Physical Therapist                 PT Assessment/Plan     Row Name 06/29/22 0852          PT Assessment    Functional Limitations Impaired gait;Limitation in home management;Performance in leisure activities;Performance in self-care ADL;Performance in sport activities;Performance in work activities  -BS     Impairments Balance;Endurance;Gait;Impaired flexibility;Muscle strength;Pain;Range of motion  -BS     Assessment Comments 66 yo female with physical deconditioning due to multiple and varied co-morbidities including CHF, atrial fib, kidney disease and orthopedic procedures (2 back surgeries).  -BS     Please refer to paper survey for additional self-reported information Yes  -BS     Rehab Potential Fair  -BS     Patient/caregiver participated in establishment of treatment plan and goals Yes  -BS     Patient would benefit from skilled therapy intervention Yes  -BS            PT Plan    PT Frequency  1x/week;2x/week  -BS     Predicted Duration of Therapy Intervention (PT) 3-4 weeks then TBD  -BS     Planned CPT's? PT EVAL MOD COMPLELITY: 90124;PT RE-EVAL: 29737;PT THER PROC EA 15 MIN: 51834;PT THER ACT EA 15 MIN: 24403;PT MANUAL THERAPY EA 15 MIN: 17178;PT NEUROMUSC RE-EDUCATION EA 15 MIN: 07095;PT GAIT TRAINING EA 15 MIN: 43551;PT HOT OR COLD PACK TREAT MCARE;PT THER SUPP EA 15 MIN;PT ELECTRICAL STIM UNATTEND:   -BS     Physical Therapy Interventions (Optional Details) balance training;gait training;home exercise program;joint mobilization;manual therapy techniques;modalities;neuromuscular re-education;patient/family education;postural re-education;ROM (Range of Motion);stair training;strengthening;stretching;swiss ball techniques;transfer training  -BS     PT Plan Comments Slowly advance LE strengthening, core strengthening and LE flexibility, as well as standing balance.  -BS           User Key  (r) = Recorded By, (t) = Taken By, (c) = Cosigned By    Initials Name Provider Type    Osman Benson, PT Physical Therapist                   OP Exercises     Row Name 06/29/22 0852             Subjective Comments    Subjective Comments 68 yo female with diagnosis of physical deconditioning. Has been in a weakened state for the past 2 years. Has multiple co-morbidities including a h/o lumbar fusion, CHF, atrial fib and State III kidney disease. Has had 2 falls over the past 6 months. Uses a cane to assist self with ambulation. Lives alone, , in a camper currently on her son's property. Ramp access to enter the camper, 3 steps (indoor) to her bedroom.  -BS              Subjective Pain    Able to rate subjective pain? yes  -BS      Pre-Treatment Pain Level 5  neck, low back, B shoulders  -BS      Post-Treatment Pain Level 6  -BS            User Key  (r) = Recorded By, (t) = Taken By, (c) = Cosigned By    Initials Name Provider Type    Osman Benson, PT Physical Therapist                               Outcome Measure Options: Lower Extremity Functional Scale (LEFS), 5x Sit to Stand  5 Times Sit to Stand  5 Times Sit to Stand (seconds): 17.03 seconds  5 Times Sit to Stand Comments: CONRAD MEEKS A  Lower Extremity Functional Index  Any of your usual work, housework or school activities: Moderate difficulty  Your usual hobbies, recreational or sporting activities: Moderate difficulty  Getting into or out of the bath: Moderate difficulty  Walking between rooms: Moderate difficulty  Putting on your shoes or socks: Moderate difficulty  Squatting: Moderate difficulty  Lifting an object, like a bag of groceries from the floor: A little bit of difficulty  Performing light activities around your home: A little bit of difficulty  Performing heavy activities around your home: Extreme difficulty or unable to perform activity  Getting into or out of a car: A little bit of difficulty  Walking 2 blocks: Extreme difficulty or unable to perform activity  Walking a mile: Extreme difficulty or unable to perform activity  Going up or down 10 stairs (about 1 flight of stairs): Extreme difficulty or unable to perform activity  Standing for 1 hour: Moderate difficulty  Sitting for 1 hour: A little bit of difficulty  Running on even ground: Extreme difficulty or unable to perform activity  Running on uneven ground: Extreme difficulty or unable to perform activity  Making sharp turns while running fast: Extreme difficulty or unable to perform activity  Hopping: Extreme difficulty or unable to perform activity  Rolling over in bed: Extreme difficulty or unable to perform activity  Total: 26      Time Calculation:     Start Time: 0852  Stop Time: 0933  Time Calculation (min): 41 min  Total Timed Code Minutes- PT: 41 minute(s)     Therapy Charges for Today     Code Description Service Date Service Provider Modifiers Qty    [id number] HC PT EVAL MOD COMPLEXITY 3 6/29/2022 Osman Piper, PT GP 1          PT G-Codes  Outcome Measure Options:  Lower Extremity Functional Scale (LEFS), 5x Sit to Stand  Total: 26         Osman Piper, PT  6/29/2022

## 2022-07-06 ENCOUNTER — HOSPITAL ENCOUNTER (OUTPATIENT)
Dept: PHYSICAL THERAPY | Facility: HOSPITAL | Age: 67
Setting detail: THERAPIES SERIES
Discharge: HOME | End: 2022-07-06

## 2022-07-06 DIAGNOSIS — R53.81 PHYSICAL DECONDITIONING: Primary | ICD-10-CM

## 2022-07-06 PROCEDURE — 97110 THERAPEUTIC EXERCISES: CPT

## 2022-07-06 NOTE — THERAPY TREATMENT NOTE
Outpatient Physical Therapy Ortho Treatment Note  [facility]     Patient Name: Lana Mata  : 1955  MRN: [id number]  Today's Date: 2022      Visit Date: 2022     Subjective Improvement 0  Visits 2/2  Visits approved 4 until 2022  RTMD PRN  Recert Date 2022    Deconditioning    Visit Dx:    ICD-10-CM ICD-9-CM   1. Physical deconditioning  R53.81 799.3       Patient Active Problem List   Diagnosis   • Thyrotoxicosis with or without goiter   • Osteoporosis   • Pain in right shoulder   • Low back pain   • Hyperlipidemia   • Generalized anxiety disorder   • Depressive disorder   • Congestive heart failure (HCC)   • Benign essential hypertension   • Asthenia   • Abnormal gait   • Obstructive sleep apnea of adult   • DDD (degenerative disc disease), lumbar   • Lumbar radiculopathy   • History of lumbar surgery   • Lumbar spondylolysis   • Long term (current) use of opiate analgesic   • Longstanding persistent atrial fibrillation ([facility])   • Urinary incontinence   • Skin ulcer ([facility])   • Shortness of breath   • Shortness of breath   • Osteoarthritis   • SOPHIA (obstructive sleep apnea)   • Neck pain   • Headache   • H/O echocardiogram   • Gastroesophageal reflux disease   • Common cold   • Chronic pain disorder   • Cellulitis of knee   • Attempted suicide ([facility])   • Allergic rhinitis   • Acute bronchitis   • Acute ankle pain   • Abrasion   • Lower abdominal pain   • Diarrhea   • Nausea   • Class 2 severe obesity due to excess calories with serious comorbidity and body mass index (BMI) of 37.0 to 37.9 in adult ([facility])   • Chronic pain of left ankle   • Posterior tibial tendon dysfunction, left   • Retained orthopedic hardware   • History of tibial fracture   • Flat foot   • UTI (urinary tract infection), bacterial   • Posterior tibial tendon dysfunction (PTTD) of left lower extremity   • Left leg pain   • Primary osteoarthritis of right knee   • Effusion of right knee   • Acute pain of  right knee   • Primary osteoarthritis of both ankles   • SOB (shortness of breath)   • Acute on chronic diastolic congestive heart failure (HCC)   • Stage 3b chronic kidney disease ([facility])   • (HFpEF) heart failure with preserved ejection fraction ([facility])   • UTI (urinary tract infection)   • Ankle weakness   • Chronic pain of both ankles        Past Medical History:   Diagnosis Date   • Abnormal gait 11/28/2012   • Abrasion 02/26/2015   • Acute ankle pain 03/31/2016   • Acute bronchitis 09/04/2015   • Allergic rhinitis 05/22/2012   • Asthenia 05/22/2014   • Atrial fibrillation ([facility]) 03/04/2016    - converted    • Attempted suicide ([facility])    • Benign essential hypertension 03/04/2016   • Cardiovascular stress test abnormal 03/04/2016   • Cellulitis of knee 03/03/2015   • Chest pain 04/02/2015   • Chronic pain disorder    • Common cold 06/24/2014   • Congestive heart failure (HCC) 02/04/2016   • Depressive disorder 05/22/2015    suspect more complicated psych issues      • Fibrocystic breast    • Gastroesophageal reflux disease 01/17/2013   • Generalized anxiety disorder 02/04/2016   • H/O echocardiogram     Normal LV function with Ef of 60-65%.Mildly dilated right ventricle with normal function. Grade 1B diastolic dysfunction with mild CLVH.NO sig. valvular regurg. or stenosis.   • Headache 10/27/2014   • Hyperlipidemia 03/04/2016   • Low back pain 06/30/2016    Need for prophylactic vaccination against Streptococcus pneumoniae [pneumococcus]    01/23/2015    • Neck pain 06/30/2016   • Obstructive sleep apnea of adult 07/30/2015   • Obstructive sleep apnea syndrome    • Osteoarthritis    • Osteoporosis 06/30/2016   • Pain in right shoulder 06/30/2016   • Pain in right shoulder 04/24/2014   • Shortness of breath 04/02/2015   • Skin ulcer ([facility]) 04/13/2015    left   • Thyrotoxicosis with or without goiter 10/24/2014   • Urinary incontinence 05/22/2014        Past Surgical History:   Procedure Laterality Date   • BACK  SURGERY  08/07/2015    Removal of intstrumentation,L5-S1.Exploration of fusion,L5-S1.Posterolateral fusion,L4-L5.Posterior segmental spinal instrumentation L4-5.Transforaminal interbody fusion Through right sided approach.Performed at    • COLONOSCOPY  05/04/2016    will order cologuard   • INJECTION OF MEDICATION  03/23/2015    celestone   • INJECTION OF MEDICATION  05/04/2016    KENALOG(6)   • LUMBAR FUSION      discectomy fusion with rods L4-S1   • LUMBAR LAMINECTOMY  1995   • MANDIBLE FRACTURE SURGERY      JAW WIRED SHUT   • MOUTH SURGERY  10/05/2015    Fractured mandible. Removal of arch bars.   • MOUTH SURGERY  1985    Fractured mandible. Removal of arch bars.   • NASAL SEPTUM SURGERY     • RHINOPLASTY     • TUBAL ABDOMINAL LIGATION  1986        PT Ortho     Row Name 07/06/22 0900       Subjective Comments    Subjective Comments Patient states that she does have back and knee pain.  She took her blood pressure meds at 5:30 a.m.  She takes over the counter meds for her pain. No falls in the 5 months.  She does trip but hasnt fallen.  -CP       Precautions and Contraindications    Precautions/Limitations fall precautions  -CP    Precautions h/o atrial fib  -CP    Contraindications latex allergy  -CP       Subjective Pain    Able to rate subjective pain? yes  -CP    Pre-Treatment Pain Level 6  -CP    Post-Treatment Pain Level 4  -CP       Posture/Observations    Posture/Observations Comments ambulates mod I with cane, slow and unsteady ghada  -CP       Flexibility    Flexibility Tested? Lower Extremity  -CP       Lower Extremity Flexibility    Hamstrings Bilateral:;Mildly limited  -CP    Hip External Rotators Bilateral:;Severely limited  -CP          User Key  (r) = Recorded By, (t) = Taken By, (c) = Cosigned By    Initials Name Provider Type    Marva Chávez, PTA Physical Therapist Assistant                             PT Assessment/Plan     Row Name 07/06/22 6488          PT Assessment    Assessment  "Comments Very good tolerance with ther ex.  vitals all remained stable during therapy.  -CP            PT Plan    PT Frequency 1x/week;2x/week  -CP     Predicted Duration of Therapy Intervention (PT) 3-4 weeks  -CP     PT Plan Comments Cont with POC.  monitor B/P LE strengthening and endurance as tolerated progress slowly  -CP           User Key  (r) = Recorded By, (t) = Taken By, (c) = Cosigned By    Initials Name Provider Type    CP Marva Buck, PTA Physical Therapist Assistant                   OP Exercises     Row Name 07/06/22 1053 07/06/22 0900          Subjective Comments    Subjective Comments -- Patient states that she does have back and knee pain.  She took her blood pressure meds at 5:30 a.m.  She takes over the counter meds for her pain. No falls in the 5 months.  She does trip but hasnt fallen.  -CP            Subjective Pain    Able to rate subjective pain? -- yes  -CP     Pre-Treatment Pain Level -- 6  -CP     Post-Treatment Pain Level -- 4  -CP            Total Minutes    80474 - PT Therapeutic Exercise Minutes 38  -CP --            Exercise 1    Exercise Name 1 -- vitals  -CP     Sets 1 -- o2 93%  -CP     Reps 1 -- pulse 80  -CP     Time 1 -- B/P 88/53 using the wrist cuff  -CP            Exercise 2    Exercise Name 2 -- B/P using cuff 100/80  -CP            Exercise 3    Exercise Name 3 -- Pro II level 1  -CP     Time 3 -- 5  -CP            Exercise 4    Exercise Name 4 -- vital  -CP     Sets 4 -- o2 96%  -CP     Reps 4 -- pulse 80  -CP     Time 4 -- B/P 110/70  -CP            Exercise 5    Exercise Name 5 -- Pro II leel 1  -CP     Time 5 -- 5  -CP            Exercise 6    Exercise Name 6 -- vitals  -CP     Sets 6 -- O2 94%  -CP     Reps 6 -- Pulse 83  -CP     Time 6 -- B/P 110/70  -CP            Exercise 7    Exercise Name 7 -- LAQ  -CP     Cueing 7 -- Verbal  -CP     Sets 7 -- 2  -CP     Reps 7 -- 10  -CP     Time 7 -- 5\" holds  -CP     Additional Comments -- B LE  -CP            Exercise 8 " "   Exercise Name 8 -- seated marching  -CP     Cueing 8 -- Verbal  -CP     Sets 8 -- 2  -CP     Reps 8 -- 10  -CP     Time 8 -- 2\" hlds  -CP     Additional Comments -- B LE  -CP            Exercise 9    Exercise Name 9 -- seated hip AD squeezes  -CP     Cueing 9 -- Verbal  -CP     Sets 9 -- 2  -CP     Reps 9 -- 10  -CP     Time 9 -- 5\" holds  -CP            Exercise 10    Exercise Name 10 -- CR/TR in standing  -CP     Cueing 10 -- Verbal;Demo  -CP     Reps 10 -- 20  -CP            Exercise 11    Exercise Name 11 -- side stepping at taping table  -CP     Cueing 11 -- Verbal;Tactile;Demo  -CP     Reps 11 -- 5  -CP            Exercise 12    Exercise Name 12 -- review HEP  -CP           User Key  (r) = Recorded By, (t) = Taken By, (c) = Cosigned By    Initials Name Provider Type    CP Marva Buck, PTA Physical Therapist Assistant                              PT OP Goals     Row Name 07/06/22 1000          PT Short Term Goals    STG Date to Achieve 07/13/22  -CP     STG 1 Improve bilateral hip external rotator flexibility to moderate limit  -CP     STG 1 Progress New  -CP     STG 2 Improve 5x's sit to stand (STS) test to 15 sec  -CP     STG 2 Progress New  -CP            Long Term Goals    LTG Date to Achieve 07/27/22  -CP     LTG 1 Improve bilateral hip external rotator flexibility to minimal limit  -CP     LTG 1 Progress New  -CP     LTG 2 Improve 5x's sit to stand (STS) test to 14 sec or less  -CP     LTG 2 Progress New  -CP     LTG 3 Improve LEFS score to 36 or more  -CP     LTG 3 Progress New  -CP            Time Calculation    PT Goal Re-Cert Due Date 07/20/22  -CP           User Key  (r) = Recorded By, (t) = Taken By, (c) = Cosigned By    Initials Name Provider Type    Marva Chávez PTA Physical Therapist Assistant                Therapy Education  Education Details: seated hip AD squeezes, seated marching, LAQ, standing CR/TR, stie stepping at counter  Given: HEP  Program: New  How Provided: Verbal, " Demonstration, Written  Provided to: Patient  Level of Understanding: Teach back education performed, Verbalized, Demonstrated              Time Calculation:   Start Time: 0853  Stop Time: 0931  Time Calculation (min): 38 min  Total Timed Code Minutes- PT: 38 minute(s)  Timed Charges  74538 - PT Therapeutic Exercise Minutes: 38  Total Minutes  Timed Charges Total Minutes: 38   Total Minutes: 38  Therapy Charges for Today     Code Description Service Date Service Provider Modifiers Qty    [id number] HC PT THER PROC EA 15 MIN 7/6/2022 Marva Buck, PTA GP, CQ 3                    Marva Buck PTA  7/6/2022

## 2022-07-07 ENCOUNTER — OFFICE VISIT (OUTPATIENT)
Dept: GASTROENTEROLOGY | Facility: CLINIC | Age: 67
End: 2022-07-07

## 2022-07-07 VITALS
HEART RATE: 81 BPM | DIASTOLIC BLOOD PRESSURE: 84 MMHG | BODY MASS INDEX: 35.35 KG/M2 | WEIGHT: 212.2 LBS | HEIGHT: 65 IN | SYSTOLIC BLOOD PRESSURE: 130 MMHG

## 2022-07-07 DIAGNOSIS — R11.0 NAUSEA: ICD-10-CM

## 2022-07-07 DIAGNOSIS — R19.5 POSITIVE COLORECTAL CANCER SCREENING USING COLOGUARD TEST: Primary | ICD-10-CM

## 2022-07-07 DIAGNOSIS — K21.9 GASTROESOPHAGEAL REFLUX DISEASE, UNSPECIFIED WHETHER ESOPHAGITIS PRESENT: ICD-10-CM

## 2022-07-07 DIAGNOSIS — R19.7 DIARRHEA, UNSPECIFIED TYPE: ICD-10-CM

## 2022-07-07 PROCEDURE — 99214 OFFICE O/P EST MOD 30 MIN: CPT | Performed by: INTERNAL MEDICINE

## 2022-07-07 RX ORDER — LOPERAMIDE HYDROCHLORIDE 2 MG/1
2 CAPSULE ORAL DAILY
COMMUNITY
End: 2022-07-25 | Stop reason: SDUPTHER

## 2022-07-07 RX ORDER — DEXTROSE AND SODIUM CHLORIDE 5; .45 G/100ML; G/100ML
30 INJECTION, SOLUTION INTRAVENOUS CONTINUOUS PRN
Status: CANCELLED | OUTPATIENT
Start: 2022-08-30

## 2022-07-08 ENCOUNTER — APPOINTMENT (OUTPATIENT)
Dept: PHYSICAL THERAPY | Facility: HOSPITAL | Age: 67
End: 2022-07-08

## 2022-07-12 ENCOUNTER — APPOINTMENT (OUTPATIENT)
Dept: PHYSICAL THERAPY | Facility: HOSPITAL | Age: 67
End: 2022-07-12

## 2022-07-12 RX ORDER — FENOFIBRIC ACID 135 MG/1
CAPSULE, DELAYED RELEASE ORAL
Qty: 90 CAPSULE | Refills: 3 | Status: SHIPPED | OUTPATIENT
Start: 2022-07-12

## 2022-07-15 ENCOUNTER — APPOINTMENT (OUTPATIENT)
Dept: PHYSICAL THERAPY | Facility: HOSPITAL | Age: 67
End: 2022-07-15

## 2022-07-19 ENCOUNTER — APPOINTMENT (OUTPATIENT)
Dept: PHYSICAL THERAPY | Facility: HOSPITAL | Age: 67
End: 2022-07-19

## 2022-07-19 DIAGNOSIS — R06.09 DYSPNEA ON EXERTION: Primary | ICD-10-CM

## 2022-07-19 NOTE — PROGRESS NOTES
Patient called with complaints of getting short of breath when using her CPAP. She states that she is not sure if she is getting too much pressure or if she is getting moisture into her lungs due to the humidifier. She thinks her humidifier has been set at 7 - she was previously using 5 but was getting dry mouth.  She reports occasional air leak and has recently switched to what sounds like a modified full face mask.     We discussed troubleshooting, such as ensuring mask fit, adjusting humidifier settings again, and pressure adjustments for now. Patient's previous pressure was set at 7 cm H2O. She is now on an autoCPAP 5-15 cm H2O, averaging a pressure of 8.5 cm H2O.    I will change her to straight CPAP setting with pressure of 7 cm H2O for now and monitor compliance report in 1 week.    I advised the patient to call if she is still having similar issues within 1 week, or sooner if needed. She has an upcoming appointment in 2 months but we may need to follow up sooner. If she continues to have difficulties with CPAP, may need new titration study for possible BiPAP therapy, given significant cardiac history.    Patient is also interested again in a referral to Pulmonology due to ongoing dyspnea on exertion. I will place a referral, and I have also advised her to stay in touch with her Cardiologist and to call his office of orthopnea and dyspnea on exertion continue or worsen.

## 2022-07-20 ENCOUNTER — OFFICE VISIT (OUTPATIENT)
Dept: FAMILY MEDICINE CLINIC | Facility: CLINIC | Age: 67
End: 2022-07-20

## 2022-07-20 ENCOUNTER — LAB (OUTPATIENT)
Dept: LAB | Facility: HOSPITAL | Age: 67
End: 2022-07-20

## 2022-07-20 VITALS
HEART RATE: 83 BPM | OXYGEN SATURATION: 96 % | SYSTOLIC BLOOD PRESSURE: 128 MMHG | WEIGHT: 217.6 LBS | BODY MASS INDEX: 36.21 KG/M2 | TEMPERATURE: 97.1 F | DIASTOLIC BLOOD PRESSURE: 70 MMHG

## 2022-07-20 DIAGNOSIS — I50.32 CHRONIC HEART FAILURE WITH PRESERVED EJECTION FRACTION: ICD-10-CM

## 2022-07-20 DIAGNOSIS — F33.0 MAJOR DEPRESSIVE DISORDER, RECURRENT, MILD: ICD-10-CM

## 2022-07-20 DIAGNOSIS — M70.62 GREATER TROCHANTERIC BURSITIS OF BOTH HIPS: ICD-10-CM

## 2022-07-20 DIAGNOSIS — F41.9 ANXIETY: ICD-10-CM

## 2022-07-20 DIAGNOSIS — R35.1 NOCTURIA: ICD-10-CM

## 2022-07-20 DIAGNOSIS — M54.16 LUMBAR RADICULOPATHY: Primary | ICD-10-CM

## 2022-07-20 DIAGNOSIS — M70.61 GREATER TROCHANTERIC BURSITIS OF BOTH HIPS: ICD-10-CM

## 2022-07-20 PROCEDURE — 36415 COLL VENOUS BLD VENIPUNCTURE: CPT

## 2022-07-20 PROCEDURE — 99213 OFFICE O/P EST LOW 20 MIN: CPT | Performed by: FAMILY MEDICINE

## 2022-07-20 PROCEDURE — 81001 URINALYSIS AUTO W/SCOPE: CPT

## 2022-07-20 PROCEDURE — 87086 URINE CULTURE/COLONY COUNT: CPT

## 2022-07-21 LAB
BACTERIA UR QL AUTO: ABNORMAL /HPF
BILIRUB UR QL STRIP: NEGATIVE
CLARITY UR: CLEAR
COLOR UR: YELLOW
GLUCOSE UR STRIP-MCNC: NEGATIVE MG/DL
HGB UR QL STRIP.AUTO: NEGATIVE
HYALINE CASTS UR QL AUTO: ABNORMAL /LPF
KETONES UR QL STRIP: NEGATIVE
LEUKOCYTE ESTERASE UR QL STRIP.AUTO: ABNORMAL
NITRITE UR QL STRIP: NEGATIVE
PH UR STRIP.AUTO: 6.5 [PH] (ref 5–8)
PROT UR QL STRIP: NEGATIVE
RBC # UR STRIP: ABNORMAL /HPF
REF LAB TEST METHOD: ABNORMAL
SP GR UR STRIP: 1.01 (ref 1–1.03)
SQUAMOUS #/AREA URNS HPF: ABNORMAL /HPF
UROBILINOGEN UR QL STRIP: ABNORMAL
WBC # UR STRIP: ABNORMAL /HPF

## 2022-07-22 ENCOUNTER — LAB (OUTPATIENT)
Dept: LAB | Facility: HOSPITAL | Age: 67
End: 2022-07-22

## 2022-07-22 ENCOUNTER — TELEPHONE (OUTPATIENT)
Dept: FAMILY MEDICINE CLINIC | Facility: CLINIC | Age: 67
End: 2022-07-22

## 2022-07-22 DIAGNOSIS — I50.32 CHRONIC HEART FAILURE WITH PRESERVED EJECTION FRACTION: ICD-10-CM

## 2022-07-22 LAB
ALBUMIN SERPL-MCNC: 4 G/DL (ref 3.5–5.2)
ALBUMIN/GLOB SERPL: 1.5 G/DL
ALP SERPL-CCNC: 46 U/L (ref 39–117)
ALT SERPL W P-5'-P-CCNC: 19 U/L (ref 1–33)
ANION GAP SERPL CALCULATED.3IONS-SCNC: 12.3 MMOL/L (ref 5–15)
AST SERPL-CCNC: 26 U/L (ref 1–32)
BACTERIA SPEC AEROBE CULT: NORMAL
BILIRUB SERPL-MCNC: 0.6 MG/DL (ref 0–1.2)
BUN SERPL-MCNC: 20 MG/DL (ref 8–23)
BUN/CREAT SERPL: 18.3 (ref 7–25)
CALCIUM SPEC-SCNC: 8.9 MG/DL (ref 8.6–10.5)
CHLORIDE SERPL-SCNC: 100 MMOL/L (ref 98–107)
CO2 SERPL-SCNC: 24.7 MMOL/L (ref 22–29)
CREAT SERPL-MCNC: 1.09 MG/DL (ref 0.57–1)
EGFRCR SERPLBLD CKD-EPI 2021: 55.8 ML/MIN/1.73
GLOBULIN UR ELPH-MCNC: 2.6 GM/DL
GLUCOSE SERPL-MCNC: 93 MG/DL (ref 65–99)
POTASSIUM SERPL-SCNC: 3.9 MMOL/L (ref 3.5–5.2)
PROT SERPL-MCNC: 6.6 G/DL (ref 6–8.5)
SODIUM SERPL-SCNC: 137 MMOL/L (ref 136–145)
T4 FREE SERPL-MCNC: 1.44 NG/DL (ref 0.93–1.7)
TSH SERPL DL<=0.05 MIU/L-ACNC: 1.39 UIU/ML (ref 0.27–4.2)

## 2022-07-22 PROCEDURE — 80053 COMPREHEN METABOLIC PANEL: CPT

## 2022-07-22 PROCEDURE — 84439 ASSAY OF FREE THYROXINE: CPT

## 2022-07-22 PROCEDURE — 84443 ASSAY THYROID STIM HORMONE: CPT

## 2022-07-22 PROCEDURE — 36415 COLL VENOUS BLD VENIPUNCTURE: CPT

## 2022-07-22 RX ORDER — BUMETANIDE 0.5 MG/1
0.5 TABLET ORAL 2 TIMES DAILY
Qty: 60 TABLET | Refills: 0 | Status: SHIPPED | OUTPATIENT
Start: 2022-07-22 | End: 2022-08-01

## 2022-07-22 NOTE — TELEPHONE ENCOUNTER
Incoming Refill Request      Medication requested (name and dose):   bumetanide (BUMEX) 0.5 MG tablet    Pharmacy where request should be sent: [facility]    Additional details provided by patient:     Best call back number: [phone]    Does the patient have less than a 3 day supply:  [] Yes  [x] No    Yoli Jimenez  07/22/22, 08:38 CDT

## 2022-07-23 DIAGNOSIS — N30.90 CYSTITIS: Primary | ICD-10-CM

## 2022-07-23 RX ORDER — CEFDINIR 300 MG/1
300 CAPSULE ORAL 2 TIMES DAILY
Qty: 20 CAPSULE | Refills: 0 | Status: SHIPPED | OUTPATIENT
Start: 2022-07-23 | End: 2022-08-02

## 2022-07-23 NOTE — PROGRESS NOTES
Family Medicine Residency  Leticia Tejeda MD    Subjective:     Lana aMta is a 67 y.o. female who presents for Depression follow up. At this visit she also notes concern for thigh pain and nocturia.     Depression: Recently switched from zoloft to cymbalta to help with depression and neuropathy. Reports she is on day 10 of Cymbalta because delay in being able to  medicine and feels it is working well for her depression. She does not notice a difference with not having zoloft in regards to her depression. She is feeling that she is having more panic attacks with her heart racing and feeling anxious. Feels this is due to not having Ativan but, reports she is still taking a half tablet at night.  No acute under a a lot of stress with living in a small trailer, giving financial support to her daughters, and being emotionally supportive to both her daughters. Was invited and will be trying to live with her relatives in [place] in about 5 months. She notes she will get more of a social life and a  to go to [place] with her.     Thigh pain: Reports worsening outer hip pain that increases with ambulation, prolonged standing and climbing onto exam table. Also notes worsened pain at her right lower back that radiates down into her leg all the way to her knee.  Did not affect her feet.  On further physical therapy and this worsened her pain.    Nocturia and dysuria: Reports that she has been drinking 3-4 bottles of water to cups of coffee plus a Sprite in the day time but urinates mostly in the nighttime.  She has started to have dysuria. Takes Bumex once in the morning and once at night as directed.                 The following portions of the patient's history were reviewed and updated as appropriate: current medications, past medical history, past social history and problem list.    Past Medical Hx:  Past Medical History:   Diagnosis Date   • Abnormal gait 11/28/2012   • Abrasion 02/26/2015    • Acute ankle pain 03/31/2016   • Acute bronchitis 09/04/2015   • Allergic rhinitis 05/22/2012   • Asthenia 05/22/2014   • Atrial fibrillation (HCC) 03/04/2016    - converted    • Attempted suicide ([facility])    • Benign essential hypertension 03/04/2016   • Cardiovascular stress test abnormal 03/04/2016   • Cellulitis of knee 03/03/2015   • Chest pain 04/02/2015   • Chronic pain disorder    • Common cold 06/24/2014   • Congestive heart failure (HCC) 02/04/2016   • Depressive disorder 05/22/2015    suspect more complicated psych issues      • Fibrocystic breast    • Gastroesophageal reflux disease 01/17/2013   • Generalized anxiety disorder 02/04/2016   • H/O echocardiogram     Normal LV function with Ef of 60-65%.Mildly dilated right ventricle with normal function. Grade 1B diastolic dysfunction with mild CLVH.NO sig. valvular regurg. or stenosis.   • Headache 10/27/2014   • Hyperlipidemia 03/04/2016   • Low back pain 06/30/2016    Need for prophylactic vaccination against Streptococcus pneumoniae [pneumococcus]    01/23/2015    • Neck pain 06/30/2016   • Obstructive sleep apnea of adult 07/30/2015   • Obstructive sleep apnea syndrome    • Osteoarthritis    • Osteoporosis 06/30/2016   • Pain in right shoulder 06/30/2016   • Pain in right shoulder 04/24/2014   • Shortness of breath 04/02/2015   • Skin ulcer ([facility]) 04/13/2015    left   • Thyrotoxicosis with or without goiter 10/24/2014   • Urinary incontinence 05/22/2014     Past Surgical Hx:  Past Surgical History:   Procedure Laterality Date   • BACK SURGERY  08/07/2015    Removal of intstrumentation,L5-S1.Exploration of fusion,L5-S1.Posterolateral fusion,L4-L5.Posterior segmental spinal instrumentation L4-5.Transforaminal interbody fusion Through right sided approach.Performed at    • COLONOSCOPY  05/04/2016    will order cologuard   • INJECTION OF MEDICATION  03/23/2015    celestone   • INJECTION OF MEDICATION  05/04/2016    KENALOG(6)   • LUMBAR FUSION       discectomy fusion with rods L4-S1   • LUMBAR LAMINECTOMY  1995   • MANDIBLE FRACTURE SURGERY      JAW WIRED SHUT   • MOUTH SURGERY  10/05/2015    Fractured mandible. Removal of arch bars.   • MOUTH SURGERY  1985    Fractured mandible. Removal of arch bars.   • NASAL SEPTUM SURGERY     • RHINOPLASTY     • TUBAL ABDOMINAL LIGATION  1986     Current Meds:    Current Outpatient Medications:   •  alendronate (Fosamax) 70 MG tablet, Take 1 tablet by mouth Every 7 (Seven) Days., Disp: 8 tablet, Rfl: 2  •  ARIPiprazole (ABILIFY) 5 MG tablet, Take 5 mg by mouth Daily. At bedtime, Disp: , Rfl:   •  aspirin 325 MG tablet, Take 500 mg by mouth Daily. At bedtime, instructed to take a half tablet of 500 aspirin. , Disp: , Rfl:   •  atorvastatin (LIPITOR) 20 MG tablet, TAKE ONE TABLET BY MOUTH EVERY DAY, Disp: 90 tablet, Rfl: 3  •  bumetanide (BUMEX) 0.5 MG tablet, Take 1 tablet by mouth 2 (Two) Times a Day., Disp: 60 tablet, Rfl: 0  •  Calcium Carb-Cholecalciferol (CALCIUM-VITAMIN D) 500-200 MG-UNIT per tablet, Take 1 tablet by mouth 2 (Two) Times a Day With Meals., Disp: 60 tablet, Rfl: 2  •  Diclofenac Sodium (VOLTAREN) 1 % gel gel, Apply 4 g topically to the appropriate area as directed 4 (Four) Times a Day As Needed (Joint pains in back, knees and hands.)., Disp: 350 g, Rfl: 3  •  Dilt- MG 24 hr capsule, TAKE 1 CAPSULE EVERY DAY, Disp: 30 capsule, Rfl: 0  •  DULoxetine (Cymbalta) 60 MG capsule, Take 1 capsule by mouth Daily for 30 days., Disp: 30 capsule, Rfl: 2  •  fenofibric acid (TRILIPIX) 135 MG capsule delayed-release delayed release capsule, TAKE ONE CAPSULE BY MOUTH EVERY DAY, Disp: 90 capsule, Rfl: 3  •  ferrous sulfate 325 (65 FE) MG tablet, Take 325 mg by mouth Daily With Breakfast., Disp: , Rfl:   •  Incontinence Supply Disposable (Depend Adjustable Underwear) misc, 1 application As Needed (Incontinence)., Disp: 90 each, Rfl: 11  •  lidocaine (LIDODERM) 5 %, Place 1 patch on the skin as directed by provider  Daily. Remove & Discard patch within 12 hours or as directed by MD, Disp: 30 each, Rfl: 0  •  lisinopril (PRINIVIL,ZESTRIL) 10 MG tablet, Take 10 mg by mouth Daily., Disp: , Rfl:   •  loperamide (IMODIUM) 2 MG capsule, Take 2 mg by mouth Daily., Disp: , Rfl:   •  methocarbamol (ROBAXIN) 500 MG tablet, Take 1 tablet by mouth 4 (Four) Times a Day As Needed for Muscle Spasms., Disp: 90 tablet, Rfl: 4  •  metoprolol tartrate (LOPRESSOR) 25 MG tablet, TAKE 1 TABLET EVERY 12 HOURS, Disp: 60 tablet, Rfl: 4  •  omeprazole (priLOSEC) 20 MG capsule, Take 1 capsule by mouth Daily., Disp: 90 capsule, Rfl: 5  •  rivaroxaban (Xarelto) 20 MG tablet, Take one tablet by mouth every day- take with food, Disp: 30 tablet, Rfl: 0  •  cefdinir (OMNICEF) 300 MG capsule, Take 1 capsule by mouth 2 (Two) Times a Day for 10 days., Disp: 20 capsule, Rfl: 0  •  isosorbide mononitrate (IMDUR) 30 MG 24 hr tablet, Take 1 tablet by mouth Daily for 30 days., Disp: 30 tablet, Rfl: 3    Current Facility-Administered Medications:   •  cyanocobalamin injection 1,000 mcg, 1,000 mcg, Intramuscular, Q28 Days, Leticia Tejeda MD  •  loperamide (IMODIUM) capsule 2 mg, 2 mg, Oral, Daily, Leticia Tejeda MD    Allergies:  Allergies   Allergen Reactions   • Codeine Nausea And Vomiting   • Latex Rash   • Morphine And Related Hallucinations   • Pravachol [Pravastatin Sodium] Myalgia     Family Hx:  Family History   Problem Relation Age of Onset   • Hypertension Mother    • Hyperlipidemia Mother    • Mental illness Mother    • Thyroid disease Mother    • Diabetes Maternal Grandmother    • Cancer Maternal Grandmother    • Depression Other    • Diabetes Other    • Heart disease Other    • Hypertension Other    • Osteoporosis Other    • Thyroid disease Other    • Cancer Maternal Aunt    • Cancer Paternal Aunt    • Breast cancer Paternal Aunt       Social History:  Social History     Socioeconomic History   • Marital status:    Tobacco Use   • Smoking  status: Never Smoker   • Smokeless tobacco: Never Used   • Tobacco comment: smoking cessation    Substance and Sexual Activity   • Alcohol use: No   • Drug use: No     Comment: lortab / oxycontin   • Sexual activity: Defer       Review of Systems  Review of Systems   Constitutional: Negative for fever.   Eyes: Negative for pain and visual disturbance.   Cardiovascular: Negative for chest pain.   Gastrointestinal: Negative for abdominal pain.   Genitourinary: Positive for decreased urine volume, dysuria and pelvic pain. Negative for vaginal discharge.        Denies any vaginal dryness   Musculoskeletal: Positive for back pain and myalgias. Negative for joint swelling.   Skin: Negative for rash.   Allergic/Immunologic: Negative for immunocompromised state.   Neurological: Positive for weakness and numbness. Negative for headaches.   Hematological: Does not bruise/bleed easily.   Psychiatric/Behavioral: Positive for sleep disturbance (due to nocturia. awakening at 5pm, 6pm and 3am. ). Negative for self-injury and suicidal ideas. The patient is nervous/anxious.      Objective:     /70   Pulse 83   Temp 97.1 °F (36.2 °C)   Wt 98.7 kg (217 lb 9.6 oz)   SpO2 96%   BMI 36.21 kg/m²   Physical Exam  Vitals reviewed.   Constitutional:       Appearance: Normal appearance. She is obese.      Comments: Matching blue outfit, well-kempt and sitting comfortably on chair. Able to climb onto exam table with minimal difficulty.    HENT:      Nose: No congestion or rhinorrhea.   Eyes:      Extraocular Movements: Extraocular movements intact.      Conjunctiva/sclera: Conjunctivae normal.   Cardiovascular:      Rate and Rhythm: Normal rate and regular rhythm.      Heart sounds: Normal heart sounds.   Pulmonary:      Effort: Pulmonary effort is normal.   Musculoskeletal:      Cervical back: Neck supple.      Right lower leg: No edema.      Left lower leg: No edema.      Comments: Lateral hip tenderness with palpation over the  greater trochanter worse on the right more than the left. No masses, deformity or skin changes at regions of tenderness. Limited ability to test weight on unaffected side as both are affected and patient noted SOB worsened with lying on right side   Skin:     General: Skin is warm and dry.   Neurological:      Mental Status: She is alert and oriented to person, place, and time.      Motor: No tremor.      Gait: Gait is intact.   Psychiatric:         Attention and Perception: Attention and perception normal.         Mood and Affect: Mood is anxious.         Speech: Speech normal.         Behavior: Behavior normal. Behavior is cooperative.         Thought Content: Thought content does not include homicidal or suicidal plan.         Judgment: Judgment normal.       Assessment/Plan:     Diagnoses and all orders for this visit:    1. Lumbar radiculopathy (Primary)-partially controlled  - Discussed that she will likely get more relief in 1 to 2 more weeks once her anxiety is better controlled with duloxetine. As she has had bad experiences with physical therapy, refuses physical therapy at this time but is open to doing exercises at home with good pain control.  Next visit: Follow-up if sciatica on right side has improved.    2. Greater trochanteric bursitis of both hips- new and worsening  Plan: instructed to use Voltaren gel at outer aspects of thigh and heat and cold pads over affected region of thighs.   Next visit follow up on bilateral outer thigh pain.     3. Anxiety-same at MINO-7 of 14-   -     TSH+Free T4; Future  Discussed that predominant the elevated heart rate that is worsening her anxiety is likely related to her duloxetine.  However as her duloxetine has only been on board for 10 days, instead of adding propanolol now, safer to recheck in 2 weeks when duloxetine has had more time to reach its peak effect.   Plan: Follow-up visit scheduled 1 month, can reschedule for sooner if palpitations become  intolerable.     Next visit: If continues to have palpitations or they become intolerable, encouraged to call clinic and we can consider sending in the propranolol at that time.    4. Nocturia  -     Comprehensive metabolic panel; Future  -     Urinalysis With Culture If Indicated -; Future  That this is combined with dysuria, urinalysis was ordered for today we will call patient with results and start antibiotics if appropriate.  If UTI present may be cause for discomfort impacting her sleep and worsening her anxiety.  Plan: Follow-up as needed.    5. Major depressive disorder, recurrent, mild (HCC)- stable  Per patient depressive symptoms are not as concerning as anxiety.  Feels that the duloxetine has been as efficacious as the Zoloft.  We will continue her current regimen and she can follow-up as needed.    6. Chronic heart failure with preserved ejection fraction (HCC)  -     Comprehensive metabolic panel; Future  Patient expressed concern of increased weight and trouble breathing while lying on her right side.  With her history of heart failure and multiple visits to the ED where she was found to be fluid overloaded with fluid mainly at her belly and that of her legs, will monitor her more closely and prior to making changes with her diuretic we will recheck her kidney function.  Plan: Recheck kidney function, at following visit increasing daytime dose of Bumex or moving nighttime dose of Bumex to daytime and is taking Bumex 1 mg in the morning.    Rx changes: Discussed plan to continue Cymbalta for a total of 4 weeks.   · Patient Education: Avoid caffeinated beverages.   · Compliance at present is estimated to be excellent.   · Efforts to improve compliance (if necessary) will be directed at increased exercise.    Depression screening: Patient screened positive for depression based on a PHQ-9 score of 10 on 7/20/2022. MINO-7 done today with score of 14. Follow-up recommendations include: follow up in 2 weeks  and await for peak effect of duloxetine. If heart palpitations continue to cause issue especially with sleep, will consider starting propranolol. .     Follow-up:     Return in about 1 month (around 8/20/2022) for Anxiety.    Preventative:  Health Maintenance   Topic Date Due   • Pneumococcal Vaccine 65+ (1 - PCV) 03/09/1961   • DIABETIC FOOT EXAM  Never done   • DIABETIC EYE EXAM  Never done   • ZOSTER VACCINE (2 of 2) 02/09/2017   • COVID-19 Vaccine (3 - Booster for Leilani series) 03/30/2022   • LIPID PANEL  08/26/2022   • HEMOGLOBIN A1C  09/09/2022   • INFLUENZA VACCINE  10/01/2022   • URINE MICROALBUMIN  11/03/2022   • ANNUAL WELLNESS VISIT  11/24/2022   • MAMMOGRAM  01/14/2024   • DXA SCAN  01/14/2024   • COLORECTAL CANCER SCREENING  11/07/2026   • TDAP/TD VACCINES (5 - Td or Tdap) 12/07/2029   • HEPATITIS C SCREENING  Completed   • PAP SMEAR  Discontinued     Alcohol use:  reports no history of alcohol use.  Nicotine status  reports that she has never smoked. She has never used smokeless tobacco.     Goals     •  management of chronic medical conditions (pt-stated)       Barriers to goals: none            RISK SCORE: 3        This document has been electronically signed by Leticia Tejeda MD on July 20, 2022 16:04 CDT

## 2022-07-23 NOTE — PROGRESS NOTES
SIGNIFICANT NOTE  NAME: Lana Ayala Attebury  : 1955  MRN: [id number]    Called and left message to call back on 2022 at 15:31 CDT. Will discuss that UA is positive for UTI. Cefdinir has been sent to her [facility] which she will need to take twice a day for 10 days.  Will encourage her to ask questions.       This document has been electronically signed by Leticia Tejeda MD on 2022 15:31 CDT  Called and left number to call back. UA positive for UTI.

## 2022-07-25 ENCOUNTER — TELEPHONE (OUTPATIENT)
Dept: FAMILY MEDICINE CLINIC | Facility: CLINIC | Age: 67
End: 2022-07-25

## 2022-07-25 RX ORDER — LOPERAMIDE HYDROCHLORIDE 2 MG/1
2 CAPSULE ORAL DAILY
Qty: 60 CAPSULE | Refills: 0 | Status: SHIPPED | OUTPATIENT
Start: 2022-07-25 | End: 2022-09-23 | Stop reason: SDUPTHER

## 2022-07-25 RX ORDER — DILTIAZEM HYDROCHLORIDE 180 MG/1
CAPSULE, EXTENDED RELEASE ORAL
Qty: 30 CAPSULE | Refills: 5 | Status: SHIPPED | OUTPATIENT
Start: 2022-07-25

## 2022-07-25 NOTE — PROGRESS NOTES
Chief Complaint   Patient presents with   • Advice Only     Consult for Colonoscopy       Subjective    Lana Mata is a 67 y.o. female.    History of Present Illness  Patient presented to GI clinic for positive Cologuard Cologuard.  Has intermittent nausea and heartburn.  Diarrhea has improved some with Imodium.  Bowel movements are still 8 to 9/day without medications.  Taking Prilosec daily.  Also takes aspirin daily.  Denied vomiting, dysphagia, rectal bleeding or weight loss.     The following portions of the patient's history were reviewed and updated as appropriate:   Past Medical History:   Diagnosis Date   • Abnormal gait 11/28/2012   • Abrasion 02/26/2015   • Acute ankle pain 03/31/2016   • Acute bronchitis 09/04/2015   • Allergic rhinitis 05/22/2012   • Asthenia 05/22/2014   • Atrial fibrillation (HCC) 03/04/2016    - converted    • Attempted suicide (formerly [facility])    • Benign essential hypertension 03/04/2016   • Cardiovascular stress test abnormal 03/04/2016   • Cellulitis of knee 03/03/2015   • Chest pain 04/02/2015   • Chronic pain disorder    • Common cold 06/24/2014   • Congestive heart failure (HCC) 02/04/2016   • Depressive disorder 05/22/2015    suspect more complicated psych issues      • Fibrocystic breast    • Gastroesophageal reflux disease 01/17/2013   • Generalized anxiety disorder 02/04/2016   • H/O echocardiogram     Normal LV function with Ef of 60-65%.Mildly dilated right ventricle with normal function. Grade 1B diastolic dysfunction with mild CLVH.NO sig. valvular regurg. or stenosis.   • Headache 10/27/2014   • Hyperlipidemia 03/04/2016   • Low back pain 06/30/2016    Need for prophylactic vaccination against Streptococcus pneumoniae [pneumococcus]    01/23/2015    • Neck pain 06/30/2016   • Obstructive sleep apnea of adult 07/30/2015   • Obstructive sleep apnea syndrome    • Osteoarthritis    • Osteoporosis 06/30/2016   • Pain in right shoulder 06/30/2016   • Pain in right shoulder  2014   • Shortness of breath 2015   • Skin ulcer (HCC) 2015    left   • Thyrotoxicosis with or without goiter 10/24/2014   • Urinary incontinence 2014     Past Surgical History:   Procedure Laterality Date   • BACK SURGERY  2015    Removal of intstrumentation,L5-S1.Exploration of fusion,L5-S1.Posterolateral fusion,L4-L5.Posterior segmental spinal instrumentation L4-5.Transforaminal interbody fusion Through right sided approach.Performed at    • COLONOSCOPY  2016    will order cologuard   • INJECTION OF MEDICATION  2015    celestone   • INJECTION OF MEDICATION  2016    KENALOG(6)   • LUMBAR FUSION      discectomy fusion with rods L4-S1   • LUMBAR LAMINECTOMY     • MANDIBLE FRACTURE SURGERY      JAW WIRED SHUT   • MOUTH SURGERY  10/05/2015    Fractured mandible. Removal of arch bars.   • MOUTH SURGERY      Fractured mandible. Removal of arch bars.   • NASAL SEPTUM SURGERY     • RHINOPLASTY     • TUBAL ABDOMINAL LIGATION       Family History   Problem Relation Age of Onset   • Hypertension Mother    • Hyperlipidemia Mother    • Mental illness Mother    • Thyroid disease Mother    • Diabetes Maternal Grandmother    • Cancer Maternal Grandmother    • Depression Other    • Diabetes Other    • Heart disease Other    • Hypertension Other    • Osteoporosis Other    • Thyroid disease Other    • Cancer Maternal Aunt    • Cancer Paternal Aunt    • Breast cancer Paternal Aunt      OB History        4    Para   4    Term   4            AB        Living           SAB        IAB        Ectopic        Molar        Multiple        Live Births                  Prior to Admission medications    Medication Sig Start Date End Date Taking? Authorizing Provider   alendronate (Fosamax) 70 MG tablet Take 1 tablet by mouth Every 7 (Seven) Days. 22  Yes Butch Duong MD   ARIPiprazole (ABILIFY) 5 MG tablet Take 5 mg by mouth Daily. At bedtime   Yes Provider,  MD Gurdeep   aspirin 325 MG tablet Take 500 mg by mouth Daily. At bedtime, instructed to take a half tablet of 500 aspirin.    Yes Gurdeep Castaneda MD   atorvastatin (LIPITOR) 20 MG tablet TAKE ONE TABLET BY MOUTH EVERY DAY 1/10/22  Yes Kristyn Jurado MD   Calcium Carb-Cholecalciferol (CALCIUM-VITAMIN D) 500-200 MG-UNIT per tablet Take 1 tablet by mouth 2 (Two) Times a Day With Meals. 11/14/19  Yes Allie Martin MD   Dilt- MG 24 hr capsule TAKE 1 CAPSULE EVERY DAY 6/27/22  Yes Kristyn Jurado MD   ferrous sulfate 325 (65 FE) MG tablet Take 325 mg by mouth Daily With Breakfast.   Yes Gurdeep Castaneda MD   lidocaine (LIDODERM) 5 % Place 1 patch on the skin as directed by provider Daily. Remove & Discard patch within 12 hours or as directed by MD 10/13/21  Yes Ron Schultz MD   lisinopril (PRINIVIL,ZESTRIL) 10 MG tablet Take 10 mg by mouth Daily. 8/9/21  Yes Gurdeep Castaneda MD   loperamide (IMODIUM) 2 MG capsule Take 2 mg by mouth Daily.   Yes Gurdeep Castaneda MD   methocarbamol (ROBAXIN) 500 MG tablet Take 1 tablet by mouth 4 (Four) Times a Day As Needed for Muscle Spasms. 4/29/22  Yes Butch Duong MD   metoprolol tartrate (LOPRESSOR) 25 MG tablet TAKE 1 TABLET EVERY 12 HOURS 4/25/22  Yes Kristyn Jurado MD   omeprazole (priLOSEC) 20 MG capsule Take 1 capsule by mouth Daily. 1/3/22  Yes Mina Porter MD   rivaroxaban (Xarelto) 20 MG tablet Take one tablet by mouth every day- take with food 12/9/21  Yes Kristyn Jurado MD   bumetanide (BUMEX) 0.5 MG tablet Take 1 tablet by mouth 2 (Two) Times a Day. 7/22/22   Mina Porter MD   cefdinir (OMNICEF) 300 MG capsule Take 1 capsule by mouth 2 (Two) Times a Day for 10 days. 7/23/22 8/2/22  Pasha Girard MD   Diclofenac Sodium (VOLTAREN) 1 % gel gel Apply 4 g topically to the appropriate area as directed 4 (Four) Times a Day As Needed (Joint pains in back, knees and hands.). 3/9/22   Pasha Girard,  MD   DULoxetine (Cymbalta) 60 MG capsule Take 1 capsule by mouth Daily for 30 days. 6/17/22 7/20/22  Butch Duong MD   fenofibric acid (TRILIPIX) 135 MG capsule delayed-release delayed release capsule TAKE ONE CAPSULE BY MOUTH EVERY DAY 7/12/22   Kristyn Jurado MD   Incontinence Supply Disposable (Depend Adjustable Underwear) misc 1 application As Needed (Incontinence). 6/23/21   Tima Salcido MD   isosorbide mononitrate (IMDUR) 30 MG 24 hr tablet Take 1 tablet by mouth Daily for 30 days. 5/13/22 6/12/22  Laila Sousa MD     Allergies   Allergen Reactions   • Codeine Nausea And Vomiting   • Latex Rash   • Morphine And Related Hallucinations   • Pravachol [Pravastatin Sodium] Myalgia     Social History     Socioeconomic History   • Marital status:    Tobacco Use   • Smoking status: Never Smoker   • Smokeless tobacco: Never Used   • Tobacco comment: smoking cessation    Substance and Sexual Activity   • Alcohol use: No   • Drug use: No     Comment: lortab / oxycontin   • Sexual activity: Defer       Review of Systems  Review of Systems   Constitutional: Negative for chills, fatigue, fever and unexpected weight change.   HENT: Negative for congestion, ear discharge, hearing loss, nosebleeds and sore throat.    Eyes: Negative for pain, discharge and redness.   Respiratory: Negative for cough, chest tightness, shortness of breath and wheezing.    Cardiovascular: Negative for chest pain and palpitations.   Gastrointestinal: Positive for diarrhea and nausea. Negative for abdominal distention, abdominal pain, blood in stool, constipation and vomiting.   Endocrine: Negative for cold intolerance, polydipsia, polyphagia and polyuria.   Genitourinary: Negative for dysuria, flank pain, frequency, hematuria and urgency.   Musculoskeletal: Negative for arthralgias, back pain, joint swelling and myalgias.   Skin: Negative for color change, pallor and rash.   Neurological: Negative for tremors, seizures,  "syncope, weakness and headaches.   Hematological: Negative for adenopathy. Does not bruise/bleed easily.   Psychiatric/Behavioral: Negative for behavioral problems, confusion, dysphoric mood, hallucinations and suicidal ideas. The patient is not nervous/anxious.         /84   Pulse 81   Ht 165.1 cm (65\")   Wt 96.3 kg (212 lb 3.2 oz)   BMI 35.31 kg/m²     Objective    Physical Exam  Constitutional:       Appearance: She is well-developed.   HENT:      Head: Normocephalic and atraumatic.   Eyes:      Conjunctiva/sclera: Conjunctivae normal.      Pupils: Pupils are equal, round, and reactive to light.   Neck:      Thyroid: No thyromegaly.   Cardiovascular:      Rate and Rhythm: Normal rate and regular rhythm.      Heart sounds: Normal heart sounds. No murmur heard.  Pulmonary:      Effort: Pulmonary effort is normal.      Breath sounds: Normal breath sounds. No wheezing.   Abdominal:      General: Bowel sounds are normal. There is no distension.      Palpations: Abdomen is soft. There is no mass.      Tenderness: There is no abdominal tenderness.      Hernia: No hernia is present.   Genitourinary:     Comments: No lesions noted  Musculoskeletal:         General: No tenderness. Normal range of motion.      Cervical back: Normal range of motion and neck supple.   Lymphadenopathy:      Cervical: No cervical adenopathy.   Skin:     General: Skin is warm and dry.      Findings: No rash.   Neurological:      Mental Status: She is alert and oriented to person, place, and time.      Cranial Nerves: No cranial nerve deficit.   Psychiatric:         Thought Content: Thought content normal.       Admission on 05/11/2022, Discharged on 05/13/2022   Component Date Value Ref Range Status   • QT Interval 05/11/2022 388  ms Final   • QTC Interval 05/11/2022 439  ms Final   • QT Interval 05/11/2022 398  ms Final   • QTC Interval 05/11/2022 432  ms Final   • Troponin T 05/11/2022 <0.010  0.000 - 0.030 ng/mL Final   • Troponin T " 05/11/2022 <0.010  0.000 - 0.030 ng/mL Final   • Glucose 05/11/2022 105 (A) 65 - 99 mg/dL Final   • BUN 05/11/2022 21  8 - 23 mg/dL Final   • Creatinine 05/11/2022 1.21 (A) 0.57 - 1.00 mg/dL Final   • Sodium 05/11/2022 139  136 - 145 mmol/L Final   • Potassium 05/11/2022 3.6  3.5 - 5.2 mmol/L Final   • Chloride 05/11/2022 101  98 - 107 mmol/L Final   • CO2 05/11/2022 26.0  22.0 - 29.0 mmol/L Final   • Calcium 05/11/2022 10.2  8.6 - 10.5 mg/dL Final   • Total Protein 05/11/2022 7.1  6.0 - 8.5 g/dL Final   • Albumin 05/11/2022 4.00  3.50 - 5.20 g/dL Final   • ALT (SGPT) 05/11/2022 13  1 - 33 U/L Final   • AST (SGOT) 05/11/2022 20  1 - 32 U/L Final   • Alkaline Phosphatase 05/11/2022 45  39 - 117 U/L Final   • Total Bilirubin 05/11/2022 0.6  0.0 - 1.2 mg/dL Final   • Globulin 05/11/2022 3.1  gm/dL Final   • A/G Ratio 05/11/2022 1.3  g/dL Final   • BUN/Creatinine Ratio 05/11/2022 17.4  7.0 - 25.0 Final   • Anion Gap 05/11/2022 12.0  5.0 - 15.0 mmol/L Final   • eGFR 05/11/2022 49.2 (A) >60.0 mL/min/1.73 Final    National Kidney Foundation and American Society of Nephrology (ASN) Task Force recommended calculation based on the Chronic Kidney Disease Epidemiology Collaboration (CKD-EPI) equation refit without adjustment for race.   • proBNP 05/11/2022 1,474.0 (A) 0.0 - 900.0 pg/mL Final   • Color, UA 05/11/2022 Yellow  Yellow, Straw, Dark Yellow, Татьяна Final   • Appearance, UA 05/11/2022 Clear  Clear Final   • pH, UA 05/11/2022 6.0  5.0 - 9.0 Final   • Specific Gravity, UA 05/11/2022 1.010  1.003 - 1.030 Final   • Glucose, UA 05/11/2022 Negative  Negative Final   • Ketones, UA 05/11/2022 Negative  Negative Final   • Bilirubin, UA 05/11/2022 Negative  Negative Final   • Blood, UA 05/11/2022 Negative  Negative Final   • Protein, UA 05/11/2022 Negative  Negative Final   • Leuk Esterase, UA 05/11/2022 Moderate (2+) (A) Negative Final   • Nitrite, UA 05/11/2022 Negative  Negative Final   • Urobilinogen, UA 05/11/2022 1.0  E.U./dL  0.2 - 1.0 E.U./dL Final   • Extra Tube 05/11/2022 Hold for add-ons.   Final    Auto resulted.   • Extra Tube 05/11/2022 hold for add-on   Final    Auto resulted   • Extra Tube 05/11/2022 Hold for add-ons.   Final    Auto resulted.   • Extra Tube 05/11/2022 Hold for add-ons.   Final    Auto resulted   • WBC 05/11/2022 10.52  3.40 - 10.80 10*3/mm3 Final   • RBC 05/11/2022 4.74  3.77 - 5.28 10*6/mm3 Final   • Hemoglobin 05/11/2022 14.3  12.0 - 15.9 g/dL Final   • Hematocrit 05/11/2022 41.2  34.0 - 46.6 % Final   • MCV 05/11/2022 86.9  79.0 - 97.0 fL Final   • MCH 05/11/2022 30.2  26.6 - 33.0 pg Final   • MCHC 05/11/2022 34.7  31.5 - 35.7 g/dL Final   • RDW 05/11/2022 13.1  12.3 - 15.4 % Final   • RDW-SD 05/11/2022 41.6  37.0 - 54.0 fl Final   • MPV 05/11/2022 9.9  6.0 - 12.0 fL Final   • Platelets 05/11/2022 231  140 - 450 10*3/mm3 Final   • Neutrophil % 05/11/2022 57.6  42.7 - 76.0 % Final   • Lymphocyte % 05/11/2022 27.9  19.6 - 45.3 % Final   • Monocyte % 05/11/2022 8.7  5.0 - 12.0 % Final   • Eosinophil % 05/11/2022 4.4  0.3 - 6.2 % Final   • Basophil % 05/11/2022 0.8  0.0 - 1.5 % Final   • Immature Grans % 05/11/2022 0.6 (A) 0.0 - 0.5 % Final   • Neutrophils, Absolute 05/11/2022 6.07  1.70 - 7.00 10*3/mm3 Final   • Lymphocytes, Absolute 05/11/2022 2.94  0.70 - 3.10 10*3/mm3 Final   • Monocytes, Absolute 05/11/2022 0.91 (A) 0.10 - 0.90 10*3/mm3 Final   • Eosinophils, Absolute 05/11/2022 0.46 (A) 0.00 - 0.40 10*3/mm3 Final   • Basophils, Absolute 05/11/2022 0.08  0.00 - 0.20 10*3/mm3 Final   • Immature Grans, Absolute 05/11/2022 0.06 (A) 0.00 - 0.05 10*3/mm3 Final   • nRBC 05/11/2022 0.0  0.0 - 0.2 /100 WBC Final   • RBC, UA 05/11/2022 0-2 (A) None Seen /HPF Final   • WBC, UA 05/11/2022 21-30 (A) None Seen, 0-2, 3-5 /HPF Final   • Bacteria, UA 05/11/2022 None Seen  None Seen /HPF Final   • Squamous Epithelial Cells, UA 05/11/2022 0-2  None Seen, 0-2 /HPF Final   • Hyaline Casts, UA 05/11/2022 None Seen   None Seen /LPF Final   • Methodology 05/11/2022 Automated Microscopy   Final   • COVID19 05/11/2022 Not Detected  Not Detected - Ref. Range Final   • Influenza A PCR 05/11/2022 Not Detected  Not Detected Final   • Influenza B PCR 05/11/2022 Not Detected  Not Detected Final   • Troponin T 05/11/2022 <0.010  0.000 - 0.030 ng/mL Final   • Glucose 05/12/2022 122 (A) 65 - 99 mg/dL Final   • BUN 05/12/2022 20  8 - 23 mg/dL Final   • Creatinine 05/12/2022 1.09 (A) 0.57 - 1.00 mg/dL Final   • Sodium 05/12/2022 139  136 - 145 mmol/L Final   • Potassium 05/12/2022 3.8  3.5 - 5.2 mmol/L Final   • Chloride 05/12/2022 101  98 - 107 mmol/L Final   • CO2 05/12/2022 26.0  22.0 - 29.0 mmol/L Final   • Calcium 05/12/2022 9.6  8.6 - 10.5 mg/dL Final   • Total Protein 05/12/2022 6.9  6.0 - 8.5 g/dL Final   • Albumin 05/12/2022 3.90  3.50 - 5.20 g/dL Final   • ALT (SGPT) 05/12/2022 13  1 - 33 U/L Final   • AST (SGOT) 05/12/2022 20  1 - 32 U/L Final   • Alkaline Phosphatase 05/12/2022 43  39 - 117 U/L Final   • Total Bilirubin 05/12/2022 0.7  0.0 - 1.2 mg/dL Final   • Globulin 05/12/2022 3.0  gm/dL Final   • A/G Ratio 05/12/2022 1.3  g/dL Final   • BUN/Creatinine Ratio 05/12/2022 18.3  7.0 - 25.0 Final   • Anion Gap 05/12/2022 12.0  5.0 - 15.0 mmol/L Final   • eGFR 05/12/2022 55.8 (A) >60.0 mL/min/1.73 Final    National Kidney Foundation and American Society of Nephrology (ASN) Task Force recommended calculation based on the Chronic Kidney Disease Epidemiology Collaboration (CKD-EPI) equation refit without adjustment for race.   • WBC 05/12/2022 9.13  3.40 - 10.80 10*3/mm3 Final   • RBC 05/12/2022 4.96  3.77 - 5.28 10*6/mm3 Final   • Hemoglobin 05/12/2022 14.6  12.0 - 15.9 g/dL Final   • Hematocrit 05/12/2022 42.7  34.0 - 46.6 % Final   • MCV 05/12/2022 86.1  79.0 - 97.0 fL Final   • MCH 05/12/2022 29.4  26.6 - 33.0 pg Final   • MCHC 05/12/2022 34.2  31.5 - 35.7 g/dL Final   • RDW 05/12/2022 13.1  12.3 - 15.4 % Final   • RDW-SD  05/12/2022 40.7  37.0 - 54.0 fl Final   • MPV 05/12/2022 10.0  6.0 - 12.0 fL Final   • Platelets 05/12/2022 237  140 - 450 10*3/mm3 Final   • Neutrophil % 05/12/2022 57.9  42.7 - 76.0 % Final   • Lymphocyte % 05/12/2022 28.5  19.6 - 45.3 % Final   • Monocyte % 05/12/2022 7.0  5.0 - 12.0 % Final   • Eosinophil % 05/12/2022 5.3  0.3 - 6.2 % Final   • Basophil % 05/12/2022 0.9  0.0 - 1.5 % Final   • Immature Grans % 05/12/2022 0.4  0.0 - 0.5 % Final   • Neutrophils, Absolute 05/12/2022 5.29  1.70 - 7.00 10*3/mm3 Final   • Lymphocytes, Absolute 05/12/2022 2.60  0.70 - 3.10 10*3/mm3 Final   • Monocytes, Absolute 05/12/2022 0.64  0.10 - 0.90 10*3/mm3 Final   • Eosinophils, Absolute 05/12/2022 0.48 (A) 0.00 - 0.40 10*3/mm3 Final   • Basophils, Absolute 05/12/2022 0.08  0.00 - 0.20 10*3/mm3 Final   • Immature Grans, Absolute 05/12/2022 0.04  0.00 - 0.05 10*3/mm3 Final   • nRBC 05/12/2022 0.0  0.0 - 0.2 /100 WBC Final   • Target HR (85%) 05/13/2022 130  bpm Final   • Max. Pred. HR (100%) 05/13/2022 153  bpm Final   • BH CV STRESS PROTOCOL 1 05/13/2022 Pharmacologic   Final   • Stage 1 05/13/2022 1   Final   • HR Stage 1 05/13/2022 69   Final   • Duration Min Stage 1 05/13/2022 0   Final   • Duration Sec Stage 1 05/13/2022 10   Final   • Stress Dose Regadenoson Stage 1 05/13/2022 0.4   Final   • Stress Comments Stage 1 05/13/2022 10 sec bolus injection   Final   • Baseline HR 05/13/2022 78  bpm Final   • Baseline BP 05/13/2022 144/86  mmHg Final   • Peak HR 05/13/2022 94  bpm Final   • Percent Max Pred HR 05/13/2022 61.44  % Final   • Percent Target HR 05/13/2022 72  % Final   • Peak BP 05/13/2022 144/86  mmHg Final   • Recovery HR 05/13/2022 77  bpm Final   • Recovery BP 05/13/2022 129/80  mmHg Final   • Exercise duration (sec) 05/13/2022 46  sec Final   • Estimated workload 05/13/2022 1.0  METS Final   • BH CV REST NUCLEAR ISOTOPE DOSE 05/13/2022 34.6  mCi Final   • BH CV STRESS NUCLEAR ISOTOPE DOSE 05/13/2022 36.2  mCi  Final   • Nuc Stress EF 05/13/2022 75  % Final   • Glucose 05/13/2022 112 (A) 65 - 99 mg/dL Final   • BUN 05/13/2022 17  8 - 23 mg/dL Final   • Creatinine 05/13/2022 1.13 (A) 0.57 - 1.00 mg/dL Final   • Sodium 05/13/2022 142  136 - 145 mmol/L Final   • Potassium 05/13/2022 3.6  3.5 - 5.2 mmol/L Final   • Chloride 05/13/2022 106  98 - 107 mmol/L Final   • CO2 05/13/2022 26.0  22.0 - 29.0 mmol/L Final   • Calcium 05/13/2022 8.6  8.6 - 10.5 mg/dL Final   • Total Protein 05/13/2022 6.4  6.0 - 8.5 g/dL Final   • Albumin 05/13/2022 3.50  3.50 - 5.20 g/dL Final   • ALT (SGPT) 05/13/2022 12  1 - 33 U/L Final   • AST (SGOT) 05/13/2022 16  1 - 32 U/L Final   • Alkaline Phosphatase 05/13/2022 45  39 - 117 U/L Final   • Total Bilirubin 05/13/2022 0.5  0.0 - 1.2 mg/dL Final   • Globulin 05/13/2022 2.9  gm/dL Final   • A/G Ratio 05/13/2022 1.2  g/dL Final   • BUN/Creatinine Ratio 05/13/2022 15.0  7.0 - 25.0 Final   • Anion Gap 05/13/2022 10.0  5.0 - 15.0 mmol/L Final   • eGFR 05/13/2022 53.4 (A) >60.0 mL/min/1.73 Final    National Kidney Foundation and American Society of Nephrology (ASN) Task Force recommended calculation based on the Chronic Kidney Disease Epidemiology Collaboration (CKD-EPI) equation refit without adjustment for race.   • WBC 05/13/2022 7.63  3.40 - 10.80 10*3/mm3 Final   • RBC 05/13/2022 4.67  3.77 - 5.28 10*6/mm3 Final   • Hemoglobin 05/13/2022 13.7  12.0 - 15.9 g/dL Final   • Hematocrit 05/13/2022 39.9  34.0 - 46.6 % Final   • MCV 05/13/2022 85.4  79.0 - 97.0 fL Final   • MCH 05/13/2022 29.3  26.6 - 33.0 pg Final   • MCHC 05/13/2022 34.3  31.5 - 35.7 g/dL Final   • RDW 05/13/2022 13.2  12.3 - 15.4 % Final   • RDW-SD 05/13/2022 41.1  37.0 - 54.0 fl Final   • MPV 05/13/2022 9.8  6.0 - 12.0 fL Final   • Platelets 05/13/2022 196  140 - 450 10*3/mm3 Final   • Neutrophil % 05/13/2022 58.9  42.7 - 76.0 % Final   • Lymphocyte % 05/13/2022 24.9  19.6 - 45.3 % Final   • Monocyte % 05/13/2022 8.0  5.0 - 12.0 % Final    • Eosinophil % 05/13/2022 6.2  0.3 - 6.2 % Final   • Basophil % 05/13/2022 1.2  0.0 - 1.5 % Final   • Immature Grans % 05/13/2022 0.8 (A) 0.0 - 0.5 % Final   • Neutrophils, Absolute 05/13/2022 4.50  1.70 - 7.00 10*3/mm3 Final   • Lymphocytes, Absolute 05/13/2022 1.90  0.70 - 3.10 10*3/mm3 Final   • Monocytes, Absolute 05/13/2022 0.61  0.10 - 0.90 10*3/mm3 Final   • Eosinophils, Absolute 05/13/2022 0.47 (A) 0.00 - 0.40 10*3/mm3 Final   • Basophils, Absolute 05/13/2022 0.09  0.00 - 0.20 10*3/mm3 Final   • Immature Grans, Absolute 05/13/2022 0.06 (A) 0.00 - 0.05 10*3/mm3 Final   • nRBC 05/13/2022 0.0  0.0 - 0.2 /100 WBC Final     Assessment & Plan      1. Positive colorectal cancer screening using Cologuard test    2. Gastroesophageal reflux disease, unspecified whether esophagitis present    3. Diarrhea, unspecified type    4. Nausea     1.  Diarrhea, likely due to irritable bowel syndrome and need to rule out IBD and colorectal neoplasia. Continue Bentyl and add Imodium as needed.  Add probiotics daily.  Colonoscopy for further evaluation.  2.  Longstanding GERD with symptoms well controlled with PPI.  Continue Prilosec and antireflux lifestyle.  EGD for Boyd's screening.  3.  Nausea, likely due to gastritis or peptic ulcer disease.  Continue Prilosec.  EGD is scheduled for further evaluation.  Gastric emptying scan if EGD is unremarkable.  4.  Obesity, exercise and diet control      Orders placed during this encounter include:  Orders Placed This Encounter   Procedures   • Follow Anesthesia Guidelines / Standing Orders     Standing Status:   Future   • Obtain Informed Consent     Standing Status:   Future     Order Specific Question:   Informed Consent Given For     Answer:   egd and colonoscopy       ESOPHAGOGASTRODUODENOSCOPY (N/A), COLONOSCOPY (N/A)    Review and/or summary of lab tests, radiology, procedures, medications. Review and summary of old records and obtaining of history. The risks and benefits of  my recommendations, as well as other treatment options were discussed with the patient today. Questions were answered.    No orders of the defined types were placed in this encounter.      Follow-up: Return in about 1 month (around 8/7/2022).               Results for orders placed or performed during the hospital encounter of 06/18/20   COVID [facility] Priority - Swab, Nasopharynx    Specimen: Nasopharynx; Swab   Result Value Ref Range    COVID [facility] PRIORITY Comment    COVID-19,[facility] ROUTINE, NP/OP SWAB IN TRANSPORT MEDIA OR ESWAB 72 HR TAT - Swab, Nasopharynx    Specimen: Nasopharynx; Swab   Result Value Ref Range    SARS-CoV-2, BRANDIE Not Detected Not Detected   Results for orders placed or performed in visit on 07/20/22   Urinalysis, Microscopic Only - Urine, Clean Catch    Specimen: Urine, Clean Catch   Result Value Ref Range    RBC, UA 0-2 None Seen, 0-2 /HPF    WBC, UA 3-5 (A) None Seen, 0-2 /HPF    Bacteria, UA Trace (A) None Seen /HPF    Squamous Epithelial Cells, UA 0-2 None Seen, 0-2 /HPF    Hyaline Casts, UA 0-2 None Seen /LPF    Methodology Manual Light Microscopy    Urinalysis With Culture If Indicated - Urine, Clean Catch    Specimen: Urine, Clean Catch   Result Value Ref Range    Color, UA Yellow Yellow, Straw    Appearance, UA Clear Clear    pH, UA 6.5 5.0 - 8.0    Specific Gravity, UA 1.008 1.005 - 1.030    Glucose, UA Negative Negative    Ketones, UA Negative Negative    Bilirubin, UA Negative Negative    Blood, UA Negative Negative    Protein, UA Negative Negative    Leuk Esterase, UA Moderate (2+) (A) Negative    Nitrite, UA Negative Negative    Urobilinogen, UA 0.2 E.U./dL 0.2 - 1.0 E.U./dL   TSH+Free T4    Specimen: Blood   Result Value Ref Range    TSH 1.390 0.270 - 4.200 uIU/mL    Free T4 1.44 0.93 - 1.70 ng/dL   Urine Culture - Urine, Urine, Clean Catch    Specimen: Urine, Clean Catch   Result Value Ref Range    Urine Culture 25,000 CFU/mL Mixed Katia Isolated    Comprehensive metabolic  panel    Specimen: Blood   Result Value Ref Range    Glucose 93 65 - 99 mg/dL    BUN 20 8 - 23 mg/dL    Creatinine 1.09 (H) 0.57 - 1.00 mg/dL    Sodium 137 136 - 145 mmol/L    Potassium 3.9 3.5 - 5.2 mmol/L    Chloride 100 98 - 107 mmol/L    CO2 24.7 22.0 - 29.0 mmol/L    Calcium 8.9 8.6 - 10.5 mg/dL    Total Protein 6.6 6.0 - 8.5 g/dL    Albumin 4.00 3.50 - 5.20 g/dL    ALT (SGPT) 19 1 - 33 U/L    AST (SGOT) 26 1 - 32 U/L    Alkaline Phosphatase 46 39 - 117 U/L    Total Bilirubin 0.6 0.0 - 1.2 mg/dL    Globulin 2.6 gm/dL    A/G Ratio 1.5 g/dL    BUN/Creatinine Ratio 18.3 7.0 - 25.0    Anion Gap 12.3 5.0 - 15.0 mmol/L    eGFR 55.8 (L) >60.0 mL/min/1.73   Results for orders placed or performed during the hospital encounter of 05/11/22   Stress Test With Myocardial Perfusion Two Day   Result Value Ref Range    Target HR (85%) 130 bpm    Max. Pred. HR (100%) 153 bpm     CV STRESS PROTOCOL 1 Pharmacologic     Stage 1 1     HR Stage 1 69     Duration Min Stage 1 0     Duration Sec Stage 1 10     Stress Dose Regadenoson Stage 1 0.4     Stress Comments Stage 1 10 sec bolus injection     Baseline HR 78 bpm    Baseline /86 mmHg    Peak HR 94 bpm    Percent Max Pred HR 61.44 %    Percent Target HR 72 %    Peak /86 mmHg    Recovery HR 77 bpm    Recovery /80 mmHg    Exercise duration (sec) 46 sec    Estimated workload 1.0 METS     CV REST NUCLEAR ISOTOPE DOSE 34.6 mCi    BH CV STRESS NUCLEAR ISOTOPE DOSE 36.2 mCi    Nuc Stress EF 75 %   Gold Top - SST   Result Value Ref Range    Extra Tube Hold for add-ons.    Green Top (Gel)   Result Value Ref Range    Extra Tube Hold for add-ons.    COVID-19 and FLU A/B PCR - Swab, Nasopharynx    Specimen: Nasopharynx; Swab   Result Value Ref Range    COVID19 Not Detected Not Detected - Ref. Range    Influenza A PCR Not Detected Not Detected    Influenza B PCR Not Detected Not Detected   Urinalysis, Microscopic Only - Urine, Clean Catch    Specimen: Urine, Clean Catch    Result Value Ref Range    RBC, UA 0-2 (A) None Seen /HPF    WBC, UA 21-30 (A) None Seen, 0-2, 3-5 /HPF    Bacteria, UA None Seen None Seen /HPF    Squamous Epithelial Cells, UA 0-2 None Seen, 0-2 /HPF    Hyaline Casts, UA None Seen None Seen /LPF    Methodology Automated Microscopy    Urinalysis With Microscopic If Indicated (No Culture) - Urine, Clean Catch    Specimen: Urine, Clean Catch   Result Value Ref Range    Color, UA Yellow Yellow, Straw, Dark Yellow, Татьяна    Appearance, UA Clear Clear    pH, UA 6.0 5.0 - 9.0    Specific Gravity, UA 1.010 1.003 - 1.030    Glucose, UA Negative Negative    Ketones, UA Negative Negative    Bilirubin, UA Negative Negative    Blood, UA Negative Negative    Protein, UA Negative Negative    Leuk Esterase, UA Moderate (2+) (A) Negative    Nitrite, UA Negative Negative    Urobilinogen, UA 1.0 E.U./dL 0.2 - 1.0 E.U./dL   CBC Auto Differential    Specimen: Blood   Result Value Ref Range    WBC 7.63 3.40 - 10.80 10*3/mm3    RBC 4.67 3.77 - 5.28 10*6/mm3    Hemoglobin 13.7 12.0 - 15.9 g/dL    Hematocrit 39.9 34.0 - 46.6 %    MCV 85.4 79.0 - 97.0 fL    MCH 29.3 26.6 - 33.0 pg    MCHC 34.3 31.5 - 35.7 g/dL    RDW 13.2 12.3 - 15.4 %    RDW-SD 41.1 37.0 - 54.0 fl    MPV 9.8 6.0 - 12.0 fL    Platelets 196 140 - 450 10*3/mm3    Neutrophil % 58.9 42.7 - 76.0 %    Lymphocyte % 24.9 19.6 - 45.3 %    Monocyte % 8.0 5.0 - 12.0 %    Eosinophil % 6.2 0.3 - 6.2 %    Basophil % 1.2 0.0 - 1.5 %    Immature Grans % 0.8 (H) 0.0 - 0.5 %    Neutrophils, Absolute 4.50 1.70 - 7.00 10*3/mm3    Lymphocytes, Absolute 1.90 0.70 - 3.10 10*3/mm3    Monocytes, Absolute 0.61 0.10 - 0.90 10*3/mm3    Eosinophils, Absolute 0.47 (H) 0.00 - 0.40 10*3/mm3    Basophils, Absolute 0.09 0.00 - 0.20 10*3/mm3    Immature Grans, Absolute 0.06 (H) 0.00 - 0.05 10*3/mm3    nRBC 0.0 0.0 - 0.2 /100 WBC     *Note: Due to a large number of results and/or encounters for the requested time period, some results have not been  displayed. A complete set of results can be found in Results Review.         This document has been electronically signed by Mikie Gregg MD on July 25, 2022 06:41 CDT

## 2022-07-25 NOTE — TELEPHONE ENCOUNTER
Incoming Refill Request      Medication requested (name and dose): loperamide (IMODIUM) capsule 2 mg    Pharmacy where request should be sent: [facility]    Additional details provided by patient:     Best call back number:     Does the patient have less than a 3 day supply:  [x] Yes  [] No    Carrie Bernardo Workman  07/25/22, 14:43 CDT

## 2022-07-26 DIAGNOSIS — R06.02 SHORTNESS OF BREATH: Primary | ICD-10-CM

## 2022-07-26 RX ORDER — SODIUM, POTASSIUM,MAG SULFATES 17.5-3.13G
SOLUTION, RECONSTITUTED, ORAL ORAL
Qty: 354 ML | Refills: 0 | Status: SHIPPED | OUTPATIENT
Start: 2022-07-26 | End: 2022-09-03

## 2022-07-26 NOTE — PROGRESS NOTES
Subjective:     Lana Mata is a 67 y.o. female who presents for   Chief Complaint   Patient presents with   • Depression     Pt with depression and anxiety, now on duloxetine reports depression stable, still with anxiety, needing to use a half tab of ativan at bed time. Right outer hip pain, worse with ambulation and standing. Does not wish to have PT. C/o Dysuria and nocturia and concerned weight increased since last visit.    For more detailed HPI, see resident note.        Past Medical Hx:  Past Medical History:   Diagnosis Date   • Abnormal gait 11/28/2012   • Abrasion 02/26/2015   • Acute ankle pain 03/31/2016   • Acute bronchitis 09/04/2015   • Allergic rhinitis 05/22/2012   • Asthenia 05/22/2014   • Atrial fibrillation (HCC) 03/04/2016    - converted    • Attempted suicide ([facility])    • Benign essential hypertension 03/04/2016   • Cardiovascular stress test abnormal 03/04/2016   • Cellulitis of knee 03/03/2015   • Chest pain 04/02/2015   • Chronic pain disorder    • Common cold 06/24/2014   • Congestive heart failure (HCC) 02/04/2016   • Depressive disorder 05/22/2015    suspect more complicated psych issues      • Fibrocystic breast    • Gastroesophageal reflux disease 01/17/2013   • Generalized anxiety disorder 02/04/2016   • H/O echocardiogram     Normal LV function with Ef of 60-65%.Mildly dilated right ventricle with normal function. Grade 1B diastolic dysfunction with mild CLVH.NO sig. valvular regurg. or stenosis.   • Headache 10/27/2014   • Hyperlipidemia 03/04/2016   • Low back pain 06/30/2016    Need for prophylactic vaccination against Streptococcus pneumoniae [pneumococcus]    01/23/2015    • Neck pain 06/30/2016   • Obstructive sleep apnea of adult 07/30/2015   • Obstructive sleep apnea syndrome    • Osteoarthritis    • Osteoporosis 06/30/2016   • Pain in right shoulder 06/30/2016   • Pain in right shoulder 04/24/2014   • Shortness of breath 04/02/2015   • Skin ulcer (HCC) 04/13/2015     left   • Thyrotoxicosis with or without goiter 10/24/2014   • Urinary incontinence 05/22/2014       Past Surgical Hx:  Past Surgical History:   Procedure Laterality Date   • BACK SURGERY  08/07/2015    Removal of intstrumentation,L5-S1.Exploration of fusion,L5-S1.Posterolateral fusion,L4-L5.Posterior segmental spinal instrumentation L4-5.Transforaminal interbody fusion Through right sided approach.Performed at    • COLONOSCOPY  05/04/2016    will order cologuard   • INJECTION OF MEDICATION  03/23/2015    celestone   • INJECTION OF MEDICATION  05/04/2016    KENALOG(6)   • LUMBAR FUSION      discectomy fusion with rods L4-S1   • LUMBAR LAMINECTOMY  1995   • MANDIBLE FRACTURE SURGERY      JAW WIRED SHUT   • MOUTH SURGERY  10/05/2015    Fractured mandible. Removal of arch bars.   • MOUTH SURGERY  1985    Fractured mandible. Removal of arch bars.   • NASAL SEPTUM SURGERY     • RHINOPLASTY     • TUBAL ABDOMINAL LIGATION  1986       Health Maintenance:  Health Maintenance   Topic Date Due   • Pneumococcal Vaccine 65+ (1 - PCV) 03/09/1961   • DIABETIC FOOT EXAM  Never done   • DIABETIC EYE EXAM  Never done   • ZOSTER VACCINE (2 of 2) 02/09/2017   • COVID-19 Vaccine (3 - Booster for Leilani series) 03/30/2022   • LIPID PANEL  08/26/2022   • HEMOGLOBIN A1C  09/09/2022   • INFLUENZA VACCINE  10/01/2022   • URINE MICROALBUMIN  11/03/2022   • ANNUAL WELLNESS VISIT  11/24/2022   • MAMMOGRAM  01/14/2024   • DXA SCAN  01/14/2024   • COLORECTAL CANCER SCREENING  11/07/2026   • TDAP/TD VACCINES (5 - Td or Tdap) 12/07/2029   • HEPATITIS C SCREENING  Completed   • PAP SMEAR  Discontinued       Current Meds:    Current Outpatient Medications:   •  alendronate (Fosamax) 70 MG tablet, Take 1 tablet by mouth Every 7 (Seven) Days., Disp: 8 tablet, Rfl: 2  •  ARIPiprazole (ABILIFY) 5 MG tablet, Take 5 mg by mouth Daily. At bedtime, Disp: , Rfl:   •  aspirin 325 MG tablet, Take 500 mg by mouth Daily. At bedtime, instructed to take a half  tablet of 500 aspirin. , Disp: , Rfl:   •  atorvastatin (LIPITOR) 20 MG tablet, TAKE ONE TABLET BY MOUTH EVERY DAY, Disp: 90 tablet, Rfl: 3  •  Calcium Carb-Cholecalciferol (CALCIUM-VITAMIN D) 500-200 MG-UNIT per tablet, Take 1 tablet by mouth 2 (Two) Times a Day With Meals., Disp: 60 tablet, Rfl: 2  •  Diclofenac Sodium (VOLTAREN) 1 % gel gel, Apply 4 g topically to the appropriate area as directed 4 (Four) Times a Day As Needed (Joint pains in back, knees and hands.)., Disp: 350 g, Rfl: 3  •  DULoxetine (Cymbalta) 60 MG capsule, Take 1 capsule by mouth Daily for 30 days., Disp: 30 capsule, Rfl: 2  •  fenofibric acid (TRILIPIX) 135 MG capsule delayed-release delayed release capsule, TAKE ONE CAPSULE BY MOUTH EVERY DAY, Disp: 90 capsule, Rfl: 3  •  ferrous sulfate 325 (65 FE) MG tablet, Take 325 mg by mouth Daily With Breakfast., Disp: , Rfl:   •  Incontinence Supply Disposable (Depend Adjustable Underwear) misc, 1 application As Needed (Incontinence)., Disp: 90 each, Rfl: 11  •  lidocaine (LIDODERM) 5 %, Place 1 patch on the skin as directed by provider Daily. Remove & Discard patch within 12 hours or as directed by MD, Disp: 30 each, Rfl: 0  •  lisinopril (PRINIVIL,ZESTRIL) 10 MG tablet, Take 10 mg by mouth Daily., Disp: , Rfl:   •  methocarbamol (ROBAXIN) 500 MG tablet, Take 1 tablet by mouth 4 (Four) Times a Day As Needed for Muscle Spasms., Disp: 90 tablet, Rfl: 4  •  metoprolol tartrate (LOPRESSOR) 25 MG tablet, TAKE 1 TABLET EVERY 12 HOURS, Disp: 60 tablet, Rfl: 4  •  omeprazole (priLOSEC) 20 MG capsule, Take 1 capsule by mouth Daily., Disp: 90 capsule, Rfl: 5  •  rivaroxaban (Xarelto) 20 MG tablet, Take one tablet by mouth every day- take with food, Disp: 30 tablet, Rfl: 0  •  bumetanide (BUMEX) 0.5 MG tablet, Take 1 tablet by mouth 2 (Two) Times a Day., Disp: 60 tablet, Rfl: 0  •  cefdinir (OMNICEF) 300 MG capsule, Take 1 capsule by mouth 2 (Two) Times a Day for 10 days., Disp: 20 capsule, Rfl: 0  •   Dilt- MG 24 hr capsule, TAKE 1 CAPSULE EVERY DAY, Disp: 30 capsule, Rfl: 5  •  isosorbide mononitrate (IMDUR) 30 MG 24 hr tablet, Take 1 tablet by mouth Daily for 30 days., Disp: 30 tablet, Rfl: 3  •  loperamide (IMODIUM) 2 MG capsule, Take 1 capsule by mouth Daily., Disp: 60 capsule, Rfl: 0  •  sodium-potassium-magnesium sulfates (Suprep Bowel Prep Kit) 17.5-3.13-1.6 GM/177ML solution oral solution, Utilize as directed per instructions received from office of Dr. Mikie Gregg M.D., Disp: 354 mL, Rfl: 0    Current Facility-Administered Medications:   •  cyanocobalamin injection 1,000 mcg, 1,000 mcg, Intramuscular, Q28 Days, Leticia Tejeda MD  •  loperamide (IMODIUM) capsule 2 mg, 2 mg, Oral, Daily, Leticia Tejeda MD    Allergies:  Codeine, Latex, Morphine and related, and Pravachol [pravastatin sodium]    Family Hx:  Family History   Problem Relation Age of Onset   • Hypertension Mother    • Hyperlipidemia Mother    • Mental illness Mother    • Thyroid disease Mother    • Diabetes Maternal Grandmother    • Cancer Maternal Grandmother    • Depression Other    • Diabetes Other    • Heart disease Other    • Hypertension Other    • Osteoporosis Other    • Thyroid disease Other    • Cancer Maternal Aunt    • Cancer Paternal Aunt    • Breast cancer Paternal Aunt         Social History:  Social History     Socioeconomic History   • Marital status:    Tobacco Use   • Smoking status: Never Smoker   • Smokeless tobacco: Never Used   • Tobacco comment: smoking cessation    Substance and Sexual Activity   • Alcohol use: No   • Drug use: No     Comment: lortab / oxycontin   • Sexual activity: Defer       Review of Systems  For ROS see resident note    Objective:     /70   Pulse 83   Temp 97.1 °F (36.2 °C)   Wt 98.7 kg (217 lb 9.6 oz)   SpO2 96%   BMI 36.21 kg/m²   For PE see resident note    Lab Review  Results for orders placed or performed during the hospital encounter of 05/11/22    COVID-19 and FLU A/B PCR - Swab, Nasopharynx    Specimen: Nasopharynx; Swab   Result Value Ref Range    COVID19 Not Detected Not Detected - Ref. Range    Influenza A PCR Not Detected Not Detected    Influenza B PCR Not Detected Not Detected   Troponin    Specimen: Blood   Result Value Ref Range    Troponin T <0.010 0.000 - 0.030 ng/mL   Troponin    Specimen: Blood   Result Value Ref Range    Troponin T <0.010 0.000 - 0.030 ng/mL   Comprehensive Metabolic Panel    Specimen: Blood   Result Value Ref Range    Glucose 105 (H) 65 - 99 mg/dL    BUN 21 8 - 23 mg/dL    Creatinine 1.21 (H) 0.57 - 1.00 mg/dL    Sodium 139 136 - 145 mmol/L    Potassium 3.6 3.5 - 5.2 mmol/L    Chloride 101 98 - 107 mmol/L    CO2 26.0 22.0 - 29.0 mmol/L    Calcium 10.2 8.6 - 10.5 mg/dL    Total Protein 7.1 6.0 - 8.5 g/dL    Albumin 4.00 3.50 - 5.20 g/dL    ALT (SGPT) 13 1 - 33 U/L    AST (SGOT) 20 1 - 32 U/L    Alkaline Phosphatase 45 39 - 117 U/L    Total Bilirubin 0.6 0.0 - 1.2 mg/dL    Globulin 3.1 gm/dL    A/G Ratio 1.3 g/dL    BUN/Creatinine Ratio 17.4 7.0 - 25.0    Anion Gap 12.0 5.0 - 15.0 mmol/L    eGFR 49.2 (L) >60.0 mL/min/1.73   BNP    Specimen: Blood   Result Value Ref Range    proBNP 1,474.0 (H) 0.0 - 900.0 pg/mL   Urinalysis With Microscopic If Indicated (No Culture) - Urine, Clean Catch    Specimen: Urine, Clean Catch   Result Value Ref Range    Color, UA Yellow Yellow, Straw, Dark Yellow, Татьяна    Appearance, UA Clear Clear    pH, UA 6.0 5.0 - 9.0    Specific Gravity, UA 1.010 1.003 - 1.030    Glucose, UA Negative Negative    Ketones, UA Negative Negative    Bilirubin, UA Negative Negative    Blood, UA Negative Negative    Protein, UA Negative Negative    Leuk Esterase, UA Moderate (2+) (A) Negative    Nitrite, UA Negative Negative    Urobilinogen, UA 1.0 E.U./dL 0.2 - 1.0 E.U./dL   CBC Auto Differential    Specimen: Blood   Result Value Ref Range    WBC 10.52 3.40 - 10.80 10*3/mm3    RBC 4.74 3.77 - 5.28 10*6/mm3    Hemoglobin  14.3 12.0 - 15.9 g/dL    Hematocrit 41.2 34.0 - 46.6 %    MCV 86.9 79.0 - 97.0 fL    MCH 30.2 26.6 - 33.0 pg    MCHC 34.7 31.5 - 35.7 g/dL    RDW 13.1 12.3 - 15.4 %    RDW-SD 41.6 37.0 - 54.0 fl    MPV 9.9 6.0 - 12.0 fL    Platelets 231 140 - 450 10*3/mm3    Neutrophil % 57.6 42.7 - 76.0 %    Lymphocyte % 27.9 19.6 - 45.3 %    Monocyte % 8.7 5.0 - 12.0 %    Eosinophil % 4.4 0.3 - 6.2 %    Basophil % 0.8 0.0 - 1.5 %    Immature Grans % 0.6 (H) 0.0 - 0.5 %    Neutrophils, Absolute 6.07 1.70 - 7.00 10*3/mm3    Lymphocytes, Absolute 2.94 0.70 - 3.10 10*3/mm3    Monocytes, Absolute 0.91 (H) 0.10 - 0.90 10*3/mm3    Eosinophils, Absolute 0.46 (H) 0.00 - 0.40 10*3/mm3    Basophils, Absolute 0.08 0.00 - 0.20 10*3/mm3    Immature Grans, Absolute 0.06 (H) 0.00 - 0.05 10*3/mm3    nRBC 0.0 0.0 - 0.2 /100 WBC   Urinalysis, Microscopic Only - Urine, Clean Catch    Specimen: Urine, Clean Catch   Result Value Ref Range    RBC, UA 0-2 (A) None Seen /HPF    WBC, UA 21-30 (A) None Seen, 0-2, 3-5 /HPF    Bacteria, UA None Seen None Seen /HPF    Squamous Epithelial Cells, UA 0-2 None Seen, 0-2 /HPF    Hyaline Casts, UA None Seen None Seen /LPF    Methodology Automated Microscopy    Troponin    Specimen: Blood   Result Value Ref Range    Troponin T <0.010 0.000 - 0.030 ng/mL   Comprehensive Metabolic Panel    Specimen: Blood   Result Value Ref Range    Glucose 122 (H) 65 - 99 mg/dL    BUN 20 8 - 23 mg/dL    Creatinine 1.09 (H) 0.57 - 1.00 mg/dL    Sodium 139 136 - 145 mmol/L    Potassium 3.8 3.5 - 5.2 mmol/L    Chloride 101 98 - 107 mmol/L    CO2 26.0 22.0 - 29.0 mmol/L    Calcium 9.6 8.6 - 10.5 mg/dL    Total Protein 6.9 6.0 - 8.5 g/dL    Albumin 3.90 3.50 - 5.20 g/dL    ALT (SGPT) 13 1 - 33 U/L    AST (SGOT) 20 1 - 32 U/L    Alkaline Phosphatase 43 39 - 117 U/L    Total Bilirubin 0.7 0.0 - 1.2 mg/dL    Globulin 3.0 gm/dL    A/G Ratio 1.3 g/dL    BUN/Creatinine Ratio 18.3 7.0 - 25.0    Anion Gap 12.0 5.0 - 15.0 mmol/L    eGFR 55.8 (L)  >60.0 mL/min/1.73   CBC Auto Differential    Specimen: Blood   Result Value Ref Range    WBC 9.13 3.40 - 10.80 10*3/mm3    RBC 4.96 3.77 - 5.28 10*6/mm3    Hemoglobin 14.6 12.0 - 15.9 g/dL    Hematocrit 42.7 34.0 - 46.6 %    MCV 86.1 79.0 - 97.0 fL    MCH 29.4 26.6 - 33.0 pg    MCHC 34.2 31.5 - 35.7 g/dL    RDW 13.1 12.3 - 15.4 %    RDW-SD 40.7 37.0 - 54.0 fl    MPV 10.0 6.0 - 12.0 fL    Platelets 237 140 - 450 10*3/mm3    Neutrophil % 57.9 42.7 - 76.0 %    Lymphocyte % 28.5 19.6 - 45.3 %    Monocyte % 7.0 5.0 - 12.0 %    Eosinophil % 5.3 0.3 - 6.2 %    Basophil % 0.9 0.0 - 1.5 %    Immature Grans % 0.4 0.0 - 0.5 %    Neutrophils, Absolute 5.29 1.70 - 7.00 10*3/mm3    Lymphocytes, Absolute 2.60 0.70 - 3.10 10*3/mm3    Monocytes, Absolute 0.64 0.10 - 0.90 10*3/mm3    Eosinophils, Absolute 0.48 (H) 0.00 - 0.40 10*3/mm3    Basophils, Absolute 0.08 0.00 - 0.20 10*3/mm3    Immature Grans, Absolute 0.04 0.00 - 0.05 10*3/mm3    nRBC 0.0 0.0 - 0.2 /100 WBC   Comprehensive Metabolic Panel    Specimen: Blood   Result Value Ref Range    Glucose 112 (H) 65 - 99 mg/dL    BUN 17 8 - 23 mg/dL    Creatinine 1.13 (H) 0.57 - 1.00 mg/dL    Sodium 142 136 - 145 mmol/L    Potassium 3.6 3.5 - 5.2 mmol/L    Chloride 106 98 - 107 mmol/L    CO2 26.0 22.0 - 29.0 mmol/L    Calcium 8.6 8.6 - 10.5 mg/dL    Total Protein 6.4 6.0 - 8.5 g/dL    Albumin 3.50 3.50 - 5.20 g/dL    ALT (SGPT) 12 1 - 33 U/L    AST (SGOT) 16 1 - 32 U/L    Alkaline Phosphatase 45 39 - 117 U/L    Total Bilirubin 0.5 0.0 - 1.2 mg/dL    Globulin 2.9 gm/dL    A/G Ratio 1.2 g/dL    BUN/Creatinine Ratio 15.0 7.0 - 25.0    Anion Gap 10.0 5.0 - 15.0 mmol/L    eGFR 53.4 (L) >60.0 mL/min/1.73   CBC Auto Differential    Specimen: Blood   Result Value Ref Range    WBC 7.63 3.40 - 10.80 10*3/mm3    RBC 4.67 3.77 - 5.28 10*6/mm3    Hemoglobin 13.7 12.0 - 15.9 g/dL    Hematocrit 39.9 34.0 - 46.6 %    MCV 85.4 79.0 - 97.0 fL    MCH 29.3 26.6 - 33.0 pg    MCHC 34.3 31.5 - 35.7 g/dL    RDW  13.2 12.3 - 15.4 %    RDW-SD 41.1 37.0 - 54.0 fl    MPV 9.8 6.0 - 12.0 fL    Platelets 196 140 - 450 10*3/mm3    Neutrophil % 58.9 42.7 - 76.0 %    Lymphocyte % 24.9 19.6 - 45.3 %    Monocyte % 8.0 5.0 - 12.0 %    Eosinophil % 6.2 0.3 - 6.2 %    Basophil % 1.2 0.0 - 1.5 %    Immature Grans % 0.8 (H) 0.0 - 0.5 %    Neutrophils, Absolute 4.50 1.70 - 7.00 10*3/mm3    Lymphocytes, Absolute 1.90 0.70 - 3.10 10*3/mm3    Monocytes, Absolute 0.61 0.10 - 0.90 10*3/mm3    Eosinophils, Absolute 0.47 (H) 0.00 - 0.40 10*3/mm3    Basophils, Absolute 0.09 0.00 - 0.20 10*3/mm3    Immature Grans, Absolute 0.06 (H) 0.00 - 0.05 10*3/mm3    nRBC 0.0 0.0 - 0.2 /100 WBC   Stress Test With Myocardial Perfusion Two Day   Result Value Ref Range    Target HR (85%) 130 bpm    Max. Pred. HR (100%) 153 bpm     CV STRESS PROTOCOL 1 Pharmacologic     Stage 1 1     HR Stage 1 69     Duration Min Stage 1 0     Duration Sec Stage 1 10     Stress Dose Regadenoson Stage 1 0.4     Stress Comments Stage 1 10 sec bolus injection     Baseline HR 78 bpm    Baseline /86 mmHg    Peak HR 94 bpm    Percent Max Pred HR 61.44 %    Percent Target HR 72 %    Peak /86 mmHg    Recovery HR 77 bpm    Recovery /80 mmHg    Exercise duration (sec) 46 sec    Estimated workload 1.0 METS    BH CV REST NUCLEAR ISOTOPE DOSE 34.6 mCi    BH CV STRESS NUCLEAR ISOTOPE DOSE 36.2 mCi    Nuc Stress EF 75 %   ECG 12 Lead   Result Value Ref Range    QT Interval 388 ms    QTC Interval 439 ms   ECG 12 Lead   Result Value Ref Range    QT Interval 398 ms    QTC Interval 432 ms   Green Top (Gel)   Result Value Ref Range    Extra Tube Hold for add-ons.    Lavender Top   Result Value Ref Range    Extra Tube hold for add-on    Gold Top - SST   Result Value Ref Range    Extra Tube Hold for add-ons.    Light Blue Top   Result Value Ref Range    Extra Tube Hold for add-ons.             Assessment:     Diagnoses and all orders for this visit:    1. Lumbar radiculopathy  (Primary)    2. Greater trochanteric bursitis of both hips    3. Anxiety  -     TSH+Free T4; Future    4. Major depressive disorder, recurrent, mild (HCC)    5. Chronic heart failure with preserved ejection fraction (HCC)  -     Comprehensive metabolic panel; Future    6. Nocturia  -     Comprehensive metabolic panel; Future  -     Urinalysis With Culture If Indicated -; Future        Plan:     I have seen and examined the patient.  I have reviewed the notes, assessments, and/or procedures performed by Leticia Tejeda MD, I concur with her/his documentation and assessment and plan for Lana Ayala Esperanza.            This document has been electronically signed by Pasha Girard MD on July 26, 2022 16:13 CDT      Answers for HPI/ROS submitted by the patient on 7/18/2022  What is the primary reason for your visit?: Back Pain  Chronicity: chronic  Onset: more than 1 year ago  Frequency: constantly  Progression since onset: waxing and waning  Pain location: gluteal, sacro-iliac  Pain quality: aching, shooting  Radiates to: left foot, left knee, left thigh, right foot, right knee, right thigh  Pain - numeric: 5/10  Pain is: the same all the time  Aggravated by: bending, coughing, position, sitting  Stiffness is present: all day  abdominal pain: No  bladder incontinence: Yes  bowel incontinence: Yes  chest pain: No  dysuria: No  fever: No  headaches: No  leg pain: Yes  numbness: Yes  paresis: No  paresthesias: Yes  pelvic pain: Yes  perianal numbness: No  tingling: Yes  weakness: Yes  weight loss: No  Risk factors: history of osteoporosis, lack of exercise

## 2022-07-29 ENCOUNTER — PROCEDURE VISIT (OUTPATIENT)
Dept: PULMONOLOGY | Facility: CLINIC | Age: 67
End: 2022-07-29

## 2022-07-29 ENCOUNTER — OFFICE VISIT (OUTPATIENT)
Dept: PULMONOLOGY | Facility: CLINIC | Age: 67
End: 2022-07-29

## 2022-07-29 VITALS
OXYGEN SATURATION: 97 % | HEART RATE: 74 BPM | HEIGHT: 65 IN | SYSTOLIC BLOOD PRESSURE: 136 MMHG | WEIGHT: 219 LBS | BODY MASS INDEX: 36.49 KG/M2 | DIASTOLIC BLOOD PRESSURE: 84 MMHG

## 2022-07-29 DIAGNOSIS — R06.02 SOB (SHORTNESS OF BREATH): Primary | ICD-10-CM

## 2022-07-29 DIAGNOSIS — G47.33 OSA (OBSTRUCTIVE SLEEP APNEA): ICD-10-CM

## 2022-07-29 DIAGNOSIS — R06.02 SHORTNESS OF BREATH: ICD-10-CM

## 2022-07-29 DIAGNOSIS — I50.32 CHRONIC DIASTOLIC CONGESTIVE HEART FAILURE: ICD-10-CM

## 2022-07-29 PROCEDURE — 94060 EVALUATION OF WHEEZING: CPT | Performed by: INTERNAL MEDICINE

## 2022-07-29 PROCEDURE — 99214 OFFICE O/P EST MOD 30 MIN: CPT | Performed by: NURSE PRACTITIONER

## 2022-07-29 NOTE — PROGRESS NOTES
Pulmonary Office Visit    Joselin Mcdonald, *    Thank you for asking me to see Lana Mata for   Chief Complaint   Patient presents with   • Shortness of Breath       History of Present Illness    Ms. Madrigal is a 67 year old patient who returns to Pulmonology for CHAN referred by sleep medicine. She was seen in 2018 for the same complaint by Dr. Helms. That note was reviewed. At that time, she did not have obstruction on PFTs and her shortness of breath was felt to be related to ongoing cardiac issues including AF and HFpEF.     She does not have personal history of smoking, but her  smoked. She is a retired nurse. She does not have lung cancer history in the family.   She follows with Sleep medicine for SOPHIA with CPAP with questionable use due to dyspnea. She says she gets smothered after about 7 days of wearing it. When she stops wearing her mask, on day three her breathing will improve.     This has been going on for the past 6 weeks.     She has chronic 2-3 pillow orthopnea. She did notice a small weight gain and took an additional Bumex 0.5mg with improvement.     We repeated PFTs today. They are unchanged from prior without obstruction.     She tells me that she has a lot of trouble with breathing due to congestive heart failure.     She will notice her oxygen dropping at night when she goes to bed (short of breath walking into bedroom). Her o2 did not drop today during walk test.       Tobacco use history:  Social History     Socioeconomic History   • Marital status:    Tobacco Use   • Smoking status: Never Smoker   • Smokeless tobacco: Never Used   • Tobacco comment: smoking cessation    Substance and Sexual Activity   • Alcohol use: No   • Drug use: No     Comment: lortab / oxycontin   • Sexual activity: Defer         Review of Systems: History obtained from chart review and the patient.  Review of Systems   Constitutional: Negative for diaphoresis, fatigue, fever and  unexpected weight change.   Respiratory: Positive for shortness of breath. Negative for cough, chest tightness and wheezing.    Cardiovascular: Negative for chest pain, palpitations and leg swelling.   Gastrointestinal: Negative for abdominal distention and blood in stool.   Genitourinary: Negative for hematuria.   Musculoskeletal: Negative for arthralgias and myalgias.   Skin: Negative for pallor and rash.   Neurological: Negative for dizziness, syncope and weakness.   Hematological: Does not bruise/bleed easily.   Psychiatric/Behavioral: Negative for confusion. The patient is not nervous/anxious.        As described in the HPI. Otherwise, remainder of ROS (14 systems) were negative.    Patient Active Problem List   Diagnosis   • Thyrotoxicosis with or without goiter   • Osteoporosis   • Pain in right shoulder   • Low back pain   • Hyperlipidemia   • Generalized anxiety disorder   • Depressive disorder   • Congestive heart failure (HCC)   • Benign essential hypertension   • Asthenia   • Abnormal gait   • Obstructive sleep apnea of adult   • DDD (degenerative disc disease), lumbar   • Lumbar radiculopathy   • History of lumbar surgery   • Lumbar spondylolysis   • Long term (current) use of opiate analgesic   • Longstanding persistent atrial fibrillation ([facility])   • Urinary incontinence   • Skin ulcer ([facility])   • Osteoarthritis   • SOPHIA (obstructive sleep apnea)   • Neck pain   • Headache   • H/O echocardiogram   • Gastroesophageal reflux disease   • Common cold   • Chronic pain disorder   • Cellulitis of knee   • Attempted suicide ([facility])   • Allergic rhinitis   • Acute bronchitis   • Acute ankle pain   • Abrasion   • Lower abdominal pain   • Diarrhea   • Nausea   • Class 2 severe obesity due to excess calories with serious comorbidity and body mass index (BMI) of 37.0 to 37.9 in adult ([facility])   • Chronic pain of left ankle   • Posterior tibial tendon dysfunction, left   • Retained orthopedic hardware   • History of tibial  fracture   • Flat foot   • UTI (urinary tract infection), bacterial   • Posterior tibial tendon dysfunction (PTTD) of left lower extremity   • Left leg pain   • Primary osteoarthritis of right knee   • Effusion of right knee   • Acute pain of right knee   • Primary osteoarthritis of both ankles   • SOB (shortness of breath)   • Acute on chronic diastolic congestive heart failure (HCC)   • Stage 3b chronic kidney disease (HCC)   • (HFpEF) heart failure with preserved ejection fraction (HCC)   • UTI (urinary tract infection)   • Ankle weakness   • Chronic pain of both ankles   • Positive colorectal cancer screening using Cologuard test         Current Outpatient Medications:   •  alendronate (Fosamax) 70 MG tablet, Take 1 tablet by mouth Every 7 (Seven) Days., Disp: 8 tablet, Rfl: 2  •  ARIPiprazole (ABILIFY) 5 MG tablet, Take 5 mg by mouth Daily. At bedtime, Disp: , Rfl:   •  aspirin 325 MG tablet, Take 500 mg by mouth Daily. At bedtime, instructed to take a half tablet of 500 aspirin. , Disp: , Rfl:   •  atorvastatin (LIPITOR) 20 MG tablet, TAKE ONE TABLET BY MOUTH EVERY DAY, Disp: 90 tablet, Rfl: 3  •  bumetanide (BUMEX) 0.5 MG tablet, Take 1 tablet by mouth 2 (Two) Times a Day., Disp: 60 tablet, Rfl: 0  •  Calcium Carb-Cholecalciferol (CALCIUM-VITAMIN D) 500-200 MG-UNIT per tablet, Take 1 tablet by mouth 2 (Two) Times a Day With Meals., Disp: 60 tablet, Rfl: 2  •  cefdinir (OMNICEF) 300 MG capsule, Take 1 capsule by mouth 2 (Two) Times a Day for 10 days., Disp: 20 capsule, Rfl: 0  •  Diclofenac Sodium (VOLTAREN) 1 % gel gel, Apply 4 g topically to the appropriate area as directed 4 (Four) Times a Day As Needed (Joint pains in back, knees and hands.)., Disp: 350 g, Rfl: 3  •  Dilt- MG 24 hr capsule, TAKE 1 CAPSULE EVERY DAY, Disp: 30 capsule, Rfl: 5  •  fenofibric acid (TRILIPIX) 135 MG capsule delayed-release delayed release capsule, TAKE ONE CAPSULE BY MOUTH EVERY DAY, Disp: 90 capsule, Rfl: 3  •  ferrous  sulfate 325 (65 FE) MG tablet, Take 325 mg by mouth Daily With Breakfast., Disp: , Rfl:   •  Incontinence Supply Disposable (Depend Adjustable Underwear) misc, 1 application As Needed (Incontinence)., Disp: 90 each, Rfl: 11  •  lidocaine (LIDODERM) 5 %, Place 1 patch on the skin as directed by provider Daily. Remove & Discard patch within 12 hours or as directed by MD, Disp: 30 each, Rfl: 0  •  lisinopril (PRINIVIL,ZESTRIL) 10 MG tablet, Take 10 mg by mouth Daily., Disp: , Rfl:   •  loperamide (IMODIUM) 2 MG capsule, Take 1 capsule by mouth Daily., Disp: 60 capsule, Rfl: 0  •  methocarbamol (ROBAXIN) 500 MG tablet, Take 1 tablet by mouth 4 (Four) Times a Day As Needed for Muscle Spasms., Disp: 90 tablet, Rfl: 4  •  metoprolol tartrate (LOPRESSOR) 25 MG tablet, TAKE 1 TABLET EVERY 12 HOURS, Disp: 60 tablet, Rfl: 4  •  omeprazole (priLOSEC) 20 MG capsule, Take 1 capsule by mouth Daily., Disp: 90 capsule, Rfl: 5  •  rivaroxaban (Xarelto) 20 MG tablet, Take one tablet by mouth every day- take with food, Disp: 30 tablet, Rfl: 0  •  sodium-potassium-magnesium sulfates (Suprep Bowel Prep Kit) 17.5-3.13-1.6 GM/177ML solution oral solution, Utilize as directed per instructions received from office of Dr. Mkiie Gregg M.D., Disp: 354 mL, Rfl: 0  •  DULoxetine (Cymbalta) 60 MG capsule, Take 1 capsule by mouth Daily for 30 days., Disp: 30 capsule, Rfl: 2  •  isosorbide mononitrate (IMDUR) 30 MG 24 hr tablet, Take 1 tablet by mouth Daily for 30 days., Disp: 30 tablet, Rfl: 3    Current Facility-Administered Medications:   •  cyanocobalamin injection 1,000 mcg, 1,000 mcg, Intramuscular, Q28 Days, Leticia Tejeda MD  •  loperamide (IMODIUM) capsule 2 mg, 2 mg, Oral, Daily, Leticia Tejeda MD    Allergies   Allergen Reactions   • Codeine Nausea And Vomiting   • Latex Rash   • Morphine And Related Hallucinations   • Pravachol [Pravastatin Sodium] Myalgia       Past Medical History:   Diagnosis Date   • Abnormal gait  11/28/2012   • Abrasion 02/26/2015   • Acute ankle pain 03/31/2016   • Acute bronchitis 09/04/2015   • Allergic rhinitis 05/22/2012   • Asthenia 05/22/2014   • Atrial fibrillation (HCC) 03/04/2016    - converted    • Attempted suicide ([facility])    • Benign essential hypertension 03/04/2016   • Cardiovascular stress test abnormal 03/04/2016   • Cellulitis of knee 03/03/2015   • Chest pain 04/02/2015   • Chronic pain disorder    • Common cold 06/24/2014   • Congestive heart failure (HCC) 02/04/2016   • Depressive disorder 05/22/2015    suspect more complicated psych issues      • Fibrocystic breast    • Gastroesophageal reflux disease 01/17/2013   • Generalized anxiety disorder 02/04/2016   • H/O echocardiogram     Normal LV function with Ef of 60-65%.Mildly dilated right ventricle with normal function. Grade 1B diastolic dysfunction with mild CLVH.NO sig. valvular regurg. or stenosis.   • Headache 10/27/2014   • Hyperlipidemia 03/04/2016   • Low back pain 06/30/2016    Need for prophylactic vaccination against Streptococcus pneumoniae [pneumococcus]    01/23/2015    • Neck pain 06/30/2016   • Obstructive sleep apnea of adult 07/30/2015   • Obstructive sleep apnea syndrome    • Osteoarthritis    • Osteoporosis 06/30/2016   • Pain in right shoulder 06/30/2016   • Pain in right shoulder 04/24/2014   • Shortness of breath 04/02/2015   • Skin ulcer ([facility]) 04/13/2015    left   • Thyrotoxicosis with or without goiter 10/24/2014   • Urinary incontinence 05/22/2014     Past Surgical History:   Procedure Laterality Date   • BACK SURGERY  08/07/2015    Removal of intstrumentation,L5-S1.Exploration of fusion,L5-S1.Posterolateral fusion,L4-L5.Posterior segmental spinal instrumentation L4-5.Transforaminal interbody fusion Through right sided approach.Performed at    • COLONOSCOPY  05/04/2016    will order cologuard   • INJECTION OF MEDICATION  03/23/2015    celestone   • INJECTION OF MEDICATION  05/04/2016    KENALOG(6)   • LUMBAR  "FUSION      discectomy fusion with rods L4-S1   • LUMBAR LAMINECTOMY  1995   • MANDIBLE FRACTURE SURGERY      JAW WIRED SHUT   • MOUTH SURGERY  10/05/2015    Fractured mandible. Removal of arch bars.   • MOUTH SURGERY  1985    Fractured mandible. Removal of arch bars.   • NASAL SEPTUM SURGERY     • RHINOPLASTY     • TUBAL ABDOMINAL LIGATION  1986     Social History     Socioeconomic History   • Marital status:    Tobacco Use   • Smoking status: Never Smoker   • Smokeless tobacco: Never Used   • Tobacco comment: smoking cessation    Substance and Sexual Activity   • Alcohol use: No   • Drug use: No     Comment: lortab / oxycontin   • Sexual activity: Defer     Family History   Problem Relation Age of Onset   • Hypertension Mother    • Hyperlipidemia Mother    • Mental illness Mother    • Thyroid disease Mother    • Diabetes Maternal Grandmother    • Cancer Maternal Grandmother    • Depression Other    • Diabetes Other    • Heart disease Other    • Hypertension Other    • Osteoporosis Other    • Thyroid disease Other    • Cancer Maternal Aunt    • Cancer Paternal Aunt    • Breast cancer Paternal Aunt        Advance Care Planning   ACP discussion was declined by the patient. Patient does not have an advance directive, declines further assistance.          Objective     Vitals:    07/29/22 1426   BP: 136/84   BP Location: Left arm   Patient Position: Sitting   Cuff Size: Adult   Pulse: 74   SpO2: 97%   Weight: 99.3 kg (219 lb)   Height: 165.1 cm (65\")     Lab Results   Component Value Date    GLUCOSE 93 07/22/2022    CALCIUM 8.9 07/22/2022     07/22/2022    K 3.9 07/22/2022    CO2 24.7 07/22/2022     07/22/2022    BUN 20 07/22/2022    CREATININE 1.09 (H) 07/22/2022    EGFRIFNONA 35 (L) 02/22/2022    BCR 18.3 07/22/2022    ANIONGAP 12.3 07/22/2022     Lab Results   Component Value Date    WBC 7.63 05/13/2022    HGB 13.7 05/13/2022    HCT 39.9 05/13/2022    MCV 85.4 05/13/2022     05/13/2022 "       Physical Exam  Vitals and nursing note reviewed.   Constitutional:       General: She is not in acute distress.     Appearance: She is well-developed. She is not diaphoretic.   HENT:      Head: Normocephalic.   Eyes:      Conjunctiva/sclera: Conjunctivae normal.   Neck:      Vascular: No JVD.   Cardiovascular:      Rate and Rhythm: Normal rate and regular rhythm.      Heart sounds: Normal heart sounds, S1 normal and S2 normal. No murmur heard.    No friction rub. No gallop.   Pulmonary:      Effort: Pulmonary effort is normal. No respiratory distress.      Breath sounds: Normal breath sounds. No wheezing or rales.   Abdominal:      General: Bowel sounds are normal. There is no distension.      Palpations: Abdomen is soft.   Musculoskeletal:         General: Normal range of motion.   Skin:     General: Skin is warm and dry.      Findings: No erythema.   Neurological:      Mental Status: She is alert and oriented to person, place, and time.   Psychiatric:         Behavior: Behavior normal.         Thought Content: Thought content normal.         Judgment: Judgment normal.         PFTs   2/13/18 (independently reviewed and interpreted by Dr. Helms)  Ratio 81  FVC 1.79/ 53%  FEV1 1.45/ 56%  TLC 4.53/ 87%  DLCO 25.38/ 99%  Poor effort.  Nonspecific spirometry pattern.  Normal lung volumes and diffusing capacity.  No bronchodilator response.  No comparative data available.        PFTs: Done on: 7/29/22 (independently reviewed by myself, interpreted by physician)  Ratio 86  FVC 59  FEV1 65  TLC not preformed  DLCO not preformed        Radiology (independently reviewed by me, interpreted by physcian)    No radiology results for the last 30 days.         Assessment       PFTs did not show overt obstruction and no bronchodilator response.  Chest imaging was unremarkable.    While it is possible she has some mild underlying asthma, I think her current dyspnea is more related to her issues with atrial fibrillation,  diastolic dysfunction, and untreated SOPHIA.  She also likely has component from her obesity and deconditioning. She has low lung volumes on CXR. Ct chest to rule out ILD disease. I am not suspicious as she is a never smoker and her dyspnea complaint has not changed or declined over the years.       Discussion/ Recommendations:     6MWT preformed. SPO2 was 98% at rest on room air. 98% with exertion.      Diagnosis   Plan   1. SOB (shortness of breath)  I do not feel that her CHAN is something that we are going to be able to improve. Can rule out ILD and nocturnal hypoxia. Walking oximeter is normal.     CT Chest Without Contrast    Overnight Sleep Oximetry Study     2. SOPHIA (obstructive sleep apnea)  Cont following with Sleep.   Overnight oximetry on room air as that is what she normally is (not wearing PAP)       3. Chronic diastolic congestive heart failure (HCC)  Per Cardiology.   She is not experiencing large volume loss with Bumex 0.5mg BID. Her weight remains stable, and down over the past month on home scales. 222lb on our scales but 219 at home.          Class 2 Severe Obesity (BMI >=35 and <=39.9). Obesity-related health conditions include the following: obstructive sleep apnea and hypertension. Obesity is unchanged. BMI is is above average; BMI management plan is completed. We discussed portion control and increasing exercise.    Follow up: 3 weeks, after CT      I spent 35 minutes caring for Lana on this date of service. This time includes time spent by me in the following activities: preparing for the visit, reviewing tests, obtaining and/or reviewing a separately obtained history, performing a medically appropriate examination and/or evaluation, counseling and educating the patient/family/caregiver, ordering medications, tests, or procedures, referring and communicating with other health care professionals, documenting information in the medical record, independently interpreting results and communicating  that information with the patient/family/caregiver and care coordination            This document has been electronically signed by JONH Cunningham on July 29, 2022 15:18 CDT

## 2022-07-29 NOTE — PROGRESS NOTES
SPIROMETRY WITH BRONCHODILATOR PERFORMED.     GOOD PATIENT EFFORT AND COOPERATION ON PRE-SPIROMETRY, FAIR EFFORT ON POST SPIROMETRY.     6 SECOND EXHALATION ON SPIROMETRY.    ORDERED BY JOHN FIGUEROA; READ BY DR. CABALLERO

## 2022-08-01 ENCOUNTER — DOCUMENTATION (OUTPATIENT)
Dept: PULMONOLOGY | Facility: CLINIC | Age: 67
End: 2022-08-01

## 2022-08-01 DIAGNOSIS — I50.32 CHRONIC HEART FAILURE WITH PRESERVED EJECTION FRACTION: ICD-10-CM

## 2022-08-01 RX ORDER — BUMETANIDE 0.5 MG/1
TABLET ORAL
Qty: 60 TABLET | Refills: 0 | Status: SHIPPED | OUTPATIENT
Start: 2022-08-01 | End: 2022-09-22

## 2022-08-01 NOTE — PROGRESS NOTES
Order for Home Overnight oximetry has been filled out and faxed to [facility].  Confirmation received.

## 2022-08-09 ENCOUNTER — DOCUMENTATION (OUTPATIENT)
Dept: SLEEP MEDICINE | Facility: HOSPITAL | Age: 67
End: 2022-08-09

## 2022-08-09 NOTE — PROGRESS NOTES
Overnight oximetry report received from [facility] (ordered by JOHN Silva).    Overnight Oximetry Results:  Test date: 08/03/2022 while on Room air  Low O2: 87%  Time O2 </= 88%: 1 minute 27 seconds  Consecutive time O2 </= 88%: <1 minute  Oxygen desaturation events: 160  Oxygen desaturation index: 22.07  Pulse range: 62-98 bpm    Patient does not qualify for nocturnal O2.

## 2022-08-10 ENCOUNTER — LAB (OUTPATIENT)
Dept: LAB | Facility: HOSPITAL | Age: 67
End: 2022-08-10

## 2022-08-10 ENCOUNTER — OFFICE VISIT (OUTPATIENT)
Dept: FAMILY MEDICINE CLINIC | Facility: CLINIC | Age: 67
End: 2022-08-10

## 2022-08-10 VITALS
SYSTOLIC BLOOD PRESSURE: 118 MMHG | HEIGHT: 65 IN | HEART RATE: 85 BPM | DIASTOLIC BLOOD PRESSURE: 76 MMHG | TEMPERATURE: 97.6 F | OXYGEN SATURATION: 98 % | BODY MASS INDEX: 35.99 KG/M2 | WEIGHT: 216 LBS

## 2022-08-10 DIAGNOSIS — F34.1 DYSTHYMIC DISORDER: ICD-10-CM

## 2022-08-10 DIAGNOSIS — Z02.83 ENCOUNTER FOR DRUG SCREENING: ICD-10-CM

## 2022-08-10 DIAGNOSIS — F13.130 SEDATIVE, HYPNOTIC OR ANXIOLYTIC ABUSE WITH WITHDRAWAL, UNCOMPLICATED: ICD-10-CM

## 2022-08-10 DIAGNOSIS — F43.10 PTSD (POST-TRAUMATIC STRESS DISORDER): ICD-10-CM

## 2022-08-10 DIAGNOSIS — F41.9 ANXIETY: Primary | ICD-10-CM

## 2022-08-10 DIAGNOSIS — F41.9 ANXIETY: ICD-10-CM

## 2022-08-10 LAB
AMPHET+METHAMPHET UR QL: NEGATIVE
AMPHETAMINES UR QL: NEGATIVE
BARBITURATES UR QL SCN: NEGATIVE
BENZODIAZ UR QL SCN: POSITIVE
BUPRENORPHINE SERPL-MCNC: NEGATIVE NG/ML
CANNABINOIDS SERPL QL: NEGATIVE
COCAINE UR QL: NEGATIVE
METHADONE UR QL SCN: NEGATIVE
OPIATES UR QL: NEGATIVE
OXYCODONE UR QL SCN: NEGATIVE
PCP UR QL SCN: NEGATIVE
PROPOXYPH UR QL: NEGATIVE
TRICYCLICS UR QL SCN: NEGATIVE

## 2022-08-10 PROCEDURE — 80306 DRUG TEST PRSMV INSTRMNT: CPT

## 2022-08-10 PROCEDURE — 99214 OFFICE O/P EST MOD 30 MIN: CPT | Performed by: FAMILY MEDICINE

## 2022-08-10 RX ORDER — LORAZEPAM 1 MG/1
0.5 TABLET ORAL EVERY 8 HOURS PRN
Status: CANCELLED | OUTPATIENT
Start: 2022-08-10

## 2022-08-10 RX ORDER — RAMELTEON 8 MG/1
8 TABLET ORAL NIGHTLY
Qty: 30 TABLET | Refills: 0 | Status: CANCELLED | OUTPATIENT
Start: 2022-08-10
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
## 2022-08-11 ENCOUNTER — HOSPITAL ENCOUNTER (OUTPATIENT)
Dept: CT IMAGING | Facility: HOSPITAL | Age: 67
Discharge: HOME | End: 2022-08-11
Admitting: NURSE PRACTITIONER

## 2022-08-11 DIAGNOSIS — R06.02 SOB (SHORTNESS OF BREATH): ICD-10-CM

## 2022-08-11 PROCEDURE — 71250 CT THORAX DX C-: CPT

## 2022-08-15 RX ORDER — LORAZEPAM 0.5 MG/1
0.25 TABLET ORAL EVERY 8 HOURS PRN
Qty: 15 TABLET | Refills: 0 | Status: SHIPPED | OUTPATIENT
Start: 2022-08-15 | End: 2022-09-03

## 2022-08-16 NOTE — PROGRESS NOTES
Lana Mata 8/10/22  Subjective:     Lana Mata is a 67 y.o. female who presents for   Chief Complaint   Patient presents with   • Follow-up       67-year-old female with history of anxiety seen today for scheduled follow-up visit.  Patient continues to have issues with insomnia and attempts at weaning her Ativan have met with limited success.  Patient is currently taking 0.5 mg and is hoping to go back up dating that the melatonin and ramelteon does not help.  Please see Dr. Tejeda's note for more detailed information regarding today's encounter.  Please also see her note regarding sickle exam findings and plan of care.        Past Medical Hx:  Past Medical History:   Diagnosis Date   • Abnormal gait 11/28/2012   • Abrasion 02/26/2015   • Acute ankle pain 03/31/2016   • Acute bronchitis 09/04/2015   • Allergic rhinitis 05/22/2012   • Asthenia 05/22/2014   • Atrial fibrillation (HCC) 03/04/2016    - converted    • Attempted suicide ([facility])    • Benign essential hypertension 03/04/2016   • Cardiovascular stress test abnormal 03/04/2016   • Cellulitis of knee 03/03/2015   • Chest pain 04/02/2015   • Chronic pain disorder    • Common cold 06/24/2014   • Congestive heart failure (HCC) 02/04/2016   • Depressive disorder 05/22/2015    suspect more complicated psych issues      • Fibrocystic breast    • Gastroesophageal reflux disease 01/17/2013   • Generalized anxiety disorder 02/04/2016   • H/O echocardiogram     Normal LV function with Ef of 60-65%.Mildly dilated right ventricle with normal function. Grade 1B diastolic dysfunction with mild CLVH.NO sig. valvular regurg. or stenosis.   • Headache 10/27/2014   • Hyperlipidemia 03/04/2016   • Kidney disease     stage 3   • Low back pain 06/30/2016    Need for prophylactic vaccination against Streptococcus pneumoniae [pneumococcus]    01/23/2015    • Neck pain 06/30/2016   • Obstructive sleep apnea of adult 07/30/2015   • Obstructive sleep apnea syndrome     • Osteoarthritis    • Osteoporosis 06/30/2016   • Pain in right shoulder 06/30/2016   • Pain in right shoulder 04/24/2014   • Shortness of breath 04/02/2015   • Skin ulcer (HCC) 04/13/2015    left   • Thyrotoxicosis with or without goiter 10/24/2014   • Urinary incontinence 05/22/2014       Past Surgical Hx:  Past Surgical History:   Procedure Laterality Date   • BACK SURGERY  08/07/2015    Removal of intstrumentation,L5-S1.Exploration of fusion,L5-S1.Posterolateral fusion,L4-L5.Posterior segmental spinal instrumentation L4-5.Transforaminal interbody fusion Through right sided approach.Performed at    • COLONOSCOPY  05/04/2016    will order cologuard   • INJECTION OF MEDICATION  03/23/2015    celestone   • INJECTION OF MEDICATION  05/04/2016    KENALOG(6)   • LUMBAR FUSION      discectomy fusion with rods L4-S1   • LUMBAR LAMINECTOMY  1995   • MANDIBLE FRACTURE SURGERY      JAW WIRED SHUT   • MOUTH SURGERY  10/05/2015    Fractured mandible. Removal of arch bars.   • MOUTH SURGERY  1985    Fractured mandible. Removal of arch bars.   • NASAL SEPTUM SURGERY     • RHINOPLASTY     • TUBAL ABDOMINAL LIGATION  1986       Health Maintenance:  Health Maintenance   Topic Date Due   • Pneumococcal Vaccine 65+ (1 - PCV) 03/09/1961   • DIABETIC FOOT EXAM  Never done   • DIABETIC EYE EXAM  Never done   • ZOSTER VACCINE (2 of 2) 02/09/2017   • LIPID PANEL  08/26/2022   • HEMOGLOBIN A1C  09/09/2022   • INFLUENZA VACCINE  10/01/2022   • URINE MICROALBUMIN  11/03/2022   • ANNUAL WELLNESS VISIT  11/24/2022   • MAMMOGRAM  01/14/2024   • DXA SCAN  01/14/2024   • COLORECTAL CANCER SCREENING  11/07/2026   • TDAP/TD VACCINES (5 - Td or Tdap) 12/07/2029   • HEPATITIS C SCREENING  Completed   • COVID-19 Vaccine  Completed   • PAP SMEAR  Discontinued       Current Meds:    Current Outpatient Medications:   •  alendronate (Fosamax) 70 MG tablet, Take 1 tablet by mouth Every 7 (Seven) Days., Disp: 8 tablet, Rfl: 2  •  ARIPiprazole (ABILIFY) 5  MG tablet, Take 5 mg by mouth Daily. At bedtime, Disp: , Rfl:   •  aspirin 325 MG tablet, Take 500 mg by mouth Daily. At bedtime, instructed to take a half tablet of 500 aspirin. , Disp: , Rfl:   •  atorvastatin (LIPITOR) 20 MG tablet, TAKE ONE TABLET BY MOUTH EVERY DAY, Disp: 90 tablet, Rfl: 3  •  bumetanide (BUMEX) 0.5 MG tablet, TAKE 1 TABLET BY MOUTH TWICE DAILY, Disp: 60 tablet, Rfl: 0  •  Calcium Carb-Cholecalciferol (CALCIUM-VITAMIN D) 500-200 MG-UNIT per tablet, Take 1 tablet by mouth 2 (Two) Times a Day With Meals., Disp: 60 tablet, Rfl: 2  •  Diclofenac Sodium (VOLTAREN) 1 % gel gel, Apply 4 g topically to the appropriate area as directed 4 (Four) Times a Day As Needed (Joint pains in back, knees and hands.)., Disp: 350 g, Rfl: 3  •  Dilt- MG 24 hr capsule, TAKE 1 CAPSULE EVERY DAY, Disp: 30 capsule, Rfl: 5  •  fenofibric acid (TRILIPIX) 135 MG capsule delayed-release delayed release capsule, TAKE ONE CAPSULE BY MOUTH EVERY DAY, Disp: 90 capsule, Rfl: 3  •  ferrous sulfate 325 (65 FE) MG tablet, Take 325 mg by mouth Daily With Breakfast., Disp: , Rfl:   •  Incontinence Supply Disposable (Depend Adjustable Underwear) misc, 1 application As Needed (Incontinence)., Disp: 90 each, Rfl: 11  •  lidocaine (LIDODERM) 5 %, Place 1 patch on the skin as directed by provider Daily. Remove & Discard patch within 12 hours or as directed by MD, Disp: 30 each, Rfl: 0  •  lisinopril (PRINIVIL,ZESTRIL) 10 MG tablet, Take 10 mg by mouth Daily., Disp: , Rfl:   •  loperamide (IMODIUM) 2 MG capsule, Take 1 capsule by mouth Daily., Disp: 60 capsule, Rfl: 0  •  methocarbamol (ROBAXIN) 500 MG tablet, Take 1 tablet by mouth 4 (Four) Times a Day As Needed for Muscle Spasms., Disp: 90 tablet, Rfl: 4  •  metoprolol tartrate (LOPRESSOR) 25 MG tablet, TAKE 1 TABLET EVERY 12 HOURS, Disp: 60 tablet, Rfl: 4  •  omeprazole (priLOSEC) 20 MG capsule, Take 1 capsule by mouth Daily., Disp: 90 capsule, Rfl: 5  •  rivaroxaban (Xarelto) 20  MG tablet, Take one tablet by mouth every day- take with food, Disp: 30 tablet, Rfl: 0  •  sodium-potassium-magnesium sulfates (Suprep Bowel Prep Kit) 17.5-3.13-1.6 GM/177ML solution oral solution, Utilize as directed per instructions received from office of Dr. Mikie Gregg M.D., Disp: 354 mL, Rfl: 0  •  DULoxetine (Cymbalta) 60 MG capsule, Take 1 capsule by mouth Daily for 30 days., Disp: 30 capsule, Rfl: 2  •  isosorbide mononitrate (IMDUR) 30 MG 24 hr tablet, Take 1 tablet by mouth Daily for 30 days., Disp: 30 tablet, Rfl: 3  •  LORazepam (Ativan) 0.5 MG tablet, Take 0.5 tablets by mouth Every 8 (Eight) Hours As Needed for Anxiety for up to 30 days., Disp: 15 tablet, Rfl: 0    Current Facility-Administered Medications:   •  cyanocobalamin injection 1,000 mcg, 1,000 mcg, Intramuscular, Q28 Days, Leticia Tejeda MD  •  loperamide (IMODIUM) capsule 2 mg, 2 mg, Oral, Daily, Leticia Tejeda MD    Allergies:  Codeine, Latex, Morphine and related, and Pravachol [pravastatin sodium]    Family Hx:  Family History   Problem Relation Age of Onset   • Hypertension Mother    • Hyperlipidemia Mother    • Mental illness Mother    • Thyroid disease Mother    • Diabetes Maternal Grandmother    • Cancer Maternal Grandmother    • Depression Other    • Diabetes Other    • Heart disease Other    • Hypertension Other    • Osteoporosis Other    • Thyroid disease Other    • Cancer Maternal Aunt    • Cancer Paternal Aunt    • Breast cancer Paternal Aunt         Social History:  Social History     Socioeconomic History   • Marital status:    Tobacco Use   • Smoking status: Never Smoker   • Smokeless tobacco: Never Used   • Tobacco comment: smoking cessation    Vaping Use   • Vaping Use: Never used   Substance and Sexual Activity   • Alcohol use: No   • Drug use: No     Comment: lortab / oxycontin   • Sexual activity: Defer       Review of Systems  Review of Systems    Objective:     /76   Pulse 85   Temp 97.6 °F  "(36.4 °C)   Ht 165.1 cm (65\")   Wt 98 kg (216 lb)   SpO2 98%   BMI 35.94 kg/m²   Physical Exam alert no distress please see Dr. Tejeda's note for more detailed information regarding physical exam findings    Lab Review  Results for orders placed or performed in visit on 07/20/22   Urine Culture - Urine, Urine, Clean Catch    Specimen: Urine, Clean Catch   Result Value Ref Range    Urine Culture 25,000 CFU/mL Mixed Katia Isolated    TSH+Free T4    Specimen: Blood   Result Value Ref Range    TSH 1.390 0.270 - 4.200 uIU/mL    Free T4 1.44 0.93 - 1.70 ng/dL   Comprehensive metabolic panel    Specimen: Blood   Result Value Ref Range    Glucose 93 65 - 99 mg/dL    BUN 20 8 - 23 mg/dL    Creatinine 1.09 (H) 0.57 - 1.00 mg/dL    Sodium 137 136 - 145 mmol/L    Potassium 3.9 3.5 - 5.2 mmol/L    Chloride 100 98 - 107 mmol/L    CO2 24.7 22.0 - 29.0 mmol/L    Calcium 8.9 8.6 - 10.5 mg/dL    Total Protein 6.6 6.0 - 8.5 g/dL    Albumin 4.00 3.50 - 5.20 g/dL    ALT (SGPT) 19 1 - 33 U/L    AST (SGOT) 26 1 - 32 U/L    Alkaline Phosphatase 46 39 - 117 U/L    Total Bilirubin 0.6 0.0 - 1.2 mg/dL    Globulin 2.6 gm/dL    A/G Ratio 1.5 g/dL    BUN/Creatinine Ratio 18.3 7.0 - 25.0    Anion Gap 12.3 5.0 - 15.0 mmol/L    eGFR 55.8 (L) >60.0 mL/min/1.73   Urinalysis With Culture If Indicated - Urine, Clean Catch    Specimen: Urine, Clean Catch   Result Value Ref Range    Color, UA Yellow Yellow, Straw    Appearance, UA Clear Clear    pH, UA 6.5 5.0 - 8.0    Specific Gravity, UA 1.008 1.005 - 1.030    Glucose, UA Negative Negative    Ketones, UA Negative Negative    Bilirubin, UA Negative Negative    Blood, UA Negative Negative    Protein, UA Negative Negative    Leuk Esterase, UA Moderate (2+) (A) Negative    Nitrite, UA Negative Negative    Urobilinogen, UA 0.2 E.U./dL 0.2 - 1.0 E.U./dL   Urinalysis, Microscopic Only - Urine, Clean Catch    Specimen: Urine, Clean Catch   Result Value Ref Range    RBC, UA 0-2 None Seen, 0-2 /HPF    WBC, " "UA 3-5 (A) None Seen, 0-2 /HPF    Bacteria, UA Trace (A) None Seen /HPF    Squamous Epithelial Cells, UA 0-2 None Seen, 0-2 /HPF    Hyaline Casts, UA 0-2 None Seen /LPF    Methodology Manual Light Microscopy           Assessment:     Diagnoses and all orders for this visit:    1. Anxiety (Primary)  -     Urine Drug Screen - Urine, Clean Catch; Future  -     LORazepam (Ativan) 0.5 MG tablet; Take 0.5 tablets by mouth Every 8 (Eight) Hours As Needed for Anxiety for up to 30 days.  Dispense: 15 tablet; Refill: 0    2. PTSD (post-traumatic stress disorder)  -     Urine Drug Screen - Urine, Clean Catch; Future    3. Dysthymic disorder  -     Urine Drug Screen - Urine, Clean Catch; Future    4. Encounter for drug screening  -     Urine Drug Screen - Urine, Clean Catch; Future    5. Sedative, hypnotic or anxiolytic abuse with withdrawal, uncomplicated (HCC)   -     Urine Drug Screen - Urine, Clean Catch; Future        Plan:   When we tried to explain the rationale and the reasoning behind trying to wean her from her Ativan the patient basically take me and stated \"if something happens to me my sons are going to come back and get you\" when asked to clarify she stated that they were going to abdi me.  Regardless Ativan is considered to be high risk drug for this elderly patient given sedating effects and will continue to make efforts to wean her from it.  Patient basically refused ramelteon and melatonin.  See Dr. Tejeda's notes for more detailed information regarding plan of care.  I have seen and examined the patient.  I have reviewed the notes, assessments, and/or procedures performed by Leticia Tejeda MD during the office visit.  I concur with her/his documentation and assessment and [facility].        This document has been electronically signed by Mina Porter MD on August 16, 2022 09:55 CDT          "

## 2022-08-19 ENCOUNTER — OFFICE VISIT (OUTPATIENT)
Dept: PULMONOLOGY | Facility: CLINIC | Age: 67
End: 2022-08-19

## 2022-08-19 VITALS
HEART RATE: 98 BPM | OXYGEN SATURATION: 97 % | SYSTOLIC BLOOD PRESSURE: 104 MMHG | WEIGHT: 220 LBS | HEIGHT: 65 IN | DIASTOLIC BLOOD PRESSURE: 76 MMHG | BODY MASS INDEX: 36.65 KG/M2

## 2022-08-19 DIAGNOSIS — R06.02 SOB (SHORTNESS OF BREATH): Primary | ICD-10-CM

## 2022-08-19 PROCEDURE — 99214 OFFICE O/P EST MOD 30 MIN: CPT | Performed by: NURSE PRACTITIONER

## 2022-08-19 NOTE — PROGRESS NOTES
Pulmonary Office Visit    Thank you for asking me to see Lana Mata for   Chief Complaint   Patient presents with   • Shortness of Breath       History of Present Illness    7/29/22: Ms. Madrigal is a 67 year old patient who returns to Pulmonology for CHAN referred by sleep medicine. She was seen in 2018 for the same complaint by Dr. Helms. That note was reviewed. At that time, she did not have obstruction on PFTs and her shortness of breath was felt to be related to ongoing cardiac issues including AF and HFpEF.     She does not have personal history of smoking, but her  smoked. She is a retired nurse. She does not have lung cancer history in the family.   She follows with Sleep medicine for SOPHIA with CPAP with questionable use due to dyspnea. She says she gets smothered after about 7 days of wearing it. When she stops wearing her mask, on day three her breathing will improve.     This has been going on for the past 6 weeks.     She has chronic 2-3 pillow orthopnea. She did notice a small weight gain and took an additional Bumex 0.5mg with improvement.     We repeated PFTs today. They are unchanged from prior without obstruction.     She tells me that she has a lot of trouble with breathing due to congestive heart failure.     She will notice her oxygen dropping at night when she goes to bed (short of breath walking into bedroom). Her o2 did not drop today during walk test.     8/19/22: Returns for 1 month follow up after CT and overnight oximety test for which she did not qualify for o2. She feels better when she doesn't wear her CPAP. Essentially, her dyspnea complaint that she initially presented with has resolved.      Overnight Oximetry Results:  Test date: 08/03/2022 while on Room air  Low O2: 87%  Time O2 </= 88%: 1 minute 27 seconds  Consecutive time O2 </= 88%: <1 minute  Oxygen desaturation events: 160  Oxygen desaturation index: 22.07  Pulse range: 62-98 bpm     Patient does not  qualify for nocturnal O2.       Tobacco use history:  Social History     Socioeconomic History   • Marital status:    Tobacco Use   • Smoking status: Never Smoker   • Smokeless tobacco: Never Used   • Tobacco comment: smoking cessation    Vaping Use   • Vaping Use: Never used   Substance and Sexual Activity   • Alcohol use: No   • Drug use: No     Comment: lortab / oxycontin   • Sexual activity: Defer         Review of Systems: History obtained from chart review and the patient.  Review of Systems   Constitutional: Negative for diaphoresis, fatigue, fever and unexpected weight change.   Respiratory: Positive for shortness of breath. Negative for cough, chest tightness and wheezing.    Cardiovascular: Negative for chest pain, palpitations and leg swelling.   Gastrointestinal: Negative for abdominal distention and blood in stool.   Genitourinary: Negative for hematuria.   Musculoskeletal: Negative for arthralgias and myalgias.   Skin: Negative for pallor and rash.   Neurological: Negative for dizziness, syncope and weakness.   Hematological: Does not bruise/bleed easily.   Psychiatric/Behavioral: Negative for confusion. The patient is not nervous/anxious.        As described in the HPI. Otherwise, remainder of ROS (14 systems) were negative.    Patient Active Problem List   Diagnosis   • Thyrotoxicosis with or without goiter   • Osteoporosis   • Pain in right shoulder   • Low back pain   • Hyperlipidemia   • Generalized anxiety disorder   • Depressive disorder   • Congestive heart failure (HCC)   • Benign essential hypertension   • Asthenia   • Abnormal gait   • Obstructive sleep apnea of adult   • DDD (degenerative disc disease), lumbar   • Lumbar radiculopathy   • History of lumbar surgery   • Lumbar spondylolysis   • Long term (current) use of opiate analgesic   • Longstanding persistent atrial fibrillation (HCC)   • Urinary incontinence   • Skin ulcer (HCC)   • Osteoarthritis   • SOPHIA (obstructive sleep  apnea)   • Neck pain   • Headache   • H/O echocardiogram   • Gastroesophageal reflux disease   • Common cold   • Chronic pain disorder   • Cellulitis of knee   • Attempted suicide ([facility])   • Allergic rhinitis   • Acute bronchitis   • Acute ankle pain   • Abrasion   • Lower abdominal pain   • Diarrhea   • Nausea   • Class 2 severe obesity due to excess calories with serious comorbidity and body mass index (BMI) of 37.0 to 37.9 in adult ([facility])   • Chronic pain of left ankle   • Posterior tibial tendon dysfunction, left   • Retained orthopedic hardware   • History of tibial fracture   • Flat foot   • UTI (urinary tract infection), bacterial   • Posterior tibial tendon dysfunction (PTTD) of left lower extremity   • Left leg pain   • Primary osteoarthritis of right knee   • Effusion of right knee   • Acute pain of right knee   • Primary osteoarthritis of both ankles   • SOB (shortness of breath)   • Acute on chronic diastolic congestive heart failure ([facility])   • Stage 3b chronic kidney disease ([facility])   • (HFpEF) heart failure with preserved ejection fraction ([facility])   • UTI (urinary tract infection)   • Ankle weakness   • Chronic pain of both ankles   • Positive colorectal cancer screening using Cologuard test         Current Outpatient Medications:   •  alendronate (Fosamax) 70 MG tablet, Take 1 tablet by mouth Every 7 (Seven) Days., Disp: 8 tablet, Rfl: 2  •  ARIPiprazole (ABILIFY) 5 MG tablet, Take 5 mg by mouth Daily. At bedtime, Disp: , Rfl:   •  aspirin 325 MG tablet, Take 500 mg by mouth Daily. At bedtime, instructed to take a half tablet of 500 aspirin. , Disp: , Rfl:   •  atorvastatin (LIPITOR) 20 MG tablet, TAKE ONE TABLET BY MOUTH EVERY DAY, Disp: 90 tablet, Rfl: 3  •  bumetanide (BUMEX) 0.5 MG tablet, TAKE 1 TABLET BY MOUTH TWICE DAILY, Disp: 60 tablet, Rfl: 0  •  Calcium Carb-Cholecalciferol (CALCIUM-VITAMIN D) 500-200 MG-UNIT per tablet, Take 1 tablet by mouth 2 (Two) Times a Day With Meals., Disp: 60 tablet, Rfl:  2  •  Diclofenac Sodium (VOLTAREN) 1 % gel gel, Apply 4 g topically to the appropriate area as directed 4 (Four) Times a Day As Needed (Joint pains in back, knees and hands.)., Disp: 350 g, Rfl: 3  •  Dilt- MG 24 hr capsule, TAKE 1 CAPSULE EVERY DAY, Disp: 30 capsule, Rfl: 5  •  fenofibric acid (TRILIPIX) 135 MG capsule delayed-release delayed release capsule, TAKE ONE CAPSULE BY MOUTH EVERY DAY, Disp: 90 capsule, Rfl: 3  •  ferrous sulfate 325 (65 FE) MG tablet, Take 325 mg by mouth Daily With Breakfast., Disp: , Rfl:   •  Incontinence Supply Disposable (Depend Adjustable Underwear) misc, 1 application As Needed (Incontinence)., Disp: 90 each, Rfl: 11  •  lidocaine (LIDODERM) 5 %, Place 1 patch on the skin as directed by provider Daily. Remove & Discard patch within 12 hours or as directed by MD, Disp: 30 each, Rfl: 0  •  lisinopril (PRINIVIL,ZESTRIL) 10 MG tablet, Take 10 mg by mouth Daily., Disp: , Rfl:   •  loperamide (IMODIUM) 2 MG capsule, Take 1 capsule by mouth Daily., Disp: 60 capsule, Rfl: 0  •  LORazepam (Ativan) 0.5 MG tablet, Take 0.5 tablets by mouth Every 8 (Eight) Hours As Needed for Anxiety for up to 30 days., Disp: 15 tablet, Rfl: 0  •  methocarbamol (ROBAXIN) 500 MG tablet, Take 1 tablet by mouth 4 (Four) Times a Day As Needed for Muscle Spasms., Disp: 90 tablet, Rfl: 4  •  metoprolol tartrate (LOPRESSOR) 25 MG tablet, TAKE 1 TABLET EVERY 12 HOURS, Disp: 60 tablet, Rfl: 4  •  omeprazole (priLOSEC) 20 MG capsule, Take 1 capsule by mouth Daily., Disp: 90 capsule, Rfl: 5  •  DULoxetine (Cymbalta) 60 MG capsule, Take 1 capsule by mouth Daily for 30 days., Disp: 30 capsule, Rfl: 2  •  isosorbide mononitrate (IMDUR) 30 MG 24 hr tablet, Take 1 tablet by mouth Daily for 30 days., Disp: 30 tablet, Rfl: 3  •  rivaroxaban (Xarelto) 20 MG tablet, Take one tablet by mouth every day- take with food, Disp: 30 tablet, Rfl: 0  •  sodium-potassium-magnesium sulfates (Suprep Bowel Prep Kit) 17.5-3.13-1.6  GM/177ML solution oral solution, Utilize as directed per instructions received from office of Dr. Mikie Gregg M.D., Disp: 354 mL, Rfl: 0    Current Facility-Administered Medications:   •  cyanocobalamin injection 1,000 mcg, 1,000 mcg, Intramuscular, Q28 Days, Leticia Tejeda MD  •  loperamide (IMODIUM) capsule 2 mg, 2 mg, Oral, Daily, Leticia Tejeda MD    Allergies   Allergen Reactions   • Codeine Nausea And Vomiting   • Latex Rash   • Morphine And Related Hallucinations   • Pravachol [Pravastatin Sodium] Myalgia       Past Medical History:   Diagnosis Date   • Abnormal gait 11/28/2012   • Abrasion 02/26/2015   • Acute ankle pain 03/31/2016   • Acute bronchitis 09/04/2015   • Allergic rhinitis 05/22/2012   • Asthenia 05/22/2014   • Atrial fibrillation (HCC) 03/04/2016    - converted    • Attempted suicide ([facility])    • Benign essential hypertension 03/04/2016   • Cardiovascular stress test abnormal 03/04/2016   • Cellulitis of knee 03/03/2015   • Chest pain 04/02/2015   • Chronic pain disorder    • Common cold 06/24/2014   • Congestive heart failure (HCC) 02/04/2016   • Depressive disorder 05/22/2015    suspect more complicated psych issues      • Fibrocystic breast    • Gastroesophageal reflux disease 01/17/2013   • Generalized anxiety disorder 02/04/2016   • H/O echocardiogram     Normal LV function with Ef of 60-65%.Mildly dilated right ventricle with normal function. Grade 1B diastolic dysfunction with mild CLVH.NO sig. valvular regurg. or stenosis.   • Headache 10/27/2014   • Hyperlipidemia 03/04/2016   • Kidney disease     stage 3   • Low back pain 06/30/2016    Need for prophylactic vaccination against Streptococcus pneumoniae [pneumococcus]    01/23/2015    • Neck pain 06/30/2016   • Obstructive sleep apnea of adult 07/30/2015   • Obstructive sleep apnea syndrome    • Osteoarthritis    • Osteoporosis 06/30/2016   • Pain in right shoulder 06/30/2016   • Pain in right shoulder 04/24/2014   •  Shortness of breath 04/02/2015   • Skin ulcer (HCC) 04/13/2015    left   • Thyrotoxicosis with or without goiter 10/24/2014   • Urinary incontinence 05/22/2014     Past Surgical History:   Procedure Laterality Date   • BACK SURGERY  08/07/2015    Removal of intstrumentation,L5-S1.Exploration of fusion,L5-S1.Posterolateral fusion,L4-L5.Posterior segmental spinal instrumentation L4-5.Transforaminal interbody fusion Through right sided approach.Performed at    • COLONOSCOPY  05/04/2016    will order cologuard   • INJECTION OF MEDICATION  03/23/2015    celestone   • INJECTION OF MEDICATION  05/04/2016    KENALOG(6)   • LUMBAR FUSION      discectomy fusion with rods L4-S1   • LUMBAR LAMINECTOMY  1995   • MANDIBLE FRACTURE SURGERY      JAW WIRED SHUT   • MOUTH SURGERY  10/05/2015    Fractured mandible. Removal of arch bars.   • MOUTH SURGERY  1985    Fractured mandible. Removal of arch bars.   • NASAL SEPTUM SURGERY     • RHINOPLASTY     • TUBAL ABDOMINAL LIGATION  1986     Social History     Socioeconomic History   • Marital status:    Tobacco Use   • Smoking status: Never Smoker   • Smokeless tobacco: Never Used   • Tobacco comment: smoking cessation    Vaping Use   • Vaping Use: Never used   Substance and Sexual Activity   • Alcohol use: No   • Drug use: No     Comment: lortab / oxycontin   • Sexual activity: Defer     Family History   Problem Relation Age of Onset   • Hypertension Mother    • Hyperlipidemia Mother    • Mental illness Mother    • Thyroid disease Mother    • Diabetes Maternal Grandmother    • Cancer Maternal Grandmother    • Depression Other    • Diabetes Other    • Heart disease Other    • Hypertension Other    • Osteoporosis Other    • Thyroid disease Other    • Cancer Maternal Aunt    • Cancer Paternal Aunt    • Breast cancer Paternal Aunt        Advance Care Planning   ACP discussion was declined by the patient. Patient does not have an advance directive, declines further assistance.         "  Objective     Vitals:    08/19/22 0945   BP: 104/76   Pulse: 98   SpO2: 97%   Weight: 99.8 kg (220 lb)   Height: 165.1 cm (65\")     Lab Results   Component Value Date    GLUCOSE 93 07/22/2022    CALCIUM 8.9 07/22/2022     07/22/2022    K 3.9 07/22/2022    CO2 24.7 07/22/2022     07/22/2022    BUN 20 07/22/2022    CREATININE 1.09 (H) 07/22/2022    EGFRIFNONA 35 (L) 02/22/2022    BCR 18.3 07/22/2022    ANIONGAP 12.3 07/22/2022     Lab Results   Component Value Date    WBC 7.63 05/13/2022    HGB 13.7 05/13/2022    HCT 39.9 05/13/2022    MCV 85.4 05/13/2022     05/13/2022       Physical Exam  Vitals and nursing note reviewed.   Constitutional:       General: She is not in acute distress.     Appearance: She is well-developed. She is not diaphoretic.   HENT:      Head: Normocephalic.   Eyes:      Conjunctiva/sclera: Conjunctivae normal.   Neck:      Vascular: No JVD.   Cardiovascular:      Rate and Rhythm: Normal rate and regular rhythm.      Heart sounds: Normal heart sounds, S1 normal and S2 normal. No murmur heard.    No friction rub. No gallop.   Pulmonary:      Effort: Pulmonary effort is normal. No respiratory distress.      Breath sounds: Normal breath sounds. No wheezing or rales.   Abdominal:      General: Bowel sounds are normal. There is no distension.      Palpations: Abdomen is soft.   Musculoskeletal:         General: Normal range of motion.   Skin:     General: Skin is warm and dry.      Findings: No erythema.   Neurological:      Mental Status: She is alert and oriented to person, place, and time.   Psychiatric:         Behavior: Behavior normal.         Thought Content: Thought content normal.         Judgment: Judgment normal.         PFTs   2/13/18 (independently reviewed and interpreted by Dr. Helms)  Ratio 81  FVC 1.79/ 53%  FEV1 1.45/ 56%  TLC 4.53/ 87%  DLCO 25.38/ 99%  Poor effort.  Nonspecific spirometry pattern.  Normal lung volumes and diffusing capacity.  No " bronchodilator response.  No comparative data available.        PFTs: Done on: 7/29/22 (independently reviewed by myself, interpreted by physician)  Ratio 86  FVC 59  FEV1 65  TLC not preformed  DLCO not preformed      Radiology (independently reviewed by me, interpreted by [email or web address])    CT Chest Without Contrast Diagnostic    Result Date: 8/12/2022  Evidence of previous granulomatous disease. Scattered groundglass opacities in the lower lobes particularly right-sided. Otherwise unremarkable examination. Electronically signed by:  Donell Jefferson MD  8/12/2022 5:36 AM CDT Workstation: [id number]    FINDINGS:  Evidence of previous granulomatous disease.  No significant lung parenchymal nodules, masses or infiltrates.  Scattered groundglass opacities particularly right lower lobe  medial.  No acute congestive changes, pneumothorax or pleural fluid.  No definite endobronchial lesions.  No pericardial effusion.  Mild coronary artery calcification.  No significant mediastinal or axillary adenopathy.  Evaluation for presence of hilar adenopathy limited by lack of IV  contrast. No gross hilar fullness seen on either side.  Adrenals grossly normal.     IMPRESSION:  Evidence of previous granulomatous disease.  Scattered groundglass opacities in the lower lobes particularly  right-sided.  Otherwise unremarkable examination.       Assessment       PFTs did not show overt obstruction and no bronchodilator response.  Chest imaging was unremarkable.    While it is possible she has some mild underlying asthma, I think her current dyspnea is more related to her issues with atrial fibrillation, diastolic dysfunction, and untreated SOPHIA.  She also likely has component from her obesity and deconditioning. She has low lung volumes on CXR. Ct chest to rule out ILD disease. I am not suspicious as she is a never smoker and her dyspnea complaint has not changed or declined over the years.     Discussion/ Recommendations:     SPO2 was 98% at  rest on room air. 98% with exertion.      Diagnosis   Plan   1. SOB (shortness of breath)  I do not feel that her CHAN is something that we are going to be able to improve.   Ruled out ILD and restrictions. She does have low lung volumes.     CT Chest Without Contrast- mild ground glass from atelectasis but no ILD    Overnight Sleep Oximetry Study- normal        2. SOPHIA (obstructive sleep apnea)  Cont following with Sleep.   Overnight oximetry without evidence of hypoxia.   She feels better not wearing her CPAP. Could consider alternative mask.         3. Chronic diastolic congestive heart failure (HCC)  Per Cardiology.   Bumex 0.5mg BID         Class 2 Severe Obesity (BMI >=35 and <=39.9). Obesity-related health conditions include the following: obstructive sleep apnea and hypertension. Obesity is unchanged. BMI is is above average; BMI management plan is completed. We discussed portion control and increasing exercise.    Follow up: with PCP. No Pulmonary issue.       I spent 30 minutes caring for Lana on this date of service. This time includes time spent by me in the following activities: preparing for the visit, reviewing tests, obtaining and/or reviewing a separately obtained history, performing a medically appropriate examination and/or evaluation, counseling and educating the patient/family/caregiver, ordering medications, tests, or procedures, referring and communicating with other health care professionals, documenting information in the medical record, independently interpreting results and communicating that information with the patient/family/caregiver and care coordination            This document has been electronically signed by JOHN Cunningham on August 19, 2022 09:58 CDT

## 2022-08-29 ENCOUNTER — DOCUMENTATION (OUTPATIENT)
Dept: FAMILY MEDICINE CLINIC | Facility: CLINIC | Age: 67
End: 2022-08-29

## 2022-08-29 DIAGNOSIS — F41.9 ANXIETY: ICD-10-CM

## 2022-08-29 RX ORDER — LORAZEPAM 0.5 MG/1
0.25 TABLET ORAL EVERY 8 HOURS PRN
Qty: 15 TABLET | Refills: 0 | OUTPATIENT
Start: 2022-08-29 | End: 2022-09-28

## 2022-08-29 NOTE — TELEPHONE ENCOUNTER
Incoming Refill Request      Medication requested (name and dose): LORazepam (Ativan) 0.5 MG tablet    Pharmacy where request should be sent: [facility]    Additional details provided by patient:     Best call back number: [phone]    Does the patient have less than a 3 day supply:  [x] Yes  [] No    Shania Ruby  08/29/22, 09:14 CDT

## 2022-08-29 NOTE — PROGRESS NOTES
TELEPHONE NOTE  NAME: Lana Ayala Attebury  : 1955  MRN: [id number]    Called patient on 2022 at 17:48 CDT. Discussed the following:     Anxiety: patient reports that she has been taking her half tablet of 0.5 Ativan once around 5 PM and again at 10 PM.  Between this she sometimes takes another if she is experiencing more anxiety.  She reports her anxiety is triggered by her generalized pain poorly controlled on Robaxin and Tylenol.  She has had more benefit from capsaicin cream that she uses up to 3 times a week.  She notes this helps more than the fourth Voltaren gel which she uses sometimes once a day.     Generalized arthritis regarding shoulders, hips, knees and ankles: Patient reports it is poorly controlled.  It was worsened with physical therapy and so she is not inclined to return.  She takes Ativan to help with the anxiety from the pain from her arthritis.  Discussed plan of placing shoe inserts for support and her how she is as she does not use her supportive shoes at home.  Otherwise she is in agreement with the current dose of Ativan she has she just misunderstood the instructions from the previous appointment.     Will discuss with attending tomorrow to see if we can give a refill of ativan starting 2022 and I will write more clear instructions to take only half a tablet of 0.5 mg ativan nightly as was discussed in previous appointment.       This document has been electronically signed by Leticia Tejeda MD on 2022 17:48 CDT

## 2022-08-30 ENCOUNTER — ANESTHESIA (OUTPATIENT)
Dept: GASTROENTEROLOGY | Facility: HOSPITAL | Age: 67
End: 2022-08-30

## 2022-08-30 ENCOUNTER — HOSPITAL ENCOUNTER (OUTPATIENT)
Facility: HOSPITAL | Age: 67
Setting detail: HOSPITAL OUTPATIENT SURGERY
Discharge: HOME | End: 2022-08-30
Attending: INTERNAL MEDICINE | Admitting: INTERNAL MEDICINE

## 2022-08-30 ENCOUNTER — ANESTHESIA EVENT (OUTPATIENT)
Dept: GASTROENTEROLOGY | Facility: HOSPITAL | Age: 67
End: 2022-08-30

## 2022-08-30 VITALS
RESPIRATION RATE: 18 BRPM | WEIGHT: 212.2 LBS | SYSTOLIC BLOOD PRESSURE: 143 MMHG | BODY MASS INDEX: 35.35 KG/M2 | HEART RATE: 87 BPM | DIASTOLIC BLOOD PRESSURE: 81 MMHG | OXYGEN SATURATION: 97 % | TEMPERATURE: 97.1 F | HEIGHT: 65 IN

## 2022-08-30 DIAGNOSIS — R11.0 NAUSEA: ICD-10-CM

## 2022-08-30 DIAGNOSIS — K21.9 GASTROESOPHAGEAL REFLUX DISEASE, UNSPECIFIED WHETHER ESOPHAGITIS PRESENT: ICD-10-CM

## 2022-08-30 DIAGNOSIS — R19.5 POSITIVE COLORECTAL CANCER SCREENING USING COLOGUARD TEST: ICD-10-CM

## 2022-08-30 DIAGNOSIS — R19.7 DIARRHEA, UNSPECIFIED TYPE: ICD-10-CM

## 2022-08-30 PROCEDURE — 88305 TISSUE EXAM BY PATHOLOGIST: CPT

## 2022-08-30 PROCEDURE — 45380 COLONOSCOPY AND BIOPSY: CPT | Performed by: INTERNAL MEDICINE

## 2022-08-30 PROCEDURE — 25010000002 PROPOFOL 10 MG/ML EMULSION: Performed by: NURSE ANESTHETIST, CERTIFIED REGISTERED

## 2022-08-30 PROCEDURE — 45385 COLONOSCOPY W/LESION REMOVAL: CPT | Performed by: INTERNAL MEDICINE

## 2022-08-30 PROCEDURE — 43239 EGD BIOPSY SINGLE/MULTIPLE: CPT | Performed by: INTERNAL MEDICINE

## 2022-08-30 RX ORDER — DEXTROSE AND SODIUM CHLORIDE 5; .45 G/100ML; G/100ML
30 INJECTION, SOLUTION INTRAVENOUS CONTINUOUS PRN
Status: DISCONTINUED | OUTPATIENT
Start: 2022-08-30 | End: 2022-08-30 | Stop reason: HOSPADM

## 2022-08-30 RX ORDER — LIDOCAINE HYDROCHLORIDE 20 MG/ML
INJECTION, SOLUTION INTRAVENOUS AS NEEDED
Status: DISCONTINUED | OUTPATIENT
Start: 2022-08-30 | End: 2022-08-30 | Stop reason: SURG

## 2022-08-30 RX ORDER — PROPOFOL 10 MG/ML
VIAL (ML) INTRAVENOUS AS NEEDED
Status: DISCONTINUED | OUTPATIENT
Start: 2022-08-30 | End: 2022-08-30 | Stop reason: SURG

## 2022-08-30 RX ADMIN — DEXTROSE AND SODIUM CHLORIDE 30 ML/HR: 5; 450 INJECTION, SOLUTION INTRAVENOUS at 13:29

## 2022-08-30 RX ADMIN — PROPOFOL 70 MG: 10 INJECTION, EMULSION INTRAVENOUS at 14:33

## 2022-08-30 RX ADMIN — PROPOFOL 30 MG: 10 INJECTION, EMULSION INTRAVENOUS at 14:44

## 2022-08-30 RX ADMIN — PROPOFOL 30 MG: 10 INJECTION, EMULSION INTRAVENOUS at 14:39

## 2022-08-30 RX ADMIN — LIDOCAINE HYDROCHLORIDE 50 MG: 20 INJECTION, SOLUTION INTRAVENOUS at 14:33

## 2022-08-30 RX ADMIN — PROPOFOL 20 MG: 10 INJECTION, EMULSION INTRAVENOUS at 14:35

## 2022-08-30 RX ADMIN — PROPOFOL 30 MG: 10 INJECTION, EMULSION INTRAVENOUS at 14:42

## 2022-08-30 RX ADMIN — PROPOFOL 30 MG: 10 INJECTION, EMULSION INTRAVENOUS at 14:37

## 2022-08-30 NOTE — ANESTHESIA POSTPROCEDURE EVALUATION
Patient: Lana Mata    Procedure Summary     Date: 08/30/22 Room / Location: [facility] ENDOSCOPY 2 / [facility] ENDOSCOPY    Anesthesia Start: 1426 Anesthesia Stop: 1449    Procedures:       ESOPHAGOGASTRODUODENOSCOPY (N/A )      COLONOSCOPY (N/A ) Diagnosis:       Positive colorectal cancer screening using Cologuard test      Gastroesophageal reflux disease, unspecified whether esophagitis present      Diarrhea, unspecified type      Nausea      (Positive colorectal cancer screening using Cologuard test [R19.5])      (Gastroesophageal reflux disease, unspecified whether esophagitis present [K21.9])      (Diarrhea, unspecified type [R19.7])      (Nausea [R11.0])    Surgeons: Mikie Gregg MD Provider: Demetris Delong CRNA    Anesthesia Type: MAC ASA Status: 4          Anesthesia Type: MAC    Vitals  No vitals data found for the desired time range.          Post Anesthesia Care and Evaluation    Patient location during evaluation: bedside  Patient participation: complete - patient participated  Level of consciousness: awake and awake and alert  Pain score: 0  Pain management: adequate    Airway patency: patent  Anesthetic complications: No anesthetic complications  PONV Status: none  Cardiovascular status: acceptable and stable  Respiratory status: acceptable, room air and spontaneous ventilation  Hydration status: acceptable    Comments: BP:  135/77  HR:   88  SAT:  99  RR:  20  TEMP:  97.8

## 2022-08-30 NOTE — ANESTHESIA PREPROCEDURE EVALUATION
Anesthesia Evaluation     Patient summary reviewed and Nursing notes reviewed   history of anesthetic complications: PONV  NPO Solid Status: > 8 hours  NPO Liquid Status: > 2 hours           Airway   Mallampati: II  TM distance: >3 FB  Neck ROM: full  no difficulty expected and No difficulty expected  Dental    (+) upper dentures and poor dentition        Pulmonary     breath sounds clear to auscultation  (+) a smoker Former, shortness of breath, recent URI resolved, sleep apnea on CPAP,   Cardiovascular   Exercise tolerance: poor (<4 METS)    ECG reviewed  PT is on anticoagulation therapy  Patient on routine beta blocker and Beta blocker given within 24 hours of surgery  Rhythm: irregular  Rate: abnormal    (+) hypertension, dysrhythmias Atrial Fib, CHF , hyperlipidemia,   (-) angina    ROS comment: · Left Ventricle: Left ventricular function is normal. Estimated EF appears to be in the range of 61 - 65%.  · Left ventricular diastolic dysfunction is noted (grade II w/high LAP) consistent with pseudonormalization. Elevated left atrial pressure  · Right Ventricle: Normal wall thickness, systolic function and septal motion noted. Right ventricular cavity is mild-to-moderately dilated  · Estimated right ventricular systolic pressure from tricuspid regurgitation is normal (<35 mmHg).  · Mild aortic valve regurgitation is present  · There is no evidence of pericardial effusion.    Neuro/Psych  (+) headaches, numbness, psychiatric history Anxiety and Depression,    GI/Hepatic/Renal/Endo    (+) obesity,  GERD well controlled,  renal disease (Kidney disease stage 3 ), thyroid problem (Thyrotoxicosis with or without goiter)     Musculoskeletal     (+) arthralgias, back pain, neck pain, radiculopathy      ROS comment: DDD (degenerative disc disease), lumbar  Lumbar radiculopathy      Abdominal   (+) obese,     Abdomen: soft.   Substance History - negative use     OB/GYN          Other   arthritis,                         Anesthesia Plan    ASA 4     MAC   total IV anesthesia  intravenous induction     Anesthetic plan, risks, benefits, and alternatives have been provided, discussed and informed consent has been obtained with: patient.

## 2022-09-01 LAB — REF LAB TEST METHOD: NORMAL

## 2022-09-05 ENCOUNTER — HOSPITAL ENCOUNTER (EMERGENCY)
Facility: HOSPITAL | Age: 67
Discharge: HOME | End: 2022-09-05
Attending: FAMILY MEDICINE | Admitting: FAMILY MEDICINE

## 2022-09-05 ENCOUNTER — APPOINTMENT (OUTPATIENT)
Dept: GENERAL RADIOLOGY | Facility: HOSPITAL | Age: 67
End: 2022-09-05

## 2022-09-05 VITALS
SYSTOLIC BLOOD PRESSURE: 141 MMHG | RESPIRATION RATE: 18 BRPM | HEART RATE: 85 BPM | OXYGEN SATURATION: 96 % | DIASTOLIC BLOOD PRESSURE: 89 MMHG | TEMPERATURE: 98.1 F

## 2022-09-05 DIAGNOSIS — M51.35 DDD (DEGENERATIVE DISC DISEASE), THORACOLUMBAR: ICD-10-CM

## 2022-09-05 DIAGNOSIS — M62.838 NECK MUSCLE SPASM: ICD-10-CM

## 2022-09-05 DIAGNOSIS — S39.012A STRAIN OF LUMBAR REGION, INITIAL ENCOUNTER: Primary | ICD-10-CM

## 2022-09-05 DIAGNOSIS — S46.911A STRAIN OF RIGHT SHOULDER, INITIAL ENCOUNTER: ICD-10-CM

## 2022-09-05 PROCEDURE — 73030 X-RAY EXAM OF SHOULDER: CPT

## 2022-09-05 PROCEDURE — 96372 THER/PROPH/DIAG INJ SC/IM: CPT

## 2022-09-05 PROCEDURE — 99282 EMERGENCY DEPT VISIT SF MDM: CPT

## 2022-09-05 PROCEDURE — 25010000002 TRIAMCINOLONE PER 10 MG: Performed by: FAMILY MEDICINE

## 2022-09-05 PROCEDURE — 72100 X-RAY EXAM L-S SPINE 2/3 VWS: CPT

## 2022-09-05 RX ORDER — TRIAMCINOLONE ACETONIDE 40 MG/ML
40 INJECTION, SUSPENSION INTRA-ARTICULAR; INTRAMUSCULAR ONCE
Status: COMPLETED | OUTPATIENT
Start: 2022-09-05 | End: 2022-09-05

## 2022-09-05 RX ORDER — METHOCARBAMOL 500 MG/1
500 TABLET, FILM COATED ORAL 4 TIMES DAILY PRN
Qty: 30 TABLET | Refills: 0 | Status: SHIPPED | OUTPATIENT
Start: 2022-09-05

## 2022-09-05 RX ADMIN — TRIAMCINOLONE ACETONIDE 40 MG: 40 INJECTION, SUSPENSION INTRA-ARTICULAR; INTRAMUSCULAR at 04:16

## 2022-09-05 NOTE — ED PROVIDER NOTES
Subjective   Patient states she has been having right shoulder pain and lumbar pain for the past 2 weeks after changing a little laundry, placing the comforter from the washing machine to the dryer.  She does have a history of chronic back pain and has had back surgeries x3.  She also mentions that she has been seen in the orthopedic department in the past for chronic right shoulder pain and there is a concern for possible rotator cuff injury.        Back Pain  Location:  Lumbar spine  Quality:  Aching  Radiates to:  Does not radiate  Pain severity:  Moderate  Relieved by:  Nothing  Worsened by:  Movement  Associated symptoms: no abdominal pain, no chest pain, no dysuria, no fever, no headaches and no weakness        Review of Systems   Constitutional: Negative for appetite change, chills, diaphoresis, fatigue and fever.   HENT: Negative for congestion, ear discharge, ear pain, nosebleeds, rhinorrhea, sinus pressure, sore throat and trouble swallowing.    Eyes: Negative for discharge and redness.   Respiratory: Negative for apnea, cough, chest tightness, shortness of breath and wheezing.    Cardiovascular: Negative for chest pain.   Gastrointestinal: Positive for nausea. Negative for abdominal pain, diarrhea and vomiting.   Endocrine: Negative for polyuria.   Genitourinary: Negative for dysuria, frequency and urgency.   Musculoskeletal: Positive for arthralgias and back pain. Negative for myalgias and neck pain.   Skin: Negative for color change and rash.   Allergic/Immunologic: Negative for immunocompromised state.   Neurological: Negative for dizziness, seizures, syncope, weakness, light-headedness and headaches.   Hematological: Negative for adenopathy. Does not bruise/bleed easily.   Psychiatric/Behavioral: Negative for behavioral problems and confusion.   All other systems reviewed and are negative.      Past Medical History:   Diagnosis Date   • Abnormal gait 11/28/2012   • Abrasion 02/26/2015   • Acute ankle  pain 03/31/2016   • Acute bronchitis 09/04/2015   • Allergic rhinitis 05/22/2012   • Asthenia 05/22/2014   • Atrial fibrillation (HCC) 03/04/2016    - converted    • Attempted suicide ([facility])    • Benign essential hypertension 03/04/2016   • Cardiovascular stress test abnormal 03/04/2016   • Cellulitis of knee 03/03/2015   • Chest pain 04/02/2015   • Chronic pain disorder    • Common cold 06/24/2014   • Congestive heart failure (HCC) 02/04/2016   • Depressive disorder 05/22/2015    suspect more complicated psych issues      • Fibrocystic breast    • Gastroesophageal reflux disease 01/17/2013   • Generalized anxiety disorder 02/04/2016   • H/O echocardiogram     Normal LV function with Ef of 60-65%.Mildly dilated right ventricle with normal function. Grade 1B diastolic dysfunction with mild CLVH.NO sig. valvular regurg. or stenosis.   • Headache 10/27/2014   • Hyperlipidemia 03/04/2016   • Kidney disease     stage 3   • Low back pain 06/30/2016    Need for prophylactic vaccination against Streptococcus pneumoniae [pneumococcus]    01/23/2015    • Neck pain 06/30/2016   • Obstructive sleep apnea of adult 07/30/2015   • Obstructive sleep apnea syndrome    • Osteoarthritis    • Osteoporosis 06/30/2016   • Pain in right shoulder 06/30/2016   • Pain in right shoulder 04/24/2014   • Shortness of breath 04/02/2015   • Skin ulcer ([facility]) 04/13/2015    left   • Thyrotoxicosis with or without goiter 10/24/2014   • Urinary incontinence 05/22/2014       Allergies   Allergen Reactions   • Codeine Nausea And Vomiting   • Latex Rash   • Morphine And Related Hallucinations   • Pravachol [Pravastatin Sodium] Myalgia       Past Surgical History:   Procedure Laterality Date   • BACK SURGERY  08/07/2015    Removal of intstrumentation,L5-S1.Exploration of fusion,L5-S1.Posterolateral fusion,L4-L5.Posterior segmental spinal instrumentation L4-5.Transforaminal interbody fusion Through right sided approach.Performed at    • COLONOSCOPY   05/04/2016    will order cologuard   • INJECTION OF MEDICATION  03/23/2015    celestone   • INJECTION OF MEDICATION  05/04/2016    KENALOG(6)   • LUMBAR FUSION      discectomy fusion with rods L4-S1   • LUMBAR LAMINECTOMY  1995   • MANDIBLE FRACTURE SURGERY      JAW WIRED SHUT   • MOUTH SURGERY  10/05/2015    Fractured mandible. Removal of arch bars.   • MOUTH SURGERY  1985    Fractured mandible. Removal of arch bars.   • NASAL SEPTUM SURGERY     • RHINOPLASTY     • TUBAL ABDOMINAL LIGATION  1986       Family History   Problem Relation Age of Onset   • Hypertension Mother    • Hyperlipidemia Mother    • Mental illness Mother    • Thyroid disease Mother    • Diabetes Maternal Grandmother    • Cancer Maternal Grandmother    • Depression Other    • Diabetes Other    • Heart disease Other    • Hypertension Other    • Osteoporosis Other    • Thyroid disease Other    • Cancer Maternal Aunt    • Cancer Paternal Aunt    • Breast cancer Paternal Aunt        Social History     Socioeconomic History   • Marital status:    Tobacco Use   • Smoking status: Never Smoker   • Smokeless tobacco: Never Used   • Tobacco comment: smoking cessation    Vaping Use   • Vaping Use: Never used   Substance and Sexual Activity   • Alcohol use: No   • Drug use: No     Comment: lortab / oxycontin   • Sexual activity: Defer           Objective   Physical Exam  Vitals and nursing note reviewed.   Constitutional:       Appearance: She is well-developed.   HENT:      Head: Normocephalic and atraumatic.      Nose: Nose normal.   Eyes:      General: No scleral icterus.        Right eye: No discharge.         Left eye: No discharge.      Conjunctiva/sclera: Conjunctivae normal.      Pupils: Pupils are equal, round, and reactive to light.   Neck:      Trachea: No tracheal deviation.   Cardiovascular:      Rate and Rhythm: Normal rate and regular rhythm.      Heart sounds: Normal heart sounds. No murmur heard.  Pulmonary:      Effort: Pulmonary  effort is normal. No respiratory distress.      Breath sounds: Normal breath sounds. No stridor. No wheezing or rales.   Abdominal:      General: Bowel sounds are normal. There is no distension.      Palpations: Abdomen is soft. There is no mass.      Tenderness: There is no abdominal tenderness. There is no guarding or rebound.   Musculoskeletal:      Right shoulder: Tenderness present. No swelling. Decreased range of motion.        Arms:       Cervical back: Normal range of motion and neck supple.      Lumbar back: Tenderness and bony tenderness present.        Back:    Skin:     General: Skin is warm and dry.      Findings: No erythema or rash.   Neurological:      Mental Status: She is alert and oriented to person, place, and time.      Coordination: Coordination normal.   Psychiatric:         Behavior: Behavior normal.         Thought Content: Thought content normal.         Procedures           ED Course                                           MDM    Final diagnoses:   Strain of lumbar region, initial encounter   Strain of right shoulder, initial encounter       ED Disposition  ED Disposition     ED Disposition   Discharge    Condition   Stable    Comment   --             Gale Stoner, APRN  [address]  [place] [address]  [phone]    In 3 days           Where to Get Your Medications      [facility] [address] [place] [facility] - [phone]  - [phone] [address] [place], [place] [address]    Phone: [phone]   · methocarbamol 500 MG tablet        Medication List      No changes were made to your prescriptions during this visit.          Raul Lawrence MD  09/05/22 0614

## 2022-09-05 NOTE — ED NOTES
"Pt reports she was washing her comforter and pulled it out of the washer and dryer and since then has been having back pain and right shoulder pain; pt reports she \"was seen at the [facility] on Saturday and was told it was a pulled muscle\"; pt reports shoulder has been hurting for 2 weeks and this made it worse; pt reports hx back surgery in 1995 and a second on 2008; pt is currently on robaxin for chronic back pain; pt reports hx stage 3 kidney disease.   "

## 2022-09-06 RX ORDER — ISOSORBIDE MONONITRATE 30 MG/1
30 TABLET, EXTENDED RELEASE ORAL
Qty: 90 TABLET | Refills: 3 | Status: SHIPPED | OUTPATIENT
Start: 2022-09-06

## 2022-09-07 ENCOUNTER — OFFICE VISIT (OUTPATIENT)
Dept: GASTROENTEROLOGY | Facility: CLINIC | Age: 67
End: 2022-09-07

## 2022-09-07 VITALS
SYSTOLIC BLOOD PRESSURE: 123 MMHG | DIASTOLIC BLOOD PRESSURE: 84 MMHG | BODY MASS INDEX: 35.65 KG/M2 | HEART RATE: 80 BPM | WEIGHT: 214 LBS | HEIGHT: 65 IN

## 2022-09-07 DIAGNOSIS — R11.0 NAUSEA: Primary | ICD-10-CM

## 2022-09-07 PROCEDURE — 99213 OFFICE O/P EST LOW 20 MIN: CPT | Performed by: INTERNAL MEDICINE

## 2022-09-07 RX ORDER — SUCRALFATE 1 G/1
1 TABLET ORAL 4 TIMES DAILY
Qty: 120 TABLET | Refills: 4 | Status: SHIPPED | OUTPATIENT
Start: 2022-09-07 | End: 2022-10-07

## 2022-09-15 ENCOUNTER — OFFICE VISIT (OUTPATIENT)
Dept: ORTHOPEDIC SURGERY | Facility: CLINIC | Age: 67
End: 2022-09-15

## 2022-09-15 VITALS — BODY MASS INDEX: 35.16 KG/M2 | WEIGHT: 211 LBS | HEIGHT: 65 IN

## 2022-09-15 DIAGNOSIS — M75.41 IMPINGEMENT SYNDROME OF RIGHT SHOULDER: ICD-10-CM

## 2022-09-15 DIAGNOSIS — M19.011 ARTHROSIS OF RIGHT ACROMIOCLAVICULAR JOINT: ICD-10-CM

## 2022-09-15 DIAGNOSIS — M25.511 ACUTE PAIN OF RIGHT SHOULDER: Primary | ICD-10-CM

## 2022-09-15 DIAGNOSIS — M75.101 ROTATOR CUFF SYNDROME OF RIGHT SHOULDER: ICD-10-CM

## 2022-09-15 DIAGNOSIS — M19.011 PRIMARY OSTEOARTHRITIS OF RIGHT SHOULDER: ICD-10-CM

## 2022-09-15 DIAGNOSIS — M25.619 LIMITED RANGE OF MOTION (ROM) OF SHOULDER: ICD-10-CM

## 2022-09-15 PROCEDURE — 99214 OFFICE O/P EST MOD 30 MIN: CPT | Performed by: NURSE PRACTITIONER

## 2022-09-15 PROCEDURE — 20610 DRAIN/INJ JOINT/BURSA W/O US: CPT | Performed by: NURSE PRACTITIONER

## 2022-09-15 RX ORDER — TRIAMCINOLONE ACETONIDE 40 MG/ML
40 INJECTION, SUSPENSION INTRA-ARTICULAR; INTRAMUSCULAR
Status: COMPLETED | OUTPATIENT
Start: 2022-09-15 | End: 2022-09-15

## 2022-09-15 RX ORDER — BUPIVACAINE HYDROCHLORIDE 5 MG/ML
2 INJECTION, SOLUTION PERINEURAL
Status: COMPLETED | OUTPATIENT
Start: 2022-09-15 | End: 2022-09-15

## 2022-09-15 RX ADMIN — BUPIVACAINE HYDROCHLORIDE 2 ML: 5 INJECTION, SOLUTION PERINEURAL at 08:45

## 2022-09-15 RX ADMIN — TRIAMCINOLONE ACETONIDE 40 MG: 40 INJECTION, SUSPENSION INTRA-ARTICULAR; INTRAMUSCULAR at 08:45

## 2022-09-15 NOTE — PROGRESS NOTES
Lana Mata is a 67 y.o. female   Primary provider:  Marlena Tejeda MD       Chief Complaint   Patient presents with   • Right Shoulder - Initial Evaluation, Pain     HISTORY OF PRESENT ILLNESS:    History of Present Illness  67-year-old female patient presents to office for evaluation of acute right shoulder pain.  Initial onset of pain occurred approximately 2 months ago when she was lifting/pulling a large comforter out of the washer and into the dryer.  She had acute onset of right shoulder pain with limited range of motion and weakness.  Patient was initially evaluated in the ED on 9/5/2022 with x-rays performed of the right shoulder.  She was given an intramuscular injection of Kenalog 40 mg and prescribed Robaxin.  Patient states that the right shoulder pain has gradually improved intermittently but as soon as she starts to use her right arm again for more exertional activity, the pain returns.  Patient continues to have persistent limited range of motion and weakness.  Patient also has pain in the right trapezius muscle and right side of her neck.  She also reports nighttime pain that disrupts her sleep.  Pain is described as intermittent and moderate to severe.  Pain is described as stabbing in nature with associated swelling, limited range of motion and weakness.  Pain is worse with driving, lifting her right arm and movement/use of the right arm/shoulder.  Pain improves with rest, Tylenol, ice therapy and aspirin.  Pain scale currently is 3/10.    CONCURRENT MEDICAL HISTORY:    Past Medical History:   Diagnosis Date   • Abnormal gait 11/28/2012   • Abrasion 02/26/2015   • Acute ankle pain 03/31/2016   • Acute bronchitis 09/04/2015   • Allergic rhinitis 05/22/2012   • Asthenia 05/22/2014   • Atrial fibrillation (HCC) 03/04/2016    - converted    • Attempted suicide (HCC)    • Benign essential hypertension 03/04/2016   • Cardiovascular stress test abnormal 03/04/2016   • Cellulitis of knee  03/03/2015   • Chest pain 04/02/2015   • Chronic pain disorder    • Common cold 06/24/2014   • Congestive heart failure (HCC) 02/04/2016   • Depressive disorder 05/22/2015    suspect more complicated psych issues      • Fibrocystic breast    • Gastroesophageal reflux disease 01/17/2013   • Generalized anxiety disorder 02/04/2016   • H/O echocardiogram     Normal LV function with Ef of 60-65%.Mildly dilated right ventricle with normal function. Grade 1B diastolic dysfunction with mild CLVH.NO sig. valvular regurg. or stenosis.   • Headache 10/27/2014   • Hyperlipidemia 03/04/2016   • Kidney disease     stage 3   • Low back pain 06/30/2016    Need for prophylactic vaccination against Streptococcus pneumoniae [pneumococcus]    01/23/2015    • Neck pain 06/30/2016   • Obstructive sleep apnea of adult 07/30/2015   • Obstructive sleep apnea syndrome    • Osteoarthritis    • Osteoporosis 06/30/2016   • Pain in right shoulder 06/30/2016   • Pain in right shoulder 04/24/2014   • Shortness of breath 04/02/2015   • Skin ulcer (HCC) 04/13/2015    left   • Thyrotoxicosis with or without goiter 10/24/2014   • Urinary incontinence 05/22/2014       Allergies   Allergen Reactions   • Codeine Nausea And Vomiting   • Latex Rash   • Morphine And Related Hallucinations   • Pravachol [Pravastatin Sodium] Myalgia         Current Outpatient Medications:   •  alendronate (Fosamax) 70 MG tablet, Take 1 tablet by mouth Every 7 (Seven) Days., Disp: 8 tablet, Rfl: 2  •  ARIPiprazole (ABILIFY) 5 MG tablet, Take 5 mg by mouth Daily. At bedtime, Disp: , Rfl:   •  aspirin 325 MG tablet, Take 500 mg by mouth Daily. At bedtime, instructed to take a half tablet of 500 aspirin. , Disp: , Rfl:   •  atorvastatin (LIPITOR) 20 MG tablet, TAKE ONE TABLET BY MOUTH EVERY DAY, Disp: 90 tablet, Rfl: 3  •  bumetanide (BUMEX) 0.5 MG tablet, TAKE 1 TABLET BY MOUTH TWICE DAILY, Disp: 60 tablet, Rfl: 0  •  Calcium Carb-Cholecalciferol (CALCIUM-VITAMIN D) 500-200  MG-UNIT per tablet, Take 1 tablet by mouth 2 (Two) Times a Day With Meals., Disp: 60 tablet, Rfl: 2  •  Diclofenac Sodium (VOLTAREN) 1 % gel gel, Apply 4 g topically to the appropriate area as directed 4 (Four) Times a Day As Needed (Joint pains in back, knees and hands.)., Disp: 350 g, Rfl: 3  •  Dilt- MG 24 hr capsule, TAKE 1 CAPSULE EVERY DAY, Disp: 30 capsule, Rfl: 5  •  fenofibric acid (TRILIPIX) 135 MG capsule delayed-release delayed release capsule, TAKE ONE CAPSULE BY MOUTH EVERY DAY, Disp: 90 capsule, Rfl: 3  •  isosorbide mononitrate (IMDUR) 30 MG 24 hr tablet, Take 1 tablet by mouth Daily., Disp: 90 tablet, Rfl: 3  •  lidocaine (LIDODERM) 5 %, Place 1 patch on the skin as directed by provider Daily. Remove & Discard patch within 12 hours or as directed by MD, Disp: 30 each, Rfl: 0  •  lisinopril (PRINIVIL,ZESTRIL) 10 MG tablet, Take 10 mg by mouth Daily., Disp: , Rfl:   •  loperamide (IMODIUM) 2 MG capsule, Take 1 capsule by mouth Daily., Disp: 60 capsule, Rfl: 0  •  methocarbamol (ROBAXIN) 500 MG tablet, Take 1 tablet by mouth 4 (Four) Times a Day As Needed for Muscle Spasms., Disp: 30 tablet, Rfl: 0  •  metoprolol tartrate (LOPRESSOR) 25 MG tablet, TAKE 1 TABLET EVERY 12 HOURS, Disp: 60 tablet, Rfl: 4  •  omeprazole (priLOSEC) 20 MG capsule, Take 1 capsule by mouth Daily., Disp: 90 capsule, Rfl: 5  •  rivaroxaban (Xarelto) 20 MG tablet, Take one tablet by mouth every day- take with food, Disp: 30 tablet, Rfl: 0  •  sucralfate (Carafate) 1 g tablet, Take 1 tablet by mouth 4 (Four) Times a Day for 30 days., Disp: 120 tablet, Rfl: 4  •  DULoxetine (Cymbalta) 60 MG capsule, Take 1 capsule by mouth Daily for 30 days., Disp: 30 capsule, Rfl: 2  •  Incontinence Supply Disposable (Depend Adjustable Underwear) misc, 1 application As Needed (Incontinence)., Disp: 90 each, Rfl: 11    Current Facility-Administered Medications:   •  cyanocobalamin injection 1,000 mcg, 1,000 mcg, Intramuscular, Q28 Days,  Marlena Tejeda MD  •  loperamide (IMODIUM) capsule 2 mg, 2 mg, Oral, Daily, Marlena Tejeda MD    Past Surgical History:   Procedure Laterality Date   • BACK SURGERY  08/07/2015    Removal of intstrumentation,L5-S1.Exploration of fusion,L5-S1.Posterolateral fusion,L4-L5.Posterior segmental spinal instrumentation L4-5.Transforaminal interbody fusion Through right sided approach.Performed at    • COLONOSCOPY  05/04/2016    will order cologuard   • COLONOSCOPY  08/30/2022   • COLONOSCOPY N/A 8/30/2022    Procedure: COLONOSCOPY;  Surgeon: Mikie Gregg MD;  Location: [facility] ENDOSCOPY;  Service: Gastroenterology;  Laterality: N/A;   • ENDOSCOPY  08/30/2022   • ENDOSCOPY N/A 8/30/2022    Procedure: ESOPHAGOGASTRODUODENOSCOPY;  Surgeon: Mikie Gregg MD;  Location: [facility] ENDOSCOPY;  Service: Gastroenterology;  Laterality: N/A;   • INJECTION OF MEDICATION  03/23/2015    celestone   • INJECTION OF MEDICATION  05/04/2016    KENALOG(6)   • LUMBAR FUSION      discectomy fusion with rods L4-S1   • LUMBAR LAMINECTOMY  1995   • MANDIBLE FRACTURE SURGERY      JAW WIRED SHUT   • MOUTH SURGERY  10/05/2015    Fractured mandible. Removal of arch bars.   • MOUTH SURGERY  1985    Fractured mandible. Removal of arch bars.   • NASAL SEPTUM SURGERY     • RHINOPLASTY     • TUBAL ABDOMINAL LIGATION  1986       Family History   Problem Relation Age of Onset   • Hypertension Mother    • Hyperlipidemia Mother    • Mental illness Mother    • Thyroid disease Mother    • Diabetes Maternal Grandmother    • Cancer Maternal Grandmother    • Depression Other    • Diabetes Other    • Heart disease Other    • Hypertension Other    • Osteoporosis Other    • Thyroid disease Other    • Cancer Maternal Aunt    • Cancer Paternal Aunt    • Breast cancer Paternal Aunt         Social History     Socioeconomic History   • Marital status:    Tobacco Use   • Smoking status: Never Smoker   • Smokeless tobacco: Never Used   •  "Tobacco comment: smoking cessation    Vaping Use   • Vaping Use: Never used   Substance and Sexual Activity   • Alcohol use: No   • Drug use: No     Comment: lortab / oxycontin   • Sexual activity: Defer        Review of Systems   Constitutional: Positive for activity change.        Night sweats   HENT: Positive for postnasal drip and tinnitus.         Ringing in the ears      Eyes: Negative.    Respiratory: Positive for cough.         Breathing difficulties      Cardiovascular: Positive for chest pain and palpitations.   Gastrointestinal: Positive for nausea and vomiting.   Endocrine: Negative.    Musculoskeletal: Positive for arthralgias, back pain, joint swelling, myalgias and neck pain.        Joint pain/stiffness.  Muscle pain.  Right shoulder pain.   Skin: Negative.    Allergic/Immunologic: Negative.    Neurological: Positive for dizziness and weakness (Right arm/shoulder).   Hematological: Bruises/bleeds easily.   Psychiatric/Behavioral: Positive for dysphoric mood and sleep disturbance. The patient is nervous/anxious.        PHYSICAL EXAMINATION:       Ht 165.1 cm (65\")   Wt 95.7 kg (211 lb)   BMI 35.11 kg/m²     Physical Exam  Vitals reviewed.   Constitutional:       General: She is not in acute distress.     Appearance: She is well-developed. She is not ill-appearing.   HENT:      Head: Normocephalic.   Pulmonary:      Effort: Pulmonary effort is normal. No respiratory distress.   Abdominal:      General: There is no distension.      Palpations: Abdomen is soft.   Musculoskeletal:         General: Swelling (Mild, right shoulder) and tenderness (Mild, right shoulder, right trapezius) present. No deformity or signs of injury.   Skin:     General: Skin is warm and dry.      Capillary Refill: Capillary refill takes less than 2 seconds.      Findings: No erythema.   Neurological:      Mental Status: She is alert and oriented to person, place, and time.      GCS: GCS eye subscore is 4. GCS verbal subscore is " 5. GCS motor subscore is 6.   Psychiatric:         Speech: Speech normal.         Behavior: Behavior normal.         Thought Content: Thought content normal.         Judgment: Judgment normal.         GAIT:     []  Normal  [x]  Antalgic (mild, chronic)    Assistive device: [x]  None  []  Walker     []  Crutches  []  Cane     []  Wheelchair  []  Stretcher    Right Shoulder Exam     Tenderness   The patient is experiencing tenderness in the acromioclavicular joint and acromion (Mild, diffuse).    Range of Motion   Active abduction: 80   External rotation: 80   Forward flexion: 90   Internal rotation 90 degrees: 30     Muscle Strength   Abduction: 4/5   Supraspinatus: 4/5     Tests   Barajas test: positive  Cross arm: negative  Impingement: positive  Drop arm: negative    Other   Erythema: absent  Sensation: normal  Pulse: present    Comments:  Pain and limitations with range of motion.  Mild tenderness to palpation in the superior shoulder, AC joint and right trapezius muscle. Empty can test positive.  Full can test positive.  Impingement sign significantly positive.  Mild, generalized swelling noted.  No ecchymosis.  Rounded shoulder posture/positioning noted.  Neurovascularly intact.            Large Joint Arthrocentesis: R subacromial bursa  Date/Time: 9/15/2022 8:45 AM  Consent given by: patient  Timeout: Immediately prior to procedure a time out was called to verify the correct patient, procedure, equipment, support staff and site/side marked as required   Supporting Documentation  Indications: pain, diagnostic evaluation and joint swelling   Procedure Details  Location: shoulder - R subacromial bursa  Preparation: Patient was prepped and draped in the usual sterile fashion  Needle size: 22 G  Approach: posterior  Medications administered: 40 mg triamcinolone acetonide 40 MG/ML; 2 mL bupivacaine 0.5 %  Patient tolerance: patient tolerated the procedure well with no immediate complications      XR Shoulder 2+ View  Right    Result Date: 9/5/2022  Narrative: EXAM DESCRIPTION: XR SHOULDER 2+ VW RIGHT CLINICAL HISTORY: shoulder pain COMPARISON: 12/27/2017 TECHNIQUE: 3 views of the right shoulder. FINDINGS: There is no acute fracture or dislocation. Bone mineralization is within normal limits. Joint spaces are preserved. Soft tissues are unremarkable. . There is no radiopaque foreign body material.     Impression: No acute osseous abnormality Electronically signed by:  Bo Strange MD  9/5/2022 5:28 AM CDT Workstation: [id number]    ASSESSMENT:    Diagnoses and all orders for this visit:    Acute pain of right shoulder  -     Large Joint Arthrocentesis: R subacromial bursa  -     MRI Shoulder Right Without Contrast; Future    Impingement syndrome of right shoulder  -     Large Joint Arthrocentesis: R subacromial bursa  -     MRI Shoulder Right Without Contrast; Future    Rotator cuff syndrome of right shoulder  -     Large Joint Arthrocentesis: R subacromial bursa  -     MRI Shoulder Right Without Contrast; Future    Arthrosis of right acromioclavicular joint  -     Large Joint Arthrocentesis: R subacromial bursa  -     MRI Shoulder Right Without Contrast; Future    Primary osteoarthritis of right shoulder  -     Large Joint Arthrocentesis: R subacromial bursa  -     MRI Shoulder Right Without Contrast; Future    Limited range of motion (ROM) of shoulder  -     Large Joint Arthrocentesis: R subacromial bursa  -     MRI Shoulder Right Without Contrast; Future    PLAN    X-rays of the right shoulder previously performed on 9/5/2022 in the ED are independently reviewed by myself today with no acute findings noted.  Patient has mild to moderate joint space narrowing at the glenohumeral joint with a small osteophyte formation noted at the inferior aspect of the humeral head.  She also has a small area of subchondral cystic changes at the superior lateral aspect of the humeral head.  She has advanced degenerative changes noted at the  AC joint with osteophyte formations projecting inferiorly.  Patient complains of acute right shoulder pain that started about 2 months ago.  She did not sustain a trauma but the pain started after lifting a large comforter out of the washer to put it into the dryer.  She had acute onset of right shoulder pain and has experienced continued pain with limited range of motion and weakness.  Subjective complaints and physical exam are suspicious for rotator cuff pathology and we discussed the possibility of rotator cuff tear.  Patient also has signs/symptoms of impingement on physical exam.  Recommend proceeding today with a subacromial injection of steroid to the right shoulder for management of pain/inflammation.  Recommend MRI of the right shoulder to evaluate the integrity of the rotator cuff, to evaluate for rotator cuff tearing, tendinitis/tendinosis, bursitis and/or impingement.    Recommend the following:     -Rest and activity modification with avoidance of heavy lifting, pulling, tugging and repetitive motions for now.  -Gentle, progressive range of motion exercises with the affected shoulder intermittently several times daily as pain allows.  -Ice therapy to the affected shoulder intermittently 3-4 times daily for 15 minutes at a time to minimize pain/inflammation.  -Gradual progression of activity and use of the affected arm/shoulder as tolerated and based on pain/symptoms.  -Tylenol as needed for pain/discomfort.  The patient cannot take oral NSAIDs due to her current use of Xarelto.  She takes an aspirin at bedtime, which she states does help with her shoulder pain somewhat.  -Continue with Robaxin 500 mg 4 times daily as needed for pain/muscle spasms, as previously prescribed by the ED physician.    Follow-up after MRI to discuss results and further treatment recommendations.  Anticipate referral to physical therapy to improve her range of motion and for conditioning/strengthening exercises.    Time spent  of a minimum of 30 minutes including the face to face evaluation, reviewing of medical history and prior medial records, reviewing of diagnostic studies, ordering additional tests, documentation, patient education and coordination of care.     EMR Dragon/Transciption Disclaimer: Some of this note may be an electronic transcription/translation of spoken language to printed text using the Dragon Dictation System.     Return for follow up after MRI.        This document has been electronically signed by JOHN Carl on September 15, 2022 08:59 CDT      JOHN Carl    Answers for HPI/ROS submitted by the patient on 9/13/2022  Please describe your symptoms.: Right shoulder, upper arm, neck pain  Have you had these symptoms before?: No  How long have you been having these symptoms?: Greater than 2 weeks  Please list any medications you are currently taking for this condition.: Tylenol and Robaxin  Please describe any probable cause for these symptoms. : Lifting and pulling to much  What is the primary reason for your visit?: Other

## 2022-09-19 ENCOUNTER — APPOINTMENT (OUTPATIENT)
Dept: MRI IMAGING | Facility: HOSPITAL | Age: 67
End: 2022-09-19

## 2022-09-19 ENCOUNTER — TELEPHONE (OUTPATIENT)
Dept: GENERAL RADIOLOGY | Facility: HOSPITAL | Age: 67
End: 2022-09-19

## 2022-09-21 DIAGNOSIS — I50.32 CHRONIC HEART FAILURE WITH PRESERVED EJECTION FRACTION: ICD-10-CM

## 2022-09-22 RX ORDER — BUMETANIDE 0.5 MG/1
TABLET ORAL
Qty: 60 TABLET | Refills: 0 | Status: SHIPPED | OUTPATIENT
Start: 2022-09-22 | End: 2022-10-26

## 2022-09-23 ENCOUNTER — TELEPHONE (OUTPATIENT)
Dept: FAMILY MEDICINE CLINIC | Facility: CLINIC | Age: 67
End: 2022-09-23

## 2022-09-23 ENCOUNTER — HOSPITAL ENCOUNTER (OUTPATIENT)
Dept: MRI IMAGING | Facility: HOSPITAL | Age: 67
Discharge: HOME | End: 2022-09-23
Admitting: NURSE PRACTITIONER

## 2022-09-23 DIAGNOSIS — M19.011 PRIMARY OSTEOARTHRITIS OF RIGHT SHOULDER: ICD-10-CM

## 2022-09-23 DIAGNOSIS — M75.101 ROTATOR CUFF SYNDROME OF RIGHT SHOULDER: ICD-10-CM

## 2022-09-23 DIAGNOSIS — M25.619 LIMITED RANGE OF MOTION (ROM) OF SHOULDER: ICD-10-CM

## 2022-09-23 DIAGNOSIS — M25.511 ACUTE PAIN OF RIGHT SHOULDER: ICD-10-CM

## 2022-09-23 DIAGNOSIS — M19.011 ARTHROSIS OF RIGHT ACROMIOCLAVICULAR JOINT: ICD-10-CM

## 2022-09-23 DIAGNOSIS — M75.41 IMPINGEMENT SYNDROME OF RIGHT SHOULDER: ICD-10-CM

## 2022-09-23 PROCEDURE — 73221 MRI JOINT UPR EXTREM W/O DYE: CPT

## 2022-09-23 RX ORDER — LOPERAMIDE HYDROCHLORIDE 2 MG/1
2 CAPSULE ORAL DAILY
Qty: 60 CAPSULE | Refills: 0 | Status: SHIPPED | OUTPATIENT
Start: 2022-09-23

## 2022-09-23 NOTE — TELEPHONE ENCOUNTER
Incoming Refill Request      Medication requested (name and dose): loperamide (IMODIUM) 2 MG capsule  Pharmacy where request should be sent: [facility]    Additional details provided by patient:     Best call back number: [phone]    Does the patient have less than a 3 day supply:  [x] Yes  [] No    Shania Ruby  09/23/22, 10:30 CDT

## 2022-09-26 NOTE — PROGRESS NOTES
Chief Complaint   Patient presents with   • endo f/u        Subjective    Lana Mata is a 67 y.o. female.    History of Present Illness  Patient presented to GI clinic for follow-up visit today.  Has intermittent symptoms with nausea and denied abdominal pain.  Also denied vomiting, diarrhea, constipation, rectal bleeding or weight loss.  EGD was consistent with gastritis and colonoscopy was consistent with hemorrhoids and 1 polyp.  Path was consistent with reactive gastropathy and adenomatous polyp.  Hemoglobin was 13.7.       The following portions of the patient's history were reviewed and updated as appropriate:   Past Medical History:   Diagnosis Date   • Abnormal gait 11/28/2012   • Abrasion 02/26/2015   • Acute ankle pain 03/31/2016   • Acute bronchitis 09/04/2015   • Allergic rhinitis 05/22/2012   • Asthenia 05/22/2014   • Atrial fibrillation (HCC) 03/04/2016    - converted    • Attempted suicide (HCC)    • Benign essential hypertension 03/04/2016   • Cardiovascular stress test abnormal 03/04/2016   • Cellulitis of knee 03/03/2015   • Chest pain 04/02/2015   • Chronic pain disorder    • Common cold 06/24/2014   • Congestive heart failure (HCC) 02/04/2016   • Depressive disorder 05/22/2015    suspect more complicated psych issues      • Fibrocystic breast    • Gastroesophageal reflux disease 01/17/2013   • Generalized anxiety disorder 02/04/2016   • H/O echocardiogram     Normal LV function with Ef of 60-65%.Mildly dilated right ventricle with normal function. Grade 1B diastolic dysfunction with mild CLVH.NO sig. valvular regurg. or stenosis.   • Headache 10/27/2014   • Hyperlipidemia 03/04/2016   • Kidney disease     stage 3   • Low back pain 06/30/2016    Need for prophylactic vaccination against Streptococcus pneumoniae [pneumococcus]    01/23/2015    • Neck pain 06/30/2016   • Obstructive sleep apnea of adult 07/30/2015   • Obstructive sleep apnea syndrome    • Osteoarthritis    • Osteoporosis  2016   • Pain in right shoulder 2016   • Pain in right shoulder 2014   • Shortness of breath 2015   • Skin ulcer (HCC) 2015    left   • Thyrotoxicosis with or without goiter 10/24/2014   • Urinary incontinence 2014     Past Surgical History:   Procedure Laterality Date   • BACK SURGERY  2015    Removal of intstrumentation,L5-S1.Exploration of fusion,L5-S1.Posterolateral fusion,L4-L5.Posterior segmental spinal instrumentation L4-5.Transforaminal interbody fusion Through right sided approach.Performed at    • COLONOSCOPY  2016    will order cologuard   • COLONOSCOPY  2022   • COLONOSCOPY N/A 2022    Procedure: COLONOSCOPY;  Surgeon: Mikie Gregg MD;  Location: [facility] ENDOSCOPY;  Service: Gastroenterology;  Laterality: N/A;   • ENDOSCOPY  2022   • ENDOSCOPY N/A 2022    Procedure: ESOPHAGOGASTRODUODENOSCOPY;  Surgeon: Mikie Gregg MD;  Location: [facility] ENDOSCOPY;  Service: Gastroenterology;  Laterality: N/A;   • INJECTION OF MEDICATION  2015    celestone   • INJECTION OF MEDICATION  2016    KENALOG(6)   • LUMBAR FUSION      discectomy fusion with rods L4-S1   • LUMBAR LAMINECTOMY     • MANDIBLE FRACTURE SURGERY      JAW WIRED SHUT   • MOUTH SURGERY  10/05/2015    Fractured mandible. Removal of arch bars.   • MOUTH SURGERY  1985    Fractured mandible. Removal of arch bars.   • NASAL SEPTUM SURGERY     • RHINOPLASTY     • TUBAL ABDOMINAL LIGATION       Family History   Problem Relation Age of Onset   • Hypertension Mother    • Hyperlipidemia Mother    • Mental illness Mother    • Thyroid disease Mother    • Diabetes Maternal Grandmother    • Cancer Maternal Grandmother    • Depression Other    • Diabetes Other    • Heart disease Other    • Hypertension Other    • Osteoporosis Other    • Thyroid disease Other    • Cancer Maternal Aunt    • Cancer Paternal Aunt    • Breast cancer Paternal Aunt      OB History         4    Para   4    Term   4            AB        Living           SAB        IAB        Ectopic        Molar        Multiple        Live Births                  Prior to Admission medications    Medication Sig Start Date End Date Taking? Authorizing Provider   alendronate (Fosamax) 70 MG tablet Take 1 tablet by mouth Every 7 (Seven) Days. 22  Yes Butch Duong MD   ARIPiprazole (ABILIFY) 5 MG tablet Take 5 mg by mouth Daily. At bedtime   Yes Gurdeep Castaneda MD   aspirin 325 MG tablet Take 500 mg by mouth Daily. At bedtime, instructed to take a half tablet of 500 aspirin.    Yes Gurdeep Castaneda MD   atorvastatin (LIPITOR) 20 MG tablet TAKE ONE TABLET BY MOUTH EVERY DAY 1/10/22  Yes Kristyn Jurado MD   Calcium Carb-Cholecalciferol (CALCIUM-VITAMIN D) 500-200 MG-UNIT per tablet Take 1 tablet by mouth 2 (Two) Times a Day With Meals. 19  Yes Allie Martin MD   Diclofenac Sodium (VOLTAREN) 1 % gel gel Apply 4 g topically to the appropriate area as directed 4 (Four) Times a Day As Needed (Joint pains in back, knees and hands.). 3/9/22  Yes Pasha Girard MD   Dilt- MG 24 hr capsule TAKE 1 CAPSULE EVERY DAY 22  Yes Kristyn Jurado MD   fenofibric acid (TRILIPIX) 135 MG capsule delayed-release delayed release capsule TAKE ONE CAPSULE BY MOUTH EVERY DAY 22  Yes Kristyn Jurado MD   Incontinence Supply Disposable (Depend Adjustable Underwear) misc 1 application As Needed (Incontinence). 21  Yes Tima Salcido MD   isosorbide mononitrate (IMDUR) 30 MG 24 hr tablet Take 1 tablet by mouth Daily. 22  Yes Kristyn Jurado MD   lidocaine (LIDODERM) 5 % Place 1 patch on the skin as directed by provider Daily. Remove & Discard patch within 12 hours or as directed by MD 10/13/21  Yes Ron Schultz MD   lisinopril (PRINIVIL,ZESTRIL) 10 MG tablet Take 10 mg by mouth Daily. 21  Yes Gurdeep Castaneda MD   methocarbamol (ROBAXIN) 500 MG tablet  Take 1 tablet by mouth 4 (Four) Times a Day As Needed for Muscle Spasms. 9/5/22  Yes Raul Lawrence MD   metoprolol tartrate (LOPRESSOR) 25 MG tablet TAKE 1 TABLET EVERY 12 HOURS 9/22/22   Kristyn Jurado MD   omeprazole (priLOSEC) 20 MG capsule Take 1 capsule by mouth Daily. 1/3/22  Yes Mina Porter MD   rivaroxaban (Xarelto) 20 MG tablet Take one tablet by mouth every day- take with food 12/9/21  Yes Kristyn Jurado MD   bumetanide (BUMEX) 0.5 MG tablet TAKE 1 TABLET TWICE DAILY 9/22/22   Mina Porter MD   DULoxetine (Cymbalta) 60 MG capsule Take 1 capsule by mouth Daily for 30 days. 6/17/22 7/20/22  Butch Duong MD   loperamide (IMODIUM) 2 MG capsule Take 1 capsule by mouth Daily. 9/23/22   Marlena Tejeda MD   sucralfate (Carafate) 1 g tablet Take 1 tablet by mouth 4 (Four) Times a Day for 30 days. 9/7/22 10/7/22  Mikie Gregg MD     Allergies   Allergen Reactions   • Codeine Nausea And Vomiting   • Latex Rash   • Morphine And Related Hallucinations   • Pravachol [Pravastatin Sodium] Myalgia     Social History     Socioeconomic History   • Marital status:    Tobacco Use   • Smoking status: Never Smoker   • Smokeless tobacco: Never Used   • Tobacco comment: smoking cessation    Vaping Use   • Vaping Use: Never used   Substance and Sexual Activity   • Alcohol use: No   • Drug use: No     Comment: lortab / oxycontin   • Sexual activity: Defer       Review of Systems  Review of Systems   Constitutional: Negative for chills, fatigue, fever and unexpected weight change.   HENT: Negative for congestion, ear discharge, hearing loss, nosebleeds and sore throat.    Eyes: Negative for pain, discharge and redness.   Respiratory: Negative for cough, chest tightness, shortness of breath and wheezing.    Cardiovascular: Negative for chest pain and palpitations.   Gastrointestinal: Positive for nausea. Negative for abdominal distention, abdominal pain, blood in stool, constipation,  "diarrhea and vomiting.   Endocrine: Negative for cold intolerance, polydipsia, polyphagia and polyuria.   Genitourinary: Negative for dysuria, flank pain, frequency, hematuria and urgency.   Musculoskeletal: Negative for arthralgias, back pain, joint swelling and myalgias.   Skin: Negative for color change, pallor and rash.   Neurological: Negative for tremors, seizures, syncope, weakness and headaches.   Hematological: Negative for adenopathy. Does not bruise/bleed easily.   Psychiatric/Behavioral: Negative for behavioral problems, confusion, dysphoric mood, hallucinations and suicidal ideas. The patient is not nervous/anxious.         /84 (BP Location: Right arm)   Pulse 80   Ht 165.1 cm (65\")   Wt 97.1 kg (214 lb)   BMI 35.61 kg/m²     Objective    Physical Exam  Constitutional:       Appearance: She is well-developed.   HENT:      Head: Normocephalic and atraumatic.   Eyes:      Conjunctiva/sclera: Conjunctivae normal.      Pupils: Pupils are equal, round, and reactive to light.   Neck:      Thyroid: No thyromegaly.   Cardiovascular:      Rate and Rhythm: Normal rate and regular rhythm.      Heart sounds: Normal heart sounds. No murmur heard.  Pulmonary:      Effort: Pulmonary effort is normal.      Breath sounds: Normal breath sounds. No wheezing.   Abdominal:      General: Bowel sounds are normal. There is no distension.      Palpations: Abdomen is soft. There is no mass.      Tenderness: There is no abdominal tenderness.      Hernia: No hernia is present.   Genitourinary:     Comments: No lesions noted  Musculoskeletal:         General: No tenderness. Normal range of motion.      Cervical back: Normal range of motion and neck supple.   Lymphadenopathy:      Cervical: No cervical adenopathy.   Skin:     General: Skin is warm and dry.      Findings: No rash.   Neurological:      Mental Status: She is alert and oriented to person, place, and time.      Cranial Nerves: No cranial nerve deficit. "   Psychiatric:         Thought Content: Thought content normal.       Admission on 09/03/2022, Discharged on 09/03/2022   Component Date Value Ref Range Status   • Color 09/03/2022 Straw  Yellow, Straw, Dark Yellow, Татьяна Final   • Clarity, UA 09/03/2022 Clear  Clear Final   • Glucose, UA 09/03/2022 Negative  Negative mg/dL Final   • Bilirubin 09/03/2022 Negative  Negative Final   • Ketones, UA 09/03/2022 Negative  Negative Final   • Specific Gravity  09/03/2022 1.015  1.005 - 1.030 Final   • Blood, UA 09/03/2022 Negative  Negative Final   • pH, Urine 09/03/2022 6.0  5.0 - 8.0 Final   • Protein, POC 09/03/2022 Negative  Negative mg/dL Final   • Urobilinogen, UA 09/03/2022 Normal  Normal, 0.2 E.U./dL Final   • Nitrite, UA 09/03/2022 Negative  Negative Final   • Leukocytes 09/03/2022 Negative  Negative Final   • WBC, UA 09/03/2022 None Seen  None Seen /HPF Final   • RBC, UA 09/03/2022 None Seen  None Seen /HPF Final   • Bacteria, UA 09/03/2022 None Seen  None Seen /HPF Final     Assessment & Plan    No diagnosis found..    1.  Diarrhea, well controlled.  Likely due to irritable bowel syndrome. Continue Bentyl daily and Imodium as needed.  Add probiotics daily.    2.  Longstanding GERD with symptoms well controlled with PPI.  Continue Prilosec and antireflux lifestyle.  EGD for Boyd's screening.  3.  Nausea, likely due to reactive gastropathy.  Continue Prilosec.  Add Carafate. Gastric emptying scan if  continues.  4.  Obesity, exercise and diet control    Orders placed during this encounter include:  No orders of the defined types were placed in this encounter.      * Surgery not found *    Review and/or summary of lab tests, radiology, procedures, medications. Review and summary of old records and obtaining of history. The risks and benefits of my recommendations, as well as other treatment options were discussed with the patient today. Questions were answered.    New Medications Ordered This Visit   Medications    • sucralfate (Carafate) 1 g tablet     Sig: Take 1 tablet by mouth 4 (Four) Times a Day for 30 days.     Dispense:  120 tablet     Refill:  4       Follow-up: Return in about 2 months (around 11/7/2022).               Results for orders placed or performed during the hospital encounter of 06/18/20   COVID [facility] Priority - Swab, Nasopharynx    Specimen: Nasopharynx; Swab   Result Value Ref Range    COVID [facility] PRIORITY Comment    COVID-19,[facility] ROUTINE, NP/OP SWAB IN TRANSPORT MEDIA OR ESWAB 72 HR TAT - Swab, Nasopharynx    Specimen: Nasopharynx; Swab   Result Value Ref Range    SARS-CoV-2, BRANDIE Not Detected Not Detected   Results for orders placed or performed during the hospital encounter of 09/03/22   POC Urine Micro    Specimen: Urine   Result Value Ref Range    WBC, UA None Seen None Seen /HPF    RBC, UA None Seen None Seen /HPF    Bacteria, UA None Seen None Seen /HPF   POC Urinalysis Dipstick, Multipro (Automated Dipstick)    Specimen: Urine   Result Value Ref Range    Color Straw Yellow, Straw, Dark Yellow, Татьяна    Clarity, UA Clear Clear    Glucose, UA Negative Negative mg/dL    Bilirubin Negative Negative    Ketones, UA Negative Negative    Specific Gravity  1.015 1.005 - 1.030    Blood, UA Negative Negative    pH, Urine 6.0 5.0 - 8.0    Protein, POC Negative Negative mg/dL    Urobilinogen, UA Normal Normal, 0.2 E.U./dL    Nitrite, UA Negative Negative    Leukocytes Negative Negative   Results for orders placed or performed during the hospital encounter of 08/30/22   TISSUE EXAM, [facility] ([facility])    Specimen: A: Small Intestine, Duodenum; Tissue    B: Gastric, Antrum; Tissue    C: Large Intestine; Tissue    D: Large Intestine, Right / Ascending Colon; Polyp   Result Value Ref Range    Reference Lab Report       [facility]  [address] [place] [address]  Phone: [phone] or [phone]  Fax: [phone]  Maxime Perry M.D., Medical Director    PATIENT  "NAME                           LABORATORY NO.  [id number]                  [id number]  [id number]                         AGE              SEX  N           CLIENT REF #  [facility]           67      1955  F    [id number]   [id number]    [place]                       REQUESTING M.D.     ATTENDING [facility].     COPY TO.  [address] [place], [place] [address]A  DATE COLLECTED      DATE RECEIVED      DATE REPORTED  2022    DIAGNOSIS:  A.   DUODENUM, BIOPSY:  No significant histologic abnormality  B.   GASTRIC ANTRUM, BIOPSY:  Reactive gastropathy  C.   COLON, BIOPSY:  No significant histologic abnormality  D.   ASCENDING COLON POLYP:  Tubular  adenoma    JBS/pah    CLINICAL HISTORY:  Positive colorectal cancer screening using Cologuard test, Gastroesophageal  reflux disease without esophagitis, diarrhea, nausea    SPECIMENS RECEIVED:  A.  DUODENUM, BIOPSY  B.  GASTRIC ANTRUM, BIOPSY  C.  COLON, BIOPSY  D.  ASCENDING COLON POLYP    MICROSCOPIC DESCRIPTION:  Tissue blocks are prepared and slides are examined microscopically on all  specimens. See diagnosis for details.    Professional interpretation rendered by Jarrod Kunz M.D. at [employer],  [employer], [address] [place] [address].    GROSS DESCRIPTION:  A.  Specimen is received in 1 formalin filled container labeled \"duodenum\"  and consists of 2 portions of tan soft tissue measuring 0.5 x 2.4 x 0.2 cm in  aggregate.  Submitted entirely in 1 cassette.  MTH  B.  Specimen is received in 1 formalin filled container labeled \"gastric antrum\"  and consists of 2 portions of tan soft tissue measuring 0.6 x 0.4 x 0.2 cm in  aggregate.  Submitted entirely in 1  cassette.  C.  Specimen is received in 1 formalin filled container labeled \"colonic  mucosa\" and consists of 2 portions of tan soft tissue measuring 0.4 x 0.4 x  0.2 cm in " "aggregate.  Submitted entirely in 1 cassette.  D.  Specimen is received in 1 formalin filled container labeled \"ascending  colon polyp\" and consists of 2 portions of tan soft tissue ranging in size  from 0.2 x 0.2 x 0.1 cm to 0.6 x 0.3 x 0.3 cm.  The smallest portion is  submitted as received and the largest portion is bisected.  Specimen is  submitted entirely in 1 cassette.  MTH    REVIEWED, DIAGNOSED AND ELECTRONICALLY  SIGNED BY:    Jarrod Kunz M.D.  CPT CODES:  88305x4     Results for orders placed or performed in visit on 08/10/22   Urine Drug Screen - Urine, Clean Catch    Specimen: Urine, Clean Catch   Result Value Ref Range    THC, Screen, Urine Negative Negative    Phencyclidine (PCP), Urine Negative Negative    Cocaine Screen, Urine Negative Negative    Methamphetamine, Ur Negative Negative    Opiate Screen Negative Negative    Amphetamine Screen, Urine Negative Negative    Benzodiazepine Screen, Urine Positive (A) Negative    Tricyclic Antidepressants Screen Negative Negative    Methadone Screen, Urine Negative Negative    Barbiturates Screen, Urine Negative Negative    Oxycodone Screen, Urine Negative Negative    Propoxyphene Screen Negative Negative    Buprenorphine, Screen, Urine Negative Negative   Results for orders placed or performed in visit on 07/20/22   Urinalysis, Microscopic Only - Urine, Clean Catch    Specimen: Urine, Clean Catch   Result Value Ref Range    RBC, UA 0-2 None Seen, 0-2 /HPF    WBC, UA 3-5 (A) None Seen, 0-2 /HPF    Bacteria, UA Trace (A) None Seen /HPF    Squamous Epithelial Cells, UA 0-2 None Seen, 0-2 /HPF    Hyaline Casts, UA 0-2 None Seen /LPF    Methodology Manual Light Microscopy    Urinalysis With Culture If Indicated - Urine, Clean Catch    Specimen: Urine, Clean Catch   Result Value Ref Range    Color, UA Yellow Yellow, Straw    Appearance, UA Clear Clear    pH, UA 6.5 5.0 - 8.0    Specific Gravity, UA 1.008 1.005 - 1.030    Glucose, UA Negative Negative    " Ketones, UA Negative Negative    Bilirubin, UA Negative Negative    Blood, UA Negative Negative    Protein, UA Negative Negative    Leuk Esterase, UA Moderate (2+) (A) Negative    Nitrite, UA Negative Negative    Urobilinogen, UA 0.2 E.U./dL 0.2 - 1.0 E.U./dL   TSH+Free T4    Specimen: Blood   Result Value Ref Range    TSH 1.390 0.270 - 4.200 uIU/mL    Free T4 1.44 0.93 - 1.70 ng/dL   Urine Culture - Urine, Urine, Clean Catch    Specimen: Urine, Clean Catch   Result Value Ref Range    Urine Culture 25,000 CFU/mL Mixed Katia Isolated    Comprehensive metabolic panel    Specimen: Blood   Result Value Ref Range    Glucose 93 65 - 99 mg/dL    BUN 20 8 - 23 mg/dL    Creatinine 1.09 (H) 0.57 - 1.00 mg/dL    Sodium 137 136 - 145 mmol/L    Potassium 3.9 3.5 - 5.2 mmol/L    Chloride 100 98 - 107 mmol/L    CO2 24.7 22.0 - 29.0 mmol/L    Calcium 8.9 8.6 - 10.5 mg/dL    Total Protein 6.6 6.0 - 8.5 g/dL    Albumin 4.00 3.50 - 5.20 g/dL    ALT (SGPT) 19 1 - 33 U/L    AST (SGOT) 26 1 - 32 U/L    Alkaline Phosphatase 46 39 - 117 U/L    Total Bilirubin 0.6 0.0 - 1.2 mg/dL    Globulin 2.6 gm/dL    A/G Ratio 1.5 g/dL    BUN/Creatinine Ratio 18.3 7.0 - 25.0    Anion Gap 12.3 5.0 - 15.0 mmol/L    eGFR 55.8 (L) >60.0 mL/min/1.73   Results for orders placed or performed during the hospital encounter of 05/11/22   Stress Test With Myocardial Perfusion Two Day   Result Value Ref Range    Target HR (85%) 130 bpm    Max. Pred. HR (100%) 153 bpm     CV STRESS PROTOCOL 1 Pharmacologic     Stage 1 1     HR Stage 1 69     Duration Min Stage 1 0     Duration Sec Stage 1 10     Stress Dose Regadenoson Stage 1 0.4     Stress Comments Stage 1 10 sec bolus injection     Baseline HR 78 bpm    Baseline /86 mmHg    Peak HR 94 bpm    Percent Max Pred HR 61.44 %    Percent Target HR 72 %    Peak /86 mmHg    Recovery HR 77 bpm    Recovery /80 mmHg    Exercise duration (sec) 46 sec    Estimated workload 1.0 METS    BH CV REST NUCLEAR  ISOTOPE DOSE 34.6 mCi    BH CV STRESS NUCLEAR ISOTOPE DOSE 36.2 mCi    Nuc Stress EF 75 %   Gold Top - SST   Result Value Ref Range    Extra Tube Hold for add-ons.    Green Top (Gel)   Result Value Ref Range    Extra Tube Hold for add-ons.    COVID-19 and FLU A/B PCR - Swab, Nasopharynx    Specimen: Nasopharynx; Swab   Result Value Ref Range    COVID19 Not Detected Not Detected - Ref. Range    Influenza A PCR Not Detected Not Detected    Influenza B PCR Not Detected Not Detected   Urinalysis, Microscopic Only - Urine, Clean Catch    Specimen: Urine, Clean Catch   Result Value Ref Range    RBC, UA 0-2 (A) None Seen /HPF    WBC, UA 21-30 (A) None Seen, 0-2, 3-5 /HPF    Bacteria, UA None Seen None Seen /HPF    Squamous Epithelial Cells, UA 0-2 None Seen, 0-2 /HPF    Hyaline Casts, UA None Seen None Seen /LPF    Methodology Automated Microscopy      *Note: Due to a large number of results and/or encounters for the requested time period, some results have not been displayed. A complete set of results can be found in Results Review.         This document has been electronically signed by Mikie Gregg MD on September 25, 2022 22:16 CDT

## 2022-09-30 DIAGNOSIS — M51.35 DDD (DEGENERATIVE DISC DISEASE), THORACOLUMBAR: ICD-10-CM

## 2022-09-30 DIAGNOSIS — F41.9 ANXIETY: ICD-10-CM

## 2022-09-30 DIAGNOSIS — M48.10 DISH (DIFFUSE IDIOPATHIC SKELETAL HYPEROSTOSIS): ICD-10-CM

## 2022-09-30 RX ORDER — DULOXETIN HYDROCHLORIDE 60 MG/1
60 CAPSULE, DELAYED RELEASE ORAL DAILY
Qty: 30 CAPSULE | Refills: 2 | Status: CANCELLED | OUTPATIENT
Start: 2022-09-30 | End: 2022-10-30

## 2022-09-30 RX ORDER — DULOXETIN HYDROCHLORIDE 60 MG/1
60 CAPSULE, DELAYED RELEASE ORAL DAILY
Qty: 30 CAPSULE | Refills: 3 | Status: SHIPPED | OUTPATIENT
Start: 2022-09-30 | End: 2023-01-28

## 2022-09-30 NOTE — PROGRESS NOTES
SIGNIFICANT NOTE  NAME: Lana Ayala Attebury  : 1955  MRN: [id number]    Called patient and left call back number on 2022 at 16:35 CDT. Called to notify 3 refills of Cymbalta 60 mg sent to patient's pharmacy [facility].         This document has been electronically signed by Marlena Tejeda MD on 2022 16:35 CDT

## 2022-09-30 NOTE — TELEPHONE ENCOUNTER
Incoming Refill Request      Medication requested (name and dose): DULoxetine (Cymbalta) 60 MG capsule ()    Pharmacy where request should be sent:   [facility]     Additional details provided by patient: ASKED FOR 90 DAYS SUPPLY    Best call back number: [phone]    Does the patient have less than a 3 day supply:  [] Yes  [x] No    Yoli Jimenez  22, 10:20 CDT

## 2022-10-04 DIAGNOSIS — F41.9 ANXIETY: ICD-10-CM

## 2022-10-04 DIAGNOSIS — M51.35 DDD (DEGENERATIVE DISC DISEASE), THORACOLUMBAR: ICD-10-CM

## 2022-10-04 DIAGNOSIS — M48.10 DISH (DIFFUSE IDIOPATHIC SKELETAL HYPEROSTOSIS): ICD-10-CM

## 2022-10-04 RX ORDER — DULOXETIN HYDROCHLORIDE 60 MG/1
60 CAPSULE, DELAYED RELEASE ORAL DAILY
Qty: 30 CAPSULE | Refills: 3 | Status: CANCELLED | OUTPATIENT
Start: 2022-10-04 | End: 2023-02-01

## 2022-10-24 DIAGNOSIS — I50.32 CHRONIC HEART FAILURE WITH PRESERVED EJECTION FRACTION: ICD-10-CM

## 2022-10-26 RX ORDER — BUMETANIDE 0.5 MG/1
TABLET ORAL
Qty: 60 TABLET | Refills: 0 | Status: SHIPPED | OUTPATIENT
Start: 2022-10-26

## 2022-11-21 ENCOUNTER — TELEPHONE (OUTPATIENT)
Dept: FAMILY MEDICINE CLINIC | Facility: CLINIC | Age: 67
End: 2022-11-21

## 2022-11-21 NOTE — TELEPHONE ENCOUNTER
Incoming Refill Request      Medication requested (name and dose): loperamide (IMODIUM) 2 MG capsule    Pharmacy where request should be sent: [facility]    Additional details provided by patient:     Best call back number: [phone]    Does the patient have less than a 3 day supply:  [x] Yes  [] No    Shania Ruby  11/21/22, 14:51 CST

## 2023-03-20 DIAGNOSIS — K21.9 GASTROESOPHAGEAL REFLUX DISEASE WITHOUT ESOPHAGITIS: ICD-10-CM

## 2023-03-20 RX ORDER — OMEPRAZOLE 20 MG/1
CAPSULE, DELAYED RELEASE ORAL
Qty: 90 CAPSULE | Refills: 3 | OUTPATIENT
Start: 2023-03-20

## 2024-10-15 NOTE — PROGRESS NOTES
Subjective:             Lauren Olson is a 67 y.o. female who complains today of:     Chief Complaint   Patient presents with    Follow-up     F/u       HPI  Mild cough due to weather changes.  C/o shortness of breath, She just rest and it goes away.   C/o feeling tired   No Wheezing. Feels tired with exertion.   No Hemoptysis.No Chest tightness.   No Chest pain with radiation or pleuritic pain.  No leg edema. No orthopnea.  No Fever or chills.   No Rhinorrhea and postnasal drip.  She has albuterol  HFA.inhaler but she is not using bronchodilator.      CXR No acute process. Severe scoliosis.     HRCT on 11/1/2022: No evidence of ILD, clear lung parenchyma.       RLS on requip. She has chronic insomnia, will be taking Zquil.     Allergies:  Morphine  Past Medical History:   Diagnosis Date    Adhesive capsulitis of right shoulder 8/12/2021    Alcoholism (HCC)     Arthritis     Bilateral leg and foot pain 8/12/2021    Chronic back pain 5/17/2022    Chronic lung disease     Cognitive impairment 4/15/2021    Depression     Diabetes mellitus (HCC)     Diarrhea 1/4/2021    Possibly related to Metformin, Keppra, constipation, colitis.  Stop Metformin.  Treat constipation.  Consider adjustment to Keppra given side effects.    Encephalopathy 12/10/2020    Grief reaction 4/15/2021    Hyperlipidemia     Hypertension     Hypokalemia 11/6/2020    Hypomagnesemia 11/6/2020    Hypothyroidism     Lymphadenopathy, axillary 1/4/2021    Numbness and tingling of upper and lower extremities of both sides 8/12/2021    RBBB (right bundle branch block) 6/30/2023    Second hand smoke exposure     Seizure (HCC)     Seronegative arthritis 6/28/2022    Squamous blepharitis of upper and lower eyelids of both eyes 5/17/2022    Stage 3b chronic kidney disease (HCC) 8/12/2021    Syncope and collapse 12/10/2020    Thoracogenic scoliosis of thoracolumbar region 5/17/2022     Past Surgical History:   Procedure Laterality Date    APPENDECTOMY    Patient called at 0755 on 8/27/21 to discuss lab results.      Hyperkalemia [5.7]: Spoke directly with her and instructed her to discontinue her potassium chloride 10 MEQ tablet BID due to her elevated potassium.  Let her know that her potassium would be rechecked in 3 months [11/26].    She is on no diuretics and has only one recorded episode of hypokalemia since she was started on potassium in 2016 by  cardiology.       Hypertriglyceridemia [249]: Notified her that her dietary changes and current medication therapy are effective.  Lipid panel done 8/26/2021 showed that her cholesterol levels are within normal. Encouraged to continue her hard work.           This document has been electronically signed by Leticia Tejeda MD on August 30, 2021 09:06 CDT

## (undated) DEVICE — SINGLE-USE BIOPSY FORCEPS: Brand: RADIAL JAW 4

## (undated) DEVICE — CANN SMPL SOFTECH BIFLO ETCO2 A/M 7FT

## (undated) DEVICE — TRAP SXN POLYP QUICKCATCH LF

## (undated) DEVICE — Device: Brand: DISPOSABLE ELECTROSURGICAL SNARE

## (undated) DEVICE — BITEBLOCK ENDO W/STRAP 60F A/ LF DISP